# Patient Record
Sex: FEMALE | Race: BLACK OR AFRICAN AMERICAN | NOT HISPANIC OR LATINO | Employment: OTHER | URBAN - METROPOLITAN AREA
[De-identification: names, ages, dates, MRNs, and addresses within clinical notes are randomized per-mention and may not be internally consistent; named-entity substitution may affect disease eponyms.]

---

## 2023-01-17 ENCOUNTER — HOSPITAL ENCOUNTER (INPATIENT)
Dept: DATA CONVERSION | Facility: HOSPITAL | Age: 78
Discharge: NURSING, REHAB OR LONG-TERM CARE | End: 2023-01-23
Attending: ORTHOPAEDIC SURGERY | Admitting: ORTHOPAEDIC SURGERY
Payer: MEDICARE

## 2023-01-17 DIAGNOSIS — M50.00 CERVICAL DISC DISORDER WITH MYELOPATHY, UNSPECIFIED CERVICAL REGION: ICD-10-CM

## 2023-01-17 DIAGNOSIS — J44.9 CHRONIC OBSTRUCTIVE PULMONARY DISEASE, UNSPECIFIED (MULTI): ICD-10-CM

## 2023-01-17 DIAGNOSIS — Z86.718 PERSONAL HISTORY OF OTHER VENOUS THROMBOSIS AND EMBOLISM: ICD-10-CM

## 2023-01-17 DIAGNOSIS — Z20.822 CONTACT WITH AND (SUSPECTED) EXPOSURE TO COVID-19: ICD-10-CM

## 2023-01-17 DIAGNOSIS — Z87.891 PERSONAL HISTORY OF NICOTINE DEPENDENCE: ICD-10-CM

## 2023-01-17 DIAGNOSIS — D64.9 ANEMIA, UNSPECIFIED: ICD-10-CM

## 2023-01-17 DIAGNOSIS — F03.90 UNSPECIFIED DEMENTIA, UNSPECIFIED SEVERITY, WITHOUT BEHAVIORAL DISTURBANCE, PSYCHOTIC DISTURBANCE, MOOD DISTURBANCE, AND ANXIETY (MULTI): ICD-10-CM

## 2023-01-17 DIAGNOSIS — E11.9 TYPE 2 DIABETES MELLITUS WITHOUT COMPLICATIONS (MULTI): ICD-10-CM

## 2023-01-17 DIAGNOSIS — F32.A DEPRESSION, UNSPECIFIED: ICD-10-CM

## 2023-01-17 DIAGNOSIS — E66.9 OBESITY, UNSPECIFIED: ICD-10-CM

## 2023-01-17 DIAGNOSIS — Q76.412: ICD-10-CM

## 2023-01-17 DIAGNOSIS — M54.12 RADICULOPATHY, CERVICAL REGION: ICD-10-CM

## 2023-01-17 DIAGNOSIS — Z86.16 PERSONAL HISTORY OF COVID-19: ICD-10-CM

## 2023-01-17 DIAGNOSIS — M48.02 SPINAL STENOSIS, CERVICAL REGION: ICD-10-CM

## 2023-01-17 DIAGNOSIS — I10 ESSENTIAL (PRIMARY) HYPERTENSION: ICD-10-CM

## 2023-01-17 DIAGNOSIS — G99.2 MYELOPATHY IN DISEASES CLASSIFIED ELSEWHERE (MULTI): ICD-10-CM

## 2023-01-17 DIAGNOSIS — Z96.649 PRESENCE OF UNSPECIFIED ARTIFICIAL HIP JOINT: ICD-10-CM

## 2023-01-17 DIAGNOSIS — Z96.659 PRESENCE OF UNSPECIFIED ARTIFICIAL KNEE JOINT: ICD-10-CM

## 2023-01-17 LAB
PATIENT TEMPERATURE: 37 DEGREES C
PATIENT TEMPERATURE: 37 DEGREES C
POCT ANION GAP, ARTERIAL: 11 MMOL/L (ref 10–25)
POCT ANION GAP, ARTERIAL: 12 MMOL/L (ref 10–25)
POCT BASE EXCESS, ARTERIAL: -2.7 MMOL/L (ref -2–3)
POCT BASE EXCESS, ARTERIAL: -3.8 MMOL/L (ref -2–3)
POCT CHLORIDE, ARTERIAL: 107 MMOL/L (ref 98–107)
POCT CHLORIDE, ARTERIAL: 108 MMOL/L (ref 98–107)
POCT GLUCOSE, ARTERIAL: 149 MG/DL (ref 74–99)
POCT GLUCOSE, ARTERIAL: 164 MG/DL (ref 74–99)
POCT GLUCOSE: 139 MG/DL (ref 74–99)
POCT GLUCOSE: 175 MG/DL (ref 74–99)
POCT HCO3, ARTERIAL: 21.5 MMOL/L (ref 22–26)
POCT HCO3, ARTERIAL: 21.8 MMOL/L (ref 22–26)
POCT HEMATOCRIT, ARTERIAL: 32 % (ref 36–46)
POCT HEMATOCRIT, ARTERIAL: 32 % (ref 36–46)
POCT HEMOGLOBIN, ARTERIAL: 10.7 G/DL (ref 12–16)
POCT HEMOGLOBIN, ARTERIAL: 10.8 G/DL (ref 12–16)
POCT IONIZED CALCIUM, ARTERIAL: 1.28 MMOL/L (ref 1.1–1.33)
POCT IONIZED CALCIUM, ARTERIAL: 1.31 MMOL/L (ref 1.1–1.33)
POCT LACTATE, ARTERIAL: 0.7 MMOL/L (ref 0.4–2)
POCT LACTATE, ARTERIAL: 0.8 MMOL/L (ref 0.4–2)
POCT OXY HEMOGLOBIN, ARTERIAL: 94.5 % (ref 94–98)
POCT OXY HEMOGLOBIN, ARTERIAL: 95.8 % (ref 94–98)
POCT PCO2, ARTERIAL: 36 MMHG (ref 38–42)
POCT PCO2, ARTERIAL: 39 MMHG (ref 38–42)
POCT PH, ARTERIAL: 7.35 (ref 7.38–7.42)
POCT PH, ARTERIAL: 7.39 (ref 7.38–7.42)
POCT PO2, ARTERIAL: 76 MMHG (ref 85–95)
POCT PO2, ARTERIAL: 92 MMHG (ref 85–95)
POCT POTASSIUM, ARTERIAL: 4 MMOL/L (ref 3.5–5.3)
POCT POTASSIUM, ARTERIAL: 4.2 MMOL/L (ref 3.5–5.3)
POCT SO2, ARTERIAL: 97 % (ref 94–100)
POCT SO2, ARTERIAL: 99 % (ref 94–100)
POCT SODIUM, ARTERIAL: 136 MMOL/L (ref 136–145)
POCT SODIUM, ARTERIAL: 137 MMOL/L (ref 136–145)

## 2023-01-18 LAB
ANION GAP IN SER/PLAS: 16 MMOL/L (ref 10–20)
CALCIUM (MG/DL) IN SER/PLAS: 8.7 MG/DL (ref 8.6–10.6)
CARBON DIOXIDE, TOTAL (MMOL/L) IN SER/PLAS: 21 MMOL/L (ref 21–32)
CHLORIDE (MMOL/L) IN SER/PLAS: 105 MMOL/L (ref 98–107)
CREATININE (MG/DL) IN SER/PLAS: 0.73 MG/DL (ref 0.5–1.05)
ERYTHROCYTE DISTRIBUTION WIDTH (RATIO) BY AUTOMATED COUNT: 14.8 % (ref 11.5–14.5)
ERYTHROCYTE MEAN CORPUSCULAR HEMOGLOBIN CONCENTRATION (G/DL) BY AUTOMATED: 29.8 G/DL (ref 32–36)
ERYTHROCYTE MEAN CORPUSCULAR VOLUME (FL) BY AUTOMATED COUNT: 93 FL (ref 80–100)
ERYTHROCYTES (10*6/UL) IN BLOOD BY AUTOMATED COUNT: 3.81 X10E12/L (ref 4–5.2)
GFR FEMALE: 85 ML/MIN/1.73M2
GLUCOSE (MG/DL) IN SER/PLAS: 190 MG/DL (ref 74–99)
HEMATOCRIT (%) IN BLOOD BY AUTOMATED COUNT: 35.6 % (ref 36–46)
HEMOGLOBIN (G/DL) IN BLOOD: 10.6 G/DL (ref 12–16)
LEUKOCYTES (10*3/UL) IN BLOOD BY AUTOMATED COUNT: 12.8 X10E9/L (ref 4.4–11.3)
NRBC (PER 100 WBCS) BY AUTOMATED COUNT: 0 /100 WBC (ref 0–0)
PLATELETS (10*3/UL) IN BLOOD AUTOMATED COUNT: 314 X10E9/L (ref 150–450)
POTASSIUM (MMOL/L) IN SER/PLAS: 4.7 MMOL/L (ref 3.5–5.3)
SODIUM (MMOL/L) IN SER/PLAS: 137 MMOL/L (ref 136–145)
UREA NITROGEN (MG/DL) IN SER/PLAS: 15 MG/DL (ref 6–23)

## 2023-01-19 LAB
ABO GROUP (TYPE) IN BLOOD: NORMAL
ANION GAP IN SER/PLAS: 14 MMOL/L (ref 10–20)
ANION GAP IN SER/PLAS: 14 MMOL/L (ref 10–20)
ANTIBODY SCREEN: NORMAL
BASOPHILS (10*3/UL) IN BLOOD BY AUTOMATED COUNT: 0.03 X10E9/L (ref 0–0.1)
BASOPHILS/100 LEUKOCYTES IN BLOOD BY AUTOMATED COUNT: 0.3 % (ref 0–2)
CALCIUM (MG/DL) IN SER/PLAS: 8.8 MG/DL (ref 8.6–10.6)
CALCIUM (MG/DL) IN SER/PLAS: 8.9 MG/DL (ref 8.6–10.6)
CARBON DIOXIDE, TOTAL (MMOL/L) IN SER/PLAS: 23 MMOL/L (ref 21–32)
CARBON DIOXIDE, TOTAL (MMOL/L) IN SER/PLAS: 25 MMOL/L (ref 21–32)
CHLORIDE (MMOL/L) IN SER/PLAS: 104 MMOL/L (ref 98–107)
CHLORIDE (MMOL/L) IN SER/PLAS: 106 MMOL/L (ref 98–107)
CREATININE (MG/DL) IN SER/PLAS: 0.62 MG/DL (ref 0.5–1.05)
CREATININE (MG/DL) IN SER/PLAS: 0.69 MG/DL (ref 0.5–1.05)
EOSINOPHILS (10*3/UL) IN BLOOD BY AUTOMATED COUNT: 0.1 X10E9/L (ref 0–0.4)
EOSINOPHILS/100 LEUKOCYTES IN BLOOD BY AUTOMATED COUNT: 1 % (ref 0–6)
ERYTHROCYTE DISTRIBUTION WIDTH (RATIO) BY AUTOMATED COUNT: 14.6 % (ref 11.5–14.5)
ERYTHROCYTE DISTRIBUTION WIDTH (RATIO) BY AUTOMATED COUNT: 14.8 % (ref 11.5–14.5)
ERYTHROCYTE MEAN CORPUSCULAR HEMOGLOBIN CONCENTRATION (G/DL) BY AUTOMATED: 28.6 G/DL (ref 32–36)
ERYTHROCYTE MEAN CORPUSCULAR HEMOGLOBIN CONCENTRATION (G/DL) BY AUTOMATED: 31 G/DL (ref 32–36)
ERYTHROCYTE MEAN CORPUSCULAR VOLUME (FL) BY AUTOMATED COUNT: 90 FL (ref 80–100)
ERYTHROCYTE MEAN CORPUSCULAR VOLUME (FL) BY AUTOMATED COUNT: 97 FL (ref 80–100)
ERYTHROCYTES (10*6/UL) IN BLOOD BY AUTOMATED COUNT: 3.87 X10E12/L (ref 4–5.2)
ERYTHROCYTES (10*6/UL) IN BLOOD BY AUTOMATED COUNT: 3.87 X10E12/L (ref 4–5.2)
GFR FEMALE: 90 ML/MIN/1.73M2
GFR FEMALE: >90 ML/MIN/1.73M2
GLUCOSE (MG/DL) IN SER/PLAS: 108 MG/DL (ref 74–99)
GLUCOSE (MG/DL) IN SER/PLAS: 82 MG/DL (ref 74–99)
HEMATOCRIT (%) IN BLOOD BY AUTOMATED COUNT: 34.8 % (ref 36–46)
HEMATOCRIT (%) IN BLOOD BY AUTOMATED COUNT: 37.7 % (ref 36–46)
HEMOGLOBIN (G/DL) IN BLOOD: 10.8 G/DL (ref 12–16)
HEMOGLOBIN (G/DL) IN BLOOD: 10.8 G/DL (ref 12–16)
IMMATURE GRANULOCYTES/100 LEUKOCYTES IN BLOOD BY AUTOMATED COUNT: 0.4 % (ref 0–0.9)
LEUKOCYTES (10*3/UL) IN BLOOD BY AUTOMATED COUNT: 11.4 X10E9/L (ref 4.4–11.3)
LEUKOCYTES (10*3/UL) IN BLOOD BY AUTOMATED COUNT: 9.9 X10E9/L (ref 4.4–11.3)
LYMPHOCYTES (10*3/UL) IN BLOOD BY AUTOMATED COUNT: 1.68 X10E9/L (ref 0.8–3)
LYMPHOCYTES/100 LEUKOCYTES IN BLOOD BY AUTOMATED COUNT: 17 % (ref 13–44)
MONOCYTES (10*3/UL) IN BLOOD BY AUTOMATED COUNT: 1.35 X10E9/L (ref 0.05–0.8)
MONOCYTES/100 LEUKOCYTES IN BLOOD BY AUTOMATED COUNT: 13.6 % (ref 2–10)
NEUTROPHILS (10*3/UL) IN BLOOD BY AUTOMATED COUNT: 6.7 X10E9/L (ref 1.6–5.5)
NEUTROPHILS/100 LEUKOCYTES IN BLOOD BY AUTOMATED COUNT: 67.7 % (ref 40–80)
NRBC (PER 100 WBCS) BY AUTOMATED COUNT: 0 /100 WBC (ref 0–0)
NRBC (PER 100 WBCS) BY AUTOMATED COUNT: 0 /100 WBC (ref 0–0)
PLATELETS (10*3/UL) IN BLOOD AUTOMATED COUNT: 315 X10E9/L (ref 150–450)
PLATELETS (10*3/UL) IN BLOOD AUTOMATED COUNT: 346 X10E9/L (ref 150–450)
POTASSIUM (MMOL/L) IN SER/PLAS: 4 MMOL/L (ref 3.5–5.3)
POTASSIUM (MMOL/L) IN SER/PLAS: 4.3 MMOL/L (ref 3.5–5.3)
RH FACTOR: NORMAL
SODIUM (MMOL/L) IN SER/PLAS: 139 MMOL/L (ref 136–145)
SODIUM (MMOL/L) IN SER/PLAS: 139 MMOL/L (ref 136–145)
UREA NITROGEN (MG/DL) IN SER/PLAS: 14 MG/DL (ref 6–23)
UREA NITROGEN (MG/DL) IN SER/PLAS: 14 MG/DL (ref 6–23)

## 2023-01-20 LAB
ANION GAP IN SER/PLAS: 16 MMOL/L (ref 10–20)
CALCIUM (MG/DL) IN SER/PLAS: 9 MG/DL (ref 8.6–10.6)
CARBON DIOXIDE, TOTAL (MMOL/L) IN SER/PLAS: 22 MMOL/L (ref 21–32)
CHLORIDE (MMOL/L) IN SER/PLAS: 104 MMOL/L (ref 98–107)
CREATININE (MG/DL) IN SER/PLAS: 0.58 MG/DL (ref 0.5–1.05)
ERYTHROCYTE DISTRIBUTION WIDTH (RATIO) BY AUTOMATED COUNT: 14.8 % (ref 11.5–14.5)
ERYTHROCYTE MEAN CORPUSCULAR HEMOGLOBIN CONCENTRATION (G/DL) BY AUTOMATED: 29.2 G/DL (ref 32–36)
ERYTHROCYTE MEAN CORPUSCULAR VOLUME (FL) BY AUTOMATED COUNT: 95 FL (ref 80–100)
ERYTHROCYTES (10*6/UL) IN BLOOD BY AUTOMATED COUNT: 4.09 X10E12/L (ref 4–5.2)
GFR FEMALE: >90 ML/MIN/1.73M2
GLUCOSE (MG/DL) IN SER/PLAS: 50 MG/DL (ref 74–99)
HEMATOCRIT (%) IN BLOOD BY AUTOMATED COUNT: 38.7 % (ref 36–46)
HEMOGLOBIN (G/DL) IN BLOOD: 11.3 G/DL (ref 12–16)
LEUKOCYTES (10*3/UL) IN BLOOD BY AUTOMATED COUNT: 8.6 X10E9/L (ref 4.4–11.3)
NRBC (PER 100 WBCS) BY AUTOMATED COUNT: 0 /100 WBC (ref 0–0)
PATIENT TEMPERATURE: 37 DEGREES C
PLATELETS (10*3/UL) IN BLOOD AUTOMATED COUNT: 303 X10E9/L (ref 150–450)
POCT ANION GAP, ARTERIAL: 12 MMOL/L (ref 10–25)
POCT BASE EXCESS, ARTERIAL: -4.7 MMOL/L (ref -2–3)
POCT CHLORIDE, ARTERIAL: 103 MMOL/L (ref 98–107)
POCT GLUCOSE, ARTERIAL: 115 MG/DL (ref 74–99)
POCT GLUCOSE: 115 MG/DL (ref 74–99)
POCT HCO3, ARTERIAL: 21.6 MMOL/L (ref 22–26)
POCT HEMATOCRIT, ARTERIAL: 35 % (ref 36–46)
POCT HEMOGLOBIN, ARTERIAL: 11.6 G/DL (ref 12–16)
POCT IONIZED CALCIUM, ARTERIAL: 1.21 MMOL/L (ref 1.1–1.33)
POCT LACTATE, ARTERIAL: 1.3 MMOL/L (ref 0.4–2)
POCT OXY HEMOGLOBIN, ARTERIAL: 95.5 % (ref 94–98)
POCT PCO2, ARTERIAL: 44 MMHG (ref 38–42)
POCT PH, ARTERIAL: 7.3 (ref 7.38–7.42)
POCT PO2, ARTERIAL: 92 MMHG (ref 85–95)
POCT POTASSIUM, ARTERIAL: 4 MMOL/L (ref 3.5–5.3)
POCT SO2, ARTERIAL: 98 % (ref 94–100)
POCT SODIUM, ARTERIAL: 133 MMOL/L (ref 136–145)
POTASSIUM (MMOL/L) IN SER/PLAS: 3.8 MMOL/L (ref 3.5–5.3)
SODIUM (MMOL/L) IN SER/PLAS: 138 MMOL/L (ref 136–145)
UREA NITROGEN (MG/DL) IN SER/PLAS: 13 MG/DL (ref 6–23)

## 2023-01-21 LAB
ANION GAP IN SER/PLAS: 18 MMOL/L (ref 10–20)
CALCIUM (MG/DL) IN SER/PLAS: 8.6 MG/DL (ref 8.6–10.6)
CARBON DIOXIDE, TOTAL (MMOL/L) IN SER/PLAS: 23 MMOL/L (ref 21–32)
CHLORIDE (MMOL/L) IN SER/PLAS: 102 MMOL/L (ref 98–107)
CREATININE (MG/DL) IN SER/PLAS: 0.7 MG/DL (ref 0.5–1.05)
ERYTHROCYTE DISTRIBUTION WIDTH (RATIO) BY AUTOMATED COUNT: 14.6 % (ref 11.5–14.5)
ERYTHROCYTE MEAN CORPUSCULAR HEMOGLOBIN CONCENTRATION (G/DL) BY AUTOMATED: 30.2 G/DL (ref 32–36)
ERYTHROCYTE MEAN CORPUSCULAR VOLUME (FL) BY AUTOMATED COUNT: 94 FL (ref 80–100)
ERYTHROCYTES (10*6/UL) IN BLOOD BY AUTOMATED COUNT: 3.94 X10E12/L (ref 4–5.2)
GFR FEMALE: 90 ML/MIN/1.73M2
GLUCOSE (MG/DL) IN SER/PLAS: 104 MG/DL (ref 74–99)
HEMATOCRIT (%) IN BLOOD BY AUTOMATED COUNT: 37.1 % (ref 36–46)
HEMOGLOBIN (G/DL) IN BLOOD: 11.2 G/DL (ref 12–16)
LEUKOCYTES (10*3/UL) IN BLOOD BY AUTOMATED COUNT: 12.9 X10E9/L (ref 4.4–11.3)
NRBC (PER 100 WBCS) BY AUTOMATED COUNT: 0 /100 WBC (ref 0–0)
PLATELETS (10*3/UL) IN BLOOD AUTOMATED COUNT: 364 X10E9/L (ref 150–450)
POTASSIUM (MMOL/L) IN SER/PLAS: 4.5 MMOL/L (ref 3.5–5.3)
SODIUM (MMOL/L) IN SER/PLAS: 138 MMOL/L (ref 136–145)
UREA NITROGEN (MG/DL) IN SER/PLAS: 15 MG/DL (ref 6–23)

## 2023-01-22 LAB
ANION GAP IN SER/PLAS: 14 MMOL/L (ref 10–20)
CALCIUM (MG/DL) IN SER/PLAS: 8.5 MG/DL (ref 8.6–10.6)
CARBON DIOXIDE, TOTAL (MMOL/L) IN SER/PLAS: 26 MMOL/L (ref 21–32)
CHLORIDE (MMOL/L) IN SER/PLAS: 102 MMOL/L (ref 98–107)
CREATININE (MG/DL) IN SER/PLAS: 0.57 MG/DL (ref 0.5–1.05)
ERYTHROCYTE DISTRIBUTION WIDTH (RATIO) BY AUTOMATED COUNT: 14.5 % (ref 11.5–14.5)
ERYTHROCYTE MEAN CORPUSCULAR HEMOGLOBIN CONCENTRATION (G/DL) BY AUTOMATED: 30.7 G/DL (ref 32–36)
ERYTHROCYTE MEAN CORPUSCULAR VOLUME (FL) BY AUTOMATED COUNT: 90 FL (ref 80–100)
ERYTHROCYTES (10*6/UL) IN BLOOD BY AUTOMATED COUNT: 4 X10E12/L (ref 4–5.2)
GFR FEMALE: >90 ML/MIN/1.73M2
GLUCOSE (MG/DL) IN SER/PLAS: 87 MG/DL (ref 74–99)
HEMATOCRIT (%) IN BLOOD BY AUTOMATED COUNT: 36.1 % (ref 36–46)
HEMOGLOBIN (G/DL) IN BLOOD: 11.1 G/DL (ref 12–16)
LEUKOCYTES (10*3/UL) IN BLOOD BY AUTOMATED COUNT: 11.1 X10E9/L (ref 4.4–11.3)
NRBC (PER 100 WBCS) BY AUTOMATED COUNT: 0 /100 WBC (ref 0–0)
PLATELETS (10*3/UL) IN BLOOD AUTOMATED COUNT: 375 X10E9/L (ref 150–450)
POTASSIUM (MMOL/L) IN SER/PLAS: 4.1 MMOL/L (ref 3.5–5.3)
SODIUM (MMOL/L) IN SER/PLAS: 138 MMOL/L (ref 136–145)
UREA NITROGEN (MG/DL) IN SER/PLAS: 16 MG/DL (ref 6–23)

## 2023-01-23 LAB
ANION GAP IN SER/PLAS: 14 MMOL/L (ref 10–20)
CALCIUM (MG/DL) IN SER/PLAS: 8.3 MG/DL (ref 8.6–10.6)
CARBON DIOXIDE, TOTAL (MMOL/L) IN SER/PLAS: 26 MMOL/L (ref 21–32)
CHLORIDE (MMOL/L) IN SER/PLAS: 103 MMOL/L (ref 98–107)
CREATININE (MG/DL) IN SER/PLAS: 0.55 MG/DL (ref 0.5–1.05)
ERYTHROCYTE DISTRIBUTION WIDTH (RATIO) BY AUTOMATED COUNT: 14.7 % (ref 11.5–14.5)
ERYTHROCYTE MEAN CORPUSCULAR HEMOGLOBIN CONCENTRATION (G/DL) BY AUTOMATED: 31.1 G/DL (ref 32–36)
ERYTHROCYTE MEAN CORPUSCULAR VOLUME (FL) BY AUTOMATED COUNT: 91 FL (ref 80–100)
ERYTHROCYTES (10*6/UL) IN BLOOD BY AUTOMATED COUNT: 3.9 X10E12/L (ref 4–5.2)
GFR FEMALE: >90 ML/MIN/1.73M2
GLUCOSE (MG/DL) IN SER/PLAS: 102 MG/DL (ref 74–99)
HEMATOCRIT (%) IN BLOOD BY AUTOMATED COUNT: 35.4 % (ref 36–46)
HEMOGLOBIN (G/DL) IN BLOOD: 11 G/DL (ref 12–16)
LEUKOCYTES (10*3/UL) IN BLOOD BY AUTOMATED COUNT: 8.6 X10E9/L (ref 4.4–11.3)
NRBC (PER 100 WBCS) BY AUTOMATED COUNT: 0 /100 WBC (ref 0–0)
PLATELETS (10*3/UL) IN BLOOD AUTOMATED COUNT: 350 X10E9/L (ref 150–450)
POTASSIUM (MMOL/L) IN SER/PLAS: 4 MMOL/L (ref 3.5–5.3)
SARS-COV-2 RESULT: NOT DETECTED
SODIUM (MMOL/L) IN SER/PLAS: 139 MMOL/L (ref 136–145)
UREA NITROGEN (MG/DL) IN SER/PLAS: 13 MG/DL (ref 6–23)

## 2023-03-02 NOTE — PROGRESS NOTES
Service: Orthopaedics     Subjective Data:   TAMARA PALMER is a 77 year old Female who is Hospital Day # 6 and POD #2 for 1. C3-6 posterior fusion with lateral mass screws;2. ;3. ;4. ;5.     Complaining of neck and back pain. Eating javi crackers and ginger ale without difficulty. No headaches/chest pain/weakness/numbness.    Objective Data:     Objective Information:      T   P  R  BP   MAP  SpO2   Value  36.8  98  16  124/84      91%  Date/Time 1/22 7:53 1/22 7:53 1/22 7:53 1/22 7:53    1/22 7:53  Range  (36.5C - 36.9C )  (86 - 99 )  (16 - 18 )  (122 - 142 )/ (79 - 100 )    (91% - 100% )   As of 21-Jan-2023 10:45:00, patient is on 1 L/min of oxygen via room air.  Highest temp of 36.9 C was recorded at 1/21 6:01      Pain reported at 1/22 7:53: 7 = Severe    Physical Exam Narrative:  ·  Physical Exam:    · Physical Exam:  - Constitutional: No acute distress, although not cooperative with exam  - Eyes: EOM grossly intact  - Head/Neck: Trachea midline  - Respiratory/Thorax: Normal work of breathing  - Cardiovascular: RRR on peripheral palpation  - Gastrointestinal: Nondistended  - Psychological: Appropriate mood/behavior  - Skin: Warm and dry. Additional findings in musculoskeletal evaluation  - Musculoskeletal:  SILT BUE/BLE  C5: SILT   Deltoid 5/5 Left; 5/5 Right  C6: SILT   Poor understanding of what she needs to do, but moving wrists at least antigravity  C7: SILT   Triceps: 5/5 Left; 5/5 Right  C8: SILT   Finger flexion: 5/5 Left; 5/5 Right  T1: SILT    Interossei: Left exam limited by baseline contracture; 5/5 Right      L1: SILT       L2: SILT      Hip flexors 5/5 Left; 5/5 Right  L3: SILT      Knee extension 5/5 Left; 5/5 Right  L4: SILT      Tib Ant. (Dorsiflexion) 5/5 Left (limited by baseline contracture); 5/5 Right  L5: SILT      EHL5/5 Left; 5/5 Right  S1: SILT      Plantar flexion 5/5 Left; 5/5 Right    Patellar reflex: 2+   Bilaterally    Negative Johnson bilaterally   Negative Babinski  bilaterally      Recent Lab Results:    Results:    CBC: 1/22/2023 06:31              \     Hgb     /                              \     11.1 L    /  WBC  ----------------  Plt               11.1       ----------------    375              /     Hct     \                              /     36.1       \            RBC: 4.00     MCV: 90           BMP: 1/22/2023 06:31  NA+        Cl-     BUN  /                         138    102    16  /  --------------------------------  Glucose                ---------------------------  87    K+     HCO3-   Creat \                         4.1  26    0.57  \  Calcium : 8.5 L    Anion Gap : 14      Assessment and Plan:   Daily Risk Screen:  ·  Does patient have an indwelling urinary catheter? yes   ·  Plan for indwelling urinary catheter removal today? yes     Code Status:  ·  Code Status Full Code     Assessment:    77F PMHx dementia, cervical stenosis now s/p C5 corpectomy, C4-6 ACDF on 1/17, C4-6 PSF with lateral mass screws w/Dr. Robb.     PLAN:   Weightbearing Status: WBAT BLE  Precautions: Maintain soft collar at all times  Pain: PO/IV pain meds   Postoperative ABx for 24 hours completed  DVT PPx: SCDs, early ambulation, hold chemoprophylaxis   Dressing: soft dressing  Drain:DHemovac x 1 removed 1/22  [place]: none  FEN: MIVFs; HLIV with good PO intake  Diet: Regular diet   Pulm: IS every 2 hrs, maintain O2 sat >92%  Bowel Regimen: Senna, Colace  Decadron 4mg q6h x4 doses completed.   PT/OT consult    Dispo: Medically ready for SNF placement    Anthony Justice MD  Orthopaedic Surgery  Pager: [phone], Doc[facility] preferred    While admitted, this patient will be  followed by the Ortho Spine Team. Please reach out to the below residents with any questions (all available via doc halo).  1st call: Roberto Tolentino PGY-2 ([phone])  2nd call: Anthony Justice, PGY-4 ([phone])    Please call [phone] between 6PM and 6AM on week days and on weekends.          Attestation:   Note Completion:  I am a:   Resident/Fellow   Attending Attestation I saw and evaluated the patient.  I personally obtained the key and critical portions of the history and physical exam or was physically present for key and  critical portions performed by the resident/fellow. I reviewed the resident/fellow?s documentation and discussed the patient with the resident/fellow.  I agree with the resident/fellow?s medical decision making as documented in the note.     I personally evaluated the patient on 22-Jan-2023         Electronic Signatures:  Irvin Robb)  (Signed 23-Jan-2023 16:17)   Authored: Note Completion   Co-Signer: Service, Subjective Data, Objective Data, Assessment and Plan, Note Completion  [place] (Resident))  (Signed 22-Jan-2023 11:17)   Authored: Service, Subjective Data, Objective Data, Assessment  and Plan, Note Completion      Last Updated: 23-Jan-2023 16:17 by Irvin Robb)

## 2023-03-02 NOTE — H&P
History & Physical Reviewed:   I have reviewed the History and Physical dated:  20-Jan-2023   History and Physical reviewed and relevant findings noted. Patient examined to review pertinent physical  findings.: No significant changes   Home Medications Reviewed: no changes noted   Allergies Reviewed: no changes noted       ERAS (Enhanced Recovery After Surgery):  ·  ERAS Patient: no     Consent:   COVID-19 Consent:  ·  COVID-19 Risk Consent Surgeon has reviewed key risks related to the risk of josé COVID-19 and if they contract COVID-19 what the risks are.     Attestation:   Note Completion:  I am a:  Resident/Fellow   Attending Attestation I saw and evaluated the patient.  I personally obtained the key and critical portions of the history and physical exam or was physically present for key and  critical portions performed by the resident/fellow. I reviewed the resident/fellow?s documentation and discussed the patient with the resident/fellow.  I agree with the resident/fellow?s medical decision making as documented in the note.     I personally evaluated the patient on 20-Jan-2023         Electronic Signatures:  Irvin Robb)  (Signed 23-Jan-2023 16:18)   Authored: Note Completion   Co-Signer: History & Physical Reviewed, ERAS, Consent, Note Completion  Roberto Tolentino (Resident))  (Signed 20-Jan-2023 05:39)   Authored: History & Physical Reviewed, ERAS, Consent,  Note Completion      Last Updated: 23-Jan-2023 16:18 by Irvin Robb)

## 2023-03-02 NOTE — PROGRESS NOTES
Service: Orthopaedics     Subjective Data:   TAMARA PALMER is a 77 year old Female who is Hospital Day # 5 and POD #1 for 1. C3-6 posterior fusion with lateral mass screws;2. ;3. ;4. ;5.     Doing well postoperatively. No complaints. Fells well. No headaches/chest pain/weakness/numbness.    Objective Data:     Objective Information:      T   P  R  BP   MAP  SpO2   Value  36.6  90  16  141/92      100%  Date/Time 1/21 7:26 1/21 7:26 1/21 7:26 1/21 7:26    1/21 7:26  Range  (36.6C - 36.9C )  (75 - 93 )  (10 - 18 )  (127 - 150 )/ (85 - 99 )    (81% - 100% )   As of 20-Jan-2023 21:45:00, patient is on 2 L/min of oxygen via nasal cannula.  Highest temp of 36.9 C was recorded at 1/20 21:45      Pain reported at 1/21 6:01: 2 = Mild    Physical Exam Narrative:  ·  Physical Exam:    · Physical Exam:  - Constitutional: No acute distress, although not cooperative with exam  - Eyes: EOM grossly intact  - Head/Neck: Trachea midline  - Respiratory/Thorax: Normal work of breathing  - Cardiovascular: RRR on peripheral palpation  - Gastrointestinal: Nondistended  - Psychological: Appropriate mood/behavior  - Skin: Warm and dry. Additional findings in musculoskeletal evaluation  - Musculoskeletal:  SILT BUE/BLE  C5: SILT   Deltoid 5/5 Left; 5/5 Right  C6: SILT   Poor understanding of what she needs to do, but moving wrists at least antibragivy  C7: SILT   Triceps: 5/5 Left; 5/5 Right  C8: SILT   Finger flexion: 5/5 Left; 5/5 Right  T1: SILT    Interossei: Left exam limited by baseline contracture; 5/5 Right      L1: SILT       L2: SILT      Hip flexors 5/5 Left; 5/5 Right  L3: SILT      Knee extension 5/5 Left; 5/5 Right  L4: SILT      Tib Ant. (Dorsiflexion) 5/5 Left (limited by baseline contracture); 5/5 Right  L5: SILT      EHL5/5 Left; 5/5 Right  S1: SILT      Plantar flexion 5/5 Left; 5/5 Right    Patellar reflex: 2+   Bilaterally    Negative Johnson bilaterally   Negative Babinski bilaterally      Recent Lab  Results:    Results:      ---------- Recent Arterial Blood Gas Results----------     1/20/2023 09:23  pO2 92  pH 7.30  pCO2 44  SO2 98  Base Excess -4.7null    Assessment and Plan:   Daily Risk Screen:  ·  Does patient have an indwelling urinary catheter? yes   ·  Plan for indwelling urinary catheter removal today? yes     Code Status:  ·  Code Status Full Code     Assessment:    77F PMHx dementia, cervical stenosis now s/p C5 corpectomy, C4-6 ACDF on 1/17, C4-6 PSF with lateral mass screws w/Dr. Robb. Doing very well.     PLAN:   Weightbearing Status: WBAT BLE  Precautions: Maintain soft collar at all times  Pain: PO/IV pain meds   Postoperative ABx for 24 hours completed  DVT PPx: SCDs, early ambulation, hold chemoprophylaxis   Dressing: soft dressing  Drain:DHemovac x 1  [place]: will remove today   FEN: MIVFs; HLIV with good PO intake  Diet: Regular diet   Pulm: IS every 2 hrs, maintain O2 sat >92%  Bowel Regimen: Senna, Colace  Decadron 4mg q6h x4 doses completed.   PT/OT consult    Dispo: SNF pending drain removal, PT    Roberto Tolentino MD  Orthopaedic Surgery  PGY-2  Pager: [phone]  Available via [facility]    While admitted, this patient will be followed by the Ortho Spine Team. Please reach out to the below residents with any questions (all available via doc halo).  1st call: Roberto Tolentino PGY-2 ([phone])  2nd call: Anthony Justice, PGY-4 ([phone])    Please call [phone] between 6PM and 6AM on week days and on weekends.          Attestation:   Note Completion:  I am a:  Resident/Fellow   Attending Attestation I saw and evaluated the patient.  I personally obtained the key and critical portions of the history and physical exam or was physically present for key and  critical portions performed by the resident/fellow. I reviewed the resident/fellow?s documentation and discussed the patient with the resident/fellow.  I agree with the resident/fellow?s medical decision making as documented in the note.     I personally  evaluated the patient on 21-Jan-2023         Electronic Signatures:  Irvin Robb)  (Signed 23-Jan-2023 16:17)   Authored: Note Completion   Co-Signer: Service, Subjective Data, Objective Data, Assessment and Plan, Note Completion  Roberto Tolentino (Resident))  (Signed 21-Jan-2023 09:21)   Authored: Service, Subjective Data, Objective Data, Assessment  and Plan, Note Completion      Last Updated: 23-Jan-2023 16:17 by Irvin Robb)

## 2023-03-02 NOTE — PROGRESS NOTES
Service: Orthopaedics     Subjective Data:   TAMARA PALMER is a 77 year old Female who is Hospital Day # 7 and POD #3 for 1. C3-6 posterior fusion with lateral mass screws;2. ;3. ;4. ;5.     No overnight events. Patient feels well and is looking forward to DC to SNF today. No shortness of breath. No chest pain. No headaches.    Objective Data:     Objective Information:      T   P  R  BP   MAP  SpO2   Value  36.6  109  18  143/92      93%  Date/Time 1/23 11:06 1/23 11:06 1/23 11:06 1/23 11:06    1/23 11:06  Range  (36.3C - 36.9C )  (89 - 111 )  (16 - 19 )  (124 - 150 )/ (79 - 100 )    (90% - 97% )  Highest temp of 36.9 C was recorded at 1/22 4:30      Pain reported at 1/23 8:29: 9 = Severe    Physical Exam Narrative:  ·  Physical Exam:    · Physical Exam:  - Constitutional: No acute distress, although not cooperative with exam  - Eyes: EOM grossly intact  - Head/Neck: Trachea midline  - Respiratory/Thorax: Normal work of breathing  - Cardiovascular: RRR on peripheral palpation  - Gastrointestinal: Nondistended  - Psychological: Appropriate mood/behavior  - Skin: Warm and dry. Additional findings in musculoskeletal evaluation  - Musculoskeletal:  SILT BUE/BLE  C5: SILT   Deltoid 4/5 Left; 4/5 Right (pain limited)   C6: SILT   Still having difficulty understanding this movement, but moving wrists at least antigravity  C7: SILT   Triceps: 5/5 Left; 5/5 Right  C8: SILT   Finger flexion: 5/5 Left; 5/5 Right  T1: SILT    Interossei: Left exam limited by baseline contracture; 5/5 Right      L1: SILT       L2: SILT      Hip flexors 5/5 Left; 5/5 Right  L3: SILT      Knee extension 5/5 Left; 5/5 Right  L4: SILT      Tib Ant. (Dorsiflexion) 5/5 Left (limited by baseline contracture); 5/5 Right  L5: SILT      EHL5/5 Left; 5/5 Right  S1: SILT      Plantar flexion 5/5 Left; 5/5 Right    Patellar reflex: 2+   Bilaterally    Negative Johnson bilaterally   Negative Babinski bilaterally      Recent Lab  Results:    Results:    CBC: 1/23/2023 05:53              \     Hgb     /                              \     11.0 L    /  WBC  ----------------  Plt               8.6       ----------------    350              /     Hct     \                              /     35.4 L    \            RBC: 3.90 L    MCV: 91           BMP: 1/23/2023 05:53  NA+        Cl-     BUN  /                         139    103    13  /  --------------------------------  Glucose                ---------------------------  102 H    K+     HCO3-   Creat \                         4.0  26    0.55  \  Calcium : 8.3 L    Anion Gap : 14      Assessment and Plan:   Daily Risk Screen:  ·  Does patient have an indwelling urinary catheter? yes   ·  Plan for indwelling urinary catheter removal today? yes     Code Status:  ·  Code Status Full Code     Assessment:    77F PMHx dementia, cervical stenosis now s/p C5 corpectomy, C4-6 ACDF on 1/17, C4-6 PSF with lateral mass screws w/Dr. Robb. Doing well. Satting well on room air,  but is more tachycardic today. Could be due to patient's pain and recently started amlodipine. Having some calf tenderness LLE when pressing firmly, so will obtain DVT scan to rule out any vascular pathology in setting of recent surgery.    PLAN:   Weightbearing Status: WBAT BLE  Precautions: Maintain soft collar at all times  Pain: PO/IV pain meds   Postoperative ABx for 24 hours completed  DVT PPx: SCDs, early ambulation, hold chemoprophylaxis   Dressing: soft dressing  Drain: Hemovac x 1 removed 1/22  [place]: none  FEN: MIVFs; HLIV with good PO intake  Diet: Regular diet   Pulm: IS every 2 hrs, maintain O2 sat >92%  Bowel Regimen: Senna, Colace  Decadron 4mg q6h x4 doses completed.   PT/OT consult    Dispo: Medically ready for SNF placement pending result of DVT scan    Roberto Tolentino MD  Orthopaedic Surgery  PGY-2  Pager: [phone]  Available via [facility]    While admitted, this patient will be  followed by the Ortho Spine Team.  Please reach out to the below residents with any questions (all available via doc halo).  1st call: Roberto Tolentino PGY-2 ([phone])  2nd call: Anthony Justice, PGY-4 ([phone])    Please call [phone] between 6PM and 6AM on week days and on weekends.          Attestation:   Note Completion:  I am a:  Resident/Fellow   Attending Attestation I saw and evaluated the patient.  I personally obtained the key and critical portions of the history and physical exam or was physically present for key and  critical portions performed by the resident/fellow. I reviewed the resident/fellow?s documentation and discussed the patient with the resident/fellow.  I agree with the resident/fellow?s medical decision making as documented in the note.     I personally evaluated the patient on 23-Jan-2023         Electronic Signatures:  Irvin Robb)  (Signed 23-Jan-2023 16:17)   Authored: Note Completion   Co-Signer: Service, Subjective Data, Objective Data, Assessment and Plan, Note Completion  Roberto Tolentino (Resident))  (Signed 23-Jan-2023 14:07)   Authored: Service, Subjective Data, Objective Data, Assessment  and Plan, Note Completion      Last Updated: 23-Jan-2023 16:17 by Irvin Robb)

## 2023-03-02 NOTE — PROGRESS NOTES
Service: Orthopaedics     Subjective Data:   TAMARA PALMER is a 77 year old Female who is Hospital Day # 2 and POD #1 for 1. C4-6 ACDF;2. Partial corpectomy of C4 and C6;3. C5 corpectomy;4. ;5.     Patient reports pain is well controlled. Denies chest pain, shortness of breath, or nausea. Feeling comfortable overall. Some confusion (baseline dementia).    Objective Data:     Objective Information:      T   P  R  BP   MAP  SpO2   Value  36.6  88  18  116/75   94  99%  Date/Time 1/18 8:28 1/18 8:28 1/18 8:28 1/18 8:28  1/18 3:20 1/18 8:28  Range  (36.5C - 36.8C )  (88 - 95 )  (17 - 18 )  (110 - 134 )/ (75 - 85 )  (88 - 101 )  (97% - 99% )      Pain reported at 1/18 1:45: 5 = Moderate    Physical Exam Narrative:  ·  Physical Exam:    · Physical Exam:  - Constitutional: No acute distress, although not cooperative with exam  - Eyes: EOM grossly intact  - Head/Neck: Trachea midline  - Respiratory/Thorax: Normal work of breathing  - Cardiovascular: RRR on peripheral palpation  - Gastrointestinal: Nondistended  - Psychological: Appropriate mood/behavior  - Skin: Warm and dry. Additional findings in musculoskeletal evaluation  - Musculoskeletal:  SILT BUE/BLE  Grossly moving all motor groups antigravity or greater, does have restricted ROM of R ankle at baseline  Not willing to participate in exam further     Recent Lab Results:    Results:    CBC: 1/18/2023 06:08              \     Hgb     /                              \     10.6 L    /  WBC  ----------------  Plt               12.8 H    ----------------    314              /     Hct     \                              /     35.6 L    \            RBC: 3.81 L    MCV: 93           BMP: 1/18/2023 06:08  NA+        Cl-     BUN  /                         137    105    15  /  --------------------------------  Glucose                ---------------------------  190 H    K+     HCO3-   Creat \                         4.7  21    0.73  \  Calcium : 8.7     Anion  Gap : 16        ---------- Recent Arterial Blood Gas Results----------     1/17/2023 10:45  pO2 92  24 h range: ( 76 - 92 )  pH 7.35  24 h range: ( 7.35 - 7.39 )  pCO2 39  24 h range: ( 36 - 39 )  SO2 99  24 h range: ( 97 - 99 )  Base Excess -3.8  24 h range: ( -3.8 - -2.7 )null    Assessment and Plan:   Daily Risk Screen:  ·  Does patient have an indwelling urinary catheter? yes   ·  Plan for indwelling urinary catheter removal today? yes     Code Status:  ·  Code Status Full Code     Advance Care Planning:  Advance Care Planning: I evaluated the patient  and determined the patient's capacity to understand the risks, benefits and alternatives to treatment. I elicited the patient's goals for treatment and reviewed advance directives and medical orders for life sustaining treatment. The patient was given  an opportunity to review a blank advance directive as appropriate.     Assessment:    77F PMHx dementia, cervical stenosis now s/p C5 corpectomy, C4-6 ACDF on 1/17 w/Dr. Robb. Doing well. Plan for return to OR 1/20 for posterior fixation    PLAN:   Weightbearing Status: WBAT BLE  Precautions: Maintain soft collar at all times  Pain: PO/IV pain meds   Postoperative ABx for 24 hours  DVT PPx: SCDs, early ambulation, hold chemoprophylaxis in setting of recent spine surgery  Dressing: soft dressing  Drain: HV Drain x1, record output q8h, will generally remove when <50cc (lumbar) or <30cc (cervical) q shift x2-3 shifts, will maintain today   [place]: will remove POD1   FEN: MIVFs; HLIV with good PO intake  Diet: Clear liquid diet. Advance as tolerated if no nausea and + bowel sounds  Pulm: IS every 2 hrs, maintain O2 sat >92%  Bowel Regimen: Senna, Colace  Decadron 4mg q6h x4 doses to complete today  PT/OT consult    Dispo: pending hospital course after next surgery    Roberto Tolentino MD  Orthopaedic Surgery  PGY-2  Pager: [phone]  Available via [facility]    While admitted, this patient will be followed by the Ortho Spine Team.  Please reach out to the below residents with any questions (all available via doc halo).  1st call: Roberto Tolentino PGY-2 ([phone])  2nd call: Anthony Justice, PGY-4 ([phone])    Please call [phone] between 6PM and 6AM on week days and on weekends.          Attestation:   Note Completion:  I am a:  Resident/Fellow   Attending Attestation I saw and evaluated the patient.  I personally obtained the key and critical portions of the history and physical exam or was physically present for key and  critical portions performed by the resident/fellow. I reviewed the resident/fellow?s documentation and discussed the patient with the resident/fellow.  I agree with the resident/fellow?s medical decision making as documented in the note.     I personally evaluated the patient on 18-Jan-2023         Electronic Signatures:  Irvin Robb)  (Signed 23-Jan-2023 16:18)   Authored: Note Completion   Co-Signer: Service, Subjective Data, Objective Data, Assessment and Plan, Note Completion  Roberto Tolentino (Resident))  (Signed 18-Jan-2023 08:39)   Authored: Service, Subjective Data, Objective Data, Assessment  and Plan, Note Completion      Last Updated: 23-Jan-2023 16:18 by Irvin Robb)

## 2023-03-02 NOTE — PROGRESS NOTES
Service: Orthopaedics     Subjective Data:   TAMARA PALMER is a 77 year old Female who is Hospital Day # 3 and POD #2 for 1. C4-6 ACDF;2. Partial corpectomy of C4 and C6;3. C5 corpectomy;4. ;5.     Patient reports pain is well controlled. Denies chest pain, shortness of breath, or nausea. Feeling comfortable overall. Some confusion (baseline dementia). Overall doing well. Ready for surgery tomorrow.    Objective Data:     Objective Information:      T   P  R  BP   MAP  SpO2   Value  36.6  90  18  143/89   94  95%  Date/Time 1/19 21:26 1/19 21:26 1/19 21:26 1/19 21:26  1/18 3:20 1/19 21:26  Range  (36.5C - 37.5C )  (85 - 98 )  (17 - 19 )  (110 - 143 )/ (68 - 89 )  (94 - 101 )  (89% - 99% )  Highest temp of 37.5 C was recorded at 1/19 7:15      Pain reported at 1/19 8:44: 8 = Severe    Physical Exam Narrative:  ·  Physical Exam:    · Physical Exam:  - Constitutional: No acute distress, although not cooperative with exam  - Eyes: EOM grossly intact  - Head/Neck: Trachea midline  - Respiratory/Thorax: Normal work of breathing  - Cardiovascular: RRR on peripheral palpation  - Gastrointestinal: Nondistended  - Psychological: Appropriate mood/behavior  - Skin: Warm and dry. Additional findings in musculoskeletal evaluation  - Musculoskeletal:  SILT BUE/BLE  Grossly moving all motor groups antigravity or greater, does have restricted ROM of R ankle at baseline  Not willing to participate in exam further     Recent Lab Results:    Results:    CBC: 1/19/2023 21:14              \     Hgb     /                              \     10.8 L    /  WBC  ----------------  Plt               9.9       ----------------    315              /     Hct     \                              /     34.8 L    \            RBC: 3.87 L    MCV: 90     Neutrophil %: 67.7      BMP: 1/19/2023 21:14  NA+        Cl-     BUN  /                         139    104    14  /  --------------------------------  Glucose                 ---------------------------  82    K+     HCO3-   Creat \                         4.0  25    0.62  \  Calcium : 8.8     Anion Gap : 14      Assessment and Plan:   Daily Risk Screen:  ·  Does patient have an indwelling urinary catheter? yes   ·  Plan for indwelling urinary catheter removal today? yes     Code Status:  ·  Code Status Full Code     Assessment:    77F PMHx dementia, cervical stenosis now s/p C5 corpectomy, C4-6 ACDF on 1/17 w/Dr. Robb. Doing well. No new events. Plan for return to OR 1/20 for posterior fixation.    PLAN:   Weightbearing Status: WBAT BLE  Precautions: Maintain soft collar at all times  Pain: PO/IV pain meds   Postoperative ABx for 24 hours  DVT PPx: SCDs, early ambulation, hold chemoprophylaxis in setting of recent spine surgery  Dressing: soft dressing  Drain: HV Drain x1, record output q8h, will generally remove when <50cc (lumbar) or <30cc (cervical) q shift x2-3 shifts, will maintain today   [place]: will remove POD1   FEN: MIVFs; HLIV with good PO intake  Diet: Clear liquid diet. Advance as tolerated if no nausea and + bowel sounds  Pulm: IS every 2 hrs, maintain O2 sat >92%  Bowel Regimen: Senna, Colace  Decadron 4mg q6h x4 doses completed.   PT/OT consult    Dispo: pending hospital course after next surgery    Roberto Tolentino MD  Orthopaedic Surgery  PGY-2  Pager: [phone]  Available via Doc Halo    While admitted, this patient will be followed by the Ortho Spine Team. Please reach out to the below residents with any questions (all available via doc halo).  1st call: Roberto Tolentino PGY-2 ([phone])  2nd call: Anthony Justice, PGY-4 ([phone])    Please call [phone] between 6PM and 6AM on week days and on weekends.          Attestation:   Note Completion:  I am a:  Resident/Fellow   Attending Attestation I saw and evaluated the patient.  I personally obtained the key and critical portions of the history and physical exam or was physically present for key and  critical portions performed by the  resident/fellow. I reviewed the resident/fellow?s documentation and discussed the patient with the resident/fellow.  I agree with the resident/fellow?s medical decision making as documented in the note.     I personally evaluated the patient on 19-Jan-2023         Electronic Signatures:  Irvin Robb (MD)  (Signed 23-Jan-2023 16:18)   Authored: Note Completion   Co-Signer: Service, Subjective Data, Objective Data, Assessment and Plan, Note Completion  Roberto Tolentino (Resident))  (Signed 19-Jan-2023 23:35)   Authored: Service, Subjective Data, Objective Data, Assessment  and Plan, Note Completion      Last Updated: 23-Jan-2023 16:18 by Irvin Robb)

## 2023-03-02 NOTE — DISCHARGE SUMMARY
Send Summary:   Discharge Summary Providers:  Provider Role Provider Name   · Attending Irvin Robb   · Referring Irvin Robb   · Primary David Swift       Note Recipients: Irvin Robb MD Mark, Matthew, MD       Discharge:    Summary:   Admission Date: .17-Jan-2023 06:30:00   Discharge Date: 24-Jan-2023   Admission Reason: Cervical stenosis   Final Discharge Diagnoses: Cervical stenosis   Procedures: Date: 17-Jan-2023 14:55:00  Procedure Name: 1. C4-6 ACDF  2. Partial corpectomy of C4 and C6  3. C5 corpectomy  4.   5.    Date: 20-Jan-2023 10:58:00  Procedure Name: 1. C3-6 posterior fusion with lateral mass screws  2.   3.   4.   5.   Hospital Course:    77 year-old F who presented with cervical stenosis with radiculopathy. Patient is now s/p C5 corpectomy, C4-6 ACDF  1/17/23 and C4-C6 posterior perc fixation 1/20/23 by Dr. Robb. On the days of surgery, patient was identified in the pre-operative holding area and agreeable to proceed with surgery. Written consent was obtained.  Please see operative note for further  details of this procedure. The surgery went without complications. Patient received 24 hours of jaylen-operative antibiotics. Patient recovered in the PACU before transfer to a regular nursing floor. The patient was monitored as an inpatient postoperatively  for drain output, therapy, and pain control. The postoperative was pulled when output was appropriately low. The patient had good pain control, was voiding, and was neurovascularly stable at the time of discharge. Patient will follow-up with Dr. Robb in  3-4 weeks for post-operative visit.    Prescription for oxycodone 5mg or percocet 5mg-325mg q4-6 hr #42 provided due to exceedingly painful nature of surgical procedure. Patients undergoing these operations typically use 1-2 pills q4-6 hours for the first 1-2 weeks. The use will be monitored  postoperatively by Dr. Robb, and weaned appropriately during the recovery period.    Immunizations:     Immunizations:  01-Mar-2021   SARS-CoV-2 (COVID-19): Immunizations, 01-Mar-2021  29-Mar-2021   SARS-CoV-2 (COVID-19): Immunizations, 29-Mar-2021  14-Nov-2021   SARS-CoV-2 (COVID-19): Immunizations, 14-Nov-2021      Discharge Information:    and Continuing Care:   Lab Results - Pending:    None  Radiology Results - Pending: Xray Fluoro Spine at  17-Jan-2023 14:21:00   Discharge Instructions:    Activity:           activity with assistance   PROGRESSIVE MOBILIZATION W/WALKER OR CANE.          May shower..  WHEN NO DRAINAGE FROM INCISIONAL AREA          May not drive until follow-up visit.  or while taking narcotic medication          No pushing, pulling, or lifting objects greater than 10 pounds.            Weight-bearing Instructions: full weight bearing.            NO HEAVY HOUSE/YARD WORK    Nutrition/Diet:           resume normal diet          Diet Consistency/Texture:   You may need to start off eating softer foods and advance to more regular consistency foods as tolerated.    Wound Care:           Wound Site:   NECK (Anterior Cervical Spine)          Wound Type:   surgical incision          Change Dressing:   daily   UNTIL NO LONGER DRAINING, THEN YOU CAN LEAVE THE DRSG. OFF          Cleanse With:   soap and water,  ONCE THE INCISION IS NO LONGER DRAINING AND YOU ARE ABLE TO SHOWER.          Cover With:   4x4 gauze          Tape With:   paper tape          Instructions:   no lotions, creams, or tub soaks          Other Instructions:   Please do not remove steri strips, they will lift away from the skin on their own.          Wound Site:   NECK (Posterior Cervical Spine)          Wound Type:   surgical incision          Change Dressing:   daily          Cleanse With:   soap and water,  ONCE THE INCISION IS NO LONGER DRAINING AND YOU ARE ABLE TO SHOWER.          Cover With:   abdominal dressing          Tape With:   paper tape          Instructions:   no lotions, creams, or tub soaks          Other  Instructions:   There is a prineo dressing (Strip) over your incision, do not remove it with your dressing changes.  The dressing will slowly lift away from the skin over time.    Additional Orders:           Special Equipment:   cervical collar          Cervical Collar Instructions:   WEAR BRACE @ ALL TIMES, MAYBE OFF FOR HYGIENE/SHOWERING          Additional Instructions:   MAY USE HEAT OR ICE TO POSTERIOR NECK & SHOULDERS AS NEEDED FOR COMFORT     NO NSAIDS (ADVIL, IBUPROFEN, ALEVE... ETC.) FOR 3  MONTHS THEY HAVE A BAD EFFECT ON HEALING OF FUSION.     Rehab Services:           Occupational Therapy Orders:   Eval and Treat ([facility])   daily   ADL's          Physical Therapy Orders:   Eval and Treat ([facility])   daily   PROGRESSIVE MOBILIZATION W/WALKER OR CANE,NO BACK OR AGGRESSIVE LEG EXERCISES.DO NOT SET UP FOR OUTPT. P.T. AT D/C.    Care Recommendation:           I recommend that INPATIENT care is required at::   Skilled          Estimated Stay:   Convalescent stay < 30 days    Follow Up Appointments:    Follow-Up Appointment 01:           Physician/Dept/Service:   Dr. Irvin Robb/ Orthopaedic Surgery/ Spine          Reason for Referral:   Postoperative Follow Up Appointment          Scheduled Date/Time:   02-Mar-2023 10:00          [facility]   [facility] [address]          Phone Number:   Office: (April, Sec./[place]) - [phone]    Follow-Up Appointment 02:           Physician/Dept/Service:   SIMEON Marshall/ Orthopaedic Surgery/ Spine          Reason for Referral:   Postoperative Wound Check          Scheduled Date/Time:   09-Feb-2023 09:30          [facility]   [facility] [address] [facility] [address]          Phone Number:   [phone]    Discharge Medications: Home Medication   amLODIPine 2.5 mg oral tablet - 1 tab(s) oral once a day  DULoxetine 30 mg oral delayed release capsule - 1 cap(s) oral 2 times  a day  gabapentin 600 mg oral tablet - 1 tab(s) oral 3 times a day  losartan 50 mg oral tablet - 1 tab(s) oral once a day  topiramate 100 mg oral tablet - 1 tab(s) oral 2 times a day  acetaminophen 325 mg oral tablet - 2 tab(s) orally every 6 hours   Colace 100 mg oral capsule - 1 cap(s) orally 2 times a day while taking narcotic pain medication   MiraLax oral powder for reconstitution - 17 gram(s) orally 2 times a day for 14 days to prevent constipation   methocarbamol 500 mg oral tablet - 2 tab(s) orally every 8 hours to reduce muscle spasms     PRN Medication   oxyCODONE 5 mg oral tablet - Take 1 tab orally every 4 to 6 hours as needed for pain x 7 days     DNR Status:   ·  Code Status Code Status order at time of discharge: Full Code     Attestation:   Note Completion:  I am a:  Resident/Fellow   Attending Attestation I saw and evaluated the patient.  I personally obtained the key and critical portions of the history and physical exam or was physically present for key and  critical portions performed by the resident/fellow. I reviewed the resident/fellow?s documentation and discussed the patient with the resident/fellow.  I agree with the resident/fellow?s medical decision making as documented in the note.     I personally evaluated the patient on 23-Jan-2023         Electronic Signatures:  Irvin Robb)  (Signed 26-Jan-2023 15:50)   Authored: Note Completion   Co-Signer: Send Summary, Summary Content, Immunizations, Ongoing Care, DNR Status, Note Completion  Roberto Tolentino (Resident))  (Signed 24-Jan-2023 15:13)   Authored: Send Summary, Summary Content, Immunizations,  Ongoing Care, DNR Status, Note Completion      Last Updated: 26-Jan-2023 15:50 by Irvin Robb)

## 2023-03-02 NOTE — PROGRESS NOTES
Service: Orthopaedics     Subjective Data:   TAMARA PALMER is a 77 year old Female who is Hospital Day # 4 and POD #0 for 1. C3-6 posterior fusion with lateral mass screws;2. ;3. ;4. ;5.     Jah reports pain is well controlled, ready for OR today.    Objective Data:     Objective Information:      T   P  R  BP   MAP  SpO2   Value  36.7  85  14  150/99      99%  Date/Time 1/20 16:57 1/20 16:57 1/20 16:57 1/20 16:57    1/20 16:57  Range  (36.6C - 37.5C )  (84 - 98 )  (10 - 19 )  (110 - 150 )/ (80 - 99 )    (81% - 99% )   As of 20-Jan-2023 14:11:00, patient is on 2 L/min of oxygen via nasal cannula.  Highest temp of 37.5 C was recorded at 1/19 7:15      Pain reported at 1/20 16:34: 10 = Severe    Physical Exam Narrative:  ·  Physical Exam:    · Physical Exam:  - Constitutional: No acute distress, although not cooperative with exam  - Eyes: EOM grossly intact  - Head/Neck: Trachea midline  - Respiratory/Thorax: Normal work of breathing  - Cardiovascular: RRR on peripheral palpation  - Gastrointestinal: Nondistended  - Psychological: Appropriate mood/behavior  - Skin: Warm and dry. Additional findings in musculoskeletal evaluation  - Musculoskeletal:  SILT BUE/BLE  Grossly moving all motor groups antigravity or greater, does have restricted ROM of R ankle, R hand at baseline  Not willing to participate in exam further     Recent Lab Results:    Results:    CBC: 1/20/2023 05:54              \     Hgb     /                              \     11.3 L    /  WBC  ----------------  Plt               8.6       ----------------    303              /     Hct     \                              /     38.7       \            RBC: 4.09     MCV: 95     Neutrophil %: 67.7      BMP: 1/20/2023 05:54  NA+        Cl-     BUN  /                         138    104    13  /  --------------------------------  Glucose                ---------------------------  50 LL    K+     HCO3-   Creat \                         3.8  22     0.58  \  Calcium : 9.0     Anion Gap : 16        ---------- Recent Arterial Blood Gas Results----------     1/20/2023 09:23  pO2 92  pH 7.30  pCO2 44  SO2 98  Base Excess -4.7null    Assessment and Plan:   Daily Risk Screen:  ·  Does patient have an indwelling urinary catheter? yes   ·  Plan for indwelling urinary catheter removal today? yes     Code Status:  ·  Code Status Full Code     Assessment:    77F PMHx dementia, cervical stenosis now s/p C5 corpectomy, C4-6 ACDF on 1/17 w/Dr. Robb. Doing well. No new events. Return to OR today for posterior fixation.     PLAN:   Weightbearing Status: WBAT BLE  Precautions: Maintain soft collar at all times  Pain: PO/IV pain meds   Postoperative ABx for 24 hours completed  DVT PPx: SCDs, early ambulation, hold chemoprophylaxis in setting of recent spine surgery and in preparation for today's surgery   Dressing: soft dressing  Drain: Drain removed   [place]: will remove POD1   FEN: MIVFs; HLIV with good PO intake  Diet: NPO for OR today   Pulm: IS every 2 hrs, maintain O2 sat >92%  Bowel Regimen: Senna, Colace  Decadron 4mg q6h x4 doses completed.   PT/OT consult    Dispo: pending hospital course after next surgery    Roberto Tolentino MD  Orthopaedic Surgery  PGY-2  Pager: [phone]  Available via Doc Halo    While admitted, this patient will be followed by the Ortho Spine Team. Please reach out to the below residents with any questions (all available via doc halo).  1st call: Roberto Tolentino PGY-2 ([phone])  2nd call: Anthony Justice, PGY-4 ([phone])    Please call [phone] between 6PM and 6AM on week days and on weekends.          Attestation:   Note Completion:  I am a:  Resident/Fellow   Attending Attestation I saw and evaluated the patient.  I personally obtained the key and critical portions of the history and physical exam or was physically present for key and  critical portions performed by the resident/fellow. I reviewed the resident/fellow?s documentation and discussed the patient  with the resident/fellow.  I agree with the resident/fellow?s medical decision making as documented in the note.     I personally evaluated the patient on 20-Jan-2023         Electronic Signatures:  Irvin Robb (MD)  (Signed 23-Jan-2023 16:18)   Authored: Note Completion   Co-Signer: Service, Subjective Data, Objective Data, Assessment and Plan, Note Completion  Roberto Tolentino (Resident))  (Signed 20-Jan-2023 19:08)   Authored: Service, Subjective Data, Objective Data, Assessment  and Plan, Note Completion      Last Updated: 23-Jan-2023 16:18 by Irvin Robb)

## 2023-03-02 NOTE — H&P
History & Physical Reviewed:   I have reviewed the History and Physical dated:  10-Eze-2023   History and Physical reviewed and relevant findings noted. Patient examined to review pertinent physical  findings.: No significant changes   Home Medications Reviewed: no changes noted   Allergies Reviewed: no changes noted       ERAS (Enhanced Recovery After Surgery):  ·  ERAS Patient: no     Consent:   COVID-19 Consent:  ·  COVID-19 Risk Consent Surgeon has reviewed key risks related to the risk of josé COVID-19 and if they contract COVID-19 what the risks are.     Attestation:   Note Completion:  I am a:  Resident/Fellow   Attending Attestation I saw and evaluated the patient.  I personally obtained the key and critical portions of the history and physical exam or was physically present for key and  critical portions performed by the resident/fellow. I reviewed the resident/fellow?s documentation and discussed the patient with the resident/fellow.  I agree with the resident/fellow?s medical decision making as documented in the note.     I personally evaluated the patient on 17-Jan-2023         Electronic Signatures:  Irvin Robb)  (Signed 23-Jan-2023 16:19)   Authored: Note Completion   Co-Signer: History & Physical Reviewed, ERAS, Consent, Note Completion  Roberto Tolentino (Resident))  (Signed 17-Jan-2023 05:55)   Authored: History & Physical Reviewed, ERAS, Consent,  Note Completion      Last Updated: 23-Jan-2023 16:19 by Irvin Robb)

## 2023-03-10 ENCOUNTER — NURSING HOME VISIT (OUTPATIENT)
Dept: POST ACUTE CARE | Facility: EXTERNAL LOCATION | Age: 78
End: 2023-03-10
Payer: MEDICARE

## 2023-03-10 DIAGNOSIS — F32.A DEPRESSION, UNSPECIFIED DEPRESSION TYPE: ICD-10-CM

## 2023-03-10 DIAGNOSIS — F02.A0 MILD ALZHEIMER'S DEMENTIA OF OTHER ONSET, UNSPECIFIED WHETHER BEHAVIORAL, PSYCHOTIC, OR MOOD DISTURBANCE OR ANXIETY (MULTI): ICD-10-CM

## 2023-03-10 DIAGNOSIS — M50.00 CERVICAL DISC DISORDER WITH MYELOPATHY: Primary | ICD-10-CM

## 2023-03-10 DIAGNOSIS — I15.9 SECONDARY HYPERTENSION: ICD-10-CM

## 2023-03-10 DIAGNOSIS — G89.4 CHRONIC PAIN SYNDROME: ICD-10-CM

## 2023-03-10 DIAGNOSIS — M25.511 ACUTE PAIN OF RIGHT SHOULDER: ICD-10-CM

## 2023-03-10 DIAGNOSIS — E11.9 TYPE 2 DIABETES MELLITUS WITHOUT COMPLICATION, WITHOUT LONG-TERM CURRENT USE OF INSULIN (MULTI): ICD-10-CM

## 2023-03-10 DIAGNOSIS — R79.89 LOW VITAMIN D LEVEL: ICD-10-CM

## 2023-03-10 DIAGNOSIS — G30.8 MILD ALZHEIMER'S DEMENTIA OF OTHER ONSET, UNSPECIFIED WHETHER BEHAVIORAL, PSYCHOTIC, OR MOOD DISTURBANCE OR ANXIETY (MULTI): ICD-10-CM

## 2023-03-10 DIAGNOSIS — R26.81 GAIT INSTABILITY: ICD-10-CM

## 2023-03-10 DIAGNOSIS — J43.9 PULMONARY EMPHYSEMA, UNSPECIFIED EMPHYSEMA TYPE (MULTI): ICD-10-CM

## 2023-03-10 PROCEDURE — 99306 1ST NF CARE HIGH MDM 50: CPT | Performed by: INTERNAL MEDICINE

## 2023-03-10 NOTE — LETTER
Subjective  Chief complaint: Elida Benson is a 78 y.o. female who is a acute skilled care patient being seen and evaluated for multiple medical problems.  Patient presents for weakness    HPI:  77 year-old F who presented with cervical stenosis with radiculopathy. Patient is now s/p C5 corpectomy, C4-6 ACDF  1/17/23 and C4-C6 posterior perc fixation 1/20/23 by Dr. Robb. On the days of surgery, patient was identified in the pre-operative holding area and agreeable to proceed with surgery. Written consent was obtained.  Please see operative note for further  details of this procedure. The surgery went without complications. Patient received 24 hours of jaylen-operative antibiotics. Patient recovered in the PACU before transfer to a regular nursing floor. The patient was monitored as an inpatient postoperatively  for drain output, therapy, and pain control. The postoperative was pulled when output was appropriately low. The patient had good pain control, was voiding, and was neurovascularly stable at the time of discharge. Patient will follow-up with Dr. Robb in  3-4 weeks for post-operative visit.  Later on on March 3 patient was seen in the emergency room for shoulder pain concern about a stroke patient had a full work-up in the hospitals including CAT scan of the brain seen by neurology there was no concern about stroke with the MRI or the CT patient underwent physical therapy evaluation she was advised to go to skilled nursing facility for rehab for PT OT patient is discharged in stable condition as well as the shoulder she will follow-up with orthopedic as an outpatient.           Review of Systems  All systems reviewed and negative except for what was mentioned in the HPI    Past Medical History:   Diagnosis Date   • Encounter for other preprocedural examination 06/29/2018    Preoperative clearance   • Other malaise 04/21/2022    Physical deconditioning       Past Surgical History:   Procedure Laterality Date    • CARPAL TUNNEL RELEASE  08/27/2013    Neuroplasty Decompression Median Nerve At Carpal Tunnel   • KNEE ARTHROPLASTY  08/27/2013    Knee Arthroplasty   • TOTAL HIP ARTHROPLASTY  08/27/2013    Total Hip Replacement       Family history no history of back problems.    Does not smoke or drink.      Vital signs: 134/80-80-18    Objective  Physical Exam  Vitals reviewed.   Constitutional:       Appearance: Normal appearance.   HENT:      Head: Normocephalic and atraumatic.   Cardiovascular:      Rate and Rhythm: Normal rate and regular rhythm.   Pulmonary:      Effort: Pulmonary effort is normal.      Breath sounds: Normal breath sounds.   Abdominal:      General: Bowel sounds are normal.      Palpations: Abdomen is soft.   Musculoskeletal:      Cervical back: Neck supple.   Skin:     General: Skin is warm and dry.   Neurological:      General: No focal deficit present.      Mental Status: She is alert.   Psychiatric:         Mood and Affect: Mood normal.         Behavior: Behavior is cooperative.         Assessment/Plan  Patient basically was admitted to the hospitals for a shoulder pain and weakness stroke was ruled out by MRI patient referred to orthopedic surgery.  Was sent to skilled nursing facility for rehab  Problem List Items Addressed This Visit          Nervous    Cervical disc disorder with myelopathy - Primary     Follow-up with orthopedic         Chronic pain syndrome     Pain management         Dementia (CMS/HCC)     Continue home med            Respiratory    Chronic obstructive pulmonary disease (CMS/HCC)     Aerosol treatment            Circulatory    Hypertension     Continue home med            Musculoskeletal    Acute pain of right shoulder       Endocrine/Metabolic    Diabetes mellitus (CMS/HCC)     Monitor sugar            Other    Depression     Continue home med         Gait instability     Physical therapy         Low vitamin D level     Vitamin D          Medications, treatments, and labs  reviewed  Continue medications and treatments as listed in PCC

## 2023-03-11 NOTE — PROGRESS NOTES
Subjective   Chief complaint: Elida Benson is a 78 y.o. female who is a acute skilled care patient being seen and evaluated for multiple medical problems.  Patient presents for weakness    HPI:  77 year-old F who presented with cervical stenosis with radiculopathy. Patient is now s/p C5 corpectomy, C4-6 ACDF  1/17/23 and C4-C6 posterior perc fixation 1/20/23 by Dr. Robb. On the days of surgery, patient was identified in the pre-operative holding area and agreeable to proceed with surgery. Written consent was obtained.  Please see operative note for further  details of this procedure. The surgery went without complications. Patient received 24 hours of jaylen-operative antibiotics. Patient recovered in the PACU before transfer to a regular nursing floor. The patient was monitored as an inpatient postoperatively  for drain output, therapy, and pain control. The postoperative was pulled when output was appropriately low. The patient had good pain control, was voiding, and was neurovascularly stable at the time of discharge. Patient will follow-up with Dr. Robb in  3-4 weeks for post-operative visit.  Later on on March 3 patient was seen in the emergency room for shoulder pain concern about a stroke patient had a full work-up in the hospitals including CAT scan of the brain seen by neurology there was no concern about stroke with the MRI or the CT patient underwent physical therapy evaluation she was advised to go to skilled nursing facility for rehab for PT OT patient is discharged in stable condition as well as the shoulder she will follow-up with orthopedic as an outpatient.           Review of Systems  All systems reviewed and negative except for what was mentioned in the HPI    Past Medical History:   Diagnosis Date    Encounter for other preprocedural examination 06/29/2018    Preoperative clearance    Other malaise 04/21/2022    Physical deconditioning       Past Surgical History:   Procedure Laterality Date     CARPAL TUNNEL RELEASE  08/27/2013    Neuroplasty Decompression Median Nerve At Carpal Tunnel    KNEE ARTHROPLASTY  08/27/2013    Knee Arthroplasty    TOTAL HIP ARTHROPLASTY  08/27/2013    Total Hip Replacement       Family history no history of back problems.    Does not smoke or drink.      Vital signs: 134/80-80-18    Objective   Physical Exam  Vitals reviewed.   Constitutional:       Appearance: Normal appearance.   HENT:      Head: Normocephalic and atraumatic.   Cardiovascular:      Rate and Rhythm: Normal rate and regular rhythm.   Pulmonary:      Effort: Pulmonary effort is normal.      Breath sounds: Normal breath sounds.   Abdominal:      General: Bowel sounds are normal.      Palpations: Abdomen is soft.   Musculoskeletal:      Cervical back: Neck supple.   Skin:     General: Skin is warm and dry.   Neurological:      General: No focal deficit present.      Mental Status: She is alert.   Psychiatric:         Mood and Affect: Mood normal.         Behavior: Behavior is cooperative.         Assessment/Plan   Patient basically was admitted to the hospitals for a shoulder pain and weakness stroke was ruled out by MRI patient referred to orthopedic surgery.  Was sent to skilled nursing facility for rehab  Problem List Items Addressed This Visit          Nervous    Cervical disc disorder with myelopathy - Primary     Follow-up with orthopedic         Chronic pain syndrome     Pain management         Dementia (CMS/HCC)     Continue home med            Respiratory    Chronic obstructive pulmonary disease (CMS/HCC)     Aerosol treatment            Circulatory    Hypertension     Continue home med            Musculoskeletal    Acute pain of right shoulder       Endocrine/Metabolic    Diabetes mellitus (CMS/HCC)     Monitor sugar            Other    Depression     Continue home med         Gait instability     Physical therapy         Low vitamin D level     Vitamin D          Medications, treatments, and labs  reviewed  Continue medications and treatments as listed in PCC

## 2023-03-12 PROBLEM — M25.511 ACUTE PAIN OF RIGHT SHOULDER: Status: ACTIVE | Noted: 2023-03-12

## 2023-03-12 PROBLEM — J44.9 CHRONIC OBSTRUCTIVE PULMONARY DISEASE (MULTI): Status: ACTIVE | Noted: 2023-03-12

## 2023-03-12 PROBLEM — E11.9 DIABETES MELLITUS (MULTI): Status: ACTIVE | Noted: 2023-03-12

## 2023-03-12 PROBLEM — I10 HYPERTENSION: Status: ACTIVE | Noted: 2023-03-12

## 2023-03-12 PROBLEM — F03.90 DEMENTIA (MULTI): Status: ACTIVE | Noted: 2023-03-12

## 2023-03-12 PROBLEM — G89.4 CHRONIC PAIN SYNDROME: Status: ACTIVE | Noted: 2023-03-12

## 2023-03-12 PROBLEM — F32.A DEPRESSION: Status: ACTIVE | Noted: 2023-03-12

## 2023-03-12 PROBLEM — R79.89 LOW VITAMIN D LEVEL: Status: ACTIVE | Noted: 2023-03-12

## 2023-03-12 PROBLEM — R26.81 GAIT INSTABILITY: Status: ACTIVE | Noted: 2023-03-12

## 2023-03-12 PROBLEM — M50.00 CERVICAL DISC DISORDER WITH MYELOPATHY: Status: ACTIVE | Noted: 2023-03-12

## 2023-03-13 ENCOUNTER — NURSING HOME VISIT (OUTPATIENT)
Dept: POST ACUTE CARE | Facility: EXTERNAL LOCATION | Age: 78
End: 2023-03-13
Payer: MEDICARE

## 2023-03-13 DIAGNOSIS — R53.1 WEAKNESS: Primary | ICD-10-CM

## 2023-03-13 DIAGNOSIS — J43.9 PULMONARY EMPHYSEMA, UNSPECIFIED EMPHYSEMA TYPE (MULTI): ICD-10-CM

## 2023-03-13 DIAGNOSIS — G47.00 INSOMNIA, UNSPECIFIED TYPE: ICD-10-CM

## 2023-03-13 DIAGNOSIS — R26.81 GAIT INSTABILITY: ICD-10-CM

## 2023-03-13 DIAGNOSIS — G30.8 MILD ALZHEIMER'S DEMENTIA OF OTHER ONSET, UNSPECIFIED WHETHER BEHAVIORAL, PSYCHOTIC, OR MOOD DISTURBANCE OR ANXIETY (MULTI): ICD-10-CM

## 2023-03-13 DIAGNOSIS — M25.511 ACUTE PAIN OF RIGHT SHOULDER: ICD-10-CM

## 2023-03-13 DIAGNOSIS — F02.A0 MILD ALZHEIMER'S DEMENTIA OF OTHER ONSET, UNSPECIFIED WHETHER BEHAVIORAL, PSYCHOTIC, OR MOOD DISTURBANCE OR ANXIETY (MULTI): ICD-10-CM

## 2023-03-13 DIAGNOSIS — I15.9 SECONDARY HYPERTENSION: ICD-10-CM

## 2023-03-13 PROCEDURE — 99309 SBSQ NF CARE MODERATE MDM 30: CPT | Performed by: NURSE PRACTITIONER

## 2023-03-13 NOTE — LETTER
Patient: Elida Benson  : 1945    Encounter Date: 2023    Subjective  Chief complaint: Elida Benson is a 78 y.o. female who is a acute skilled care patient being seen and evaluated for weakness and insomnia    HPI:  3/13/2023 -patient at skilled nursing facility for therapy due to weakness after recent hospitalization.  Patient requires assist with ADLs and transfers.  She has complaint of difficulty sleeping at night.  She has not tried anything for it.  Denies constitutional symptoms.      Objective  Vital signs: 121/70  Physical Exam  Constitutional:       General: She is not in acute distress.  Eyes:      Extraocular Movements: Extraocular movements intact.   Cardiovascular:      Rate and Rhythm: Regular rhythm.   Pulmonary:      Effort: Pulmonary effort is normal.      Breath sounds: Normal breath sounds.   Abdominal:      General: Bowel sounds are normal.      Palpations: Abdomen is soft.   Musculoskeletal:      Cervical back: Neck supple.      Right lower leg: No edema.      Left lower leg: No edema.      Comments: Generalized weakness   Neurological:      Mental Status: She is alert.   Psychiatric:         Mood and Affect: Mood normal.         Behavior: Behavior is cooperative.         Assessment/Plan  Problem List Items Addressed This Visit       Acute pain of right shoulder     Pain controlled with current regime  Therapy to eval and treat         Chronic obstructive pulmonary disease (CMS/HCC)     Stable  On room air         Dementia (CMS/HCC)     Speech therapy for cognition         Gait instability     Therapy         Hypertension     Controlled  Continue to monitor BP         Insomnia     Start melatonin         Weakness - Primary     Continue therapy          Medications, treatments, and labs reviewed  Continue medications and treatments as listed in PCC        Electronically Signed By: MI Triplett   3/15/23  7:49 PM

## 2023-03-14 ENCOUNTER — NURSING HOME VISIT (OUTPATIENT)
Dept: POST ACUTE CARE | Facility: EXTERNAL LOCATION | Age: 78
End: 2023-03-14
Payer: MEDICARE

## 2023-03-14 DIAGNOSIS — I15.9 SECONDARY HYPERTENSION: ICD-10-CM

## 2023-03-14 DIAGNOSIS — F02.A0 MILD ALZHEIMER'S DEMENTIA OF OTHER ONSET, UNSPECIFIED WHETHER BEHAVIORAL, PSYCHOTIC, OR MOOD DISTURBANCE OR ANXIETY (MULTI): ICD-10-CM

## 2023-03-14 DIAGNOSIS — G30.8 MILD ALZHEIMER'S DEMENTIA OF OTHER ONSET, UNSPECIFIED WHETHER BEHAVIORAL, PSYCHOTIC, OR MOOD DISTURBANCE OR ANXIETY (MULTI): ICD-10-CM

## 2023-03-14 DIAGNOSIS — R53.1 WEAKNESS: Primary | ICD-10-CM

## 2023-03-14 DIAGNOSIS — J43.9 PULMONARY EMPHYSEMA, UNSPECIFIED EMPHYSEMA TYPE (MULTI): ICD-10-CM

## 2023-03-14 PROCEDURE — 99309 SBSQ NF CARE MODERATE MDM 30: CPT | Performed by: NURSE PRACTITIONER

## 2023-03-14 NOTE — LETTER
Patient: Elida Benson  : 1945    Date: 3/14/23    Subjective  Chief complaint: Elida Benson is a 78 y.o. female who is a acute skilled care patient being seen and evaluated for weakness    HPI:  3/14/23 -patient at skilled nursing facility for therapy due to weakness after recent hospitalization.  Patient is total dependent for mobility and requires wheelchair.  She requires moderate to minimal assist for transfers and moderate assist for bed mobility.  She is working in speech therapy for cognition.  No new concerns today.  Denies constitutional symptoms.      Objective  Vital signs: 109/71, 97.8, 82, 16, 96%  Physical Exam  Constitutional:       General: She is not in acute distress.  Eyes:      Extraocular Movements: Extraocular movements intact.   Cardiovascular:      Rate and Rhythm: Regular rhythm.   Pulmonary:      Effort: Pulmonary effort is normal.      Breath sounds: Normal breath sounds.   Abdominal:      General: Bowel sounds are normal.      Palpations: Abdomen is soft.   Musculoskeletal:      Cervical back: Neck supple.      Right lower leg: No edema.      Left lower leg: No edema.   Neurological:      Mental Status: She is alert.   Psychiatric:         Mood and Affect: Mood normal.         Behavior: Behavior is cooperative.         Assessment/Plan  Problem List Items Addressed This Visit       Chronic obstructive pulmonary disease (CMS/HCC)     Stable  On room air         Dementia (CMS/[facility])     Speech therapy for cognition         Hypertension     Controlled  Continue to monitor BP         Weakness - Primary     Continue therapy          Medications, treatments, and labs reviewed  Continue medications and treatments as listed in [facility]        PARAG Triplett-CNP

## 2023-03-14 NOTE — PROGRESS NOTES
Subjective   Chief complaint: Elida Benson is a 78 y.o. female who is a acute skilled care patient being seen and evaluated for weakness    HPI:  3/14/23 -patient at skilled nursing facility for therapy due to weakness after recent hospitalization.  Patient is total dependent for mobility and requires wheelchair.  She requires moderate to minimal assist for transfers and moderate assist for bed mobility.  She is working in speech therapy for cognition.  No new concerns today.  Denies constitutional symptoms.      Objective   Vital signs: 109/71, 97.8, 82, 16, 96%  Physical Exam  Constitutional:       General: She is not in acute distress.  Eyes:      Extraocular Movements: Extraocular movements intact.   Cardiovascular:      Rate and Rhythm: Regular rhythm.   Pulmonary:      Effort: Pulmonary effort is normal.      Breath sounds: Normal breath sounds.   Abdominal:      General: Bowel sounds are normal.      Palpations: Abdomen is soft.   Musculoskeletal:      Cervical back: Neck supple.      Right lower leg: No edema.      Left lower leg: No edema.   Neurological:      Mental Status: She is alert.   Psychiatric:         Mood and Affect: Mood normal.         Behavior: Behavior is cooperative.         Assessment/Plan   Problem List Items Addressed This Visit       Chronic obstructive pulmonary disease (CMS/[facility])     Stable  On room air         Dementia (CMS/[facility])     Speech therapy for cognition         Hypertension     Controlled  Continue to monitor BP         Weakness - Primary     Continue therapy          Medications, treatments, and labs reviewed  Continue medications and treatments as listed in PCC

## 2023-03-15 ENCOUNTER — NURSING HOME VISIT (OUTPATIENT)
Dept: POST ACUTE CARE | Facility: EXTERNAL LOCATION | Age: 78
End: 2023-03-15
Payer: MEDICARE

## 2023-03-15 DIAGNOSIS — E11.9 TYPE 2 DIABETES MELLITUS WITHOUT COMPLICATION, WITHOUT LONG-TERM CURRENT USE OF INSULIN (MULTI): ICD-10-CM

## 2023-03-15 DIAGNOSIS — I15.9 SECONDARY HYPERTENSION: ICD-10-CM

## 2023-03-15 DIAGNOSIS — R53.1 WEAKNESS: ICD-10-CM

## 2023-03-15 PROBLEM — G47.00 INSOMNIA: Status: ACTIVE | Noted: 2023-03-15

## 2023-03-15 PROCEDURE — 99309 SBSQ NF CARE MODERATE MDM 30: CPT | Performed by: INTERNAL MEDICINE

## 2023-03-15 NOTE — LETTER
Patient: Elida Benson  : 1945    Encounter Date: 03/15/2023    Subjective  Chief complaint: Elida Benson is a 78 y.o. female who is a acute skilled care patient being seen and evaluated for multiple medical problems.  Patient presents for .  General medical care and follow-up    HPI:  Patient presents for general medical care and f/u.  Patient seen and examined at bedside.  No issues per nursing.  Patient has no acute complaints.  Patient has dementia and requires assist with ADLs.  Denies chest pain and headache.  Patient denies vision changes, excessive thirst, sweating, urinary frequency.  Patient is working in therapy and requires moderate assistance for transfers ADLs and mobility.  Pain controlled.  No acute distress.          Review of Systems  All systems reviewed and negative except for what was mentioned in the HPI    Vital signs: 115/70    Objective  Physical Exam  Constitutional:       General: She is not in acute distress.  Eyes:      Extraocular Movements: Extraocular movements intact.   Cardiovascular:      Rate and Rhythm: Regular rhythm.   Pulmonary:      Effort: Pulmonary effort is normal.      Breath sounds: Normal breath sounds.   Abdominal:      General: Bowel sounds are normal.      Palpations: Abdomen is soft.   Musculoskeletal:      Cervical back: Neck supple.      Right lower leg: No edema.      Left lower leg: No edema.   Neurological:      Mental Status: She is alert.   Psychiatric:         Mood and Affect: Mood normal.         Behavior: Behavior is cooperative.         Assessment/Plan  Problem List Items Addressed This Visit          Circulatory    Hypertension     Controlled   monitor BP            Endocrine/Metabolic    Diabetes mellitus (CMS/[facility])     Monitor blood sugars            Other    Weakness     Continue therapy          Medications, treatments, and labs reviewed  Continue medications and treatments as listed in [facility]    Scribe Attestation  By signing my name  below, I, Hector Arnoldibkelsie   attest that this documentation has been prepared under the direction and in the presence of Sean Cloud MD.    Provider Attestation - Scribe documentation  All medical record entries made by the Scribe were at my direction and personally dictated by me. I have reviewed the chart and agree that the record accurately reflects my personal performance of the history, physical exam, discussion and plan.      Electronically Signed By: Sean Cloud MD   3/20/23  4:50 PM

## 2023-03-15 NOTE — PROGRESS NOTES
Subjective   Chief complaint: Elida Benson is a 78 y.o. female who is a acute skilled care patient being seen and evaluated for weakness and insomnia    HPI:  3/13/2023 -patient at skilled nursing facility for therapy due to weakness after recent hospitalization.  Patient requires assist with ADLs and transfers.  She has complaint of difficulty sleeping at night.  She has not tried anything for it.  Denies constitutional symptoms.      Objective   Vital signs: 121/70  Physical Exam  Constitutional:       General: She is not in acute distress.  Eyes:      Extraocular Movements: Extraocular movements intact.   Cardiovascular:      Rate and Rhythm: Regular rhythm.   Pulmonary:      Effort: Pulmonary effort is normal.      Breath sounds: Normal breath sounds.   Abdominal:      General: Bowel sounds are normal.      Palpations: Abdomen is soft.   Musculoskeletal:      Cervical back: Neck supple.      Right lower leg: No edema.      Left lower leg: No edema.      Comments: Generalized weakness   Neurological:      Mental Status: She is alert.   Psychiatric:         Mood and Affect: Mood normal.         Behavior: Behavior is cooperative.         Assessment/Plan   Problem List Items Addressed This Visit       Acute pain of right shoulder     Pain controlled with current regime  Therapy to eval and treat         Chronic obstructive pulmonary disease (CMS/HCC)     Stable  On room air         Dementia (CMS/HCC)     Speech therapy for cognition         Gait instability     Therapy         Hypertension     Controlled  Continue to monitor BP         Insomnia     Start melatonin         Weakness - Primary     Continue therapy          Medications, treatments, and labs reviewed  Continue medications and treatments as listed in P[facility]

## 2023-03-16 ENCOUNTER — NURSING HOME VISIT (OUTPATIENT)
Dept: POST ACUTE CARE | Facility: EXTERNAL LOCATION | Age: 78
End: 2023-03-16
Payer: MEDICARE

## 2023-03-16 DIAGNOSIS — R53.1 WEAKNESS: Primary | ICD-10-CM

## 2023-03-16 DIAGNOSIS — I15.9 SECONDARY HYPERTENSION: ICD-10-CM

## 2023-03-16 DIAGNOSIS — G30.8 MILD ALZHEIMER'S DEMENTIA OF OTHER ONSET, UNSPECIFIED WHETHER BEHAVIORAL, PSYCHOTIC, OR MOOD DISTURBANCE OR ANXIETY (MULTI): ICD-10-CM

## 2023-03-16 DIAGNOSIS — F02.A0 MILD ALZHEIMER'S DEMENTIA OF OTHER ONSET, UNSPECIFIED WHETHER BEHAVIORAL, PSYCHOTIC, OR MOOD DISTURBANCE OR ANXIETY (MULTI): ICD-10-CM

## 2023-03-16 DIAGNOSIS — J43.9 PULMONARY EMPHYSEMA, UNSPECIFIED EMPHYSEMA TYPE (MULTI): ICD-10-CM

## 2023-03-16 PROCEDURE — 99309 SBSQ NF CARE MODERATE MDM 30: CPT | Performed by: NURSE PRACTITIONER

## 2023-03-16 NOTE — LETTER
Patient: Elida Benson  : 1945    Encounter Date: 2023    Progress Note  Subjective  Chief complaint: Elida Benson is a 78 y.o. female who is a acute skilled care patient being seen and evaluated for weakness    HPI:  3/14/23 -patient at skilled nursing facility for therapy due to weakness after recent hospitalization.  Patient is total dependent for mobility and requires wheelchair.  She requires moderate to minimal assist for transfers and moderate assist for bed mobility.  She is working in speech therapy for cognition.  No new concerns today.  Denies constitutional symptoms.    3/16/2023 therapy continues to work with the patient.  She is totally dependent for mobility and requires assist with ADLs.  She has no new concerns today.  Denies constitutional symptoms.      Objective  Vital signs: 104/57, 18, 97.5, 90, 94%  Physical Exam  Constitutional:       General: She is not in acute distress.  Eyes:      Extraocular Movements: Extraocular movements intact.   Cardiovascular:      Rate and Rhythm: Regular rhythm.   Pulmonary:      Effort: Pulmonary effort is normal.      Breath sounds: Normal breath sounds.   Abdominal:      General: Bowel sounds are normal.      Palpations: Abdomen is soft.   Musculoskeletal:      Cervical back: Neck supple.      Right lower leg: No edema.      Left lower leg: No edema.      Comments: Generalized weakness   Neurological:      Mental Status: She is alert.   Psychiatric:         Mood and Affect: Mood normal.         Behavior: Behavior is cooperative.         Assessment/Plan  Problem List Items Addressed This Visit       Chronic obstructive pulmonary disease (CMS/HCC)     Stable  On room air         Dementia (CMS/[facility])     Speech therapy for cognition         Hypertension     Controlled  Continue to monitor BP         Weakness - Primary     Continue therapy          Medications, treatments, and labs reviewed  Continue medications and treatments as listed in  PCC        Electronically Signed By: MI Triplett   3/18/23  7:43 PM

## 2023-03-17 ENCOUNTER — NURSING HOME VISIT (OUTPATIENT)
Dept: POST ACUTE CARE | Facility: EXTERNAL LOCATION | Age: 78
End: 2023-03-17
Payer: MEDICARE

## 2023-03-17 DIAGNOSIS — R53.1 WEAKNESS: ICD-10-CM

## 2023-03-17 PROCEDURE — 99308 SBSQ NF CARE LOW MDM 20: CPT | Performed by: INTERNAL MEDICINE

## 2023-03-17 NOTE — LETTER
Patient: Elida Benson  : 1945    Encounter Date: 2023    Subjective  Chief complaint: Elida Benson is a 78 y.o. female who is a acute skilled care patient being seen and evaluated for weakness    HPI:  Patient working in therapy and requires assistance for all transfers and ADL's. She is getting stronger and has no new concerns at this time.         Review of Systems  All systems reviewed and negative except for what was mentioned in the HPI    Vital signs: 120/80    Objective  Physical Exam  Constitutional:       General: She is not in acute distress.  Eyes:      Extraocular Movements: Extraocular movements intact.   Cardiovascular:      Rate and Rhythm: Regular rhythm.   Pulmonary:      Effort: Pulmonary effort is normal.      Breath sounds: Normal breath sounds.   Abdominal:      General: Bowel sounds are normal.      Palpations: Abdomen is soft.   Musculoskeletal:      Cervical back: Neck supple.      Right lower leg: No edema.      Left lower leg: No edema.   Neurological:      Mental Status: She is alert.   Psychiatric:         Mood and Affect: Mood normal.         Behavior: Behavior is cooperative.         Assessment/Plan  Problem List Items Addressed This Visit          Other    Weakness     Continue therapy          Medications, treatments, and labs reviewed  Continue medications and treatments as listed in [facility]    Scribe Attestation  By signing my name below, IHelen Scribkelsie   attest that this documentation has been prepared under the direction and in the presence of Sean Cloud MD.    Provider Attestation - Scribe documentation  All medical record entries made by the Scribe were at my direction and personally dictated by me. I have reviewed the chart and agree that the record accurately reflects my personal performance of the history, physical exam, discussion and plan.      Electronically Signed By: Sean Cloud MD   3/21/23  3:07 PM

## 2023-03-17 NOTE — PROGRESS NOTES
Progress Note  Subjective   Chief complaint: Elida Benson is a 78 y.o. female who is a acute skilled care patient being seen and evaluated for weakness    HPI:  3/14/23 -patient at skilled nursing facility for therapy due to weakness after recent hospitalization.  Patient is total dependent for mobility and requires wheelchair.  She requires moderate to minimal assist for transfers and moderate assist for bed mobility.  She is working in speech therapy for cognition.  No new concerns today.  Denies constitutional symptoms.    3/16/2023 therapy continues to work with the patient.  She is totally dependent for mobility and requires assist with ADLs.  She has no new concerns today.  Denies constitutional symptoms.      Objective   Vital signs: 104/57, 18, 97.5, 90, 94%  Physical Exam  Constitutional:       General: She is not in acute distress.  Eyes:      Extraocular Movements: Extraocular movements intact.   Cardiovascular:      Rate and Rhythm: Regular rhythm.   Pulmonary:      Effort: Pulmonary effort is normal.      Breath sounds: Normal breath sounds.   Abdominal:      General: Bowel sounds are normal.      Palpations: Abdomen is soft.   Musculoskeletal:      Cervical back: Neck supple.      Right lower leg: No edema.      Left lower leg: No edema.      Comments: Generalized weakness   Neurological:      Mental Status: She is alert.   Psychiatric:         Mood and Affect: Mood normal.         Behavior: Behavior is cooperative.         Assessment/Plan   Problem List Items Addressed This Visit       Chronic obstructive pulmonary disease (CMS/[facility])     Stable  On room air         Dementia (CMS/[facility])     Speech therapy for cognition         Hypertension     Controlled  Continue to monitor BP         Weakness - Primary     Continue therapy          Medications, treatments, and labs reviewed  Continue medications and treatments as listed in P[facility]

## 2023-03-20 ENCOUNTER — NURSING HOME VISIT (OUTPATIENT)
Dept: POST ACUTE CARE | Facility: EXTERNAL LOCATION | Age: 78
End: 2023-03-20
Payer: MEDICARE

## 2023-03-20 DIAGNOSIS — F02.A0 MILD ALZHEIMER'S DEMENTIA OF OTHER ONSET, UNSPECIFIED WHETHER BEHAVIORAL, PSYCHOTIC, OR MOOD DISTURBANCE OR ANXIETY (MULTI): ICD-10-CM

## 2023-03-20 DIAGNOSIS — J43.9 PULMONARY EMPHYSEMA, UNSPECIFIED EMPHYSEMA TYPE (MULTI): ICD-10-CM

## 2023-03-20 DIAGNOSIS — G30.8 MILD ALZHEIMER'S DEMENTIA OF OTHER ONSET, UNSPECIFIED WHETHER BEHAVIORAL, PSYCHOTIC, OR MOOD DISTURBANCE OR ANXIETY (MULTI): ICD-10-CM

## 2023-03-20 DIAGNOSIS — I15.9 SECONDARY HYPERTENSION: ICD-10-CM

## 2023-03-20 DIAGNOSIS — R53.1 WEAKNESS: Primary | ICD-10-CM

## 2023-03-20 DIAGNOSIS — G47.00 INSOMNIA, UNSPECIFIED TYPE: ICD-10-CM

## 2023-03-20 PROCEDURE — 99309 SBSQ NF CARE MODERATE MDM 30: CPT | Performed by: NURSE PRACTITIONER

## 2023-03-20 NOTE — PROGRESS NOTES
Subjective   Chief complaint: Elida Benson is a 78 y.o. female who is a acute skilled care patient being seen and evaluated for multiple medical problems.  Patient presents for .  General medical care and follow-up    HPI:  Patient presents for general medical care and f/u.  Patient seen and examined at bedside.  No issues per nursing.  Patient has no acute complaints.  Patient has dementia and requires assist with ADLs.  Denies chest pain and headache.  Patient denies vision changes, excessive thirst, sweating, urinary frequency.  Patient is working in therapy and requires moderate assistance for transfers ADLs and mobility.  Pain controlled.  No acute distress.          Review of Systems  All systems reviewed and negative except for what was mentioned in the HPI    Vital signs: 115/70    Objective   Physical Exam  Constitutional:       General: She is not in acute distress.  Eyes:      Extraocular Movements: Extraocular movements intact.   Cardiovascular:      Rate and Rhythm: Regular rhythm.   Pulmonary:      Effort: Pulmonary effort is normal.      Breath sounds: Normal breath sounds.   Abdominal:      General: Bowel sounds are normal.      Palpations: Abdomen is soft.   Musculoskeletal:      Cervical back: Neck supple.      Right lower leg: No edema.      Left lower leg: No edema.   Neurological:      Mental Status: She is alert.   Psychiatric:         Mood and Affect: Mood normal.         Behavior: Behavior is cooperative.         Assessment/Plan   Problem List Items Addressed This Visit          Circulatory    Hypertension     Controlled   monitor BP            Endocrine/Metabolic    Diabetes mellitus (CMS/[facility])     Monitor blood sugars            Other    Weakness     Continue therapy          Medications, treatments, and labs reviewed  Continue medications and treatments as listed in P[facility]    Scribe Attestation  By signing my name below, IHelen, Scribe   attest that this documentation has been  prepared under the direction and in the presence of Sean Cloud MD.    Provider Attestation - Scribe documentation  All medical record entries made by the Scribe were at my direction and personally dictated by me. I have reviewed the chart and agree that the record accurately reflects my personal performance of the history, physical exam, discussion and plan.

## 2023-03-20 NOTE — LETTER
Patient: Elida Benson  : 1945    Encounter Date: 2023    PROGRESS NOTE    Subjective  Chief complaint: Elida Benson is a 78 y.o. female who is a acute skilled care patient being seen and evaluated for weakness.    HPI:  3/14/23 -patient at skilled nursing facility for therapy due to weakness after recent hospitalization.  Patient is total dependent for mobility and requires wheelchair.  She requires moderate to minimal assist for transfers and moderate assist for bed mobility.  She is working in speech therapy for cognition.  No new concerns today.  Denies constitutional symptoms.    3/16/2023 therapy continues to work with the patient.  She is totally dependent for mobility and requires assist with ADLs.  She has no new concerns today.  Denies constitutional symptoms.    3/20/23 patient to skilled nursing facility for therapy.  She requires assist with all ADLs and transfers.  States that she is feeling a lot better today.  She is sleeping well at night.  Denies constitutional symptoms.      Objective  Vital signs: 115/70  Physical Exam  Constitutional:       General: She is not in acute distress.  Eyes:      Extraocular Movements: Extraocular movements intact.   Cardiovascular:      Rate and Rhythm: Regular rhythm.   Pulmonary:      Effort: Pulmonary effort is normal.      Breath sounds: Normal breath sounds.   Abdominal:      General: Bowel sounds are normal.      Palpations: Abdomen is soft.   Musculoskeletal:      Cervical back: Neck supple.      Right lower leg: No edema.      Left lower leg: No edema.      Comments: Generalized weakness   Neurological:      Mental Status: She is alert.   Psychiatric:         Mood and Affect: Mood normal.         Behavior: Behavior is cooperative.         Assessment/Plan  Problem List Items Addressed This Visit       Chronic obstructive pulmonary disease (CMS/[facility])     Stable  On room air         Dementia (CMS/[facility])     Speech therapy for cognition          Hypertension     Controlled  Continue to monitor BP         Insomnia     Sleeping well         Weakness - Primary     Continue therapy          Medications, treatments, and labs reviewed  Continue medications and treatments as listed in [facility]    Scribe Attestation  ISharon Scribe   attest that this documentation has been prepared under the direction and in the presence of MI Triplett    Provider Attestation - Scribe documentation  All medical record entries made by the Scribe were at my direction and personally dictated by me. I have reviewed the chart and agree that the record accurately reflects my personal performance of the history, physical exam, discussion and plan.   MI Triplett        Electronically Signed By: MI Triplett   3/20/23  9:19 PM

## 2023-03-20 NOTE — PROGRESS NOTES
PROGRESS NOTE    Subjective   Chief complaint: Elida Benson is a 78 y.o. female who is a acute skilled care patient being seen and evaluated for weakness.    HPI:  3/14/23 -patient at skilled nursing facility for therapy due to weakness after recent hospitalization.  Patient is total dependent for mobility and requires wheelchair.  She requires moderate to minimal assist for transfers and moderate assist for bed mobility.  She is working in speech therapy for cognition.  No new concerns today.  Denies constitutional symptoms.    3/16/2023 therapy continues to work with the patient.  She is totally dependent for mobility and requires assist with ADLs.  She has no new concerns today.  Denies constitutional symptoms.    3/20/23 patient to skilled nursing facility for therapy.  She requires assist with all ADLs and transfers.  States that she is feeling a lot better today.  She is sleeping well at night.  Denies constitutional symptoms.      Objective   Vital signs: 115/70  Physical Exam  Constitutional:       General: She is not in acute distress.  Eyes:      Extraocular Movements: Extraocular movements intact.   Cardiovascular:      Rate and Rhythm: Regular rhythm.   Pulmonary:      Effort: Pulmonary effort is normal.      Breath sounds: Normal breath sounds.   Abdominal:      General: Bowel sounds are normal.      Palpations: Abdomen is soft.   Musculoskeletal:      Cervical back: Neck supple.      Right lower leg: No edema.      Left lower leg: No edema.      Comments: Generalized weakness   Neurological:      Mental Status: She is alert.   Psychiatric:         Mood and Affect: Mood normal.         Behavior: Behavior is cooperative.         Assessment/Plan   Problem List Items Addressed This Visit       Chronic obstructive pulmonary disease (CMS/[facility])     Stable  On room air         Dementia (CMS/[facility])     Speech therapy for cognition         Hypertension     Controlled  Continue to monitor BP         Insomnia      Sleeping well         Weakness - Primary     Continue therapy          Medications, treatments, and labs reviewed  Continue medications and treatments as listed in [facility]    Scribe Attestation  ISharon Scribe   attest that this documentation has been prepared under the direction and in the presence of MI Triplett    Provider Attestation - Scribe documentation  All medical record entries made by the Scribe were at my direction and personally dictated by me. I have reviewed the chart and agree that the record accurately reflects my personal performance of the history, physical exam, discussion and plan.   MI Triplett

## 2023-03-21 ENCOUNTER — NURSING HOME VISIT (OUTPATIENT)
Dept: POST ACUTE CARE | Facility: EXTERNAL LOCATION | Age: 78
End: 2023-03-21
Payer: MEDICARE

## 2023-03-21 DIAGNOSIS — J43.9 PULMONARY EMPHYSEMA, UNSPECIFIED EMPHYSEMA TYPE (MULTI): ICD-10-CM

## 2023-03-21 DIAGNOSIS — F02.A0 MILD ALZHEIMER'S DEMENTIA OF OTHER ONSET, UNSPECIFIED WHETHER BEHAVIORAL, PSYCHOTIC, OR MOOD DISTURBANCE OR ANXIETY (MULTI): ICD-10-CM

## 2023-03-21 DIAGNOSIS — G30.8 MILD ALZHEIMER'S DEMENTIA OF OTHER ONSET, UNSPECIFIED WHETHER BEHAVIORAL, PSYCHOTIC, OR MOOD DISTURBANCE OR ANXIETY (MULTI): ICD-10-CM

## 2023-03-21 DIAGNOSIS — I15.9 SECONDARY HYPERTENSION: ICD-10-CM

## 2023-03-21 DIAGNOSIS — R53.1 WEAKNESS: Primary | ICD-10-CM

## 2023-03-21 PROCEDURE — 99309 SBSQ NF CARE MODERATE MDM 30: CPT | Performed by: NURSE PRACTITIONER

## 2023-03-21 NOTE — LETTER
Patient: Elida Benson  : 1945    Encounter Date: 2023    Progress Note  Subjective  Chief complaint: Elida Benson is a 78 y.o. female who is a acute skilled care patient being seen and evaluated for weakness    HPI:  3/14/23 -patient at skilled nursing facility for therapy due to weakness after recent hospitalization.  Patient is total dependent for mobility and requires wheelchair.  She requires moderate to minimal assist for transfers and moderate assist for bed mobility.  She is working in speech therapy for cognition.  No new concerns today.  Denies constitutional symptoms.    3/16/2023 therapy continues to work with the patient.  She is totally dependent for mobility and requires assist with ADLs.  She has no new concerns today.  Denies constitutional symptoms.    3/20/23 patient to skilled nursing facility for therapy.  She requires assist with all ADLs and transfers.  States that she is feeling a lot better today.  She is sleeping well at night.  Denies constitutional symptoms.    3/21/2023  therapy continues to work with the patient.  She is making progress and is able to walk 22 feet with front wheel walker with minimal assist.  Patient requires minimal to moderate assist for transfers and moderate assist for bed mobility.  She is working and speech therapy for cognition.  No new concerns per nursing staff.  Patient denies Additional symptoms.      Objective  Vital signs: 136/76, 97.3, 88, 18, 96%  Physical Exam  Constitutional:       General: She is not in acute distress.  Eyes:      Extraocular Movements: Extraocular movements intact.   Cardiovascular:      Rate and Rhythm: Regular rhythm.   Pulmonary:      Effort: Pulmonary effort is normal.      Breath sounds: Normal breath sounds.   Abdominal:      General: Bowel sounds are normal.      Palpations: Abdomen is soft.   Musculoskeletal:      Cervical back: Neck supple.      Right lower leg: No edema.      Left lower leg: No edema.       Comments: Generalized weakness   Neurological:      Mental Status: She is alert.   Psychiatric:         Mood and Affect: Mood normal.         Behavior: Behavior is cooperative.         Assessment/Plan  Problem List Items Addressed This Visit       Chronic obstructive pulmonary disease (CMS/[facility])     Stable  On room air         Dementia (CMS/[facility])     Speech therapy for cognition         Hypertension     Controlled  Continue to monitor BP         Weakness - Primary     Improving   Continue therapy          Medications, treatments, and labs reviewed  Continue medications and treatments as listed in [facility]    MI Triplett      Electronically Signed By: MI Triplett   3/21/23  2:47 PM

## 2023-03-21 NOTE — PROGRESS NOTES
Progress Note  Subjective   Chief complaint: Elida Benson is a 78 y.o. female who is a acute skilled care patient being seen and evaluated for weakness    HPI:  3/14/23 -patient at skilled nursing facility for therapy due to weakness after recent hospitalization.  Patient is total dependent for mobility and requires wheelchair.  She requires moderate to minimal assist for transfers and moderate assist for bed mobility.  She is working in speech therapy for cognition.  No new concerns today.  Denies constitutional symptoms.    3/16/2023 therapy continues to work with the patient.  She is totally dependent for mobility and requires assist with ADLs.  She has no new concerns today.  Denies constitutional symptoms.    3/20/23 patient to skilled nursing facility for therapy.  She requires assist with all ADLs and transfers.  States that she is feeling a lot better today.  She is sleeping well at night.  Denies constitutional symptoms.    3/21/2023  therapy continues to work with the patient.  She is making progress and is able to walk 22 feet with front wheel walker with minimal assist.  Patient requires minimal to moderate assist for transfers and moderate assist for bed mobility.  She is working and speech therapy for cognition.  No new concerns per nursing staff.  Patient denies Additional symptoms.      Objective   Vital signs: 136/76, 97.3, 88, 18, 96%  Physical Exam  Constitutional:       General: She is not in acute distress.  Eyes:      Extraocular Movements: Extraocular movements intact.   Cardiovascular:      Rate and Rhythm: Regular rhythm.   Pulmonary:      Effort: Pulmonary effort is normal.      Breath sounds: Normal breath sounds.   Abdominal:      General: Bowel sounds are normal.      Palpations: Abdomen is soft.   Musculoskeletal:      Cervical back: Neck supple.      Right lower leg: No edema.      Left lower leg: No edema.      Comments: Generalized weakness   Neurological:      Mental Status:  She is alert.   Psychiatric:         Mood and Affect: Mood normal.         Behavior: Behavior is cooperative.         Assessment/Plan   Problem List Items Addressed This Visit       Chronic obstructive pulmonary disease (CMS/[facility])     Stable  On room air         Dementia (CMS/[facility])     Speech therapy for cognition         Hypertension     Controlled  Continue to monitor BP         Weakness - Primary     Improving   Continue therapy          Medications, treatments, and labs reviewed  Continue medications and treatments as listed in [facility]    Radha Lay, APRN-CNP

## 2023-03-21 NOTE — PROGRESS NOTES
Subjective   Chief complaint: Elida Benson is a 78 y.o. female who is a acute skilled care patient being seen and evaluated for weakness    HPI:  Patient working in therapy and requires assistance for all transfers and ADL's. She is getting stronger and has no new concerns at this time.         Review of Systems  All systems reviewed and negative except for what was mentioned in the HPI    Vital signs: 120/80    Objective   Physical Exam  Constitutional:       General: She is not in acute distress.  Eyes:      Extraocular Movements: Extraocular movements intact.   Cardiovascular:      Rate and Rhythm: Regular rhythm.   Pulmonary:      Effort: Pulmonary effort is normal.      Breath sounds: Normal breath sounds.   Abdominal:      General: Bowel sounds are normal.      Palpations: Abdomen is soft.   Musculoskeletal:      Cervical back: Neck supple.      Right lower leg: No edema.      Left lower leg: No edema.   Neurological:      Mental Status: She is alert.   Psychiatric:         Mood and Affect: Mood normal.         Behavior: Behavior is cooperative.         Assessment/Plan   Problem List Items Addressed This Visit          Other    Weakness     Continue therapy          Medications, treatments, and labs reviewed  Continue medications and treatments as listed in [facility]    Scribe Attestation  By signing my name below, IHelen Scribe   attest that this documentation has been prepared under the direction and in the presence of Sean Cloud MD.    Provider Attestation - Scribe documentation  All medical record entries made by the Scribe were at my direction and personally dictated by me. I have reviewed the chart and agree that the record accurately reflects my personal performance of the history, physical exam, discussion and plan.

## 2023-03-22 ENCOUNTER — NURSING HOME VISIT (OUTPATIENT)
Dept: POST ACUTE CARE | Facility: EXTERNAL LOCATION | Age: 78
End: 2023-03-22
Payer: MEDICARE

## 2023-03-22 DIAGNOSIS — R53.1 WEAKNESS: ICD-10-CM

## 2023-03-22 DIAGNOSIS — Z01.89 ROUTINE LAB DRAW: ICD-10-CM

## 2023-03-22 PROCEDURE — 99308 SBSQ NF CARE LOW MDM 20: CPT | Performed by: INTERNAL MEDICINE

## 2023-03-22 NOTE — LETTER
Patient: Elida Benson  : 1945    Encounter Date: 2023    Subjective  Chief complaint: Elida Benson is a 78 y.o. female who is a acute skilled care patient being seen and evaluated for  weakness    HPI:  3/21/2023  therapy continues to work with the patient.  She is making progress and is able to walk 22 feet with front wheel walker with minimal assist.  Patient requires minimal to moderate assist for transfers and moderate assist for bed mobility.  She is working and speech therapy for cognition.  No new concerns per nursing staff.  Patient denies Additional symptoms.    3/22/23 Patient progressing well in therapy. She is ambulating up to 22' with FWW and minimal assistance. She requires min to mod assistance for  transfers and moderate assistance for bed mobility. No new isseus at this time. Recent labs WNL. Denies constitutional symptoms. BUN 17, Creat 0.17, Na 140, K4.2, H&H 10.5 & 35.2.         Review of Systems  All systems reviewed and negative except for what was mentioned in the HPI    Vital signs: 136/76, 88, 18, 96%    Objective  Physical Exam  Constitutional:       General: She is not in acute distress.  Eyes:      Extraocular Movements: Extraocular movements intact.   Cardiovascular:      Rate and Rhythm: Regular rhythm.   Pulmonary:      Effort: Pulmonary effort is normal.      Breath sounds: Normal breath sounds.   Abdominal:      General: Bowel sounds are normal.      Palpations: Abdomen is soft.   Musculoskeletal:      Cervical back: Neck supple.      Right lower leg: No edema.      Left lower leg: No edema.   Neurological:      Mental Status: She is alert.   Psychiatric:         Mood and Affect: Mood normal.         Behavior: Behavior is cooperative.         Assessment/Plan  Problem List Items Addressed This Visit          Other    Weakness     Improving   Continue therapy         Routine lab draw     WNL - NNO          Medications, treatments, and labs reviewed  Continue  medications and treatments as listed in [facility]    Scribe Attestation  By signing my name below, I, Shalini Arnold   attest that this documentation has been prepared under the direction and in the presence of Sean Cloud MD.    Provider Attestation - Scribe documentation  All medical record entries made by the Scribe were at my direction and personally dictated by me. I have reviewed the chart and agree that the record accurately reflects my personal performance of the history, physical exam, discussion and plan.      Electronically Signed By: Sean Cloud MD   3/22/23  5:33 PM

## 2023-03-22 NOTE — PROGRESS NOTES
Subjective   Chief complaint: Elida Benson is a 78 y.o. female who is a acute skilled care patient being seen and evaluated for  weakness    HPI:  3/21/2023  therapy continues to work with the patient.  She is making progress and is able to walk 22 feet with front wheel walker with minimal assist.  Patient requires minimal to moderate assist for transfers and moderate assist for bed mobility.  She is working and speech therapy for cognition.  No new concerns per nursing staff.  Patient denies Additional symptoms.    3/22/23 Patient progressing well in therapy. She is ambulating up to 22' with FWW and minimal assistance. She requires min to mod assistance for  transfers and moderate assistance for bed mobility. No new isseus at this time. Recent labs WNL. Denies constitutional symptoms. BUN 17, Creat 0.17, Na 140, K4.2, H&H 10.5 & 35.2.         Review of Systems  All systems reviewed and negative except for what was mentioned in the HPI    Vital signs: 136/76, 88, 18, 96%    Objective   Physical Exam  Constitutional:       General: She is not in acute distress.  Eyes:      Extraocular Movements: Extraocular movements intact.   Cardiovascular:      Rate and Rhythm: Regular rhythm.   Pulmonary:      Effort: Pulmonary effort is normal.      Breath sounds: Normal breath sounds.   Abdominal:      General: Bowel sounds are normal.      Palpations: Abdomen is soft.   Musculoskeletal:      Cervical back: Neck supple.      Right lower leg: No edema.      Left lower leg: No edema.   Neurological:      Mental Status: She is alert.   Psychiatric:         Mood and Affect: Mood normal.         Behavior: Behavior is cooperative.         Assessment/Plan   Problem List Items Addressed This Visit          Other    Weakness     Improving   Continue therapy         Routine lab draw     WNL - NNO          Medications, treatments, and labs reviewed  Continue medications and treatments as listed in [facility]    Scribe Attestation  By  signing my name below, I, Shalini Arnold   attest that this documentation has been prepared under the direction and in the presence of Sean Cloud MD.    Provider Attestation - Scribe documentation  All medical record entries made by the Scribe were at my direction and personally dictated by me. I have reviewed the chart and agree that the record accurately reflects my personal performance of the history, physical exam, discussion and plan.

## 2023-03-23 ENCOUNTER — NURSING HOME VISIT (OUTPATIENT)
Dept: POST ACUTE CARE | Facility: EXTERNAL LOCATION | Age: 78
End: 2023-03-23
Payer: MEDICARE

## 2023-03-23 DIAGNOSIS — J43.9 PULMONARY EMPHYSEMA, UNSPECIFIED EMPHYSEMA TYPE (MULTI): ICD-10-CM

## 2023-03-23 DIAGNOSIS — F02.A0 MILD ALZHEIMER'S DEMENTIA OF OTHER ONSET, UNSPECIFIED WHETHER BEHAVIORAL, PSYCHOTIC, OR MOOD DISTURBANCE OR ANXIETY (MULTI): ICD-10-CM

## 2023-03-23 DIAGNOSIS — R53.1 WEAKNESS: Primary | ICD-10-CM

## 2023-03-23 DIAGNOSIS — I15.9 SECONDARY HYPERTENSION: ICD-10-CM

## 2023-03-23 DIAGNOSIS — G30.8 MILD ALZHEIMER'S DEMENTIA OF OTHER ONSET, UNSPECIFIED WHETHER BEHAVIORAL, PSYCHOTIC, OR MOOD DISTURBANCE OR ANXIETY (MULTI): ICD-10-CM

## 2023-03-23 PROCEDURE — 99309 SBSQ NF CARE MODERATE MDM 30: CPT | Performed by: NURSE PRACTITIONER

## 2023-03-23 NOTE — LETTER
Patient: Elida Benson  : 1945    Encounter Date: 2023    Subjective  Chief complaint: Elida Benson is a 78 y.o. female who is a acute skilled care patient being seen and evaluated for  weakness    HPI:  3/14/23 -patient at skilled nursing facility for therapy due to weakness after recent hospitalization.  Patient is total dependent for mobility and requires wheelchair.  She requires moderate to minimal assist for transfers and moderate assist for bed mobility.  She is working in speech therapy for cognition.  No new concerns today.  Denies constitutional symptoms.    3/16/2023 therapy continues to work with the patient.  She is totally dependent for mobility and requires assist with ADLs.  She has no new concerns today.  Denies constitutional symptoms.    3/20/23 patient to skilled nursing facility for therapy.  She requires assist with all ADLs and transfers.  States that she is feeling a lot better today.  She is sleeping well at night.  Denies constitutional symptoms.    3/21/2023  therapy continues to work with the patient.  She is making progress and is able to walk 22 feet with front wheel walker with minimal assist.  Patient requires minimal to moderate assist for transfers and moderate assist for bed mobility.  She is working and speech therapy for cognition.  No new concerns per nursing staff.  Patient denies Additional symptoms.    3/22/23 Patient progressing well in therapy. She is ambulating up to 22' with FWW and minimal assistance. She requires min to mod assistance for  transfers and moderate assistance for bed mobility. No new isseus at this time. Recent labs WNL. Denies constitutional symptoms. BUN 17, Creat 0.17, Na 140, K4.2, H&H 10.5 & 35.2.     3/23/2023 patient has no new concerns today.  She continues to work with therapy on strengthening and is motivated to walk.  Patient is able to walk short distances with front wheeled walker with assist.  Denies nausea vomiting fever  chills.      Vital signs: 129/76, 97.2, 81, 97%, 20  Objective  Physical Exam  Constitutional:       General: She is not in acute distress.  Eyes:      Extraocular Movements: Extraocular movements intact.   Cardiovascular:      Rate and Rhythm: Regular rhythm.   Pulmonary:      Effort: Pulmonary effort is normal.      Breath sounds: Normal breath sounds.   Abdominal:      General: Bowel sounds are normal.      Palpations: Abdomen is soft.   Musculoskeletal:      Cervical back: Neck supple.      Right lower leg: No edema.      Left lower leg: No edema.      Comments: Generalized weakness   Neurological:      Mental Status: She is alert.   Psychiatric:         Mood and Affect: Mood normal.         Behavior: Behavior is cooperative.         Assessment/Plan  Problem List Items Addressed This Visit       Chronic obstructive pulmonary disease (CMS/[facility])     Stable, no shortness of breath or wheezing  On room air         Dementia (CMS/[facility])     Speech therapy for cognition         Hypertension     Controlled  Continue antihypertensives  Continue to monitor BP         Weakness - Primary     Improving   Continue with therapy        Medications, treatments, and labs reviewed  Continue medications and treatments as listed in [facility]        Electronically Signed By: MI Triplett   3/24/23  1:29 PM

## 2023-03-24 ENCOUNTER — NURSING HOME VISIT (OUTPATIENT)
Dept: POST ACUTE CARE | Facility: EXTERNAL LOCATION | Age: 78
End: 2023-03-24
Payer: MEDICARE

## 2023-03-24 DIAGNOSIS — R53.1 WEAKNESS: ICD-10-CM

## 2023-03-24 DIAGNOSIS — I15.9 SECONDARY HYPERTENSION: ICD-10-CM

## 2023-03-24 PROBLEM — Z01.89 ROUTINE LAB DRAW: Status: RESOLVED | Noted: 2023-03-22 | Resolved: 2023-03-24

## 2023-03-24 PROCEDURE — 99308 SBSQ NF CARE LOW MDM 20: CPT | Performed by: INTERNAL MEDICINE

## 2023-03-24 NOTE — PROGRESS NOTES
Subjective   Chief complaint: Elida Benson is a 78 y.o. female who is a acute skilled care patient being seen and evaluated for  weakness    HPI:  3/14/23 -patient at skilled nursing facility for therapy due to weakness after recent hospitalization.  Patient is total dependent for mobility and requires wheelchair.  She requires moderate to minimal assist for transfers and moderate assist for bed mobility.  She is working in speech therapy for cognition.  No new concerns today.  Denies constitutional symptoms.    3/16/2023 therapy continues to work with the patient.  She is totally dependent for mobility and requires assist with ADLs.  She has no new concerns today.  Denies constitutional symptoms.    3/20/23 patient to skilled nursing facility for therapy.  She requires assist with all ADLs and transfers.  States that she is feeling a lot better today.  She is sleeping well at night.  Denies constitutional symptoms.    3/21/2023  therapy continues to work with the patient.  She is making progress and is able to walk 22 feet with front wheel walker with minimal assist.  Patient requires minimal to moderate assist for transfers and moderate assist for bed mobility.  She is working and speech therapy for cognition.  No new concerns per nursing staff.  Patient denies Additional symptoms.    3/22/23 Patient progressing well in therapy. She is ambulating up to 22' with FWW and minimal assistance. She requires min to mod assistance for  transfers and moderate assistance for bed mobility. No new isseus at this time. Recent labs WNL. Denies constitutional symptoms. BUN 17, Creat 0.17, Na 140, K4.2, H&H 10.5 & 35.2.     3/23/2023 patient has no new concerns today.  She continues to work with therapy on strengthening and is motivated to walk.  Patient is able to walk short distances with front wheeled walker with assist.  Denies nausea vomiting fever chills.      Vital signs: 129/76, 97.2, 81, 97%, 20  Objective    Physical Exam  Constitutional:       General: She is not in acute distress.  Eyes:      Extraocular Movements: Extraocular movements intact.   Cardiovascular:      Rate and Rhythm: Regular rhythm.   Pulmonary:      Effort: Pulmonary effort is normal.      Breath sounds: Normal breath sounds.   Abdominal:      General: Bowel sounds are normal.      Palpations: Abdomen is soft.   Musculoskeletal:      Cervical back: Neck supple.      Right lower leg: No edema.      Left lower leg: No edema.      Comments: Generalized weakness   Neurological:      Mental Status: She is alert.   Psychiatric:         Mood and Affect: Mood normal.         Behavior: Behavior is cooperative.         Assessment/Plan   Problem List Items Addressed This Visit       Chronic obstructive pulmonary disease (CMS/[facility])     Stable, no shortness of breath or wheezing  On room air         Dementia (CMS/[facility])     Speech therapy for cognition         Hypertension     Controlled  Continue antihypertensives  Continue to monitor BP         Weakness - Primary     Improving   Continue with therapy        Medications, treatments, and labs reviewed  Continue medications and treatments as listed in [facility]

## 2023-03-24 NOTE — LETTER
Patient: Elida Benson  : 1945    Encounter Date: 2023    Subjective  Chief complaint: Elida Benson is a 78 y.o. female who is a acute skilled care patient being seen and evaluated for  weakness    HPI:  3/14/23 -patient at skilled nursing facility for therapy due to weakness after recent hospitalization.  Patient is total dependent for mobility and requires wheelchair.  She requires moderate to minimal assist for transfers and moderate assist for bed mobility.  She is working in speech therapy for cognition.  No new concerns today.  Denies constitutional symptoms.    3/16/2023 therapy continues to work with the patient.  She is totally dependent for mobility and requires assist with ADLs.  She has no new concerns today.  Denies constitutional symptoms.    3/20/23 patient to skilled nursing facility for therapy.  She requires assist with all ADLs and transfers.  States that she is feeling a lot better today.  She is sleeping well at night.  Denies constitutional symptoms.    3/21/2023  therapy continues to work with the patient.  She is making progress and is able to walk 22 feet with front wheel walker with minimal assist.  Patient requires minimal to moderate assist for transfers and moderate assist for bed mobility.  She is working and speech therapy for cognition.  No new concerns per nursing staff.  Patient denies Additional symptoms.    3/22/23 Patient progressing well in therapy. She is ambulating up to 22' with FWW and minimal assistance. She requires min to mod assistance for  transfers and moderate assistance for bed mobility. No new isseus at this time. Recent labs WNL. Denies constitutional symptoms. BUN 17, Creat 0.17, Na 140, K4.2, H&H 10.5 & 35.2.     3/23/2023 patient has no new concerns today.  She continues to work with therapy on strengthening and is motivated to walk.  Patient is able to walk short distances with front wheeled walker with assist.  Denies nausea vomiting fever  chills.    3/24/23 Patient ambulating over 20 feet with a walker minimal assistance.  She also requires minimal to moderate assistance for transfers and moderate assistance for bed mobility.  She is doing well and has no new issues today.  No acute distress.      Vital signs: 1232/78, 981, 97%, 20  Objective  Physical Exam  Constitutional:       General: She is not in acute distress.  Eyes:      Extraocular Movements: Extraocular movements intact.   Cardiovascular:      Rate and Rhythm: Regular rhythm.   Pulmonary:      Effort: Pulmonary effort is normal.      Breath sounds: Normal breath sounds.   Abdominal:      General: Bowel sounds are normal.      Palpations: Abdomen is soft.   Musculoskeletal:      Cervical back: Neck supple.      Right lower leg: No edema.      Left lower leg: No edema.      Comments: Generalized weakness   Neurological:      Mental Status: She is alert.   Psychiatric:         Mood and Affect: Mood normal.         Behavior: Behavior is cooperative.         Assessment/Plan  Problem List Items Addressed This Visit          Circulatory    Hypertension     Controlled  Continue antihypertensives  Continue to monitor BP            Other    Weakness     Continue therapy        Medications, treatments, and labs reviewed  Continue medications and treatments as listed in [facility]        Electronically Signed By: Sean Cloud MD   3/25/23 11:39 AM

## 2023-03-25 NOTE — PROGRESS NOTES
Subjective   Chief complaint: Elida Benson is a 78 y.o. female who is a acute skilled care patient being seen and evaluated for  weakness    HPI:  3/14/23 -patient at skilled nursing facility for therapy due to weakness after recent hospitalization.  Patient is total dependent for mobility and requires wheelchair.  She requires moderate to minimal assist for transfers and moderate assist for bed mobility.  She is working in speech therapy for cognition.  No new concerns today.  Denies constitutional symptoms.    3/16/2023 therapy continues to work with the patient.  She is totally dependent for mobility and requires assist with ADLs.  She has no new concerns today.  Denies constitutional symptoms.    3/20/23 patient to skilled nursing facility for therapy.  She requires assist with all ADLs and transfers.  States that she is feeling a lot better today.  She is sleeping well at night.  Denies constitutional symptoms.    3/21/2023  therapy continues to work with the patient.  She is making progress and is able to walk 22 feet with front wheel walker with minimal assist.  Patient requires minimal to moderate assist for transfers and moderate assist for bed mobility.  She is working and speech therapy for cognition.  No new concerns per nursing staff.  Patient denies Additional symptoms.    3/22/23 Patient progressing well in therapy. She is ambulating up to 22' with FWW and minimal assistance. She requires min to mod assistance for  transfers and moderate assistance for bed mobility. No new isseus at this time. Recent labs WNL. Denies constitutional symptoms. BUN 17, Creat 0.17, Na 140, K4.2, H&H 10.5 & 35.2.     3/23/2023 patient has no new concerns today.  She continues to work with therapy on strengthening and is motivated to walk.  Patient is able to walk short distances with front wheeled walker with assist.  Denies nausea vomiting fever chills.    3/24/23 Patient ambulating over 20 feet with a walker minimal  assistance.  She also requires minimal to moderate assistance for transfers and moderate assistance for bed mobility.  She is doing well and has no new issues today.  No acute distress.      Vital signs: 1232/78, 981, 97%, 20  Objective   Physical Exam  Constitutional:       General: She is not in acute distress.  Eyes:      Extraocular Movements: Extraocular movements intact.   Cardiovascular:      Rate and Rhythm: Regular rhythm.   Pulmonary:      Effort: Pulmonary effort is normal.      Breath sounds: Normal breath sounds.   Abdominal:      General: Bowel sounds are normal.      Palpations: Abdomen is soft.   Musculoskeletal:      Cervical back: Neck supple.      Right lower leg: No edema.      Left lower leg: No edema.      Comments: Generalized weakness   Neurological:      Mental Status: She is alert.   Psychiatric:         Mood and Affect: Mood normal.         Behavior: Behavior is cooperative.         Assessment/Plan   Problem List Items Addressed This Visit          Circulatory    Hypertension     Controlled  Continue antihypertensives  Continue to monitor BP            Other    Weakness     Continue therapy        Medications, treatments, and labs reviewed  Continue medications and treatments as listed in [facility]

## 2023-03-26 NOTE — PROGRESS NOTES
PROGRESS NOTE  Subjective   Chief complaint: Elida Benson is a 78 y.o. female who is an acute skilled patient being seen and evaluated for weakness    HPI:  3/14/23 -patient at skilled nursing facility for therapy due to weakness after recent hospitalization.  Patient is total dependent for mobility and requires wheelchair.  She requires moderate to minimal assist for transfers and moderate assist for bed mobility.  She is working in speech therapy for cognition.  No new concerns today.  Denies constitutional symptoms.    3/16/2023 therapy continues to work with the patient.  She is totally dependent for mobility and requires assist with ADLs.  She has no new concerns today.  Denies constitutional symptoms.    3/20/23 patient to skilled nursing facility for therapy.  She requires assist with all ADLs and transfers.  States that she is feeling a lot better today.  She is sleeping well at night.  Denies constitutional symptoms.    3/21/2023  therapy continues to work with the patient.  She is making progress and is able to walk 22 feet with front wheel walker with minimal assist.  Patient requires minimal to moderate assist for transfers and moderate assist for bed mobility.  She is working and speech therapy for cognition.  No new concerns per nursing staff.  Patient denies Additional symptoms.    3/22/23 Patient progressing well in therapy. She is ambulating up to 22' with FWW and minimal assistance. She requires min to mod assistance for  transfers and moderate assistance for bed mobility. No new isseus at this time. Recent labs WNL. Denies constitutional symptoms. BUN 17, Creat 0.17, Na 140, K4.2, H&H 10.5 & 35.2.     3/23/2023 patient has no new concerns today.  She continues to work with therapy on strengthening and is motivated to walk.  Patient is able to walk short distances with front wheeled walker with assist.  Denies nausea vomiting fever chills.    3/24/23 Patient ambulating over 20 feet with a  walker minimal assistance.  She also requires minimal to moderate assistance for transfers and moderate assistance for bed mobility.  She is doing well and has no new issues today.  No acute distress.    3/27/2023 patient has been working in therapy and is able to walk short distances.  She is eager to go home.  She has no new concerns today.  Denies constitutional symptoms.        Objective   Vital signs: 130/66    Physical Exam  Constitutional:       General: She is not in acute distress.  Eyes:      Extraocular Movements: Extraocular movements intact.   Cardiovascular:      Rate and Rhythm: Regular rhythm.   Pulmonary:      Effort: Pulmonary effort is normal.      Breath sounds: Normal breath sounds.   Abdominal:      General: Bowel sounds are normal.      Palpations: Abdomen is soft.   Musculoskeletal:      Cervical back: Neck supple.      Right lower leg: No edema.      Left lower leg: No edema.      Comments: Generalized weakness  AFO right lower extremity   Neurological:      Mental Status: She is alert.   Psychiatric:         Mood and Affect: Mood normal.         Behavior: Behavior is cooperative.         Assessment/Plan   Problem List Items Addressed This Visit       Chronic obstructive pulmonary disease (CMS/[facility])     Stable, no shortness of breath or wheezing  On room air         Dementia (CMS/[facility])     Speech therapy for cognition         Hypertension     Continue antihypertensives  Continue to monitor BP         Weakness - Primary     Improving   Continue with therapy          Medications, treatments, and labs reviewed  Continue medications and treatments as listed in PCC    Radha Lay, APRN-CNP

## 2023-03-27 ENCOUNTER — NURSING HOME VISIT (OUTPATIENT)
Dept: POST ACUTE CARE | Facility: EXTERNAL LOCATION | Age: 78
End: 2023-03-27
Payer: MEDICARE

## 2023-03-27 DIAGNOSIS — I15.9 SECONDARY HYPERTENSION: ICD-10-CM

## 2023-03-27 DIAGNOSIS — J43.9 PULMONARY EMPHYSEMA, UNSPECIFIED EMPHYSEMA TYPE (MULTI): ICD-10-CM

## 2023-03-27 DIAGNOSIS — G30.8 MILD ALZHEIMER'S DEMENTIA OF OTHER ONSET, UNSPECIFIED WHETHER BEHAVIORAL, PSYCHOTIC, OR MOOD DISTURBANCE OR ANXIETY (MULTI): ICD-10-CM

## 2023-03-27 DIAGNOSIS — F02.A0 MILD ALZHEIMER'S DEMENTIA OF OTHER ONSET, UNSPECIFIED WHETHER BEHAVIORAL, PSYCHOTIC, OR MOOD DISTURBANCE OR ANXIETY (MULTI): ICD-10-CM

## 2023-03-27 DIAGNOSIS — R53.1 WEAKNESS: Primary | ICD-10-CM

## 2023-03-27 PROCEDURE — 99308 SBSQ NF CARE LOW MDM 20: CPT | Performed by: NURSE PRACTITIONER

## 2023-03-27 NOTE — LETTER
Patient: Elida Benson  : 1945    Encounter Date: 2023    PROGRESS NOTE  Subjective  Chief complaint: Elida Benson is a 78 y.o. female who is an acute skilled patient being seen and evaluated for weakness    HPI:  3/14/23 -patient at skilled nursing facility for therapy due to weakness after recent hospitalization.  Patient is total dependent for mobility and requires wheelchair.  She requires moderate to minimal assist for transfers and moderate assist for bed mobility.  She is working in speech therapy for cognition.  No new concerns today.  Denies constitutional symptoms.    3/16/2023 therapy continues to work with the patient.  She is totally dependent for mobility and requires assist with ADLs.  She has no new concerns today.  Denies constitutional symptoms.    3/20/23 patient to skilled nursing facility for therapy.  She requires assist with all ADLs and transfers.  States that she is feeling a lot better today.  She is sleeping well at night.  Denies constitutional symptoms.    3/21/2023  therapy continues to work with the patient.  She is making progress and is able to walk 22 feet with front wheel walker with minimal assist.  Patient requires minimal to moderate assist for transfers and moderate assist for bed mobility.  She is working and speech therapy for cognition.  No new concerns per nursing staff.  Patient denies Additional symptoms.    3/22/23 Patient progressing well in therapy. She is ambulating up to 22' with FWW and minimal assistance. She requires min to mod assistance for  transfers and moderate assistance for bed mobility. No new isseus at this time. Recent labs WNL. Denies constitutional symptoms. BUN 17, Creat 0.17, Na 140, K4.2, H&H 10.5 & 35.2.     3/23/2023 patient has no new concerns today.  She continues to work with therapy on strengthening and is motivated to walk.  Patient is able to walk short distances with front wheeled walker with assist.  Denies nausea  vomiting fever chills.    3/24/23 Patient ambulating over 20 feet with a walker minimal assistance.  She also requires minimal to moderate assistance for transfers and moderate assistance for bed mobility.  She is doing well and has no new issues today.  No acute distress.    3/27/2023 patient has been working in therapy and is able to walk short distances.  She is eager to go home.  She has no new concerns today.  Denies constitutional symptoms.        Objective  Vital signs: 130/66    Physical Exam  Constitutional:       General: She is not in acute distress.  Eyes:      Extraocular Movements: Extraocular movements intact.   Cardiovascular:      Rate and Rhythm: Regular rhythm.   Pulmonary:      Effort: Pulmonary effort is normal.      Breath sounds: Normal breath sounds.   Abdominal:      General: Bowel sounds are normal.      Palpations: Abdomen is soft.   Musculoskeletal:      Cervical back: Neck supple.      Right lower leg: No edema.      Left lower leg: No edema.      Comments: Generalized weakness  AFO right lower extremity   Neurological:      Mental Status: She is alert.   Psychiatric:         Mood and Affect: Mood normal.         Behavior: Behavior is cooperative.         Assessment/Plan  Problem List Items Addressed This Visit       Chronic obstructive pulmonary disease (CMS/[facility])     Stable, no shortness of breath or wheezing  On room air         Dementia (CMS/[facility])     Speech therapy for cognition         Hypertension     Continue antihypertensives  Continue to monitor BP         Weakness - Primary     Improving   Continue with therapy          Medications, treatments, and labs reviewed  Continue medications and treatments as listed in [facility]    MI Triplett      Electronically Signed By: MI Triplett   3/27/23  4:07 PM

## 2023-03-28 ENCOUNTER — NURSING HOME VISIT (OUTPATIENT)
Dept: POST ACUTE CARE | Facility: EXTERNAL LOCATION | Age: 78
End: 2023-03-28
Payer: MEDICARE

## 2023-03-28 DIAGNOSIS — J43.9 PULMONARY EMPHYSEMA, UNSPECIFIED EMPHYSEMA TYPE (MULTI): ICD-10-CM

## 2023-03-28 DIAGNOSIS — F02.A0 MILD ALZHEIMER'S DEMENTIA OF OTHER ONSET, UNSPECIFIED WHETHER BEHAVIORAL, PSYCHOTIC, OR MOOD DISTURBANCE OR ANXIETY (MULTI): ICD-10-CM

## 2023-03-28 DIAGNOSIS — R53.1 WEAKNESS: Primary | ICD-10-CM

## 2023-03-28 DIAGNOSIS — G30.8 MILD ALZHEIMER'S DEMENTIA OF OTHER ONSET, UNSPECIFIED WHETHER BEHAVIORAL, PSYCHOTIC, OR MOOD DISTURBANCE OR ANXIETY (MULTI): ICD-10-CM

## 2023-03-28 DIAGNOSIS — I15.9 SECONDARY HYPERTENSION: ICD-10-CM

## 2023-03-28 PROCEDURE — 99308 SBSQ NF CARE LOW MDM 20: CPT | Performed by: NURSE PRACTITIONER

## 2023-03-28 NOTE — PROGRESS NOTES
PROGRESS NOTE  Subjective   Chief complaint: Elida Benson is a 78 y.o. female who is an acute skilled patient being seen and evaluated for weakness    HPI:  3/14/23 -patient at skilled nursing facility for therapy due to weakness after recent hospitalization.  Patient is total dependent for mobility and requires wheelchair.  She requires moderate to minimal assist for transfers and moderate assist for bed mobility.  She is working in speech therapy for cognition.  No new concerns today.  Denies constitutional symptoms.    3/16/2023 therapy continues to work with the patient.  She is totally dependent for mobility and requires assist with ADLs.  She has no new concerns today.  Denies constitutional symptoms.    3/20/23 patient to skilled nursing facility for therapy.  She requires assist with all ADLs and transfers.  States that she is feeling a lot better today.  She is sleeping well at night.  Denies constitutional symptoms.    3/21/2023  therapy continues to work with the patient.  She is making progress and is able to walk 22 feet with front wheel walker with minimal assist.  Patient requires minimal to moderate assist for transfers and moderate assist for bed mobility.  She is working and speech therapy for cognition.  No new concerns per nursing staff.  Patient denies Additional symptoms.    3/22/23 Patient progressing well in therapy. She is ambulating up to 22' with FWW and minimal assistance. She requires min to mod assistance for  transfers and moderate assistance for bed mobility. No new isseus at this time. Recent labs WNL. Denies constitutional symptoms. BUN 17, Creat 0.17, Na 140, K4.2, H&H 10.5 & 35.2.     3/23/2023 patient has no new concerns today.  She continues to work with therapy on strengthening and is motivated to walk.  Patient is able to walk short distances with front wheeled walker with assist.  Denies nausea vomiting fever chills.    3/24/23 Patient ambulating over 20 feet with a  walker minimal assistance.  She also requires minimal to moderate assistance for transfers and moderate assistance for bed mobility.  She is doing well and has no new issues today.  No acute distress.    3/27/2023 patient has been working in therapy and is able to walk short distances.  She is eager to go home.  She has no new concerns today.  Denies constitutional symptoms.    3/28/2023 patient is working in therapy with a rollator walker and is able to walk 10 steps with minimal assist.  She requires minimal assist for transfers and is total dependent for hygiene.  Patient is working in speech therapy for cognition.  Her cognition is limited her ability to discharge home alone according to the therapist.  No new concerns per nursing staff.      Objective   Vital signs: 130/66, 18, 97.5, 72, 95%    Physical Exam  Constitutional:       General: She is not in acute distress.  Eyes:      Extraocular Movements: Extraocular movements intact.   Cardiovascular:      Rate and Rhythm: Regular rhythm.   Pulmonary:      Effort: Pulmonary effort is normal.      Breath sounds: Normal breath sounds.   Abdominal:      General: Bowel sounds are normal.      Palpations: Abdomen is soft.   Musculoskeletal:      Cervical back: Neck supple.      Right lower leg: No edema.      Left lower leg: No edema.      Comments: Generalized weakness  AFO right lower extremity   Neurological:      Mental Status: She is alert.   Psychiatric:         Mood and Affect: Mood normal.         Behavior: Behavior is cooperative.         Assessment/Plan   Problem List Items Addressed This Visit       Chronic obstructive pulmonary disease (CMS/[facility])     Stable, no shortness of breath or wheezing  On room air         Dementia (CMS/H[facility])     Speech therapy for cognition  Cognition limiting ability to discharge home alone according to therapy  We will discuss possible long-term care         Hypertension     Blood pressure at goal  Continue  antihypertensives  Continue to monitor BP         Weakness - Primary     Continue with therapy          Medications, treatments, and labs reviewed  Continue medications and treatments as listed in PCC    Radha Lay, APRN-CNP

## 2023-03-28 NOTE — LETTER
Patient: Elida Benson  : 1945    Encounter Date: 2023    PROGRESS NOTE  Subjective  Chief complaint: Elida Benson is a 78 y.o. female who is an acute skilled patient being seen and evaluated for weakness    HPI:  3/14/23 -patient at skilled nursing facility for therapy due to weakness after recent hospitalization.  Patient is total dependent for mobility and requires wheelchair.  She requires moderate to minimal assist for transfers and moderate assist for bed mobility.  She is working in speech therapy for cognition.  No new concerns today.  Denies constitutional symptoms.    3/16/2023 therapy continues to work with the patient.  She is totally dependent for mobility and requires assist with ADLs.  She has no new concerns today.  Denies constitutional symptoms.    3/20/23 patient to skilled nursing facility for therapy.  She requires assist with all ADLs and transfers.  States that she is feeling a lot better today.  She is sleeping well at night.  Denies constitutional symptoms.    3/21/2023  therapy continues to work with the patient.  She is making progress and is able to walk 22 feet with front wheel walker with minimal assist.  Patient requires minimal to moderate assist for transfers and moderate assist for bed mobility.  She is working and speech therapy for cognition.  No new concerns per nursing staff.  Patient denies Additional symptoms.    3/22/23 Patient progressing well in therapy. She is ambulating up to 22' with FWW and minimal assistance. She requires min to mod assistance for  transfers and moderate assistance for bed mobility. No new isseus at this time. Recent labs WNL. Denies constitutional symptoms. BUN 17, Creat 0.17, Na 140, K4.2, H&H 10.5 & 35.2.     3/23/2023 patient has no new concerns today.  She continues to work with therapy on strengthening and is motivated to walk.  Patient is able to walk short distances with front wheeled walker with assist.  Denies nausea  vomiting fever chills.    3/24/23 Patient ambulating over 20 feet with a walker minimal assistance.  She also requires minimal to moderate assistance for transfers and moderate assistance for bed mobility.  She is doing well and has no new issues today.  No acute distress.    3/27/2023 patient has been working in therapy and is able to walk short distances.  She is eager to go home.  She has no new concerns today.  Denies constitutional symptoms.    3/28/2023 patient is working in therapy with a rollator walker and is able to walk 10 steps with minimal assist.  She requires minimal assist for transfers and is total dependent for hygiene.  Patient is working in speech therapy for cognition.  Her cognition is limited her ability to discharge home alone according to the therapist.  No new concerns per nursing staff.      Objective  Vital signs: 130/66, 18, 97.5, 72, 95%    Physical Exam  Constitutional:       General: She is not in acute distress.  Eyes:      Extraocular Movements: Extraocular movements intact.   Cardiovascular:      Rate and Rhythm: Regular rhythm.   Pulmonary:      Effort: Pulmonary effort is normal.      Breath sounds: Normal breath sounds.   Abdominal:      General: Bowel sounds are normal.      Palpations: Abdomen is soft.   Musculoskeletal:      Cervical back: Neck supple.      Right lower leg: No edema.      Left lower leg: No edema.      Comments: Generalized weakness  AFO right lower extremity   Neurological:      Mental Status: She is alert.   Psychiatric:         Mood and Affect: Mood normal.         Behavior: Behavior is cooperative.         Assessment/Plan  Problem List Items Addressed This Visit       Chronic obstructive pulmonary disease (CMS/[facility])     Stable, no shortness of breath or wheezing  On room air         Dementia (CMS/[facility])     Speech therapy for cognition  Cognition limiting ability to discharge home alone according to therapy  We will discuss possible long-term care          Hypertension     Blood pressure at goal  Continue antihypertensives  Continue to monitor BP         Weakness - Primary     Continue with therapy          Medications, treatments, and labs reviewed  Continue medications and treatments as listed in PCC    MI Triplett      Electronically Signed By: MI Triplett   3/28/23  2:14 PM

## 2023-03-28 NOTE — ASSESSMENT & PLAN NOTE
Speech therapy for cognition  Cognition limiting ability to discharge home alone according to therapy  We will discuss possible long-term care

## 2023-03-29 ENCOUNTER — NURSING HOME VISIT (OUTPATIENT)
Dept: POST ACUTE CARE | Facility: EXTERNAL LOCATION | Age: 78
End: 2023-03-29
Payer: MEDICARE

## 2023-03-29 DIAGNOSIS — R53.1 WEAKNESS: ICD-10-CM

## 2023-03-29 DIAGNOSIS — R26.81 GAIT INSTABILITY: ICD-10-CM

## 2023-03-29 DIAGNOSIS — E11.9 TYPE 2 DIABETES MELLITUS WITHOUT COMPLICATION, WITHOUT LONG-TERM CURRENT USE OF INSULIN (MULTI): ICD-10-CM

## 2023-03-29 PROCEDURE — 99308 SBSQ NF CARE LOW MDM 20: CPT | Performed by: INTERNAL MEDICINE

## 2023-03-29 NOTE — LETTER
Patient: Elida Benson  : 1945    Encounter Date: 2023    Subjective  Chief complaint: Elida Benson is a 78 y.o. female who is a acute skilled care patient being seen and evaluated for weakness    HPI:  3/14/23 -patient at skilled nursing facility for therapy due to weakness after recent hospitalization.  Patient is total dependent for mobility and requires wheelchair.  She requires moderate to minimal assist for transfers and moderate assist for bed mobility.  She is working in speech therapy for cognition.  No new concerns today.  Denies constitutional symptoms.    3/16/2023 therapy continues to work with the patient.  She is totally dependent for mobility and requires assist with ADLs.  She has no new concerns today.  Denies constitutional symptoms.    3/20/23 patient to skilled nursing facility for therapy.  She requires assist with all ADLs and transfers.  States that she is feeling a lot better today.  She is sleeping well at night.  Denies constitutional symptoms.    3/21/2023  therapy continues to work with the patient.  She is making progress and is able to walk 22 feet with front wheel walker with minimal assist.  Patient requires minimal to moderate assist for transfers and moderate assist for bed mobility.  She is working and speech therapy for cognition.  No new concerns per nursing staff.  Patient denies Additional symptoms.    3/22/23 Patient progressing well in therapy. She is ambulating up to 22' with FWW and minimal assistance. She requires min to mod assistance for  transfers and moderate assistance for bed mobility. No new isseus at this time. Recent labs WNL. Denies constitutional symptoms. BUN 17, Creat 0.17, Na 140, K4.2, H&H 10.5 & 35.2.     3/23/2023 patient has no new concerns today.  She continues to work with therapy on strengthening and is motivated to walk.  Patient is able to walk short distances with front wheeled walker with assist.  Denies nausea vomiting fever  chills.    3/24/23 Patient ambulating over 20 feet with a walker minimal assistance.  She also requires minimal to moderate assistance for transfers and moderate assistance for bed mobility.  She is doing well and has no new issues today.  No acute distress.    3/27/2023 patient has been working in therapy and is able to walk short distances.  She is eager to go home.  She has no new concerns today.  Denies constitutional symptoms.    3/28/2023 patient is working in therapy with a rollator walker and is able to walk 10 steps with minimal assist.  She requires minimal assist for transfers and is total dependent for hygiene.  Patient is working in speech therapy for cognition.  Her cognition is limited her ability to discharge home alone according to the therapist.  No new concerns per nursing staff.    3/29/23 Patient working in therapy and uses rollator walker for ambulation up to 10 steps with minimal assistance. Bruce pain. No new issues at this time. No acute distress.         Review of Systems  All systems reviewed and negative except for what was mentioned in the HPI    Vital signs: 114/71, 98, 18, 96%,     Objective  Physical Exam  Constitutional:       General: She is not in acute distress.  Eyes:      Extraocular Movements: Extraocular movements intact.   Cardiovascular:      Rate and Rhythm: Regular rhythm.   Pulmonary:      Effort: Pulmonary effort is normal.      Breath sounds: Normal breath sounds.   Abdominal:      General: Bowel sounds are normal.      Palpations: Abdomen is soft.   Musculoskeletal:      Cervical back: Neck supple.      Right lower leg: No edema.      Left lower leg: No edema.   Neurological:      Mental Status: She is alert.   Psychiatric:         Mood and Affect: Mood normal.         Behavior: Behavior is cooperative.         Assessment/Plan  Problem List Items Addressed This Visit          Endocrine/Metabolic    Diabetes mellitus (CMS/HCC)     Monitor blood sugars   SSI             Other    Gait instability     Therapy         Weakness     Therapy          Medications, treatments, and labs reviewed  Continue medications and treatments as listed in PCC    Scribe Attestation  By signing my name below, I, Hector Arnoldibkelsie   attest that this documentation has been prepared under the direction and in the presence of Sean Cloud MD.    Provider Attestation - Scribe documentation  All medical record entries made by the Scribe were at my direction and personally dictated by me. I have reviewed the chart and agree that the record accurately reflects my personal performance of the history, physical exam, discussion and plan.      Electronically Signed By: Sean Cloud MD   3/30/23 11:56 AM

## 2023-03-30 ENCOUNTER — NURSING HOME VISIT (OUTPATIENT)
Dept: POST ACUTE CARE | Facility: EXTERNAL LOCATION | Age: 78
End: 2023-03-30
Payer: MEDICARE

## 2023-03-30 DIAGNOSIS — J43.9 PULMONARY EMPHYSEMA, UNSPECIFIED EMPHYSEMA TYPE (MULTI): ICD-10-CM

## 2023-03-30 DIAGNOSIS — R53.1 WEAKNESS: Primary | ICD-10-CM

## 2023-03-30 DIAGNOSIS — F02.A0 MILD ALZHEIMER'S DEMENTIA OF OTHER ONSET, UNSPECIFIED WHETHER BEHAVIORAL, PSYCHOTIC, OR MOOD DISTURBANCE OR ANXIETY (MULTI): ICD-10-CM

## 2023-03-30 DIAGNOSIS — I15.9 SECONDARY HYPERTENSION: ICD-10-CM

## 2023-03-30 DIAGNOSIS — G30.8 MILD ALZHEIMER'S DEMENTIA OF OTHER ONSET, UNSPECIFIED WHETHER BEHAVIORAL, PSYCHOTIC, OR MOOD DISTURBANCE OR ANXIETY (MULTI): ICD-10-CM

## 2023-03-30 PROCEDURE — 99308 SBSQ NF CARE LOW MDM 20: CPT | Performed by: NURSE PRACTITIONER

## 2023-03-30 NOTE — LETTER
Patient: Elida Benson  : 1945    Encounter Date: 2023    PROGRESS NOTE  Subjective  Chief complaint: Elida Benson is a 78 y.o. female who is an acute skilled patient being seen and evaluated for weakness    HPI:  3/14/23 -patient at skilled nursing facility for therapy due to weakness after recent hospitalization.  Patient is total dependent for mobility and requires wheelchair.  She requires moderate to minimal assist for transfers and moderate assist for bed mobility.  She is working in speech therapy for cognition.  No new concerns today.  Denies constitutional symptoms.    3/16/2023 therapy continues to work with the patient.  She is totally dependent for mobility and requires assist with ADLs.  She has no new concerns today.  Denies constitutional symptoms.    3/20/23 patient to skilled nursing facility for therapy.  She requires assist with all ADLs and transfers.  States that she is feeling a lot better today.  She is sleeping well at night.  Denies constitutional symptoms.    3/21/2023  therapy continues to work with the patient.  She is making progress and is able to walk 22 feet with front wheel walker with minimal assist.  Patient requires minimal to moderate assist for transfers and moderate assist for bed mobility.  She is working and speech therapy for cognition.  No new concerns per nursing staff.  Patient denies Additional symptoms.    3/22/23 Patient progressing well in therapy. She is ambulating up to 22' with FWW and minimal assistance. She requires min to mod assistance for  transfers and moderate assistance for bed mobility. No new isseus at this time. Recent labs WNL. Denies constitutional symptoms. BUN 17, Creat 0.17, Na 140, K4.2, H&H 10.5 & 35.2.     3/23/2023 patient has no new concerns today.  She continues to work with therapy on strengthening and is motivated to walk.  Patient is able to walk short distances with front wheeled walker with assist.  Denies nausea  vomiting fever chills.    3/24/23 Patient ambulating over 20 feet with a walker minimal assistance.  She also requires minimal to moderate assistance for transfers and moderate assistance for bed mobility.  She is doing well and has no new issues today.  No acute distress.    3/27/2023 patient has been working in therapy and is able to walk short distances.  She is eager to go home.  She has no new concerns today.  Denies constitutional symptoms.    3/28/2023 patient is working in therapy with a rollator walker and is able to walk 10 steps with minimal assist.  She requires minimal assist for transfers and is total dependent for hygiene.  Patient is working in speech therapy for cognition.  Her cognition is limited her ability to discharge home alone according to the therapist.  No new concerns per nursing staff.    3/29/23 Patient working in therapy and uses rollator walker for ambulation up to 10 steps with minimal assistance. Bruce pain. No new issues at this time. No acute distress.     3/30/2023 therapy continues to work with the patient.  She is walking short distances with walker with assist.  She has no new concerns today.  Denies constitutional symptoms.  Uneventful night.      Objective  Vital signs: 114/71    Physical Exam  Constitutional:       General: She is not in acute distress.  Eyes:      Extraocular Movements: Extraocular movements intact.   Cardiovascular:      Rate and Rhythm: Regular rhythm.   Pulmonary:      Effort: Pulmonary effort is normal.      Breath sounds: Normal breath sounds.   Abdominal:      General: Bowel sounds are normal.      Palpations: Abdomen is soft.   Musculoskeletal:      Cervical back: Neck supple.      Right lower leg: No edema.      Left lower leg: No edema.      Comments: Generalized weakness  AFO right lower extremity   Neurological:      Mental Status: She is alert.   Psychiatric:         Mood and Affect: Mood normal.         Behavior: Behavior is cooperative.          Assessment/Plan  Problem List Items Addressed This Visit       Chronic obstructive pulmonary disease (CMS/[facility])     Stable, no shortness of breath or wheezing  On room air         Dementia (CMS/[facility])     Speech therapy for cognition         Hypertension     Blood pressure at goal  Continue antihypertensives  Continue to monitor BP         Weakness - Primary     Continue with therapy          Medications, treatments, and labs reviewed  Continue medications and treatments as listed in PCC    MI Triplett      Electronically Signed By: MI Triplett   3/30/23  8:28 PM

## 2023-03-30 NOTE — PROGRESS NOTES
Subjective   Chief complaint: Elida Benson is a 78 y.o. female who is a acute skilled care patient being seen and evaluated for weakness    HPI:  3/14/23 -patient at skilled nursing facility for therapy due to weakness after recent hospitalization.  Patient is total dependent for mobility and requires wheelchair.  She requires moderate to minimal assist for transfers and moderate assist for bed mobility.  She is working in speech therapy for cognition.  No new concerns today.  Denies constitutional symptoms.    3/16/2023 therapy continues to work with the patient.  She is totally dependent for mobility and requires assist with ADLs.  She has no new concerns today.  Denies constitutional symptoms.    3/20/23 patient to skilled nursing facility for therapy.  She requires assist with all ADLs and transfers.  States that she is feeling a lot better today.  She is sleeping well at night.  Denies constitutional symptoms.    3/21/2023  therapy continues to work with the patient.  She is making progress and is able to walk 22 feet with front wheel walker with minimal assist.  Patient requires minimal to moderate assist for transfers and moderate assist for bed mobility.  She is working and speech therapy for cognition.  No new concerns per nursing staff.  Patient denies Additional symptoms.    3/22/23 Patient progressing well in therapy. She is ambulating up to 22' with FWW and minimal assistance. She requires min to mod assistance for  transfers and moderate assistance for bed mobility. No new isseus at this time. Recent labs WNL. Denies constitutional symptoms. BUN 17, Creat 0.17, Na 140, K4.2, H&H 10.5 & 35.2.     3/23/2023 patient has no new concerns today.  She continues to work with therapy on strengthening and is motivated to walk.  Patient is able to walk short distances with front wheeled walker with assist.  Denies nausea vomiting fever chills.    3/24/23 Patient ambulating over 20 feet with a walker minimal  assistance.  She also requires minimal to moderate assistance for transfers and moderate assistance for bed mobility.  She is doing well and has no new issues today.  No acute distress.    3/27/2023 patient has been working in therapy and is able to walk short distances.  She is eager to go home.  She has no new concerns today.  Denies constitutional symptoms.    3/28/2023 patient is working in therapy with a rollator walker and is able to walk 10 steps with minimal assist.  She requires minimal assist for transfers and is total dependent for hygiene.  Patient is working in speech therapy for cognition.  Her cognition is limited her ability to discharge home alone according to the therapist.  No new concerns per nursing staff.    3/29/23 Patient working in therapy and uses rollator walker for ambulation up to 10 steps with minimal assistance. Bruce pain. No new issues at this time. No acute distress.         Review of Systems  All systems reviewed and negative except for what was mentioned in the HPI    Vital signs: 114/71, 98, 18, 96%,     Objective   Physical Exam  Constitutional:       General: She is not in acute distress.  Eyes:      Extraocular Movements: Extraocular movements intact.   Cardiovascular:      Rate and Rhythm: Regular rhythm.   Pulmonary:      Effort: Pulmonary effort is normal.      Breath sounds: Normal breath sounds.   Abdominal:      General: Bowel sounds are normal.      Palpations: Abdomen is soft.   Musculoskeletal:      Cervical back: Neck supple.      Right lower leg: No edema.      Left lower leg: No edema.   Neurological:      Mental Status: She is alert.   Psychiatric:         Mood and Affect: Mood normal.         Behavior: Behavior is cooperative.         Assessment/Plan   Problem List Items Addressed This Visit          Endocrine/Metabolic    Diabetes mellitus (CMS/[facility])     Monitor blood sugars   SSI            Other    Gait instability     Therapy         Weakness      Therapy          Medications, treatments, and labs reviewed  Continue medications and treatments as listed in PCC    Scribe Attestation  By signing my name below, I, Shalini Arnold   attest that this documentation has been prepared under the direction and in the presence of Sean Cloud MD.    Provider Attestation - Scribe documentation  All medical record entries made by the Scribe were at my direction and personally dictated by me. I have reviewed the chart and agree that the record accurately reflects my personal performance of the history, physical exam, discussion and plan.

## 2023-03-31 ENCOUNTER — NURSING HOME VISIT (OUTPATIENT)
Dept: POST ACUTE CARE | Facility: EXTERNAL LOCATION | Age: 78
End: 2023-03-31
Payer: MEDICARE

## 2023-03-31 DIAGNOSIS — R53.1 WEAKNESS: ICD-10-CM

## 2023-03-31 DIAGNOSIS — E11.9 TYPE 2 DIABETES MELLITUS WITHOUT COMPLICATION, WITHOUT LONG-TERM CURRENT USE OF INSULIN (MULTI): ICD-10-CM

## 2023-03-31 DIAGNOSIS — G30.8 MILD ALZHEIMER'S DEMENTIA OF OTHER ONSET, UNSPECIFIED WHETHER BEHAVIORAL, PSYCHOTIC, OR MOOD DISTURBANCE OR ANXIETY (MULTI): ICD-10-CM

## 2023-03-31 DIAGNOSIS — I10 PRIMARY HYPERTENSION: ICD-10-CM

## 2023-03-31 DIAGNOSIS — F02.A0 MILD ALZHEIMER'S DEMENTIA OF OTHER ONSET, UNSPECIFIED WHETHER BEHAVIORAL, PSYCHOTIC, OR MOOD DISTURBANCE OR ANXIETY (MULTI): ICD-10-CM

## 2023-03-31 PROCEDURE — 99308 SBSQ NF CARE LOW MDM 20: CPT | Performed by: INTERNAL MEDICINE

## 2023-03-31 NOTE — PROGRESS NOTES
PROGRESS NOTE  Subjective   Chief complaint: Elida Benson is a 78 y.o. female who is an acute skilled patient being seen and evaluated for weakness    HPI:  3/14/23 -patient at skilled nursing facility for therapy due to weakness after recent hospitalization.  Patient is total dependent for mobility and requires wheelchair.  She requires moderate to minimal assist for transfers and moderate assist for bed mobility.  She is working in speech therapy for cognition.  No new concerns today.  Denies constitutional symptoms.    3/16/2023 therapy continues to work with the patient.  She is totally dependent for mobility and requires assist with ADLs.  She has no new concerns today.  Denies constitutional symptoms.    3/20/23 patient to skilled nursing facility for therapy.  She requires assist with all ADLs and transfers.  States that she is feeling a lot better today.  She is sleeping well at night.  Denies constitutional symptoms.    3/21/2023  therapy continues to work with the patient.  She is making progress and is able to walk 22 feet with front wheel walker with minimal assist.  Patient requires minimal to moderate assist for transfers and moderate assist for bed mobility.  She is working and speech therapy for cognition.  No new concerns per nursing staff.  Patient denies Additional symptoms.    3/22/23 Patient progressing well in therapy. She is ambulating up to 22' with FWW and minimal assistance. She requires min to mod assistance for  transfers and moderate assistance for bed mobility. No new isseus at this time. Recent labs WNL. Denies constitutional symptoms. BUN 17, Creat 0.17, Na 140, K4.2, H&H 10.5 & 35.2.     3/23/2023 patient has no new concerns today.  She continues to work with therapy on strengthening and is motivated to walk.  Patient is able to walk short distances with front wheeled walker with assist.  Denies nausea vomiting fever chills.    3/24/23 Patient ambulating over 20 feet with a  walker minimal assistance.  She also requires minimal to moderate assistance for transfers and moderate assistance for bed mobility.  She is doing well and has no new issues today.  No acute distress.    3/27/2023 patient has been working in therapy and is able to walk short distances.  She is eager to go home.  She has no new concerns today.  Denies constitutional symptoms.    3/28/2023 patient is working in therapy with a rollator walker and is able to walk 10 steps with minimal assist.  She requires minimal assist for transfers and is total dependent for hygiene.  Patient is working in speech therapy for cognition.  Her cognition is limited her ability to discharge home alone according to the therapist.  No new concerns per nursing staff.    3/29/23 Patient working in therapy and uses rollator walker for ambulation up to 10 steps with minimal assistance. Bruce pain. No new issues at this time. No acute distress.     3/30/2023 therapy continues to work with the patient.  She is walking short distances with walker with assist.  She has no new concerns today.  Denies constitutional symptoms.  Uneventful night.      Objective   Vital signs: 114/71    Physical Exam  Constitutional:       General: She is not in acute distress.  Eyes:      Extraocular Movements: Extraocular movements intact.   Cardiovascular:      Rate and Rhythm: Regular rhythm.   Pulmonary:      Effort: Pulmonary effort is normal.      Breath sounds: Normal breath sounds.   Abdominal:      General: Bowel sounds are normal.      Palpations: Abdomen is soft.   Musculoskeletal:      Cervical back: Neck supple.      Right lower leg: No edema.      Left lower leg: No edema.      Comments: Generalized weakness  AFO right lower extremity   Neurological:      Mental Status: She is alert.   Psychiatric:         Mood and Affect: Mood normal.         Behavior: Behavior is cooperative.         Assessment/Plan   Problem List Items Addressed This Visit        Chronic obstructive pulmonary disease (CMS/[facility])     Stable, no shortness of breath or wheezing  On room air         Dementia (CMS/[facility])     Speech therapy for cognition         Hypertension     Blood pressure at goal  Continue antihypertensives  Continue to monitor BP         Weakness - Primary     Continue with therapy          Medications, treatments, and labs reviewed  Continue medications and treatments as listed in PCC    Radha Lay, APRN-CNP

## 2023-03-31 NOTE — LETTER
Patient: Elida Benson  : 1945    Encounter Date: 2023    Subjective  Chief complaint: Elida Benson is a 78 y.o. female who is a acute skilled care patient being seen and evaluated for weakness    HPI:  3/14/23 -patient at skilled nursing facility for therapy due to weakness after recent hospitalization.  Patient is total dependent for mobility and requires wheelchair.  She requires moderate to minimal assist for transfers and moderate assist for bed mobility.  She is working in speech therapy for cognition.  No new concerns today.  Denies constitutional symptoms.    3/16/2023 therapy continues to work with the patient.  She is totally dependent for mobility and requires assist with ADLs.  She has no new concerns today.  Denies constitutional symptoms.    3/20/23 patient to skilled nursing facility for therapy.  She requires assist with all ADLs and transfers.  States that she is feeling a lot better today.  She is sleeping well at night.  Denies constitutional symptoms.    3/21/2023  therapy continues to work with the patient.  She is making progress and is able to walk 22 feet with front wheel walker with minimal assist.  Patient requires minimal to moderate assist for transfers and moderate assist for bed mobility.  She is working and speech therapy for cognition.  No new concerns per nursing staff.  Patient denies Additional symptoms.    3/22/23 Patient progressing well in therapy. She is ambulating up to 22' with FWW and minimal assistance. She requires min to mod assistance for  transfers and moderate assistance for bed mobility. No new isseus at this time. Recent labs WNL. Denies constitutional symptoms. BUN 17, Creat 0.17, Na 140, K4.2, H&H 10.5 & 35.2.     3/23/2023 patient has no new concerns today.  She continues to work with therapy on strengthening and is motivated to walk.  Patient is able to walk short distances with front wheeled walker with assist.  Denies nausea vomiting fever  chills.    3/24/23 Patient ambulating over 20 feet with a walker minimal assistance.  She also requires minimal to moderate assistance for transfers and moderate assistance for bed mobility.  She is doing well and has no new issues today.  No acute distress.    3/27/2023 patient has been working in therapy and is able to walk short distances.  She is eager to go home.  She has no new concerns today.  Denies constitutional symptoms.    3/28/2023 patient is working in therapy with a rollator walker and is able to walk 10 steps with minimal assist.  She requires minimal assist for transfers and is total dependent for hygiene.  Patient is working in speech therapy for cognition.  Her cognition is limited her ability to discharge home alone according to the therapist.  No new concerns per nursing staff.    3/29/23 Patient working in therapy and uses rollator walker for ambulation up to 10 steps with minimal assistance. Bruce pain. No new issues at this time. No acute distress.     3/30/2023 therapy continues to work with the patient.  She is walking short distances with walker with assist.  She has no new concerns today.  Denies constitutional symptoms.  Uneventful night.    3/31/23 Patient with dementia walking increased distances with walker. She has no new issues and continues participating in therapy. No new issues at this time. Denies SOB.         Review of Systems  All systems reviewed and negative except for what was mentioned in the HPI    Vital signs: 107/71, 88, 18, 97%,     Objective  Physical Exam    Assessment/Plan  Problem List Items Addressed This Visit          Nervous    Dementia (CMS/[facility])     Speech therapy for cognition             Circulatory    Hypertension     Blood pressure at goal  Continue antihypertensives  Continue to monitor BP            Endocrine/Metabolic    Diabetes mellitus (CMS/[facility])     Monitor blood sugars   SSI            Other    Weakness     Continue therapy           Medications, treatments, and labs reviewed  Continue medications and treatments as listed in PCC    Scribe Attestation  By signing my name below, I, Hector Arnoldibe   attest that this documentation has been prepared under the direction and in the presence of Sean Cloud MD.    Provider Attestation - Scribe documentation  All medical record entries made by the Scribe were at my direction and personally dictated by me. I have reviewed the chart and agree that the record accurately reflects my personal performance of the history, physical exam, discussion and plan.      Electronically Signed By: Sean Cloud MD   4/3/23  5:39 PM

## 2023-04-03 ENCOUNTER — NURSING HOME VISIT (OUTPATIENT)
Dept: POST ACUTE CARE | Facility: EXTERNAL LOCATION | Age: 78
End: 2023-04-03
Payer: MEDICARE

## 2023-04-03 DIAGNOSIS — G30.8 MILD ALZHEIMER'S DEMENTIA OF OTHER ONSET, UNSPECIFIED WHETHER BEHAVIORAL, PSYCHOTIC, OR MOOD DISTURBANCE OR ANXIETY (MULTI): ICD-10-CM

## 2023-04-03 DIAGNOSIS — J43.9 PULMONARY EMPHYSEMA, UNSPECIFIED EMPHYSEMA TYPE (MULTI): ICD-10-CM

## 2023-04-03 DIAGNOSIS — R53.1 WEAKNESS: Primary | ICD-10-CM

## 2023-04-03 DIAGNOSIS — R11.2 NAUSEA AND VOMITING, UNSPECIFIED VOMITING TYPE: ICD-10-CM

## 2023-04-03 DIAGNOSIS — I10 PRIMARY HYPERTENSION: ICD-10-CM

## 2023-04-03 DIAGNOSIS — R19.7 DIARRHEA, UNSPECIFIED TYPE: ICD-10-CM

## 2023-04-03 DIAGNOSIS — F02.A0 MILD ALZHEIMER'S DEMENTIA OF OTHER ONSET, UNSPECIFIED WHETHER BEHAVIORAL, PSYCHOTIC, OR MOOD DISTURBANCE OR ANXIETY (MULTI): ICD-10-CM

## 2023-04-03 PROCEDURE — 99309 SBSQ NF CARE MODERATE MDM 30: CPT | Performed by: NURSE PRACTITIONER

## 2023-04-03 NOTE — LETTER
Patient: Elida Benson  : 1945    Encounter Date: 2023    PROGRESS NOTE    Subjective  Chief complaint: Elida Benson is a 78 y.o. female who is an acute skilled patient being seen and evaluated for weakness and nausea vomiting diarrhea    HPI:  3/14/23 -patient at skilled nursing facility for therapy due to weakness after recent hospitalization.  Patient is total dependent for mobility and requires wheelchair.  She requires moderate to minimal assist for transfers and moderate assist for bed mobility.  She is working in speech therapy for cognition.  No new concerns today.  Denies constitutional symptoms.    3/16/2023 therapy continues to work with the patient.  She is totally dependent for mobility and requires assist with ADLs.  She has no new concerns today.  Denies constitutional symptoms.    3/20/23 patient to skilled nursing facility for therapy.  She requires assist with all ADLs and transfers.  States that she is feeling a lot better today.  She is sleeping well at night.  Denies constitutional symptoms.    3/21/2023  therapy continues to work with the patient.  She is making progress and is able to walk 22 feet with front wheel walker with minimal assist.  Patient requires minimal to moderate assist for transfers and moderate assist for bed mobility.  She is working and speech therapy for cognition.  No new concerns per nursing staff.  Patient denies Additional symptoms.    3/22/23 Patient progressing well in therapy. She is ambulating up to 22' with FWW and minimal assistance. She requires min to mod assistance for  transfers and moderate assistance for bed mobility. No new isseus at this time. Recent labs WNL. Denies constitutional symptoms. BUN 17, Creat 0.17, Na 140, K4.2, H&H 10.5 & 35.2.     3/23/2023 patient has no new concerns today.  She continues to work with therapy on strengthening and is motivated to walk.  Patient is able to walk short distances with front wheeled walker  with assist.  Denies nausea vomiting fever chills.    3/24/23 Patient ambulating over 20 feet with a walker minimal assistance.  She also requires minimal to moderate assistance for transfers and moderate assistance for bed mobility.  She is doing well and has no new issues today.  No acute distress.    3/27/2023 patient has been working in therapy and is able to walk short distances.  She is eager to go home.  She has no new concerns today.  Denies constitutional symptoms.    3/28/2023 patient is working in therapy with a rollator walker and is able to walk 10 steps with minimal assist.  She requires minimal assist for transfers and is total dependent for hygiene.  Patient is working in speech therapy for cognition.  Her cognition is limited her ability to discharge home alone according to the therapist.  No new concerns per nursing staff.    3/29/23 Patient working in therapy and uses rollator walker for ambulation up to 10 steps with minimal assistance. Bruce pain. No new issues at this time. No acute distress.     3/30/2023 therapy continues to work with the patient.  She is walking short distances with walker with assist.  She has no new concerns today.  Denies constitutional symptoms.  Uneventful night.    3/31/23 Patient with dementia walking increased distances with walker. She has no new issues and continues participating in therapy. No new issues at this time. Denies SOB.     4/3/2023 patient has been at skilled nursing facility for therapy and has made progress.  She is able to walk 12 feet with contact-guard assist with front wheel walker.  She requires minimal assist for transfers and is working with speech therapy for cognition.  Patient had complaint of nausea vomiting and diarrhea which began a few days ago.  KUB was obtained and is negative.  She states that she has not had any symptoms today.  Labs were also ordered and pending.  She denies fever and chills.      Objective  Vital signs: 107/71, BS  371    Physical Exam  Constitutional:       General: She is not in acute distress.  Eyes:      Extraocular Movements: Extraocular movements intact.   Cardiovascular:      Rate and Rhythm: Regular rhythm.   Pulmonary:      Effort: Pulmonary effort is normal.      Breath sounds: Normal breath sounds.   Abdominal:      General: Bowel sounds are normal.      Palpations: Abdomen is soft.   Musculoskeletal:      Cervical back: Neck supple.      Right lower leg: No edema.      Left lower leg: No edema.      Comments: Generalized weakness  AFO right lower extremity   Neurological:      Mental Status: She is alert.   Psychiatric:         Mood and Affect: Mood normal.         Behavior: Behavior is cooperative.         Assessment/Plan  Problem List Items Addressed This Visit       Chronic obstructive pulmonary disease (CMS/[facility])     Stable, no shortness of breath or wheezing  On room air         Dementia (CMS/[facility])     Speech therapy for cognition         Diarrhea     Monitor         Hypertension     Blood pressure at goal  Continue antihypertensives  Continue to monitor BP         Nausea and vomiting     Clear liquid diet advance as tolerated  KUB negative  Labs pending         Weakness - Primary     Improving  Continue with therapy          Medications, treatments, and labs reviewed  Continue medications and treatments as listed in [facility]    MI Triplett      Electronically Signed By: MI Triplett   4/5/23  4:26 PM

## 2023-04-03 NOTE — PROGRESS NOTES
Subjective   Chief complaint: Elida Benson is a 78 y.o. female who is a acute skilled care patient being seen and evaluated for weakness    HPI:  3/14/23 -patient at skilled nursing facility for therapy due to weakness after recent hospitalization.  Patient is total dependent for mobility and requires wheelchair.  She requires moderate to minimal assist for transfers and moderate assist for bed mobility.  She is working in speech therapy for cognition.  No new concerns today.  Denies constitutional symptoms.    3/16/2023 therapy continues to work with the patient.  She is totally dependent for mobility and requires assist with ADLs.  She has no new concerns today.  Denies constitutional symptoms.    3/20/23 patient to skilled nursing facility for therapy.  She requires assist with all ADLs and transfers.  States that she is feeling a lot better today.  She is sleeping well at night.  Denies constitutional symptoms.    3/21/2023  therapy continues to work with the patient.  She is making progress and is able to walk 22 feet with front wheel walker with minimal assist.  Patient requires minimal to moderate assist for transfers and moderate assist for bed mobility.  She is working and speech therapy for cognition.  No new concerns per nursing staff.  Patient denies Additional symptoms.    3/22/23 Patient progressing well in therapy. She is ambulating up to 22' with FWW and minimal assistance. She requires min to mod assistance for  transfers and moderate assistance for bed mobility. No new isseus at this time. Recent labs WNL. Denies constitutional symptoms. BUN 17, Creat 0.17, Na 140, K4.2, H&H 10.5 & 35.2.     3/23/2023 patient has no new concerns today.  She continues to work with therapy on strengthening and is motivated to walk.  Patient is able to walk short distances with front wheeled walker with assist.  Denies nausea vomiting fever chills.    3/24/23 Patient ambulating over 20 feet with a walker minimal  assistance.  She also requires minimal to moderate assistance for transfers and moderate assistance for bed mobility.  She is doing well and has no new issues today.  No acute distress.    3/27/2023 patient has been working in therapy and is able to walk short distances.  She is eager to go home.  She has no new concerns today.  Denies constitutional symptoms.    3/28/2023 patient is working in therapy with a rollator walker and is able to walk 10 steps with minimal assist.  She requires minimal assist for transfers and is total dependent for hygiene.  Patient is working in speech therapy for cognition.  Her cognition is limited her ability to discharge home alone according to the therapist.  No new concerns per nursing staff.    3/29/23 Patient working in therapy and uses rollator walker for ambulation up to 10 steps with minimal assistance. Bruce pain. No new issues at this time. No acute distress.     3/30/2023 therapy continues to work with the patient.  She is walking short distances with walker with assist.  She has no new concerns today.  Denies constitutional symptoms.  Uneventful night.    3/31/23 Patient with dementia walking increased distances with walker. She has no new issues and continues participating in therapy. No new issues at this time. Denies SOB.         Review of Systems  All systems reviewed and negative except for what was mentioned in the HPI    Vital signs: 107/71, 88, 18, 97%,     Objective   Physical Exam    Assessment/Plan   Problem List Items Addressed This Visit          Nervous    Dementia (CMS/[facility])     Speech therapy for cognition             Circulatory    Hypertension     Blood pressure at goal  Continue antihypertensives  Continue to monitor BP            Endocrine/Metabolic    Diabetes mellitus (CMS/[facility])     Monitor blood sugars   SSI            Other    Weakness     Continue therapy          Medications, treatments, and labs reviewed  Continue medications and treatments  as listed in [facility]    Scribe Attestation  By signing my name below, I, Hector Arnoldibkelsie   attest that this documentation has been prepared under the direction and in the presence of Sean Cloud MD.    Provider Attestation - Scribe documentation  All medical record entries made by the Scribe were at my direction and personally dictated by me. I have reviewed the chart and agree that the record accurately reflects my personal performance of the history, physical exam, discussion and plan.

## 2023-04-04 ENCOUNTER — NURSING HOME VISIT (OUTPATIENT)
Dept: POST ACUTE CARE | Facility: EXTERNAL LOCATION | Age: 78
End: 2023-04-04
Payer: MEDICARE

## 2023-04-04 DIAGNOSIS — G30.8 MILD ALZHEIMER'S DEMENTIA OF OTHER ONSET, UNSPECIFIED WHETHER BEHAVIORAL, PSYCHOTIC, OR MOOD DISTURBANCE OR ANXIETY (MULTI): ICD-10-CM

## 2023-04-04 DIAGNOSIS — J43.9 PULMONARY EMPHYSEMA, UNSPECIFIED EMPHYSEMA TYPE (MULTI): ICD-10-CM

## 2023-04-04 DIAGNOSIS — I10 PRIMARY HYPERTENSION: ICD-10-CM

## 2023-04-04 DIAGNOSIS — R53.1 WEAKNESS: Primary | ICD-10-CM

## 2023-04-04 DIAGNOSIS — F02.A0 MILD ALZHEIMER'S DEMENTIA OF OTHER ONSET, UNSPECIFIED WHETHER BEHAVIORAL, PSYCHOTIC, OR MOOD DISTURBANCE OR ANXIETY (MULTI): ICD-10-CM

## 2023-04-04 PROCEDURE — 99308 SBSQ NF CARE LOW MDM 20: CPT | Performed by: NURSE PRACTITIONER

## 2023-04-04 NOTE — PROGRESS NOTES
PROGRESS NOTE    Subjective   Chief complaint: Elida Benson is a 78 y.o. female who is an acute skilled patient being seen and evaluated for weakness and nausea vomiting diarrhea    HPI:  3/14/23 -patient at skilled nursing facility for therapy due to weakness after recent hospitalization.  Patient is total dependent for mobility and requires wheelchair.  She requires moderate to minimal assist for transfers and moderate assist for bed mobility.  She is working in speech therapy for cognition.  No new concerns today.  Denies constitutional symptoms.    3/16/2023 therapy continues to work with the patient.  She is totally dependent for mobility and requires assist with ADLs.  She has no new concerns today.  Denies constitutional symptoms.    3/20/23 patient to skilled nursing facility for therapy.  She requires assist with all ADLs and transfers.  States that she is feeling a lot better today.  She is sleeping well at night.  Denies constitutional symptoms.    3/21/2023  therapy continues to work with the patient.  She is making progress and is able to walk 22 feet with front wheel walker with minimal assist.  Patient requires minimal to moderate assist for transfers and moderate assist for bed mobility.  She is working and speech therapy for cognition.  No new concerns per nursing staff.  Patient denies Additional symptoms.    3/22/23 Patient progressing well in therapy. She is ambulating up to 22' with FWW and minimal assistance. She requires min to mod assistance for  transfers and moderate assistance for bed mobility. No new isseus at this time. Recent labs WNL. Denies constitutional symptoms. BUN 17, Creat 0.17, Na 140, K4.2, H&H 10.5 & 35.2.     3/23/2023 patient has no new concerns today.  She continues to work with therapy on strengthening and is motivated to walk.  Patient is able to walk short distances with front wheeled walker with assist.  Denies nausea vomiting fever chills.    3/24/23 Patient  ambulating over 20 feet with a walker minimal assistance.  She also requires minimal to moderate assistance for transfers and moderate assistance for bed mobility.  She is doing well and has no new issues today.  No acute distress.    3/27/2023 patient has been working in therapy and is able to walk short distances.  She is eager to go home.  She has no new concerns today.  Denies constitutional symptoms.    3/28/2023 patient is working in therapy with a rollator walker and is able to walk 10 steps with minimal assist.  She requires minimal assist for transfers and is total dependent for hygiene.  Patient is working in speech therapy for cognition.  Her cognition is limited her ability to discharge home alone according to the therapist.  No new concerns per nursing staff.    3/29/23 Patient working in therapy and uses rollator walker for ambulation up to 10 steps with minimal assistance. Bruce pain. No new issues at this time. No acute distress.     3/30/2023 therapy continues to work with the patient.  She is walking short distances with walker with assist.  She has no new concerns today.  Denies constitutional symptoms.  Uneventful night.    3/31/23 Patient with dementia walking increased distances with walker. She has no new issues and continues participating in therapy. No new issues at this time. Denies SOB.     4/3/2023 patient has been at skilled nursing facility for therapy and has made progress.  She is able to walk 12 feet with contact-guard assist with front wheel walker.  She requires minimal assist for transfers and is working with speech therapy for cognition.  Patient had complaint of nausea vomiting and diarrhea which began a few days ago.  KUB was obtained and is negative.  She states that she has not had any symptoms today.  Labs were also ordered and pending.  She denies fever and chills.      Objective   Vital signs: 107/71,     Physical Exam  Constitutional:       General: She is not in  acute distress.  Eyes:      Extraocular Movements: Extraocular movements intact.   Cardiovascular:      Rate and Rhythm: Regular rhythm.   Pulmonary:      Effort: Pulmonary effort is normal.      Breath sounds: Normal breath sounds.   Abdominal:      General: Bowel sounds are normal.      Palpations: Abdomen is soft.   Musculoskeletal:      Cervical back: Neck supple.      Right lower leg: No edema.      Left lower leg: No edema.      Comments: Generalized weakness  AFO right lower extremity   Neurological:      Mental Status: She is alert.   Psychiatric:         Mood and Affect: Mood normal.         Behavior: Behavior is cooperative.         Assessment/Plan   Problem List Items Addressed This Visit       Chronic obstructive pulmonary disease (CMS/[facility])     Stable, no shortness of breath or wheezing  On room air         Dementia (CMS/[facility])     Speech therapy for cognition         Diarrhea     Monitor         Hypertension     Blood pressure at goal  Continue antihypertensives  Continue to monitor BP         Nausea and vomiting     Clear liquid diet advance as tolerated  KUB negative  Labs pending         Weakness - Primary     Improving  Continue with therapy          Medications, treatments, and labs reviewed  Continue medications and treatments as listed in [facility]    Radha Lay, APRN-CNP

## 2023-04-04 NOTE — LETTER
Patient: Elida Benson  : 1945    Encounter Date: 2023    PROGRESS NOTE    Subjective  Chief complaint: Elida Benson is a 78 y.o. female who is an acute skilled patient being seen and evaluated for weakness    HPI:  3/14/23 -patient at skilled nursing facility for therapy due to weakness after recent hospitalization.  Patient is total dependent for mobility and requires wheelchair.  She requires moderate to minimal assist for transfers and moderate assist for bed mobility.  She is working in speech therapy for cognition.  No new concerns today.  Denies constitutional symptoms.    3/16/2023 therapy continues to work with the patient.  She is totally dependent for mobility and requires assist with ADLs.  She has no new concerns today.  Denies constitutional symptoms.    3/20/23 patient to skilled nursing facility for therapy.  She requires assist with all ADLs and transfers.  States that she is feeling a lot better today.  She is sleeping well at night.  Denies constitutional symptoms.    3/21/2023  therapy continues to work with the patient.  She is making progress and is able to walk 22 feet with front wheel walker with minimal assist.  Patient requires minimal to moderate assist for transfers and moderate assist for bed mobility.  She is working and speech therapy for cognition.  No new concerns per nursing staff.  Patient denies Additional symptoms.    3/22/23 Patient progressing well in therapy. She is ambulating up to 22' with FWW and minimal assistance. She requires min to mod assistance for  transfers and moderate assistance for bed mobility. No new isseus at this time. Recent labs WNL. Denies constitutional symptoms. BUN 17, Creat 0.17, Na 140, K4.2, H&H 10.5 & 35.2.     3/23/2023 patient has no new concerns today.  She continues to work with therapy on strengthening and is motivated to walk.  Patient is able to walk short distances with front wheeled walker with assist.  Denies nausea  vomiting fever chills.    3/24/23 Patient ambulating over 20 feet with a walker minimal assistance.  She also requires minimal to moderate assistance for transfers and moderate assistance for bed mobility.  She is doing well and has no new issues today.  No acute distress.    3/27/2023 patient has been working in therapy and is able to walk short distances.  She is eager to go home.  She has no new concerns today.  Denies constitutional symptoms.    3/28/2023 patient is working in therapy with a rollator walker and is able to walk 10 steps with minimal assist.  She requires minimal assist for transfers and is total dependent for hygiene.  Patient is working in speech therapy for cognition.  Her cognition is limited her ability to discharge home alone according to the therapist.  No new concerns per nursing staff.    3/29/23 Patient working in therapy and uses rollator walker for ambulation up to 10 steps with minimal assistance. Bruce pain. No new issues at this time. No acute distress.     3/30/2023 therapy continues to work with the patient.  She is walking short distances with walker with assist.  She has no new concerns today.  Denies constitutional symptoms.  Uneventful night.    3/31/23 Patient with dementia walking increased distances with walker. She has no new issues and continues participating in therapy. No new issues at this time. Denies SOB.     4/3/2023 patient has been at skilled nursing facility for therapy and has made progress.  She is able to walk 12 feet with contact-guard assist with front wheel walker.  She requires minimal assist for transfers and is working with speech therapy for cognition.  Patient had complaint of nausea vomiting and diarrhea which began a few days ago.  KUB was obtained and is negative.  She states that she has not had any symptoms today.  Labs were also ordered and pending.  She denies fever and chills.    4/4/23 patient had uneventful night.  No new concerns per nursing  staff.  She continues working towards goals in therapy and is able to walk short distances with assist.  Patient denies nausea vomiting fever chills.      Objective  Vital signs: 104/65    Physical Exam  Constitutional:       General: She is not in acute distress.  Eyes:      Extraocular Movements: Extraocular movements intact.   Cardiovascular:      Rate and Rhythm: Regular rhythm.   Pulmonary:      Effort: Pulmonary effort is normal.      Breath sounds: Normal breath sounds.   Abdominal:      General: Bowel sounds are normal.      Palpations: Abdomen is soft.   Musculoskeletal:      Cervical back: Neck supple.      Right lower leg: No edema.      Left lower leg: No edema.      Comments: Generalized weakness  AFO right lower extremity   Neurological:      Mental Status: She is alert.   Psychiatric:         Mood and Affect: Mood normal.         Behavior: Behavior is cooperative.         Assessment/Plan  Problem List Items Addressed This Visit       Chronic obstructive pulmonary disease (CMS/[facility])     Stable, no shortness of breath or wheezing  On room air         Dementia (CMS/[facility])     Speech therapy for cognition         Hypertension     Blood pressure at goal  Continue antihypertensives  Continue to monitor BP         Weakness - Primary     Continue therapy          Medications, treatments, and labs reviewed  Continue medications and treatments as listed in [facility]    MI Triplett      Electronically Signed By: MI Triplett   4/14/23  5:12 PM

## 2023-04-05 ENCOUNTER — NURSING HOME VISIT (OUTPATIENT)
Dept: POST ACUTE CARE | Facility: EXTERNAL LOCATION | Age: 78
End: 2023-04-05
Payer: MEDICARE

## 2023-04-05 DIAGNOSIS — G30.8 MILD ALZHEIMER'S DEMENTIA OF OTHER ONSET, UNSPECIFIED WHETHER BEHAVIORAL, PSYCHOTIC, OR MOOD DISTURBANCE OR ANXIETY (MULTI): ICD-10-CM

## 2023-04-05 DIAGNOSIS — R53.1 WEAKNESS: ICD-10-CM

## 2023-04-05 DIAGNOSIS — F02.A0 MILD ALZHEIMER'S DEMENTIA OF OTHER ONSET, UNSPECIFIED WHETHER BEHAVIORAL, PSYCHOTIC, OR MOOD DISTURBANCE OR ANXIETY (MULTI): ICD-10-CM

## 2023-04-05 PROBLEM — M25.511 ACUTE PAIN OF RIGHT SHOULDER: Status: RESOLVED | Noted: 2023-03-12 | Resolved: 2023-04-05

## 2023-04-05 PROBLEM — R19.7 DIARRHEA: Status: ACTIVE | Noted: 2023-04-05

## 2023-04-05 PROBLEM — R11.2 NAUSEA AND VOMITING: Status: ACTIVE | Noted: 2023-04-05

## 2023-04-05 PROCEDURE — 99308 SBSQ NF CARE LOW MDM 20: CPT | Performed by: INTERNAL MEDICINE

## 2023-04-05 NOTE — PROGRESS NOTES
PROGRESS NOTE    Subjective   Chief complaint: Elida Benson is a 78 y.o. female who is an acute skilled patient being seen and evaluated for weakness    HPI:  3/14/23 -patient at skilled nursing facility for therapy due to weakness after recent hospitalization.  Patient is total dependent for mobility and requires wheelchair.  She requires moderate to minimal assist for transfers and moderate assist for bed mobility.  She is working in speech therapy for cognition.  No new concerns today.  Denies constitutional symptoms.    3/16/2023 therapy continues to work with the patient.  She is totally dependent for mobility and requires assist with ADLs.  She has no new concerns today.  Denies constitutional symptoms.    3/20/23 patient to skilled nursing facility for therapy.  She requires assist with all ADLs and transfers.  States that she is feeling a lot better today.  She is sleeping well at night.  Denies constitutional symptoms.    3/21/2023  therapy continues to work with the patient.  She is making progress and is able to walk 22 feet with front wheel walker with minimal assist.  Patient requires minimal to moderate assist for transfers and moderate assist for bed mobility.  She is working and speech therapy for cognition.  No new concerns per nursing staff.  Patient denies Additional symptoms.    3/22/23 Patient progressing well in therapy. She is ambulating up to 22' with FWW and minimal assistance. She requires min to mod assistance for  transfers and moderate assistance for bed mobility. No new isseus at this time. Recent labs WNL. Denies constitutional symptoms. BUN 17, Creat 0.17, Na 140, K4.2, H&H 10.5 & 35.2.     3/23/2023 patient has no new concerns today.  She continues to work with therapy on strengthening and is motivated to walk.  Patient is able to walk short distances with front wheeled walker with assist.  Denies nausea vomiting fever chills.    3/24/23 Patient ambulating over 20 feet with a  walker minimal assistance.  She also requires minimal to moderate assistance for transfers and moderate assistance for bed mobility.  She is doing well and has no new issues today.  No acute distress.    3/27/2023 patient has been working in therapy and is able to walk short distances.  She is eager to go home.  She has no new concerns today.  Denies constitutional symptoms.    3/28/2023 patient is working in therapy with a rollator walker and is able to walk 10 steps with minimal assist.  She requires minimal assist for transfers and is total dependent for hygiene.  Patient is working in speech therapy for cognition.  Her cognition is limited her ability to discharge home alone according to the therapist.  No new concerns per nursing staff.    3/29/23 Patient working in therapy and uses rollator walker for ambulation up to 10 steps with minimal assistance. Bruce pain. No new issues at this time. No acute distress.     3/30/2023 therapy continues to work with the patient.  She is walking short distances with walker with assist.  She has no new concerns today.  Denies constitutional symptoms.  Uneventful night.    3/31/23 Patient with dementia walking increased distances with walker. She has no new issues and continues participating in therapy. No new issues at this time. Denies SOB.     4/3/2023 patient has been at skilled nursing facility for therapy and has made progress.  She is able to walk 12 feet with contact-guard assist with front wheel walker.  She requires minimal assist for transfers and is working with speech therapy for cognition.  Patient had complaint of nausea vomiting and diarrhea which began a few days ago.  KUB was obtained and is negative.  She states that she has not had any symptoms today.  Labs were also ordered and pending.  She denies fever and chills.    4/4/23 patient had uneventful night.  No new concerns per nursing staff.  She continues working towards goals in therapy and is able to  walk short distances with assist.  Patient denies nausea vomiting fever chills.      Objective   Vital signs: 104/65    Physical Exam  Constitutional:       General: She is not in acute distress.  Eyes:      Extraocular Movements: Extraocular movements intact.   Cardiovascular:      Rate and Rhythm: Regular rhythm.   Pulmonary:      Effort: Pulmonary effort is normal.      Breath sounds: Normal breath sounds.   Abdominal:      General: Bowel sounds are normal.      Palpations: Abdomen is soft.   Musculoskeletal:      Cervical back: Neck supple.      Right lower leg: No edema.      Left lower leg: No edema.      Comments: Generalized weakness  AFO right lower extremity   Neurological:      Mental Status: She is alert.   Psychiatric:         Mood and Affect: Mood normal.         Behavior: Behavior is cooperative.         Assessment/Plan   Problem List Items Addressed This Visit       Chronic obstructive pulmonary disease (CMS/[facility])     Stable, no shortness of breath or wheezing  On room air         Dementia (CMS/[facility])     Speech therapy for cognition         Hypertension     Blood pressure at goal  Continue antihypertensives  Continue to monitor BP         Weakness - Primary     Continue therapy          Medications, treatments, and labs reviewed  Continue medications and treatments as listed in [facility]    Radha Lay, APRN-CNP

## 2023-04-05 NOTE — LETTER
Patient: Elida Benson  : 1945    Encounter Date: 2023    PROGRESS NOTE    Subjective  Chief complaint: Elida Benson is a 78 y.o. female who is an acute skilled patient being seen and evaluated for weakness    HPI:  3/14/23 -patient at skilled nursing facility for therapy due to weakness after recent hospitalization.  Patient is total dependent for mobility and requires wheelchair.  She requires moderate to minimal assist for transfers and moderate assist for bed mobility.  She is working in speech therapy for cognition.  No new concerns today.  Denies constitutional symptoms.    3/16/2023 therapy continues to work with the patient.  She is totally dependent for mobility and requires assist with ADLs.  She has no new concerns today.  Denies constitutional symptoms.    3/20/23 patient to skilled nursing facility for therapy.  She requires assist with all ADLs and transfers.  States that she is feeling a lot better today.  She is sleeping well at night.  Denies constitutional symptoms.    3/21/2023  therapy continues to work with the patient.  She is making progress and is able to walk 22 feet with front wheel walker with minimal assist.  Patient requires minimal to moderate assist for transfers and moderate assist for bed mobility.  She is working and speech therapy for cognition.  No new concerns per nursing staff.  Patient denies Additional symptoms.    3/22/23 Patient progressing well in therapy. She is ambulating up to 22' with FWW and minimal assistance. She requires min to mod assistance for  transfers and moderate assistance for bed mobility. No new isseus at this time. Recent labs WNL. Denies constitutional symptoms. BUN 17, Creat 0.17, Na 140, K4.2, H&H 10.5 & 35.2.     3/23/2023 patient has no new concerns today.  She continues to work with therapy on strengthening and is motivated to walk.  Patient is able to walk short distances with front wheeled walker with assist.  Denies nausea  vomiting fever chills.    3/24/23 Patient ambulating over 20 feet with a walker minimal assistance.  She also requires minimal to moderate assistance for transfers and moderate assistance for bed mobility.  She is doing well and has no new issues today.  No acute distress.    3/27/2023 patient has been working in therapy and is able to walk short distances.  She is eager to go home.  She has no new concerns today.  Denies constitutional symptoms.    3/28/2023 patient is working in therapy with a rollator walker and is able to walk 10 steps with minimal assist.  She requires minimal assist for transfers and is total dependent for hygiene.  Patient is working in speech therapy for cognition.  Her cognition is limited her ability to discharge home alone according to the therapist.  No new concerns per nursing staff.    3/29/23 Patient working in therapy and uses rollator walker for ambulation up to 10 steps with minimal assistance. Bruce pain. No new issues at this time. No acute distress.     3/30/2023 therapy continues to work with the patient.  She is walking short distances with walker with assist.  She has no new concerns today.  Denies constitutional symptoms.  Uneventful night.    3/31/23 Patient with dementia walking increased distances with walker. She has no new issues and continues participating in therapy. No new issues at this time. Denies SOB.     4/3/2023 patient has been at skilled nursing facility for therapy and has made progress.  She is able to walk 12 feet with contact-guard assist with front wheel walker.  She requires minimal assist for transfers and is working with speech therapy for cognition.  Patient had complaint of nausea vomiting and diarrhea which began a few days ago.  KUB was obtained and is negative.  She states that she has not had any symptoms today.  Labs were also ordered and pending.  She denies fever and chills.    4/5/23  patient with dementia continues to work in therapy due to  weakness.  She is also working in therapy for cognition.  Patient ambulating over 10 feet with a walker and contact-guard assist.  No new issues or concerns.  No acute distress      Objective  Vital signs:  116/64, 80, 18, 95%    Physical Exam  Constitutional:       General: She is not in acute distress.  Eyes:      Extraocular Movements: Extraocular movements intact.   Cardiovascular:      Rate and Rhythm: Regular rhythm.   Pulmonary:      Effort: Pulmonary effort is normal.      Breath sounds: Normal breath sounds.   Abdominal:      General: Bowel sounds are normal.      Palpations: Abdomen is soft.   Musculoskeletal:      Cervical back: Neck supple.      Right lower leg: No edema.      Left lower leg: No edema.   Neurological:      Mental Status: She is alert.   Psychiatric:         Mood and Affect: Mood normal.         Behavior: Behavior is cooperative.         Assessment/Plan  Problem List Items Addressed This Visit          Nervous    Dementia (CMS/[facility])     Speech therapy for cognition             Other    Weakness     Continue therapy          Medications, treatments, and labs reviewed  Continue medications and treatments as listed in [facility]    Scribe Attestation  By signing my name below, IHelen, Scribe   attest that this documentation has been prepared under the direction and in the presence of Sean Cloud MD.    Provider Attestation - Scribe documentation  All medical record entries made by the Scribe were at my direction and personally dictated by me. I have reviewed the chart and agree that the record accurately reflects my personal performance of the history, physical exam, discussion and plan.      Electronically Signed By: Sean Cloud MD   4/6/23  5:11 PM

## 2023-04-06 ENCOUNTER — NURSING HOME VISIT (OUTPATIENT)
Dept: POST ACUTE CARE | Facility: EXTERNAL LOCATION | Age: 78
End: 2023-04-06
Payer: MEDICARE

## 2023-04-06 DIAGNOSIS — G30.8 MILD ALZHEIMER'S DEMENTIA OF OTHER ONSET, UNSPECIFIED WHETHER BEHAVIORAL, PSYCHOTIC, OR MOOD DISTURBANCE OR ANXIETY (MULTI): ICD-10-CM

## 2023-04-06 DIAGNOSIS — F02.A0 MILD ALZHEIMER'S DEMENTIA OF OTHER ONSET, UNSPECIFIED WHETHER BEHAVIORAL, PSYCHOTIC, OR MOOD DISTURBANCE OR ANXIETY (MULTI): ICD-10-CM

## 2023-04-06 DIAGNOSIS — I10 PRIMARY HYPERTENSION: ICD-10-CM

## 2023-04-06 DIAGNOSIS — R53.1 WEAKNESS: Primary | ICD-10-CM

## 2023-04-06 DIAGNOSIS — J43.9 PULMONARY EMPHYSEMA, UNSPECIFIED EMPHYSEMA TYPE (MULTI): ICD-10-CM

## 2023-04-06 PROCEDURE — 99308 SBSQ NF CARE LOW MDM 20: CPT | Performed by: NURSE PRACTITIONER

## 2023-04-06 NOTE — PROGRESS NOTES
PROGRESS NOTE    Subjective   Chief complaint: Elida Benson is a 78 y.o. female who is an acute skilled patient being seen and evaluated for weakness    HPI:  3/14/23 -patient at skilled nursing facility for therapy due to weakness after recent hospitalization.  Patient is total dependent for mobility and requires wheelchair.  She requires moderate to minimal assist for transfers and moderate assist for bed mobility.  She is working in speech therapy for cognition.  No new concerns today.  Denies constitutional symptoms.    3/16/2023 therapy continues to work with the patient.  She is totally dependent for mobility and requires assist with ADLs.  She has no new concerns today.  Denies constitutional symptoms.    3/20/23 patient to skilled nursing facility for therapy.  She requires assist with all ADLs and transfers.  States that she is feeling a lot better today.  She is sleeping well at night.  Denies constitutional symptoms.    3/21/2023  therapy continues to work with the patient.  She is making progress and is able to walk 22 feet with front wheel walker with minimal assist.  Patient requires minimal to moderate assist for transfers and moderate assist for bed mobility.  She is working and speech therapy for cognition.  No new concerns per nursing staff.  Patient denies Additional symptoms.    3/22/23 Patient progressing well in therapy. She is ambulating up to 22' with FWW and minimal assistance. She requires min to mod assistance for  transfers and moderate assistance for bed mobility. No new isseus at this time. Recent labs WNL. Denies constitutional symptoms. BUN 17, Creat 0.17, Na 140, K4.2, H&H 10.5 & 35.2.     3/23/2023 patient has no new concerns today.  She continues to work with therapy on strengthening and is motivated to walk.  Patient is able to walk short distances with front wheeled walker with assist.  Denies nausea vomiting fever chills.    3/24/23 Patient ambulating over 20 feet with a  walker minimal assistance.  She also requires minimal to moderate assistance for transfers and moderate assistance for bed mobility.  She is doing well and has no new issues today.  No acute distress.    3/27/2023 patient has been working in therapy and is able to walk short distances.  She is eager to go home.  She has no new concerns today.  Denies constitutional symptoms.    3/28/2023 patient is working in therapy with a rollator walker and is able to walk 10 steps with minimal assist.  She requires minimal assist for transfers and is total dependent for hygiene.  Patient is working in speech therapy for cognition.  Her cognition is limited her ability to discharge home alone according to the therapist.  No new concerns per nursing staff.    3/29/23 Patient working in therapy and uses rollator walker for ambulation up to 10 steps with minimal assistance. Bruce pain. No new issues at this time. No acute distress.     3/30/2023 therapy continues to work with the patient.  She is walking short distances with walker with assist.  She has no new concerns today.  Denies constitutional symptoms.  Uneventful night.    3/31/23 Patient with dementia walking increased distances with walker. She has no new issues and continues participating in therapy. No new issues at this time. Denies SOB.     4/3/2023 patient has been at skilled nursing facility for therapy and has made progress.  She is able to walk 12 feet with contact-guard assist with front wheel walker.  She requires minimal assist for transfers and is working with speech therapy for cognition.  Patient had complaint of nausea vomiting and diarrhea which began a few days ago.  KUB was obtained and is negative.  She states that she has not had any symptoms today.  Labs were also ordered and pending.  She denies fever and chills.    4/5/23  patient with dementia continues to work in therapy due to weakness.  She is also working in therapy for cognition.  Patient  ambulating over 10 feet with a walker and contact-guard assist.  No new issues or concerns.  No acute distress      Objective   Vital signs:  116/64, 80, 18, 95%    Physical Exam  Constitutional:       General: She is not in acute distress.  Eyes:      Extraocular Movements: Extraocular movements intact.   Cardiovascular:      Rate and Rhythm: Regular rhythm.   Pulmonary:      Effort: Pulmonary effort is normal.      Breath sounds: Normal breath sounds.   Abdominal:      General: Bowel sounds are normal.      Palpations: Abdomen is soft.   Musculoskeletal:      Cervical back: Neck supple.      Right lower leg: No edema.      Left lower leg: No edema.   Neurological:      Mental Status: She is alert.   Psychiatric:         Mood and Affect: Mood normal.         Behavior: Behavior is cooperative.         Assessment/Plan   Problem List Items Addressed This Visit          Nervous    Dementia (CMS/[facility])     Speech therapy for cognition             Other    Weakness     Continue therapy          Medications, treatments, and labs reviewed  Continue medications and treatments as listed in [facility]    Scribe Attestation  By signing my name below, Helen SHABAZZ Scribe   attest that this documentation has been prepared under the direction and in the presence of Sean Cloud MD.    Provider Attestation - Scribe documentation  All medical record entries made by the Scribe were at my direction and personally dictated by me. I have reviewed the chart and agree that the record accurately reflects my personal performance of the history, physical exam, discussion and plan.

## 2023-04-06 NOTE — LETTER
Patient: Elida Benson  : 1945    Encounter Date: 2023    PROGRESS NOTE    Subjective  Chief complaint: Elida Benson is a 78 y.o. female who is a acute skilled care patient being seen and evaluated for weakness.    HPI:  3/14/23 -patient at skilled nursing facility for therapy due to weakness after recent hospitalization.  Patient is total dependent for mobility and requires wheelchair.  She requires moderate to minimal assist for transfers and moderate assist for bed mobility.  She is working in speech therapy for cognition.  No new concerns today.  Denies constitutional symptoms.    3/16/2023 therapy continues to work with the patient.  She is totally dependent for mobility and requires assist with ADLs.  She has no new concerns today.  Denies constitutional symptoms.    3/20/23 patient to skilled nursing facility for therapy.  She requires assist with all ADLs and transfers.  States that she is feeling a lot better today.  She is sleeping well at night.  Denies constitutional symptoms.    3/21/2023  therapy continues to work with the patient.  She is making progress and is able to walk 22 feet with front wheel walker with minimal assist.  Patient requires minimal to moderate assist for transfers and moderate assist for bed mobility.  She is working and speech therapy for cognition.  No new concerns per nursing staff.  Patient denies Additional symptoms.    3/22/23 Patient progressing well in therapy. She is ambulating up to 22' with FWW and minimal assistance. She requires min to mod assistance for  transfers and moderate assistance for bed mobility. No new isseus at this time. Recent labs WNL. Denies constitutional symptoms. BUN 17, Creat 0.17, Na 140, K4.2, H&H 10.5 & 35.2.     3/23/2023 patient has no new concerns today.  She continues to work with therapy on strengthening and is motivated to walk.  Patient is able to walk short distances with front wheeled walker with assist.  Denies  nausea vomiting fever chills.    3/24/23 Patient ambulating over 20 feet with a walker minimal assistance.  She also requires minimal to moderate assistance for transfers and moderate assistance for bed mobility.  She is doing well and has no new issues today.  No acute distress.    3/27/2023 patient has been working in therapy and is able to walk short distances.  She is eager to go home.  She has no new concerns today.  Denies constitutional symptoms.    3/28/2023 patient is working in therapy with a rollator walker and is able to walk 10 steps with minimal assist.  She requires minimal assist for transfers and is total dependent for hygiene.  Patient is working in speech therapy for cognition.  Her cognition is limited her ability to discharge home alone according to the therapist.  No new concerns per nursing staff.    3/29/23 Patient working in therapy and uses rollator walker for ambulation up to 10 steps with minimal assistance. Bruce pain. No new issues at this time. No acute distress.     3/30/2023 therapy continues to work with the patient.  She is walking short distances with walker with assist.  She has no new concerns today.  Denies constitutional symptoms.  Uneventful night.    3/31/23 Patient with dementia walking increased distances with walker. She has no new issues and continues participating in therapy. No new issues at this time. Denies SOB.     4/3/2023 patient has been at skilled nursing facility for therapy and has made progress.  She is able to walk 12 feet with contact-guard assist with front wheel walker.  She requires minimal assist for transfers and is working with speech therapy for cognition.  Patient had complaint of nausea vomiting and diarrhea which began a few days ago.  KUB was obtained and is negative.  She states that she has not had any symptoms today.  Labs were also ordered and pending.  She denies fever and chills.    4/5/23  patient with dementia continues to work in therapy  due to weakness.  She is also working in therapy for cognition.  Patient ambulating over 10 feet with a walker and contact-guard assist.  No new issues or concerns.  No acute distress.    4/6/23  therapy has been working with the patient to improve strength and endurance with ADLs, transfers, and mobility.  Patient continues to work toward goals.  Patient is stable and has no new complaints.  Nursing staff voices no new concerns today.      Objective  Vital signs: 18, 115/64, 97.2, 80, 95%, BS 84  Physical Exam  Constitutional:       General: She is not in acute distress.  Eyes:      Extraocular Movements: Extraocular movements intact.   Cardiovascular:      Rate and Rhythm: Regular rhythm.   Pulmonary:      Effort: Pulmonary effort is normal.      Breath sounds: Normal breath sounds.   Abdominal:      General: Bowel sounds are normal.      Palpations: Abdomen is soft.   Musculoskeletal:      Cervical back: Neck supple.      Right lower leg: No edema.      Left lower leg: No edema.      Comments: Generalized weakness  AFO right lower extremity   Neurological:      Mental Status: She is alert.   Psychiatric:         Mood and Affect: Mood normal.         Behavior: Behavior is cooperative.         Assessment/Plan  Problem List Items Addressed This Visit       Chronic obstructive pulmonary disease (CMS/[facility])     Stable, no shortness of breath or wheezing  On room air         Dementia (CMS/[facility])     Speech therapy for cognition         Hypertension     Blood pressure at goal  Continue antihypertensives  Continue to monitor BP         Weakness - Primary     Continue therapy          Medications, treatments, and labs reviewed  Continue medications and treatments as listed in [facility]    Scribe Attestation  I, Hector Murrayibkelsie   attest that this documentation has been prepared under the direction and in the presence of PARAG Triplett-CNP    Provider Attestation - Scribe documentation  All medical record entries made by  the Scribe were at my direction and personally dictated by me. I have reviewed the chart and agree that the record accurately reflects my personal performance of the history, physical exam, discussion and plan.   MI Triplett        Electronically Signed By: MI Triplett   4/10/23  7:13 PM

## 2023-04-06 NOTE — PROGRESS NOTES
PROGRESS NOTE    Subjective   Chief complaint: Elida Benson is a 78 y.o. female who is a acute skilled care patient being seen and evaluated for weakness.    HPI:  3/14/23 -patient at skilled nursing facility for therapy due to weakness after recent hospitalization.  Patient is total dependent for mobility and requires wheelchair.  She requires moderate to minimal assist for transfers and moderate assist for bed mobility.  She is working in speech therapy for cognition.  No new concerns today.  Denies constitutional symptoms.    3/16/2023 therapy continues to work with the patient.  She is totally dependent for mobility and requires assist with ADLs.  She has no new concerns today.  Denies constitutional symptoms.    3/20/23 patient to skilled nursing facility for therapy.  She requires assist with all ADLs and transfers.  States that she is feeling a lot better today.  She is sleeping well at night.  Denies constitutional symptoms.    3/21/2023  therapy continues to work with the patient.  She is making progress and is able to walk 22 feet with front wheel walker with minimal assist.  Patient requires minimal to moderate assist for transfers and moderate assist for bed mobility.  She is working and speech therapy for cognition.  No new concerns per nursing staff.  Patient denies Additional symptoms.    3/22/23 Patient progressing well in therapy. She is ambulating up to 22' with FWW and minimal assistance. She requires min to mod assistance for  transfers and moderate assistance for bed mobility. No new isseus at this time. Recent labs WNL. Denies constitutional symptoms. BUN 17, Creat 0.17, Na 140, K4.2, H&H 10.5 & 35.2.     3/23/2023 patient has no new concerns today.  She continues to work with therapy on strengthening and is motivated to walk.  Patient is able to walk short distances with front wheeled walker with assist.  Denies nausea vomiting fever chills.    3/24/23 Patient ambulating over 20 feet with  a walker minimal assistance.  She also requires minimal to moderate assistance for transfers and moderate assistance for bed mobility.  She is doing well and has no new issues today.  No acute distress.    3/27/2023 patient has been working in therapy and is able to walk short distances.  She is eager to go home.  She has no new concerns today.  Denies constitutional symptoms.    3/28/2023 patient is working in therapy with a rollator walker and is able to walk 10 steps with minimal assist.  She requires minimal assist for transfers and is total dependent for hygiene.  Patient is working in speech therapy for cognition.  Her cognition is limited her ability to discharge home alone according to the therapist.  No new concerns per nursing staff.    3/29/23 Patient working in therapy and uses rollator walker for ambulation up to 10 steps with minimal assistance. Bruce pain. No new issues at this time. No acute distress.     3/30/2023 therapy continues to work with the patient.  She is walking short distances with walker with assist.  She has no new concerns today.  Denies constitutional symptoms.  Uneventful night.    3/31/23 Patient with dementia walking increased distances with walker. She has no new issues and continues participating in therapy. No new issues at this time. Denies SOB.     4/3/2023 patient has been at skilled nursing facility for therapy and has made progress.  She is able to walk 12 feet with contact-guard assist with front wheel walker.  She requires minimal assist for transfers and is working with speech therapy for cognition.  Patient had complaint of nausea vomiting and diarrhea which began a few days ago.  KUB was obtained and is negative.  She states that she has not had any symptoms today.  Labs were also ordered and pending.  She denies fever and chills.    4/5/23  patient with dementia continues to work in therapy due to weakness.  She is also working in therapy for cognition.  Patient  ambulating over 10 feet with a walker and contact-guard assist.  No new issues or concerns.  No acute distress.    4/6/23  therapy has been working with the patient to improve strength and endurance with ADLs, transfers, and mobility.  Patient continues to work toward goals.  Patient is stable and has no new complaints.  Nursing staff voices no new concerns today.      Objective   Vital signs: 18, 115/64, 97.2, 80, 95%, BS 84  Physical Exam  Constitutional:       General: She is not in acute distress.  Eyes:      Extraocular Movements: Extraocular movements intact.   Cardiovascular:      Rate and Rhythm: Regular rhythm.   Pulmonary:      Effort: Pulmonary effort is normal.      Breath sounds: Normal breath sounds.   Abdominal:      General: Bowel sounds are normal.      Palpations: Abdomen is soft.   Musculoskeletal:      Cervical back: Neck supple.      Right lower leg: No edema.      Left lower leg: No edema.      Comments: Generalized weakness  AFO right lower extremity   Neurological:      Mental Status: She is alert.   Psychiatric:         Mood and Affect: Mood normal.         Behavior: Behavior is cooperative.         Assessment/Plan   Problem List Items Addressed This Visit       Chronic obstructive pulmonary disease (CMS/[facility])     Stable, no shortness of breath or wheezing  On room air         Dementia (CMS/[facility])     Speech therapy for cognition         Hypertension     Blood pressure at goal  Continue antihypertensives  Continue to monitor BP         Weakness - Primary     Continue therapy          Medications, treatments, and labs reviewed  Continue medications and treatments as listed in [facility]    Scribe Attestation  Sharon SHABAZZ Scribe   attest that this documentation has been prepared under the direction and in the presence of PARAG Triplett-CNP    Provider Attestation - Scribe documentation  All medical record entries made by the Scribe were at my direction and personally dictated by me. I have  reviewed the chart and agree that the record accurately reflects my personal performance of the history, physical exam, discussion and plan.   Radha Lay, APRN-CNP

## 2023-04-07 ENCOUNTER — NURSING HOME VISIT (OUTPATIENT)
Dept: POST ACUTE CARE | Facility: EXTERNAL LOCATION | Age: 78
End: 2023-04-07
Payer: MEDICARE

## 2023-04-07 DIAGNOSIS — M50.00 CERVICAL DISC DISORDER WITH MYELOPATHY: ICD-10-CM

## 2023-04-07 DIAGNOSIS — R53.1 WEAKNESS: ICD-10-CM

## 2023-04-07 PROCEDURE — 99309 SBSQ NF CARE MODERATE MDM 30: CPT | Performed by: INTERNAL MEDICINE

## 2023-04-07 NOTE — LETTER
Patient: Elida Benson  : 1945    Encounter Date: 2023    PROGRESS NOTE    Subjective  Chief complaint: Elida Benson is a 78 y.o. female who is an acute skilled patient being seen and evaluated for weakness    HPI:  3/14/23 -patient at skilled nursing facility for therapy due to weakness after recent hospitalization.  Patient is total dependent for mobility and requires wheelchair.  She requires moderate to minimal assist for transfers and moderate assist for bed mobility.  She is working in speech therapy for cognition.  No new concerns today.  Denies constitutional symptoms.    3/16/2023 therapy continues to work with the patient.  She is totally dependent for mobility and requires assist with ADLs.  She has no new concerns today.  Denies constitutional symptoms.    3/20/23 patient to skilled nursing facility for therapy.  She requires assist with all ADLs and transfers.  States that she is feeling a lot better today.  She is sleeping well at night.  Denies constitutional symptoms.    3/21/2023  therapy continues to work with the patient.  She is making progress and is able to walk 22 feet with front wheel walker with minimal assist.  Patient requires minimal to moderate assist for transfers and moderate assist for bed mobility.  She is working and speech therapy for cognition.  No new concerns per nursing staff.  Patient denies Additional symptoms.    3/22/23 Patient progressing well in therapy. She is ambulating up to 22' with FWW and minimal assistance. She requires min to mod assistance for  transfers and moderate assistance for bed mobility. No new isseus at this time. Recent labs WNL. Denies constitutional symptoms. BUN 17, Creat 0.17, Na 140, K4.2, H&H 10.5 & 35.2.     3/23/2023 patient has no new concerns today.  She continues to work with therapy on strengthening and is motivated to walk.  Patient is able to walk short distances with front wheeled walker with assist.  Denies nausea  vomiting fever chills.    3/24/23 Patient ambulating over 20 feet with a walker minimal assistance.  She also requires minimal to moderate assistance for transfers and moderate assistance for bed mobility.  She is doing well and has no new issues today.  No acute distress.    3/27/2023 patient has been working in therapy and is able to walk short distances.  She is eager to go home.  She has no new concerns today.  Denies constitutional symptoms.    3/28/2023 patient is working in therapy with a rollator walker and is able to walk 10 steps with minimal assist.  She requires minimal assist for transfers and is total dependent for hygiene.  Patient is working in speech therapy for cognition.  Her cognition is limited her ability to discharge home alone according to the therapist.  No new concerns per nursing staff.    3/29/23 Patient working in therapy and uses rollator walker for ambulation up to 10 steps with minimal assistance. Bruce pain. No new issues at this time. No acute distress.     3/30/2023 therapy continues to work with the patient.  She is walking short distances with walker with assist.  She has no new concerns today.  Denies constitutional symptoms.  Uneventful night.    3/31/23 Patient with dementia walking increased distances with walker. She has no new issues and continues participating in therapy. No new issues at this time. Denies SOB.     4/3/2023 patient has been at skilled nursing facility for therapy and has made progress.  She is able to walk 12 feet with contact-guard assist with front wheel walker.  She requires minimal assist for transfers and is working with speech therapy for cognition.  Patient had complaint of nausea vomiting and diarrhea which began a few days ago.  KUB was obtained and is negative.  She states that she has not had any symptoms today.  Labs were also ordered and pending.  She denies fever and chills.    4/5/23  patient with dementia continues to work in therapy due to  weakness.  She is also working in therapy for cognition.  Patient ambulating over 10 feet with a walker and contact-guard assist.  No new issues or concerns.  No acute distress.    4/6/23  therapy has been working with the patient to improve strength and endurance with ADLs, transfers, and mobility.  Patient continues to work toward goals.  Patient is stable and has no new complaints.  Nursing staff voices no new concerns today.    4/7/23 Patient working in therapy and requires moderate assistance for transfers and minimal assistance for ADL's. Pain controlled. No acute distress.       Objective  Vital signs: 106/72, 91, 18, 97%    Physical Exam  Constitutional:       General: She is not in acute distress.  Eyes:      Extraocular Movements: Extraocular movements intact.   Cardiovascular:      Rate and Rhythm: Normal rate and regular rhythm.   Pulmonary:      Effort: Pulmonary effort is normal.      Breath sounds: Normal breath sounds.   Abdominal:      General: Bowel sounds are normal.      Palpations: Abdomen is soft.   Musculoskeletal:         General: Normal range of motion.      Cervical back: Normal range of motion and neck supple.      Right lower leg: No edema.      Left lower leg: No edema.   Neurological:      Mental Status: She is alert.   Psychiatric:         Mood and Affect: Mood normal.         Behavior: Behavior is cooperative.         Assessment/Plan  Problem List Items Addressed This Visit          Nervous    Cervical disc disorder with myelopathy     Pain control  +weakness            Other    Weakness     Continue therapy          Medications, treatments, and labs reviewed  Continue medications and treatments as listed in [facility]    Scribe Attestation  By signing my name below, Helen SHABAZZ, Hectoribkelsie   attest that this documentation has been prepared under the direction and in the presence of Sean Cloud MD.    Provider Attestation - Scribe documentation  All medical record entries made by the  Scribe were at my direction and personally dictated by me. I have reviewed the chart and agree that the record accurately reflects my personal performance of the history, physical exam, discussion and plan.      Electronically Signed By: Sean Cloud MD   4/10/23  7:27 PM

## 2023-04-10 ENCOUNTER — NURSING HOME VISIT (OUTPATIENT)
Dept: POST ACUTE CARE | Facility: EXTERNAL LOCATION | Age: 78
End: 2023-04-10
Payer: MEDICARE

## 2023-04-10 DIAGNOSIS — R53.1 WEAKNESS: Primary | ICD-10-CM

## 2023-04-10 DIAGNOSIS — N89.8 VAGINAL DISCHARGE: ICD-10-CM

## 2023-04-10 DIAGNOSIS — F02.A0 MILD ALZHEIMER'S DEMENTIA OF OTHER ONSET, UNSPECIFIED WHETHER BEHAVIORAL, PSYCHOTIC, OR MOOD DISTURBANCE OR ANXIETY (MULTI): ICD-10-CM

## 2023-04-10 DIAGNOSIS — G30.8 MILD ALZHEIMER'S DEMENTIA OF OTHER ONSET, UNSPECIFIED WHETHER BEHAVIORAL, PSYCHOTIC, OR MOOD DISTURBANCE OR ANXIETY (MULTI): ICD-10-CM

## 2023-04-10 DIAGNOSIS — I10 HYPERTENSION, ESSENTIAL: ICD-10-CM

## 2023-04-10 DIAGNOSIS — J43.9 PULMONARY EMPHYSEMA, UNSPECIFIED EMPHYSEMA TYPE (MULTI): ICD-10-CM

## 2023-04-10 PROCEDURE — 99308 SBSQ NF CARE LOW MDM 20: CPT | Performed by: NURSE PRACTITIONER

## 2023-04-10 NOTE — LETTER
Patient: Elida Benson  : 1945    Encounter Date: 04/10/2023    PROGRESS NOTE    Subjective  Chief complaint: Elida Benson is a 78 y.o. female who is a acute skilled care patient being seen and evaluated for weakness.    HPI:  3/14/23 -patient at skilled nursing facility for therapy due to weakness after recent hospitalization.  Patient is total dependent for mobility and requires wheelchair.  She requires moderate to minimal assist for transfers and moderate assist for bed mobility.  She is working in speech therapy for cognition.  No new concerns today.  Denies constitutional symptoms.    3/16/2023 therapy continues to work with the patient.  She is totally dependent for mobility and requires assist with ADLs.  She has no new concerns today.  Denies constitutional symptoms.    3/20/23 patient to skilled nursing facility for therapy.  She requires assist with all ADLs and transfers.  States that she is feeling a lot better today.  She is sleeping well at night.  Denies constitutional symptoms.    3/21/2023  therapy continues to work with the patient.  She is making progress and is able to walk 22 feet with front wheel walker with minimal assist.  Patient requires minimal to moderate assist for transfers and moderate assist for bed mobility.  She is working and speech therapy for cognition.  No new concerns per nursing staff.  Patient denies Additional symptoms.    3/22/23 Patient progressing well in therapy. She is ambulating up to 22' with FWW and minimal assistance. She requires min to mod assistance for  transfers and moderate assistance for bed mobility. No new isseus at this time. Recent labs WNL. Denies constitutional symptoms. BUN 17, Creat 0.17, Na 140, K4.2, H&H 10.5 & 35.2.     3/23/2023 patient has no new concerns today.  She continues to work with therapy on strengthening and is motivated to walk.  Patient is able to walk short distances with front wheeled walker with assist.  Denies  nausea vomiting fever chills.    3/24/23 Patient ambulating over 20 feet with a walker minimal assistance.  She also requires minimal to moderate assistance for transfers and moderate assistance for bed mobility.  She is doing well and has no new issues today.  No acute distress.    3/27/2023 patient has been working in therapy and is able to walk short distances.  She is eager to go home.  She has no new concerns today.  Denies constitutional symptoms.    3/28/2023 patient is working in therapy with a rollator walker and is able to walk 10 steps with minimal assist.  She requires minimal assist for transfers and is total dependent for hygiene.  Patient is working in speech therapy for cognition.  Her cognition is limited her ability to discharge home alone according to the therapist.  No new concerns per nursing staff.    3/29/23 Patient working in therapy and uses rollator walker for ambulation up to 10 steps with minimal assistance. Bruce pain. No new issues at this time. No acute distress.     3/30/2023 therapy continues to work with the patient.  She is walking short distances with walker with assist.  She has no new concerns today.  Denies constitutional symptoms.  Uneventful night.    3/31/23 Patient with dementia walking increased distances with walker. She has no new issues and continues participating in therapy. No new issues at this time. Denies SOB.     4/3/2023 patient has been at skilled nursing facility for therapy and has made progress.  She is able to walk 12 feet with contact-guard assist with front wheel walker.  She requires minimal assist for transfers and is working with speech therapy for cognition.  Patient had complaint of nausea vomiting and diarrhea which began a few days ago.  KUB was obtained and is negative.  She states that she has not had any symptoms today.  Labs were also ordered and pending.  She denies fever and chills.    4/5/23  patient with dementia continues to work in therapy  due to weakness.  She is also working in therapy for cognition.  Patient ambulating over 10 feet with a walker and contact-guard assist.  No new issues or concerns.  No acute distress.    4/6/23  therapy has been working with the patient to improve strength and endurance with ADLs, transfers, and mobility.  Patient continues to work toward goals.  Patient is stable and has no new complaints.  Nursing staff voices no new concerns today.    4/7/23 Patient working in therapy and requires moderate assistance for transfers and minimal assistance for ADL's. Pain controlled. No acute distress.     4/10/23  nurse called on 4/9 to report patient with vaginal discharge.  Gave order for culture.  Patient denies itching and burning.  Continues to work towards goals in therapy.  Denies n/v/f/c and pain.      Objective  Vital signs: 106/72, 97.6, 91, 18, 97%  Physical Exam  Constitutional:       General: She is not in acute distress.  Eyes:      Extraocular Movements: Extraocular movements intact.   Cardiovascular:      Rate and Rhythm: Regular rhythm.   Pulmonary:      Effort: Pulmonary effort is normal.      Breath sounds: Normal breath sounds.   Abdominal:      General: Bowel sounds are normal.      Palpations: Abdomen is soft.   Musculoskeletal:      Cervical back: Neck supple.      Right lower leg: No edema.      Left lower leg: No edema.      Comments: Generalized weakness  AFO right lower extremity   Neurological:      Mental Status: She is alert.   Psychiatric:         Mood and Affect: Mood normal.         Behavior: Behavior is cooperative.         Assessment/Plan  Problem List Items Addressed This Visit       Chronic obstructive pulmonary disease (CMS/[facility])     Stable, no shortness of breath or wheezing  On room air         Dementia (CMS/[facility])     Speech therapy for cognition         Hypertension, essential     Blood pressure at goal  Continue antihypertensives  Continue to monitor BP         Vaginal discharge     Culture  pending         Weakness - Primary     Continue therapy          Medications, treatments, and labs reviewed  Continue medications and treatments as listed in [facility]    Scribe Attestation  I, Shalini Murray   attest that this documentation has been prepared under the direction and in the presence of MI Triplett    Provider Attestation - Scribe documentation  All medical record entries made by the Scribe were at my direction and personally dictated by me. I have reviewed the chart and agree that the record accurately reflects my personal performance of the history, physical exam, discussion and plan.   MI Triplett        Electronically Signed By: MI Triplett   4/11/23  5:33 PM

## 2023-04-10 NOTE — PROGRESS NOTES
PROGRESS NOTE    Subjective   Chief complaint: Elida Benson is a 78 y.o. female who is an acute skilled patient being seen and evaluated for weakness    HPI:  3/14/23 -patient at skilled nursing facility for therapy due to weakness after recent hospitalization.  Patient is total dependent for mobility and requires wheelchair.  She requires moderate to minimal assist for transfers and moderate assist for bed mobility.  She is working in speech therapy for cognition.  No new concerns today.  Denies constitutional symptoms.    3/16/2023 therapy continues to work with the patient.  She is totally dependent for mobility and requires assist with ADLs.  She has no new concerns today.  Denies constitutional symptoms.    3/20/23 patient to skilled nursing facility for therapy.  She requires assist with all ADLs and transfers.  States that she is feeling a lot better today.  She is sleeping well at night.  Denies constitutional symptoms.    3/21/2023  therapy continues to work with the patient.  She is making progress and is able to walk 22 feet with front wheel walker with minimal assist.  Patient requires minimal to moderate assist for transfers and moderate assist for bed mobility.  She is working and speech therapy for cognition.  No new concerns per nursing staff.  Patient denies Additional symptoms.    3/22/23 Patient progressing well in therapy. She is ambulating up to 22' with FWW and minimal assistance. She requires min to mod assistance for  transfers and moderate assistance for bed mobility. No new isseus at this time. Recent labs WNL. Denies constitutional symptoms. BUN 17, Creat 0.17, Na 140, K4.2, H&H 10.5 & 35.2.     3/23/2023 patient has no new concerns today.  She continues to work with therapy on strengthening and is motivated to walk.  Patient is able to walk short distances with front wheeled walker with assist.  Denies nausea vomiting fever chills.    3/24/23 Patient ambulating over 20 feet with a  walker minimal assistance.  She also requires minimal to moderate assistance for transfers and moderate assistance for bed mobility.  She is doing well and has no new issues today.  No acute distress.    3/27/2023 patient has been working in therapy and is able to walk short distances.  She is eager to go home.  She has no new concerns today.  Denies constitutional symptoms.    3/28/2023 patient is working in therapy with a rollator walker and is able to walk 10 steps with minimal assist.  She requires minimal assist for transfers and is total dependent for hygiene.  Patient is working in speech therapy for cognition.  Her cognition is limited her ability to discharge home alone according to the therapist.  No new concerns per nursing staff.    3/29/23 Patient working in therapy and uses rollator walker for ambulation up to 10 steps with minimal assistance. Bruce pain. No new issues at this time. No acute distress.     3/30/2023 therapy continues to work with the patient.  She is walking short distances with walker with assist.  She has no new concerns today.  Denies constitutional symptoms.  Uneventful night.    3/31/23 Patient with dementia walking increased distances with walker. She has no new issues and continues participating in therapy. No new issues at this time. Denies SOB.     4/3/2023 patient has been at skilled nursing facility for therapy and has made progress.  She is able to walk 12 feet with contact-guard assist with front wheel walker.  She requires minimal assist for transfers and is working with speech therapy for cognition.  Patient had complaint of nausea vomiting and diarrhea which began a few days ago.  KUB was obtained and is negative.  She states that she has not had any symptoms today.  Labs were also ordered and pending.  She denies fever and chills.    4/5/23  patient with dementia continues to work in therapy due to weakness.  She is also working in therapy for cognition.  Patient  ambulating over 10 feet with a walker and contact-guard assist.  No new issues or concerns.  No acute distress.    4/6/23  therapy has been working with the patient to improve strength and endurance with ADLs, transfers, and mobility.  Patient continues to work toward goals.  Patient is stable and has no new complaints.  Nursing staff voices no new concerns today.    4/7/23 Patient working in therapy and requires moderate assistance for transfers and minimal assistance for ADL's. Pain controlled. No acute distress.       Objective   Vital signs: 106/72, 91, 18, 97%    Physical Exam  Constitutional:       General: She is not in acute distress.  Eyes:      Extraocular Movements: Extraocular movements intact.   Cardiovascular:      Rate and Rhythm: Normal rate and regular rhythm.   Pulmonary:      Effort: Pulmonary effort is normal.      Breath sounds: Normal breath sounds.   Abdominal:      General: Bowel sounds are normal.      Palpations: Abdomen is soft.   Musculoskeletal:         General: Normal range of motion.      Cervical back: Normal range of motion and neck supple.      Right lower leg: No edema.      Left lower leg: No edema.   Neurological:      Mental Status: She is alert.   Psychiatric:         Mood and Affect: Mood normal.         Behavior: Behavior is cooperative.         Assessment/Plan   Problem List Items Addressed This Visit          Nervous    Cervical disc disorder with myelopathy     Pain control  +weakness            Other    Weakness     Continue therapy          Medications, treatments, and labs reviewed  Continue medications and treatments as listed in [facility]    Scribe Attestation  By signing my name below, IHelen Scribe   attest that this documentation has been prepared under the direction and in the presence of Sean Cloud MD.    Provider Attestation - Scribe documentation  All medical record entries made by the Scribe were at my direction and personally dictated by me. I have  reviewed the chart and agree that the record accurately reflects my personal performance of the history, physical exam, discussion and plan.

## 2023-04-11 ENCOUNTER — NURSING HOME VISIT (OUTPATIENT)
Dept: POST ACUTE CARE | Facility: EXTERNAL LOCATION | Age: 78
End: 2023-04-11
Payer: MEDICARE

## 2023-04-11 DIAGNOSIS — I10 HYPERTENSION, ESSENTIAL: ICD-10-CM

## 2023-04-11 DIAGNOSIS — F02.A0 MILD ALZHEIMER'S DEMENTIA OF OTHER ONSET, UNSPECIFIED WHETHER BEHAVIORAL, PSYCHOTIC, OR MOOD DISTURBANCE OR ANXIETY (MULTI): ICD-10-CM

## 2023-04-11 DIAGNOSIS — G30.8 MILD ALZHEIMER'S DEMENTIA OF OTHER ONSET, UNSPECIFIED WHETHER BEHAVIORAL, PSYCHOTIC, OR MOOD DISTURBANCE OR ANXIETY (MULTI): ICD-10-CM

## 2023-04-11 DIAGNOSIS — R53.1 WEAKNESS: Primary | ICD-10-CM

## 2023-04-11 PROBLEM — N89.8 VAGINAL DISCHARGE: Status: ACTIVE | Noted: 2023-04-11

## 2023-04-11 PROCEDURE — 99308 SBSQ NF CARE LOW MDM 20: CPT | Performed by: NURSE PRACTITIONER

## 2023-04-11 NOTE — PROGRESS NOTES
PROGRESS NOTE    Subjective   Chief complaint: Elida Benson is a 78 y.o. female who is a acute skilled care patient being seen and evaluated for weakness.    HPI:  3/14/23 -patient at skilled nursing facility for therapy due to weakness after recent hospitalization.  Patient is total dependent for mobility and requires wheelchair.  She requires moderate to minimal assist for transfers and moderate assist for bed mobility.  She is working in speech therapy for cognition.  No new concerns today.  Denies constitutional symptoms.    3/16/2023 therapy continues to work with the patient.  She is totally dependent for mobility and requires assist with ADLs.  She has no new concerns today.  Denies constitutional symptoms.    3/20/23 patient to skilled nursing facility for therapy.  She requires assist with all ADLs and transfers.  States that she is feeling a lot better today.  She is sleeping well at night.  Denies constitutional symptoms.    3/21/2023  therapy continues to work with the patient.  She is making progress and is able to walk 22 feet with front wheel walker with minimal assist.  Patient requires minimal to moderate assist for transfers and moderate assist for bed mobility.  She is working and speech therapy for cognition.  No new concerns per nursing staff.  Patient denies Additional symptoms.    3/22/23 Patient progressing well in therapy. She is ambulating up to 22' with FWW and minimal assistance. She requires min to mod assistance for  transfers and moderate assistance for bed mobility. No new isseus at this time. Recent labs WNL. Denies constitutional symptoms. BUN 17, Creat 0.17, Na 140, K4.2, H&H 10.5 & 35.2.     3/23/2023 patient has no new concerns today.  She continues to work with therapy on strengthening and is motivated to walk.  Patient is able to walk short distances with front wheeled walker with assist.  Denies nausea vomiting fever chills.    3/24/23 Patient ambulating over 20 feet with  a walker minimal assistance.  She also requires minimal to moderate assistance for transfers and moderate assistance for bed mobility.  She is doing well and has no new issues today.  No acute distress.    3/27/2023 patient has been working in therapy and is able to walk short distances.  She is eager to go home.  She has no new concerns today.  Denies constitutional symptoms.    3/28/2023 patient is working in therapy with a rollator walker and is able to walk 10 steps with minimal assist.  She requires minimal assist for transfers and is total dependent for hygiene.  Patient is working in speech therapy for cognition.  Her cognition is limited her ability to discharge home alone according to the therapist.  No new concerns per nursing staff.    3/29/23 Patient working in therapy and uses rollator walker for ambulation up to 10 steps with minimal assistance. Bruce pain. No new issues at this time. No acute distress.     3/30/2023 therapy continues to work with the patient.  She is walking short distances with walker with assist.  She has no new concerns today.  Denies constitutional symptoms.  Uneventful night.    3/31/23 Patient with dementia walking increased distances with walker. She has no new issues and continues participating in therapy. No new issues at this time. Denies SOB.     4/3/2023 patient has been at skilled nursing facility for therapy and has made progress.  She is able to walk 12 feet with contact-guard assist with front wheel walker.  She requires minimal assist for transfers and is working with speech therapy for cognition.  Patient had complaint of nausea vomiting and diarrhea which began a few days ago.  KUB was obtained and is negative.  She states that she has not had any symptoms today.  Labs were also ordered and pending.  She denies fever and chills.    4/5/23  patient with dementia continues to work in therapy due to weakness.  She is also working in therapy for cognition.  Patient  ambulating over 10 feet with a walker and contact-guard assist.  No new issues or concerns.  No acute distress.    4/6/23  therapy has been working with the patient to improve strength and endurance with ADLs, transfers, and mobility.  Patient continues to work toward goals.  Patient is stable and has no new complaints.  Nursing staff voices no new concerns today.    4/7/23 Patient working in therapy and requires moderate assistance for transfers and minimal assistance for ADL's. Pain controlled. No acute distress.     4/10/23  nurse called on 4/9 to report patient with vaginal discharge.  Gave order for culture.  Patient denies itching and burning.  Continues to work towards goals in therapy.  Denies n/v/f/c and pain.      Objective   Vital signs: 106/72, 97.6, 91, 18, 97%  Physical Exam  Constitutional:       General: She is not in acute distress.  Eyes:      Extraocular Movements: Extraocular movements intact.   Cardiovascular:      Rate and Rhythm: Regular rhythm.   Pulmonary:      Effort: Pulmonary effort is normal.      Breath sounds: Normal breath sounds.   Abdominal:      General: Bowel sounds are normal.      Palpations: Abdomen is soft.   Musculoskeletal:      Cervical back: Neck supple.      Right lower leg: No edema.      Left lower leg: No edema.      Comments: Generalized weakness  AFO right lower extremity   Neurological:      Mental Status: She is alert.   Psychiatric:         Mood and Affect: Mood normal.         Behavior: Behavior is cooperative.         Assessment/Plan   Problem List Items Addressed This Visit       Chronic obstructive pulmonary disease (CMS/[facility])     Stable, no shortness of breath or wheezing  On room air         Dementia (CMS/[facility])     Speech therapy for cognition         Hypertension, essential     Blood pressure at goal  Continue antihypertensives  Continue to monitor BP         Vaginal discharge     Culture pending         Weakness - Primary     Continue therapy           Medications, treatments, and labs reviewed  Continue medications and treatments as listed in [facility]    Scribe Attestation  I, Shalini Murray   attest that this documentation has been prepared under the direction and in the presence of MI Triplett    Provider Attestation - Scribe documentation  All medical record entries made by the Scribe were at my direction and personally dictated by me. I have reviewed the chart and agree that the record accurately reflects my personal performance of the history, physical exam, discussion and plan.   MI Triplett

## 2023-04-11 NOTE — LETTER
Patient: Elida Benson  : 1945    Encounter Date: 2023    PROGRESS NOTE    Subjective  Chief complaint: Elida Benson is a 78 y.o. female who is an acute skilled patient being seen and evaluated for weakness    HPI:  3/14/23 -patient at skilled nursing facility for therapy due to weakness after recent hospitalization.  Patient is total dependent for mobility and requires wheelchair.  She requires moderate to minimal assist for transfers and moderate assist for bed mobility.  She is working in speech therapy for cognition.  No new concerns today.  Denies constitutional symptoms.    3/16/2023 therapy continues to work with the patient.  She is totally dependent for mobility and requires assist with ADLs.  She has no new concerns today.  Denies constitutional symptoms.    3/20/23 patient to skilled nursing facility for therapy.  She requires assist with all ADLs and transfers.  States that she is feeling a lot better today.  She is sleeping well at night.  Denies constitutional symptoms.    3/21/2023  therapy continues to work with the patient.  She is making progress and is able to walk 22 feet with front wheel walker with minimal assist.  Patient requires minimal to moderate assist for transfers and moderate assist for bed mobility.  She is working and speech therapy for cognition.  No new concerns per nursing staff.  Patient denies Additional symptoms.    3/22/23 Patient progressing well in therapy. She is ambulating up to 22' with FWW and minimal assistance. She requires min to mod assistance for  transfers and moderate assistance for bed mobility. No new isseus at this time. Recent labs WNL. Denies constitutional symptoms. BUN 17, Creat 0.17, Na 140, K4.2, H&H 10.5 & 35.2.     3/23/2023 patient has no new concerns today.  She continues to work with therapy on strengthening and is motivated to walk.  Patient is able to walk short distances with front wheeled walker with assist.  Denies nausea  vomiting fever chills.    3/24/23 Patient ambulating over 20 feet with a walker minimal assistance.  She also requires minimal to moderate assistance for transfers and moderate assistance for bed mobility.  She is doing well and has no new issues today.  No acute distress.    3/27/2023 patient has been working in therapy and is able to walk short distances.  She is eager to go home.  She has no new concerns today.  Denies constitutional symptoms.    3/28/2023 patient is working in therapy with a rollator walker and is able to walk 10 steps with minimal assist.  She requires minimal assist for transfers and is total dependent for hygiene.  Patient is working in speech therapy for cognition.  Her cognition is limited her ability to discharge home alone according to the therapist.  No new concerns per nursing staff.    3/29/23 Patient working in therapy and uses rollator walker for ambulation up to 10 steps with minimal assistance. Bruce pain. No new issues at this time. No acute distress.     3/30/2023 therapy continues to work with the patient.  She is walking short distances with walker with assist.  She has no new concerns today.  Denies constitutional symptoms.  Uneventful night.    3/31/23 Patient with dementia walking increased distances with walker. She has no new issues and continues participating in therapy. No new issues at this time. Denies SOB.     4/3/2023 patient has been at skilled nursing facility for therapy and has made progress.  She is able to walk 12 feet with contact-guard assist with front wheel walker.  She requires minimal assist for transfers and is working with speech therapy for cognition.  Patient had complaint of nausea vomiting and diarrhea which began a few days ago.  KUB was obtained and is negative.  She states that she has not had any symptoms today.  Labs were also ordered and pending.  She denies fever and chills.    4/5/23  patient with dementia continues to work in therapy due to  weakness.  She is also working in therapy for cognition.  Patient ambulating over 10 feet with a walker and contact-guard assist.  No new issues or concerns.  No acute distress.    4/6/23  therapy has been working with the patient to improve strength and endurance with ADLs, transfers, and mobility.  Patient continues to work toward goals.  Patient is stable and has no new complaints.  Nursing staff voices no new concerns today.    4/7/23 Patient working in therapy and requires moderate assistance for transfers and minimal assistance for ADL's. Pain controlled. No acute distress.     4/10/23  nurse called on 4/9 to report patient with vaginal discharge.  Gave order for culture.  Patient denies itching and burning.  Continues to work towards goals in therapy.  Denies n/v/f/c and pain.    4/11/2023 patient continues to work toward goals in therapy.  She is making progress and able to walk 45 feet with front wheeled walker with contact-guard assist.  Patient requires contact-guard assist for transfers and standby assist for bed mobility.  She has no new concerns today.  Denies constitutional symptoms.      Objective  Vital signs: 110/69    Physical Exam  Constitutional:       General: She is not in acute distress.  Eyes:      Extraocular Movements: Extraocular movements intact.   Cardiovascular:      Rate and Rhythm: Regular rhythm.   Pulmonary:      Effort: Pulmonary effort is normal.      Breath sounds: Normal breath sounds.   Abdominal:      General: Bowel sounds are normal.      Palpations: Abdomen is soft.   Musculoskeletal:      Cervical back: Neck supple.      Right lower leg: No edema.      Left lower leg: No edema.      Comments: Generalized weakness  AFO right lower extremity   Neurological:      Mental Status: She is alert.   Psychiatric:         Mood and Affect: Mood normal.         Behavior: Behavior is cooperative.         Assessment/Plan  Problem List Items Addressed This Visit       Dementia (CMS/[facility])      Speech therapy for cognition         Hypertension, essential     Blood pressure controlled  Continue antihypertensives  Continue to monitor BP         Weakness - Primary     Continue working in therapy          Medications, treatments, and labs reviewed  Continue medications and treatments as listed in [facility]    MI Triplett      Electronically Signed By: MI Triplett   4/16/23 11:51 AM

## 2023-04-12 ENCOUNTER — NURSING HOME VISIT (OUTPATIENT)
Dept: POST ACUTE CARE | Facility: EXTERNAL LOCATION | Age: 78
End: 2023-04-12
Payer: MEDICARE

## 2023-04-12 DIAGNOSIS — J43.0 UNILATERAL EMPHYSEMA (MULTI): ICD-10-CM

## 2023-04-12 DIAGNOSIS — M50.00 CERVICAL DISC DISORDER WITH MYELOPATHY: ICD-10-CM

## 2023-04-12 PROCEDURE — 99308 SBSQ NF CARE LOW MDM 20: CPT | Performed by: INTERNAL MEDICINE

## 2023-04-12 NOTE — LETTER
Patient: Elida Benson  : 1945    Encounter Date: 2023    PROGRESS NOTE    Subjective  Chief complaint: Elida Benson is a 78 y.o. female who is an acute skilled patient being seen and evaluated for weakness    HPI:  3/14/23 -patient at skilled nursing facility for therapy due to weakness after recent hospitalization.  Patient is total dependent for mobility and requires wheelchair.  She requires moderate to minimal assist for transfers and moderate assist for bed mobility.  She is working in speech therapy for cognition.  No new concerns today.  Denies constitutional symptoms.    3/16/2023 therapy continues to work with the patient.  She is totally dependent for mobility and requires assist with ADLs.  She has no new concerns today.  Denies constitutional symptoms.    3/20/23 patient to skilled nursing facility for therapy.  She requires assist with all ADLs and transfers.  States that she is feeling a lot better today.  She is sleeping well at night.  Denies constitutional symptoms.    3/21/2023  therapy continues to work with the patient.  She is making progress and is able to walk 22 feet with front wheel walker with minimal assist.  Patient requires minimal to moderate assist for transfers and moderate assist for bed mobility.  She is working and speech therapy for cognition.  No new concerns per nursing staff.  Patient denies Additional symptoms.    3/22/23 Patient progressing well in therapy. She is ambulating up to 22' with FWW and minimal assistance. She requires min to mod assistance for  transfers and moderate assistance for bed mobility. No new isseus at this time. Recent labs WNL. Denies constitutional symptoms. BUN 17, Creat 0.17, Na 140, K4.2, H&H 10.5 & 35.2.     3/23/2023 patient has no new concerns today.  She continues to work with therapy on strengthening and is motivated to walk.  Patient is able to walk short distances with front wheeled walker with assist.  Denies nausea  vomiting fever chills.    3/24/23 Patient ambulating over 20 feet with a walker minimal assistance.  She also requires minimal to moderate assistance for transfers and moderate assistance for bed mobility.  She is doing well and has no new issues today.  No acute distress.    3/27/2023 patient has been working in therapy and is able to walk short distances.  She is eager to go home.  She has no new concerns today.  Denies constitutional symptoms.    3/28/2023 patient is working in therapy with a rollator walker and is able to walk 10 steps with minimal assist.  She requires minimal assist for transfers and is total dependent for hygiene.  Patient is working in speech therapy for cognition.  Her cognition is limited her ability to discharge home alone according to the therapist.  No new concerns per nursing staff.    3/29/23 Patient working in therapy and uses rollator walker for ambulation up to 10 steps with minimal assistance. Bruce pain. No new issues at this time. No acute distress.     3/30/2023 therapy continues to work with the patient.  She is walking short distances with walker with assist.  She has no new concerns today.  Denies constitutional symptoms.  Uneventful night.    3/31/23 Patient with dementia walking increased distances with walker. She has no new issues and continues participating in therapy. No new issues at this time. Denies SOB.     4/3/2023 patient has been at skilled nursing facility for therapy and has made progress.  She is able to walk 12 feet with contact-guard assist with front wheel walker.  She requires minimal assist for transfers and is working with speech therapy for cognition.  Patient had complaint of nausea vomiting and diarrhea which began a few days ago.  KUB was obtained and is negative.  She states that she has not had any symptoms today.  Labs were also ordered and pending.  She denies fever and chills.    4/5/23  patient with dementia continues to work in therapy due to  weakness.  She is also working in therapy for cognition.  Patient ambulating over 10 feet with a walker and contact-guard assist.  No new issues or concerns.  No acute distress.    4/6/23  therapy has been working with the patient to improve strength and endurance with ADLs, transfers, and mobility.  Patient continues to work toward goals.  Patient is stable and has no new complaints.  Nursing staff voices no new concerns today.    4/7/23 Patient working in therapy and requires moderate assistance for transfers and minimal assistance for ADL's. Pain controlled. No acute distress.     4/10/23  nurse called on 4/9 to report patient with vaginal discharge.  Gave order for culture.  Patient denies itching and burning.  Continues to work towards goals in therapy.  Denies n/v/f/c and pain.    4/11/2023 patient continues to work toward goals in therapy.  She is making progress and able to walk 45 feet with front wheeled walker with contact-guard assist.  Patient requires contact-guard assist for transfers and standby assist for bed mobility.  She has no new concerns today.  Denies constitutional symptoms.    4/12/23 Patient progressing in therapy and is walking over 40' with walker and CGA. No new issues or concerns today. No acute distress. Denies SOB or pain at this time.       Objective  Vital signs: 123/76, 98%    Physical Exam  Constitutional:       General: She is not in acute distress.  Eyes:      Extraocular Movements: Extraocular movements intact.   Pulmonary:      Effort: Pulmonary effort is normal.      Breath sounds: Normal breath sounds.   Abdominal:      General: Bowel sounds are normal.      Palpations: Abdomen is soft.   Musculoskeletal:      Cervical back: Neck supple.      Right lower leg: No edema.      Left lower leg: No edema.   Neurological:      Mental Status: She is alert.   Psychiatric:         Mood and Affect: Mood normal.         Behavior: Behavior is cooperative.         Assessment/Plan  Problem  List Items Addressed This Visit          Nervous    Cervical disc disorder with myelopathy     Pain control  +weakness            Respiratory    Chronic obstructive pulmonary disease (CMS/HCC)     Stable, no shortness of breath or wheezing  On room air          Medications, treatments, and labs reviewed  Continue medications and treatments as listed in [facility]    Scribe Attestation  By signing my name below, I, Hector Arnoldibkelsie   attest that this documentation has been prepared under the direction and in the presence of Sean Cloud MD.    Provider Attestation - Scribe documentation  All medical record entries made by the Scribe were at my direction and personally dictated by me. I have reviewed the chart and agree that the record accurately reflects my personal performance of the history, physical exam, discussion and plan.      Electronically Signed By: Sean Cloud MD   4/16/23  1:10 PM

## 2023-04-12 NOTE — PROGRESS NOTES
PROGRESS NOTE    Subjective   Chief complaint: Elida Benson is a 78 y.o. female who is an acute skilled patient being seen and evaluated for weakness    HPI:  3/14/23 -patient at skilled nursing facility for therapy due to weakness after recent hospitalization.  Patient is total dependent for mobility and requires wheelchair.  She requires moderate to minimal assist for transfers and moderate assist for bed mobility.  She is working in speech therapy for cognition.  No new concerns today.  Denies constitutional symptoms.    3/16/2023 therapy continues to work with the patient.  She is totally dependent for mobility and requires assist with ADLs.  She has no new concerns today.  Denies constitutional symptoms.    3/20/23 patient to skilled nursing facility for therapy.  She requires assist with all ADLs and transfers.  States that she is feeling a lot better today.  She is sleeping well at night.  Denies constitutional symptoms.    3/21/2023  therapy continues to work with the patient.  She is making progress and is able to walk 22 feet with front wheel walker with minimal assist.  Patient requires minimal to moderate assist for transfers and moderate assist for bed mobility.  She is working and speech therapy for cognition.  No new concerns per nursing staff.  Patient denies Additional symptoms.    3/22/23 Patient progressing well in therapy. She is ambulating up to 22' with FWW and minimal assistance. She requires min to mod assistance for  transfers and moderate assistance for bed mobility. No new isseus at this time. Recent labs WNL. Denies constitutional symptoms. BUN 17, Creat 0.17, Na 140, K4.2, H&H 10.5 & 35.2.     3/23/2023 patient has no new concerns today.  She continues to work with therapy on strengthening and is motivated to walk.  Patient is able to walk short distances with front wheeled walker with assist.  Denies nausea vomiting fever chills.    3/24/23 Patient ambulating over 20 feet with a  walker minimal assistance.  She also requires minimal to moderate assistance for transfers and moderate assistance for bed mobility.  She is doing well and has no new issues today.  No acute distress.    3/27/2023 patient has been working in therapy and is able to walk short distances.  She is eager to go home.  She has no new concerns today.  Denies constitutional symptoms.    3/28/2023 patient is working in therapy with a rollator walker and is able to walk 10 steps with minimal assist.  She requires minimal assist for transfers and is total dependent for hygiene.  Patient is working in speech therapy for cognition.  Her cognition is limited her ability to discharge home alone according to the therapist.  No new concerns per nursing staff.    3/29/23 Patient working in therapy and uses rollator walker for ambulation up to 10 steps with minimal assistance. Bruce pain. No new issues at this time. No acute distress.     3/30/2023 therapy continues to work with the patient.  She is walking short distances with walker with assist.  She has no new concerns today.  Denies constitutional symptoms.  Uneventful night.    3/31/23 Patient with dementia walking increased distances with walker. She has no new issues and continues participating in therapy. No new issues at this time. Denies SOB.     4/3/2023 patient has been at skilled nursing facility for therapy and has made progress.  She is able to walk 12 feet with contact-guard assist with front wheel walker.  She requires minimal assist for transfers and is working with speech therapy for cognition.  Patient had complaint of nausea vomiting and diarrhea which began a few days ago.  KUB was obtained and is negative.  She states that she has not had any symptoms today.  Labs were also ordered and pending.  She denies fever and chills.    4/5/23  patient with dementia continues to work in therapy due to weakness.  She is also working in therapy for cognition.  Patient  ambulating over 10 feet with a walker and contact-guard assist.  No new issues or concerns.  No acute distress.    4/6/23  therapy has been working with the patient to improve strength and endurance with ADLs, transfers, and mobility.  Patient continues to work toward goals.  Patient is stable and has no new complaints.  Nursing staff voices no new concerns today.    4/7/23 Patient working in therapy and requires moderate assistance for transfers and minimal assistance for ADL's. Pain controlled. No acute distress.     4/10/23  nurse called on 4/9 to report patient with vaginal discharge.  Gave order for culture.  Patient denies itching and burning.  Continues to work towards goals in therapy.  Denies n/v/f/c and pain.    4/11/2023 patient continues to work toward goals in therapy.  She is making progress and able to walk 45 feet with front wheeled walker with contact-guard assist.  Patient requires contact-guard assist for transfers and standby assist for bed mobility.  She has no new concerns today.  Denies constitutional symptoms.      Objective   Vital signs: 110/69    Physical Exam  Constitutional:       General: She is not in acute distress.  Eyes:      Extraocular Movements: Extraocular movements intact.   Cardiovascular:      Rate and Rhythm: Regular rhythm.   Pulmonary:      Effort: Pulmonary effort is normal.      Breath sounds: Normal breath sounds.   Abdominal:      General: Bowel sounds are normal.      Palpations: Abdomen is soft.   Musculoskeletal:      Cervical back: Neck supple.      Right lower leg: No edema.      Left lower leg: No edema.      Comments: Generalized weakness  AFO right lower extremity   Neurological:      Mental Status: She is alert.   Psychiatric:         Mood and Affect: Mood normal.         Behavior: Behavior is cooperative.         Assessment/Plan   Problem List Items Addressed This Visit       Dementia (CMS/[facility])     Speech therapy for cognition         Hypertension, essential      Blood pressure controlled  Continue antihypertensives  Continue to monitor BP         Weakness - Primary     Continue working in therapy          Medications, treatments, and labs reviewed  Continue medications and treatments as listed in PCC    Radha Lay, APRN-CNP

## 2023-04-13 ENCOUNTER — NURSING HOME VISIT (OUTPATIENT)
Dept: POST ACUTE CARE | Facility: EXTERNAL LOCATION | Age: 78
End: 2023-04-13
Payer: MEDICARE

## 2023-04-13 DIAGNOSIS — G30.8 MILD ALZHEIMER'S DEMENTIA OF OTHER ONSET, UNSPECIFIED WHETHER BEHAVIORAL, PSYCHOTIC, OR MOOD DISTURBANCE OR ANXIETY (MULTI): ICD-10-CM

## 2023-04-13 DIAGNOSIS — F02.A0 MILD ALZHEIMER'S DEMENTIA OF OTHER ONSET, UNSPECIFIED WHETHER BEHAVIORAL, PSYCHOTIC, OR MOOD DISTURBANCE OR ANXIETY (MULTI): ICD-10-CM

## 2023-04-13 DIAGNOSIS — R53.1 WEAKNESS: Primary | ICD-10-CM

## 2023-04-13 DIAGNOSIS — I10 HYPERTENSION, ESSENTIAL: ICD-10-CM

## 2023-04-13 DIAGNOSIS — J43.0 UNILATERAL EMPHYSEMA (MULTI): ICD-10-CM

## 2023-04-13 PROCEDURE — 99309 SBSQ NF CARE MODERATE MDM 30: CPT | Performed by: NURSE PRACTITIONER

## 2023-04-13 NOTE — LETTER
Patient: Elida Benson  : 1945    Encounter Date: 2023    PROGRESS NOTE    Subjective  Chief complaint: Elida Benson is a 78 y.o. female who is an acute skilled patient being seen and evaluated for weakness    HPI:  3/14/23 -patient at skilled nursing facility for therapy due to weakness after recent hospitalization.  Patient is total dependent for mobility and requires wheelchair.  She requires moderate to minimal assist for transfers and moderate assist for bed mobility.  She is working in speech therapy for cognition.  No new concerns today.  Denies constitutional symptoms.    3/16/2023 therapy continues to work with the patient.  She is totally dependent for mobility and requires assist with ADLs.  She has no new concerns today.  Denies constitutional symptoms.    3/20/23 patient to skilled nursing facility for therapy.  She requires assist with all ADLs and transfers.  States that she is feeling a lot better today.  She is sleeping well at night.  Denies constitutional symptoms.    3/21/2023  therapy continues to work with the patient.  She is making progress and is able to walk 22 feet with front wheel walker with minimal assist.  Patient requires minimal to moderate assist for transfers and moderate assist for bed mobility.  She is working and speech therapy for cognition.  No new concerns per nursing staff.  Patient denies Additional symptoms.    3/22/23 Patient progressing well in therapy. She is ambulating up to 22' with FWW and minimal assistance. She requires min to mod assistance for  transfers and moderate assistance for bed mobility. No new isseus at this time. Recent labs WNL. Denies constitutional symptoms. BUN 17, Creat 0.17, Na 140, K4.2, H&H 10.5 & 35.2.     3/23/2023 patient has no new concerns today.  She continues to work with therapy on strengthening and is motivated to walk.  Patient is able to walk short distances with front wheeled walker with assist.  Denies nausea  vomiting fever chills.    3/24/23 Patient ambulating over 20 feet with a walker minimal assistance.  She also requires minimal to moderate assistance for transfers and moderate assistance for bed mobility.  She is doing well and has no new issues today.  No acute distress.    3/27/2023 patient has been working in therapy and is able to walk short distances.  She is eager to go home.  She has no new concerns today.  Denies constitutional symptoms.    3/28/2023 patient is working in therapy with a rollator walker and is able to walk 10 steps with minimal assist.  She requires minimal assist for transfers and is total dependent for hygiene.  Patient is working in speech therapy for cognition.  Her cognition is limited her ability to discharge home alone according to the therapist.  No new concerns per nursing staff.    3/29/23 Patient working in therapy and uses rollator walker for ambulation up to 10 steps with minimal assistance. Bruce pain. No new issues at this time. No acute distress.     3/30/2023 therapy continues to work with the patient.  She is walking short distances with walker with assist.  She has no new concerns today.  Denies constitutional symptoms.  Uneventful night.    3/31/23 Patient with dementia walking increased distances with walker. She has no new issues and continues participating in therapy. No new issues at this time. Denies SOB.     4/3/2023 patient has been at skilled nursing facility for therapy and has made progress.  She is able to walk 12 feet with contact-guard assist with front wheel walker.  She requires minimal assist for transfers and is working with speech therapy for cognition.  Patient had complaint of nausea vomiting and diarrhea which began a few days ago.  KUB was obtained and is negative.  She states that she has not had any symptoms today.  Labs were also ordered and pending.  She denies fever and chills.    4/5/23  patient with dementia continues to work in therapy due to  weakness.  She is also working in therapy for cognition.  Patient ambulating over 10 feet with a walker and contact-guard assist.  No new issues or concerns.  No acute distress.    4/6/23  therapy has been working with the patient to improve strength and endurance with ADLs, transfers, and mobility.  Patient continues to work toward goals.  Patient is stable and has no new complaints.  Nursing staff voices no new concerns today.    4/7/23 Patient working in therapy and requires moderate assistance for transfers and minimal assistance for ADL's. Pain controlled. No acute distress.     4/10/23  nurse called on 4/9 to report patient with vaginal discharge.  Gave order for culture.  Patient denies itching and burning.  Continues to work towards goals in therapy.  Denies n/v/f/c and pain.    4/11/2023 patient continues to work toward goals in therapy.  She is making progress and able to walk 45 feet with front wheeled walker with contact-guard assist.  Patient requires contact-guard assist for transfers and standby assist for bed mobility.  She has no new concerns today.  Denies constitutional symptoms.    4/13/2023 patient has no new complaints or concerns today.  Continues working towards goals in therapy.  Denies constitutional symptoms.          Objective  Vital signs: 107/65, blood sugar 121    Physical Exam  Constitutional:       General: She is not in acute distress.  Eyes:      Extraocular Movements: Extraocular movements intact.   Cardiovascular:      Rate and Rhythm: Regular rhythm.   Pulmonary:      Effort: Pulmonary effort is normal.      Breath sounds: Normal breath sounds.   Abdominal:      General: Bowel sounds are normal.      Palpations: Abdomen is soft.   Musculoskeletal:      Cervical back: Neck supple.      Right lower leg: No edema.      Left lower leg: No edema.      Comments: Generalized weakness  AFO right lower extremity   Neurological:      Mental Status: She is alert.   Psychiatric:          Mood and Affect: Mood normal.         Behavior: Behavior is cooperative.         Assessment/Plan  Problem List Items Addressed This Visit       Chronic obstructive pulmonary disease (CMS/[facility])     Stable, no shortness of breath or wheezing  On room air         Dementia (CMS/[facility])     Speech therapy for cognition         Hypertension, essential     Blood pressure controlled  Continue antihypertensives  Continue to monitor BP         Weakness - Primary     Continue working in therapy          Medications, treatments, and labs reviewed  Continue medications and treatments as listed in [facility]    MI Triplett      Electronically Signed By: MI Triplett   4/18/23  2:07 PM

## 2023-04-13 NOTE — PROGRESS NOTES
PROGRESS NOTE    Subjective   Chief complaint: Elida Benson is a 78 y.o. female who is an acute skilled patient being seen and evaluated for weakness    HPI:  3/14/23 -patient at skilled nursing facility for therapy due to weakness after recent hospitalization.  Patient is total dependent for mobility and requires wheelchair.  She requires moderate to minimal assist for transfers and moderate assist for bed mobility.  She is working in speech therapy for cognition.  No new concerns today.  Denies constitutional symptoms.    3/16/2023 therapy continues to work with the patient.  She is totally dependent for mobility and requires assist with ADLs.  She has no new concerns today.  Denies constitutional symptoms.    3/20/23 patient to skilled nursing facility for therapy.  She requires assist with all ADLs and transfers.  States that she is feeling a lot better today.  She is sleeping well at night.  Denies constitutional symptoms.    3/21/2023  therapy continues to work with the patient.  She is making progress and is able to walk 22 feet with front wheel walker with minimal assist.  Patient requires minimal to moderate assist for transfers and moderate assist for bed mobility.  She is working and speech therapy for cognition.  No new concerns per nursing staff.  Patient denies Additional symptoms.    3/22/23 Patient progressing well in therapy. She is ambulating up to 22' with FWW and minimal assistance. She requires min to mod assistance for  transfers and moderate assistance for bed mobility. No new isseus at this time. Recent labs WNL. Denies constitutional symptoms. BUN 17, Creat 0.17, Na 140, K4.2, H&H 10.5 & 35.2.     3/23/2023 patient has no new concerns today.  She continues to work with therapy on strengthening and is motivated to walk.  Patient is able to walk short distances with front wheeled walker with assist.  Denies nausea vomiting fever chills.    3/24/23 Patient ambulating over 20 feet with a  walker minimal assistance.  She also requires minimal to moderate assistance for transfers and moderate assistance for bed mobility.  She is doing well and has no new issues today.  No acute distress.    3/27/2023 patient has been working in therapy and is able to walk short distances.  She is eager to go home.  She has no new concerns today.  Denies constitutional symptoms.    3/28/2023 patient is working in therapy with a rollator walker and is able to walk 10 steps with minimal assist.  She requires minimal assist for transfers and is total dependent for hygiene.  Patient is working in speech therapy for cognition.  Her cognition is limited her ability to discharge home alone according to the therapist.  No new concerns per nursing staff.    3/29/23 Patient working in therapy and uses rollator walker for ambulation up to 10 steps with minimal assistance. Bruce pain. No new issues at this time. No acute distress.     3/30/2023 therapy continues to work with the patient.  She is walking short distances with walker with assist.  She has no new concerns today.  Denies constitutional symptoms.  Uneventful night.    3/31/23 Patient with dementia walking increased distances with walker. She has no new issues and continues participating in therapy. No new issues at this time. Denies SOB.     4/3/2023 patient has been at skilled nursing facility for therapy and has made progress.  She is able to walk 12 feet with contact-guard assist with front wheel walker.  She requires minimal assist for transfers and is working with speech therapy for cognition.  Patient had complaint of nausea vomiting and diarrhea which began a few days ago.  KUB was obtained and is negative.  She states that she has not had any symptoms today.  Labs were also ordered and pending.  She denies fever and chills.    4/5/23  patient with dementia continues to work in therapy due to weakness.  She is also working in therapy for cognition.  Patient  ambulating over 10 feet with a walker and contact-guard assist.  No new issues or concerns.  No acute distress.    4/6/23  therapy has been working with the patient to improve strength and endurance with ADLs, transfers, and mobility.  Patient continues to work toward goals.  Patient is stable and has no new complaints.  Nursing staff voices no new concerns today.    4/7/23 Patient working in therapy and requires moderate assistance for transfers and minimal assistance for ADL's. Pain controlled. No acute distress.     4/10/23  nurse called on 4/9 to report patient with vaginal discharge.  Gave order for culture.  Patient denies itching and burning.  Continues to work towards goals in therapy.  Denies n/v/f/c and pain.    4/11/2023 patient continues to work toward goals in therapy.  She is making progress and able to walk 45 feet with front wheeled walker with contact-guard assist.  Patient requires contact-guard assist for transfers and standby assist for bed mobility.  She has no new concerns today.  Denies constitutional symptoms.    4/13/2023 patient has no new complaints or concerns today.  Continues working towards goals in therapy.  Denies constitutional symptoms.          Objective   Vital signs: 107/65, blood sugar 121    Physical Exam  Constitutional:       General: She is not in acute distress.  Eyes:      Extraocular Movements: Extraocular movements intact.   Cardiovascular:      Rate and Rhythm: Regular rhythm.   Pulmonary:      Effort: Pulmonary effort is normal.      Breath sounds: Normal breath sounds.   Abdominal:      General: Bowel sounds are normal.      Palpations: Abdomen is soft.   Musculoskeletal:      Cervical back: Neck supple.      Right lower leg: No edema.      Left lower leg: No edema.      Comments: Generalized weakness  AFO right lower extremity   Neurological:      Mental Status: She is alert.   Psychiatric:         Mood and Affect: Mood normal.         Behavior: Behavior is  cooperative.         Assessment/Plan   Problem List Items Addressed This Visit       Chronic obstructive pulmonary disease (CMS/[facility])     Stable, no shortness of breath or wheezing  On room air         Dementia (CMS/[facility])     Speech therapy for cognition         Hypertension, essential     Blood pressure controlled  Continue antihypertensives  Continue to monitor BP         Weakness - Primary     Continue working in therapy          Medications, treatments, and labs reviewed  Continue medications and treatments as listed in [facility]    Radha Lay, APRN-CNP

## 2023-04-14 ENCOUNTER — NURSING HOME VISIT (OUTPATIENT)
Dept: POST ACUTE CARE | Facility: EXTERNAL LOCATION | Age: 78
End: 2023-04-14
Payer: MEDICARE

## 2023-04-14 DIAGNOSIS — R53.1 WEAKNESS: ICD-10-CM

## 2023-04-14 DIAGNOSIS — J43.0 UNILATERAL EMPHYSEMA (MULTI): ICD-10-CM

## 2023-04-14 PROCEDURE — 99308 SBSQ NF CARE LOW MDM 20: CPT | Performed by: INTERNAL MEDICINE

## 2023-04-14 NOTE — LETTER
Patient: Elida Benson  : 1945    Encounter Date: 2023    PROGRESS NOTE    Subjective  Chief complaint: Elida Benson is a 78 y.o. female who is an acute skilled patient being seen and evaluated for weakness    HPI:  3/14/23 -patient at skilled nursing facility for therapy due to weakness after recent hospitalization.  Patient is total dependent for mobility and requires wheelchair.  She requires moderate to minimal assist for transfers and moderate assist for bed mobility.  She is working in speech therapy for cognition.  No new concerns today.  Denies constitutional symptoms.    3/16/2023 therapy continues to work with the patient.  She is totally dependent for mobility and requires assist with ADLs.  She has no new concerns today.  Denies constitutional symptoms.    3/20/23 patient to skilled nursing facility for therapy.  She requires assist with all ADLs and transfers.  States that she is feeling a lot better today.  She is sleeping well at night.  Denies constitutional symptoms.    3/21/2023  therapy continues to work with the patient.  She is making progress and is able to walk 22 feet with front wheel walker with minimal assist.  Patient requires minimal to moderate assist for transfers and moderate assist for bed mobility.  She is working and speech therapy for cognition.  No new concerns per nursing staff.  Patient denies Additional symptoms.    3/22/23 Patient progressing well in therapy. She is ambulating up to 22' with FWW and minimal assistance. She requires min to mod assistance for  transfers and moderate assistance for bed mobility. No new isseus at this time. Recent labs WNL. Denies constitutional symptoms. BUN 17, Creat 0.17, Na 140, K4.2, H&H 10.5 & 35.2.     3/23/2023 patient has no new concerns today.  She continues to work with therapy on strengthening and is motivated to walk.  Patient is able to walk short distances with front wheeled walker with assist.  Denies nausea  vomiting fever chills.    3/24/23 Patient ambulating over 20 feet with a walker minimal assistance.  She also requires minimal to moderate assistance for transfers and moderate assistance for bed mobility.  She is doing well and has no new issues today.  No acute distress.    3/27/2023 patient has been working in therapy and is able to walk short distances.  She is eager to go home.  She has no new concerns today.  Denies constitutional symptoms.    3/28/2023 patient is working in therapy with a rollator walker and is able to walk 10 steps with minimal assist.  She requires minimal assist for transfers and is total dependent for hygiene.  Patient is working in speech therapy for cognition.  Her cognition is limited her ability to discharge home alone according to the therapist.  No new concerns per nursing staff.    3/29/23 Patient working in therapy and uses rollator walker for ambulation up to 10 steps with minimal assistance. Bruce pain. No new issues at this time. No acute distress.     3/30/2023 therapy continues to work with the patient.  She is walking short distances with walker with assist.  She has no new concerns today.  Denies constitutional symptoms.  Uneventful night.    3/31/23 Patient with dementia walking increased distances with walker. She has no new issues and continues participating in therapy. No new issues at this time. Denies SOB.     4/3/2023 patient has been at skilled nursing facility for therapy and has made progress.  She is able to walk 12 feet with contact-guard assist with front wheel walker.  She requires minimal assist for transfers and is working with speech therapy for cognition.  Patient had complaint of nausea vomiting and diarrhea which began a few days ago.  KUB was obtained and is negative.  She states that she has not had any symptoms today.  Labs were also ordered and pending.  She denies fever and chills.    4/5/23  patient with dementia continues to work in therapy due to  weakness.  She is also working in therapy for cognition.  Patient ambulating over 10 feet with a walker and contact-guard assist.  No new issues or concerns.  No acute distress.    4/6/23  therapy has been working with the patient to improve strength and endurance with ADLs, transfers, and mobility.  Patient continues to work toward goals.  Patient is stable and has no new complaints.  Nursing staff voices no new concerns today.    4/7/23 Patient working in therapy and requires moderate assistance for transfers and minimal assistance for ADL's. Pain controlled. No acute distress.     4/10/23  nurse called on 4/9 to report patient with vaginal discharge.  Gave order for culture.  Patient denies itching and burning.  Continues to work towards goals in therapy.  Denies n/v/f/c and pain.    4/11/2023 patient continues to work toward goals in therapy.  She is making progress and able to walk 45 feet with front wheeled walker with contact-guard assist.  Patient requires contact-guard assist for transfers and standby assist for bed mobility.  She has no new concerns today.  Denies constitutional symptoms.    4/13/2023 patient has no new complaints or concerns today.  Continues working towards goals in therapy.  Denies constitutional symptoms.    4/14/23 Patient working in therapy due to weakness and debility. Patient ambulates over 40 feet with walker and contact guard assistance. No new issues or concerns at this time. Deenis SOB.       Objective  Vital signs: 112/68, 96%    Physical Exam    Assessment/Plan  Problem List Items Addressed This Visit          Respiratory    Chronic obstructive pulmonary disease (CMS/HCC)     Stable, no shortness of breath or wheezing  On room air            Other    Weakness     Continue working in therapy          Medications, treatments, and labs reviewed  Continue medications and treatments as listed in [facility]    Sean Cloud MD    1. Unilateral emphysema (CMS/HCC)        2. Weakness              Scribe Attestation  By signing my name below, I, Shalini Arnold   attest that this documentation has been prepared under the direction and in the presence of Sean Cloud MD.    Provider Attestation - Scribe documentation  All medical record entries made by the Scribe were at my direction and personally dictated by me. I have reviewed the chart and agree that the record accurately reflects my personal performance of the history, physical exam, discussion and plan.      Electronically Signed By: Sean Cloud MD   4/19/23  5:42 PM

## 2023-04-16 NOTE — PROGRESS NOTES
PROGRESS NOTE    Subjective   Chief complaint: Elida Benson is a 78 y.o. female who is an acute skilled patient being seen and evaluated for weakness    HPI:  3/14/23 -patient at skilled nursing facility for therapy due to weakness after recent hospitalization.  Patient is total dependent for mobility and requires wheelchair.  She requires moderate to minimal assist for transfers and moderate assist for bed mobility.  She is working in speech therapy for cognition.  No new concerns today.  Denies constitutional symptoms.    3/16/2023 therapy continues to work with the patient.  She is totally dependent for mobility and requires assist with ADLs.  She has no new concerns today.  Denies constitutional symptoms.    3/20/23 patient to skilled nursing facility for therapy.  She requires assist with all ADLs and transfers.  States that she is feeling a lot better today.  She is sleeping well at night.  Denies constitutional symptoms.    3/21/2023  therapy continues to work with the patient.  She is making progress and is able to walk 22 feet with front wheel walker with minimal assist.  Patient requires minimal to moderate assist for transfers and moderate assist for bed mobility.  She is working and speech therapy for cognition.  No new concerns per nursing staff.  Patient denies Additional symptoms.    3/22/23 Patient progressing well in therapy. She is ambulating up to 22' with FWW and minimal assistance. She requires min to mod assistance for  transfers and moderate assistance for bed mobility. No new isseus at this time. Recent labs WNL. Denies constitutional symptoms. BUN 17, Creat 0.17, Na 140, K4.2, H&H 10.5 & 35.2.     3/23/2023 patient has no new concerns today.  She continues to work with therapy on strengthening and is motivated to walk.  Patient is able to walk short distances with front wheeled walker with assist.  Denies nausea vomiting fever chills.    3/24/23 Patient ambulating over 20 feet with a  walker minimal assistance.  She also requires minimal to moderate assistance for transfers and moderate assistance for bed mobility.  She is doing well and has no new issues today.  No acute distress.    3/27/2023 patient has been working in therapy and is able to walk short distances.  She is eager to go home.  She has no new concerns today.  Denies constitutional symptoms.    3/28/2023 patient is working in therapy with a rollator walker and is able to walk 10 steps with minimal assist.  She requires minimal assist for transfers and is total dependent for hygiene.  Patient is working in speech therapy for cognition.  Her cognition is limited her ability to discharge home alone according to the therapist.  No new concerns per nursing staff.    3/29/23 Patient working in therapy and uses rollator walker for ambulation up to 10 steps with minimal assistance. Bruce pain. No new issues at this time. No acute distress.     3/30/2023 therapy continues to work with the patient.  She is walking short distances with walker with assist.  She has no new concerns today.  Denies constitutional symptoms.  Uneventful night.    3/31/23 Patient with dementia walking increased distances with walker. She has no new issues and continues participating in therapy. No new issues at this time. Denies SOB.     4/3/2023 patient has been at skilled nursing facility for therapy and has made progress.  She is able to walk 12 feet with contact-guard assist with front wheel walker.  She requires minimal assist for transfers and is working with speech therapy for cognition.  Patient had complaint of nausea vomiting and diarrhea which began a few days ago.  KUB was obtained and is negative.  She states that she has not had any symptoms today.  Labs were also ordered and pending.  She denies fever and chills.    4/5/23  patient with dementia continues to work in therapy due to weakness.  She is also working in therapy for cognition.  Patient  ambulating over 10 feet with a walker and contact-guard assist.  No new issues or concerns.  No acute distress.    4/6/23  therapy has been working with the patient to improve strength and endurance with ADLs, transfers, and mobility.  Patient continues to work toward goals.  Patient is stable and has no new complaints.  Nursing staff voices no new concerns today.    4/7/23 Patient working in therapy and requires moderate assistance for transfers and minimal assistance for ADL's. Pain controlled. No acute distress.     4/10/23  nurse called on 4/9 to report patient with vaginal discharge.  Gave order for culture.  Patient denies itching and burning.  Continues to work towards goals in therapy.  Denies n/v/f/c and pain.    4/11/2023 patient continues to work toward goals in therapy.  She is making progress and able to walk 45 feet with front wheeled walker with contact-guard assist.  Patient requires contact-guard assist for transfers and standby assist for bed mobility.  She has no new concerns today.  Denies constitutional symptoms.    4/12/23 Patient progressing in therapy and is walking over 40' with walker and CGA. No new issues or concerns today. No acute distress. Denies SOB or pain at this time.       Objective   Vital signs: 123/76, 98%    Physical Exam  Constitutional:       General: She is not in acute distress.  Eyes:      Extraocular Movements: Extraocular movements intact.   Pulmonary:      Effort: Pulmonary effort is normal.      Breath sounds: Normal breath sounds.   Abdominal:      General: Bowel sounds are normal.      Palpations: Abdomen is soft.   Musculoskeletal:      Cervical back: Neck supple.      Right lower leg: No edema.      Left lower leg: No edema.   Neurological:      Mental Status: She is alert.   Psychiatric:         Mood and Affect: Mood normal.         Behavior: Behavior is cooperative.         Assessment/Plan   Problem List Items Addressed This Visit          Nervous    Cervical  disc disorder with myelopathy     Pain control  +weakness            Respiratory    Chronic obstructive pulmonary disease (CMS/HCC)     Stable, no shortness of breath or wheezing  On room air          Medications, treatments, and labs reviewed  Continue medications and treatments as listed in [facility]    Scribe Attestation  By signing my name below, I, Shalini Arnold   attest that this documentation has been prepared under the direction and in the presence of Sean Cloud MD.    Provider Attestation - Scribe documentation  All medical record entries made by the Scribe were at my direction and personally dictated by me. I have reviewed the chart and agree that the record accurately reflects my personal performance of the history, physical exam, discussion and plan.

## 2023-04-18 PROBLEM — R19.7 DIARRHEA: Status: RESOLVED | Noted: 2023-04-05 | Resolved: 2023-04-18

## 2023-04-18 PROBLEM — R11.2 NAUSEA AND VOMITING: Status: RESOLVED | Noted: 2023-04-05 | Resolved: 2023-04-18

## 2023-04-18 NOTE — PROGRESS NOTES
PROGRESS NOTE    Subjective   Chief complaint: Elida Benson is a 78 y.o. female who is an acute skilled patient being seen and evaluated for weakness    HPI:  3/14/23 -patient at skilled nursing facility for therapy due to weakness after recent hospitalization.  Patient is total dependent for mobility and requires wheelchair.  She requires moderate to minimal assist for transfers and moderate assist for bed mobility.  She is working in speech therapy for cognition.  No new concerns today.  Denies constitutional symptoms.    3/16/2023 therapy continues to work with the patient.  She is totally dependent for mobility and requires assist with ADLs.  She has no new concerns today.  Denies constitutional symptoms.    3/20/23 patient to skilled nursing facility for therapy.  She requires assist with all ADLs and transfers.  States that she is feeling a lot better today.  She is sleeping well at night.  Denies constitutional symptoms.    3/21/2023  therapy continues to work with the patient.  She is making progress and is able to walk 22 feet with front wheel walker with minimal assist.  Patient requires minimal to moderate assist for transfers and moderate assist for bed mobility.  She is working and speech therapy for cognition.  No new concerns per nursing staff.  Patient denies Additional symptoms.    3/22/23 Patient progressing well in therapy. She is ambulating up to 22' with FWW and minimal assistance. She requires min to mod assistance for  transfers and moderate assistance for bed mobility. No new isseus at this time. Recent labs WNL. Denies constitutional symptoms. BUN 17, Creat 0.17, Na 140, K4.2, H&H 10.5 & 35.2.     3/23/2023 patient has no new concerns today.  She continues to work with therapy on strengthening and is motivated to walk.  Patient is able to walk short distances with front wheeled walker with assist.  Denies nausea vomiting fever chills.    3/24/23 Patient ambulating over 20 feet with a  walker minimal assistance.  She also requires minimal to moderate assistance for transfers and moderate assistance for bed mobility.  She is doing well and has no new issues today.  No acute distress.    3/27/2023 patient has been working in therapy and is able to walk short distances.  She is eager to go home.  She has no new concerns today.  Denies constitutional symptoms.    3/28/2023 patient is working in therapy with a rollator walker and is able to walk 10 steps with minimal assist.  She requires minimal assist for transfers and is total dependent for hygiene.  Patient is working in speech therapy for cognition.  Her cognition is limited her ability to discharge home alone according to the therapist.  No new concerns per nursing staff.    3/29/23 Patient working in therapy and uses rollator walker for ambulation up to 10 steps with minimal assistance. Bruce pain. No new issues at this time. No acute distress.     3/30/2023 therapy continues to work with the patient.  She is walking short distances with walker with assist.  She has no new concerns today.  Denies constitutional symptoms.  Uneventful night.    3/31/23 Patient with dementia walking increased distances with walker. She has no new issues and continues participating in therapy. No new issues at this time. Denies SOB.     4/3/2023 patient has been at skilled nursing facility for therapy and has made progress.  She is able to walk 12 feet with contact-guard assist with front wheel walker.  She requires minimal assist for transfers and is working with speech therapy for cognition.  Patient had complaint of nausea vomiting and diarrhea which began a few days ago.  KUB was obtained and is negative.  She states that she has not had any symptoms today.  Labs were also ordered and pending.  She denies fever and chills.    4/5/23  patient with dementia continues to work in therapy due to weakness.  She is also working in therapy for cognition.  Patient  ambulating over 10 feet with a walker and contact-guard assist.  No new issues or concerns.  No acute distress.    4/6/23  therapy has been working with the patient to improve strength and endurance with ADLs, transfers, and mobility.  Patient continues to work toward goals.  Patient is stable and has no new complaints.  Nursing staff voices no new concerns today.    4/7/23 Patient working in therapy and requires moderate assistance for transfers and minimal assistance for ADL's. Pain controlled. No acute distress.     4/10/23  nurse called on 4/9 to report patient with vaginal discharge.  Gave order for culture.  Patient denies itching and burning.  Continues to work towards goals in therapy.  Denies n/v/f/c and pain.    4/11/2023 patient continues to work toward goals in therapy.  She is making progress and able to walk 45 feet with front wheeled walker with contact-guard assist.  Patient requires contact-guard assist for transfers and standby assist for bed mobility.  She has no new concerns today.  Denies constitutional symptoms.    4/13/2023 patient has no new complaints or concerns today.  Continues working towards goals in therapy.  Denies constitutional symptoms.    4/14/23 Patient working in therapy due to weakness and debility. Patient ambulates over 40 feet with walker and contact guard assistance. No new issues or concerns at this time. Deenis SOB.       Objective   Vital signs: 112/68, 96%    Physical Exam    Assessment/Plan   Problem List Items Addressed This Visit          Respiratory    Chronic obstructive pulmonary disease (CMS/HCC)     Stable, no shortness of breath or wheezing  On room air            Other    Weakness     Continue working in therapy          Medications, treatments, and labs reviewed  Continue medications and treatments as listed in [facility]    Sean Cloud MD    1. Unilateral emphysema (CMS/HCC)        2. Weakness             Scribe Attestation  By signing my name below, I, Helen  Shalini Cornejo   attest that this documentation has been prepared under the direction and in the presence of Sean Cloud MD.    Provider Attestation - Scribe documentation  All medical record entries made by the Scribe were at my direction and personally dictated by me. I have reviewed the chart and agree that the record accurately reflects my personal performance of the history, physical exam, discussion and plan.

## 2023-05-03 ENCOUNTER — TELEPHONE (OUTPATIENT)
Dept: PRIMARY CARE | Facility: CLINIC | Age: 78
End: 2023-05-03

## 2023-05-03 NOTE — TELEPHONE ENCOUNTER
Pts daughter called stating she has had blood in her urine for the past week. I suggested for her to go to ER and make a fuv with us afterwards.     Kaelyn Turk (daughter) can be reached at [phone]

## 2023-05-18 ENCOUNTER — PATIENT OUTREACH (OUTPATIENT)
Dept: CARE COORDINATION | Facility: CLINIC | Age: 78
End: 2023-05-18
Payer: MEDICARE

## 2023-05-19 LAB
APPEARANCE, URINE: ABNORMAL
BACTERIA, URINE: ABNORMAL /HPF
BILIRUBIN, URINE: ABNORMAL
BLOOD, URINE: ABNORMAL
COLOR, URINE: YELLOW
GLUCOSE, URINE: NEGATIVE MG/DL
HYALINE CASTS, URINE: ABNORMAL /LPF
KETONES, URINE: NEGATIVE MG/DL
LEUKOCYTE ESTERASE, URINE: ABNORMAL
MUCUS, URINE: ABNORMAL /LPF
NITRITE, URINE: NEGATIVE
PH, URINE: 6 (ref 5–8)
PROTEIN, URINE: ABNORMAL MG/DL
RBC, URINE: >182 /HPF (ref 0–5)
SPECIFIC GRAVITY, URINE: 1.02 (ref 1–1.03)
SQUAMOUS EPITHELIAL CELLS, URINE: 4 /HPF
UROBILINOGEN, URINE: <2 MG/DL (ref 0–1.9)
WBC, URINE: 122 /HPF (ref 0–5)

## 2023-05-21 LAB — URINE CULTURE: NORMAL

## 2023-05-22 ENCOUNTER — TELEPHONE (OUTPATIENT)
Dept: PRIMARY CARE | Facility: CLINIC | Age: 78
End: 2023-05-22
Payer: MEDICARE

## 2023-05-22 DIAGNOSIS — G89.4 CHRONIC PAIN SYNDROME: Primary | ICD-10-CM

## 2023-05-23 RX ORDER — METHOCARBAMOL 750 MG/1
TABLET, FILM COATED ORAL
COMMUNITY
Start: 2023-04-15 | End: 2023-05-26 | Stop reason: SINTOL

## 2023-05-24 RX ORDER — DULOXETIN HYDROCHLORIDE 30 MG/1
30 CAPSULE, DELAYED RELEASE ORAL 2 TIMES DAILY
Qty: 60 CAPSULE | Refills: 0 | Status: SHIPPED | OUTPATIENT
Start: 2023-05-24 | End: 2023-06-26

## 2023-05-26 ENCOUNTER — OFFICE VISIT (OUTPATIENT)
Dept: PRIMARY CARE | Facility: CLINIC | Age: 78
End: 2023-05-26
Payer: MEDICARE

## 2023-05-26 VITALS
HEIGHT: 59 IN | BODY MASS INDEX: 26.21 KG/M2 | HEART RATE: 98 BPM | SYSTOLIC BLOOD PRESSURE: 117 MMHG | DIASTOLIC BLOOD PRESSURE: 84 MMHG | WEIGHT: 130 LBS

## 2023-05-26 DIAGNOSIS — J43.0 UNILATERAL EMPHYSEMA (MULTI): ICD-10-CM

## 2023-05-26 DIAGNOSIS — N82.3 RVF (RECTOVAGINAL FISTULA): ICD-10-CM

## 2023-05-26 DIAGNOSIS — I10 HYPERTENSION, ESSENTIAL: ICD-10-CM

## 2023-05-26 DIAGNOSIS — M54.2 CERVICAL PAIN: ICD-10-CM

## 2023-05-26 DIAGNOSIS — K57.92 DIVERTICULITIS: ICD-10-CM

## 2023-05-26 DIAGNOSIS — M51.16 LUMBAR DISC HERNIATION WITH RADICULOPATHY: Primary | ICD-10-CM

## 2023-05-26 DIAGNOSIS — F51.01 PRIMARY INSOMNIA: ICD-10-CM

## 2023-05-26 PROBLEM — M43.10 ACQUIRED SPONDYLOLISTHESIS: Status: ACTIVE | Noted: 2023-05-26

## 2023-05-26 PROBLEM — R32 INCONTINENCE OF URINE: Status: ACTIVE | Noted: 2023-05-26

## 2023-05-26 PROBLEM — R53.81 PHYSICAL DECONDITIONING: Status: ACTIVE | Noted: 2023-05-26

## 2023-05-26 PROBLEM — N39.41 URGE INCONTINENCE OF URINE: Status: ACTIVE | Noted: 2023-05-26

## 2023-05-26 PROBLEM — G99.2 MYELOPATHY CONCURRENT WITH AND DUE TO SPINAL STENOSIS OF CERVICAL REGION (MULTI): Status: ACTIVE | Noted: 2023-05-26

## 2023-05-26 PROBLEM — E55.9 VITAMIN D DEFICIENCY: Status: ACTIVE | Noted: 2023-05-26

## 2023-05-26 PROBLEM — Q31.8 VOCAL CORD ANOMALY: Status: ACTIVE | Noted: 2023-05-26

## 2023-05-26 PROBLEM — H52.209 ASTIGMATISM: Status: ACTIVE | Noted: 2023-05-26

## 2023-05-26 PROBLEM — R27.0 ATAXIA: Status: ACTIVE | Noted: 2023-05-26

## 2023-05-26 PROBLEM — H25.813 COMBINED FORM OF AGE-RELATED CATARACT, BOTH EYES: Status: ACTIVE | Noted: 2023-05-26

## 2023-05-26 PROBLEM — M48.062 LUMBAR STENOSIS WITH NEUROGENIC CLAUDICATION: Status: ACTIVE | Noted: 2023-05-26

## 2023-05-26 PROBLEM — K92.1 HEMATOCHEZIA: Status: ACTIVE | Noted: 2023-05-26

## 2023-05-26 PROBLEM — H52.10 MYOPIA WITH PRESBYOPIA: Status: ACTIVE | Noted: 2023-05-26

## 2023-05-26 PROBLEM — T84.7XXA: Status: ACTIVE | Noted: 2023-05-26

## 2023-05-26 PROBLEM — L03.116 CELLULITIS OF LEFT LOWER EXTREMITY: Status: ACTIVE | Noted: 2023-05-26

## 2023-05-26 PROBLEM — Z96.9 PRESENCE OF RETAINED HARDWARE: Status: ACTIVE | Noted: 2023-05-26

## 2023-05-26 PROBLEM — M41.9 LUMBAR SCOLIOSIS: Status: ACTIVE | Noted: 2023-05-26

## 2023-05-26 PROBLEM — M15.9 OSTEOARTHRITIS, MULTIPLE SITES: Status: ACTIVE | Noted: 2023-05-26

## 2023-05-26 PROBLEM — M48.02 MYELOPATHY CONCURRENT WITH AND DUE TO SPINAL STENOSIS OF CERVICAL REGION (MULTI): Status: ACTIVE | Noted: 2023-05-26

## 2023-05-26 PROBLEM — M24.549 CONTRACTURE OF JOINT OF FINGER: Status: ACTIVE | Noted: 2023-05-26

## 2023-05-26 PROBLEM — M54.16 LUMBAR RADICULOPATHY: Status: ACTIVE | Noted: 2023-05-26

## 2023-05-26 PROBLEM — F45.42 PAIN DISORDER ASSOCIATED WITH PSYCHOLOGICAL AND PHYSICAL FACTORS: Status: ACTIVE | Noted: 2023-05-26

## 2023-05-26 PROBLEM — M19.012 ARTHRITIS OF LEFT SHOULDER REGION: Status: ACTIVE | Noted: 2023-05-26

## 2023-05-26 PROBLEM — A49.8 PROTEUS INFECTION: Status: ACTIVE | Noted: 2023-05-26

## 2023-05-26 PROBLEM — M43.16 SPONDYLOLISTHESIS, LUMBAR REGION: Status: ACTIVE | Noted: 2023-05-26

## 2023-05-26 PROBLEM — G89.18 POST-OP PAIN: Status: ACTIVE | Noted: 2023-05-26

## 2023-05-26 PROBLEM — H52.4 MYOPIA WITH PRESBYOPIA: Status: ACTIVE | Noted: 2023-05-26

## 2023-05-26 PROBLEM — R26.81 UNSTEADINESS ON FEET: Status: ACTIVE | Noted: 2023-05-26

## 2023-05-26 PROBLEM — I87.2 VENOUS INSUFFICIENCY: Status: ACTIVE | Noted: 2023-05-26

## 2023-05-26 PROBLEM — N89.8 VAGINAL DISCHARGE: Status: RESOLVED | Noted: 2023-04-11 | Resolved: 2023-05-26

## 2023-05-26 PROBLEM — M96.1 POSTLAMINECTOMY SYNDROME, LUMBAR: Status: ACTIVE | Noted: 2023-05-26

## 2023-05-26 PROBLEM — H52.00 HYPEROPIA: Status: ACTIVE | Noted: 2023-05-26

## 2023-05-26 LAB
CHOLESTEROL (MG/DL) IN SER/PLAS: 182 MG/DL (ref 0–199)
CHOLESTEROL IN HDL (MG/DL) IN SER/PLAS: 77.2 MG/DL
CHOLESTEROL/HDL RATIO: 2.4
LDL: 84 MG/DL (ref 0–99)
TRIGLYCERIDE (MG/DL) IN SER/PLAS: 102 MG/DL (ref 0–149)
VLDL: 20 MG/DL (ref 0–40)

## 2023-05-26 PROCEDURE — 3074F SYST BP LT 130 MM HG: CPT | Performed by: FAMILY MEDICINE

## 2023-05-26 PROCEDURE — 1126F AMNT PAIN NOTED NONE PRSNT: CPT | Performed by: FAMILY MEDICINE

## 2023-05-26 PROCEDURE — 1160F RVW MEDS BY RX/DR IN RCRD: CPT | Performed by: FAMILY MEDICINE

## 2023-05-26 PROCEDURE — 99214 OFFICE O/P EST MOD 30 MIN: CPT | Performed by: FAMILY MEDICINE

## 2023-05-26 PROCEDURE — 1036F TOBACCO NON-USER: CPT | Performed by: FAMILY MEDICINE

## 2023-05-26 PROCEDURE — 1159F MED LIST DOCD IN RCRD: CPT | Performed by: FAMILY MEDICINE

## 2023-05-26 PROCEDURE — 3079F DIAST BP 80-89 MM HG: CPT | Performed by: FAMILY MEDICINE

## 2023-05-26 PROCEDURE — 80061 LIPID PANEL: CPT

## 2023-05-26 RX ORDER — TRAMADOL HYDROCHLORIDE 50 MG/1
TABLET ORAL
COMMUNITY
End: 2023-06-01 | Stop reason: SDUPTHER

## 2023-05-26 RX ORDER — TOPIRAMATE 100 MG/1
TABLET, FILM COATED ORAL
COMMUNITY
End: 2024-01-19 | Stop reason: ALTCHOICE

## 2023-05-26 RX ORDER — LOSARTAN POTASSIUM 50 MG/1
TABLET ORAL
COMMUNITY
End: 2023-10-12 | Stop reason: SDUPTHER

## 2023-05-26 RX ORDER — AMLODIPINE BESYLATE 2.5 MG/1
TABLET ORAL
COMMUNITY
End: 2023-05-26

## 2023-05-26 RX ORDER — FLUTICASONE FUROATE, UMECLIDINIUM BROMIDE AND VILANTEROL TRIFENATATE 100; 62.5; 25 UG/1; UG/1; UG/1
POWDER RESPIRATORY (INHALATION)
COMMUNITY
End: 2023-05-26 | Stop reason: ALTCHOICE

## 2023-05-26 RX ORDER — DONEPEZIL HYDROCHLORIDE 5 MG/1
TABLET, FILM COATED ORAL
COMMUNITY
End: 2023-11-27 | Stop reason: SDUPTHER

## 2023-05-26 RX ORDER — GABAPENTIN 600 MG/1
TABLET ORAL
COMMUNITY
End: 2024-01-19

## 2023-05-26 RX ORDER — ASPIRIN 325 MG
TABLET, DELAYED RELEASE (ENTERIC COATED) ORAL
COMMUNITY

## 2023-05-26 RX ORDER — BIOTIN 1 MG
TABLET ORAL
COMMUNITY

## 2023-05-26 RX ORDER — METHOCARBAMOL 750 MG/1
TABLET, FILM COATED ORAL
Qty: 90 TABLET | Refills: 1 | Status: CANCELLED | OUTPATIENT
Start: 2023-05-26

## 2023-05-26 ASSESSMENT — ENCOUNTER SYMPTOMS
ABDOMINAL PAIN: 1
CARDIOVASCULAR NEGATIVE: 1
CONSTITUTIONAL NEGATIVE: 1
RESPIRATORY NEGATIVE: 1
MUSCULOSKELETAL NEGATIVE: 1
WEAKNESS: 1

## 2023-05-26 NOTE — PROGRESS NOTES
Subjective   Patient ID: Elida Benson is a 78 y.o. female who presents for Medicare Annual Wellness Visit Subsequent.    HPI fu diverticullitis and likely rectovaginal fistula , on long term antibiotics and scheduled for fu w dr alicia and colonosocpy, slowly improving  Chornic pain, gerd, now episodes of low bp , spinal stenosis lumbar and cervicle spine w dificulty w ambulationOARRS:  No data recorded  I have personally reviewed the OARRS report for Elida Benson. I have considered the risks of abuse, dependence, addiction and diversion    Is the patient prescribed a combination of a benzodiazepine and opioid?  No    Last Urine Drug Screen / ordered today: No  Recent Results (from the past 76346 hour(s))   Drug Screen, Urine With Reflex to Confirmation    Collection Time: 10/27/22  2:52 PM   Result Value Ref Range    DRUG SCREEN COMMENT URINE SEE BELOW     Amphetamine Screen, Urine PRESUMPTIVE NEGATIVE NEGATIVE    Barbiturate Screen, Urine PRESUMPTIVE NEGATIVE NEGATIVE    BENZODIAZEPINE (PRESENCE) IN URINE BY SCREEN METHOD PRESUMPTIVE NEGATIVE NEGATIVE    Cannabinoid Screen, Urine PRESUMPTIVE NEGATIVE NEGATIVE    Cocaine Screen, Urine PRESUMPTIVE NEGATIVE NEGATIVE    Fentanyl, Ur PRESUMPTIVE NEGATIVE NEGATIVE    Methadone Screen, Urine PRESUMPTIVE NEGATIVE NEGATIVE    Opiate Screen, Urine PRESUMPTIVE NEGATIVE NEGATIVE    Oxycodone Screen, Ur PRESUMPTIVE NEGATIVE NEGATIVE    PCP Screen, Urine PRESUMPTIVE NEGATIVE NEGATIVE     Results are as expected.     Controlled Substance Agreement:  Date of the Last Agreement: 10/22  Reviewed Controlled Substance Agreement including but not limited to the benefits, risks, and alternatives to treatment with a Controlled Substance medication(s).    Opioids:  What is the patient's goal of therapy? Pain limitation  Is this being achieved with current treatment? yes    I have calculated the patient's Morphine Dose Equivalent (MED):   I have considered referral to Pain  "Management and/or a specialist, and do not feel it is necessary at this time.    I feel that it is clinically indicated to continue this current medication regimen after consideration of alternative therapies, and other non-opioid treatment.    Opioid Risk Screening:  No data recorded    Pt is having pain cont resonalbly on tramadol and no side effect , falls, cognitive changes, nasuea or vommitting or abuse issues or concerns and daughters are with pt today and agree w above  No data recorded    Review of Systems   Constitutional: Negative.    HENT: Negative.     Respiratory: Negative.     Cardiovascular: Negative.    Gastrointestinal:  Positive for abdominal pain.   Genitourinary:  Positive for pelvic pain.   Musculoskeletal: Negative.    Neurological:  Positive for weakness.        Gait instability       Objective   /84   Pulse 98   Ht 1.499 m (4' 11\")   Wt 59 kg (130 lb)   BMI 26.26 kg/m²     Physical Exam  Vitals reviewed.   Constitutional:       Appearance: Normal appearance. She is normal weight.   Eyes:      Extraocular Movements: Extraocular movements intact.      Conjunctiva/sclera: Conjunctivae normal.      Pupils: Pupils are equal, round, and reactive to light.   Cardiovascular:      Rate and Rhythm: Normal rate and regular rhythm.      Pulses: Normal pulses.      Heart sounds: Normal heart sounds.   Pulmonary:      Effort: Pulmonary effort is normal.      Breath sounds: Normal breath sounds.   Abdominal:      General: Bowel sounds are normal.      Palpations: Abdomen is soft.      Comments: Diffuse abd tend no point tend or rebound   Musculoskeletal:         General: Normal range of motion.   Skin:     General: Skin is warm and dry.   Neurological:      General: No focal deficit present.      Mental Status: She is alert and oriented to person, place, and time. Mental status is at baseline.      Motor: Weakness present.      Gait: Gait abnormal.         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

## 2023-05-26 NOTE — PROGRESS NOTES
Subjective   Reason for Visit: Elida Benson is an 78 y.o. female here for a Medicare Wellness visit.      Pt comes in for wellness exam. Pt having stomach issues and memory issues.          HPI    Patient Care Team:  David Swift MD as PCP - General (Family Medicine)  Ervin Alvarez MD as PCP - [employer] PCP  Salud Rondon RN as Care Manager (Case Management)     Review of Systems    Objective   Vitals:  There were no vitals taken for this visit.      Physical Exam    Assessment/Plan   Problem List Items Addressed This Visit    None

## 2023-06-01 DIAGNOSIS — G89.4 CHRONIC PAIN SYNDROME: Primary | ICD-10-CM

## 2023-06-01 RX ORDER — TRAMADOL HYDROCHLORIDE 50 MG/1
50 TABLET ORAL EVERY 6 HOURS PRN
Qty: 60 TABLET | Refills: 0 | Status: SHIPPED | OUTPATIENT
Start: 2023-06-01 | End: 2023-07-01

## 2023-06-02 ENCOUNTER — TELEPHONE (OUTPATIENT)
Dept: PRIMARY CARE | Facility: CLINIC | Age: 78
End: 2023-06-02
Payer: MEDICARE

## 2023-06-05 LAB
APPEARANCE, URINE: ABNORMAL
BACTERIA, URINE: ABNORMAL /HPF
BILIRUBIN, URINE: NEGATIVE
BLOOD, URINE: ABNORMAL
CALCIUM OXALATE CRYSTALS, URINE: ABNORMAL /HPF
COLOR, URINE: YELLOW
GLUCOSE, URINE: NEGATIVE MG/DL
KETONES, URINE: NEGATIVE MG/DL
LEUKOCYTE ESTERASE, URINE: ABNORMAL
MUCUS, URINE: ABNORMAL /LPF
NITRITE, URINE: NEGATIVE
PH, URINE: 5 (ref 5–8)
PROTEIN, URINE: ABNORMAL MG/DL
RBC, URINE: 101 /HPF (ref 0–5)
SPECIFIC GRAVITY, URINE: 1.03 (ref 1–1.03)
SQUAMOUS EPITHELIAL CELLS, URINE: 3 /HPF
UROBILINOGEN, URINE: <2 MG/DL (ref 0–1.9)
WBC CLUMPS, URINE: ABNORMAL /HPF
WBC, URINE: >182 /HPF (ref 0–5)

## 2023-06-06 LAB — URINE CULTURE: ABNORMAL

## 2023-06-16 ENCOUNTER — APPOINTMENT (OUTPATIENT)
Dept: LAB | Facility: LAB | Age: 78
End: 2023-06-16
Payer: MEDICARE

## 2023-06-16 LAB
ANION GAP IN SER/PLAS: 15 MMOL/L (ref 10–20)
CALCIUM (MG/DL) IN SER/PLAS: 9.4 MG/DL (ref 8.6–10.6)
CARBON DIOXIDE, TOTAL (MMOL/L) IN SER/PLAS: 26 MMOL/L (ref 21–32)
CHLORIDE (MMOL/L) IN SER/PLAS: 109 MMOL/L (ref 98–107)
CREATININE (MG/DL) IN SER/PLAS: 0.61 MG/DL (ref 0.5–1.05)
ERYTHROCYTE DISTRIBUTION WIDTH (RATIO) BY AUTOMATED COUNT: 17.1 % (ref 11.5–14.5)
ERYTHROCYTE MEAN CORPUSCULAR HEMOGLOBIN CONCENTRATION (G/DL) BY AUTOMATED: 28.5 G/DL (ref 32–36)
ERYTHROCYTE MEAN CORPUSCULAR VOLUME (FL) BY AUTOMATED COUNT: 94 FL (ref 80–100)
ERYTHROCYTES (10*6/UL) IN BLOOD BY AUTOMATED COUNT: 4.12 X10E12/L (ref 4–5.2)
GFR FEMALE: >90 ML/MIN/1.73M2
GLUCOSE (MG/DL) IN SER/PLAS: 83 MG/DL (ref 74–99)
HEMATOCRIT (%) IN BLOOD BY AUTOMATED COUNT: 38.6 % (ref 36–46)
HEMOGLOBIN (G/DL) IN BLOOD: 11 G/DL (ref 12–16)
LEUKOCYTES (10*3/UL) IN BLOOD BY AUTOMATED COUNT: 7.1 X10E9/L (ref 4.4–11.3)
NRBC (PER 100 WBCS) BY AUTOMATED COUNT: 0 /100 WBC (ref 0–0)
PLATELETS (10*3/UL) IN BLOOD AUTOMATED COUNT: 362 X10E9/L (ref 150–450)
POTASSIUM (MMOL/L) IN SER/PLAS: 4.3 MMOL/L (ref 3.5–5.3)
SODIUM (MMOL/L) IN SER/PLAS: 146 MMOL/L (ref 136–145)
UREA NITROGEN (MG/DL) IN SER/PLAS: 14 MG/DL (ref 6–23)

## 2023-07-05 LAB
ALANINE AMINOTRANSFERASE (SGPT) (U/L) IN SER/PLAS: 8 U/L (ref 7–45)
ALBUMIN (G/DL) IN SER/PLAS: 3.2 G/DL (ref 3.4–5)
ALKALINE PHOSPHATASE (U/L) IN SER/PLAS: 78 U/L (ref 33–136)
ANION GAP IN SER/PLAS: 10 MMOL/L (ref 10–20)
ASPARTATE AMINOTRANSFERASE (SGOT) (U/L) IN SER/PLAS: 10 U/L (ref 9–39)
BILIRUBIN TOTAL (MG/DL) IN SER/PLAS: 0.3 MG/DL (ref 0–1.2)
CALCIUM (MG/DL) IN SER/PLAS: 8.7 MG/DL (ref 8.6–10.3)
CARBON DIOXIDE, TOTAL (MMOL/L) IN SER/PLAS: 25 MMOL/L (ref 21–32)
CHLORIDE (MMOL/L) IN SER/PLAS: 105 MMOL/L (ref 98–107)
CREATININE (MG/DL) IN SER/PLAS: 0.64 MG/DL (ref 0.5–1.05)
ERYTHROCYTE DISTRIBUTION WIDTH (RATIO) BY AUTOMATED COUNT: 16.4 % (ref 11.5–14.5)
ERYTHROCYTE MEAN CORPUSCULAR HEMOGLOBIN CONCENTRATION (G/DL) BY AUTOMATED: 29.2 G/DL (ref 32–36)
ERYTHROCYTE MEAN CORPUSCULAR VOLUME (FL) BY AUTOMATED COUNT: 92 FL (ref 80–100)
ERYTHROCYTES (10*6/UL) IN BLOOD BY AUTOMATED COUNT: 4.26 X10E12/L (ref 4–5.2)
GFR FEMALE: >90 ML/MIN/1.73M2
GLUCOSE (MG/DL) IN SER/PLAS: 118 MG/DL (ref 74–99)
HEMATOCRIT (%) IN BLOOD BY AUTOMATED COUNT: 39.1 % (ref 36–46)
HEMOGLOBIN (G/DL) IN BLOOD: 11.4 G/DL (ref 12–16)
LEUKOCYTES (10*3/UL) IN BLOOD BY AUTOMATED COUNT: 8.1 X10E9/L (ref 4.4–11.3)
PLATELETS (10*3/UL) IN BLOOD AUTOMATED COUNT: 417 X10E9/L (ref 150–450)
POTASSIUM (MMOL/L) IN SER/PLAS: 3.8 MMOL/L (ref 3.5–5.3)
PROTEIN TOTAL: 6.8 G/DL (ref 6.4–8.2)
SODIUM (MMOL/L) IN SER/PLAS: 136 MMOL/L (ref 136–145)
UREA NITROGEN (MG/DL) IN SER/PLAS: 16 MG/DL (ref 6–23)

## 2023-07-06 LAB — URINE CULTURE: NORMAL

## 2023-07-20 DIAGNOSIS — G89.4 CHRONIC PAIN SYNDROME: Primary | ICD-10-CM

## 2023-07-20 RX ORDER — TRAMADOL HYDROCHLORIDE 50 MG/1
TABLET ORAL
COMMUNITY
End: 2023-07-20 | Stop reason: SDUPTHER

## 2023-07-20 RX ORDER — TRAMADOL HYDROCHLORIDE 50 MG/1
50 TABLET ORAL EVERY 12 HOURS PRN
Qty: 60 TABLET | Refills: 0 | Status: SHIPPED | OUTPATIENT
Start: 2023-07-20 | End: 2023-08-19

## 2023-07-30 LAB — STAPH/MRSA SCREEN, CULTURE: NORMAL

## 2023-08-28 ENCOUNTER — NURSING HOME VISIT (OUTPATIENT)
Dept: POST ACUTE CARE | Facility: EXTERNAL LOCATION | Age: 78
End: 2023-08-28
Payer: MEDICARE

## 2023-08-28 DIAGNOSIS — E11.9 TYPE 2 DIABETES MELLITUS WITHOUT COMPLICATION, WITH LONG-TERM CURRENT USE OF INSULIN (MULTI): ICD-10-CM

## 2023-08-28 DIAGNOSIS — E11.9 TYPE 2 DIABETES MELLITUS WITHOUT COMPLICATION, WITHOUT LONG-TERM CURRENT USE OF INSULIN (MULTI): ICD-10-CM

## 2023-08-28 DIAGNOSIS — I10 HYPERTENSION, ESSENTIAL: ICD-10-CM

## 2023-08-28 DIAGNOSIS — M51.16 LUMBAR DISC HERNIATION WITH RADICULOPATHY: ICD-10-CM

## 2023-08-28 DIAGNOSIS — R26.81 GAIT INSTABILITY: ICD-10-CM

## 2023-08-28 DIAGNOSIS — Z79.4 TYPE 2 DIABETES MELLITUS WITHOUT COMPLICATION, WITH LONG-TERM CURRENT USE OF INSULIN (MULTI): ICD-10-CM

## 2023-08-28 DIAGNOSIS — F51.01 PRIMARY INSOMNIA: ICD-10-CM

## 2023-08-28 DIAGNOSIS — E55.9 VITAMIN D DEFICIENCY: ICD-10-CM

## 2023-08-28 DIAGNOSIS — M15.9 PRIMARY OSTEOARTHRITIS INVOLVING MULTIPLE JOINTS: ICD-10-CM

## 2023-08-28 DIAGNOSIS — G89.4 CHRONIC PAIN SYNDROME: ICD-10-CM

## 2023-08-28 DIAGNOSIS — M54.16 LUMBAR RADICULOPATHY: ICD-10-CM

## 2023-08-28 DIAGNOSIS — J43.0 UNILATERAL EMPHYSEMA (MULTI): Primary | ICD-10-CM

## 2023-08-28 PROCEDURE — 99305 1ST NF CARE MODERATE MDM 35: CPT | Performed by: INTERNAL MEDICINE

## 2023-08-28 NOTE — LETTER
Patient: Elida Benson  : 1945    Encounter Date: 2023    HISTORY & PHYSICAL    Subjective  Chief complaint: Elida Benson is a 78 y.o. female who is a acute skilled care patient being seen and evaluated for multiple medical problems.  Patient presents for weakness.    HPI:  Patient was recently admitted to the skilled nursing facility after being in the hospital. Patient was admitted to the hospital for being status post laparoscopic anterior resection with colorectal anastomosis and fistula takedown diverting loop ileostomy with flex sig and omental flap. Patient had postoperative complication by hypotension and was transferred to ICU. Pt was given eliquis. Pt was evaluated by PT/OT and was discharged to SNF.        Past Medical History:   Diagnosis Date   • Dementia (CMS/HCC)    • Depression    • Diverticulitis    • Encounter for other preprocedural examination 2018    Preoperative clearance   • Hypertension, essential    • Other malaise 2022    Physical deconditioning   • Rectovaginal fistula        Past Surgical History:   Procedure Laterality Date   • CARPAL TUNNEL RELEASE  2013    Neuroplasty Decompression Median Nerve At Carpal Tunnel   • KNEE ARTHROPLASTY  2013    Knee Arthroplasty   • TOTAL HIP ARTHROPLASTY  2013    Total Hip Replacement       No family history on file.    Social History     Socioeconomic History   • Marital status:      Spouse name: Not on file   • Number of children: Not on file   • Years of education: Not on file   • Highest education level: Not on file   Occupational History   • Not on file   Tobacco Use   • Smoking status: Never   • Smokeless tobacco: Never   Substance and Sexual Activity   • Alcohol use: Not on file   • Drug use: Not on file   • Sexual activity: Not on file   Other Topics Concern   • Not on file   Social History Narrative   • Not on file     Social Determinants of Health     Financial Resource Strain: Not on  file   Food Insecurity: Not on file   Transportation Needs: Not on file   Physical Activity: Not on file   Stress: Not on file   Social Connections: Not on file   Intimate Partner Violence: Not on file   Housing Stability: Not on file       Vital signs: 99/65, 97.4, 70, 18, 97%    Objective  Physical Exam  Vitals reviewed.   Constitutional:       Appearance: Normal appearance.   HENT:      Head: Normocephalic and atraumatic.   Cardiovascular:      Rate and Rhythm: Normal rate and regular rhythm.   Pulmonary:      Effort: Pulmonary effort is normal.      Breath sounds: Normal breath sounds.   Abdominal:      General: Bowel sounds are normal.      Palpations: Abdomen is soft.   Musculoskeletal:      Cervical back: Neck supple.   Skin:     General: Skin is warm and dry.   Neurological:      General: No focal deficit present.      Mental Status: She is alert.   Psychiatric:         Mood and Affect: Mood normal.         Behavior: Behavior is cooperative.         Assessment/Plan  Problem List Items Addressed This Visit       Chronic obstructive pulmonary disease (CMS/HCC) - Primary    Chronic pain syndrome    Diabetes mellitus (CMS/HCC)    Gait instability    Hypertension, essential    Insomnia    Lumbar disc herniation with radiculopathy    Lumbar radiculopathy    Osteoarthritis, multiple sites    Vitamin D deficiency    Type 2 diabetes mellitus without complication (CMS/HCC)     Medications, treatments, and labs reviewed  Continue medications and treatments as listed in [facility]    Scribe Attestation  I, Shalini Salas   attest that this documentation has been prepared under the direction and in the presence of Sean Cloud MD.    Provider Attestation - Scribe documentation  All medical record entries made by the Scribe were at my direction and personally dictated by me. I have reviewed the chart and agree that the record accurately reflects my personal performance of the history, physical exam, discussion and  plan.    Sean Cloud MD          Electronically Signed By: Sean Cloud MD   8/28/23  5:41 PM

## 2023-08-28 NOTE — PROGRESS NOTES
HISTORY & PHYSICAL    Subjective   Chief complaint: Elida Benson is a 78 y.o. female who is a acute skilled care patient being seen and evaluated for multiple medical problems.  Patient presents for weakness.    HPI:  Patient was recently admitted to the skilled nursing facility after being in the hospital. Patient was admitted to the hospital for being status post laparoscopic anterior resection with colorectal anastomosis and fistula takedown diverting loop ileostomy with flex sig and omental flap. Patient had postoperative complication by hypotension and was transferred to ICU. Pt was given eliquis. Pt was evaluated by PT/OT and was discharged to SNF.        Past Medical History:   Diagnosis Date    Dementia (CMS/HCC)     Depression     Diverticulitis     Encounter for other preprocedural examination 06/29/2018    Preoperative clearance    Hypertension, essential     Other malaise 04/21/2022    Physical deconditioning    Rectovaginal fistula        Past Surgical History:   Procedure Laterality Date    CARPAL TUNNEL RELEASE  08/27/2013    Neuroplasty Decompression Median Nerve At Carpal Tunnel    KNEE ARTHROPLASTY  08/27/2013    Knee Arthroplasty    TOTAL HIP ARTHROPLASTY  08/27/2013    Total Hip Replacement       No family history on file.    Social History     Socioeconomic History    Marital status:      Spouse name: Not on file    Number of children: Not on file    Years of education: Not on file    Highest education level: Not on file   Occupational History    Not on file   Tobacco Use    Smoking status: Never    Smokeless tobacco: Never   Substance and Sexual Activity    Alcohol use: Not on file    Drug use: Not on file    Sexual activity: Not on file   Other Topics Concern    Not on file   Social History Narrative    Not on file     Social Determinants of Health     Financial Resource Strain: Not on file   Food Insecurity: Not on file   Transportation Needs: Not on file   Physical Activity: Not  on file   Stress: Not on file   Social Connections: Not on file   Intimate Partner Violence: Not on file   Housing Stability: Not on file       Vital signs: 99/65, 97.4, 70, 18, 97%    Objective   Physical Exam  Vitals reviewed.   Constitutional:       Appearance: Normal appearance.   HENT:      Head: Normocephalic and atraumatic.   Cardiovascular:      Rate and Rhythm: Normal rate and regular rhythm.   Pulmonary:      Effort: Pulmonary effort is normal.      Breath sounds: Normal breath sounds.   Abdominal:      General: Bowel sounds are normal.      Palpations: Abdomen is soft.   Musculoskeletal:      Cervical back: Neck supple.   Skin:     General: Skin is warm and dry.   Neurological:      General: No focal deficit present.      Mental Status: She is alert.   Psychiatric:         Mood and Affect: Mood normal.         Behavior: Behavior is cooperative.         Assessment/Plan   Problem List Items Addressed This Visit       Chronic obstructive pulmonary disease (CMS/HCC) - Primary    Chronic pain syndrome    Diabetes mellitus (CMS/HCC)    Gait instability    Hypertension, essential    Insomnia    Lumbar disc herniation with radiculopathy    Lumbar radiculopathy    Osteoarthritis, multiple sites    Vitamin D deficiency    Type 2 diabetes mellitus without complication (CMS/HCC)     Medications, treatments, and labs reviewed  Continue medications and treatments as listed in [facility]    Scribe Attestation  I, Shalini Salas   attest that this documentation has been prepared under the direction and in the presence of Sean Cloud MD.    Provider Attestation - Scribe documentation  All medical record entries made by the Scribe were at my direction and personally dictated by me. I have reviewed the chart and agree that the record accurately reflects my personal performance of the history, physical exam, discussion and plan.    Sean Cloud MD

## 2023-08-29 ENCOUNTER — NURSING HOME VISIT (OUTPATIENT)
Dept: POST ACUTE CARE | Facility: EXTERNAL LOCATION | Age: 78
End: 2023-08-29
Payer: MEDICARE

## 2023-08-29 DIAGNOSIS — R53.1 WEAKNESS: Primary | ICD-10-CM

## 2023-08-29 DIAGNOSIS — I10 HYPERTENSION, ESSENTIAL: ICD-10-CM

## 2023-08-29 PROCEDURE — 99308 SBSQ NF CARE LOW MDM 20: CPT | Performed by: INTERNAL MEDICINE

## 2023-08-29 NOTE — LETTER
Patient: Elida Benson  : 1945    Encounter Date: 2023    PROGRESS NOTE    Subjective  Chief complaint: Elida Benson is a 78 y.o. female who is an acute skilled patient being seen and evaluated for weakness    HPI:  Patient working in therapy due to weakness and debility. Requires assistance for transfers and ADL's. Denies chest pain and headache. No acute distress.       Objective  Vital signs: 126/72, 98%    Physical Exam  Constitutional:       General: She is not in acute distress.  Eyes:      Extraocular Movements: Extraocular movements intact.   Cardiovascular:      Rate and Rhythm: Normal rate and regular rhythm.   Pulmonary:      Effort: Pulmonary effort is normal.      Breath sounds: Normal breath sounds.   Abdominal:      General: Bowel sounds are normal.      Palpations: Abdomen is soft.   Musculoskeletal:      Cervical back: Neck supple.      Right lower leg: No edema.      Left lower leg: No edema.   Neurological:      Mental Status: She is alert.   Psychiatric:         Mood and Affect: Mood normal.         Behavior: Behavior is cooperative.         Assessment/Plan  Problem List Items Addressed This Visit       Hypertension, essential     Blood pressure controlled  Continue antihypertensives  Continue to monitor BP         Weakness - Primary     Continue working in therapy          Medications, treatments, and labs reviewed  Continue medications and treatments as listed in [facility]    Scribe Attestation  By signing my name below, I, Shalini Arnold   attest that this documentation has been prepared under the direction and in the presence of Sean Cloud MD.    Provider Attestation - Scribe documentation  All medical record entries made by the Scribe were at my direction and personally dictated by me. I have reviewed the chart and agree that the record accurately reflects my personal performance of the history, physical exam, discussion and plan.  1. Weakness        2.  Hypertension, essential              Electronically Signed By: Sean Cloud MD   8/30/23  7:18 PM

## 2023-08-30 ENCOUNTER — NURSING HOME VISIT (OUTPATIENT)
Dept: POST ACUTE CARE | Facility: EXTERNAL LOCATION | Age: 78
End: 2023-08-30
Payer: MEDICARE

## 2023-08-30 DIAGNOSIS — J44.9 COPD WITHOUT EXACERBATION (MULTI): ICD-10-CM

## 2023-08-30 DIAGNOSIS — U07.1 COVID: ICD-10-CM

## 2023-08-30 DIAGNOSIS — I10 HYPERTENSION, ESSENTIAL: ICD-10-CM

## 2023-08-30 DIAGNOSIS — N82.4 COLOVAGINAL FISTULA: ICD-10-CM

## 2023-08-30 DIAGNOSIS — R53.1 WEAKNESS: Primary | ICD-10-CM

## 2023-08-30 DIAGNOSIS — D72.829 LEUKOCYTOSIS, UNSPECIFIED TYPE: ICD-10-CM

## 2023-08-30 PROCEDURE — 99309 SBSQ NF CARE MODERATE MDM 30: CPT | Performed by: NURSE PRACTITIONER

## 2023-08-30 NOTE — LETTER
Patient: Elida Benson  : 1945    Encounter Date: 2023    PROGRESS NOTE    Subjective  Chief complaint: Elida Benson is a 78 y.o. female who is a acute skilled care patient being seen and evaluated for  F/u of COVID and weakness.    HPI:  HPI   Nurse called on  to report labs showed WBC 13.9 down from 20.6.  In addition, patient being seen for follow-up of COVID.  Remains on isolation precaution.  Denies n/v/f/c and sob.  Patient is skilled for PT and OT.  Working on bed mobility and transfers.  Patient completed STS at [facility] with Mod A and voice cues.  No further concerns reported.     Objective  Vital signs:  18, 123/78, 97.5, 69, 95%  Physical Exam  Constitutional:       General: She is not in acute distress.  Eyes:      Extraocular Movements: Extraocular movements intact.   Cardiovascular:      Rate and Rhythm: Normal rate and regular rhythm.   Pulmonary:      Effort: Pulmonary effort is normal.      Breath sounds: Normal breath sounds.      Comments: O2  Abdominal:      General: Bowel sounds are normal.      Palpations: Abdomen is soft.      Comments: Ileostomy   Musculoskeletal:      Cervical back: Neck supple.      Right lower leg: No edema.      Left lower leg: No edema.      Comments: Generalized weakness   Neurological:      Mental Status: She is alert.   Psychiatric:         Mood and Affect: Mood normal.         Behavior: Behavior is cooperative.         Assessment/Plan  Problem List Items Addressed This Visit       Colovaginal fistula     Status post surgery  Follow-up with surgeon  Therapy  Pain meds         COPD without exacerbation (CMS/HCC)     Stable, no shortness of breath or wheezing         COVID     Isolation  Supportive treatment         Hypertension, essential     Blood pressure controlled  Continue antihypertensives  Continue to monitor BP         Leukocytosis     Improving  Monitor lab         Weakness - Primary     Continue working in therapy          Medications,  treatments, and labs reviewed  Continue medications and treatments as listed in EMR    Scribe Attestation  HARIKA, Shalini Murray   attest that this documentation has been prepared under the direction and in the presence of MI Triplett    Provider Attestation - Scribe documentation  All medical record entries made by the Scribe were at my direction and personally dictated by me. I have reviewed the chart and agree that the record accurately reflects my personal performance of the history, physical exam, discussion and plan.   MI Triplett        Electronically Signed By: MI Triplett   9/12/23  4:36 PM

## 2023-08-30 NOTE — PROGRESS NOTES
PROGRESS NOTE    Subjective   Chief complaint: Elida Benson is a 78 y.o. female who is an acute skilled patient being seen and evaluated for weakness    HPI:  Patient working in therapy due to weakness and debility. Requires assistance for transfers and ADL's. Denies chest pain and headache. No acute distress.       Objective   Vital signs: 126/72, 98%    Physical Exam  Constitutional:       General: She is not in acute distress.  Eyes:      Extraocular Movements: Extraocular movements intact.   Cardiovascular:      Rate and Rhythm: Normal rate and regular rhythm.   Pulmonary:      Effort: Pulmonary effort is normal.      Breath sounds: Normal breath sounds.   Abdominal:      General: Bowel sounds are normal.      Palpations: Abdomen is soft.   Musculoskeletal:      Cervical back: Neck supple.      Right lower leg: No edema.      Left lower leg: No edema.   Neurological:      Mental Status: She is alert.   Psychiatric:         Mood and Affect: Mood normal.         Behavior: Behavior is cooperative.         Assessment/Plan   Problem List Items Addressed This Visit       Hypertension, essential     Blood pressure controlled  Continue antihypertensives  Continue to monitor BP         Weakness - Primary     Continue working in therapy          Medications, treatments, and labs reviewed  Continue medications and treatments as listed in [facility]    Scribe Attestation  By signing my name below, I, Shalini Arnold   attest that this documentation has been prepared under the direction and in the presence of Sean Cloud MD.    Provider Attestation - Scribe documentation  All medical record entries made by the Scribe were at my direction and personally dictated by me. I have reviewed the chart and agree that the record accurately reflects my personal performance of the history, physical exam, discussion and plan.  1. Weakness        2. Hypertension, essential

## 2023-08-31 ENCOUNTER — NURSING HOME VISIT (OUTPATIENT)
Dept: POST ACUTE CARE | Facility: EXTERNAL LOCATION | Age: 78
End: 2023-08-31
Payer: MEDICARE

## 2023-08-31 DIAGNOSIS — J43.0 UNILATERAL EMPHYSEMA (MULTI): ICD-10-CM

## 2023-08-31 DIAGNOSIS — D72.829 LEUKOCYTOSIS, UNSPECIFIED TYPE: ICD-10-CM

## 2023-08-31 DIAGNOSIS — R53.1 WEAKNESS: Primary | ICD-10-CM

## 2023-08-31 DIAGNOSIS — U07.1 COVID: ICD-10-CM

## 2023-08-31 PROCEDURE — 99308 SBSQ NF CARE LOW MDM 20: CPT | Performed by: INTERNAL MEDICINE

## 2023-08-31 NOTE — PROGRESS NOTES
PROGRESS NOTE    Subjective   Chief complaint: Elida Benson is a 78 y.o. female who is an acute skilled patient being seen and evaluated for weakness    HPI:  8/29/23 Patient working in therapy due to weakness and debility. Requires assistance for transfers and ADL's. Denies chest pain and headache. No acute distress.     8/31/23 Patient is working in therapy due to weakness. requires assistance for transfers and ADL's. Denies SOB or orthopnea. Patient recent labs reveal WBC improving. 13.9 previously 20.6. Denies fever or chills. No acute distress.       Objective   Vital signs: 124/77, 98%    Physical Exam  Constitutional:       General: She is not in acute distress.  Eyes:      Extraocular Movements: Extraocular movements intact.   Cardiovascular:      Rate and Rhythm: Normal rate and regular rhythm.   Pulmonary:      Effort: Pulmonary effort is normal.      Breath sounds: Normal breath sounds.   Abdominal:      General: Bowel sounds are normal.      Palpations: Abdomen is soft.   Musculoskeletal:      Cervical back: Neck supple.      Right lower leg: No edema.      Left lower leg: No edema.   Neurological:      Mental Status: She is alert.   Psychiatric:         Mood and Affect: Mood normal.         Behavior: Behavior is cooperative.         Assessment/Plan   Problem List Items Addressed This Visit       Chronic obstructive pulmonary disease (CMS/HCC)     Stable, no shortness of breath or wheezing  On room air         Weakness - Primary     Continue working in therapy         COVID     Isolation precautions  Supportive treatment as needed  Monitor labs           Leukocytosis     WBC improved  Continue to monitor        Medications, treatments, and labs reviewed  Continue medications and treatments as listed in [facility]    Scribe Attestation  By signing my name below, IHelen, Scribe   attest that this documentation has been prepared under the direction and in the presence of Sean Cloud  MD.    Provider Attestation - Scribe documentation  All medical record entries made by the Scribe were at my direction and personally dictated by me. I have reviewed the chart and agree that the record accurately reflects my personal performance of the history, physical exam, discussion and plan.  1. Weakness        2. Unilateral emphysema (CMS/HCC)        3. COVID        4. Leukocytosis, unspecified type

## 2023-08-31 NOTE — LETTER
Patient: Elida Benson  : 1945    Encounter Date: 2023    PROGRESS NOTE    Subjective  Chief complaint: Elida Benson is a 78 y.o. female who is an acute skilled patient being seen and evaluated for weakness    HPI:  23 Patient working in therapy due to weakness and debility. Requires assistance for transfers and ADL's. Denies chest pain and headache. No acute distress.     23 Patient is working in therapy due to weakness. requires assistance for transfers and ADL's. Denies SOB or orthopnea. Patient recent labs reveal WBC improving. 13.9 previously 20.6. Denies fever or chills. No acute distress.       Objective  Vital signs: 124/77, 98%    Physical Exam  Constitutional:       General: She is not in acute distress.  Eyes:      Extraocular Movements: Extraocular movements intact.   Cardiovascular:      Rate and Rhythm: Normal rate and regular rhythm.   Pulmonary:      Effort: Pulmonary effort is normal.      Breath sounds: Normal breath sounds.   Abdominal:      General: Bowel sounds are normal.      Palpations: Abdomen is soft.   Musculoskeletal:      Cervical back: Neck supple.      Right lower leg: No edema.      Left lower leg: No edema.   Neurological:      Mental Status: She is alert.   Psychiatric:         Mood and Affect: Mood normal.         Behavior: Behavior is cooperative.         Assessment/Plan  Problem List Items Addressed This Visit       Chronic obstructive pulmonary disease (CMS/HCC)     Stable, no shortness of breath or wheezing  On room air         Weakness - Primary     Continue working in therapy         COVID     Isolation precautions  Supportive treatment as needed  Monitor labs           Leukocytosis     WBC improved  Continue to monitor        Medications, treatments, and labs reviewed  Continue medications and treatments as listed in [facility]    Scribe Attestation  By signing my name below, I, Helen Cornejo, Scribe   attest that this documentation has been prepared  under the direction and in the presence of Sean Cloud MD.    Provider Attestation - Scribe documentation  All medical record entries made by the Scribe were at my direction and personally dictated by me. I have reviewed the chart and agree that the record accurately reflects my personal performance of the history, physical exam, discussion and plan.  1. Weakness        2. Unilateral emphysema (CMS/HCC)        3. COVID        4. Leukocytosis, unspecified type              Electronically Signed By: Sean Cloud MD   9/1/23 10:23 AM

## 2023-09-01 ENCOUNTER — NURSING HOME VISIT (OUTPATIENT)
Dept: POST ACUTE CARE | Facility: EXTERNAL LOCATION | Age: 78
End: 2023-09-01
Payer: MEDICARE

## 2023-09-01 DIAGNOSIS — U07.1 COVID: ICD-10-CM

## 2023-09-01 DIAGNOSIS — J44.9 COPD WITHOUT EXACERBATION (MULTI): ICD-10-CM

## 2023-09-01 DIAGNOSIS — I10 HYPERTENSION, ESSENTIAL: ICD-10-CM

## 2023-09-01 DIAGNOSIS — R53.1 WEAKNESS: Primary | ICD-10-CM

## 2023-09-01 PROCEDURE — 99309 SBSQ NF CARE MODERATE MDM 30: CPT | Performed by: NURSE PRACTITIONER

## 2023-09-01 NOTE — LETTER
Patient: Elida Benson  : 1945    Encounter Date: 2023    PROGRESS NOTE    Subjective  Chief complaint: Elida Benson is a 78 y.o. female who is a acute skilled care patient being seen and evaluated for follow up of Covid and weakness.    HPI:  HPI   Patient being seen for follow-up of COVID.  Remains in isolation precaution.  Has fatigue and cough.  Denies n/v/f/c.  Therapy working with patient in room on supine BLE strength exercises due to increased fatigue and weakness.    Objective  Vital signs: 130/78, 98.6, 70, 18, 96%  Physical Exam  Constitutional:       General: She is not in acute distress.  Eyes:      Extraocular Movements: Extraocular movements intact.   Cardiovascular:      Rate and Rhythm: Normal rate and regular rhythm.   Pulmonary:      Effort: Pulmonary effort is normal.      Breath sounds: Normal breath sounds.      Comments: O2  Abdominal:      General: Bowel sounds are normal.      Palpations: Abdomen is soft.      Comments: Ileostomy   Musculoskeletal:      Cervical back: Neck supple.      Right lower leg: No edema.      Left lower leg: No edema.      Comments: Generalized weakness   Neurological:      Mental Status: She is alert.   Psychiatric:         Mood and Affect: Mood normal.         Behavior: Behavior is cooperative.         Assessment/Plan  Problem List Items Addressed This Visit       COPD without exacerbation (CMS/HCC)     Stable, no shortness of breath or wheezing         COVID     Isolation  Supportive treatment  Start Robitussin         Hypertension, essential     Blood pressure controlled  Continue antihypertensives  Continue to monitor BP         Weakness - Primary     Continue working in therapy          Medications, treatments, and labs reviewed  Continue medications and treatments as listed in EMR    Scribe Attestation  Sharon SHABAZZ Scribe   attest that this documentation has been prepared under the direction and in the presence of Radha Lay,  PARAG-CNP    Provider Attestation - Scribe documentation  All medical record entries made by the Scribe were at my direction and personally dictated by me. I have reviewed the chart and agree that the record accurately reflects my personal performance of the history, physical exam, discussion and plan.   MI Triplett        Electronically Signed By: MI Triplett   9/12/23  5:16 PM

## 2023-09-07 ENCOUNTER — NURSING HOME VISIT (OUTPATIENT)
Dept: POST ACUTE CARE | Facility: EXTERNAL LOCATION | Age: 78
End: 2023-09-07
Payer: MEDICARE

## 2023-09-07 DIAGNOSIS — J43.0 UNILATERAL EMPHYSEMA (MULTI): ICD-10-CM

## 2023-09-07 DIAGNOSIS — R53.1 WEAKNESS: ICD-10-CM

## 2023-09-07 DIAGNOSIS — K57.92 DIVERTICULITIS: Primary | ICD-10-CM

## 2023-09-07 DIAGNOSIS — F32.A DEPRESSION, UNSPECIFIED DEPRESSION TYPE: ICD-10-CM

## 2023-09-07 DIAGNOSIS — G30.8 MILD ALZHEIMER'S DEMENTIA OF OTHER ONSET, UNSPECIFIED WHETHER BEHAVIORAL, PSYCHOTIC, OR MOOD DISTURBANCE OR ANXIETY (MULTI): ICD-10-CM

## 2023-09-07 DIAGNOSIS — F02.A0 MILD ALZHEIMER'S DEMENTIA OF OTHER ONSET, UNSPECIFIED WHETHER BEHAVIORAL, PSYCHOTIC, OR MOOD DISTURBANCE OR ANXIETY (MULTI): ICD-10-CM

## 2023-09-07 DIAGNOSIS — E11.9 TYPE 2 DIABETES MELLITUS WITHOUT COMPLICATION, WITHOUT LONG-TERM CURRENT USE OF INSULIN (MULTI): ICD-10-CM

## 2023-09-07 PROCEDURE — 99305 1ST NF CARE MODERATE MDM 35: CPT | Performed by: INTERNAL MEDICINE

## 2023-09-07 NOTE — PROGRESS NOTES
HISTORY & PHYSICAL    Subjective   Chief complaint: Elida Benson is a 78 y.o. female who is a acute skilled care patient being seen and evaluated for multiple medical problems.  Patient presents for weakness.    HPI:  Patient is a 78 year old female with a past medical history of pleural effusion, osteoarthritis, spinal stenosis and dementia. Patient was admitted for medical management of diverticulitis.  She was given a course of antibiotic improved and he was evaluated by PT and OT.  Patient is skilled and working towards goals in therapy.       Past Medical History:   Diagnosis Date    Dementia (CMS/HCC)     Depression     Diverticulitis     Encounter for other preprocedural examination 06/29/2018    Preoperative clearance    Hypertension, essential     Other malaise 04/21/2022    Physical deconditioning    Rectovaginal fistula        Past Surgical History:   Procedure Laterality Date    CARPAL TUNNEL RELEASE  08/27/2013    Neuroplasty Decompression Median Nerve At Carpal Tunnel    KNEE ARTHROPLASTY  08/27/2013    Knee Arthroplasty    TOTAL HIP ARTHROPLASTY  08/27/2013    Total Hip Replacement       No family history on file.    Social History     Socioeconomic History    Marital status:      Spouse name: Not on file    Number of children: Not on file    Years of education: Not on file    Highest education level: Not on file   Occupational History    Not on file   Tobacco Use    Smoking status: Never    Smokeless tobacco: Never   Substance and Sexual Activity    Alcohol use: Not on file    Drug use: Not on file    Sexual activity: Not on file   Other Topics Concern    Not on file   Social History Narrative    Not on file     Social Determinants of Health     Financial Resource Strain: Not on file   Food Insecurity: Not on file   Transportation Needs: Not on file   Physical Activity: Not on file   Stress: Not on file   Social Connections: Not on file   Intimate Partner Violence: Not on file   Housing  Stability: Not on file       Vital signs: 108/78, 98, 68, 18, 108.0, 97%    Objective   Physical Exam  Vitals reviewed.   Constitutional:       Appearance: Normal appearance.   HENT:      Head: Normocephalic and atraumatic.   Cardiovascular:      Rate and Rhythm: Normal rate and regular rhythm.   Pulmonary:      Effort: Pulmonary effort is normal.      Breath sounds: Normal breath sounds.   Abdominal:      General: Bowel sounds are normal.      Palpations: Abdomen is soft.   Musculoskeletal:      Cervical back: Neck supple.   Skin:     General: Skin is warm and dry.   Neurological:      General: No focal deficit present.      Mental Status: She is alert.   Psychiatric:         Mood and Affect: Mood normal.         Behavior: Behavior is cooperative.         Assessment/Plan   Problem List Items Addressed This Visit       Chronic obstructive pulmonary disease (CMS/HCC)    Dementia (CMS/HCC)    Depression    Diabetes mellitus (CMS/HCC)    Weakness    Diverticulitis - Primary     Medications, treatments, and labs reviewed  Continue medications and treatments as listed in [facility]    Scribe Attestation  I, Hector Salasibkelsie   attest that this documentation has been prepared under the direction and in the presence of Sean Cloud MD.    Provider Attestation - Scribe documentation  All medical record entries made by the Scribe were at my direction and personally dictated by me. I have reviewed the chart and agree that the record accurately reflects my personal performance of the history, physical exam, discussion and plan.    Sean Cloud MD

## 2023-09-07 NOTE — PROGRESS NOTES
PROGRESS NOTE    Subjective   Chief complaint: Elida Benson is a 78 y.o. female who is a acute skilled care patient being seen and evaluated for  F/u of COVID and weakness.    HPI:  HPI   Nurse called on 8/28 to report labs showed WBC 13.9 down from 20.6.  In addition, patient being seen for follow-up of COVID.  Remains on isolation precaution.  Denies n/v/f/c and sob.  Patient is skilled for PT and OT.  Working on bed mobility and transfers.  Patient completed STS at [facility] with Mod A and voice cues.  No further concerns reported.     Objective   Vital signs:  18, 123/78, 97.5, 69, 95%  Physical Exam  Constitutional:       General: She is not in acute distress.  Eyes:      Extraocular Movements: Extraocular movements intact.   Cardiovascular:      Rate and Rhythm: Normal rate and regular rhythm.   Pulmonary:      Effort: Pulmonary effort is normal.      Breath sounds: Normal breath sounds.      Comments: O2  Abdominal:      General: Bowel sounds are normal.      Palpations: Abdomen is soft.      Comments: Ileostomy   Musculoskeletal:      Cervical back: Neck supple.      Right lower leg: No edema.      Left lower leg: No edema.      Comments: Generalized weakness   Neurological:      Mental Status: She is alert.   Psychiatric:         Mood and Affect: Mood normal.         Behavior: Behavior is cooperative.         Assessment/Plan   Problem List Items Addressed This Visit       Colovaginal fistula     Status post surgery  Follow-up with surgeon  Therapy  Pain meds         COPD without exacerbation (CMS/HCC)     Stable, no shortness of breath or wheezing         COVID     Isolation  Supportive treatment         Hypertension, essential     Blood pressure controlled  Continue antihypertensives  Continue to monitor BP         Leukocytosis     Improving  Monitor lab         Weakness - Primary     Continue working in therapy          Medications, treatments, and labs reviewed  Continue medications and treatments as  listed in EMR    Scribe Attestation  I, Shalini Murray   attest that this documentation has been prepared under the direction and in the presence of MI Triplett    Provider Attestation - Scribe documentation  All medical record entries made by the Scribe were at my direction and personally dictated by me. I have reviewed the chart and agree that the record accurately reflects my personal performance of the history, physical exam, discussion and plan.   MI Triplett

## 2023-09-07 NOTE — LETTER
Patient: Elida Benson  : 1945    Encounter Date: 2023    HISTORY & PHYSICAL    Subjective  Chief complaint: Elida Benson is a 78 y.o. female who is a acute skilled care patient being seen and evaluated for multiple medical problems.  Patient presents for weakness.    HPI:  Patient is a 78 year old female with a past medical history of pleural effusion, osteoarthritis, spinal stenosis and dementia. Patient was admitted for medical management of diverticulitis.  She was given a course of antibiotic improved and he was evaluated by PT and OT.  Patient is skilled and working towards goals in therapy.       Past Medical History:   Diagnosis Date   • Dementia (CMS/HCC)    • Depression    • Diverticulitis    • Encounter for other preprocedural examination 2018    Preoperative clearance   • Hypertension, essential    • Other malaise 2022    Physical deconditioning   • Rectovaginal fistula        Past Surgical History:   Procedure Laterality Date   • CARPAL TUNNEL RELEASE  2013    Neuroplasty Decompression Median Nerve At Carpal Tunnel   • KNEE ARTHROPLASTY  2013    Knee Arthroplasty   • TOTAL HIP ARTHROPLASTY  2013    Total Hip Replacement       No family history on file.    Social History     Socioeconomic History   • Marital status:      Spouse name: Not on file   • Number of children: Not on file   • Years of education: Not on file   • Highest education level: Not on file   Occupational History   • Not on file   Tobacco Use   • Smoking status: Never   • Smokeless tobacco: Never   Substance and Sexual Activity   • Alcohol use: Not on file   • Drug use: Not on file   • Sexual activity: Not on file   Other Topics Concern   • Not on file   Social History Narrative   • Not on file     Social Determinants of Health     Financial Resource Strain: Not on file   Food Insecurity: Not on file   Transportation Needs: Not on file   Physical Activity: Not on file   Stress: Not  on file   Social Connections: Not on file   Intimate Partner Violence: Not on file   Housing Stability: Not on file       Vital signs: 108/78, 98, 68, 18, 108.0, 97%    Objective  Physical Exam  Vitals reviewed.   Constitutional:       Appearance: Normal appearance.   HENT:      Head: Normocephalic and atraumatic.   Cardiovascular:      Rate and Rhythm: Normal rate and regular rhythm.   Pulmonary:      Effort: Pulmonary effort is normal.      Breath sounds: Normal breath sounds.   Abdominal:      General: Bowel sounds are normal.      Palpations: Abdomen is soft.   Musculoskeletal:      Cervical back: Neck supple.   Skin:     General: Skin is warm and dry.   Neurological:      General: No focal deficit present.      Mental Status: She is alert.   Psychiatric:         Mood and Affect: Mood normal.         Behavior: Behavior is cooperative.         Assessment/Plan  Problem List Items Addressed This Visit       Chronic obstructive pulmonary disease (CMS/HCC)    Dementia (CMS/HCC)    Depression    Diabetes mellitus (CMS/HCC)    Weakness    Diverticulitis - Primary     Medications, treatments, and labs reviewed  Continue medications and treatments as listed in [facility]    Scribe Attestation  ILedy Scribkelsie   attest that this documentation has been prepared under the direction and in the presence of Sean Cloud MD.    Provider Attestation - Scribe documentation  All medical record entries made by the Scribe were at my direction and personally dictated by me. I have reviewed the chart and agree that the record accurately reflects my personal performance of the history, physical exam, discussion and plan.    Sean Cloud MD          Electronically Signed By: Sean Cloud MD   9/7/23  4:42 PM

## 2023-09-08 ENCOUNTER — DOCUMENTATION (OUTPATIENT)
Dept: CARE COORDINATION | Facility: CLINIC | Age: 78
End: 2023-09-08
Payer: MEDICARE

## 2023-09-08 ENCOUNTER — NURSING HOME VISIT (OUTPATIENT)
Dept: POST ACUTE CARE | Facility: EXTERNAL LOCATION | Age: 78
End: 2023-09-08
Payer: MEDICARE

## 2023-09-08 DIAGNOSIS — J44.9 COPD WITHOUT EXACERBATION (MULTI): ICD-10-CM

## 2023-09-08 DIAGNOSIS — G30.9 MILD ALZHEIMER'S DEMENTIA, UNSPECIFIED TIMING OF DEMENTIA ONSET, UNSPECIFIED WHETHER BEHAVIORAL, PSYCHOTIC, OR MOOD DISTURBANCE OR ANXIETY (MULTI): ICD-10-CM

## 2023-09-08 DIAGNOSIS — I10 HYPERTENSION, ESSENTIAL: ICD-10-CM

## 2023-09-08 DIAGNOSIS — Z79.4 TYPE 2 DIABETES MELLITUS WITHOUT COMPLICATION, WITH LONG-TERM CURRENT USE OF INSULIN (MULTI): ICD-10-CM

## 2023-09-08 DIAGNOSIS — R53.1 WEAKNESS: Primary | ICD-10-CM

## 2023-09-08 DIAGNOSIS — E11.9 TYPE 2 DIABETES MELLITUS WITHOUT COMPLICATION, WITH LONG-TERM CURRENT USE OF INSULIN (MULTI): ICD-10-CM

## 2023-09-08 DIAGNOSIS — F02.A0 MILD ALZHEIMER'S DEMENTIA, UNSPECIFIED TIMING OF DEMENTIA ONSET, UNSPECIFIED WHETHER BEHAVIORAL, PSYCHOTIC, OR MOOD DISTURBANCE OR ANXIETY (MULTI): ICD-10-CM

## 2023-09-08 PROCEDURE — 99309 SBSQ NF CARE MODERATE MDM 30: CPT | Performed by: NURSE PRACTITIONER

## 2023-09-08 NOTE — LETTER
Patient: Elida Benson  : 1945    Encounter Date: 2023    PROGRESS NOTE    Subjective  Chief complaint: Elida Benson is a 78 y.o. female who is a acute skilled care patient being seen and evaluated for weakness.    HPI:  HPI   Patient with PMH of HTN, COPD, diabetes, history of pleural effusion, spinal stenosis, OA, dementia, insomnia and depression admitted to SNF for therapy d/t weakness after recent hospitalization for diverticulitis.   Patient requires assist with ADLs and transfers.  Therapy to eval and treat.  No new complaints.      Objective  Vital signs:  18, 100/74, 97.3, 65, 97%,   Physical Exam  Constitutional:       General: She is not in acute distress.  Eyes:      Extraocular Movements: Extraocular movements intact.   Cardiovascular:      Rate and Rhythm: Normal rate and regular rhythm.   Pulmonary:      Effort: Pulmonary effort is normal.      Breath sounds: Normal breath sounds.   Abdominal:      General: Bowel sounds are normal.      Palpations: Abdomen is soft.      Comments: Ileostomy   Musculoskeletal:      Cervical back: Neck supple.      Right lower leg: No edema.      Left lower leg: No edema.      Comments: Generalized weakness   Neurological:      Mental Status: She is alert.   Psychiatric:         Mood and Affect: Mood normal.         Behavior: Behavior is cooperative.         Assessment/Plan  Problem List Items Addressed This Visit       COPD without exacerbation (CMS/HCC)     Stable, no shortness of breath or wheezing         Dementia (CMS/HCC)    Hypertension, essential     Blood pressure controlled  Continue antihypertensives  Continue to monitor BP         Type 2 diabetes mellitus without complication, with long-term current use of insulin (CMS/HCC)     FBG at goal  Monitor glucoscan         Weakness - Primary     Continue working in therapy  Requires wheelchair for mobility and assist with ADLs and transfers          Medications, treatments, and labs  reviewed  Continue medications and treatments as listed in EMR    Scribe Attestation  Sharon SHABAZZ Scribe   attest that this documentation has been prepared under the direction and in the presence of MI Triplett    Provider Attestation - Scribe documentation  All medical record entries made by the Scribe were at my direction and personally dictated by me. I have reviewed the chart and agree that the record accurately reflects my personal performance of the history, physical exam, discussion and plan.   MI Triplett        Electronically Signed By: MI Triplett   9/27/23  5:00 PM

## 2023-09-08 NOTE — PROGRESS NOTES
PROGRESS NOTE    Subjective   Chief complaint: Elida Benson is a 78 y.o. female who is a acute skilled care patient being seen and evaluated for follow up of Covid and weakness.    HPI:  HPI   Patient being seen for follow-up of COVID.  Remains in isolation precaution.  Has fatigue and cough.  Denies n/v/f/c.  Therapy working with patient in room on supine BLE strength exercises due to increased fatigue and weakness.    Objective   Vital signs: 130/78, 98.6, 70, 18, 96%  Physical Exam  Constitutional:       General: She is not in acute distress.  Eyes:      Extraocular Movements: Extraocular movements intact.   Cardiovascular:      Rate and Rhythm: Normal rate and regular rhythm.   Pulmonary:      Effort: Pulmonary effort is normal.      Breath sounds: Normal breath sounds.      Comments: O2  Abdominal:      General: Bowel sounds are normal.      Palpations: Abdomen is soft.      Comments: Ileostomy   Musculoskeletal:      Cervical back: Neck supple.      Right lower leg: No edema.      Left lower leg: No edema.      Comments: Generalized weakness   Neurological:      Mental Status: She is alert.   Psychiatric:         Mood and Affect: Mood normal.         Behavior: Behavior is cooperative.         Assessment/Plan   Problem List Items Addressed This Visit       COPD without exacerbation (CMS/HCC)     Stable, no shortness of breath or wheezing         COVID     Isolation  Supportive treatment  Start Robitussin         Hypertension, essential     Blood pressure controlled  Continue antihypertensives  Continue to monitor BP         Weakness - Primary     Continue working in therapy          Medications, treatments, and labs reviewed  Continue medications and treatments as listed in EMR    Scribe Attestation  I, Hector Murrayibkelsie   attest that this documentation has been prepared under the direction and in the presence of PARAG Triplett-CNP    Provider Attestation - Scribe documentation  All medical  record entries made by the Scribe were at my direction and personally dictated by me. I have reviewed the chart and agree that the record accurately reflects my personal performance of the history, physical exam, discussion and plan.   Radha Lay, APRN-CNP

## 2023-09-08 NOTE — PROGRESS NOTES
09/08/2023   Discharged from the hospital for shortness of breathe, PNA.  Discharge disposition to a skilled nursing facility, no outreach  yara

## 2023-09-11 ENCOUNTER — NURSING HOME VISIT (OUTPATIENT)
Dept: POST ACUTE CARE | Facility: EXTERNAL LOCATION | Age: 78
End: 2023-09-11
Payer: MEDICARE

## 2023-09-11 DIAGNOSIS — R53.1 WEAKNESS: Primary | ICD-10-CM

## 2023-09-11 DIAGNOSIS — F32.A DEPRESSION, UNSPECIFIED DEPRESSION TYPE: ICD-10-CM

## 2023-09-11 DIAGNOSIS — J43.0 UNILATERAL EMPHYSEMA (MULTI): ICD-10-CM

## 2023-09-11 DIAGNOSIS — I10 HYPERTENSION, ESSENTIAL: ICD-10-CM

## 2023-09-11 PROCEDURE — 99309 SBSQ NF CARE MODERATE MDM 30: CPT | Performed by: INTERNAL MEDICINE

## 2023-09-11 NOTE — LETTER
Patient: Elida Benson  : 1945    Encounter Date: 2023    PROGRESS NOTE    Subjective  Chief complaint: Elida Benson is a 78 y.o. female who is a long term care patient being seen and evaluated for monthly general medical care and follow-up.   Weakness    HPI:   patient presents for general medical care and f/u.  Patient seen and examined at bedside.  No issues per nursing.  Patient has no acute complaints.  Patient working in therapy due to weakness. Requires assistance for transfer, ADL's and mobility. Denies feeling down or thoughts of harming self or others. Denies cough or wheezing. Denies chest pain or headache. No acute distress.       Objective  Vital signs:  124/72, 98%    Physical Exam  Constitutional:       General: She is not in acute distress.  Eyes:      Extraocular Movements: Extraocular movements intact.   Cardiovascular:      Rate and Rhythm: Normal rate and regular rhythm.   Pulmonary:      Effort: Pulmonary effort is normal.      Breath sounds: Normal breath sounds.   Abdominal:      General: Bowel sounds are normal.      Palpations: Abdomen is soft.   Musculoskeletal:      Cervical back: Neck supple.      Right lower leg: No edema.      Left lower leg: No edema.   Neurological:      Mental Status: She is alert.   Psychiatric:         Mood and Affect: Mood normal.         Behavior: Behavior is cooperative.         Assessment/Plan  Problem List Items Addressed This Visit       Chronic obstructive pulmonary disease (CMS/HCC)     Stable, no shortness of breath or wheezing  On room air         Depression     Mood stable  Continue home med         Hypertension, essential     Blood pressure controlled  Continue antihypertensives  Continue to monitor BP         Weakness - Primary     Continue working in therapy          Medications, treatments, and labs reviewed  Continue medications and treatments as listed in [facility]    Scribe Attestation  By signing my name below, I, Helen Cornejo,  Scribe   attest that this documentation has been prepared under the direction and in the presence of Sean Cloud MD.    Provider Attestation - Scribe documentation  All medical record entries made by the Scribe were at my direction and personally dictated by me. I have reviewed the chart and agree that the record accurately reflects my personal performance of the history, physical exam, discussion and plan.    1. Weakness        2. Hypertension, essential        3. Depression, unspecified depression type        4. Unilateral emphysema (CMS/[facility])               Electronically Signed By: Sean Cloud MD   9/12/23 12:23 PM

## 2023-09-12 ENCOUNTER — NURSING HOME VISIT (OUTPATIENT)
Dept: POST ACUTE CARE | Facility: EXTERNAL LOCATION | Age: 78
End: 2023-09-12
Payer: MEDICARE

## 2023-09-12 DIAGNOSIS — R53.1 WEAKNESS: ICD-10-CM

## 2023-09-12 DIAGNOSIS — Z79.4 TYPE 2 DIABETES MELLITUS WITHOUT COMPLICATION, WITH LONG-TERM CURRENT USE OF INSULIN (MULTI): ICD-10-CM

## 2023-09-12 DIAGNOSIS — E11.9 TYPE 2 DIABETES MELLITUS WITHOUT COMPLICATION, WITH LONG-TERM CURRENT USE OF INSULIN (MULTI): ICD-10-CM

## 2023-09-12 DIAGNOSIS — F32.A DEPRESSION, UNSPECIFIED DEPRESSION TYPE: ICD-10-CM

## 2023-09-12 PROBLEM — N82.4 COLOVAGINAL FISTULA: Status: ACTIVE | Noted: 2023-09-12

## 2023-09-12 PROBLEM — R26.81 GAIT INSTABILITY: Status: RESOLVED | Noted: 2023-03-12 | Resolved: 2023-09-12

## 2023-09-12 PROBLEM — M43.16 SPONDYLOLISTHESIS, LUMBAR REGION: Status: RESOLVED | Noted: 2023-05-26 | Resolved: 2023-09-12

## 2023-09-12 PROBLEM — N39.41 URGE INCONTINENCE OF URINE: Status: RESOLVED | Noted: 2023-05-26 | Resolved: 2023-09-12

## 2023-09-12 PROBLEM — E55.9 VITAMIN D DEFICIENCY: Status: RESOLVED | Noted: 2023-05-26 | Resolved: 2023-09-12

## 2023-09-12 PROBLEM — T84.7XXA: Status: RESOLVED | Noted: 2023-05-26 | Resolved: 2023-09-12

## 2023-09-12 PROBLEM — G89.18 POST-OP PAIN: Status: RESOLVED | Noted: 2023-05-26 | Resolved: 2023-09-12

## 2023-09-12 PROBLEM — R53.81 PHYSICAL DECONDITIONING: Status: RESOLVED | Noted: 2023-05-26 | Resolved: 2023-09-12

## 2023-09-12 PROBLEM — A49.8 PROTEUS INFECTION: Status: RESOLVED | Noted: 2023-05-26 | Resolved: 2023-09-12

## 2023-09-12 PROBLEM — R26.81 UNSTEADINESS ON FEET: Status: RESOLVED | Noted: 2023-05-26 | Resolved: 2023-09-12

## 2023-09-12 PROBLEM — L03.116 CELLULITIS OF LEFT LOWER EXTREMITY: Status: RESOLVED | Noted: 2023-05-26 | Resolved: 2023-09-12

## 2023-09-12 PROBLEM — M54.2 CERVICAL PAIN: Status: RESOLVED | Noted: 2023-05-26 | Resolved: 2023-09-12

## 2023-09-12 PROBLEM — K92.1 HEMATOCHEZIA: Status: RESOLVED | Noted: 2023-05-26 | Resolved: 2023-09-12

## 2023-09-12 PROCEDURE — 99308 SBSQ NF CARE LOW MDM 20: CPT | Performed by: INTERNAL MEDICINE

## 2023-09-12 NOTE — PROGRESS NOTES
PROGRESS NOTE    Subjective   Chief complaint: Elida Benson is a 78 y.o. female who is an acute skilled patient being seen and evaluated for weakness    HPI:  Therapy has been working with the patient to improve strength and endurance with ADLs, transfers, and mobility.  Patient continues to work toward goals. Patient at parallel bars with min assist. Static balance activities completed with good carryover.  Actively participates with skilled interventions with encouragement.  Patient is stable and has no new complaints.  Nursing staff voices no new concerns today.      Objective   Vital signs: 105/67,66,96% O2 via NC,     Physical Exam  Constitutional:       General: She is not in acute distress.  Eyes:      Extraocular Movements: Extraocular movements intact.   Cardiovascular:      Rate and Rhythm: Normal rate and regular rhythm.   Pulmonary:      Effort: Pulmonary effort is normal.      Breath sounds: Normal breath sounds.   Abdominal:      General: Bowel sounds are normal.      Palpations: Abdomen is soft.   Musculoskeletal:      Cervical back: Neck supple.      Right lower leg: No edema.      Left lower leg: No edema.   Neurological:      Mental Status: She is alert.   Psychiatric:         Mood and Affect: Mood normal.         Behavior: Behavior is cooperative.         Assessment/Plan   Problem List Items Addressed This Visit       Depression     Mood stable  Continue meds         Weakness     Continue working in therapy         Type 2 diabetes mellitus without complication (CMS/HCC)     Cont glucoscan with SS            Medications, treatments, and labs reviewed  Continue medications and treatments as listed in EMR    Scribe Attestation  I, Shalini Quiorz   attest that this documentation has been prepared under the direction and in the presence of Sean Cloud MD.     Provider Attestation - Scribe documentation  All medical record entries made by the Scribe were at my direction and  personally dictated by me. I have reviewed the chart and agree that the record accurately reflects my personal performance of the history, physical exam, discussion and plan.   Sean Cloud MD

## 2023-09-12 NOTE — PROGRESS NOTES
PROGRESS NOTE    Subjective   Chief complaint: Elida Benson is a 78 y.o. female who is a long term care patient being seen and evaluated for monthly general medical care and follow-up.   Weakness    HPI:   patient presents for general medical care and f/u.  Patient seen and examined at bedside.  No issues per nursing.  Patient has no acute complaints.  Patient working in therapy due to weakness. Requires assistance for transfer, ADL's and mobility. Denies feeling down or thoughts of harming self or others. Denies cough or wheezing. Denies chest pain or headache. No acute distress.       Objective   Vital signs:  124/72, 98%    Physical Exam  Constitutional:       General: She is not in acute distress.  Eyes:      Extraocular Movements: Extraocular movements intact.   Cardiovascular:      Rate and Rhythm: Normal rate and regular rhythm.   Pulmonary:      Effort: Pulmonary effort is normal.      Breath sounds: Normal breath sounds.   Abdominal:      General: Bowel sounds are normal.      Palpations: Abdomen is soft.   Musculoskeletal:      Cervical back: Neck supple.      Right lower leg: No edema.      Left lower leg: No edema.   Neurological:      Mental Status: She is alert.   Psychiatric:         Mood and Affect: Mood normal.         Behavior: Behavior is cooperative.         Assessment/Plan   Problem List Items Addressed This Visit       Chronic obstructive pulmonary disease (CMS/HCC)     Stable, no shortness of breath or wheezing  On room air         Depression     Mood stable  Continue home med         Hypertension, essential     Blood pressure controlled  Continue antihypertensives  Continue to monitor BP         Weakness - Primary     Continue working in therapy          Medications, treatments, and labs reviewed  Continue medications and treatments as listed in [facility]    Scribe Attestation  By signing my name below, IHelen, Scribkelsie   attest that this documentation has been prepared under the  direction and in the presence of Sean Cloud MD.    Provider Attestation - Scribe documentation  All medical record entries made by the Scribe were at my direction and personally dictated by me. I have reviewed the chart and agree that the record accurately reflects my personal performance of the history, physical exam, discussion and plan.    1. Weakness        2. Hypertension, essential        3. Depression, unspecified depression type        4. Unilateral emphysema (CMS/HCC)

## 2023-09-12 NOTE — LETTER
Patient: Elida Benson  : 1945    Encounter Date: 2023    PROGRESS NOTE    Subjective  Chief complaint: Elida Benson is a 78 y.o. female who is an acute skilled patient being seen and evaluated for weakness    HPI:  Therapy has been working with the patient to improve strength and endurance with ADLs, transfers, and mobility.  Patient continues to work toward goals. Patient at parallel bars with min assist. Static balance activities completed with good carryover.  Actively participates with skilled interventions with encouragement.  Patient is stable and has no new complaints.  Nursing staff voices no new concerns today.      Objective  Vital signs: 105/67,66,96% O2 via NC,     Physical Exam  Constitutional:       General: She is not in acute distress.  Eyes:      Extraocular Movements: Extraocular movements intact.   Cardiovascular:      Rate and Rhythm: Normal rate and regular rhythm.   Pulmonary:      Effort: Pulmonary effort is normal.      Breath sounds: Normal breath sounds.   Abdominal:      General: Bowel sounds are normal.      Palpations: Abdomen is soft.   Musculoskeletal:      Cervical back: Neck supple.      Right lower leg: No edema.      Left lower leg: No edema.   Neurological:      Mental Status: She is alert.   Psychiatric:         Mood and Affect: Mood normal.         Behavior: Behavior is cooperative.         Assessment/Plan  Problem List Items Addressed This Visit       Depression     Mood stable  Continue meds         Weakness     Continue working in therapy         Type 2 diabetes mellitus without complication (CMS/HCC)     Cont glucoscan with SS            Medications, treatments, and labs reviewed  Continue medications and treatments as listed in EMR    Scribe Attestation  I, Shalini Quiroz   attest that this documentation has been prepared under the direction and in the presence of Sean Cloud MD.     Provider Attestation - Scribe documentation  All  medical record entries made by the Scribe were at my direction and personally dictated by me. I have reviewed the chart and agree that the record accurately reflects my personal performance of the history, physical exam, discussion and plan.   Sean Cloud MD      Electronically Signed By: Sean Cloud MD   9/12/23  7:19 PM

## 2023-09-13 ENCOUNTER — NURSING HOME VISIT (OUTPATIENT)
Dept: POST ACUTE CARE | Facility: EXTERNAL LOCATION | Age: 78
End: 2023-09-13
Payer: MEDICARE

## 2023-09-13 DIAGNOSIS — Z79.4 TYPE 2 DIABETES MELLITUS WITHOUT COMPLICATION, WITH LONG-TERM CURRENT USE OF INSULIN (MULTI): ICD-10-CM

## 2023-09-13 DIAGNOSIS — R53.1 WEAKNESS: Primary | ICD-10-CM

## 2023-09-13 DIAGNOSIS — I10 HYPERTENSION, ESSENTIAL: ICD-10-CM

## 2023-09-13 DIAGNOSIS — E11.9 TYPE 2 DIABETES MELLITUS WITHOUT COMPLICATION, WITH LONG-TERM CURRENT USE OF INSULIN (MULTI): ICD-10-CM

## 2023-09-13 DIAGNOSIS — G30.8 MILD ALZHEIMER'S DEMENTIA OF OTHER ONSET, UNSPECIFIED WHETHER BEHAVIORAL, PSYCHOTIC, OR MOOD DISTURBANCE OR ANXIETY (MULTI): ICD-10-CM

## 2023-09-13 DIAGNOSIS — J44.9 COPD WITHOUT EXACERBATION (MULTI): ICD-10-CM

## 2023-09-13 DIAGNOSIS — F02.A0 MILD ALZHEIMER'S DEMENTIA OF OTHER ONSET, UNSPECIFIED WHETHER BEHAVIORAL, PSYCHOTIC, OR MOOD DISTURBANCE OR ANXIETY (MULTI): ICD-10-CM

## 2023-09-13 PROCEDURE — 99309 SBSQ NF CARE MODERATE MDM 30: CPT | Performed by: NURSE PRACTITIONER

## 2023-09-13 NOTE — LETTER
Patient: Elida Benson  : 1945    Encounter Date: 2023    PROGRESS NOTE    Subjective  Chief complaint: Elida Benson is a 78 y.o. female who is a acute skilled care patient being seen and evaluated for weakness.    HPI:  HPI  Patient continues to work towards goals in therapy to increase strength and improve functional mobility.  Completed 2 sets of 15 reps of BLE therapy exercises.  Completed transfer training with Max A, static balance activities during standing with CGA, dynamic balance activities during sitting and range of motion techniques.  Also performed UB and LB dressing, supine>sit and SPT EOB>wc with Max Assist.  Patient is stable and has no complaints at this time.  No new concerns reported by nursing.    Objective  Vital signs:  18, 98/60, 97.7, 68, 96%,   Physical Exam  Constitutional:       General: She is not in acute distress.  Eyes:      Extraocular Movements: Extraocular movements intact.   Cardiovascular:      Rate and Rhythm: Normal rate and regular rhythm.   Pulmonary:      Effort: Pulmonary effort is normal.      Breath sounds: Normal breath sounds.   Abdominal:      General: Bowel sounds are normal.      Palpations: Abdomen is soft.      Comments: Ileostomy   Musculoskeletal:      Cervical back: Neck supple.      Right lower leg: No edema.      Left lower leg: No edema.      Comments: Generalized weakness   Neurological:      Mental Status: She is alert.   Psychiatric:         Mood and Affect: Mood normal.         Behavior: Behavior is cooperative.         Assessment/Plan  Problem List Items Addressed This Visit       COPD without exacerbation (CMS/HCC)     Stable, no shortness of breath or wheezing         Dementia (CMS/[facility])     Speech therapy for cognition         Hypertension, essential     Blood pressure controlled  Continue antihypertensives  Continue to monitor BP         Type 2 diabetes mellitus without complication, with long-term current use of insulin  (CMS/[facility])     FBG at goal  Monitor glucoscan         Weakness - Primary     Medications, treatments, and labs reviewed  Continue medications and treatments as listed in EMR    Scribe Attestation  I, Shalini Murray   attest that this documentation has been prepared under the direction and in the presence of MI Triplett    Provider Attestation - Scribe documentation  All medical record entries made by the Scribe were at my direction and personally dictated by me. I have reviewed the chart and agree that the record accurately reflects my personal performance of the history, physical exam, discussion and plan.   MI Triplett        Electronically Signed By: MI Triplett   9/23/23  7:40 PM

## 2023-09-14 ENCOUNTER — NURSING HOME VISIT (OUTPATIENT)
Dept: POST ACUTE CARE | Facility: EXTERNAL LOCATION | Age: 78
End: 2023-09-14
Payer: MEDICARE

## 2023-09-14 DIAGNOSIS — I10 HYPERTENSION, ESSENTIAL: ICD-10-CM

## 2023-09-14 DIAGNOSIS — R53.1 WEAKNESS: Primary | ICD-10-CM

## 2023-09-14 DIAGNOSIS — J44.9 COPD WITHOUT EXACERBATION (MULTI): ICD-10-CM

## 2023-09-14 PROCEDURE — 99308 SBSQ NF CARE LOW MDM 20: CPT | Performed by: INTERNAL MEDICINE

## 2023-09-14 NOTE — PROGRESS NOTES
Service: Orthopaedics     Subjective Data:   TAMARA PALMER is a 77 year old Female who is Hospital Day # 5 and POD #1 for 1. C3-6 posterior fusion with lateral mass screws;2. ;3. ;4. ;5.     Doing well postoperatively. No complaints. Fells well. No headaches/chest pain/weakness/numbness.    Objective Data:     Objective Information:      T   P  R  BP   MAP  SpO2   Value  36.6  90  16  141/92      100%  Date/Time 1/21 7:26 1/21 7:26 1/21 7:26 1/21 7:26    1/21 7:26  Range  (36.6C - 36.9C )  (75 - 93 )  (10 - 18 )  (127 - 150 )/ (85 - 99 )    (81% - 100% )   As of 20-Jan-2023 21:45:00, patient is on 2 L/min of oxygen via nasal cannula.  Highest temp of 36.9 C was recorded at 1/20 21:45      Pain reported at 1/21 6:01: 2 = Mild    Physical Exam Narrative:  ·  Physical Exam:    · Physical Exam:  - Constitutional: No acute distress, although not cooperative with exam  - Eyes: EOM grossly intact  - Head/Neck: Trachea midline  - Respiratory/Thorax: Normal work of breathing  - Cardiovascular: RRR on peripheral palpation  - Gastrointestinal: Nondistended  - Psychological: Appropriate mood/behavior  - Skin: Warm and dry. Additional findings in musculoskeletal evaluation  - Musculoskeletal:  SILT BUE/BLE  C5: SILT   Deltoid 5/5 Left; 5/5 Right  C6: SILT   Poor understanding of what she needs to do, but moving wrists at least antibragivy  C7: SILT   Triceps: 5/5 Left; 5/5 Right  C8: SILT   Finger flexion: 5/5 Left; 5/5 Right  T1: SILT    Interossei: Left exam limited by baseline contracture; 5/5 Right      L1: SILT       L2: SILT      Hip flexors 5/5 Left; 5/5 Right  L3: SILT      Knee extension 5/5 Left; 5/5 Right  L4: SILT      Tib Ant. (Dorsiflexion) 5/5 Left (limited by baseline contracture); 5/5 Right  L5: SILT      EHL5/5 Left; 5/5 Right  S1: SILT      Plantar flexion 5/5 Left; 5/5 Right    Patellar reflex: 2+   Bilaterally    Negative Johnson bilaterally   Negative Babinski bilaterally      Recent Lab  Results:    Results:      ---------- Recent Arterial Blood Gas Results----------     1/20/2023 09:23  pO2 92  pH 7.30  pCO2 44  SO2 98  Base Excess -4.7null    Assessment and Plan:   Daily Risk Screen:  ·  Does patient have an indwelling urinary catheter? yes   ·  Plan for indwelling urinary catheter removal today? yes     Code Status:  ·  Code Status Full Code     Assessment:    77F PMHx dementia, cervical stenosis now s/p C5 corpectomy, C4-6 ACDF on 1/17, C4-6 PSF with lateral mass screws w/Dr. Robb. Doing very well.     PLAN:   Weightbearing Status: WBAT BLE  Precautions: Maintain soft collar at all times  Pain: PO/IV pain meds   Postoperative ABx for 24 hours completed  DVT PPx: SCDs, early ambulation, hold chemoprophylaxis   Dressing: soft dressing  Drain:DHemovac x 1  [place]: will remove today   FEN: MIVFs; HLIV with good PO intake  Diet: Regular diet   Pulm: IS every 2 hrs, maintain O2 sat >92%  Bowel Regimen: Senna, Colace  Decadron 4mg q6h x4 doses completed.   PT/OT consult    Dispo: SNF pending drain removal, PT    Roberto Tolentino MD  Orthopaedic Surgery  PGY-2  Pager: [phone]  Available via Doc Halo    While admitted, this patient will be followed by the Ortho Spine Team. Please reach out to the below residents with any questions (all available via doc halo).  1st call: Roberto Tolentino PGY-2 ([phone])  2nd call: Atnhony Justice, PGY-4 ([phone])    Please call [phone] between 6PM and 6AM on week days and on weekends.          Attestation:   Note Completion:  I am a:  Resident/Fellow   Attending Attestation I saw and evaluated the patient.  I personally obtained the key and critical portions of the history and physical exam or was physically present for key and  critical portions performed by the resident/fellow. I reviewed the resident/fellow?s documentation and discussed the patient with the resident/fellow.  I agree with the resident/fellow?s medical decision making as documented in the note.     I personally  evaluated the patient on 21-Jan-2023         Electronic Signatures:  Irvin Robb)  (Signed 23-Jan-2023 16:17)   Authored: Note Completion   Co-Signer: Service, Subjective Data, Objective Data, Assessment and Plan, Note Completion  Roberto Tolentino (Resident))  (Signed 21-Jan-2023 09:21)   Authored: Service, Subjective Data, Objective Data, Assessment  and Plan, Note Completion      Last Updated: 23-Jan-2023 16:17 by Irvin Robb)

## 2023-09-14 NOTE — PROGRESS NOTES
Service: Orthopaedics     Subjective Data:   TAMARA PALMER is a 77 year old Female who is Hospital Day # 4 and POD #0 for 1. C3-6 posterior fusion with lateral mass screws;2. ;3. ;4. ;5.     Jah reports pain is well controlled, ready for OR today.    Objective Data:     Objective Information:      T   P  R  BP   MAP  SpO2   Value  36.7  85  14  150/99      99%  Date/Time 1/20 16:57 1/20 16:57 1/20 16:57 1/20 16:57    1/20 16:57  Range  (36.6C - 37.5C )  (84 - 98 )  (10 - 19 )  (110 - 150 )/ (80 - 99 )    (81% - 99% )   As of 20-Jan-2023 14:11:00, patient is on 2 L/min of oxygen via nasal cannula.  Highest temp of 37.5 C was recorded at 1/19 7:15      Pain reported at 1/20 16:34: 10 = Severe    Physical Exam Narrative:  ·  Physical Exam:    · Physical Exam:  - Constitutional: No acute distress, although not cooperative with exam  - Eyes: EOM grossly intact  - Head/Neck: Trachea midline  - Respiratory/Thorax: Normal work of breathing  - Cardiovascular: RRR on peripheral palpation  - Gastrointestinal: Nondistended  - Psychological: Appropriate mood/behavior  - Skin: Warm and dry. Additional findings in musculoskeletal evaluation  - Musculoskeletal:  SILT BUE/BLE  Grossly moving all motor groups antigravity or greater, does have restricted ROM of R ankle, R hand at baseline  Not willing to participate in exam further     Recent Lab Results:    Results:    CBC: 1/20/2023 05:54              \     Hgb     /                              \     11.3 L    /  WBC  ----------------  Plt               8.6       ----------------    303              /     Hct     \                              /     38.7       \            RBC: 4.09     MCV: 95     Neutrophil %: 67.7      BMP: 1/20/2023 05:54  NA+        Cl-     BUN  /                         138    104    13  /  --------------------------------  Glucose                ---------------------------  50 LL    K+     HCO3-   Creat \                         3.8  22     0.58  \  Calcium : 9.0     Anion Gap : 16        ---------- Recent Arterial Blood Gas Results----------     1/20/2023 09:23  pO2 92  pH 7.30  pCO2 44  SO2 98  Base Excess -4.7null    Assessment and Plan:   Daily Risk Screen:  ·  Does patient have an indwelling urinary catheter? yes   ·  Plan for indwelling urinary catheter removal today? yes     Code Status:  ·  Code Status Full Code     Assessment:    77F PMHx dementia, cervical stenosis now s/p C5 corpectomy, C4-6 ACDF on 1/17 w/Dr. Robb. Doing well. No new events. Return to OR today for posterior fixation.     PLAN:   Weightbearing Status: WBAT BLE  Precautions: Maintain soft collar at all times  Pain: PO/IV pain meds   Postoperative ABx for 24 hours completed  DVT PPx: SCDs, early ambulation, hold chemoprophylaxis in setting of recent spine surgery and in preparation for today's surgery   Dressing: soft dressing  Drain: Drain removed   [place]: will remove POD1   FEN: MIVFs; HLIV with good PO intake  Diet: NPO for OR today   Pulm: IS every 2 hrs, maintain O2 sat >92%  Bowel Regimen: Senna, Colace  Decadron 4mg q6h x4 doses completed.   PT/OT consult    Dispo: pending hospital course after next surgery    Roberto Tolentino MD  Orthopaedic Surgery  PGY-2  Pager: [phone]  Available via Doc Halo    While admitted, this patient will be followed by the Ortho Spine Team. Please reach out to the below residents with any questions (all available via doc halo).  1st call: Roberto Tolentino PGY-2 ([phone])  2nd call: Anthony Justice, PGY-4 ([phone])    Please call [phone] between 6PM and 6AM on week days and on weekends.          Attestation:   Note Completion:  I am a:  Resident/Fellow   Attending Attestation I saw and evaluated the patient.  I personally obtained the key and critical portions of the history and physical exam or was physically present for key and  critical portions performed by the resident/fellow. I reviewed the resident/fellow?s documentation and discussed the patient  with the resident/fellow.  I agree with the resident/fellow?s medical decision making as documented in the note.     I personally evaluated the patient on 20-Jan-2023         Electronic Signatures:  Irvin Robb (MD)  (Signed 23-Jan-2023 16:18)   Authored: Note Completion   Co-Signer: Service, Subjective Data, Objective Data, Assessment and Plan, Note Completion  Roberto Tolentino (Resident))  (Signed 20-Jan-2023 19:08)   Authored: Service, Subjective Data, Objective Data, Assessment  and Plan, Note Completion      Last Updated: 23-Jan-2023 16:18 by Irvin Robb)

## 2023-09-14 NOTE — PROGRESS NOTES
Service: Orthopaedics     Subjective Data:   TAMARA PALMER is a 77 year old Female who is Hospital Day # 6 and POD #2 for 1. C3-6 posterior fusion with lateral mass screws;2. ;3. ;4. ;5.     Complaining of neck and back pain. Eating javi crackers and ginger ale without difficulty. No headaches/chest pain/weakness/numbness.    Objective Data:     Objective Information:      T   P  R  BP   MAP  SpO2   Value  36.8  98  16  124/84      91%  Date/Time 1/22 7:53 1/22 7:53 1/22 7:53 1/22 7:53    1/22 7:53  Range  (36.5C - 36.9C )  (86 - 99 )  (16 - 18 )  (122 - 142 )/ (79 - 100 )    (91% - 100% )   As of 21-Jan-2023 10:45:00, patient is on 1 L/min of oxygen via room air.  Highest temp of 36.9 C was recorded at 1/21 6:01      Pain reported at 1/22 7:53: 7 = Severe    Physical Exam Narrative:  ·  Physical Exam:    · Physical Exam:  - Constitutional: No acute distress, although not cooperative with exam  - Eyes: EOM grossly intact  - Head/Neck: Trachea midline  - Respiratory/Thorax: Normal work of breathing  - Cardiovascular: RRR on peripheral palpation  - Gastrointestinal: Nondistended  - Psychological: Appropriate mood/behavior  - Skin: Warm and dry. Additional findings in musculoskeletal evaluation  - Musculoskeletal:  SILT BUE/BLE  C5: SILT   Deltoid 5/5 Left; 5/5 Right  C6: SILT   Poor understanding of what she needs to do, but moving wrists at least antigravity  C7: SILT   Triceps: 5/5 Left; 5/5 Right  C8: SILT   Finger flexion: 5/5 Left; 5/5 Right  T1: SILT    Interossei: Left exam limited by baseline contracture; 5/5 Right      L1: SILT       L2: SILT      Hip flexors 5/5 Left; 5/5 Right  L3: SILT      Knee extension 5/5 Left; 5/5 Right  L4: SILT      Tib Ant. (Dorsiflexion) 5/5 Left (limited by baseline contracture); 5/5 Right  L5: SILT      EHL5/5 Left; 5/5 Right  S1: SILT      Plantar flexion 5/5 Left; 5/5 Right    Patellar reflex: 2+   Bilaterally    Negative Johnson bilaterally   Negative Babinski  bilaterally      Recent Lab Results:    Results:    CBC: 1/22/2023 06:31              \     Hgb     /                              \     11.1 L    /  WBC  ----------------  Plt               11.1       ----------------    375              /     Hct     \                              /     36.1       \            RBC: 4.00     MCV: 90           BMP: 1/22/2023 06:31  NA+        Cl-     BUN  /                         138    102    16  /  --------------------------------  Glucose                ---------------------------  87    K+     HCO3-   Creat \                         4.1  26    0.57  \  Calcium : 8.5 L    Anion Gap : 14      Assessment and Plan:   Daily Risk Screen:  ·  Does patient have an indwelling urinary catheter? yes   ·  Plan for indwelling urinary catheter removal today? yes     Code Status:  ·  Code Status Full Code     Assessment:    77F PMHx dementia, cervical stenosis now s/p C5 corpectomy, C4-6 ACDF on 1/17, C4-6 PSF with lateral mass screws w/Dr. Robb.     PLAN:   Weightbearing Status: WBAT BLE  Precautions: Maintain soft collar at all times  Pain: PO/IV pain meds   Postoperative ABx for 24 hours completed  DVT PPx: SCDs, early ambulation, hold chemoprophylaxis   Dressing: soft dressing  Drain:DHemovac x 1 removed 1/22  [place]: none  FEN: MIVFs; HLIV with good PO intake  Diet: Regular diet   Pulm: IS every 2 hrs, maintain O2 sat >92%  Bowel Regimen: Senna, Colace  Decadron 4mg q6h x4 doses completed.   PT/OT consult    Dispo: Medically ready for SNF placement    Anthony Justice MD  Orthopaedic Surgery  Pager: [phone], Doc[facility]alo preferred    While admitted, this patient will be  followed by the Ortho Spine Team. Please reach out to the below residents with any questions (all available via doc halo).  1st call: Roberto Tolentino PGY-2 ([phone])  2nd call: Anthony Justice, PGY-4 ([phone])    Please call [phone] between 6PM and 6AM on week days and on weekends.          Attestation:   Note Completion:  I am a:   Resident/Fellow   Attending Attestation I saw and evaluated the patient.  I personally obtained the key and critical portions of the history and physical exam or was physically present for key and  critical portions performed by the resident/fellow. I reviewed the resident/fellow?s documentation and discussed the patient with the resident/fellow.  I agree with the resident/fellow?s medical decision making as documented in the note.     I personally evaluated the patient on 22-Jan-2023         Electronic Signatures:  Irvin Robb)  (Signed 23-Jan-2023 16:17)   Authored: Note Completion   Co-Signer: Service, Subjective Data, Objective Data, Assessment and Plan, Note Completion  [place] (Resident))  (Signed 22-Jan-2023 11:17)   Authored: Service, Subjective Data, Objective Data, Assessment  and Plan, Note Completion      Last Updated: 23-Jan-2023 16:17 by Irvin Robb)

## 2023-09-14 NOTE — PROGRESS NOTES
Service: Orthopaedics     Subjective Data:   TAMARA PALMER is a 77 year old Female who is Hospital Day # 2 and POD #1 for 1. C4-6 ACDF;2. Partial corpectomy of C4 and C6;3. C5 corpectomy;4. ;5.     Patient reports pain is well controlled. Denies chest pain, shortness of breath, or nausea. Feeling comfortable overall. Some confusion (baseline dementia).    Objective Data:     Objective Information:      T   P  R  BP   MAP  SpO2   Value  36.6  88  18  116/75   94  99%  Date/Time 1/18 8:28 1/18 8:28 1/18 8:28 1/18 8:28  1/18 3:20 1/18 8:28  Range  (36.5C - 36.8C )  (88 - 95 )  (17 - 18 )  (110 - 134 )/ (75 - 85 )  (88 - 101 )  (97% - 99% )      Pain reported at 1/18 1:45: 5 = Moderate    Physical Exam Narrative:  ·  Physical Exam:    · Physical Exam:  - Constitutional: No acute distress, although not cooperative with exam  - Eyes: EOM grossly intact  - Head/Neck: Trachea midline  - Respiratory/Thorax: Normal work of breathing  - Cardiovascular: RRR on peripheral palpation  - Gastrointestinal: Nondistended  - Psychological: Appropriate mood/behavior  - Skin: Warm and dry. Additional findings in musculoskeletal evaluation  - Musculoskeletal:  SILT BUE/BLE  Grossly moving all motor groups antigravity or greater, does have restricted ROM of R ankle at baseline  Not willing to participate in exam further     Recent Lab Results:    Results:    CBC: 1/18/2023 06:08              \     Hgb     /                              \     10.6 L    /  WBC  ----------------  Plt               12.8 H    ----------------    314              /     Hct     \                              /     35.6 L    \            RBC: 3.81 L    MCV: 93           BMP: 1/18/2023 06:08  NA+        Cl-     BUN  /                         137    105    15  /  --------------------------------  Glucose                ---------------------------  190 H    K+     HCO3-   Creat \                         4.7  21    0.73  \  Calcium : 8.7     Anion  Gap : 16        ---------- Recent Arterial Blood Gas Results----------     1/17/2023 10:45  pO2 92  24 h range: ( 76 - 92 )  pH 7.35  24 h range: ( 7.35 - 7.39 )  pCO2 39  24 h range: ( 36 - 39 )  SO2 99  24 h range: ( 97 - 99 )  Base Excess -3.8  24 h range: ( -3.8 - -2.7 )null    Assessment and Plan:   Daily Risk Screen:  ·  Does patient have an indwelling urinary catheter? yes   ·  Plan for indwelling urinary catheter removal today? yes     Code Status:  ·  Code Status Full Code     Advance Care Planning:  Advance Care Planning: I evaluated the patient  and determined the patient's capacity to understand the risks, benefits and alternatives to treatment. I elicited the patient's goals for treatment and reviewed advance directives and medical orders for life sustaining treatment. The patient was given  an opportunity to review a blank advance directive as appropriate.     Assessment:    77F PMHx dementia, cervical stenosis now s/p C5 corpectomy, C4-6 ACDF on 1/17 w/Dr. Robb. Doing well. Plan for return to OR 1/20 for posterior fixation    PLAN:   Weightbearing Status: WBAT BLE  Precautions: Maintain soft collar at all times  Pain: PO/IV pain meds   Postoperative ABx for 24 hours  DVT PPx: SCDs, early ambulation, hold chemoprophylaxis in setting of recent spine surgery  Dressing: soft dressing  Drain: HV Drain x1, record output q8h, will generally remove when <50cc (lumbar) or <30cc (cervical) q shift x2-3 shifts, will maintain today   [place]: will remove POD1   FEN: MIVFs; HLIV with good PO intake  Diet: Clear liquid diet. Advance as tolerated if no nausea and + bowel sounds  Pulm: IS every 2 hrs, maintain O2 sat >92%  Bowel Regimen: Senna, Colace  Decadron 4mg q6h x4 doses to complete today  PT/OT consult    Dispo: pending hospital course after next surgery    Roberto Tolentino MD  Orthopaedic Surgery  PGY-2  Pager: [phone]  Available via [facility]    While admitted, this patient will be followed by the Ortho Spine Team.  Please reach out to the below residents with any questions (all available via doc halo).  1st call: Roberto Tolentino PGY-2 ([phone])  2nd call: Anthony Justice, PGY-4 ([phone])    Please call [phone] between 6PM and 6AM on week days and on weekends.          Attestation:   Note Completion:  I am a:  Resident/Fellow   Attending Attestation I saw and evaluated the patient.  I personally obtained the key and critical portions of the history and physical exam or was physically present for key and  critical portions performed by the resident/fellow. I reviewed the resident/fellow?s documentation and discussed the patient with the resident/fellow.  I agree with the resident/fellow?s medical decision making as documented in the note.     I personally evaluated the patient on 18-Jan-2023         Electronic Signatures:  Irvin Robb)  (Signed 23-Jan-2023 16:18)   Authored: Note Completion   Co-Signer: Service, Subjective Data, Objective Data, Assessment and Plan, Note Completion  Roberto Tolentino (Resident))  (Signed 18-Jan-2023 08:39)   Authored: Service, Subjective Data, Objective Data, Assessment  and Plan, Note Completion      Last Updated: 23-Jan-2023 16:18 by Irvin Robb)

## 2023-09-14 NOTE — PROGRESS NOTES
Service: Orthopaedics     Subjective Data:   TAMARA PALMER is a 77 year old Female who is Hospital Day # 3 and POD #2 for 1. C4-6 ACDF;2. Partial corpectomy of C4 and C6;3. C5 corpectomy;4. ;5.     Patient reports pain is well controlled. Denies chest pain, shortness of breath, or nausea. Feeling comfortable overall. Some confusion (baseline dementia). Overall doing well. Ready for surgery tomorrow.    Objective Data:     Objective Information:      T   P  R  BP   MAP  SpO2   Value  36.6  90  18  143/89   94  95%  Date/Time 1/19 21:26 1/19 21:26 1/19 21:26 1/19 21:26  1/18 3:20 1/19 21:26  Range  (36.5C - 37.5C )  (85 - 98 )  (17 - 19 )  (110 - 143 )/ (68 - 89 )  (94 - 101 )  (89% - 99% )  Highest temp of 37.5 C was recorded at 1/19 7:15      Pain reported at 1/19 8:44: 8 = Severe    Physical Exam Narrative:  ·  Physical Exam:    · Physical Exam:  - Constitutional: No acute distress, although not cooperative with exam  - Eyes: EOM grossly intact  - Head/Neck: Trachea midline  - Respiratory/Thorax: Normal work of breathing  - Cardiovascular: RRR on peripheral palpation  - Gastrointestinal: Nondistended  - Psychological: Appropriate mood/behavior  - Skin: Warm and dry. Additional findings in musculoskeletal evaluation  - Musculoskeletal:  SILT BUE/BLE  Grossly moving all motor groups antigravity or greater, does have restricted ROM of R ankle at baseline  Not willing to participate in exam further     Recent Lab Results:    Results:    CBC: 1/19/2023 21:14              \     Hgb     /                              \     10.8 L    /  WBC  ----------------  Plt               9.9       ----------------    315              /     Hct     \                              /     34.8 L    \            RBC: 3.87 L    MCV: 90     Neutrophil %: 67.7      BMP: 1/19/2023 21:14  NA+        Cl-     BUN  /                         139    104    14  /  --------------------------------  Glucose                 ---------------------------  82    K+     HCO3-   Creat \                         4.0  25    0.62  \  Calcium : 8.8     Anion Gap : 14      Assessment and Plan:   Daily Risk Screen:  ·  Does patient have an indwelling urinary catheter? yes   ·  Plan for indwelling urinary catheter removal today? yes     Code Status:  ·  Code Status Full Code     Assessment:    77F PMHx dementia, cervical stenosis now s/p C5 corpectomy, C4-6 ACDF on 1/17 w/Dr. Robb. Doing well. No new events. Plan for return to OR 1/20 for posterior fixation.    PLAN:   Weightbearing Status: WBAT BLE  Precautions: Maintain soft collar at all times  Pain: PO/IV pain meds   Postoperative ABx for 24 hours  DVT PPx: SCDs, early ambulation, hold chemoprophylaxis in setting of recent spine surgery  Dressing: soft dressing  Drain: HV Drain x1, record output q8h, will generally remove when <50cc (lumbar) or <30cc (cervical) q shift x2-3 shifts, will maintain today   [place]: will remove POD1   FEN: MIVFs; HLIV with good PO intake  Diet: Clear liquid diet. Advance as tolerated if no nausea and + bowel sounds  Pulm: IS every 2 hrs, maintain O2 sat >92%  Bowel Regimen: Senna, Colace  Decadron 4mg q6h x4 doses completed.   PT/OT consult    Dispo: pending hospital course after next surgery    Roberto Tolentino MD  Orthopaedic Surgery  PGY-2  Pager: [phone]  Available via [facility]    While admitted, this patient will be followed by the Ortho Spine Team. Please reach out to the below residents with any questions (all available via doc halo).  1st call: Roberto Tolentino PGY-2 ([phone])  2nd call: Anthony Justice, PGY-4 ([phone])    Please call [phone] between 6PM and 6AM on week days and on weekends.          Attestation:   Note Completion:  I am a:  Resident/Fellow   Attending Attestation I saw and evaluated the patient.  I personally obtained the key and critical portions of the history and physical exam or was physically present for key and  critical portions performed by the  resident/fellow. I reviewed the resident/fellow?s documentation and discussed the patient with the resident/fellow.  I agree with the resident/fellow?s medical decision making as documented in the note.     I personally evaluated the patient on 19-Jan-2023         Electronic Signatures:  Irvin Robb (MD)  (Signed 23-Jan-2023 16:18)   Authored: Note Completion   Co-Signer: Service, Subjective Data, Objective Data, Assessment and Plan, Note Completion  Roberto Tolentino (Resident))  (Signed 19-Jan-2023 23:35)   Authored: Service, Subjective Data, Objective Data, Assessment  and Plan, Note Completion      Last Updated: 23-Jan-2023 16:18 by Irvin Robb)

## 2023-09-14 NOTE — LETTER
Patient: Elida Benson  : 1945    Encounter Date: 2023    PROGRESS NOTE    Subjective  Chief complaint: Elida Benson is a 78 y.o. female who is an acute skilled patient being seen and evaluated for weakness    HPI:   patient continues to work in therapy.  Requires assistance for transfers ADLs and mobility.  No new issues or concerns.  No acute distress.   Denies chest pain or headache.   Denies shortness of breath or wheezing.      Objective  Vital signs:   124/72, 98%    Physical Exam  Constitutional:       General: She is not in acute distress.  Eyes:      Extraocular Movements: Extraocular movements intact.   Cardiovascular:      Rate and Rhythm: Normal rate and regular rhythm.   Pulmonary:      Effort: Pulmonary effort is normal.      Breath sounds: Normal breath sounds.   Abdominal:      General: Bowel sounds are normal.      Palpations: Abdomen is soft.   Musculoskeletal:      Cervical back: Neck supple.      Right lower leg: No edema.      Left lower leg: No edema.   Neurological:      Mental Status: She is alert.   Psychiatric:         Mood and Affect: Mood normal.         Behavior: Behavior is cooperative.         Assessment/Plan  Problem List Items Addressed This Visit       COPD without exacerbation (CMS/HCC)     Stable, no shortness of breath or wheezing         Hypertension, essential     Blood pressure controlled  Continue antihypertensives  Continue to monitor BP         Weakness - Primary     Continue working in therapy          Medications, treatments, and labs reviewed  Continue medications and treatments as listed in PCC    Scribe Attestation  By signing my name below, IHelen Scribe   attest that this documentation has been prepared under the direction and in the presence of Sean Cloud MD.    Provider Attestation - Scribe documentation  All medical record entries made by the Scribe were at my direction and personally dictated by me. I have reviewed the chart  and agree that the record accurately reflects my personal performance of the history, physical exam, discussion and plan.  1. Weakness        2. Hypertension, essential        3. COPD without exacerbation (CMS/[facility])              Electronically Signed By: Sean Cloud MD   9/14/23  3:33 PM

## 2023-09-14 NOTE — PROGRESS NOTES
Service: Orthopaedics     Subjective Data:   TAMARA PALMER is a 77 year old Female who is Hospital Day # 7 and POD #3 for 1. C3-6 posterior fusion with lateral mass screws;2. ;3. ;4. ;5.     No overnight events. Patient feels well and is looking forward to DC to SNF today. No shortness of breath. No chest pain. No headaches.    Objective Data:     Objective Information:      T   P  R  BP   MAP  SpO2   Value  36.6  109  18  143/92      93%  Date/Time 1/23 11:06 1/23 11:06 1/23 11:06 1/23 11:06    1/23 11:06  Range  (36.3C - 36.9C )  (89 - 111 )  (16 - 19 )  (124 - 150 )/ (79 - 100 )    (90% - 97% )  Highest temp of 36.9 C was recorded at 1/22 4:30      Pain reported at 1/23 8:29: 9 = Severe    Physical Exam Narrative:  ·  Physical Exam:    · Physical Exam:  - Constitutional: No acute distress, although not cooperative with exam  - Eyes: EOM grossly intact  - Head/Neck: Trachea midline  - Respiratory/Thorax: Normal work of breathing  - Cardiovascular: RRR on peripheral palpation  - Gastrointestinal: Nondistended  - Psychological: Appropriate mood/behavior  - Skin: Warm and dry. Additional findings in musculoskeletal evaluation  - Musculoskeletal:  SILT BUE/BLE  C5: SILT   Deltoid 4/5 Left; 4/5 Right (pain limited)   C6: SILT   Still having difficulty understanding this movement, but moving wrists at least antigravity  C7: SILT   Triceps: 5/5 Left; 5/5 Right  C8: SILT   Finger flexion: 5/5 Left; 5/5 Right  T1: SILT    Interossei: Left exam limited by baseline contracture; 5/5 Right      L1: SILT       L2: SILT      Hip flexors 5/5 Left; 5/5 Right  L3: SILT      Knee extension 5/5 Left; 5/5 Right  L4: SILT      Tib Ant. (Dorsiflexion) 5/5 Left (limited by baseline contracture); 5/5 Right  L5: SILT      EHL5/5 Left; 5/5 Right  S1: SILT      Plantar flexion 5/5 Left; 5/5 Right    Patellar reflex: 2+   Bilaterally    Negative Johnson bilaterally   Negative Babinski bilaterally      Recent Lab  Results:    Results:    CBC: 1/23/2023 05:53              \     Hgb     /                              \     11.0 L    /  WBC  ----------------  Plt               8.6       ----------------    350              /     Hct     \                              /     35.4 L    \            RBC: 3.90 L    MCV: 91           BMP: 1/23/2023 05:53  NA+        Cl-     BUN  /                         139    103    13  /  --------------------------------  Glucose                ---------------------------  102 H    K+     HCO3-   Creat \                         4.0  26    0.55  \  Calcium : 8.3 L    Anion Gap : 14      Assessment and Plan:   Daily Risk Screen:  ·  Does patient have an indwelling urinary catheter? yes   ·  Plan for indwelling urinary catheter removal today? yes     Code Status:  ·  Code Status Full Code     Assessment:    77F PMHx dementia, cervical stenosis now s/p C5 corpectomy, C4-6 ACDF on 1/17, C4-6 PSF with lateral mass screws w/Dr. Robb. Doing well. Satting well on room air,  but is more tachycardic today. Could be due to patient's pain and recently started amlodipine. Having some calf tenderness LLE when pressing firmly, so will obtain DVT scan to rule out any vascular pathology in setting of recent surgery.    PLAN:   Weightbearing Status: WBAT BLE  Precautions: Maintain soft collar at all times  Pain: PO/IV pain meds   Postoperative ABx for 24 hours completed  DVT PPx: SCDs, early ambulation, hold chemoprophylaxis   Dressing: soft dressing  Drain: Hemovac x 1 removed 1/22  [place]: none  FEN: MIVFs; HLIV with good PO intake  Diet: Regular diet   Pulm: IS every 2 hrs, maintain O2 sat >92%  Bowel Regimen: Senna, Colace  Decadron 4mg q6h x4 doses completed.   PT/OT consult    Dispo: Medically ready for SNF placement pending result of DVT scan    Roberto Tolentino MD  Orthopaedic Surgery  PGY-2  Pager: [phone]  Available via Doc [facility]    While admitted, this patient will be  followed by the Ortho Spine Team.  Please reach out to the below residents with any questions (all available via doc halo).  1st call: Roberto Tolentino PGY-2 ([phone])  2nd call: Anthony Justice, PGY-4 ([phone])    Please call [phone] between 6PM and 6AM on week days and on weekends.          Attestation:   Note Completion:  I am a:  Resident/Fellow   Attending Attestation I saw and evaluated the patient.  I personally obtained the key and critical portions of the history and physical exam or was physically present for key and  critical portions performed by the resident/fellow. I reviewed the resident/fellow?s documentation and discussed the patient with the resident/fellow.  I agree with the resident/fellow?s medical decision making as documented in the note.     I personally evaluated the patient on 23-Jan-2023         Electronic Signatures:  Irvin Robb)  (Signed 23-Jan-2023 16:17)   Authored: Note Completion   Co-Signer: Service, Subjective Data, Objective Data, Assessment and Plan, Note Completion  Roberto Tolentino (Resident))  (Signed 23-Jan-2023 14:07)   Authored: Service, Subjective Data, Objective Data, Assessment  and Plan, Note Completion      Last Updated: 23-Jan-2023 16:17 by Irvin Robb)

## 2023-09-14 NOTE — PROGRESS NOTES
PROGRESS NOTE    Subjective   Chief complaint: Elida Benson is a 78 y.o. female who is an acute skilled patient being seen and evaluated for weakness    HPI:   patient continues to work in therapy.  Requires assistance for transfers ADLs and mobility.  No new issues or concerns.  No acute distress.   Denies chest pain or headache.   Denies shortness of breath or wheezing.      Objective   Vital signs:   124/72, 98%    Physical Exam  Constitutional:       General: She is not in acute distress.  Eyes:      Extraocular Movements: Extraocular movements intact.   Cardiovascular:      Rate and Rhythm: Normal rate and regular rhythm.   Pulmonary:      Effort: Pulmonary effort is normal.      Breath sounds: Normal breath sounds.   Abdominal:      General: Bowel sounds are normal.      Palpations: Abdomen is soft.   Musculoskeletal:      Cervical back: Neck supple.      Right lower leg: No edema.      Left lower leg: No edema.   Neurological:      Mental Status: She is alert.   Psychiatric:         Mood and Affect: Mood normal.         Behavior: Behavior is cooperative.         Assessment/Plan   Problem List Items Addressed This Visit       COPD without exacerbation (CMS/HCC)     Stable, no shortness of breath or wheezing         Hypertension, essential     Blood pressure controlled  Continue antihypertensives  Continue to monitor BP         Weakness - Primary     Continue working in therapy          Medications, treatments, and labs reviewed  Continue medications and treatments as listed in PCC    Scribe Attestation  By signing my name below, I, Shalini Arnold   attest that this documentation has been prepared under the direction and in the presence of Sean Cloud MD.    Provider Attestation - Scribe documentation  All medical record entries made by the Scribe were at my direction and personally dictated by me. I have reviewed the chart and agree that the record accurately reflects my personal performance of  the history, physical exam, discussion and plan.  1. Weakness        2. Hypertension, essential        3. COPD without exacerbation (CMS/[facility])

## 2023-09-15 ENCOUNTER — NURSING HOME VISIT (OUTPATIENT)
Dept: POST ACUTE CARE | Facility: EXTERNAL LOCATION | Age: 78
End: 2023-09-15
Payer: MEDICARE

## 2023-09-15 DIAGNOSIS — F02.A0 MILD ALZHEIMER'S DEMENTIA, UNSPECIFIED TIMING OF DEMENTIA ONSET, UNSPECIFIED WHETHER BEHAVIORAL, PSYCHOTIC, OR MOOD DISTURBANCE OR ANXIETY (MULTI): ICD-10-CM

## 2023-09-15 DIAGNOSIS — R53.1 WEAKNESS: Primary | ICD-10-CM

## 2023-09-15 DIAGNOSIS — G30.9 MILD ALZHEIMER'S DEMENTIA, UNSPECIFIED TIMING OF DEMENTIA ONSET, UNSPECIFIED WHETHER BEHAVIORAL, PSYCHOTIC, OR MOOD DISTURBANCE OR ANXIETY (MULTI): ICD-10-CM

## 2023-09-15 DIAGNOSIS — I10 HYPERTENSION, ESSENTIAL: ICD-10-CM

## 2023-09-15 DIAGNOSIS — J44.9 COPD WITHOUT EXACERBATION (MULTI): ICD-10-CM

## 2023-09-15 DIAGNOSIS — E11.9 TYPE 2 DIABETES MELLITUS WITHOUT COMPLICATION, WITH LONG-TERM CURRENT USE OF INSULIN (MULTI): ICD-10-CM

## 2023-09-15 DIAGNOSIS — Z79.4 TYPE 2 DIABETES MELLITUS WITHOUT COMPLICATION, WITH LONG-TERM CURRENT USE OF INSULIN (MULTI): ICD-10-CM

## 2023-09-15 PROCEDURE — 99309 SBSQ NF CARE MODERATE MDM 30: CPT | Performed by: NURSE PRACTITIONER

## 2023-09-15 NOTE — LETTER
Patient: Elida Benson  : 1945    Encounter Date: 09/15/2023    PROGRESS NOTE    Subjective  Chief complaint: Elida Benson is a 78 y.o. female who is a acute skilled care patient being seen and evaluated for weakness.    HPI:  HPI  Therapy has been working with the patient to improve strength and endurance with ADLs, transfers, mobility and cognition.  Patient continues to work toward goals.  Patient sat on edge of bed with standby assist and BUE support.  Performed stand pivot with FWW from EOB<>wheelchair with moderate assist, voice cues and no loss of balance.  Patient is stable and has no new complaints.  Nursing staff voices no new concerns today.    Objective  Vital signs:   18, 107/68, 98.8, 67, 96%,   Physical Exam  Constitutional:       General: She is not in acute distress.  Eyes:      Extraocular Movements: Extraocular movements intact.   Cardiovascular:      Rate and Rhythm: Normal rate and regular rhythm.   Pulmonary:      Effort: Pulmonary effort is normal.      Breath sounds: Normal breath sounds.   Abdominal:      General: Bowel sounds are normal.      Palpations: Abdomen is soft.      Comments: Ileostomy   Musculoskeletal:      Cervical back: Neck supple.      Right lower leg: No edema.      Left lower leg: No edema.      Comments: Generalized weakness   Neurological:      Mental Status: She is alert.   Psychiatric:         Mood and Affect: Mood normal.         Behavior: Behavior is cooperative.         Assessment/Plan  Problem List Items Addressed This Visit       COPD without exacerbation (CMS/HCC)     Stable, no shortness of breath or wheezing         Dementia (CMS/HCC)    Hypertension, essential     Blood pressure controlled  Continue antihypertensives  Continue to monitor BP         Type 2 diabetes mellitus without complication, with long-term current use of insulin (CMS/HCC)     FBG at goal  Monitor glucoscan         Weakness - Primary     Continue working in  therapy  Requires wheelchair for mobility and assist with ADLs and transfers          Medications, treatments, and labs reviewed  Continue medications and treatments as listed in EMR    Scribe Attestation  ISharon Scribe   attest that this documentation has been prepared under the direction and in the presence of MI Triplett    Provider Attestation - Scribe documentation  All medical record entries made by the Scribe were at my direction and personally dictated by me. I have reviewed the chart and agree that the record accurately reflects my personal performance of the history, physical exam, discussion and plan.   MI Triplett        Electronically Signed By: MI Triplett   9/24/23  5:34 PM

## 2023-09-18 ENCOUNTER — NURSING HOME VISIT (OUTPATIENT)
Dept: POST ACUTE CARE | Facility: EXTERNAL LOCATION | Age: 78
End: 2023-09-18
Payer: MEDICARE

## 2023-09-18 DIAGNOSIS — J44.9 COPD WITHOUT EXACERBATION (MULTI): ICD-10-CM

## 2023-09-18 DIAGNOSIS — R53.1 WEAKNESS: ICD-10-CM

## 2023-09-18 DIAGNOSIS — Z79.4 TYPE 2 DIABETES MELLITUS WITHOUT COMPLICATION, WITH LONG-TERM CURRENT USE OF INSULIN (MULTI): ICD-10-CM

## 2023-09-18 DIAGNOSIS — E11.9 TYPE 2 DIABETES MELLITUS WITHOUT COMPLICATION, WITH LONG-TERM CURRENT USE OF INSULIN (MULTI): ICD-10-CM

## 2023-09-18 DIAGNOSIS — I10 HYPERTENSION, ESSENTIAL: ICD-10-CM

## 2023-09-18 PROCEDURE — 99308 SBSQ NF CARE LOW MDM 20: CPT | Performed by: INTERNAL MEDICINE

## 2023-09-18 NOTE — LETTER
Patient: Elida Benson  : 1945    Encounter Date: 2023    PROGRESS NOTE    Subjective  Chief complaint: Elida Benson is a 78 y.o. female who is an acute skilled patient being seen and evaluated for weakness    HPI:  Patient has been working in therapy to improve strength, endurance, and ADLs.  Patient continues to work toward goals. Patient completed static balance activities during standing at FWW with Mod A & STS wc<>FWW with Mod A. Patient actively participates with skilled interventions and compliant with trained techniques. No new concerns today.  Denies n/v/f/c pain.        Objective  Vital signs: 104/76,76,97.7,18,98%,     Physical Exam  Constitutional:       General: She is not in acute distress.  Eyes:      Extraocular Movements: Extraocular movements intact.   Cardiovascular:      Rate and Rhythm: Normal rate and regular rhythm.   Pulmonary:      Effort: Pulmonary effort is normal.      Breath sounds: Normal breath sounds.   Abdominal:      General: Bowel sounds are normal.      Palpations: Abdomen is soft.   Musculoskeletal:      Cervical back: Neck supple.      Right lower leg: No edema.      Left lower leg: No edema.   Neurological:      Mental Status: She is alert.   Psychiatric:         Mood and Affect: Mood normal.         Behavior: Behavior is cooperative.         Assessment/Plan  Problem List Items Addressed This Visit       COPD without exacerbation (CMS/HCC)     Stable, no shortness of breath or wheezing         Hypertension, essential     Blood pressure controlled  Continue antihypertensives  Continue to monitor BP         Weakness     Continue working in therapy         Type 2 diabetes mellitus without complication (CMS/HCC)     Cont glucoscan with SS            Medications, treatments, and labs reviewed  Continue medications and treatments as listed in EMR    Scribe Attestation  I, Shalini Quiroz   attest that this documentation has been prepared under the  direction and in the presence of Sean Cloud MD.     Provider Attestation - Scribe documentation  All medical record entries made by the Scribe were at my direction and personally dictated by me. I have reviewed the chart and agree that the record accurately reflects my personal performance of the history, physical exam, discussion and plan.   Sean Cloud MD      Electronically Signed By: Sean Cloud MD   9/18/23  3:27 PM

## 2023-09-18 NOTE — PROGRESS NOTES
PROGRESS NOTE    Subjective   Chief complaint: Elida Benson is a 78 y.o. female who is an acute skilled patient being seen and evaluated for weakness    HPI:  Patient has been working in therapy to improve strength, endurance, and ADLs.  Patient continues to work toward goals. Patient completed static balance activities during standing at FWW with Mod A & STS wc<>FWW with Mod A. Patient actively participates with skilled interventions and compliant with trained techniques. No new concerns today.  Denies n/v/f/c pain.        Objective   Vital signs: 104/76,76,97.7,18,98%,     Physical Exam  Constitutional:       General: She is not in acute distress.  Eyes:      Extraocular Movements: Extraocular movements intact.   Cardiovascular:      Rate and Rhythm: Normal rate and regular rhythm.   Pulmonary:      Effort: Pulmonary effort is normal.      Breath sounds: Normal breath sounds.   Abdominal:      General: Bowel sounds are normal.      Palpations: Abdomen is soft.   Musculoskeletal:      Cervical back: Neck supple.      Right lower leg: No edema.      Left lower leg: No edema.   Neurological:      Mental Status: She is alert.   Psychiatric:         Mood and Affect: Mood normal.         Behavior: Behavior is cooperative.         Assessment/Plan   Problem List Items Addressed This Visit       COPD without exacerbation (CMS/HCC)     Stable, no shortness of breath or wheezing         Hypertension, essential     Blood pressure controlled  Continue antihypertensives  Continue to monitor BP         Weakness     Continue working in therapy         Type 2 diabetes mellitus without complication (CMS/HCC)     Cont glucoscan with SS            Medications, treatments, and labs reviewed  Continue medications and treatments as listed in EMR    Scribe Attestation  I, Ana Maria Connell, Hectoribe   attest that this documentation has been prepared under the direction and in the presence of Sean Cloud MD.     Provider  Attestation - Scribe documentation  All medical record entries made by the Scribe were at my direction and personally dictated by me. I have reviewed the chart and agree that the record accurately reflects my personal performance of the history, physical exam, discussion and plan.   Sean Cloud MD

## 2023-09-19 ENCOUNTER — NURSING HOME VISIT (OUTPATIENT)
Dept: POST ACUTE CARE | Facility: EXTERNAL LOCATION | Age: 78
End: 2023-09-19
Payer: MEDICARE

## 2023-09-19 DIAGNOSIS — E11.9 TYPE 2 DIABETES MELLITUS WITHOUT COMPLICATION, WITH LONG-TERM CURRENT USE OF INSULIN (MULTI): ICD-10-CM

## 2023-09-19 DIAGNOSIS — Z79.4 TYPE 2 DIABETES MELLITUS WITHOUT COMPLICATION, WITH LONG-TERM CURRENT USE OF INSULIN (MULTI): ICD-10-CM

## 2023-09-19 DIAGNOSIS — J44.9 COPD WITHOUT EXACERBATION (MULTI): ICD-10-CM

## 2023-09-19 DIAGNOSIS — R53.1 WEAKNESS: ICD-10-CM

## 2023-09-19 DIAGNOSIS — I10 HYPERTENSION, ESSENTIAL: ICD-10-CM

## 2023-09-19 PROCEDURE — 99308 SBSQ NF CARE LOW MDM 20: CPT | Performed by: INTERNAL MEDICINE

## 2023-09-19 NOTE — PROGRESS NOTES
PROGRESS NOTE    Subjective   Chief complaint: Elida Benson is a 78 y.o. female who is a acute skilled care patient being seen and evaluated for weakness.    HPI:  HPI  Patient continues to work towards goals in therapy to increase strength and improve functional mobility.  Completed 2 sets of 15 reps of BLE therapy exercises.  Completed transfer training with Max A, static balance activities during standing with CGA, dynamic balance activities during sitting and range of motion techniques.  Also performed UB and LB dressing, supine>sit and SPT EOB>wc with Max Assist.  Patient is stable and has no complaints at this time.  No new concerns reported by nursing.    Objective   Vital signs:  18, 98/60, 97.7, 68, 96%,   Physical Exam  Constitutional:       General: She is not in acute distress.  Eyes:      Extraocular Movements: Extraocular movements intact.   Cardiovascular:      Rate and Rhythm: Normal rate and regular rhythm.   Pulmonary:      Effort: Pulmonary effort is normal.      Breath sounds: Normal breath sounds.   Abdominal:      General: Bowel sounds are normal.      Palpations: Abdomen is soft.      Comments: Ileostomy   Musculoskeletal:      Cervical back: Neck supple.      Right lower leg: No edema.      Left lower leg: No edema.      Comments: Generalized weakness   Neurological:      Mental Status: She is alert.   Psychiatric:         Mood and Affect: Mood normal.         Behavior: Behavior is cooperative.         Assessment/Plan   Problem List Items Addressed This Visit       COPD without exacerbation (CMS/HCC)     Stable, no shortness of breath or wheezing         Dementia (CMS/HCC)     Speech therapy for cognition         Hypertension, essential     Blood pressure controlled  Continue antihypertensives  Continue to monitor BP         Type 2 diabetes mellitus without complication, with long-term current use of insulin (CMS/HCC)     FBG at goal  Monitor glucoscan         Weakness - Primary      Medications, treatments, and labs reviewed  Continue medications and treatments as listed in EMR    Scribe Attestation  I, Shalini Murray   attest that this documentation has been prepared under the direction and in the presence of MI Triplett    Provider Attestation - Scribe documentation  All medical record entries made by the Scribe were at my direction and personally dictated by me. I have reviewed the chart and agree that the record accurately reflects my personal performance of the history, physical exam, discussion and plan.   MI Triplett

## 2023-09-19 NOTE — PROGRESS NOTES
PROGRESS NOTE    Subjective   Chief complaint: Elida Benson is a 78 y.o. female who is an acute skilled patient being seen and evaluated for weakness    HPI:  Patient continues to work with therapy to reach goals. STS wc<>FWW withMod A and static balance activities during standing at FWW with Mod A. Patient actively participates with skilled interventions and is making progress. No nursing concerns, denies n,v,f,c,pain.       Objective   Vital signs: 113/75,79,18,97.7    Physical Exam  Constitutional:       General: She is not in acute distress.  Eyes:      Extraocular Movements: Extraocular movements intact.   Cardiovascular:      Rate and Rhythm: Normal rate and regular rhythm.   Pulmonary:      Effort: Pulmonary effort is normal.      Breath sounds: Normal breath sounds.   Abdominal:      General: Bowel sounds are normal.      Palpations: Abdomen is soft.   Musculoskeletal:      Cervical back: Neck supple.      Right lower leg: No edema.      Left lower leg: No edema.   Neurological:      Mental Status: She is alert.   Psychiatric:         Mood and Affect: Mood normal.         Behavior: Behavior is cooperative.         Assessment/Plan   Problem List Items Addressed This Visit       COPD without exacerbation (CMS/HCC)     Stable, no shortness of breath or wheezing         Hypertension, essential     Blood pressure controlled  Continue antihypertensives  Continue to monitor BP         Weakness     Continue working in therapy         Type 2 diabetes mellitus without complication (CMS/HCC)     Cont glucoscan with SS            Medications, treatments, and labs reviewed  Continue medications and treatments as listed in EMR    Scribe Attestation  I, Shalini Quiroz   attest that this documentation has been prepared under the direction and in the presence of Sean Cloud MD.     Provider Attestation - Scribe documentation  All medical record entries made by the Scribe were at my direction and personally  dictated by me. I have reviewed the chart and agree that the record accurately reflects my personal performance of the history, physical exam, discussion and plan.   Sean Cloud MD

## 2023-09-19 NOTE — PROGRESS NOTES
PROGRESS NOTE    Subjective   Chief complaint: Elida Benson is a 78 y.o. female who is a acute skilled care patient being seen and evaluated for weakness.    HPI:  HPI  Therapy has been working with the patient to improve strength and endurance with ADLs, transfers, mobility and cognition.  Patient continues to work toward goals.  Patient sat on edge of bed with standby assist and BUE support.  Performed stand pivot with FWW from EOB<>wheelchair with moderate assist, voice cues and no loss of balance.  Patient is stable and has no new complaints.  Nursing staff voices no new concerns today.    Objective   Vital signs:   18, 107/68, 98.8, 67, 96%,   Physical Exam  Constitutional:       General: She is not in acute distress.  Eyes:      Extraocular Movements: Extraocular movements intact.   Cardiovascular:      Rate and Rhythm: Normal rate and regular rhythm.   Pulmonary:      Effort: Pulmonary effort is normal.      Breath sounds: Normal breath sounds.   Abdominal:      General: Bowel sounds are normal.      Palpations: Abdomen is soft.      Comments: Ileostomy   Musculoskeletal:      Cervical back: Neck supple.      Right lower leg: No edema.      Left lower leg: No edema.      Comments: Generalized weakness   Neurological:      Mental Status: She is alert.   Psychiatric:         Mood and Affect: Mood normal.         Behavior: Behavior is cooperative.         Assessment/Plan   Problem List Items Addressed This Visit       COPD without exacerbation (CMS/HCC)     Stable, no shortness of breath or wheezing         Dementia (CMS/HCC)    Hypertension, essential     Blood pressure controlled  Continue antihypertensives  Continue to monitor BP         Type 2 diabetes mellitus without complication, with long-term current use of insulin (CMS/HCC)     FBG at goal  Monitor glucoscan         Weakness - Primary     Continue working in therapy  Requires wheelchair for mobility and assist with ADLs and transfers           Medications, treatments, and labs reviewed  Continue medications and treatments as listed in EMR    Scribe Attestation  I, Shalini Murray   attest that this documentation has been prepared under the direction and in the presence of MI Triplett    Provider Attestation - Scribe documentation  All medical record entries made by the Scribe were at my direction and personally dictated by me. I have reviewed the chart and agree that the record accurately reflects my personal performance of the history, physical exam, discussion and plan.   MI Triplett

## 2023-09-19 NOTE — LETTER
Patient: Elida Benson  : 1945    Encounter Date: 2023    PROGRESS NOTE    Subjective  Chief complaint: Elida Benson is a 78 y.o. female who is an acute skilled patient being seen and evaluated for weakness    HPI:  Patient continues to work with therapy to reach goals. STS wc<>FWW withMod A and static balance activities during standing at FWW with Mod A. Patient actively participates with skilled interventions and is making progress. No nursing concerns, denies n,v,f,c,pain.       Objective  Vital signs: 113/75,79,18,97.7    Physical Exam  Constitutional:       General: She is not in acute distress.  Eyes:      Extraocular Movements: Extraocular movements intact.   Cardiovascular:      Rate and Rhythm: Normal rate and regular rhythm.   Pulmonary:      Effort: Pulmonary effort is normal.      Breath sounds: Normal breath sounds.   Abdominal:      General: Bowel sounds are normal.      Palpations: Abdomen is soft.   Musculoskeletal:      Cervical back: Neck supple.      Right lower leg: No edema.      Left lower leg: No edema.   Neurological:      Mental Status: She is alert.   Psychiatric:         Mood and Affect: Mood normal.         Behavior: Behavior is cooperative.         Assessment/Plan  Problem List Items Addressed This Visit       COPD without exacerbation (CMS/HCC)     Stable, no shortness of breath or wheezing         Hypertension, essential     Blood pressure controlled  Continue antihypertensives  Continue to monitor BP         Weakness     Continue working in therapy         Type 2 diabetes mellitus without complication (CMS/HCC)     Cont glucoscan with SS            Medications, treatments, and labs reviewed  Continue medications and treatments as listed in EMR    Scribe Attestation  I, Shalini Quiroz   attest that this documentation has been prepared under the direction and in the presence of Sean Cloud MD.     Provider Attestation - Scribe documentation  All  medical record entries made by the Scribe were at my direction and personally dictated by me. I have reviewed the chart and agree that the record accurately reflects my personal performance of the history, physical exam, discussion and plan.   Sean Cloud MD      Electronically Signed By: Sean Cloud MD   9/19/23  4:32 PM

## 2023-09-20 ENCOUNTER — NURSING HOME VISIT (OUTPATIENT)
Dept: POST ACUTE CARE | Facility: EXTERNAL LOCATION | Age: 78
End: 2023-09-20
Payer: MEDICARE

## 2023-09-20 DIAGNOSIS — R53.1 WEAKNESS: Primary | ICD-10-CM

## 2023-09-20 DIAGNOSIS — J44.9 COPD WITHOUT EXACERBATION (MULTI): ICD-10-CM

## 2023-09-20 DIAGNOSIS — Z79.4 TYPE 2 DIABETES MELLITUS WITHOUT COMPLICATION, WITH LONG-TERM CURRENT USE OF INSULIN (MULTI): ICD-10-CM

## 2023-09-20 DIAGNOSIS — I10 HYPERTENSION, ESSENTIAL: ICD-10-CM

## 2023-09-20 DIAGNOSIS — E11.9 TYPE 2 DIABETES MELLITUS WITHOUT COMPLICATION, WITH LONG-TERM CURRENT USE OF INSULIN (MULTI): ICD-10-CM

## 2023-09-20 PROCEDURE — 99309 SBSQ NF CARE MODERATE MDM 30: CPT | Performed by: NURSE PRACTITIONER

## 2023-09-20 NOTE — PROGRESS NOTES
PROGRESS NOTE    Subjective   Chief complaint: Elida Benson is a 78 y.o. female who is a acute skilled care patient being seen and evaluated for weakness.    HPI:  HPI   Patient with PMH of HTN, COPD, diabetes, history of pleural effusion, spinal stenosis, OA, dementia, insomnia and depression admitted to SNF for therapy d/t weakness after recent hospitalization for diverticulitis.   Patient requires assist with ADLs and transfers.  Therapy to eval and treat.  No new complaints.      Objective   Vital signs:  18, 100/74, 97.3, 65, 97%,   Physical Exam  Constitutional:       General: She is not in acute distress.  Eyes:      Extraocular Movements: Extraocular movements intact.   Cardiovascular:      Rate and Rhythm: Normal rate and regular rhythm.   Pulmonary:      Effort: Pulmonary effort is normal.      Breath sounds: Normal breath sounds.   Abdominal:      General: Bowel sounds are normal.      Palpations: Abdomen is soft.      Comments: Ileostomy   Musculoskeletal:      Cervical back: Neck supple.      Right lower leg: No edema.      Left lower leg: No edema.      Comments: Generalized weakness   Neurological:      Mental Status: She is alert.   Psychiatric:         Mood and Affect: Mood normal.         Behavior: Behavior is cooperative.         Assessment/Plan   Problem List Items Addressed This Visit       COPD without exacerbation (CMS/HCC)     Stable, no shortness of breath or wheezing         Dementia (CMS/HCC)    Hypertension, essential     Blood pressure controlled  Continue antihypertensives  Continue to monitor BP         Type 2 diabetes mellitus without complication, with long-term current use of insulin (CMS/HCC)     FBG at goal  Monitor glucoscan         Weakness - Primary     Continue working in therapy  Requires wheelchair for mobility and assist with ADLs and transfers          Medications, treatments, and labs reviewed  Continue medications and treatments as listed in EMR    Scribe  Attestation  I, Shalini Murray   attest that this documentation has been prepared under the direction and in the presence of MI Triplett    Provider Attestation - Scribe documentation  All medical record entries made by the Scribe were at my direction and personally dictated by me. I have reviewed the chart and agree that the record accurately reflects my personal performance of the history, physical exam, discussion and plan.   MI Triplett

## 2023-09-20 NOTE — LETTER
Patient: Elida Benson  : 1945    Encounter Date: 2023    PROGRESS NOTE    Subjective  Chief complaint: Elida Benson is a 78 y.o. female who is a acute skilled care patient being seen and evaluated for weakness.    HPI:  HPI  Patient is skilled for PT/OT/ST.  Patient is able to ambulate 10' with FWW and Mod A.  Requires partial/Mod A for bed mobility and chair<>bed transfer.  Requires substantial/Max A for sit to stand transfer.  Patient has no complaints.  Remains on ATB therapy for spinal infection.  No adverse reaction.  Patient is afebrile.  Denies n/v.  No new concerns voiced by staff.    Objective  Vital signs:   18, 96/66, 97.1, 72, 98%,   Physical Exam  Constitutional:       General: She is not in acute distress.  Eyes:      Extraocular Movements: Extraocular movements intact.   Cardiovascular:      Rate and Rhythm: Normal rate and regular rhythm.   Pulmonary:      Effort: Pulmonary effort is normal.      Breath sounds: Normal breath sounds.   Abdominal:      General: Bowel sounds are normal.      Palpations: Abdomen is soft.      Comments: Ileostomy   Musculoskeletal:      Cervical back: Neck supple.      Right lower leg: No edema.      Left lower leg: No edema.      Comments: Generalized weakness   Neurological:      Mental Status: She is alert.   Psychiatric:         Mood and Affect: Mood normal.         Behavior: Behavior is cooperative.         Assessment/Plan  Problem List Items Addressed This Visit       COPD without exacerbation (CMS/HCC)     Stable, no shortness of breath or wheezing         Hypertension, essential     Blood pressure controlled  Continue antihypertensives  Continue to monitor BP         Type 2 diabetes mellitus without complication, with long-term current use of insulin (CMS/[facility])     FBG at goal  Monitor glucoscan         Weakness - Primary     Continue working in therapy  Making progress, able to walk short distances now          Medications, treatments,  and labs reviewed  Continue medications and treatments as listed in EMR    Scribe Attestation  I, Shalini Murray   attest that this documentation has been prepared under the direction and in the presence of MI Triplett    Provider Attestation - Scribe documentation  All medical record entries made by the Scribe were at my direction and personally dictated by me. I have reviewed the chart and agree that the record accurately reflects my personal performance of the history, physical exam, discussion and plan.   MI Triplett        Electronically Signed By: MI Triplett   9/30/23 10:59 AM

## 2023-09-21 ENCOUNTER — NURSING HOME VISIT (OUTPATIENT)
Dept: POST ACUTE CARE | Facility: EXTERNAL LOCATION | Age: 78
End: 2023-09-21
Payer: MEDICARE

## 2023-09-21 DIAGNOSIS — R53.1 WEAKNESS: Primary | ICD-10-CM

## 2023-09-21 DIAGNOSIS — N82.4 COLOVAGINAL FISTULA: ICD-10-CM

## 2023-09-21 DIAGNOSIS — R63.4 WEIGHT LOSS: ICD-10-CM

## 2023-09-21 DIAGNOSIS — B99.9 INFECTION: ICD-10-CM

## 2023-09-21 PROCEDURE — 99308 SBSQ NF CARE LOW MDM 20: CPT | Performed by: INTERNAL MEDICINE

## 2023-09-21 NOTE — PROGRESS NOTES
PROGRESS NOTE    Subjective   Chief complaint: Elida Benson is a 78 y.o. female who is an acute skilled patient being seen and evaluated for weakness    HPI:   patient in therapy d/t generalized weakness.  Patient presents for f/u.  Continues to work toward goals in therapy.   Patient requires assistance with transfers ADLs and mobility.   Pain from recent surgery is controlled.   Patient noted to have weight loss.    Denies nausea, vomiting or diarrhea.  patient continues on Keflex which is lifelong for suppressive treatment for C-spine infection.   No adverse  reaction noted from antibiotic therapy. No other complaints at this time.      Objective   Vital signs:  124/76, 98%    Physical Exam  Constitutional:       General: She is not in acute distress.  Eyes:      Extraocular Movements: Extraocular movements intact.   Cardiovascular:      Rate and Rhythm: Normal rate and regular rhythm.   Pulmonary:      Effort: Pulmonary effort is normal.      Breath sounds: Normal breath sounds.   Abdominal:      General: Bowel sounds are normal.      Palpations: Abdomen is soft.   Musculoskeletal:      Cervical back: Neck supple.      Right lower leg: No edema.      Left lower leg: No edema.   Neurological:      Mental Status: She is alert.   Psychiatric:         Mood and Affect: Mood normal.         Behavior: Behavior is cooperative.         Assessment/Plan   Problem List Items Addressed This Visit       Weakness - Primary     Continue working in therapy         Colovaginal fistula     Status post surgery  Follow-up with surgeon  Therapy  Pain meds         Weight loss       Start Remeron   monitor intake         Infection      C-spine   continue on lifelong Keflex as suppressive treatment          Medications, treatments, and labs reviewed  Continue medications and treatments as listed in [facility]    Scribe Attestation  By signing my name below, IHelen, Scribe   attest that this documentation has been prepared under  the direction and in the presence of Sean Cloud MD.    Provider Attestation - Scribe documentation  All medical record entries made by the Scribe were at my direction and personally dictated by me. I have reviewed the chart and agree that the record accurately reflects my personal performance of the history, physical exam, discussion and plan.  1. Weakness        2. Colovaginal fistula        3. Weight loss        4. Infection

## 2023-09-21 NOTE — LETTER
Patient: Elida Benson  : 1945    Encounter Date: 2023    PROGRESS NOTE    Subjective  Chief complaint: Elida Benson is a 78 y.o. female who is an acute skilled patient being seen and evaluated for weakness    HPI:   patient in therapy d/t generalized weakness.  Patient presents for f/u.  Continues to work toward goals in therapy.   Patient requires assistance with transfers ADLs and mobility.   Pain from recent surgery is controlled.   Patient noted to have weight loss.    Denies nausea, vomiting or diarrhea.  patient continues on Keflex which is lifelong for suppressive treatment for C-spine infection.   No adverse  reaction noted from antibiotic therapy. No other complaints at this time.      Objective  Vital signs:  124/76, 98%    Physical Exam  Constitutional:       General: She is not in acute distress.  Eyes:      Extraocular Movements: Extraocular movements intact.   Cardiovascular:      Rate and Rhythm: Normal rate and regular rhythm.   Pulmonary:      Effort: Pulmonary effort is normal.      Breath sounds: Normal breath sounds.   Abdominal:      General: Bowel sounds are normal.      Palpations: Abdomen is soft.   Musculoskeletal:      Cervical back: Neck supple.      Right lower leg: No edema.      Left lower leg: No edema.   Neurological:      Mental Status: She is alert.   Psychiatric:         Mood and Affect: Mood normal.         Behavior: Behavior is cooperative.         Assessment/Plan  Problem List Items Addressed This Visit       Weakness - Primary     Continue working in therapy         Colovaginal fistula     Status post surgery  Follow-up with surgeon  Therapy  Pain meds         Weight loss       Start Remeron   monitor intake         Infection      C-spine   continue on lifelong Keflex as suppressive treatment          Medications, treatments, and labs reviewed  Continue medications and treatments as listed in [facility]    Scribe Attestation  By signing my name below, Helen SHABAZZ  Shalini Cornejo   attest that this documentation has been prepared under the direction and in the presence of Sean Cloud MD.    Provider Attestation - Scribe documentation  All medical record entries made by the Scribe were at my direction and personally dictated by me. I have reviewed the chart and agree that the record accurately reflects my personal performance of the history, physical exam, discussion and plan.  1. Weakness        2. Colovaginal fistula        3. Weight loss        4. Infection              Electronically Signed By: Sean Cloud MD   9/21/23  2:15 PM

## 2023-09-22 ENCOUNTER — NURSING HOME VISIT (OUTPATIENT)
Dept: POST ACUTE CARE | Facility: EXTERNAL LOCATION | Age: 78
End: 2023-09-22
Payer: MEDICARE

## 2023-09-22 DIAGNOSIS — R10.9 ABDOMINAL PAIN, UNSPECIFIED ABDOMINAL LOCATION: ICD-10-CM

## 2023-09-22 DIAGNOSIS — J44.9 COPD WITHOUT EXACERBATION (MULTI): ICD-10-CM

## 2023-09-22 DIAGNOSIS — I10 HYPERTENSION, ESSENTIAL: ICD-10-CM

## 2023-09-22 DIAGNOSIS — Z79.4 TYPE 2 DIABETES MELLITUS WITHOUT COMPLICATION, WITH LONG-TERM CURRENT USE OF INSULIN (MULTI): ICD-10-CM

## 2023-09-22 DIAGNOSIS — R63.4 WEIGHT LOSS: ICD-10-CM

## 2023-09-22 DIAGNOSIS — R53.1 WEAKNESS: Primary | ICD-10-CM

## 2023-09-22 DIAGNOSIS — E11.9 TYPE 2 DIABETES MELLITUS WITHOUT COMPLICATION, WITH LONG-TERM CURRENT USE OF INSULIN (MULTI): ICD-10-CM

## 2023-09-22 PROCEDURE — 99309 SBSQ NF CARE MODERATE MDM 30: CPT | Performed by: NURSE PRACTITIONER

## 2023-09-22 NOTE — LETTER
Patient: Elida Benson  : 1945    Encounter Date: 2023    PROGRESS NOTE    Subjective  Chief complaint: Elida Benson is a 78 y.o. female who is a acute skilled care patient being seen and evaluated for abdominal pain and weakness.    HPI:  HPI   Patient with complaint of abdominal pain.  Has had 24 pound weight loss.  Recently started on Remeron.  Ate approximately 50% of meal this morning.  Continues working with therapy.  Requires Min A for scooting and transitioning from supine<>sit.  Requires Min A/CGA for sit <> stand from bed and Mod A from wheelchair.  Able to ambulate 8 feet with FWW and Mod A.  Patient requires max cues for encouragement to participate.  No further concerns reported.    Objective  Vital signs:   18, 108/73, 98.2, 64, 96%,   Physical Exam  Constitutional:       General: She is not in acute distress.  Eyes:      Extraocular Movements: Extraocular movements intact.   Cardiovascular:      Rate and Rhythm: Normal rate and regular rhythm.   Pulmonary:      Effort: Pulmonary effort is normal.      Breath sounds: Normal breath sounds.   Abdominal:      General: Bowel sounds are normal.      Palpations: Abdomen is soft.      Comments: Tender to palpation   Musculoskeletal:      Cervical back: Neck supple.      Right lower leg: No edema.      Left lower leg: No edema.      Comments: Generalized weakness   Neurological:      Mental Status: She is alert.   Psychiatric:         Mood and Affect: Mood normal.         Behavior: Behavior is cooperative.         Assessment/Plan  Problem List Items Addressed This Visit       Abdominal pain     Obtain BMP CBC and KUB         COPD without exacerbation (CMS/HCC)     Stable, no shortness of breath or wheezing         Hypertension, essential     Blood pressure controlled  Continue antihypertensives  Continue to monitor BP         Type 2 diabetes mellitus without complication, with long-term current use of insulin (CMS/HCC)     FBG at  goal  Monitor glucoscan         Weakness - Primary     Continue working in therapy  Able to walk short distances          Weight loss     Dietitian following  Supplements per dietitian  Continue Remeron  Obtain labs  Continue to encourage p.o. intake  Continue to monitor weight          Medications, treatments, and labs reviewed  Continue medications and treatments as listed in EMR    Scribe Attestation  Sharon SHABAZZ Scribe   attest that this documentation has been prepared under the direction and in the presence of MI Triplett    Provider Attestation - Scribe documentation  All medical record entries made by the Scribe were at my direction and personally dictated by me. I have reviewed the chart and agree that the record accurately reflects my personal performance of the history, physical exam, discussion and plan.   MI Triplett        Electronically Signed By: MI Triplett   9/30/23  6:27 PM

## 2023-09-23 PROBLEM — R32 INCONTINENCE OF URINE: Status: RESOLVED | Noted: 2023-05-26 | Resolved: 2023-09-23

## 2023-09-23 PROBLEM — R27.0 ATAXIA: Status: RESOLVED | Noted: 2023-05-26 | Resolved: 2023-09-23

## 2023-09-23 PROBLEM — Z79.4 TYPE 2 DIABETES MELLITUS WITHOUT COMPLICATION, WITH LONG-TERM CURRENT USE OF INSULIN (MULTI): Status: ACTIVE | Noted: 2023-05-19

## 2023-09-23 PROBLEM — G47.00 INSOMNIA: Status: RESOLVED | Noted: 2023-03-15 | Resolved: 2023-09-23

## 2023-09-23 PROBLEM — K57.92 DIVERTICULITIS: Status: RESOLVED | Noted: 2023-05-26 | Resolved: 2023-09-23

## 2023-09-23 PROBLEM — D72.829 LEUKOCYTOSIS: Status: RESOLVED | Noted: 2023-08-31 | Resolved: 2023-09-23

## 2023-09-23 PROBLEM — R79.89 LOW VITAMIN D LEVEL: Status: RESOLVED | Noted: 2023-03-12 | Resolved: 2023-09-23

## 2023-09-23 PROBLEM — U07.1 COVID: Status: RESOLVED | Noted: 2023-08-31 | Resolved: 2023-09-23

## 2023-09-23 PROBLEM — B99.9 INFECTION: Status: RESOLVED | Noted: 2023-09-21 | Resolved: 2023-09-23

## 2023-09-25 ENCOUNTER — NURSING HOME VISIT (OUTPATIENT)
Dept: POST ACUTE CARE | Facility: EXTERNAL LOCATION | Age: 78
End: 2023-09-25
Payer: MEDICARE

## 2023-09-25 DIAGNOSIS — R63.4 WEIGHT LOSS: ICD-10-CM

## 2023-09-25 DIAGNOSIS — E11.9 TYPE 2 DIABETES MELLITUS WITHOUT COMPLICATION, WITH LONG-TERM CURRENT USE OF INSULIN (MULTI): ICD-10-CM

## 2023-09-25 DIAGNOSIS — Z79.4 TYPE 2 DIABETES MELLITUS WITHOUT COMPLICATION, WITH LONG-TERM CURRENT USE OF INSULIN (MULTI): ICD-10-CM

## 2023-09-25 DIAGNOSIS — N82.4 COLOVAGINAL FISTULA: ICD-10-CM

## 2023-09-25 DIAGNOSIS — R53.1 WEAKNESS: Primary | ICD-10-CM

## 2023-09-25 PROCEDURE — 99308 SBSQ NF CARE LOW MDM 20: CPT | Performed by: INTERNAL MEDICINE

## 2023-09-25 NOTE — PROGRESS NOTES
PROGRESS NOTE    Subjective   Chief complaint: Elida Benson is a 78 y.o. female who is an acute skilled patient being seen and evaluated for weakness    HPI:  Patient has been working in therapy to improve strength, endurance, and ADLs.  Patient continues to work toward goals.  Patient amb FWW 10ft, 3ft with Mod A.  Pt CGA for EOB STS and from lower surfaces like wc Mod A. Pt stood statically at FWW 2min with CGA. P+ standing balance. Patient continues on ATB for spinal infection prophylactic. Suggest to nursing that family\patient consider hospice for patient comfort. No new concerns today.  Denies n/v/f/c pain.        Objective   Vital signs: 96/61,68,96%,     Physical Exam  Constitutional:       General: She is not in acute distress.  Eyes:      Extraocular Movements: Extraocular movements intact.   Cardiovascular:      Rate and Rhythm: Normal rate and regular rhythm.   Pulmonary:      Effort: Pulmonary effort is normal.      Breath sounds: Normal breath sounds.   Abdominal:      General: Bowel sounds are normal.      Palpations: Abdomen is soft.   Musculoskeletal:      Cervical back: Neck supple.      Right lower leg: No edema.      Left lower leg: No edema.   Neurological:      Mental Status: She is alert.   Psychiatric:         Mood and Affect: Mood normal.         Behavior: Behavior is cooperative.         Assessment/Plan   Problem List Items Addressed This Visit       Weakness - Primary     Continue working in therapy         Type 2 diabetes mellitus without complication, with long-term current use of insulin (CMS/[facility])     Cont glucoscan with SS           Colovaginal fistula     Status post surgery  Follow-up with surgeon  Therapy  Pain meds         Weight loss     Remeron  Monitor           Medications, treatments, and labs reviewed  Continue medications and treatments as listed in EMR    Scribe Attestation  I, Shalini Quiroz   attest that this documentation has been prepared under the  direction and in the presence of Sean Cloud MD.     Provider Attestation - Scribe documentation  All medical record entries made by the Scribe were at my direction and personally dictated by me. I have reviewed the chart and agree that the record accurately reflects my personal performance of the history, physical exam, discussion and plan.   Sean Cloud MD

## 2023-09-25 NOTE — PROGRESS NOTES
PROGRESS NOTE    Subjective   Chief complaint: Elida Benson is a 78 y.o. female who is a acute skilled care patient being seen and evaluated for weakness.    HPI:  HPI  Patient is skilled for PT/OT/ST.  Patient is able to ambulate 10' with FWW and Mod A.  Requires partial/Mod A for bed mobility and chair<>bed transfer.  Requires substantial/Max A for sit to stand transfer.  Patient has no complaints.  Remains on ATB therapy for spinal infection.  No adverse reaction.  Patient is afebrile.  Denies n/v.  No new concerns voiced by staff.    Objective   Vital signs:   18, 96/66, 97.1, 72, 98%,   Physical Exam  Constitutional:       General: She is not in acute distress.  Eyes:      Extraocular Movements: Extraocular movements intact.   Cardiovascular:      Rate and Rhythm: Normal rate and regular rhythm.   Pulmonary:      Effort: Pulmonary effort is normal.      Breath sounds: Normal breath sounds.   Abdominal:      General: Bowel sounds are normal.      Palpations: Abdomen is soft.      Comments: Ileostomy   Musculoskeletal:      Cervical back: Neck supple.      Right lower leg: No edema.      Left lower leg: No edema.      Comments: Generalized weakness   Neurological:      Mental Status: She is alert.   Psychiatric:         Mood and Affect: Mood normal.         Behavior: Behavior is cooperative.         Assessment/Plan   Problem List Items Addressed This Visit       COPD without exacerbation (CMS/HCC)     Stable, no shortness of breath or wheezing         Hypertension, essential     Blood pressure controlled  Continue antihypertensives  Continue to monitor BP         Type 2 diabetes mellitus without complication, with long-term current use of insulin (CMS/HCC)     FBG at goal  Monitor glucoscan         Weakness - Primary     Continue working in therapy  Making progress, able to walk short distances now          Medications, treatments, and labs reviewed  Continue medications and treatments as listed in  EMR    Scribe Attestation  I, Shalini Murray   attest that this documentation has been prepared under the direction and in the presence of MI Triplett    Provider Attestation - Scribe documentation  All medical record entries made by the Scribe were at my direction and personally dictated by me. I have reviewed the chart and agree that the record accurately reflects my personal performance of the history, physical exam, discussion and plan.   MI Triplett

## 2023-09-25 NOTE — LETTER
Patient: Elida Besnon  : 1945    Encounter Date: 2023    PROGRESS NOTE    Subjective  Chief complaint: Elida Benson is a 78 y.o. female who is an acute skilled patient being seen and evaluated for weakness    HPI:  Patient has been working in therapy to improve strength, endurance, and ADLs.  Patient continues to work toward goals.  Patient amb FWW 10ft, 3ft with Mod A.  Pt CGA for EOB STS and from lower surfaces like wc Mod A. Pt stood statically at FWW 2min with CGA. P+ standing balance. Patient continues on ATB for spinal infection prophylactic. Suggest to nursing that family\patient consider hospice for patient comfort. No new concerns today.  Denies n/v/f/c pain.        Objective  Vital signs: 96/61,68,96%,     Physical Exam  Constitutional:       General: She is not in acute distress.  Eyes:      Extraocular Movements: Extraocular movements intact.   Cardiovascular:      Rate and Rhythm: Normal rate and regular rhythm.   Pulmonary:      Effort: Pulmonary effort is normal.      Breath sounds: Normal breath sounds.   Abdominal:      General: Bowel sounds are normal.      Palpations: Abdomen is soft.   Musculoskeletal:      Cervical back: Neck supple.      Right lower leg: No edema.      Left lower leg: No edema.   Neurological:      Mental Status: She is alert.   Psychiatric:         Mood and Affect: Mood normal.         Behavior: Behavior is cooperative.         Assessment/Plan  Problem List Items Addressed This Visit       Weakness - Primary     Continue working in therapy         Type 2 diabetes mellitus without complication, with long-term current use of insulin (CMS/[facility])     Cont glucoscan with SS           Colovaginal fistula     Status post surgery  Follow-up with surgeon  Therapy  Pain meds         Weight loss     Remeron  Monitor           Medications, treatments, and labs reviewed  Continue medications and treatments as listed in EMR    Scribe Attestation  I, Patience  Shalini Connell   attest that this documentation has been prepared under the direction and in the presence of Sean Cloud MD.     Provider Attestation - Scribe documentation  All medical record entries made by the Scribe were at my direction and personally dictated by me. I have reviewed the chart and agree that the record accurately reflects my personal performance of the history, physical exam, discussion and plan.   Sean Cloud MD      Electronically Signed By: Sean Cloud MD   9/25/23  4:37 PM

## 2023-09-26 ENCOUNTER — NURSING HOME VISIT (OUTPATIENT)
Dept: POST ACUTE CARE | Facility: EXTERNAL LOCATION | Age: 78
End: 2023-09-26
Payer: MEDICARE

## 2023-09-26 DIAGNOSIS — E11.9 TYPE 2 DIABETES MELLITUS WITHOUT COMPLICATION, WITH LONG-TERM CURRENT USE OF INSULIN (MULTI): ICD-10-CM

## 2023-09-26 DIAGNOSIS — E87.5 HYPERKALEMIA: ICD-10-CM

## 2023-09-26 DIAGNOSIS — N82.4 COLOVAGINAL FISTULA: ICD-10-CM

## 2023-09-26 DIAGNOSIS — R53.1 WEAKNESS: ICD-10-CM

## 2023-09-26 DIAGNOSIS — Z79.4 TYPE 2 DIABETES MELLITUS WITHOUT COMPLICATION, WITH LONG-TERM CURRENT USE OF INSULIN (MULTI): ICD-10-CM

## 2023-09-26 PROCEDURE — 99308 SBSQ NF CARE LOW MDM 20: CPT | Performed by: INTERNAL MEDICINE

## 2023-09-26 NOTE — PROGRESS NOTES
PROGRESS NOTE    Subjective   Chief complaint: Elida Benson is a 78 y.o. female who is an acute skilled patient being seen and evaluated for weakness    HPI:  Patient in therapy d/t generalized weakness.  Patient presents for f/u.  Continues to work toward goals in therapy. Patient completed Recumbent bike x 15 min. Pt amb FWW 5ft, 3ft, 4ft, 2ft with Min A and wc follow. Pt required standing rest breaks d/t fatigue.Potassium level was elevated yesterday, awaiting results of labs from today.  No new complaints at this time.       Objective   Vital signs: 125/82,67,97%,     Physical Exam  Constitutional:       General: She is not in acute distress.  Eyes:      Extraocular Movements: Extraocular movements intact.   Cardiovascular:      Rate and Rhythm: Normal rate and regular rhythm.   Pulmonary:      Effort: Pulmonary effort is normal.      Breath sounds: Normal breath sounds.   Abdominal:      General: Bowel sounds are normal.      Palpations: Abdomen is soft.   Musculoskeletal:      Cervical back: Neck supple.      Right lower leg: No edema.      Left lower leg: No edema.   Neurological:      Mental Status: She is alert.   Psychiatric:         Mood and Affect: Mood normal.         Behavior: Behavior is cooperative.         Assessment/Plan   Problem List Items Addressed This Visit       Weakness     Continue working in therapy         Type 2 diabetes mellitus without complication, with long-term current use of insulin (CMS/[facility])     Cont glucoscan with SS           Colovaginal fistula     Status post surgery  Follow-up with surgeon  Therapy  Pain meds         Hyperkalemia     Monitor            Medications, treatments, and labs reviewed  Continue medications and treatments as listed in EMR    Scribe Attestation  I, Shalini Quiroz   attest that this documentation has been prepared under the direction and in the presence of Sean Cloud MD.     Provider Attestation - Scribe documentation  All  medical record entries made by the Scribe were at my direction and personally dictated by me. I have reviewed the chart and agree that the record accurately reflects my personal performance of the history, physical exam, discussion and plan.   Sean Cloud MD

## 2023-09-26 NOTE — LETTER
Patient: Elida Benson  : 1945    Encounter Date: 2023    PROGRESS NOTE    Subjective  Chief complaint: Elida Benson is a 78 y.o. female who is an acute skilled patient being seen and evaluated for weakness    HPI:  Patient in therapy d/t generalized weakness.  Patient presents for f/u.  Continues to work toward goals in therapy. Patient completed Recumbent bike x 15 min. Pt amb FWW 5ft, 3ft, 4ft, 2ft with Min A and wc follow. Pt required standing rest breaks d/t fatigue.Potassium level was elevated yesterday, awaiting results of labs from today.  No new complaints at this time.       Objective  Vital signs: 125/82,67,97%,     Physical Exam  Constitutional:       General: She is not in acute distress.  Eyes:      Extraocular Movements: Extraocular movements intact.   Cardiovascular:      Rate and Rhythm: Normal rate and regular rhythm.   Pulmonary:      Effort: Pulmonary effort is normal.      Breath sounds: Normal breath sounds.   Abdominal:      General: Bowel sounds are normal.      Palpations: Abdomen is soft.   Musculoskeletal:      Cervical back: Neck supple.      Right lower leg: No edema.      Left lower leg: No edema.   Neurological:      Mental Status: She is alert.   Psychiatric:         Mood and Affect: Mood normal.         Behavior: Behavior is cooperative.         Assessment/Plan  Problem List Items Addressed This Visit       Weakness     Continue working in therapy         Type 2 diabetes mellitus without complication, with long-term current use of insulin (CMS/[facility])     Cont glucoscan with SS           Colovaginal fistula     Status post surgery  Follow-up with surgeon  Therapy  Pain meds         Hyperkalemia     Monitor            Medications, treatments, and labs reviewed  Continue medications and treatments as listed in EMR    Scribe Attestation  I, Shalini Quiroz   attest that this documentation has been prepared under the direction and in the presence of  Sean Cloud MD.     Provider Attestation - Scribe documentation  All medical record entries made by the Scribe were at my direction and personally dictated by me. I have reviewed the chart and agree that the record accurately reflects my personal performance of the history, physical exam, discussion and plan.   Sean Cloud MD      Electronically Signed By: Sean Cloud MD   9/26/23 12:37 PM

## 2023-09-27 ENCOUNTER — NURSING HOME VISIT (OUTPATIENT)
Dept: POST ACUTE CARE | Facility: EXTERNAL LOCATION | Age: 78
End: 2023-09-27
Payer: MEDICARE

## 2023-09-27 DIAGNOSIS — F02.A0 MILD ALZHEIMER'S DEMENTIA, UNSPECIFIED TIMING OF DEMENTIA ONSET, UNSPECIFIED WHETHER BEHAVIORAL, PSYCHOTIC, OR MOOD DISTURBANCE OR ANXIETY (MULTI): ICD-10-CM

## 2023-09-27 DIAGNOSIS — G30.9 MILD ALZHEIMER'S DEMENTIA, UNSPECIFIED TIMING OF DEMENTIA ONSET, UNSPECIFIED WHETHER BEHAVIORAL, PSYCHOTIC, OR MOOD DISTURBANCE OR ANXIETY (MULTI): ICD-10-CM

## 2023-09-27 DIAGNOSIS — I10 HYPERTENSION, ESSENTIAL: ICD-10-CM

## 2023-09-27 DIAGNOSIS — Z79.4 TYPE 2 DIABETES MELLITUS WITHOUT COMPLICATION, WITH LONG-TERM CURRENT USE OF INSULIN (MULTI): ICD-10-CM

## 2023-09-27 DIAGNOSIS — R53.1 WEAKNESS: Primary | ICD-10-CM

## 2023-09-27 DIAGNOSIS — J44.9 COPD WITHOUT EXACERBATION (MULTI): ICD-10-CM

## 2023-09-27 DIAGNOSIS — E11.9 TYPE 2 DIABETES MELLITUS WITHOUT COMPLICATION, WITH LONG-TERM CURRENT USE OF INSULIN (MULTI): ICD-10-CM

## 2023-09-27 PROCEDURE — 99309 SBSQ NF CARE MODERATE MDM 30: CPT | Performed by: NURSE PRACTITIONER

## 2023-09-27 NOTE — PROGRESS NOTES
PROGRESS NOTE    Subjective   Chief complaint: Elida Benson is a 78 y.o. female who is a acute skilled care patient being seen and evaluated for abdominal pain and weakness.    HPI:  HPI   Patient with complaint of abdominal pain.  Has had 24 pound weight loss.  Recently started on Remeron.  Ate approximately 50% of meal this morning.  Continues working with therapy.  Requires Min A for scooting and transitioning from supine<>sit.  Requires Min A/CGA for sit <> stand from bed and Mod A from wheelchair.  Able to ambulate 8 feet with FWW and Mod A.  Patient requires max cues for encouragement to participate.  No further concerns reported.    Objective   Vital signs:   18, 108/73, 98.2, 64, 96%,   Physical Exam  Constitutional:       General: She is not in acute distress.  Eyes:      Extraocular Movements: Extraocular movements intact.   Cardiovascular:      Rate and Rhythm: Normal rate and regular rhythm.   Pulmonary:      Effort: Pulmonary effort is normal.      Breath sounds: Normal breath sounds.   Abdominal:      General: Bowel sounds are normal.      Palpations: Abdomen is soft.      Comments: Tender to palpation   Musculoskeletal:      Cervical back: Neck supple.      Right lower leg: No edema.      Left lower leg: No edema.      Comments: Generalized weakness   Neurological:      Mental Status: She is alert.   Psychiatric:         Mood and Affect: Mood normal.         Behavior: Behavior is cooperative.         Assessment/Plan   Problem List Items Addressed This Visit       Abdominal pain     Obtain BMP CBC and KUB         COPD without exacerbation (CMS/HCC)     Stable, no shortness of breath or wheezing         Hypertension, essential     Blood pressure controlled  Continue antihypertensives  Continue to monitor BP         Type 2 diabetes mellitus without complication, with long-term current use of insulin (CMS/HCC)     FBG at goal  Monitor glucoscan         Weakness - Primary     Continue working in  therapy  Able to walk short distances          Weight loss     Dietitian following  Supplements per dietitian  Continue Remeron  Obtain labs  Continue to encourage p.o. intake  Continue to monitor weight          Medications, treatments, and labs reviewed  Continue medications and treatments as listed in EMR    Scribe Attestation  ISharon Scribe   attest that this documentation has been prepared under the direction and in the presence of MI Triplett    Provider Attestation - Scribe documentation  All medical record entries made by the Scribe were at my direction and personally dictated by me. I have reviewed the chart and agree that the record accurately reflects my personal performance of the history, physical exam, discussion and plan.   MI Triplett

## 2023-09-27 NOTE — LETTER
Patient: Elida Benson  : 1945    Encounter Date: 2023    PROGRESS NOTE    Subjective  Chief complaint: Elida Benson is a 78 y.o. female who is a acute skilled care patient being seen and evaluated for weakness.    HPI:  HPI  Patient is working in therapy due to weakness.  Working on strengthening exercises/activities, gait training, transfers and ADLs.  Ambulates short distances with FWW and Min A.  Requires CGA for sit<>stand from edge of bed and Mod A from lower surfaces.  Patient has no complaints.  Denies n/v/f/c.  No new concerns reported by staff.    Objective  Vital signs: 18, 105/76, 97.1, 70, 94%,   Physical Exam  Constitutional:       General: She is not in acute distress.  Eyes:      Extraocular Movements: Extraocular movements intact.   Cardiovascular:      Rate and Rhythm: Normal rate and regular rhythm.   Pulmonary:      Effort: Pulmonary effort is normal.      Breath sounds: Normal breath sounds.   Abdominal:      General: Bowel sounds are normal.      Palpations: Abdomen is soft.   Musculoskeletal:      Cervical back: Neck supple.      Right lower leg: No edema.      Left lower leg: No edema.      Comments: Generalized weakness   Neurological:      Mental Status: She is alert.   Psychiatric:         Mood and Affect: Mood normal.         Behavior: Behavior is cooperative.         Assessment/Plan  Problem List Items Addressed This Visit       COPD without exacerbation (CMS/HCC)     Stable, no shortness of breath or wheezing         Dementia (CMS/HCC)    Hypertension, essential     Blood pressure controlled  Continue antihypertensives  Continue to monitor BP         Type 2 diabetes mellitus without complication, with long-term current use of insulin (CMS/HCC)     FBG at goal  Monitor glucoscan         Weakness - Primary     Continue working in therapy            Medications, treatments, and labs reviewed  Continue medications and treatments as listed in EMR    Scribe  Attestation  I, Shalini Murray   attest that this documentation has been prepared under the direction and in the presence of MI Triplett    Provider Attestation - Scribe documentation  All medical record entries made by the Scribe were at my direction and personally dictated by me. I have reviewed the chart and agree that the record accurately reflects my personal performance of the history, physical exam, discussion and plan.   MI Triplett        Electronically Signed By: MI Triplett   10/7/23  9:23 AM

## 2023-09-27 NOTE — PROGRESS NOTES
History Of Present Illness  Elida Benson is a 78 y.o. female who presented multiple times to the hospital with severe diverticulitis with associated abscess, vaginal drainage, and UTI.  She is treated with antibiotics and cool down.  She had an attempted colonoscopy, which was aborted due to fibrosis in the sigmoid and was not improving and presented for resection.     8/16/2023 OPERATION/PROCEDURE:   Single-incision laparoscopic anterior resection with laparoscopic splenic flexure takedown, colorectal anastomosis, diverting loop ileostomy, flexible sigmoidoscopy, and Omentopexy    FINAL DIAGNOSIS   A. RECTO SIGMOID RESECTIONS: -- SEGMENT OF COLON WITH MARKED ACTIVE INFLAMMATION, WITH GRANULOMATOUS INFLAMMATION AND PENETRATING ULCERS, SEE NOTE. Note: The inflammatory changes noted include fissure-like ulcers with prominent fibrosis, scattered noncaseating granulomas and prominent lymphoid nodules in the pericolonic soft tissues. The differential is that of Crohn's like diverticulitis versus Crohn's disease in a patient with pre-existing diverticulitis. Crohn's like diverticulitis is much more common and is also supported by the lack of significant mucosal-based inflammation, or other sequela of Crohn's disease. -- 1 LYMPH NODE WITH NO SIGNIFICANT PATHOLOGIC FINDING.     Diagnosed with Covid while in hospital.  Was discharge to SNF.  She just returned home from the nursing facility yesterday.  She was not happy at the nursing home as they did not know how to take care of the ileostomy.  The nurses were changing the bag about once per week.  They think the nurses were emptying the ileostomy frequently.  The stool consistency varies from watery to thick.  She is not taking any imodium.  She has intermittent dizziness.  She is urinating frequently and the urine is light yellow - no longer foul smelling no vaginal drainage.   Appetite is not good.  She has intermittent bloating.   Denies nausea/vomiting.  Today's  weight is 116.  She was 133lb in July.  Denies fevers.  Has not passed any mucous from her rectum.   Denies discharge from the vagina. She was hospitalized for PNA postop    Vitals:    10/04/23 1106   BP: 116/75   Pulse: 80   Temp: 36.8 °C (98.2 °F)         Review of Systems  Constitutional: Negative for fever, chills, anorexia, weight loss, malaise     ENMT: Negative for nasal discharge, congestion, ear pain, mouth pain, throat pain     Respiratory: Negative for cough, hemoptysis, wheezing, shortness of breath     Cardiac: Negative for chest pain, dyspnea on exertion, orthopnea, palpitations, syncope     Gastrointestinal: Negative for nausea, vomiting, diarrhea, constipation, abdominal pain,  (+)DIVERTICUITIS    Genitourinary: Negative for discharge, dysuria, flank pain, frequency, hematuria     Musculoskeletal: Negative for decreased ROM, pain, swelling, weakness     Neurological: Negative for dizziness, confusion, headache, seizures, syncope     Psychiatric: Negative for mood changes, anxiety, hallucinations, sleep changes, suicidal ideas     Skin: Negative for mass, pain, itching, rash, ulcer     Endocrine: Negative for heat intolerance, cold intolerance, excessive sweating, polyuria, excess thirst     Hematologic/Lymph: Negative for anemia, bruising, easy bleeding, night sweats, petechiae, history of DVT/PE or cancer     Allergic/Immunologic: Negative for anaphylaxis, itchy/ teary eyes, itching, sneezing, swelling      PE:  Gen - well appearing - NAD   Abd - very soft, nondistended, mildly tender  all over, the incision is healed with a 5 mm opening with granulation tissue and liquid at the base.  Stoma is pink and healthy and well everted. Skin looks pretty good.   Extr - very thin no edema, using a wheelchair    CT 09/03 reviewed and there is a dot of air near the CRA.  Otherwise no abscess under the wound.     Impression - Pt s/p anterior resection with DLI for obstructing and fistulizing diverticulitis.   Doing well  Moderate malnutrition     Plan   Protein loading  No further dietary or lifting restrictions.    Needs active rehabilitation  Once weight is up then we will get GGE   If negative and back up to 125 and walking can plan on stoma closure

## 2023-09-28 ENCOUNTER — NURSING HOME VISIT (OUTPATIENT)
Dept: POST ACUTE CARE | Facility: EXTERNAL LOCATION | Age: 78
End: 2023-09-28
Payer: MEDICARE

## 2023-09-28 DIAGNOSIS — J44.9 COPD WITHOUT EXACERBATION (MULTI): ICD-10-CM

## 2023-09-28 DIAGNOSIS — I10 HYPERTENSION, ESSENTIAL: ICD-10-CM

## 2023-09-28 DIAGNOSIS — R53.1 WEAKNESS: Primary | ICD-10-CM

## 2023-09-28 PROCEDURE — 99308 SBSQ NF CARE LOW MDM 20: CPT | Performed by: INTERNAL MEDICINE

## 2023-09-28 NOTE — LETTER
Patient: Elida Benson  : 1945    Encounter Date: 2023    PROGRESS NOTE    Subjective  Chief complaint: Elida Benson is a 78 y.o. female who is an acute skilled patient being seen and evaluated for weakness    HPI:   patient seen and examined at bedside.  Patient denies n/v/f/c.  Continues working in therapy.  No new complaints.   Patient requires assistance for transfers ADLs and mobility.   Denies chest pain or headache.  Denies shortness of breath or wheezing.  No acute distress.      Objective  Vital signs:   129/72, 98%    Physical Exam  Constitutional:       General: She is not in acute distress.  Eyes:      Extraocular Movements: Extraocular movements intact.   Cardiovascular:      Rate and Rhythm: Normal rate and regular rhythm.   Pulmonary:      Effort: Pulmonary effort is normal.      Breath sounds: Normal breath sounds.   Abdominal:      General: Bowel sounds are normal.      Palpations: Abdomen is soft.   Musculoskeletal:      Cervical back: Neck supple.      Right lower leg: No edema.      Left lower leg: No edema.   Neurological:      Mental Status: She is alert.   Psychiatric:         Mood and Affect: Mood normal.         Behavior: Behavior is cooperative.         Assessment/Plan  Problem List Items Addressed This Visit       COPD without exacerbation (CMS/[facility])     Stable, no shortness of breath or wheezing         Hypertension, essential     Blood pressure controlled  Continue antihypertensives  Continue to monitor BP         Weakness - Primary     Continue working in therapy  Requires wheelchair for mobility and assist with ADLs and transfers          Medications, treatments, and labs reviewed  Continue medications and treatments as listed in PCC    Scribe Attestation  By signing my name below, IHelen Scribe   attest that this documentation has been prepared under the direction and in the presence of Sean Cloud MD.    Provider Attestation - Scribe  documentation  All medical record entries made by the Scribe were at my direction and personally dictated by me. I have reviewed the chart and agree that the record accurately reflects my personal performance of the history, physical exam, discussion and plan.  1. Weakness        2. Hypertension, essential        3. COPD without exacerbation (CMS/[facility])              Electronically Signed By: Sean Cloud MD   9/28/23  1:32 PM

## 2023-09-28 NOTE — PROGRESS NOTES
PROGRESS NOTE    Subjective   Chief complaint: Elida Benson is a 78 y.o. female who is an acute skilled patient being seen and evaluated for weakness    HPI:   patient seen and examined at bedside.  Patient denies n/v/f/c.  Continues working in therapy.  No new complaints.   Patient requires assistance for transfers ADLs and mobility.   Denies chest pain or headache.  Denies shortness of breath or wheezing.  No acute distress.      Objective   Vital signs:   129/72, 98%    Physical Exam  Constitutional:       General: She is not in acute distress.  Eyes:      Extraocular Movements: Extraocular movements intact.   Cardiovascular:      Rate and Rhythm: Normal rate and regular rhythm.   Pulmonary:      Effort: Pulmonary effort is normal.      Breath sounds: Normal breath sounds.   Abdominal:      General: Bowel sounds are normal.      Palpations: Abdomen is soft.   Musculoskeletal:      Cervical back: Neck supple.      Right lower leg: No edema.      Left lower leg: No edema.   Neurological:      Mental Status: She is alert.   Psychiatric:         Mood and Affect: Mood normal.         Behavior: Behavior is cooperative.         Assessment/Plan   Problem List Items Addressed This Visit       COPD without exacerbation (CMS/HCC)     Stable, no shortness of breath or wheezing         Hypertension, essential     Blood pressure controlled  Continue antihypertensives  Continue to monitor BP         Weakness - Primary     Continue working in therapy  Requires wheelchair for mobility and assist with ADLs and transfers          Medications, treatments, and labs reviewed  Continue medications and treatments as listed in PCC    Scribe Attestation  By signing my name below, Helen SHABAZZ Scribe   attest that this documentation has been prepared under the direction and in the presence of Sean Cloud MD.    Provider Attestation - Scribe documentation  All medical record entries made by the Scribe were at my direction and  personally dictated by me. I have reviewed the chart and agree that the record accurately reflects my personal performance of the history, physical exam, discussion and plan.  1. Weakness        2. Hypertension, essential        3. COPD without exacerbation (CMS/[facility])

## 2023-09-29 ENCOUNTER — NURSING HOME VISIT (OUTPATIENT)
Dept: POST ACUTE CARE | Facility: EXTERNAL LOCATION | Age: 78
End: 2023-09-29
Payer: MEDICARE

## 2023-09-29 DIAGNOSIS — J44.9 COPD WITHOUT EXACERBATION (MULTI): ICD-10-CM

## 2023-09-29 DIAGNOSIS — G30.9 MILD ALZHEIMER'S DEMENTIA, UNSPECIFIED TIMING OF DEMENTIA ONSET, UNSPECIFIED WHETHER BEHAVIORAL, PSYCHOTIC, OR MOOD DISTURBANCE OR ANXIETY (MULTI): ICD-10-CM

## 2023-09-29 DIAGNOSIS — E11.9 TYPE 2 DIABETES MELLITUS WITHOUT COMPLICATION, WITH LONG-TERM CURRENT USE OF INSULIN (MULTI): Primary | ICD-10-CM

## 2023-09-29 DIAGNOSIS — Z79.4 TYPE 2 DIABETES MELLITUS WITHOUT COMPLICATION, WITH LONG-TERM CURRENT USE OF INSULIN (MULTI): Primary | ICD-10-CM

## 2023-09-29 DIAGNOSIS — F02.A0 MILD ALZHEIMER'S DEMENTIA, UNSPECIFIED TIMING OF DEMENTIA ONSET, UNSPECIFIED WHETHER BEHAVIORAL, PSYCHOTIC, OR MOOD DISTURBANCE OR ANXIETY (MULTI): ICD-10-CM

## 2023-09-29 PROCEDURE — 99308 SBSQ NF CARE LOW MDM 20: CPT | Performed by: NURSE PRACTITIONER

## 2023-09-29 NOTE — LETTER
Patient: Elida Benson  : 1945    Encounter Date: 2023    PROGRESS NOTE    Subjective  Chief complaint: Elida Benson is a 78 y.o. female who is a acute skilled care patient being seen and evaluated for weakness.    HPI:  HPI   Patient in therapy d/t generalized weakness.  Patient presents for f/u.  Continues to work toward goals in therapy.  .  Patient ambulating short distances with front wheel walker and minimal assist.  Requires contact-guard assist for sit to stand transfer from edge of bed and moderate assist from lower surfaces.  Patient has no new complaints at this time.  No concerns voiced by nursing.  Objective  Vital signs:   18, 96/58, 97.5, 62, 97%,   Physical Exam  Constitutional:       General: She is not in acute distress.  Eyes:      Extraocular Movements: Extraocular movements intact.   Cardiovascular:      Rate and Rhythm: Normal rate and regular rhythm.   Pulmonary:      Effort: Pulmonary effort is normal.      Breath sounds: Normal breath sounds.   Abdominal:      General: Bowel sounds are normal.      Palpations: Abdomen is soft.   Musculoskeletal:      Cervical back: Neck supple.      Right lower leg: No edema.      Left lower leg: No edema.      Comments: Generalized weakness   Neurological:      Mental Status: She is alert.   Psychiatric:         Mood and Affect: Mood normal.         Behavior: Behavior is cooperative.         Assessment/Plan  Problem List Items Addressed This Visit       COPD without exacerbation (CMS/HCC)     Stable, no shortness of breath or wheezing  On room air         Dementia (CMS/HCC)    Type 2 diabetes mellitus without complication, with long-term current use of insulin (CMS/HCC) - Primary     FBG at goal  Monitor glucoscan          Medications, treatments, and labs reviewed  Continue medications and treatments as listed in EMR    Scribe Attestation  I, Sharon Kwok , Scribe   attest that this documentation has been prepared under the  direction and in the presence of MI Triplett    Provider Attestation - Scribe documentation  All medical record entries made by the Scribe were at my direction and personally dictated by me. I have reviewed the chart and agree that the record accurately reflects my personal performance of the history, physical exam, discussion and plan.   MI Triplett        Electronically Signed By: MI Triplett   10/7/23  1:27 PM

## 2023-09-30 PROBLEM — R10.9 ABDOMINAL PAIN: Status: ACTIVE | Noted: 2023-09-30

## 2023-09-30 NOTE — ASSESSMENT & PLAN NOTE
Dietitian following  Supplements per dietitian  Continue Remeron  Obtain labs  Continue to encourage p.o. intake  Continue to monitor weight

## 2023-10-02 ENCOUNTER — NURSING HOME VISIT (OUTPATIENT)
Dept: POST ACUTE CARE | Facility: EXTERNAL LOCATION | Age: 78
End: 2023-10-02
Payer: MEDICARE

## 2023-10-02 DIAGNOSIS — J44.9 COPD WITHOUT EXACERBATION (MULTI): ICD-10-CM

## 2023-10-02 DIAGNOSIS — I10 HYPERTENSION, ESSENTIAL: ICD-10-CM

## 2023-10-02 DIAGNOSIS — R53.1 WEAKNESS: Primary | ICD-10-CM

## 2023-10-02 DIAGNOSIS — F02.A0 MILD ALZHEIMER'S DEMENTIA, UNSPECIFIED TIMING OF DEMENTIA ONSET, UNSPECIFIED WHETHER BEHAVIORAL, PSYCHOTIC, OR MOOD DISTURBANCE OR ANXIETY (MULTI): ICD-10-CM

## 2023-10-02 DIAGNOSIS — G30.9 MILD ALZHEIMER'S DEMENTIA, UNSPECIFIED TIMING OF DEMENTIA ONSET, UNSPECIFIED WHETHER BEHAVIORAL, PSYCHOTIC, OR MOOD DISTURBANCE OR ANXIETY (MULTI): ICD-10-CM

## 2023-10-02 DIAGNOSIS — I10 HYPERTENSION, ESSENTIAL: Primary | ICD-10-CM

## 2023-10-02 PROCEDURE — 99308 SBSQ NF CARE LOW MDM 20: CPT | Performed by: INTERNAL MEDICINE

## 2023-10-02 NOTE — OP NOTE
Post Operative Note:     PreOp Diagnosis: Cervical stenosis with instability,  cord compression, myelopathy C4-6   Post-Procedure Diagnosis: Cervical stenosis with  instability, cord compression, myelopathy C4-6   Procedure: C5 corpectomy  partial corpectomy C4, C6  ASF C4-6 with cage, plate instrumentation   Surgeon: Clarisse   Resident/Fellow/Other Assistant: Rajan Vargas PA-C  PGY-2   Estimated Blood Loss (mL): 25   Specimen: no   Findings: Cervical stenosis   Patient Returned To/Condition: PACU/stable   Drains and/or Catheters: qing BELTRÁN     Attestation:   Note Completion:  I am a: Advanced Practice Provider   Attending Only - Shared Visit with Advanced Practice Provider This is a shared visit.  I have reviewed the Advanced Practice Provider?s encounter note, approve the Advanced Practice Provider?s documentation,  and provide the following additional information from my personal encounter.    Attending Attestation I was present for the entire procedure    Comments/ Additional Findings    see above          Electronic Signatures:  Irvin Robb)  (Signed 06-Feb-2023 14:10)   Authored: Post Operative Note, Note Completion   Co-Signer: Post Operative Note, Note Completion  Ricco Vargas (PAC)  (Signed 17-Jan-2023 14:57)   Authored: Post Operative Note, Note Completion      Last Updated: 06-Feb-2023 14:10 by Irvin Robb)

## 2023-10-02 NOTE — OP NOTE
Post Operative Note:     PreOp Diagnosis: Cervical stenosis with cord compression,  myelopathy C4-6 s/p anterior corpectomy and fusion   Post-Procedure Diagnosis: Cervical stenosis with  cord compression, myelopathy C4-6 s/p anterior corpectomy and fusion   Procedure: PSf with instrumentation C3-6  application/ removal douglas   Surgeon: Clarisse   Resident/Fellow/Other Assistant: Rajan Vargas PA-C  PGY-2   Estimated Blood Loss (mL): 100   Specimen: no   Findings: Cervical stenosis   Patient Returned To/Condition: PACU/stable   Drains and/or Catheters: Hemovac, longo     Attestation:   Note Completion:  I am a: Advanced Practice Provider   Attending Only - Shared Visit with Advanced Practice Provider This is a shared visit.  I have reviewed the Advanced Practice Provider?s encounter note, approve the Advanced Practice Provider?s documentation,  and provide the following additional information from my personal encounter.    Attending Attestation I was present for the entire procedure    Comments/ Additional Findings    see above          Electronic Signatures:  Irvin Robb)  (Signed 06-Feb-2023 14:14)   Authored: Post Operative Note, Note Completion   Co-Signer: Post Operative Note, Note Completion  Ricco Vargas (PAC)  (Signed 20-Jan-2023 11:01)   Authored: Post Operative Note, Note Completion      Last Updated: 06-Feb-2023 14:14 by Irvin Robb)

## 2023-10-02 NOTE — LETTER
Patient: Elida Benson  : 1945    Encounter Date: 10/02/2023    PROGRESS NOTE    Subjective  Chief complaint: Elida Benson is a 78 y.o. female who is an acute skilled patient being seen and evaluated for weakness    HPI:   patient with dementia has no new complaints or concerns today.  Continues working towards goals in therapy.  Denies constitutional symptoms. Patient requires assistance for transfers ADLs and mobility. Denies SOB or wheezing. Denies headache or chest pain. Mentation at baseline. No acute distress.         Objective  Vital signs: 124/75, 98%    Physical Exam  Constitutional:       General: She is not in acute distress.  Eyes:      Extraocular Movements: Extraocular movements intact.   Cardiovascular:      Rate and Rhythm: Normal rate and regular rhythm.   Pulmonary:      Effort: Pulmonary effort is normal.      Breath sounds: Normal breath sounds.   Abdominal:      General: Bowel sounds are normal.      Palpations: Abdomen is soft.   Musculoskeletal:      Cervical back: Neck supple.      Right lower leg: No edema.      Left lower leg: No edema.   Neurological:      Mental Status: She is alert.   Psychiatric:         Mood and Affect: Mood normal.         Behavior: Behavior is cooperative.         Assessment/Plan  Problem List Items Addressed This Visit       COPD without exacerbation (CMS/HCC)     Stable, no shortness of breath or wheezing         Dementia (CMS/[facility])     Speech therapy for cognition         Hypertension, essential     Blood pressure controlled  Continue antihypertensives  Continue to monitor BP         Weakness - Primary     Continue working in therapy  Able to walk short distances           Medications, treatments, and labs reviewed  Continue medications and treatments as listed in [facility]    Scribe Attestation  By signing my name below, IHelen, Scribkelsie   attest that this documentation has been prepared under the direction and in the presence of Sean Cloud  MD.    Provider Attestation - Scribe documentation  All medical record entries made by the Scribe were at my direction and personally dictated by me. I have reviewed the chart and agree that the record accurately reflects my personal performance of the history, physical exam, discussion and plan.  1. Weakness        2. Hypertension, essential        3. COPD without exacerbation (CMS/HCC)        4. Mild Alzheimer's dementia, unspecified timing of dementia onset, unspecified whether behavioral, psychotic, or mood disturbance or anxiety (CMS/HCC)              Electronically Signed By: Sean Cloud MD   10/3/23  5:01 PM

## 2023-10-03 ENCOUNTER — NURSING HOME VISIT (OUTPATIENT)
Dept: POST ACUTE CARE | Facility: EXTERNAL LOCATION | Age: 78
End: 2023-10-03
Payer: MEDICARE

## 2023-10-03 DIAGNOSIS — I10 HYPERTENSION, ESSENTIAL: ICD-10-CM

## 2023-10-03 DIAGNOSIS — Z79.4 TYPE 2 DIABETES MELLITUS WITHOUT COMPLICATION, WITH LONG-TERM CURRENT USE OF INSULIN (MULTI): ICD-10-CM

## 2023-10-03 DIAGNOSIS — E11.9 TYPE 2 DIABETES MELLITUS WITHOUT COMPLICATION, WITH LONG-TERM CURRENT USE OF INSULIN (MULTI): ICD-10-CM

## 2023-10-03 PROCEDURE — 99315 NF DSCHRG MGMT 30 MIN/LESS: CPT | Performed by: INTERNAL MEDICINE

## 2023-10-03 NOTE — PROGRESS NOTES
PROGRESS NOTE    Subjective   Chief complaint: Elida Benson is a 78 y.o. female who is an acute skilled patient being seen and evaluated for weakness    HPI:  Patient has been working in therapy to improve strength, endurance, and ADLs.  Patient was given NOM[place] and family did decide to appeal. However the appeal was lost with patient discharging home today, 10/3. Patient has [facility] or home assistance. Patient discharging on ATB therapy for wound. No adverse reactions noted and patient remains afebrile. Patient is stable with no new concerns today.  Denies n/v/f/c pain.        Objective   Vital signs: 106/81,75,98%    Physical Exam  Constitutional:       General: She is not in acute distress.  Eyes:      Extraocular Movements: Extraocular movements intact.   Cardiovascular:      Rate and Rhythm: Normal rate and regular rhythm.   Pulmonary:      Effort: Pulmonary effort is normal.      Breath sounds: Normal breath sounds.   Abdominal:      General: Bowel sounds are normal.      Palpations: Abdomen is soft.   Musculoskeletal:      Cervical back: Neck supple.      Right lower leg: No edema.      Left lower leg: No edema.   Neurological:      Mental Status: She is alert.   Psychiatric:         Mood and Affect: Mood normal.         Behavior: Behavior is cooperative.         Assessment/Plan   Problem List Items Addressed This Visit       Hypertension, essential     Blood pressure controlled  Continue antihypertensives  Continue to monitor BP         Type 2 diabetes mellitus without complication, with long-term current use of insulin (CMS/[facility])     Cont glucoscan with SS            Medications, treatments, and labs reviewed  Continue medications and treatments as listed in EMR  Prognosis fair  Follow up with PCP within 2 weeks  Medications called into pharmacy by office  Condition at discharge is stable    Scribe Attestation  I, Shalini Quiroz   attest that this documentation has been prepared under the  direction and in the presence of Sean Cloud MD.     Provider Attestation - Scribe documentation  All medical record entries made by the Scribe were at my direction and personally dictated by me. I have reviewed the chart and agree that the record accurately reflects my personal performance of the history, physical exam, discussion and plan.   Sean Cloud MD

## 2023-10-03 NOTE — LETTER
Patient: Elida Benson  : 1945    Encounter Date: 10/03/2023    PROGRESS NOTE    Subjective  Chief complaint: Elida Benson is a 78 y.o. female who is an acute skilled patient being seen and evaluated for weakness    HPI:  Patient has been working in therapy to improve strength, endurance, and ADLs.  Patient was given NOMNC and family did decide to appeal. However the appeal was lost with patient discharging home today, 10/3. Patient has [facility] or home assistance. Patient discharging on ATB therapy for wound. No adverse reactions noted and patient remains afebrile. Patient is stable with no new concerns today.  Denies n/v/f/c pain.        Objective  Vital signs: 106/81,75,98%    Physical Exam  Constitutional:       General: She is not in acute distress.  Eyes:      Extraocular Movements: Extraocular movements intact.   Cardiovascular:      Rate and Rhythm: Normal rate and regular rhythm.   Pulmonary:      Effort: Pulmonary effort is normal.      Breath sounds: Normal breath sounds.   Abdominal:      General: Bowel sounds are normal.      Palpations: Abdomen is soft.   Musculoskeletal:      Cervical back: Neck supple.      Right lower leg: No edema.      Left lower leg: No edema.   Neurological:      Mental Status: She is alert.   Psychiatric:         Mood and Affect: Mood normal.         Behavior: Behavior is cooperative.         Assessment/Plan  Problem List Items Addressed This Visit       Hypertension, essential     Blood pressure controlled  Continue antihypertensives  Continue to monitor BP         Type 2 diabetes mellitus without complication, with long-term current use of insulin (CMS/[facility])     Cont glucoscan with SS            Medications, treatments, and labs reviewed  Continue medications and treatments as listed in EMR  Prognosis fair  Follow up with PCP within 2 weeks  Medications called into pharmacy by office  Condition at discharge is stable    Scribe Attestation  Ana Maria SHABAZZ,  Scribe   attest that this documentation has been prepared under the direction and in the presence of Sean Cloud MD.     Provider Attestation - Scribe documentation  All medical record entries made by the Scribe were at my direction and personally dictated by me. I have reviewed the chart and agree that the record accurately reflects my personal performance of the history, physical exam, discussion and plan.   Sean Cloud MD      Electronically Signed By: Sean Cloud MD   10/3/23  2:31 PM

## 2023-10-03 NOTE — PROGRESS NOTES
PROGRESS NOTE    Subjective   Chief complaint: Elida Benson is a 78 y.o. female who is a acute skilled care patient being seen and evaluated for weakness.    HPI:  HPI  Patient is working in therapy due to weakness.  Working on strengthening exercises/activities, gait training, transfers and ADLs.  Ambulates short distances with FWW and Min A.  Requires CGA for sit<>stand from edge of bed and Mod A from lower surfaces.  Patient has no complaints.  Denies n/v/f/c.  No new concerns reported by staff.    Objective   Vital signs: 18, 105/76, 97.1, 70, 94%,   Physical Exam  Constitutional:       General: She is not in acute distress.  Eyes:      Extraocular Movements: Extraocular movements intact.   Cardiovascular:      Rate and Rhythm: Normal rate and regular rhythm.   Pulmonary:      Effort: Pulmonary effort is normal.      Breath sounds: Normal breath sounds.   Abdominal:      General: Bowel sounds are normal.      Palpations: Abdomen is soft.   Musculoskeletal:      Cervical back: Neck supple.      Right lower leg: No edema.      Left lower leg: No edema.      Comments: Generalized weakness   Neurological:      Mental Status: She is alert.   Psychiatric:         Mood and Affect: Mood normal.         Behavior: Behavior is cooperative.         Assessment/Plan   Problem List Items Addressed This Visit       COPD without exacerbation (CMS/HCC)     Stable, no shortness of breath or wheezing         Dementia (CMS/HCC)    Hypertension, essential     Blood pressure controlled  Continue antihypertensives  Continue to monitor BP         Type 2 diabetes mellitus without complication, with long-term current use of insulin (CMS/HCC)     FBG at goal  Monitor glucoscan         Weakness - Primary     Continue working in therapy            Medications, treatments, and labs reviewed  Continue medications and treatments as listed in EMR    Scribe Attestation  I, Sharon Kwok , Scribe   attest that this documentation has been  prepared under the direction and in the presence of MI Triplett    Provider Attestation - Scribe documentation  All medical record entries made by the Scribe were at my direction and personally dictated by me. I have reviewed the chart and agree that the record accurately reflects my personal performance of the history, physical exam, discussion and plan.   MI Triplett

## 2023-10-03 NOTE — PROGRESS NOTES
PROGRESS NOTE    Subjective   Chief complaint: Elida Benson is a 78 y.o. female who is an acute skilled patient being seen and evaluated for weakness    HPI:   patient with dementia has no new complaints or concerns today.  Continues working towards goals in therapy.  Denies constitutional symptoms. Patient requires assistance for transfers ADLs and mobility. Denies SOB or wheezing. Denies headache or chest pain. Mentation at baseline. No acute distress.         Objective   Vital signs: 124/75, 98%    Physical Exam  Constitutional:       General: She is not in acute distress.  Eyes:      Extraocular Movements: Extraocular movements intact.   Cardiovascular:      Rate and Rhythm: Normal rate and regular rhythm.   Pulmonary:      Effort: Pulmonary effort is normal.      Breath sounds: Normal breath sounds.   Abdominal:      General: Bowel sounds are normal.      Palpations: Abdomen is soft.   Musculoskeletal:      Cervical back: Neck supple.      Right lower leg: No edema.      Left lower leg: No edema.   Neurological:      Mental Status: She is alert.   Psychiatric:         Mood and Affect: Mood normal.         Behavior: Behavior is cooperative.         Assessment/Plan   Problem List Items Addressed This Visit       COPD without exacerbation (CMS/[facility])     Stable, no shortness of breath or wheezing         Dementia (CMS/[facility])     Speech therapy for cognition         Hypertension, essential     Blood pressure controlled  Continue antihypertensives  Continue to monitor BP         Weakness - Primary     Continue working in therapy  Able to walk short distances           Medications, treatments, and labs reviewed  Continue medications and treatments as listed in PCC    Scribe Attestation  By signing my name below, Helen SHABAZZ, Hectoribkelsie   attest that this documentation has been prepared under the direction and in the presence of Sean Cloud MD.    Provider Attestation - Scribe documentation  All medical record  entries made by the Scribe were at my direction and personally dictated by me. I have reviewed the chart and agree that the record accurately reflects my personal performance of the history, physical exam, discussion and plan.  1. Weakness        2. Hypertension, essential        3. COPD without exacerbation (CMS/HCC)        4. Mild Alzheimer's dementia, unspecified timing of dementia onset, unspecified whether behavioral, psychotic, or mood disturbance or anxiety (CMS/HCC)

## 2023-10-04 ENCOUNTER — OFFICE VISIT (OUTPATIENT)
Dept: SURGERY | Facility: CLINIC | Age: 78
End: 2023-10-04
Payer: MEDICARE

## 2023-10-04 VITALS
TEMPERATURE: 98.2 F | HEIGHT: 58 IN | SYSTOLIC BLOOD PRESSURE: 116 MMHG | WEIGHT: 116 LBS | DIASTOLIC BLOOD PRESSURE: 75 MMHG | BODY MASS INDEX: 24.35 KG/M2 | HEART RATE: 80 BPM

## 2023-10-04 DIAGNOSIS — K57.20 DIVERTICULITIS OF LARGE INTESTINE WITH PERFORATION, UNSPECIFIED BLEEDING STATUS: Primary | ICD-10-CM

## 2023-10-04 PROCEDURE — 3074F SYST BP LT 130 MM HG: CPT | Performed by: COLON & RECTAL SURGERY

## 2023-10-04 PROCEDURE — 1036F TOBACCO NON-USER: CPT | Performed by: COLON & RECTAL SURGERY

## 2023-10-04 PROCEDURE — 1159F MED LIST DOCD IN RCRD: CPT | Performed by: COLON & RECTAL SURGERY

## 2023-10-04 PROCEDURE — 3078F DIAST BP <80 MM HG: CPT | Performed by: COLON & RECTAL SURGERY

## 2023-10-04 PROCEDURE — 1125F AMNT PAIN NOTED PAIN PRSNT: CPT | Performed by: COLON & RECTAL SURGERY

## 2023-10-04 PROCEDURE — 99024 POSTOP FOLLOW-UP VISIT: CPT | Performed by: COLON & RECTAL SURGERY

## 2023-10-04 PROCEDURE — 1160F RVW MEDS BY RX/DR IN RCRD: CPT | Performed by: COLON & RECTAL SURGERY

## 2023-10-04 ASSESSMENT — PAIN SCALES - GENERAL: PAINLEVEL: 6

## 2023-10-04 NOTE — NURSING NOTE
"WO nursing visit outcome: Stoma and pouching system were assessed. Recommendations for pouching system were provided. Daughter was provided hands-on pouching lesson.       [facility] next scheduled visit/plan: TBD    Stoma Type: loop ileostomy  Kike: No  Diameter: 1 1/8\"  Location: RUQ  Protrusion: Budded  Mucosal Condition and Color: Moist, Red  Mucocutaneous Junction: Intact  Peristomal Skin: Maceration  Location of Skin Impairment: circumferentially, close to the stoma  Peristomal Contour: Flat  Supportive Tissue: Soft  Character of Output:  watery with food particles, dark green  Emptying Frequency: difficult to determine; just released from SNF yesterday  Removed/Current Pouching System: Isis Premier flat with clamp closure  Current Wearing Time: about 3 days (pouch was dated 10/1)    Recommendations:   Skin Care: stoma powder to denuded skin as needed with pouch change  Pouching System: added convexity: [place] Premier 8578 soft convex with barrier ring, lock n' roll, belt  Wear Time: 3-4 days  Other: TBD    Incision: at about umbilicus, small open wound with moist, red wound base, incision was cleaned and then packed with 1/4\" packing strip; covered with gauze  Drain: n/a    Supplier:  will have [facility]    Comments: Daughter was provided with additional supplies and handwritten instructions for pouching and wound care    Time Increment: 60 minutes    RIVAS AshleyN, RN, CWOCN     "

## 2023-10-04 NOTE — PROGRESS NOTES
PROGRESS NOTE    Subjective   Chief complaint: Elida Benson is a 78 y.o. female who is a acute skilled care patient being seen and evaluated for weakness.    HPI:  HPI   Patient in therapy d/t generalized weakness.  Patient presents for f/u.  Continues to work toward goals in therapy.  .  Patient ambulating short distances with front wheel walker and minimal assist.  Requires contact-guard assist for sit to stand transfer from edge of bed and moderate assist from lower surfaces.  Patient has no new complaints at this time.  No concerns voiced by nursing.  Objective   Vital signs:   18, 96/58, 97.5, 62, 97%,   Physical Exam  Constitutional:       General: She is not in acute distress.  Eyes:      Extraocular Movements: Extraocular movements intact.   Cardiovascular:      Rate and Rhythm: Normal rate and regular rhythm.   Pulmonary:      Effort: Pulmonary effort is normal.      Breath sounds: Normal breath sounds.   Abdominal:      General: Bowel sounds are normal.      Palpations: Abdomen is soft.   Musculoskeletal:      Cervical back: Neck supple.      Right lower leg: No edema.      Left lower leg: No edema.      Comments: Generalized weakness   Neurological:      Mental Status: She is alert.   Psychiatric:         Mood and Affect: Mood normal.         Behavior: Behavior is cooperative.         Assessment/Plan   Problem List Items Addressed This Visit       COPD without exacerbation (CMS/HCC)     Stable, no shortness of breath or wheezing  On room air         Dementia (CMS/HCC)    Type 2 diabetes mellitus without complication, with long-term current use of insulin (CMS/HCC) - Primary     FBG at goal  Monitor glucoscan          Medications, treatments, and labs reviewed  Continue medications and treatments as listed in EMR    Scribe Attestation  I, Shalini Murray   attest that this documentation has been prepared under the direction and in the presence of PARAG Triplett-CNP    Provider  Attestation - Scribe documentation  All medical record entries made by the Scribe were at my direction and personally dictated by me. I have reviewed the chart and agree that the record accurately reflects my personal performance of the history, physical exam, discussion and plan.   Radha Lay, APRN-CNP

## 2023-10-05 ENCOUNTER — HOME HEALTH ADMISSION (OUTPATIENT)
Dept: HOME HEALTH SERVICES | Facility: HOME HEALTH | Age: 78
End: 2023-10-05
Payer: MEDICARE

## 2023-10-06 ENCOUNTER — APPOINTMENT (OUTPATIENT)
Dept: HOME HEALTH SERVICES | Facility: HOME HEALTH | Age: 78
End: 2023-10-06
Payer: MEDICARE

## 2023-10-06 DIAGNOSIS — E44.1 MILD PROTEIN-CALORIE MALNUTRITION (MULTI): Primary | ICD-10-CM

## 2023-10-06 DIAGNOSIS — M51.16 LUMBAR DISC HERNIATION WITH RADICULOPATHY: Primary | ICD-10-CM

## 2023-10-06 RX ORDER — INFANT FORMULA, IRON/DHA/ARA 2.07G/1
1 LIQUID (ML) ORAL 2 TIMES DAILY
Qty: 237 ML | Refills: 0 | Status: SHIPPED | OUTPATIENT
Start: 2023-10-06 | End: 2023-10-20

## 2023-10-06 RX ORDER — TRAMADOL HYDROCHLORIDE 50 MG/1
50 TABLET ORAL 2 TIMES DAILY
Qty: 60 TABLET | Refills: 0 | Status: SHIPPED | OUTPATIENT
Start: 2023-10-06 | End: 2023-11-05

## 2023-10-06 NOTE — PROGRESS NOTES
Subjective   Patient ID: Elida Benson is a 78 y.o. female who presents for No chief complaint on file..    HPI     Review of Systems    Objective   There were no vitals taken for this visit.    Physical Exam    Assessment/Plan

## 2023-10-10 ENCOUNTER — HOME CARE VISIT (OUTPATIENT)
Dept: HOME HEALTH SERVICES | Facility: HOME HEALTH | Age: 78
End: 2023-10-10
Payer: MEDICARE

## 2023-10-10 VITALS
BODY MASS INDEX: 24.24 KG/M2 | WEIGHT: 116 LBS | DIASTOLIC BLOOD PRESSURE: 65 MMHG | SYSTOLIC BLOOD PRESSURE: 131 MMHG | RESPIRATION RATE: 16 BRPM

## 2023-10-10 PROCEDURE — 0023 HH SOC

## 2023-10-10 PROCEDURE — 169592 NO-PAY CLAIM PROCEDURE

## 2023-10-10 PROCEDURE — 1090000001 HH PPS REVENUE CREDIT

## 2023-10-10 PROCEDURE — G0299 HHS/HOSPICE OF RN EA 15 MIN: HCPCS | Mod: HHH

## 2023-10-10 PROCEDURE — 1090000002 HH PPS REVENUE DEBIT

## 2023-10-10 ASSESSMENT — ACTIVITIES OF DAILY LIVING (ADL)
ENTERING_EXITING_HOME: ONE PERSON
BATHING ASSESSED: 1
BATHING_CURRENT_FUNCTION: MODERATE ASSIST
OASIS_M1830: 02
AMBULATION ASSISTANCE: ONE PERSON
AMBULATION ASSISTANCE: 1

## 2023-10-10 ASSESSMENT — ENCOUNTER SYMPTOMS
LOWEST PAIN SEVERITY IN PAST 24 HOURS: 0/10
SUBJECTIVE PAIN PROGRESSION: RESOLVED
HIGHEST PAIN SEVERITY IN PAST 24 HOURS: 3/10
DEPRESSION: 0
CHANGE IN APPETITE: INCREASED
LAST BOWEL MOVEMENT: 66757
LIMITED RANGE OF MOTION: 1
OCCASIONAL FEELINGS OF UNSTEADINESS: 1
DESCRIPTION OF MEMORY LOSS: SHORT TERM
PAIN: 1
PAIN SEVERITY GOAL: 0/10
APPETITE LEVEL: GOOD
LOSS OF SENSATION IN FEET: 0
PERSON REPORTING PAIN: PATIENT

## 2023-10-10 ASSESSMENT — PAIN SCALES - PAIN ASSESSMENT IN ADVANCED DEMENTIA (PAINAD)
BODYLANGUAGE: 0 - RELAXED.
CONSOLABILITY: 0
BODYLANGUAGE: 0
FACIALEXPRESSION: 0
FACIALEXPRESSION: 0 - SMILING OR INEXPRESSIVE.
NEGVOCALIZATION: 0 - NONE.
BREATHING: 0
NEGVOCALIZATION: 0
CONSOLABILITY: 0 - NO NEED TO CONSOLE.
TOTALSCORE: 0

## 2023-10-11 PROCEDURE — 1090000001 HH PPS REVENUE CREDIT

## 2023-10-11 PROCEDURE — 1090000002 HH PPS REVENUE DEBIT

## 2023-10-12 ENCOUNTER — HOME CARE VISIT (OUTPATIENT)
Dept: HOME HEALTH SERVICES | Facility: HOME HEALTH | Age: 78
End: 2023-10-12
Payer: MEDICARE

## 2023-10-12 PROCEDURE — 1090000002 HH PPS REVENUE DEBIT

## 2023-10-12 PROCEDURE — G0151 HHCP-SERV OF PT,EA 15 MIN: HCPCS | Mod: HHH

## 2023-10-12 PROCEDURE — 1090000001 HH PPS REVENUE CREDIT

## 2023-10-12 RX ORDER — LOSARTAN POTASSIUM 50 MG/1
TABLET ORAL
Qty: 90 TABLET | Refills: 1 | Status: SHIPPED | OUTPATIENT
Start: 2023-10-12 | End: 2024-03-27 | Stop reason: ALTCHOICE

## 2023-10-12 NOTE — HOME HEALTH
Patient is a 78-year-old female recently admitted to hospital for elective surgery for Colovaginal fistula, takedown and diverting loop ileostomy. During her hospital stay, she was found to be Covid positive and then discharged to a skilled nursing facility for rehab. During her stay in rehab, she had continued abdominal discomfort. Patient now home under the care of her daughter. past medical history is significant for hypertension, re current diverticulitis C-spine infection, a fib on Eliquis dementia and depression, dm, post laminectomy syndrome, oa, lumbar radiculopathy, copd r foot drop. Pt is well supported by dtr. Pt states no pain currently due to tramadol. No recent falls. At home with hospital bed rolling walker, bedside commode, tub bench. Prior to recent admission pt was supervised with all adls  and used ad for fall prevention. Required CGA to exit home vi a steps.

## 2023-10-13 ENCOUNTER — TELEPHONE (OUTPATIENT)
Dept: SURGERY | Facility: CLINIC | Age: 78
End: 2023-10-13
Payer: MEDICARE

## 2023-10-13 ENCOUNTER — LAB (OUTPATIENT)
Dept: LAB | Facility: LAB | Age: 78
End: 2023-10-13
Payer: MEDICARE

## 2023-10-13 DIAGNOSIS — R31.0 GROSS HEMATURIA: ICD-10-CM

## 2023-10-13 DIAGNOSIS — R31.0 GROSS HEMATURIA: Primary | ICD-10-CM

## 2023-10-13 LAB
APPEARANCE UR: ABNORMAL
BILIRUB UR STRIP.AUTO-MCNC: NEGATIVE MG/DL
COLOR UR: YELLOW
GLUCOSE UR STRIP.AUTO-MCNC: NEGATIVE MG/DL
KETONES UR STRIP.AUTO-MCNC: NEGATIVE MG/DL
LEUKOCYTE ESTERASE UR QL STRIP.AUTO: ABNORMAL
MUCOUS THREADS #/AREA URNS AUTO: ABNORMAL /LPF
NITRITE UR QL STRIP.AUTO: NEGATIVE
PH UR STRIP.AUTO: 5 [PH]
PROT UR STRIP.AUTO-MCNC: NEGATIVE MG/DL
RBC # UR STRIP.AUTO: ABNORMAL /UL
RBC #/AREA URNS AUTO: >20 /HPF
SP GR UR STRIP.AUTO: 1.02
UROBILINOGEN UR STRIP.AUTO-MCNC: <2 MG/DL
WBC #/AREA URNS AUTO: ABNORMAL /HPF
YEAST BUDDING #/AREA UR COMP ASSIST: PRESENT /HPF

## 2023-10-13 PROCEDURE — 87086 URINE CULTURE/COLONY COUNT: CPT

## 2023-10-13 PROCEDURE — 81001 URINALYSIS AUTO W/SCOPE: CPT

## 2023-10-13 PROCEDURE — 1090000002 HH PPS REVENUE DEBIT

## 2023-10-13 PROCEDURE — 1090000001 HH PPS REVENUE CREDIT

## 2023-10-13 NOTE — TELEPHONE ENCOUNTER
Kaelyn called back and said she took the urine specimen to the lab at [facility].    Spoke with Kaelyn. Lab needs order placed. Specimen is there. No dysuria just blood in stool No F/C.     From: Felipe Shafer   Sent: Friday, October 13, 2023 1:09 PM  To: Pat Sellers [email or web address]>  Cc: Radha Meyer [email or web address]>  Subject: Elida Benson [id number]    Kaelyn, pts daughter, calling for the pt.  Kaelyn said the pt had blood in her urine today.  Kaelyn phone:  [phone]

## 2023-10-14 VITALS
OXYGEN SATURATION: 96 % | DIASTOLIC BLOOD PRESSURE: 70 MMHG | HEART RATE: 78 BPM | SYSTOLIC BLOOD PRESSURE: 102 MMHG | TEMPERATURE: 97.2 F | RESPIRATION RATE: 18 BRPM

## 2023-10-14 LAB — BACTERIA UR CULT: NORMAL

## 2023-10-14 PROCEDURE — 1090000002 HH PPS REVENUE DEBIT

## 2023-10-14 PROCEDURE — 1090000001 HH PPS REVENUE CREDIT

## 2023-10-14 ASSESSMENT — ACTIVITIES OF DAILY LIVING (ADL)
DRESSING_LB_CURRENT_FUNCTION: MODERATE ASSIST
AMBULATION ASSISTANCE: ONE PERSON
AMBULATION ASSISTANCE ON FLAT SURFACES: 1
AMBULATION ASSISTANCE: 1

## 2023-10-14 ASSESSMENT — ENCOUNTER SYMPTOMS
HIGHEST PAIN SEVERITY IN PAST 24 HOURS: 6/10
PAIN: 1
PERSON REPORTING PAIN: PATIENT
MUSCLE WEAKNESS: 1
LOWEST PAIN SEVERITY IN PAST 24 HOURS: 5/10
SUBJECTIVE PAIN PROGRESSION: UNCHANGED
PAIN SEVERITY GOAL: 3/10

## 2023-10-14 ASSESSMENT — PAIN SCALES - PAIN ASSESSMENT IN ADVANCED DEMENTIA (PAINAD)
NEGVOCALIZATION: 0
TOTALSCORE: 0
BODYLANGUAGE: 0
BODYLANGUAGE: 0 - RELAXED.
FACIALEXPRESSION: 0 - SMILING OR INEXPRESSIVE.
CONSOLABILITY: 0 - NO NEED TO CONSOLE.
FACIALEXPRESSION: 0
CONSOLABILITY: 0
NEGVOCALIZATION: 0 - NONE.
BREATHING: 0

## 2023-10-15 PROCEDURE — 1090000002 HH PPS REVENUE DEBIT

## 2023-10-15 PROCEDURE — 1090000001 HH PPS REVENUE CREDIT

## 2023-10-16 ENCOUNTER — HOME CARE VISIT (OUTPATIENT)
Dept: HOME HEALTH SERVICES | Facility: HOME HEALTH | Age: 78
End: 2023-10-16
Payer: MEDICARE

## 2023-10-16 ENCOUNTER — TELEPHONE (OUTPATIENT)
Dept: SURGERY | Facility: CLINIC | Age: 78
End: 2023-10-16
Payer: MEDICARE

## 2023-10-16 VITALS
OXYGEN SATURATION: 100 % | SYSTOLIC BLOOD PRESSURE: 92 MMHG | RESPIRATION RATE: 16 BRPM | DIASTOLIC BLOOD PRESSURE: 52 MMHG | HEART RATE: 68 BPM

## 2023-10-16 PROCEDURE — 1090000001 HH PPS REVENUE CREDIT

## 2023-10-16 PROCEDURE — G0300 HHS/HOSPICE OF LPN EA 15 MIN: HCPCS | Mod: HHH

## 2023-10-16 PROCEDURE — 1090000002 HH PPS REVENUE DEBIT

## 2023-10-16 ASSESSMENT — ENCOUNTER SYMPTOMS
APPETITE LEVEL: GOOD
DENIES PAIN: 1
CHANGE IN APPETITE: UNCHANGED
LAST BOWEL MOVEMENT: 66763

## 2023-10-16 ASSESSMENT — PAIN SCALES - PAIN ASSESSMENT IN ADVANCED DEMENTIA (PAINAD): BREATHING: 0

## 2023-10-16 NOTE — TELEPHONE ENCOUNTER
Spoke with Kaelyn, patient's daughter.  Patient is s/p 8/16/2023 LX Anterior resection with CRA, divetiing loop ileostomy.  DX: Diverticulitis with colovaginal and colovesicular fistula.  Had blood in urine on Friday. Was sent for UA and Culture. Culture showed no growth.  No further bleeding episodes since Friday per Kaelyn.  Discussed with Dr. Kelley.  Since she had a colovesicular fistula, Will keep an eye on the bleeding.  If continues will send to urology for work up.    Radha Meyer RN

## 2023-10-16 NOTE — HOME HEALTH
SN VISIT COMPLETE. PT ALERT AND ORIENTATED X 3.  DAUGHTER DONALD PRESENT.  PT LYING IN BED UPON ARRIVAL.  DENIES PAIN SOB OR FALLS. PT APPETITE IS FAIR, PT ABLE TO MAKE NEEDS KNOWN.  PT  BM WNL HAS OSTOMY STOMA LOOKS HEALTHY STOOL IS A LITTLE LIQUID BUT HAS SOME FORM TO STOOL. DAUGHTER AND PT INDEPENDENT WITH OSTOMY CHANGES.  INQUIRED ABOUT ORDER FOR SUPPLIES. FOR OSTOMY.  PT EDUCATED ON FALLS PREVENTION SKIN BARRIER BREAKDOWN AND WHEN TO CALL DR OR NURSE.  UNDERSTANDING VERBILZED NO OTHER CONCERNS.

## 2023-10-17 ENCOUNTER — HOME CARE VISIT (OUTPATIENT)
Dept: HOME HEALTH SERVICES | Facility: HOME HEALTH | Age: 78
End: 2023-10-17
Payer: MEDICARE

## 2023-10-17 VITALS — HEART RATE: 69 BPM | SYSTOLIC BLOOD PRESSURE: 98 MMHG | DIASTOLIC BLOOD PRESSURE: 76 MMHG

## 2023-10-17 PROCEDURE — 1090000002 HH PPS REVENUE DEBIT

## 2023-10-17 PROCEDURE — G0152 HHCP-SERV OF OT,EA 15 MIN: HCPCS | Mod: HHH

## 2023-10-17 PROCEDURE — 1090000001 HH PPS REVENUE CREDIT

## 2023-10-17 ASSESSMENT — ENCOUNTER SYMPTOMS
PERSON REPORTING PAIN: PATIENT
SUBJECTIVE PAIN PROGRESSION: GRADUALLY IMPROVING
PAIN: 1
PAIN SEVERITY GOAL: 0/10
LOWEST PAIN SEVERITY IN PAST 24 HOURS: 0/10
HIGHEST PAIN SEVERITY IN PAST 24 HOURS: 7/10
PAIN LOCATION: ABDOMEN

## 2023-10-17 ASSESSMENT — ACTIVITIES OF DAILY LIVING (ADL)
FEEDING ASSESSED: 1
GROOMING ASSESSED: 1
DRESSING_LB_CURRENT_FUNCTION: MAXIMUM ASSIST
DRESSING_UB_CURRENT_FUNCTION: MODERATE ASSIST
FEEDING EQUIPMENT USED: SET UP
GROOMING_CURRENT_FUNCTION: MODERATE ASSIST
TOILETING: 1
TOILETING: MODERATE ASSIST

## 2023-10-18 ENCOUNTER — HOME CARE VISIT (OUTPATIENT)
Dept: HOME HEALTH SERVICES | Facility: HOME HEALTH | Age: 78
End: 2023-10-18
Payer: MEDICARE

## 2023-10-18 ENCOUNTER — TELEPHONE (OUTPATIENT)
Dept: SURGERY | Facility: CLINIC | Age: 78
End: 2023-10-18
Payer: MEDICARE

## 2023-10-18 PROCEDURE — 1090000001 HH PPS REVENUE CREDIT

## 2023-10-18 PROCEDURE — 1090000002 HH PPS REVENUE DEBIT

## 2023-10-19 ENCOUNTER — HOME CARE VISIT (OUTPATIENT)
Dept: HOME HEALTH SERVICES | Facility: HOME HEALTH | Age: 78
End: 2023-10-19
Payer: MEDICARE

## 2023-10-19 ENCOUNTER — APPOINTMENT (OUTPATIENT)
Dept: HOME HEALTH SERVICES | Facility: HOME HEALTH | Age: 78
End: 2023-10-19
Payer: MEDICARE

## 2023-10-19 PROCEDURE — 1090000002 HH PPS REVENUE DEBIT

## 2023-10-19 PROCEDURE — 1090000001 HH PPS REVENUE CREDIT

## 2023-10-20 ENCOUNTER — HOME CARE VISIT (OUTPATIENT)
Dept: HOME HEALTH SERVICES | Facility: HOME HEALTH | Age: 78
End: 2023-10-20
Payer: MEDICARE

## 2023-10-20 VITALS
RESPIRATION RATE: 17 BRPM | DIASTOLIC BLOOD PRESSURE: 72 MMHG | TEMPERATURE: 98.3 F | HEART RATE: 81 BPM | SYSTOLIC BLOOD PRESSURE: 106 MMHG

## 2023-10-20 PROCEDURE — G0158 HHC OT ASSISTANT EA 15: HCPCS | Mod: HHH

## 2023-10-20 PROCEDURE — 1090000001 HH PPS REVENUE CREDIT

## 2023-10-20 PROCEDURE — G0156 HHCP-SVS OF AIDE,EA 15 MIN: HCPCS | Mod: HHH

## 2023-10-20 PROCEDURE — 1090000002 HH PPS REVENUE DEBIT

## 2023-10-20 ASSESSMENT — ENCOUNTER SYMPTOMS: DENIES PAIN: 1

## 2023-10-21 PROCEDURE — 1090000001 HH PPS REVENUE CREDIT

## 2023-10-21 PROCEDURE — 1090000002 HH PPS REVENUE DEBIT

## 2023-10-22 PROCEDURE — 1090000001 HH PPS REVENUE CREDIT

## 2023-10-22 PROCEDURE — 1090000002 HH PPS REVENUE DEBIT

## 2023-10-23 ENCOUNTER — HOME CARE VISIT (OUTPATIENT)
Dept: HOME HEALTH SERVICES | Facility: HOME HEALTH | Age: 78
End: 2023-10-23
Payer: MEDICARE

## 2023-10-23 VITALS
RESPIRATION RATE: 16 BRPM | DIASTOLIC BLOOD PRESSURE: 69 MMHG | TEMPERATURE: 98.3 F | HEART RATE: 63 BPM | SYSTOLIC BLOOD PRESSURE: 98 MMHG

## 2023-10-23 PROCEDURE — 1090000001 HH PPS REVENUE CREDIT

## 2023-10-23 PROCEDURE — G0158 HHC OT ASSISTANT EA 15: HCPCS | Mod: HHH

## 2023-10-23 PROCEDURE — 1090000002 HH PPS REVENUE DEBIT

## 2023-10-23 ASSESSMENT — PAIN DESCRIPTION - PAIN TYPE: TYPE: CHRONIC PAIN

## 2023-10-24 PROCEDURE — 1090000002 HH PPS REVENUE DEBIT

## 2023-10-24 PROCEDURE — 1090000001 HH PPS REVENUE CREDIT

## 2023-10-25 ENCOUNTER — APPOINTMENT (OUTPATIENT)
Dept: HOME HEALTH SERVICES | Facility: HOME HEALTH | Age: 78
End: 2023-10-25
Payer: MEDICARE

## 2023-10-25 ENCOUNTER — HOME CARE VISIT (OUTPATIENT)
Dept: HOME HEALTH SERVICES | Facility: HOME HEALTH | Age: 78
End: 2023-10-25
Payer: MEDICARE

## 2023-10-25 VITALS
HEART RATE: 64 BPM | RESPIRATION RATE: 16 BRPM | TEMPERATURE: 98.4 F | DIASTOLIC BLOOD PRESSURE: 59 MMHG | SYSTOLIC BLOOD PRESSURE: 94 MMHG

## 2023-10-25 DIAGNOSIS — G89.4 CHRONIC PAIN SYNDROME: ICD-10-CM

## 2023-10-25 PROCEDURE — 1090000002 HH PPS REVENUE DEBIT

## 2023-10-25 PROCEDURE — 1090000001 HH PPS REVENUE CREDIT

## 2023-10-25 PROCEDURE — G0300 HHS/HOSPICE OF LPN EA 15 MIN: HCPCS | Mod: HHH

## 2023-10-25 PROCEDURE — G0158 HHC OT ASSISTANT EA 15: HCPCS | Mod: HHH

## 2023-10-25 ASSESSMENT — ENCOUNTER SYMPTOMS
PAIN LOCATION - PAIN DURATION: TODAY
PAIN LOCATION - RELIEVING FACTORS: REST
PAIN LOCATION: ABDOMEN
PAIN LOCATION - PAIN SEVERITY: 4/10
PAIN: 1
PAIN LOCATION - EXACERBATING FACTORS: TOUCH
PAIN LOCATION - PAIN FREQUENCY: INFREQUENT
PAIN SEVERITY GOAL: 0/10
HIGHEST PAIN SEVERITY IN PAST 24 HOURS: 5/10
PERSON REPORTING PAIN: PATIENT
LOWEST PAIN SEVERITY IN PAST 24 HOURS: 2/10

## 2023-10-25 NOTE — TELEPHONE ENCOUNTER
Result Communication    Resulted Orders   Urinalysis with Reflex Microscopic   Result Value Ref Range    Color, Urine Yellow Straw, Yellow    Appearance, Urine Hazy (N) Clear    Specific Gravity, Urine 1.016 1.005 - 1.035    pH, Urine 5.0 5.0, 5.5, 6.0, 6.5, 7.0, 7.5, 8.0    Protein, Urine NEGATIVE NEGATIVE mg/dL    Glucose, Urine NEGATIVE NEGATIVE mg/dL    Blood, Urine MODERATE (2+) (A) NEGATIVE    Ketones, Urine NEGATIVE NEGATIVE mg/dL    Bilirubin, Urine NEGATIVE NEGATIVE    Urobilinogen, Urine <2.0 <2.0 mg/dL    Nitrite, Urine NEGATIVE NEGATIVE    Leukocyte Esterase, Urine SMALL (1+) (A) NEGATIVE   Urine culture   Result Value Ref Range    Urine Culture No significant growth    Urinalysis Microscopic Only   Result Value Ref Range    WBC, Urine 21-50 (A) 1-5, NONE /HPF    RBC, Urine >20 (A) NONE, 1-2, 3-5 /HPF    Budding Yeast, Urine PRESENT (A) NONE /HPF    Mucus, Urine 1+ Reference range not established. /LPF       2:58 PM      Results were successfully communicated with the  daugther  and they acknowledged their understanding.

## 2023-10-26 ENCOUNTER — APPOINTMENT (OUTPATIENT)
Dept: HOME HEALTH SERVICES | Facility: HOME HEALTH | Age: 78
End: 2023-10-26
Payer: MEDICARE

## 2023-10-26 ENCOUNTER — HOME CARE VISIT (OUTPATIENT)
Dept: HOME HEALTH SERVICES | Facility: HOME HEALTH | Age: 78
End: 2023-10-26
Payer: MEDICARE

## 2023-10-26 PROCEDURE — 1090000002 HH PPS REVENUE DEBIT

## 2023-10-26 PROCEDURE — 1090000001 HH PPS REVENUE CREDIT

## 2023-10-26 PROCEDURE — G0156 HHCP-SVS OF AIDE,EA 15 MIN: HCPCS | Mod: HHH

## 2023-10-26 RX ORDER — DULOXETIN HYDROCHLORIDE 30 MG/1
30 CAPSULE, DELAYED RELEASE ORAL 2 TIMES DAILY
Qty: 60 CAPSULE | Refills: 0 | Status: SHIPPED | OUTPATIENT
Start: 2023-10-26 | End: 2023-11-27 | Stop reason: SDUPTHER

## 2023-10-27 ENCOUNTER — OFFICE VISIT (OUTPATIENT)
Dept: PRIMARY CARE | Facility: CLINIC | Age: 78
End: 2023-10-27
Payer: MEDICARE

## 2023-10-27 VITALS
DIASTOLIC BLOOD PRESSURE: 60 MMHG | SYSTOLIC BLOOD PRESSURE: 86 MMHG | HEIGHT: 58 IN | WEIGHT: 117 LBS | HEART RATE: 70 BPM | BODY MASS INDEX: 24.56 KG/M2

## 2023-10-27 DIAGNOSIS — Z23 NEED FOR INFLUENZA VACCINATION: ICD-10-CM

## 2023-10-27 DIAGNOSIS — D64.9 ANEMIA, UNSPECIFIED TYPE: ICD-10-CM

## 2023-10-27 DIAGNOSIS — F33.41 RECURRENT MAJOR DEPRESSIVE DISORDER, IN PARTIAL REMISSION (CMS-HCC): ICD-10-CM

## 2023-10-27 DIAGNOSIS — I27.20 PULMONARY HYPERTENSION, UNSPECIFIED (MULTI): Primary | ICD-10-CM

## 2023-10-27 DIAGNOSIS — R63.4 WEIGHT LOSS: ICD-10-CM

## 2023-10-27 DIAGNOSIS — Z93.2 ILEOSTOMY STATUS (MULTI): ICD-10-CM

## 2023-10-27 DIAGNOSIS — Z79.4 TYPE 2 DIABETES MELLITUS WITH DIABETIC NEUROPATHY, WITH LONG-TERM CURRENT USE OF INSULIN (MULTI): ICD-10-CM

## 2023-10-27 DIAGNOSIS — I48.11 LONGSTANDING PERSISTENT ATRIAL FIBRILLATION (MULTI): ICD-10-CM

## 2023-10-27 DIAGNOSIS — I10 HYPERTENSION, ESSENTIAL: ICD-10-CM

## 2023-10-27 DIAGNOSIS — R26.2 DIFFICULTY IN WALKING, NOT ELSEWHERE CLASSIFIED: ICD-10-CM

## 2023-10-27 DIAGNOSIS — E11.40 TYPE 2 DIABETES MELLITUS WITH DIABETIC NEUROPATHY, WITH LONG-TERM CURRENT USE OF INSULIN (MULTI): ICD-10-CM

## 2023-10-27 DIAGNOSIS — G89.4 CHRONIC PAIN SYNDROME: ICD-10-CM

## 2023-10-27 PROBLEM — R57.1 HYPOVOLEMIC SHOCK (MULTI): Status: ACTIVE | Noted: 2023-10-27

## 2023-10-27 PROBLEM — I49.9 ARRHYTHMIA: Status: ACTIVE | Noted: 2023-07-12

## 2023-10-27 PROBLEM — J90 PLEURAL EFFUSION, NOT ELSEWHERE CLASSIFIED: Status: ACTIVE | Noted: 2023-09-07

## 2023-10-27 PROBLEM — R34 OLIGURIA: Status: ACTIVE | Noted: 2023-10-27

## 2023-10-27 PROBLEM — N32.81 OVERACTIVE BLADDER: Status: ACTIVE | Noted: 2017-06-27

## 2023-10-27 PROBLEM — E63.9 UNDERNUTRITION: Status: ACTIVE | Noted: 2023-10-27

## 2023-10-27 PROBLEM — H04.209 EYE TEARING: Status: ACTIVE | Noted: 2023-10-27

## 2023-10-27 PROBLEM — E87.20 LACTIC ACIDOSIS: Status: ACTIVE | Noted: 2023-10-27

## 2023-10-27 PROBLEM — T14.8XXA NONHEALING NONSURGICAL WOUND: Status: ACTIVE | Noted: 2023-10-27

## 2023-10-27 PROBLEM — K57.92 DIVERTICULITIS OF INTESTINE, PART UNSPECIFIED, WITHOUT PERFORATION OR ABSCESS WITHOUT BLEEDING: Status: ACTIVE | Noted: 2023-05-26

## 2023-10-27 PROBLEM — Z90.49 STATUS POST LAPAROSCOPIC COLECTOMY: Status: ACTIVE | Noted: 2023-10-27

## 2023-10-27 PROBLEM — M62.50 MUSCLE WASTING AND ATROPHY, NOT ELSEWHERE CLASSIFIED, UNSPECIFIED SITE: Status: ACTIVE | Noted: 2018-07-19

## 2023-10-27 PROBLEM — M19.012 PRIMARY OSTEOARTHRITIS, LEFT SHOULDER: Status: ACTIVE | Noted: 2023-05-26

## 2023-10-27 PROBLEM — R21 PAPULAR RASH: Status: ACTIVE | Noted: 2023-10-27

## 2023-10-27 PROBLEM — E86.1: Status: ACTIVE | Noted: 2023-10-27

## 2023-10-27 PROBLEM — F33.9 MAJOR DEPRESSIVE DISORDER, RECURRENT, UNSPECIFIED (CMS-HCC): Status: ACTIVE | Noted: 2018-07-19

## 2023-10-27 PROBLEM — M46.35: Status: ACTIVE | Noted: 2023-09-07

## 2023-10-27 PROBLEM — M79.646 FINGER PAIN: Status: ACTIVE | Noted: 2023-10-27

## 2023-10-27 PROBLEM — K57.32 DIVERTICULITIS, COLON: Status: ACTIVE | Noted: 2023-10-27

## 2023-10-27 PROBLEM — M25.571 ANKLE PAIN, RIGHT: Status: ACTIVE | Noted: 2023-10-27

## 2023-10-27 PROBLEM — M65.341 TRIGGER RING FINGER OF RIGHT HAND: Status: ACTIVE | Noted: 2023-10-27

## 2023-10-27 PROBLEM — I21.4 NSTEMI, INITIAL EPISODE OF CARE (MULTI): Status: ACTIVE | Noted: 2023-10-27

## 2023-10-27 PROBLEM — I48.91 ATRIAL FIBRILLATION (MULTI): Status: ACTIVE | Noted: 2023-10-27

## 2023-10-27 LAB
ALBUMIN SERPL BCP-MCNC: 3.9 G/DL (ref 3.4–5)
ALP SERPL-CCNC: 87 U/L (ref 33–136)
ALT SERPL W P-5'-P-CCNC: 20 U/L (ref 7–45)
ANION GAP SERPL CALC-SCNC: 14 MMOL/L (ref 10–20)
AST SERPL W P-5'-P-CCNC: 20 U/L (ref 9–39)
BASOPHILS # BLD AUTO: 0.04 X10*3/UL (ref 0–0.1)
BASOPHILS NFR BLD AUTO: 0.7 %
BILIRUB SERPL-MCNC: 0.2 MG/DL (ref 0–1.2)
BUN SERPL-MCNC: 27 MG/DL (ref 6–23)
CALCIUM SERPL-MCNC: 9.2 MG/DL (ref 8.6–10.6)
CHLORIDE SERPL-SCNC: 106 MMOL/L (ref 98–107)
CO2 SERPL-SCNC: 22 MMOL/L (ref 21–32)
CREAT SERPL-MCNC: 0.85 MG/DL (ref 0.5–1.05)
EOSINOPHIL # BLD AUTO: 0.08 X10*3/UL (ref 0–0.4)
EOSINOPHIL NFR BLD AUTO: 1.5 %
ERYTHROCYTE [DISTWIDTH] IN BLOOD BY AUTOMATED COUNT: 17.1 % (ref 11.5–14.5)
GFR SERPL CREATININE-BSD FRML MDRD: 71 ML/MIN/1.73M*2
GLUCOSE SERPL-MCNC: 114 MG/DL (ref 74–99)
HCT VFR BLD AUTO: 43 % (ref 36–46)
HGB BLD-MCNC: 12.8 G/DL (ref 12–16)
IMM GRANULOCYTES # BLD AUTO: 0.01 X10*3/UL (ref 0–0.5)
IMM GRANULOCYTES NFR BLD AUTO: 0.2 % (ref 0–0.9)
LYMPHOCYTES # BLD AUTO: 1.35 X10*3/UL (ref 0.8–3)
LYMPHOCYTES NFR BLD AUTO: 24.8 %
MCH RBC QN AUTO: 29.4 PG (ref 26–34)
MCHC RBC AUTO-ENTMCNC: 29.8 G/DL (ref 32–36)
MCV RBC AUTO: 99 FL (ref 80–100)
MONOCYTES # BLD AUTO: 0.5 X10*3/UL (ref 0.05–0.8)
MONOCYTES NFR BLD AUTO: 9.2 %
NEUTROPHILS # BLD AUTO: 3.46 X10*3/UL (ref 1.6–5.5)
NEUTROPHILS NFR BLD AUTO: 63.6 %
NRBC BLD-RTO: 0 /100 WBCS (ref 0–0)
PLATELET # BLD AUTO: 259 X10*3/UL (ref 150–450)
PMV BLD AUTO: 10.2 FL (ref 7.5–11.5)
POTASSIUM SERPL-SCNC: 4.2 MMOL/L (ref 3.5–5.3)
PROT SERPL-MCNC: 6.8 G/DL (ref 6.4–8.2)
RBC # BLD AUTO: 4.36 X10*6/UL (ref 4–5.2)
SODIUM SERPL-SCNC: 138 MMOL/L (ref 136–145)
WBC # BLD AUTO: 5.4 X10*3/UL (ref 4.4–11.3)

## 2023-10-27 PROCEDURE — 90686 IIV4 VACC NO PRSV 0.5 ML IM: CPT | Performed by: FAMILY MEDICINE

## 2023-10-27 PROCEDURE — 80053 COMPREHEN METABOLIC PANEL: CPT

## 2023-10-27 PROCEDURE — 1159F MED LIST DOCD IN RCRD: CPT | Performed by: FAMILY MEDICINE

## 2023-10-27 PROCEDURE — G0180 MD CERTIFICATION HHA PATIENT: HCPCS | Performed by: FAMILY MEDICINE

## 2023-10-27 PROCEDURE — 99214 OFFICE O/P EST MOD 30 MIN: CPT | Performed by: FAMILY MEDICINE

## 2023-10-27 PROCEDURE — 3078F DIAST BP <80 MM HG: CPT | Performed by: FAMILY MEDICINE

## 2023-10-27 PROCEDURE — 1090000002 HH PPS REVENUE DEBIT

## 2023-10-27 PROCEDURE — 1090000001 HH PPS REVENUE CREDIT

## 2023-10-27 PROCEDURE — 1125F AMNT PAIN NOTED PAIN PRSNT: CPT | Performed by: FAMILY MEDICINE

## 2023-10-27 PROCEDURE — G0008 ADMIN INFLUENZA VIRUS VAC: HCPCS | Performed by: FAMILY MEDICINE

## 2023-10-27 PROCEDURE — 36415 COLL VENOUS BLD VENIPUNCTURE: CPT

## 2023-10-27 PROCEDURE — 3074F SYST BP LT 130 MM HG: CPT | Performed by: FAMILY MEDICINE

## 2023-10-27 PROCEDURE — 85025 COMPLETE CBC W/AUTO DIFF WBC: CPT

## 2023-10-27 PROCEDURE — 1036F TOBACCO NON-USER: CPT | Performed by: FAMILY MEDICINE

## 2023-10-27 PROCEDURE — 1160F RVW MEDS BY RX/DR IN RCRD: CPT | Performed by: FAMILY MEDICINE

## 2023-10-27 RX ORDER — TALC
POWDER (GRAM) TOPICAL
COMMUNITY

## 2023-10-27 RX ORDER — AMIODARONE HYDROCHLORIDE 200 MG/1
TABLET ORAL
COMMUNITY
End: 2023-11-03 | Stop reason: SDUPTHER

## 2023-10-27 RX ORDER — ACETAMINOPHEN 325 MG/1
TABLET ORAL
COMMUNITY

## 2023-10-27 RX ORDER — CEPHALEXIN 500 MG/1
CAPSULE ORAL
Status: ON HOLD | COMMUNITY
End: 2024-04-11 | Stop reason: WASHOUT

## 2023-10-27 RX ORDER — UBIDECARENONE 75 MG
CAPSULE ORAL
COMMUNITY
End: 2024-01-19 | Stop reason: ALTCHOICE

## 2023-10-27 ASSESSMENT — ENCOUNTER SYMPTOMS
MUSCULOSKELETAL NEGATIVE: 1
NEUROLOGICAL NEGATIVE: 1
CONSTITUTIONAL NEGATIVE: 1
RESPIRATORY NEGATIVE: 1
GASTROINTESTINAL NEGATIVE: 1
CARDIOVASCULAR NEGATIVE: 1

## 2023-10-27 NOTE — PROGRESS NOTES
Pt comes in for fu from having stomach surgery and then went to rehab facility. Today- pt has no concerns.

## 2023-10-27 NOTE — PROGRESS NOTES
"Subjective   Patient ID: Elida Benson is a 78 y.o. female who presents for No chief complaint on file..    HPI sp hemicolectomy w bag secondary to diverticular inf, pt in rehab and on high protien formula to help rebuild strength for eventual re-anastamosis of colon to rectum , pt following w colorectal , htn off amlodipine as bp down w wt loss, dm well cont , anemia     Review of Systems   Constitutional: Negative.    HENT: Negative.     Respiratory: Negative.     Cardiovascular: Negative.    Gastrointestinal: Negative.         Sp hemicolectomy w external colosctomy healing slowly but well    Musculoskeletal: Negative.         Significant physical weakness sp surg and inf, pt getting pt and ot at home now sp rehab center   Neurological: Negative.        Objective   BP 86/60   Pulse 70   Ht 1.473 m (4' 10\")   Wt 53.1 kg (117 lb)   BMI 24.45 kg/m²     Physical Exam  Vitals reviewed.   Constitutional:       Appearance: Normal appearance. She is normal weight.   Eyes:      Extraocular Movements: Extraocular movements intact.      Conjunctiva/sclera: Conjunctivae normal.      Pupils: Pupils are equal, round, and reactive to light.   Cardiovascular:      Rate and Rhythm: Normal rate and regular rhythm.      Pulses: Normal pulses.      Heart sounds: Normal heart sounds.   Pulmonary:      Effort: Pulmonary effort is normal.      Breath sounds: Normal breath sounds.   Abdominal:      General: Bowel sounds are normal.      Palpations: Abdomen is soft.      Comments: Colostomy doing well    Musculoskeletal:         General: Normal range of motion.      Comments: Physical deconditioning and leg weakness,    Skin:     General: Skin is warm and dry.   Neurological:      General: No focal deficit present.      Mental Status: She is alert and oriented to person, place, and time. Mental status is at baseline.         Assessment/Plan   Problem List Items Addressed This Visit             ICD-10-CM    Anemia D64.9    Relevant " Orders    CBC and Auto Differential    Atrial fibrillation (CMS/[facility]) I48.91    Chronic pain syndrome G89.4    Depression F32.A    Difficulty in walking, not elsewhere classified R26.2    Hypertension, essential I10    Ileostomy status (CMS/[facility]) Z93.2    Pulmonary hypertension, unspecified (CMS/[facility]) - Primary I27.20    Relevant Orders    Comprehensive Metabolic Panel    Type 2 diabetes mellitus with diabetic neuropathy, unspecified (CMS/[facility]) E11.40    Weight loss R63.4     Other Visit Diagnoses         Codes    Need for influenza vaccination     Z23    Relevant Orders    Flu vaccine (IIV4) age 6 months and greater, preservative free

## 2023-10-28 PROCEDURE — 1090000002 HH PPS REVENUE DEBIT

## 2023-10-28 PROCEDURE — 1090000001 HH PPS REVENUE CREDIT

## 2023-10-29 PROCEDURE — 1090000002 HH PPS REVENUE DEBIT

## 2023-10-29 PROCEDURE — 1090000001 HH PPS REVENUE CREDIT

## 2023-10-30 ENCOUNTER — HOME CARE VISIT (OUTPATIENT)
Dept: HOME HEALTH SERVICES | Facility: HOME HEALTH | Age: 78
End: 2023-10-30
Payer: MEDICARE

## 2023-10-30 ENCOUNTER — TELEPHONE (OUTPATIENT)
Dept: PRIMARY CARE | Facility: CLINIC | Age: 78
End: 2023-10-30
Payer: MEDICARE

## 2023-10-30 VITALS
SYSTOLIC BLOOD PRESSURE: 110 MMHG | HEART RATE: 69 BPM | TEMPERATURE: 97.6 F | DIASTOLIC BLOOD PRESSURE: 78 MMHG | RESPIRATION RATE: 16 BRPM

## 2023-10-30 PROCEDURE — 1090000002 HH PPS REVENUE DEBIT

## 2023-10-30 PROCEDURE — G0158 HHC OT ASSISTANT EA 15: HCPCS | Mod: HHH

## 2023-10-30 PROCEDURE — 1090000001 HH PPS REVENUE CREDIT

## 2023-10-30 ASSESSMENT — PAIN DESCRIPTION - PAIN TYPE: TYPE: ACUTE PAIN

## 2023-10-30 NOTE — TELEPHONE ENCOUNTER
Spoke with pt    ----- Message from David Swift MD sent at 10/29/2023 10:42 AM EDT -----  All results are stable  no change

## 2023-10-31 ENCOUNTER — HOME CARE VISIT (OUTPATIENT)
Dept: HOME HEALTH SERVICES | Facility: HOME HEALTH | Age: 78
End: 2023-10-31
Payer: MEDICARE

## 2023-10-31 VITALS
SYSTOLIC BLOOD PRESSURE: 102 MMHG | HEART RATE: 80 BPM | TEMPERATURE: 97.6 F | DIASTOLIC BLOOD PRESSURE: 68 MMHG | OXYGEN SATURATION: 100 %

## 2023-10-31 PROCEDURE — 1090000001 HH PPS REVENUE CREDIT

## 2023-10-31 PROCEDURE — 1090000002 HH PPS REVENUE DEBIT

## 2023-10-31 PROCEDURE — G0151 HHCP-SERV OF PT,EA 15 MIN: HCPCS | Mod: HHH

## 2023-10-31 NOTE — HOME HEALTH
Patient visited today for physical therapy to address lower extremity strengthening, transfers, and ambulation. Today patient is able to transfer Sit to stand from bed with just supervision. Sit to stand from wheelchair. Requires moderate assist initially after four repetitions, patient able to stand from wheelchair with supervision to contact guard assist. Encouraged being up to chair more versus laying in bed. Patient able to ambulate  today household distances with contact guard assistance using rolling walker and right AFO. Performed seated home exercises 15 reps ea. Patient verbalized that she was slightly fatigued after today’s visit. Patient also reported that abdominal pain has resolved. Overall, pt engaged in [facility] therapy and feeling better in general.

## 2023-11-01 ENCOUNTER — HOME CARE VISIT (OUTPATIENT)
Dept: HOME HEALTH SERVICES | Facility: HOME HEALTH | Age: 78
End: 2023-11-01
Payer: MEDICARE

## 2023-11-01 ENCOUNTER — APPOINTMENT (OUTPATIENT)
Dept: HOME HEALTH SERVICES | Facility: HOME HEALTH | Age: 78
End: 2023-11-01
Payer: MEDICARE

## 2023-11-01 VITALS
DIASTOLIC BLOOD PRESSURE: 73 MMHG | TEMPERATURE: 97.6 F | RESPIRATION RATE: 17 BRPM | SYSTOLIC BLOOD PRESSURE: 106 MMHG | HEART RATE: 71 BPM

## 2023-11-01 DIAGNOSIS — I48.11 LONGSTANDING PERSISTENT ATRIAL FIBRILLATION (MULTI): Primary | ICD-10-CM

## 2023-11-01 PROCEDURE — 1090000001 HH PPS REVENUE CREDIT

## 2023-11-01 PROCEDURE — G0158 HHC OT ASSISTANT EA 15: HCPCS | Mod: HHH

## 2023-11-01 PROCEDURE — G0157 HHC PT ASSISTANT EA 15: HCPCS | Mod: HHH

## 2023-11-01 PROCEDURE — 1090000002 HH PPS REVENUE DEBIT

## 2023-11-01 SDOH — HEALTH STABILITY: PHYSICAL HEALTH
EXERCISE COMMENTS: INSTRUCTED PT AND PERFORMED SEATED THERAPEUTIC EXERCISES TO INCREASE STRENGTH AND IMPROVE FUNCTIONAL TRANSFERS AND GAIT WITH LAQ, HIP FLEX, HIP ABD/ADD WITH RESISTANCE AND HAMSTRING CURLS WITH THERABAND, 3 SETS OF 10 WITH VERBAL CUEING FOR PROPER FOR

## 2023-11-01 SDOH — HEALTH STABILITY: PHYSICAL HEALTH
EXERCISE COMMENTS: M AND TECHNIQUE.PT  EDUCATED IN HOME EXERCISE PROGRAM OF ABOVE EXERCISES TO ENCOURAGE FOLLOW THROUGH IN ORDER TO IMPROVE OUTCOME AND CARRY OVER WITH RETURN DEMONSTRATION OF VERBAL UNDERSTANDING.ISSUED HANDOUTS OF HEP AND INSTRUCTED PT TO PERFORM TWIC

## 2023-11-01 SDOH — HEALTH STABILITY: PHYSICAL HEALTH: EXERCISE TYPE: BLE STRENGTHENING

## 2023-11-01 SDOH — HEALTH STABILITY: PHYSICAL HEALTH
EXERCISE COMMENTS: E DAILY AS TOLERATED.  PT IS VERY WEAK FROM SURGERY, HOSPITAL STAY, AND COVID.  PT DEMONSTRATES INCREASE COGNITIVE DECLINE SINCE LAST SEEN IN AUGUST.

## 2023-11-01 ASSESSMENT — PAIN SCALES - PAIN ASSESSMENT IN ADVANCED DEMENTIA (PAINAD)
FACIALEXPRESSION: 0 - SMILING OR INEXPRESSIVE.
CONSOLABILITY: 0
CONSOLABILITY: 0 - NO NEED TO CONSOLE.
NEGVOCALIZATION: 0 - NONE.
BODYLANGUAGE: 0
BODYLANGUAGE: 0 - RELAXED.
NEGVOCALIZATION: 0
BREATHING: 0
FACIALEXPRESSION: 0
TOTALSCORE: 0

## 2023-11-01 ASSESSMENT — ENCOUNTER SYMPTOMS
PAIN LOCATION - PAIN SEVERITY: 7/10
PAIN LOCATION - PAIN SEVERITY: 7/10
MUSCLE WEAKNESS: 1
PERSON REPORTING PAIN: PATIENT
PAIN LOCATION: RIGHT ARM
OCCASIONAL FEELINGS OF UNSTEADINESS: 0
PAIN: 1
PAIN LOCATION: LEFT KNEE
SUBJECTIVE PAIN PROGRESSION: UNCHANGED
PAIN LOCATION: BACK
PAIN LOCATION - RELIEVING FACTORS: REST
LOWEST PAIN SEVERITY IN PAST 24 HOURS: 3/10
PAIN LOCATION - PAIN FREQUENCY: WITH ACTIVITY
PAIN LOCATION - PAIN QUALITY: ACHY
PERSON REPORTING PAIN: PATIENT
HIGHEST PAIN SEVERITY IN PAST 24 HOURS: 6/10
PAIN LOCATION - PAIN SEVERITY: 6/10
PAIN SEVERITY GOAL: 0/10
LOWEST PAIN SEVERITY IN PAST 24 HOURS: 2/10
PAIN SEVERITY GOAL: 0/10
PAIN LOCATION: LEFT ARM
HIGHEST PAIN SEVERITY IN PAST 24 HOURS: 8/10
SUBJECTIVE PAIN PROGRESSION: UNCHANGED
PAIN LOCATION - PAIN SEVERITY: 5/10
PAIN: 1

## 2023-11-01 ASSESSMENT — ACTIVITIES OF DAILY LIVING (ADL)
AMBULATION ASSISTANCE: STAND BY ASSIST
AMBULATION ASSISTANCE ON FLAT SURFACES: 1

## 2023-11-02 ENCOUNTER — HOME CARE VISIT (OUTPATIENT)
Dept: HOME HEALTH SERVICES | Facility: HOME HEALTH | Age: 78
End: 2023-11-02
Payer: MEDICARE

## 2023-11-02 VITALS
RESPIRATION RATE: 12 BRPM | TEMPERATURE: 98.4 F | SYSTOLIC BLOOD PRESSURE: 100 MMHG | HEART RATE: 86 BPM | DIASTOLIC BLOOD PRESSURE: 60 MMHG

## 2023-11-02 PROCEDURE — G0156 HHCP-SVS OF AIDE,EA 15 MIN: HCPCS | Mod: HHH

## 2023-11-02 PROCEDURE — 1090000002 HH PPS REVENUE DEBIT

## 2023-11-02 PROCEDURE — G0299 HHS/HOSPICE OF RN EA 15 MIN: HCPCS | Mod: HHH

## 2023-11-02 PROCEDURE — 1090000001 HH PPS REVENUE CREDIT

## 2023-11-02 RX ORDER — AMIODARONE HYDROCHLORIDE 200 MG/1
TABLET ORAL
OUTPATIENT
Start: 2023-11-02

## 2023-11-02 SDOH — ECONOMIC STABILITY: GENERAL

## 2023-11-02 ASSESSMENT — ACTIVITIES OF DAILY LIVING (ADL)
MONEY MANAGEMENT (EXPENSES/BILLS): NEEDS ASSISTANCE
AMBULATION ASSISTANCE: 1

## 2023-11-02 ASSESSMENT — ENCOUNTER SYMPTOMS
LOSS OF SENSATION IN FEET: 0
PERSON REPORTING PAIN: PATIENT
STOOL FREQUENCY: MORE THAN TWICE DAILY
FORGETFULNESS: 1
DEPRESSION: 0
APPETITE LEVEL: FAIR
DENIES PAIN: 1

## 2023-11-02 NOTE — HOME HEALTH
WOC nursing visit     stoma type:  size:1 1/8  location: rt side  protrustion:yes   mucocutaneous junction:intact  mucosal condition and color: red moist   Peristomal Skin: discolorad  Peristomal contour:flat  character of output: varies     pouching system used : 1 piece precut chris 1 1/8 convex  accessories used: remover and ring    martin has not done any pouching family is doing it.     it was last changed 10 25  explaied should be changed 2 x weekly., had dgt participate in care today    and she did great. reviewed samples and recieved ordered supplies.   dgt to set up appoint with maria teresa.

## 2023-11-03 ENCOUNTER — TELEPHONE (OUTPATIENT)
Dept: PRIMARY CARE | Facility: CLINIC | Age: 78
End: 2023-11-03
Payer: MEDICARE

## 2023-11-03 DIAGNOSIS — I49.9 CARDIAC ARRHYTHMIA, UNSPECIFIED CARDIAC ARRHYTHMIA TYPE: Primary | ICD-10-CM

## 2023-11-03 PROCEDURE — 1090000002 HH PPS REVENUE DEBIT

## 2023-11-03 PROCEDURE — 1090000001 HH PPS REVENUE CREDIT

## 2023-11-03 RX ORDER — AMIODARONE HYDROCHLORIDE 200 MG/1
TABLET ORAL
Qty: 60 TABLET | Refills: 0 | Status: SHIPPED | OUTPATIENT
Start: 2023-11-03 | End: 2023-12-08 | Stop reason: SDUPTHER

## 2023-11-03 NOTE — TELEPHONE ENCOUNTER
Daughter says when they went to  the amiodarone she was told that the physician would not fill it. Patients daughter would like to know why.

## 2023-11-04 PROCEDURE — 1090000001 HH PPS REVENUE CREDIT

## 2023-11-04 PROCEDURE — 1090000002 HH PPS REVENUE DEBIT

## 2023-11-04 ASSESSMENT — ENCOUNTER SYMPTOMS
DENIES PAIN: 1
CHANGE IN APPETITE: UNCHANGED
APPETITE LEVEL: GOOD

## 2023-11-05 PROCEDURE — 1090000002 HH PPS REVENUE DEBIT

## 2023-11-05 PROCEDURE — 1090000001 HH PPS REVENUE CREDIT

## 2023-11-06 ENCOUNTER — HOME CARE VISIT (OUTPATIENT)
Dept: HOME HEALTH SERVICES | Facility: HOME HEALTH | Age: 78
End: 2023-11-06
Payer: MEDICARE

## 2023-11-06 PROCEDURE — G0157 HHC PT ASSISTANT EA 15: HCPCS | Mod: HHH

## 2023-11-06 PROCEDURE — 1090000002 HH PPS REVENUE DEBIT

## 2023-11-06 PROCEDURE — 1090000001 HH PPS REVENUE CREDIT

## 2023-11-06 SDOH — HEALTH STABILITY: PHYSICAL HEALTH: EXERCISE TYPE: BLE STRENGTHERNING

## 2023-11-06 SDOH — HEALTH STABILITY: PHYSICAL HEALTH: EXERCISE COMMENTS: M AND TECHNIQUE.

## 2023-11-06 ASSESSMENT — ACTIVITIES OF DAILY LIVING (ADL)
AMBULATION_DISTANCE/DURATION_TOLERATED: 30-40FT
AMBULATION ASSISTANCE: STAND BY ASSIST
AMBULATION ASSISTANCE ON FLAT SURFACES: 1
CURRENT_FUNCTION: STAND BY ASSIST

## 2023-11-06 ASSESSMENT — ENCOUNTER SYMPTOMS
LOSS OF SENSATION IN FEET: 0
MUSCLE WEAKNESS: 1
OCCASIONAL FEELINGS OF UNSTEADINESS: 1
PERSON REPORTING PAIN: PATIENT
DENIES PAIN: 1
DEPRESSION: 0

## 2023-11-07 ENCOUNTER — HOME CARE VISIT (OUTPATIENT)
Dept: HOME HEALTH SERVICES | Facility: HOME HEALTH | Age: 78
End: 2023-11-07
Payer: MEDICARE

## 2023-11-07 VITALS
TEMPERATURE: 98.3 F | DIASTOLIC BLOOD PRESSURE: 74 MMHG | RESPIRATION RATE: 17 BRPM | HEART RATE: 90 BPM | SYSTOLIC BLOOD PRESSURE: 116 MMHG

## 2023-11-07 PROCEDURE — 1090000001 HH PPS REVENUE CREDIT

## 2023-11-07 PROCEDURE — 1090000002 HH PPS REVENUE DEBIT

## 2023-11-07 PROCEDURE — G0158 HHC OT ASSISTANT EA 15: HCPCS | Mod: HHH

## 2023-11-08 PROCEDURE — 1090000001 HH PPS REVENUE CREDIT

## 2023-11-08 PROCEDURE — 1090000002 HH PPS REVENUE DEBIT

## 2023-11-09 ENCOUNTER — HOME CARE VISIT (OUTPATIENT)
Dept: HOME HEALTH SERVICES | Facility: HOME HEALTH | Age: 78
End: 2023-11-09
Payer: MEDICARE

## 2023-11-09 VITALS
OXYGEN SATURATION: 100 % | HEART RATE: 64 BPM | SYSTOLIC BLOOD PRESSURE: 100 MMHG | TEMPERATURE: 98.6 F | DIASTOLIC BLOOD PRESSURE: 60 MMHG

## 2023-11-09 PROCEDURE — 0023 HH SOC

## 2023-11-09 PROCEDURE — G0156 HHCP-SVS OF AIDE,EA 15 MIN: HCPCS | Mod: HHH

## 2023-11-09 PROCEDURE — 1090000002 HH PPS REVENUE DEBIT

## 2023-11-09 PROCEDURE — G0151 HHCP-SERV OF PT,EA 15 MIN: HCPCS | Mod: HHH

## 2023-11-09 PROCEDURE — G0300 HHS/HOSPICE OF LPN EA 15 MIN: HCPCS | Mod: HHH

## 2023-11-09 PROCEDURE — 1090000001 HH PPS REVENUE CREDIT

## 2023-11-09 ASSESSMENT — ENCOUNTER SYMPTOMS
DENIES PAIN: 1
MUSCLE WEAKNESS: 1

## 2023-11-09 ASSESSMENT — ACTIVITIES OF DAILY LIVING (ADL)
AMBULATION ASSISTANCE ON FLAT SURFACES: 1
AMBULATION ASSISTANCE: STAND BY ASSIST
CURRENT_FUNCTION: STAND BY ASSIST

## 2023-11-10 VITALS
OXYGEN SATURATION: 100 % | HEART RATE: 94 BPM | RESPIRATION RATE: 16 BRPM | SYSTOLIC BLOOD PRESSURE: 124 MMHG | DIASTOLIC BLOOD PRESSURE: 82 MMHG

## 2023-11-10 PROCEDURE — 1090000001 HH PPS REVENUE CREDIT

## 2023-11-10 PROCEDURE — 1090000002 HH PPS REVENUE DEBIT

## 2023-11-10 ASSESSMENT — ENCOUNTER SYMPTOMS
CHANGE IN APPETITE: UNCHANGED
APPETITE LEVEL: GOOD

## 2023-11-10 NOTE — HOME HEALTH
Pt visited for reasessment today.  States overall improvement in transfers and ambulation.  Pt states no pain today.  She has been eating a meal at dining table.  Gradually lessening time in bed.  States satisfaction of care provided by PTA.  Pt demonstrates improvement in bed mobility and transfers on this reasessment.  Recommend continiued care to improve stair climbing.

## 2023-11-11 ENCOUNTER — HOME CARE VISIT (OUTPATIENT)
Dept: HOME HEALTH SERVICES | Facility: HOME HEALTH | Age: 78
End: 2023-11-11
Payer: MEDICARE

## 2023-11-11 VITALS
TEMPERATURE: 97.6 F | DIASTOLIC BLOOD PRESSURE: 68 MMHG | HEART RATE: 77 BPM | SYSTOLIC BLOOD PRESSURE: 108 MMHG | RESPIRATION RATE: 17 BRPM

## 2023-11-11 PROCEDURE — 1090000001 HH PPS REVENUE CREDIT

## 2023-11-11 PROCEDURE — 1090000002 HH PPS REVENUE DEBIT

## 2023-11-11 PROCEDURE — G0158 HHC OT ASSISTANT EA 15: HCPCS | Mod: HHH

## 2023-11-12 PROCEDURE — 1090000002 HH PPS REVENUE DEBIT

## 2023-11-12 PROCEDURE — 1090000001 HH PPS REVENUE CREDIT

## 2023-11-13 ENCOUNTER — HOME CARE VISIT (OUTPATIENT)
Dept: HOME HEALTH SERVICES | Facility: HOME HEALTH | Age: 78
End: 2023-11-13
Payer: MEDICARE

## 2023-11-13 PROCEDURE — 1090000001 HH PPS REVENUE CREDIT

## 2023-11-13 PROCEDURE — 1090000002 HH PPS REVENUE DEBIT

## 2023-11-13 PROCEDURE — G0157 HHC PT ASSISTANT EA 15: HCPCS | Mod: HHH

## 2023-11-13 SDOH — HEALTH STABILITY: PHYSICAL HEALTH: EXERCISE TYPE: BLE STRENGTHENING

## 2023-11-13 SDOH — HEALTH STABILITY: PHYSICAL HEALTH
EXERCISE COMMENTS: INSTRUCTED PT AND PERFORMED SEATED AND STANDING  THERAPEUTIC EXERCISES TO INCREASE STRENGTH OF BLE AND CORE AND IMPROVE FUNCTIONAL TRANSFERS AND GAIT WITH LAQ, HIP FLEX, HIP ABD/ADD WITH RESISTANCE AND HAMSTRING CURLS WITH THERABAND, 3 SETS OF 10 WIT

## 2023-11-13 SDOH — HEALTH STABILITY: PHYSICAL HEALTH
EXERCISE COMMENTS: H VERBAL CUEING FOR PROPER FORM AND TECHNIQUE.  PT DEMONSTRATES POOR ENDURANCE DURING THERAPY SESSION, REQUIRING SEVERAL REST PERIODS.

## 2023-11-13 ASSESSMENT — PAIN SCALES - PAIN ASSESSMENT IN ADVANCED DEMENTIA (PAINAD)
BODYLANGUAGE: 0
FACIALEXPRESSION: 0
NEGVOCALIZATION: 0 - NONE.
BODYLANGUAGE: 0 - RELAXED.
FACIALEXPRESSION: 0 - SMILING OR INEXPRESSIVE.
NEGVOCALIZATION: 0
BREATHING: 0

## 2023-11-13 ASSESSMENT — ACTIVITIES OF DAILY LIVING (ADL): AMBULATION ASSISTANCE ON FLAT SURFACES: 1

## 2023-11-13 ASSESSMENT — ENCOUNTER SYMPTOMS
LOSS OF SENSATION IN FEET: 0
DEPRESSION: 0
DENIES PAIN: 1
OCCASIONAL FEELINGS OF UNSTEADINESS: 1

## 2023-11-14 ENCOUNTER — HOME CARE VISIT (OUTPATIENT)
Dept: HOME HEALTH SERVICES | Facility: HOME HEALTH | Age: 78
End: 2023-11-14
Payer: MEDICARE

## 2023-11-14 PROCEDURE — G0152 HHCP-SERV OF OT,EA 15 MIN: HCPCS | Mod: HHH

## 2023-11-14 PROCEDURE — 1090000002 HH PPS REVENUE DEBIT

## 2023-11-14 PROCEDURE — 1090000001 HH PPS REVENUE CREDIT

## 2023-11-15 ENCOUNTER — HOME CARE VISIT (OUTPATIENT)
Dept: HOME HEALTH SERVICES | Facility: HOME HEALTH | Age: 78
End: 2023-11-15
Payer: MEDICARE

## 2023-11-15 PROCEDURE — 1090000002 HH PPS REVENUE DEBIT

## 2023-11-15 PROCEDURE — G0157 HHC PT ASSISTANT EA 15: HCPCS | Mod: HHH

## 2023-11-15 PROCEDURE — 1090000001 HH PPS REVENUE CREDIT

## 2023-11-15 SDOH — HEALTH STABILITY: PHYSICAL HEALTH: EXERCISE COMMENTS: E DAILY AS TOLERATED.

## 2023-11-15 SDOH — HEALTH STABILITY: PHYSICAL HEALTH: EXERCISE TYPE: BLE STRENGTHENING

## 2023-11-15 ASSESSMENT — ACTIVITIES OF DAILY LIVING (ADL)
TOILETING: MODERATE ASSIST
BATHING_CURRENT_FUNCTION: MINIMUM ASSIST
DRESSING_UB_CURRENT_FUNCTION: MINIMUM ASSIST
AMBULATION ASSISTANCE ON FLAT SURFACES: 1
TOILETING: MINIMUM ASSIST
BATHING ASSESSED: 1
GROOMING_CURRENT_FUNCTION: STAND BY ASSIST
TOILETING: 1
FEEDING ASSESSED: 1
GROOMING ASSESSED: 1
AMBULATION ASSISTANCE: 1
BATHING_CURRENT_FUNCTION: MODERATE ASSIST
AMBULATION_DISTANCE/DURATION_TOLERATED: 30-40FT
DRESSING_LB_CURRENT_FUNCTION: MODERATE ASSIST
FEEDING: MINIMUM ASSIST
AMBULATION ASSISTANCE: STAND BY ASSIST

## 2023-11-15 ASSESSMENT — ENCOUNTER SYMPTOMS
OCCASIONAL FEELINGS OF UNSTEADINESS: 1
LOSS OF SENSATION IN FEET: 0
DEPRESSION: 0
ARTHRALGIAS: 1
DENIES PAIN: 1
PAIN: 1
PERSON REPORTING PAIN: PATIENT
PAIN LOCATION: ABDOMEN
MUSCLE WEAKNESS: 1
SUBJECTIVE PAIN PROGRESSION: GRADUALLY IMPROVING

## 2023-11-16 ENCOUNTER — HOME CARE VISIT (OUTPATIENT)
Dept: HOME HEALTH SERVICES | Facility: HOME HEALTH | Age: 78
End: 2023-11-16
Payer: MEDICARE

## 2023-11-16 ENCOUNTER — TELEPHONE (OUTPATIENT)
Dept: SURGERY | Facility: CLINIC | Age: 78
End: 2023-11-16
Payer: MEDICARE

## 2023-11-16 VITALS
OXYGEN SATURATION: 97 % | RESPIRATION RATE: 16 BRPM | SYSTOLIC BLOOD PRESSURE: 122 MMHG | DIASTOLIC BLOOD PRESSURE: 82 MMHG | HEART RATE: 74 BPM

## 2023-11-16 DIAGNOSIS — K57.32 DIVERTICULITIS OF LARGE INTESTINE WITHOUT PERFORATION OR ABSCESS WITHOUT BLEEDING: ICD-10-CM

## 2023-11-16 PROCEDURE — 1090000001 HH PPS REVENUE CREDIT

## 2023-11-16 PROCEDURE — 1090000002 HH PPS REVENUE DEBIT

## 2023-11-16 PROCEDURE — G0300 HHS/HOSPICE OF LPN EA 15 MIN: HCPCS | Mod: HHH

## 2023-11-16 ASSESSMENT — ENCOUNTER SYMPTOMS
APPETITE LEVEL: GOOD
CHANGE IN APPETITE: UNCHANGED
STOOL FREQUENCY: DAILY

## 2023-11-16 NOTE — TELEPHONE ENCOUNTER
Attempted to return call to patient's daughter Kaelyn at [phone]. Message left that I was returning her call.  I invited her to reach me at [phone].    When she returns the call will inquire what her mom's weight is, is she up and walking/moving.   Radha Meyer RN

## 2023-11-16 NOTE — TELEPHONE ENCOUNTER
Spoke with patient's daughter Loren.  She reports that her mom has gained four pounds.  She has been doing PT and OT twice per week.  She is eating everything in sight.  They know that her weight needs to be up to 125 lb prior to stoma closure.  We can schedule the GGE for next month and then return to see Dr. Kelley to discuss if she approves scheduling the next surgery.  I have asked them to stop at our office and  more protein shakes.    Radha Meyer RN    
Alert and oriented to person, place and time

## 2023-11-16 NOTE — HOME HEALTH
122/82  16  98.2  74    Need supplies urine collected awaiting order  Adequate: hears normal conversation without difficulty

## 2023-11-17 ENCOUNTER — HOME CARE VISIT (OUTPATIENT)
Dept: HOME HEALTH SERVICES | Facility: HOME HEALTH | Age: 78
End: 2023-11-17
Payer: MEDICARE

## 2023-11-17 VITALS
SYSTOLIC BLOOD PRESSURE: 108 MMHG | RESPIRATION RATE: 17 BRPM | TEMPERATURE: 97.1 F | DIASTOLIC BLOOD PRESSURE: 70 MMHG | HEART RATE: 69 BPM

## 2023-11-17 PROCEDURE — 1090000001 HH PPS REVENUE CREDIT

## 2023-11-17 PROCEDURE — G0158 HHC OT ASSISTANT EA 15: HCPCS | Mod: HHH

## 2023-11-17 PROCEDURE — 1090000002 HH PPS REVENUE DEBIT

## 2023-11-17 ASSESSMENT — PAIN DESCRIPTION - PAIN TYPE: TYPE: CHRONIC PAIN

## 2023-11-18 PROCEDURE — 1090000002 HH PPS REVENUE DEBIT

## 2023-11-18 PROCEDURE — 1090000001 HH PPS REVENUE CREDIT

## 2023-11-19 PROCEDURE — 1090000002 HH PPS REVENUE DEBIT

## 2023-11-19 PROCEDURE — 1090000001 HH PPS REVENUE CREDIT

## 2023-11-20 ENCOUNTER — HOME CARE VISIT (OUTPATIENT)
Dept: HOME HEALTH SERVICES | Facility: HOME HEALTH | Age: 78
End: 2023-11-20
Payer: MEDICARE

## 2023-11-20 PROCEDURE — 1090000001 HH PPS REVENUE CREDIT

## 2023-11-20 PROCEDURE — 1090000002 HH PPS REVENUE DEBIT

## 2023-11-20 PROCEDURE — G0157 HHC PT ASSISTANT EA 15: HCPCS | Mod: HHH

## 2023-11-20 SDOH — HEALTH STABILITY: PHYSICAL HEALTH
EXERCISE COMMENTS: INSTRUCTED PT AND PERFORMED SEATED AND STANDING THERAPEUTIC EXERCISES TO INCREASE STRENGTH AND IMPROVE FUNCTIONAL TRANSFERS AND GAIT WITH LAQ, HIP FLEX, HIP ABD/ADD WITH RESISTANCE AND HAMSTRING CURLS WITH THERABAND, 2 SETS OF 10 WITH VERBAL CUEING F

## 2023-11-20 SDOH — HEALTH STABILITY: PHYSICAL HEALTH: EXERCISE TYPE: BLE STRENGTHENING

## 2023-11-20 SDOH — HEALTH STABILITY: PHYSICAL HEALTH: EXERCISE COMMENTS: OR PROPER FORM AND TECHNIQUE.PT DEMONSTRATES GOOD UNDERSTANDING AND TEACH BACK OF HEP

## 2023-11-20 ASSESSMENT — PAIN SCALES - PAIN ASSESSMENT IN ADVANCED DEMENTIA (PAINAD)
BREATHING: 0
FACIALEXPRESSION: 0
CONSOLABILITY: 0
BODYLANGUAGE: 0 - RELAXED.
BODYLANGUAGE: 0
NEGVOCALIZATION: 0
CONSOLABILITY: 0 - NO NEED TO CONSOLE.
TOTALSCORE: 0
FACIALEXPRESSION: 0 - SMILING OR INEXPRESSIVE.
NEGVOCALIZATION: 0 - NONE.

## 2023-11-20 ASSESSMENT — ENCOUNTER SYMPTOMS
HIGHEST PAIN SEVERITY IN PAST 24 HOURS: 6/10
PAIN LOCATION - PAIN SEVERITY: 5/10
PAIN LOCATION - PAIN SEVERITY: 5/10
PERSON REPORTING PAIN: PATIENT
LOWEST PAIN SEVERITY IN PAST 24 HOURS: 2/10
SUBJECTIVE PAIN PROGRESSION: GRADUALLY IMPROVING
DEPRESSION: 0
PAIN: 1
LOSS OF SENSATION IN FEET: 0
PAIN LOCATION: HEAD
PAIN LOCATION: RIGHT HIP
PAIN SEVERITY GOAL: 1/10
OCCASIONAL FEELINGS OF UNSTEADINESS: 1

## 2023-11-20 ASSESSMENT — ACTIVITIES OF DAILY LIVING (ADL)
AMBULATION ASSISTANCE ON FLAT SURFACES: 1
AMBULATION ASSISTANCE: STAND BY ASSIST
AMBULATION ASSISTANCE: 1
AMBULATION_DISTANCE/DURATION_TOLERATED: 60-70FT

## 2023-11-21 ENCOUNTER — HOME CARE VISIT (OUTPATIENT)
Dept: HOME HEALTH SERVICES | Facility: HOME HEALTH | Age: 78
End: 2023-11-21
Payer: MEDICARE

## 2023-11-21 VITALS
SYSTOLIC BLOOD PRESSURE: 121 MMHG | HEART RATE: 75 BPM | TEMPERATURE: 97.7 F | DIASTOLIC BLOOD PRESSURE: 72 MMHG | RESPIRATION RATE: 17 BRPM

## 2023-11-21 PROCEDURE — 1090000001 HH PPS REVENUE CREDIT

## 2023-11-21 PROCEDURE — 1090000002 HH PPS REVENUE DEBIT

## 2023-11-21 PROCEDURE — G0158 HHC OT ASSISTANT EA 15: HCPCS | Mod: HHH

## 2023-11-21 ASSESSMENT — ENCOUNTER SYMPTOMS
PAIN SEVERITY GOAL: 0/10
PAIN LOCATION: LEFT ARM
PAIN: 1
PAIN LOCATION - PAIN FREQUENCY: CONSTANT
LOWEST PAIN SEVERITY IN PAST 24 HOURS: 3/10
SUBJECTIVE PAIN PROGRESSION: UNCHANGED
PAIN LOCATION - PAIN SEVERITY: 6/10
PERSON REPORTING PAIN: PATIENT
HIGHEST PAIN SEVERITY IN PAST 24 HOURS: 7/10
PAIN LOCATION - PAIN QUALITY: ACHE
PAIN LOCATION - RELIEVING FACTORS: MEDICATION AND REST

## 2023-11-22 ENCOUNTER — HOME CARE VISIT (OUTPATIENT)
Dept: HOME HEALTH SERVICES | Facility: HOME HEALTH | Age: 78
End: 2023-11-22
Payer: MEDICARE

## 2023-11-22 PROCEDURE — G0300 HHS/HOSPICE OF LPN EA 15 MIN: HCPCS | Mod: HHH

## 2023-11-22 PROCEDURE — 1090000002 HH PPS REVENUE DEBIT

## 2023-11-22 PROCEDURE — G0157 HHC PT ASSISTANT EA 15: HCPCS | Mod: HHH

## 2023-11-22 PROCEDURE — 1090000001 HH PPS REVENUE CREDIT

## 2023-11-23 PROCEDURE — 1090000001 HH PPS REVENUE CREDIT

## 2023-11-23 PROCEDURE — 1090000002 HH PPS REVENUE DEBIT

## 2023-11-23 SDOH — HEALTH STABILITY: PHYSICAL HEALTH: EXERCISE TYPE: BLE STRENTHENING

## 2023-11-23 SDOH — HEALTH STABILITY: PHYSICAL HEALTH: EXERCISE COMMENTS: E DAILY AS TOLERATED.ISSUED HANDOUTS OF HEP AND INSTRUCTED PT TO PERFORM TWICE DAILY AS TOLERATED.

## 2023-11-23 ASSESSMENT — ENCOUNTER SYMPTOMS
LOSS OF SENSATION IN FEET: 0
DENIES PAIN: 1
OCCASIONAL FEELINGS OF UNSTEADINESS: 1
PERSON REPORTING PAIN: PATIENT
DEPRESSION: 0
MUSCLE WEAKNESS: 1

## 2023-11-23 ASSESSMENT — PAIN SCALES - PAIN ASSESSMENT IN ADVANCED DEMENTIA (PAINAD)
FACIALEXPRESSION: 0
NEGVOCALIZATION: 0
CONSOLABILITY: 0 - NO NEED TO CONSOLE.
NEGVOCALIZATION: 0 - NONE.
CONSOLABILITY: 0
BODYLANGUAGE: 0
BREATHING: 0
TOTALSCORE: 0
FACIALEXPRESSION: 0 - SMILING OR INEXPRESSIVE.
BODYLANGUAGE: 0 - RELAXED.

## 2023-11-23 ASSESSMENT — ACTIVITIES OF DAILY LIVING (ADL)
AMBULATION ASSISTANCE ON FLAT SURFACES: 1
CURRENT_FUNCTION: MINIMUM ASSIST
AMBULATION ASSISTANCE: 1
AMBULATION_DISTANCE/DURATION_TOLERATED: 40-50FT
AMBULATION ASSISTANCE: STAND BY ASSIST
PHYSICAL TRANSFERS ASSESSED: 1

## 2023-11-24 ENCOUNTER — HOME CARE VISIT (OUTPATIENT)
Dept: HOME HEALTH SERVICES | Facility: HOME HEALTH | Age: 78
End: 2023-11-24
Payer: MEDICARE

## 2023-11-24 PROCEDURE — G0158 HHC OT ASSISTANT EA 15: HCPCS | Mod: HHH

## 2023-11-24 PROCEDURE — 1090000002 HH PPS REVENUE DEBIT

## 2023-11-24 PROCEDURE — 1090000001 HH PPS REVENUE CREDIT

## 2023-11-25 VITALS
SYSTOLIC BLOOD PRESSURE: 98 MMHG | RESPIRATION RATE: 16 BRPM | HEART RATE: 64 BPM | DIASTOLIC BLOOD PRESSURE: 68 MMHG | OXYGEN SATURATION: 97 %

## 2023-11-25 PROCEDURE — 1090000001 HH PPS REVENUE CREDIT

## 2023-11-25 PROCEDURE — 1090000002 HH PPS REVENUE DEBIT

## 2023-11-25 ASSESSMENT — ENCOUNTER SYMPTOMS
APPETITE LEVEL: GOOD
CHANGE IN APPETITE: UNCHANGED

## 2023-11-26 PROCEDURE — 1090000002 HH PPS REVENUE DEBIT

## 2023-11-26 PROCEDURE — 1090000001 HH PPS REVENUE CREDIT

## 2023-11-27 ENCOUNTER — HOME CARE VISIT (OUTPATIENT)
Dept: HOME HEALTH SERVICES | Facility: HOME HEALTH | Age: 78
End: 2023-11-27
Payer: MEDICARE

## 2023-11-27 DIAGNOSIS — G30.9 MILD ALZHEIMER'S DEMENTIA, UNSPECIFIED TIMING OF DEMENTIA ONSET, UNSPECIFIED WHETHER BEHAVIORAL, PSYCHOTIC, OR MOOD DISTURBANCE OR ANXIETY (MULTI): Primary | ICD-10-CM

## 2023-11-27 DIAGNOSIS — G89.4 CHRONIC PAIN SYNDROME: ICD-10-CM

## 2023-11-27 DIAGNOSIS — F02.A0 MILD ALZHEIMER'S DEMENTIA, UNSPECIFIED TIMING OF DEMENTIA ONSET, UNSPECIFIED WHETHER BEHAVIORAL, PSYCHOTIC, OR MOOD DISTURBANCE OR ANXIETY (MULTI): Primary | ICD-10-CM

## 2023-11-27 PROCEDURE — 1090000002 HH PPS REVENUE DEBIT

## 2023-11-27 PROCEDURE — 1090000001 HH PPS REVENUE CREDIT

## 2023-11-27 PROCEDURE — G0157 HHC PT ASSISTANT EA 15: HCPCS | Mod: HHH

## 2023-11-27 RX ORDER — DULOXETIN HYDROCHLORIDE 30 MG/1
30 CAPSULE, DELAYED RELEASE ORAL 2 TIMES DAILY
Qty: 60 CAPSULE | Refills: 0 | Status: SHIPPED | OUTPATIENT
Start: 2023-11-27 | End: 2023-12-23 | Stop reason: SDUPTHER

## 2023-11-27 RX ORDER — DONEPEZIL HYDROCHLORIDE 5 MG/1
TABLET, FILM COATED ORAL
Qty: 90 TABLET | Refills: 1 | Status: SHIPPED | OUTPATIENT
Start: 2023-11-27

## 2023-11-28 ENCOUNTER — HOME CARE VISIT (OUTPATIENT)
Dept: HOME HEALTH SERVICES | Facility: HOME HEALTH | Age: 78
End: 2023-11-28
Payer: MEDICARE

## 2023-11-28 VITALS
SYSTOLIC BLOOD PRESSURE: 124 MMHG | HEART RATE: 84 BPM | RESPIRATION RATE: 17 BRPM | TEMPERATURE: 97.6 F | DIASTOLIC BLOOD PRESSURE: 74 MMHG

## 2023-11-28 PROCEDURE — 1090000002 HH PPS REVENUE DEBIT

## 2023-11-28 PROCEDURE — 1090000001 HH PPS REVENUE CREDIT

## 2023-11-28 PROCEDURE — G0158 HHC OT ASSISTANT EA 15: HCPCS | Mod: HHH

## 2023-11-28 ASSESSMENT — ACTIVITIES OF DAILY LIVING (ADL)
AMBULATION ASSISTANCE ON FLAT SURFACES: 1
CURRENT_FUNCTION: ONE PERSON
AMBULATION_DISTANCE/DURATION_TOLERATED: 30-40FT
AMBULATION ASSISTANCE: ONE PERSON

## 2023-11-28 ASSESSMENT — ENCOUNTER SYMPTOMS
DEPRESSION: 0
LOSS OF SENSATION IN FEET: 0
DENIES PAIN: 1
OCCASIONAL FEELINGS OF UNSTEADINESS: 1
MUSCLE WEAKNESS: 1

## 2023-11-28 ASSESSMENT — PAIN SCALES - PAIN ASSESSMENT IN ADVANCED DEMENTIA (PAINAD)
NEGVOCALIZATION: 0 - NONE.
BODYLANGUAGE: 0
BREATHING: 0
FACIALEXPRESSION: 0 - SMILING OR INEXPRESSIVE.
FACIALEXPRESSION: 0
CONSOLABILITY: 0 - NO NEED TO CONSOLE.
BODYLANGUAGE: 0 - RELAXED.
CONSOLABILITY: 0
TOTALSCORE: 0
NEGVOCALIZATION: 0

## 2023-11-29 ENCOUNTER — HOME CARE VISIT (OUTPATIENT)
Dept: HOME HEALTH SERVICES | Facility: HOME HEALTH | Age: 78
End: 2023-11-29
Payer: MEDICARE

## 2023-11-29 ENCOUNTER — TELEPHONE (OUTPATIENT)
Dept: PRIMARY CARE | Facility: CLINIC | Age: 78
End: 2023-11-29
Payer: MEDICARE

## 2023-11-29 PROCEDURE — 1090000002 HH PPS REVENUE DEBIT

## 2023-11-29 PROCEDURE — G0152 HHCP-SERV OF OT,EA 15 MIN: HCPCS | Mod: HHH

## 2023-11-29 PROCEDURE — 1090000001 HH PPS REVENUE CREDIT

## 2023-11-29 PROCEDURE — G0157 HHC PT ASSISTANT EA 15: HCPCS | Mod: HHH

## 2023-11-29 SDOH — HEALTH STABILITY: PHYSICAL HEALTH: EXERCISE TYPE: BLE STRENGTHENING

## 2023-11-29 SDOH — HEALTH STABILITY: PHYSICAL HEALTH: EXERCISE COMMENTS: R PROPER FORM AND TECHNIQUE.

## 2023-11-29 SDOH — HEALTH STABILITY: PHYSICAL HEALTH
EXERCISE COMMENTS: INSTRUCTED PT AND PERFORMED SEATED AND STANDING THERAPEUTIC EXERCISES TO INCREASE STRENGTH AND IMPROVE FUNCTIONAL TRANSFERS AND GAIT WITH LAQ, HIP FLEX, HIP ABD/ADD WITH RESISTANCE AND HAMSTRING CURLS WITH THERABAND, 2SETS OF 10 WITH VERBAL CUEING FO

## 2023-11-29 ASSESSMENT — PAIN SCALES - PAIN ASSESSMENT IN ADVANCED DEMENTIA (PAINAD)
FACIALEXPRESSION: 0
CONSOLABILITY: 0
BREATHING: 0
NEGVOCALIZATION: 0
BODYLANGUAGE: 0 - RELAXED.
BODYLANGUAGE: 0
NEGVOCALIZATION: 0 - NONE.
TOTALSCORE: 0
FACIALEXPRESSION: 0 - SMILING OR INEXPRESSIVE.
CONSOLABILITY: 0 - NO NEED TO CONSOLE.

## 2023-11-29 ASSESSMENT — ENCOUNTER SYMPTOMS
OCCASIONAL FEELINGS OF UNSTEADINESS: 1
DENIES PAIN: 1
PERSON REPORTING PAIN: PATIENT
MUSCLE WEAKNESS: 1

## 2023-11-29 ASSESSMENT — ACTIVITIES OF DAILY LIVING (ADL)
AMBULATION_DISTANCE/DURATION_TOLERATED: 20-30FT
AMBULATION ASSISTANCE ON FLAT SURFACES: 1
AMBULATION ASSISTANCE: STAND BY ASSIST
CURRENT_FUNCTION: ONE PERSON

## 2023-11-30 PROCEDURE — 1090000001 HH PPS REVENUE CREDIT

## 2023-11-30 PROCEDURE — 1090000002 HH PPS REVENUE DEBIT

## 2023-12-01 ENCOUNTER — HOME CARE VISIT (OUTPATIENT)
Dept: HOME HEALTH SERVICES | Facility: HOME HEALTH | Age: 78
End: 2023-12-01
Payer: MEDICARE

## 2023-12-01 VITALS
RESPIRATION RATE: 18 BRPM | SYSTOLIC BLOOD PRESSURE: 110 MMHG | TEMPERATURE: 97.8 F | DIASTOLIC BLOOD PRESSURE: 70 MMHG | HEART RATE: 66 BPM

## 2023-12-01 PROCEDURE — G0299 HHS/HOSPICE OF RN EA 15 MIN: HCPCS | Mod: HHH

## 2023-12-01 PROCEDURE — 1090000001 HH PPS REVENUE CREDIT

## 2023-12-01 PROCEDURE — 1090000002 HH PPS REVENUE DEBIT

## 2023-12-01 ASSESSMENT — ENCOUNTER SYMPTOMS
DENIES PAIN: 1
CHANGE IN APPETITE: UNCHANGED
APPETITE LEVEL: FAIR

## 2023-12-01 NOTE — HOME HEALTH
patient sleeping upon arrival and dgt too.   dgt reports changed pouch earlier this week and felt no need to change today. doing well no more leaks with current system    [id number] chris [id number] chris and [id number] remover   call made to [facility] to place order as patient was able to order again on 11 25  now will be able to order again o 1 2 24  placed on contine care with [facility] for future orders to dgt.    mid abd wound with mucousy drainage.     in agreement to nursing discharge knows can call with quesitons or concers.   Is aware to call [facility] for future orders.     patient reports doing much better with getting around environment. walking more and feeling stronger.

## 2023-12-01 NOTE — HOME HEALTH
DISCIPLINE: nursing   DATE OF DISCIPLINE DISCHARGE: 12/1/2023  REASON FOR DISCHARGE:  dgt independent in pouching and wound care,   EVALUATION OF GOALS: knows who to call for help.   SUMMARY OF CARE PROVIDED:  Wound and osomty care and assessment    signs and symptoms of infection and ways to prevent infection   DISCHARGE INSTRUCTIONS GIVEN: How to obtain supplies, who and when to call with concerns.  SERVICES REMAINING: NONE   NOMNC OBTAINED:yes

## 2023-12-02 PROCEDURE — 1090000002 HH PPS REVENUE DEBIT

## 2023-12-02 PROCEDURE — 1090000001 HH PPS REVENUE CREDIT

## 2023-12-03 PROCEDURE — 1090000002 HH PPS REVENUE DEBIT

## 2023-12-03 PROCEDURE — 1090000001 HH PPS REVENUE CREDIT

## 2023-12-03 SDOH — ECONOMIC STABILITY: HOUSING INSECURITY: HOME SAFETY: NO NEW CONCERNS

## 2023-12-03 ASSESSMENT — ACTIVITIES OF DAILY LIVING (ADL)
GROOMING_CURRENT_FUNCTION: SUPERVISION
BATHING_CURRENT_FUNCTION: MINIMUM ASSIST
TOILETING: 1
TOILETING: SUPERVISION
GROOMING ASSESSED: 1
FEEDING ASSESSED: 1
DRESSING_LB_CURRENT_FUNCTION: STAND BY ASSIST
BATHING ASSESSED: 1

## 2023-12-03 ASSESSMENT — ENCOUNTER SYMPTOMS
PAIN: NO CHANGES
DENIES PAIN: 1

## 2023-12-04 ENCOUNTER — HOME CARE VISIT (OUTPATIENT)
Dept: HOME HEALTH SERVICES | Facility: HOME HEALTH | Age: 78
End: 2023-12-04
Payer: MEDICARE

## 2023-12-04 PROCEDURE — 1090000002 HH PPS REVENUE DEBIT

## 2023-12-04 PROCEDURE — 1090000001 HH PPS REVENUE CREDIT

## 2023-12-04 PROCEDURE — G0157 HHC PT ASSISTANT EA 15: HCPCS | Mod: HHH

## 2023-12-04 SDOH — HEALTH STABILITY: PHYSICAL HEALTH: EXERCISE TYPE: BLE STRENGTHENING

## 2023-12-04 SDOH — HEALTH STABILITY: PHYSICAL HEALTH: EXERCISE COMMENTS: M AND TECHNIQUE.

## 2023-12-04 ASSESSMENT — ACTIVITIES OF DAILY LIVING (ADL)
CURRENT_FUNCTION: MINIMUM ASSIST
PHYSICAL TRANSFERS ASSESSED: 1
AMBULATION ASSISTANCE: ONE PERSON
AMBULATION_DISTANCE/DURATION_TOLERATED: 30-40FT
AMBULATION ASSISTANCE ON FLAT SURFACES: 1
AMBULATION ASSISTANCE: 1

## 2023-12-04 ASSESSMENT — PAIN SCALES - PAIN ASSESSMENT IN ADVANCED DEMENTIA (PAINAD)
BODYLANGUAGE: 0
BODYLANGUAGE: 0 - RELAXED.
NEGVOCALIZATION: 0
FACIALEXPRESSION: 0 - SMILING OR INEXPRESSIVE.
TOTALSCORE: 0
CONSOLABILITY: 0 - NO NEED TO CONSOLE.
FACIALEXPRESSION: 0
CONSOLABILITY: 0
BREATHING: 0
NEGVOCALIZATION: 0 - NONE.

## 2023-12-04 ASSESSMENT — ENCOUNTER SYMPTOMS
PAIN LOCATION: LEFT HAND
PAIN LOCATION - PAIN SEVERITY: 5/10
PAIN LOCATION - PAIN SEVERITY: 5/10
HIGHEST PAIN SEVERITY IN PAST 24 HOURS: 5/10
PAIN SEVERITY GOAL: 1/10
PAIN LOCATION: RIGHT HAND
LIMITED RANGE OF MOTION: 1
PAIN LOCATION - PAIN SEVERITY: 5/10
PAIN LOCATION: LEFT ARM
PAIN: 1
PAIN LOCATION - PAIN SEVERITY: 5/10
DEPRESSION: 0
MUSCLE WEAKNESS: 1
SUBJECTIVE PAIN PROGRESSION: WAXING AND WANING
PAIN LOCATION: LEFT SHOULDER
PAIN LOCATION - PAIN SEVERITY: 5/10
PAIN LOCATION: RIGHT ARM
PERSON REPORTING PAIN: PATIENT
OCCASIONAL FEELINGS OF UNSTEADINESS: 1
LOSS OF SENSATION IN FEET: 0
LOWEST PAIN SEVERITY IN PAST 24 HOURS: 2/10

## 2023-12-05 PROCEDURE — 1090000002 HH PPS REVENUE DEBIT

## 2023-12-05 PROCEDURE — 1090000001 HH PPS REVENUE CREDIT

## 2023-12-06 PROCEDURE — 1090000001 HH PPS REVENUE CREDIT

## 2023-12-06 PROCEDURE — 1090000002 HH PPS REVENUE DEBIT

## 2023-12-07 ENCOUNTER — HOME CARE VISIT (OUTPATIENT)
Dept: HOME HEALTH SERVICES | Facility: HOME HEALTH | Age: 78
End: 2023-12-07
Payer: MEDICARE

## 2023-12-07 VITALS
TEMPERATURE: 97.5 F | OXYGEN SATURATION: 98 % | SYSTOLIC BLOOD PRESSURE: 110 MMHG | DIASTOLIC BLOOD PRESSURE: 60 MMHG | HEART RATE: 70 BPM

## 2023-12-07 PROCEDURE — 1090000001 HH PPS REVENUE CREDIT

## 2023-12-07 PROCEDURE — 1090000002 HH PPS REVENUE DEBIT

## 2023-12-07 PROCEDURE — G0151 HHCP-SERV OF PT,EA 15 MIN: HCPCS | Mod: HHH

## 2023-12-07 PROCEDURE — 1090000003 HH PPS REVENUE ADJ

## 2023-12-07 SDOH — ECONOMIC STABILITY: HOUSING INSECURITY: HOME SAFETY: RECOMMEND ADDITIONAL HANDRAILS ON SIDESTEPS.

## 2023-12-07 ASSESSMENT — PAIN SCALES - PAIN ASSESSMENT IN ADVANCED DEMENTIA (PAINAD)
BODYLANGUAGE: 0 - RELAXED.
BODYLANGUAGE: 0
TOTALSCORE: 0
FACIALEXPRESSION: 0
FACIALEXPRESSION: 0 - SMILING OR INEXPRESSIVE.
NEGVOCALIZATION: 0 - NONE.
CONSOLABILITY: 0 - NO NEED TO CONSOLE.
BREATHING: 0
CONSOLABILITY: 0
NEGVOCALIZATION: 0

## 2023-12-07 ASSESSMENT — ENCOUNTER SYMPTOMS
DENIES PAIN: 1
MUSCLE WEAKNESS: 1

## 2023-12-07 ASSESSMENT — ACTIVITIES OF DAILY LIVING (ADL): HOME_HEALTH_OASIS: 01

## 2023-12-07 NOTE — HOME HEALTH
PT dc with home exercise program today.  Pt does admit difficulty with self motivation to perform her exs.  Made suggestions to set an alarm clock or smart phone daily to perform home ex program.  Pt reports no pain currently.

## 2023-12-08 ENCOUNTER — OFFICE VISIT (OUTPATIENT)
Dept: CARDIOLOGY | Facility: CLINIC | Age: 78
End: 2023-12-08
Payer: MEDICARE

## 2023-12-08 VITALS
OXYGEN SATURATION: 95 % | HEART RATE: 76 BPM | SYSTOLIC BLOOD PRESSURE: 108 MMHG | HEIGHT: 57 IN | DIASTOLIC BLOOD PRESSURE: 62 MMHG | BODY MASS INDEX: 28.05 KG/M2 | WEIGHT: 130 LBS

## 2023-12-08 DIAGNOSIS — R93.1 ABNORMAL ECHOCARDIOGRAM: ICD-10-CM

## 2023-12-08 DIAGNOSIS — I48.0 PAROXYSMAL ATRIAL FIBRILLATION (MULTI): Primary | ICD-10-CM

## 2023-12-08 DIAGNOSIS — I48.11 LONGSTANDING PERSISTENT ATRIAL FIBRILLATION (MULTI): ICD-10-CM

## 2023-12-08 DIAGNOSIS — I42.8 INFILTRATIVE CARDIOMYOPATHY (MULTI): ICD-10-CM

## 2023-12-08 DIAGNOSIS — I49.9 CARDIAC ARRHYTHMIA, UNSPECIFIED CARDIAC ARRHYTHMIA TYPE: ICD-10-CM

## 2023-12-08 PROCEDURE — 1125F AMNT PAIN NOTED PAIN PRSNT: CPT | Performed by: STUDENT IN AN ORGANIZED HEALTH CARE EDUCATION/TRAINING PROGRAM

## 2023-12-08 PROCEDURE — 1159F MED LIST DOCD IN RCRD: CPT | Performed by: STUDENT IN AN ORGANIZED HEALTH CARE EDUCATION/TRAINING PROGRAM

## 2023-12-08 PROCEDURE — 1036F TOBACCO NON-USER: CPT | Performed by: STUDENT IN AN ORGANIZED HEALTH CARE EDUCATION/TRAINING PROGRAM

## 2023-12-08 PROCEDURE — 1160F RVW MEDS BY RX/DR IN RCRD: CPT | Performed by: STUDENT IN AN ORGANIZED HEALTH CARE EDUCATION/TRAINING PROGRAM

## 2023-12-08 PROCEDURE — 3074F SYST BP LT 130 MM HG: CPT | Performed by: STUDENT IN AN ORGANIZED HEALTH CARE EDUCATION/TRAINING PROGRAM

## 2023-12-08 PROCEDURE — 99205 OFFICE O/P NEW HI 60 MIN: CPT | Performed by: STUDENT IN AN ORGANIZED HEALTH CARE EDUCATION/TRAINING PROGRAM

## 2023-12-08 PROCEDURE — 3078F DIAST BP <80 MM HG: CPT | Performed by: STUDENT IN AN ORGANIZED HEALTH CARE EDUCATION/TRAINING PROGRAM

## 2023-12-08 RX ORDER — AMIODARONE HYDROCHLORIDE 200 MG/1
TABLET ORAL
Qty: 60 TABLET | Refills: 11 | Status: SHIPPED | OUTPATIENT
Start: 2023-12-08

## 2023-12-08 RX ORDER — METOPROLOL SUCCINATE 25 MG/1
25 TABLET, EXTENDED RELEASE ORAL DAILY
Qty: 30 TABLET | Refills: 11 | Status: SHIPPED | OUTPATIENT
Start: 2023-12-08 | End: 2024-03-27 | Stop reason: SDUPTHER

## 2023-12-08 RX ORDER — TRAMADOL HYDROCHLORIDE 50 MG/1
TABLET ORAL
COMMUNITY
End: 2023-12-23 | Stop reason: SDUPTHER

## 2023-12-08 ASSESSMENT — ACTIVITIES OF DAILY LIVING (ADL)
AMBULATION ASSISTANCE: 1
AMBULATION ASSISTANCE: STAND BY ASSIST

## 2023-12-08 NOTE — PROGRESS NOTES
Referred by Dr. Swift for New Patient Visit (PCP referral. Recent hospitalization. New onset afib)     HPI:    Elida Benson is a 78 y.o. female with pertinent history of dementia, hypertension, diverticulitis status post laparoscopic anterior resection with ileostomy on 8/16/2023, paroxysmal atrial fibrillation on amiodarone and Eliquis, preserved ejection fraction with suspected myocardial cleft versus false tendon, moderate concentric hypertrophy, moderate left atrial enlargement on echo performed 8/19/2023, preserved ejection fraction with moderate septal thickness, abnormal strain imaging concerning for possible infiltrative heart disease on echo performed 8/22/2023 presents to cardiology clinic to establish care.     She is accompanied by her daughter provides history and has questions.  She is doing relatively well.  She does note some occasional palpitations but they resolve with rest relatively quickly.  She is been compliant with medications.  We have prolonged discussion about the natural history of atrial fibrillation.  We also discussed abnormalities on her prior echocardiograms and the natural history of infiltrative cardiomyopathy.  Dyspnea on exertion stable.  No exacerbating or relieving factors.  Patient denies chest pain and angina.  Pt denies orthopnea, and paroxysmal nocturnal dyspnea.  Pt denies worsening lower extremity edema.  Pt denies syncope.  No recent falls.  No fever or chills.  No cough.  No change in bowel or bladder habits.  No sick contacts.  No recent travel.    12 point review of systems including (Constitutional, Eyes, ENMT, Respiratory, Cardiac, Gastrointestinal, Neurological, Psychiatric, and Hematologic) was performed and is otherwise negative.    Past medical history reviewed:   has a past medical history of Dementia (CMS/HCC), Depression, Diverticulitis, Encounter for other preprocedural examination (06/29/2018), Hypertension, essential, Other malaise (04/21/2022), and  Rectovaginal fistula.    Past surgical history reviewed:   has a past surgical history that includes Carpal tunnel release (08/27/2013); Knee Arthroplasty (08/27/2013); Total hip arthroplasty (08/27/2013); and Other surgical history (08/16/2023).    Social history reviewed:   reports that she has quit smoking. Her smoking use included cigarettes. She has never used smokeless tobacco. No history on file for alcohol use and drug use.     Family history reviewed:    Family History   Problem Relation Name Age of Onset    No Known Problems Mother      No Known Problems Father         Allergies reviewed: Shellfish containing products     Medications reviewed:   Current Outpatient Medications   Medication Instructions    acetaminophen (Tylenol) 325 mg tablet Give 2 tablet by mouth every 4 hours as needed for Pain    amiodarone (Pacerone) 200 mg tablet Give 1 tablet by mouth in the morning for ANTIARRHYTHMIC    apixaban (Eliquis) 5 mg tablet Give 1 tablet by mouth two times a day for blood thinner    biotin 1 mg tablet 1 tab(s) orally once a day    cephalexin (Keflex) 500 mg capsule TAKE ONE CAPSULE BY MOUTH EVERY 12 HOURS    cholecalciferol (Vitamin D-3) 1,250 mcg (50,000 unit) capsule Take 50,000 Units by mouth once each week.    cyanocobalamin (Vitamin B-12) 500 mcg tablet 1 tab(s) orally once a day, As Needed    donepezil (Aricept) 5 mg tablet 1 tab(s) orally once (at bedtime)    DULoxetine (CYMBALTA) 30 mg, oral, 2 times daily    gabapentin (Neurontin) 600 mg tablet 1 tab(s) oral 3 times a day    losartan (Cozaar) 50 mg tablet 1 tab(s) oral once a day    melatonin 3 mg tablet Give 2 tablet by mouth as needed for insomnia administer at bedtime    topiramate (Topamax) 100 mg tablet TAKE ONE TABLET BY MOUTH TWO TIMES A DAY    traMADol (Ultram) 50 mg tablet oral        Vitals reviewed: Visit Vitals  /62   Pulse 76       Physical Exam:   General:  Patient is awake, alert, and oriented.  Patient is in no acute  distress.  HEENT:  Pupils equal and reactive.  Normocephalic.  Moist mucosa.    Neck:  No thyromegaly.  Normal Jugular Venous Pressure.  Cardiovascular:  Regular rate and rhythm.  Normal S1 and S2.  1/6 EDER.  Pulmonary:  Clear to auscultation bilaterally.  Abdomen:  Soft. Non-tender.   Non-distended.  Positive bowel sounds.  Lower Extremities:  2+ pedal pulses. No LE edema.  Neurologic:  Cranial nerves intact.  No focal deficit.   Skin: Skin warm and dry, normal skin turgor.   Psychiatric: Normal affect.    Last Labs:  CBC -      Lab Results   Component Value Date    WBC 5.4 10/27/2023    HGB 12.8 10/27/2023    HCT 43.0 10/27/2023     10/27/2023        CMP-  Lab Results   Component Value Date    GLUCOSE 114 (H) 10/27/2023     10/27/2023    K 4.2 10/27/2023     10/27/2023    CO2 22 10/27/2023    ANIONGAP 14 10/27/2023    BUN 27 (H) 10/27/2023    CREATININE 0.85 10/27/2023    EGFR 71 10/27/2023    CALCIUM 9.2 10/27/2023    PHOS 2.9 08/20/2023    PROT 6.8 10/27/2023    ALBUMIN 3.9 10/27/2023    AST 20 10/27/2023    ALT 20 10/27/2023    ALKPHOS 87 10/27/2023    BILITOT 0.2 10/27/2023        LIPIDS-  Lab Results   Component Value Date    CHOL 182 05/26/2023    TRIG 102 05/26/2023    HDL 77.2 05/26/2023    CHHDL 2.4 05/26/2023    VLDL 20 05/26/2023        OTHERS-  Lab Results   Component Value Date    HGBA1C 7.2 (A) 09/05/2023        I personally reviewed the patient's recent vitals, labs, medications, orders, EKGs, pertinent cardiac imaging/ echocardiography.    Assessment and Plan:    Elida Benson is a 78 y.o. female with pertinent history of dementia, hypertension, diverticulitis status post laparoscopic anterior resection with ileostomy on 8/16/2023, paroxysmal atrial fibrillation on amiodarone and Eliquis, preserved ejection fraction with suspected myocardial cleft versus false tendon, moderate concentric hypertrophy, moderate left atrial enlargement on echo performed 8/19/2023, preserved  ejection fraction with moderate septal thickness, abnormal strain imaging concerning for possible infiltrative heart disease on echo performed 8/22/2023 presents to cardiology clinic to establish care. She is accompanied by her daughter provides history and has questions.  She is doing relatively well.  She does note some occasional palpitations but they resolve with rest relatively quickly.  She is been compliant with medications.  We have prolonged discussion about the natural history of atrial fibrillation.  We also discussed abnormalities on her prior echocardiograms and the natural history of infiltrative cardiomyopathy.  Dyspnea on exertion stable.     We will start metoprolol succinate 25 mg daily.    We will arrange for cardiac MRI for further evaluation of possible infiltrative cardiomyopathy.    Please continue remaining cardiac medications including amiodarone 200 mg once daily, losartan 25 mg daily, and Eliquis 5 mg twice daily.  Refills were sent to her pharmacy.    Please followup with me in Cardiology clinic within the next 2 to 3 months.  Please return to clinic sooner or seek emergent care if your symptoms reoccur or worsen.    Thank you for allowing me to participate in their care.  Please feel free to call me with any further questions or concerns.        Cesar Ignacio MD, FACC, TAIWO ABARCA  Division of Cardiovascular Medicine  Medical Director, [facility]  [facility]  Assistant Clinical Professor, Medicine  [employer]  [email or web address]  Office:  [phone]

## 2023-12-08 NOTE — PATIENT INSTRUCTIONS
We will start metoprolol succinate 25 mg daily.    We will arrange for cardiac MRI for further evaluation of possible infiltrative cardiomyopathy.    Please continue remaining cardiac medications including amiodarone 200 mg once daily, losartan 25 mg daily, and Eliquis 5 mg twice daily.  Refills were sent to her pharmacy.    Please followup with me in Cardiology clinic within the next 2 to 3 months.  Please return to clinic sooner or seek emergent care if your symptoms reoccur or worsen.

## 2023-12-08 NOTE — CASE COMMUNICATION
Pt discharged from home care PT. Has met her maximal functional status. CG daughter instructed in providing safe care, transfers, ambulation with patient.

## 2023-12-14 NOTE — H&P (VIEW-ONLY)
History Of Present Illness  Elida Benson is a 78 y.o. female who presented multiple times to the hospital with severe diverticulitis with associated abscess, vaginal drainage, and UTI.  She is treated with antibiotics and cool down.  She had an attempted colonoscopy, which was aborted due to fibrosis in the sigmoid and was not improving and presented for resection.     Pt denies any abdominal pain, she is doing well with the stoma - she did have a leak yesterday - the only 1.  She is eating like a horse.  She has gained 10 lbs since last visit!  She has no vaginal drainage or UTI since the last time that I saw her.  She is home and they are working on rehabing with her daughter.  She was able to get to her grandsons for the holidays.  No oxygen at home.  Her Hb is back up to normal, Cr is normal as well.       8/16/2023 OPERATION/PROCEDURE:   Single-incision laparoscopic anterior resection with laparoscopic splenic flexure takedown, colorectal anastomosis, diverting loop ileostomy, flexible sigmoidoscopy, and Omentopexy     FINAL DIAGNOSIS   A. RECTO SIGMOID RESECTIONS: -- SEGMENT OF COLON WITH MARKED ACTIVE INFLAMMATION, WITH GRANULOMATOUS INFLAMMATION AND PENETRATING ULCERS, SEE NOTE. Note: The inflammatory changes noted include fissure-like ulcers with prominent fibrosis, scattered noncaseating granulomas and prominent lymphoid nodules in the pericolonic soft tissues. The differential is that of Crohn's like diverticulitis versus Crohn's disease in a patient with pre-existing diverticulitis. Crohn's like diverticulitis is much more common and is also supported by the lack of significant mucosal-based inflammation, or other sequela of Crohn's disease. -- 1 LYMPH NODE WITH NO SIGNIFICANT PATHOLOGIC FINDING.       9/03/2023 CT ABD/PELVIS   1.  Interval removal of deep pelvic drain, with previously seen air and fluid in this location essentially resolved.  2.  Small organizing fluid collections in the mid and  lower abdomen adjacent to several small bowel loops and colon, stable or decreasing.  3.  Remainder as above.    Post op complicated with Covid and PNA diagnosis.  Was discharge to rehab facility.  Patient was examined on October 4, 2023. Her weight at this visit was 116 lb.  She was instructed to protein load and gain weight prior to considering ileostomy reversal.  Her weight as of December 8, 2023 was 130 lb.  The order for the GGE was placed.  Patient returns to discuss results.    12/20/2023 GGE:  Limited exam due to patient discomfort as described above. No definite evidence of abnormal contrast extravasation of the opacified portions of the proximal sigmoid colon and rectum.      Review of Systems  Constitutional: Negative for fever, chills, anorexia, weight loss, malaise             ENMT: Negative for nasal discharge, congestion, ear pain, mouth pain, throat pain                 Respiratory: Negative for cough, hemoptysis, wheezing, shortness of breath                Cardiac: Negative for chest pain, dyspnea on exertion, orthopnea, palpitations, syncope         Gastrointestinal: Negative for nausea, vomiting, diarrhea, constipation, abdominal pain,  (+)DIVERTICUITIS     Genitourinary: Negative for discharge, dysuria, flank pain, frequency, hematuria          Musculoskeletal: Negative for decreased ROM, pain, swelling, weakness          Neurological: Negative for dizziness, confusion, headache, seizures, syncope               Psychiatric: Negative for mood changes, anxiety, hallucinations, sleep changes, suicidal ideas        Skin: Negative for mass, pain, itching, rash, ulcer            Endocrine: Negative for heat intolerance, cold intolerance, excessive sweating, polyuria, excess thirst         Hematologic/Lymph: Negative for anemia, bruising, easy bleeding, night sweats, petechiae, history of DVT/PE or cancer               Allergic/Immunologic: Negative for anaphylaxis, itchy/ teary eyes, itching,  sneezing, swelling    Physical Exam  Constitutional: Well developed, awake/alert/oriented x3, no distress, alert and cooperative   Gastrointestinal: Nondistended, soft, non-tender,   Extremities: normal extremities, no cyanosis edema, contusions or wounds   Neurological: alert and oriented x3, normal strength, in a wheelchair    Impression - Pt s/p Sigmoid resection for severe diverticulitis with a CVF - doing SO MUCH better  Inadequate GGE    Plan   Pt declined a flex sig today   Plan for flex sig before anesthesia and then will go to sleep and move to table if negative for leak    Discussed the risks of leakage and need for a permanent stoma, infection, bleeding, injury to other organs, UTI, wound infection, blood clots, hernia, need for further operation, need for blood products and anesthetic complications. Urinary retention.  Nerve dysfunction.

## 2023-12-15 ENCOUNTER — HOME CARE VISIT (OUTPATIENT)
Dept: HOME HEALTH SERVICES | Facility: HOME HEALTH | Age: 78
End: 2023-12-15

## 2023-12-15 ENCOUNTER — APPOINTMENT (OUTPATIENT)
Dept: ORTHOPEDIC SURGERY | Facility: CLINIC | Age: 78
End: 2023-12-15
Payer: MEDICARE

## 2023-12-20 ENCOUNTER — HOSPITAL ENCOUNTER (OUTPATIENT)
Dept: RADIOLOGY | Facility: HOSPITAL | Age: 78
Discharge: HOME | End: 2023-12-20
Payer: MEDICARE

## 2023-12-20 DIAGNOSIS — K57.32 DIVERTICULITIS OF LARGE INTESTINE WITHOUT PERFORATION OR ABSCESS WITHOUT BLEEDING: ICD-10-CM

## 2023-12-20 PROCEDURE — 74270 X-RAY XM COLON 1CNTRST STD: CPT | Performed by: RADIOLOGY

## 2023-12-20 PROCEDURE — 2550000001 HC RX 255 CONTRASTS: Performed by: COLON & RECTAL SURGERY

## 2023-12-20 PROCEDURE — 74270 X-RAY XM COLON 1CNTRST STD: CPT

## 2023-12-20 RX ADMIN — DIATRIZOATE MEGLUMINE AND DIATRIZOATE SODIUM 240 ML: 660; 100 LIQUID ORAL; RECTAL at 12:45

## 2023-12-22 ENCOUNTER — OFFICE VISIT (OUTPATIENT)
Dept: PRIMARY CARE | Facility: CLINIC | Age: 78
End: 2023-12-22
Payer: MEDICARE

## 2023-12-22 DIAGNOSIS — J44.9 COPD WITHOUT EXACERBATION (MULTI): ICD-10-CM

## 2023-12-22 DIAGNOSIS — M50.00 CERVICAL DISC DISORDER WITH MYELOPATHY: ICD-10-CM

## 2023-12-22 DIAGNOSIS — G99.2 MYELOPATHY CONCURRENT WITH AND DUE TO SPINAL STENOSIS OF CERVICAL REGION (MULTI): ICD-10-CM

## 2023-12-22 DIAGNOSIS — E11.40 TYPE 2 DIABETES MELLITUS WITH DIABETIC NEUROPATHY, WITHOUT LONG-TERM CURRENT USE OF INSULIN (MULTI): ICD-10-CM

## 2023-12-22 DIAGNOSIS — I10 HYPERTENSION, ESSENTIAL: ICD-10-CM

## 2023-12-22 DIAGNOSIS — M15.9 PRIMARY OSTEOARTHRITIS INVOLVING MULTIPLE JOINTS: ICD-10-CM

## 2023-12-22 DIAGNOSIS — G89.4 CHRONIC PAIN SYNDROME: ICD-10-CM

## 2023-12-22 DIAGNOSIS — M48.02 MYELOPATHY CONCURRENT WITH AND DUE TO SPINAL STENOSIS OF CERVICAL REGION (MULTI): ICD-10-CM

## 2023-12-22 DIAGNOSIS — I48.11 LONGSTANDING PERSISTENT ATRIAL FIBRILLATION (MULTI): Primary | ICD-10-CM

## 2023-12-22 DIAGNOSIS — M96.1 POSTLAMINECTOMY SYNDROME, LUMBAR: ICD-10-CM

## 2023-12-22 LAB
AMPHETAMINES UR QL SCN: NORMAL
BARBITURATES UR QL SCN: NORMAL
BENZODIAZ UR QL SCN: NORMAL
BZE UR QL SCN: NORMAL
CANNABINOIDS UR QL SCN: NORMAL
FENTANYL+NORFENTANYL UR QL SCN: NORMAL
OPIATES UR QL SCN: NORMAL
OXYCODONE+OXYMORPHONE UR QL SCN: NORMAL
PCP UR QL SCN: NORMAL

## 2023-12-22 PROCEDURE — 1125F AMNT PAIN NOTED PAIN PRSNT: CPT | Performed by: FAMILY MEDICINE

## 2023-12-22 PROCEDURE — 1036F TOBACCO NON-USER: CPT | Performed by: FAMILY MEDICINE

## 2023-12-22 PROCEDURE — 99214 OFFICE O/P EST MOD 30 MIN: CPT | Performed by: FAMILY MEDICINE

## 2023-12-22 PROCEDURE — 1160F RVW MEDS BY RX/DR IN RCRD: CPT | Performed by: FAMILY MEDICINE

## 2023-12-22 PROCEDURE — 1159F MED LIST DOCD IN RCRD: CPT | Performed by: FAMILY MEDICINE

## 2023-12-22 PROCEDURE — 80307 DRUG TEST PRSMV CHEM ANLYZR: CPT

## 2023-12-22 ASSESSMENT — ENCOUNTER SYMPTOMS
MYALGIAS: 1
CONSTITUTIONAL NEGATIVE: 1
GASTROINTESTINAL NEGATIVE: 1
ARTHRALGIAS: 1
BACK PAIN: 1
NECK STIFFNESS: 1
NECK PAIN: 1
RESPIRATORY NEGATIVE: 1
CARDIOVASCULAR NEGATIVE: 1

## 2023-12-22 NOTE — PROGRESS NOTES
Subjective   Patient ID: Elida Benson is a 78 y.o. female who presents for No chief complaint on file..    HPI pt fu , dm, htn, chol, chf, chronic pain syndrom sp laminectomy and cervicle myelopathy     Review of Systems   Constitutional: Negative.    HENT: Negative.     Respiratory: Negative.     Cardiovascular: Negative.    Gastrointestinal: Negative.    Musculoskeletal:  Positive for arthralgias, back pain, gait problem, myalgias, neck pain and neck stiffness.       Objective   There were no vitals taken for this visit.    Physical Exam  Vitals reviewed.   Constitutional:       Appearance: Normal appearance. She is normal weight.   Eyes:      Extraocular Movements: Extraocular movements intact.      Conjunctiva/sclera: Conjunctivae normal.      Pupils: Pupils are equal, round, and reactive to light.   Cardiovascular:      Rate and Rhythm: Normal rate and regular rhythm.      Pulses: Normal pulses.      Heart sounds: Normal heart sounds.   Pulmonary:      Effort: Pulmonary effort is normal.      Breath sounds: Normal breath sounds.   Abdominal:      General: Bowel sounds are normal.      Palpations: Abdomen is soft.   Musculoskeletal:         General: Normal range of motion.      Comments: Chronic p[ain in arms and legs   Skin:     General: Skin is warm and dry.   Neurological:      General: No focal deficit present.      Mental Status: She is alert and oriented to person, place, and time. Mental status is at baseline.       OARRS:  No data recorded  I have personally reviewed the OARRS report for Elida Benson. I have considered the risks of abuse, dependence, addiction and diversion and I believe that it is clinically appropriate for Elida Benson to be prescribed this medication    Is the patient prescribed a combination of a benzodiazepine and opioid?  no    Last Urine Drug Screen / ordered today: Yes  No results found for this or any previous visit (from the past 8760 hour(s)).  N/A  Will know  [facility]      Controlled Substance Agreement:  Date of the Last Agreement: October 22  Reviewed Controlled Substance Agreement including but not limited to the benefits, risks, and alternatives to treatment with a Controlled Substance medication(s).    Opioids:  What is the patient's goal of therapy? Pain modification  Is this being achieved with current treatment? yes    I have calculated the patient's Morphine Dose Equivalent (MED):   I have considered referral to Pain Management and/or a specialist, and do not feel it is necessary at this time.    I feel that it is clinically indicated to continue this current medication regimen after consideration of alternative therapies, and other non-opioid treatment.    Opioid Risk Screening:  Total Score Risk Category  TOTAL SCORE CATEGORY: Low Risk (0-3) (12/22/2023  1:00 PM)        Pain Assessment:  Analgesia  What was your pain level on average during the past week?: 5  What was your pain level at its worst during the past week?: 9  What percentage of your pain has been relieved during the past week?: 3 %  Is the amount of pain relief you are now obtaining from your current pain relievers enough to make a real difference in your life?: Y  Query to Clinician: Is the patient's pain relief clinically significant?: Yes    Activities of Daily Living  Physical Functioning: Better  Family Relationships: Better  Social Relationships: Better  Sleep Patterns: Better    Adverse Events  Is patient experiencing any side effects from current pain relievers?: N  Patient's Overall Severity of Side Effects: None      Assessment  Is your overall impression that this patient is benefiting from opioid therapy?: Yes      Assessment/Plan   Problem List Items Addressed This Visit             ICD-10-CM    Atrial fibrillation (CMS/HCC) - Primary I48.91    Cervical disc disorder with myelopathy M50.00    Chronic pain syndrome G89.4    Relevant Orders    Drug Screen, Urine With Reflex to Confirmation     COPD without exacerbation (CMS/[facility]) J44.9    Hypertension, essential I10    Myelopathy concurrent with and due to spinal stenosis of cervical region (CMS/[facility]) M48.02, G99.2    Osteoarthritis, multiple sites M15.9    Postlaminectomy syndrome, lumbar M96.1    Type 2 diabetes mellitus with diabetic neuropathy, unspecified (CMS/[facility]) E11.40

## 2023-12-23 DIAGNOSIS — G89.4 CHRONIC PAIN SYNDROME: ICD-10-CM

## 2023-12-23 RX ORDER — TRAMADOL HYDROCHLORIDE 50 MG/1
50 TABLET ORAL 2 TIMES DAILY
Qty: 60 TABLET | Refills: 0 | Status: SHIPPED | OUTPATIENT
Start: 2023-12-23 | End: 2024-01-29 | Stop reason: SDUPTHER

## 2023-12-23 RX ORDER — DULOXETIN HYDROCHLORIDE 30 MG/1
30 CAPSULE, DELAYED RELEASE ORAL 2 TIMES DAILY
Qty: 60 CAPSULE | Refills: 0 | OUTPATIENT
Start: 2023-12-23

## 2023-12-23 RX ORDER — DULOXETIN HYDROCHLORIDE 30 MG/1
30 CAPSULE, DELAYED RELEASE ORAL 2 TIMES DAILY
Qty: 180 CAPSULE | Refills: 0 | Status: SHIPPED | OUTPATIENT
Start: 2023-12-23

## 2023-12-23 RX ORDER — TRAMADOL HYDROCHLORIDE 50 MG/1
TABLET ORAL
Qty: 60 TABLET | Refills: 0 | OUTPATIENT
Start: 2023-12-23

## 2023-12-26 NOTE — PROGRESS NOTES
CHIEF COMPLAINT: No chief complaint on file.    History: 78 y.o. female presents to the office today for evaluation of her left shoulder.  She has known left shoulder end-stage arthritis.  We have given her 2 cortisone injections previously, the most recent of which was 6 months ago.  She had relief from this up until a few weeks ago.  She is also recovering from cervical spine surgery which she says she is doing well from.  She also had a bout of GI surgery.  Overall, she continues to be recovering, and is not interested in surgery at this time.  Difficulty with using the left shoulder.  Significant pain is anterior and deep.  Requesting repeat injection today.    Past medical history, past surgical history, medications, allergies, family history, social history, and review of systems were reviewed today.    A 12 point review of systems was negative other than as stated in the HPI.    Past Medical History:   Diagnosis Date    Dementia (CMS/HCC)     Depression     Diverticulitis     Encounter for other preprocedural examination 06/29/2018    Preoperative clearance    Hypertension, essential     Other malaise 04/21/2022    Physical deconditioning    Rectovaginal fistula         Allergies   Allergen Reactions    Shellfish Containing Products Swelling        Past Surgical History:   Procedure Laterality Date    CARPAL TUNNEL RELEASE  08/27/2013    Neuroplasty Decompression Median Nerve At Carpal Tunnel    KNEE ARTHROPLASTY  08/27/2013    Knee Arthroplasty    OTHER SURGICAL HISTORY  08/16/2023    LAPAROSCPIC ANTERIOR RESECTION, DIVERTING LOOP ILEOSTOMY    TOTAL HIP ARTHROPLASTY  08/27/2013    Total Hip Replacement        Family History   Problem Relation Name Age of Onset    No Known Problems Mother      No Known Problems Father          Social History     Socioeconomic History    Marital status:      Spouse name: Not on file    Number of children: Not on file    Years of education: Not on file    Highest  education level: Not on file   Occupational History    Not on file   Tobacco Use    Smoking status: Former     Types: Cigarettes    Smokeless tobacco: Never   Substance and Sexual Activity    Alcohol use: Not on file    Drug use: Not on file    Sexual activity: Not on file   Other Topics Concern    Not on file   Social History Narrative    Not on file     Social Determinants of Health     Financial Resource Strain: Not on file   Food Insecurity: Not on file   Transportation Needs: No Transportation Needs (12/7/2023)    OASIS : Transportation     Lack of Transportation (Medical): No     Lack of Transportation (Non-Medical): No     Patient Unable or Declines to Respond: No   Physical Activity: Not on file   Stress: Not on file   Social Connections: Feeling Socially Integrated (12/7/2023)    OASIS : Social Isolation     Frequency of experiencing loneliness or isolation: Never   Intimate Partner Violence: Not on file   Housing Stability: Not on file        CURRENT MEDICATIONS:   Current Outpatient Medications   Medication Sig Dispense Refill    acetaminophen (Tylenol) 325 mg tablet Give 2 tablet by mouth every 4 hours as needed for Pain      amiodarone (Pacerone) 200 mg tablet Give 1 tablet by mouth in the morning for ANTIARRHYTHMIC 60 tablet 11    apixaban (Eliquis) 5 mg tablet Give 1 tablet by mouth two times a day for blood thinner 180 tablet 11    biotin 1 mg tablet 1 tab(s) orally once a day      cephalexin (Keflex) 500 mg capsule TAKE ONE CAPSULE BY MOUTH EVERY 12 HOURS      cholecalciferol (Vitamin D-3) 1,250 mcg (50,000 unit) capsule Take 50,000 Units by mouth once each week.      cyanocobalamin (Vitamin B-12) 500 mcg tablet 1 tab(s) orally once a day, As Needed      donepezil (Aricept) 5 mg tablet 1 tab(s) orally once (at bedtime) 90 tablet 1    DULoxetine (Cymbalta) 30 mg DR capsule Take 1 capsule (30 mg) by mouth 2 times a day. 180 capsule 0    gabapentin (Neurontin) 600 mg tablet 1 tab(s) oral 3  "times a day      losartan (Cozaar) 50 mg tablet 1 tab(s) oral once a day 90 tablet 1    melatonin 3 mg tablet Give 2 tablet by mouth as needed for insomnia administer at bedtime      metoprolol succinate XL (Toprol-XL) 25 mg 24 hr tablet Take 1 tablet (25 mg) by mouth once daily. Do not crush or chew. 30 tablet 11    topiramate (Topamax) 100 mg tablet TAKE ONE TABLET BY MOUTH TWO TIMES A DAY      traMADol (Ultram) 50 mg tablet Take 1 tablet (50 mg) by mouth 2 times a day. 60 tablet 0     No current facility-administered medications for this visit.       Physical Examination:      11/17/2023     3:17 PM 11/21/2023     1:15 PM 11/22/2023    12:16 PM 11/28/2023     1:07 PM 12/1/2023    12:40 PM 12/7/2023    12:40 PM 12/8/2023    11:32 AM   Vitals   Systolic 108 121 98 124 110 110 108   Diastolic 70 72 68 74 70 60 62   Heart Rate 69 75 64 84 66 70 76   Temp 36.2 °C (97.1 °F) 36.5 °C (97.7 °F)  36.4 °C (97.6 °F) 36.6 °C (97.8 °F) 36.4 °C (97.5 °F)    Resp 17 17 16 17 18     Height (in)       1.448 m (4' 9\")   Weight (lb)       130   BMI       28.13 kg/m2   BSA (m2)       1.54 m2   Visit Report       Report      There is no height or weight on file to calculate BMI.    Well-appearing, appears stated age, pleasant and cooperative, appropriate mood and behavior. Height and weight reviewed. Alert and oriented x3.  Auditory function intact.  No acute distress.  Intact ocular function, ARIC, EOMI. Breathing is unlabored .  There is no evidence of jugular venous distension. Skin appearance is normal without evidence of rash or other lesions. 2+ radial pulses bilaterally, fingers pink and wwp, good capillary refill, no pitting edema. No appreciable lymphadenopathy in bilateral upper extremities. SILT throughout both upper extremities, median/radial/ulnar/musculocutaneous/axillary nerve motor and sensory intact (except for abnormalities noted in focused musculoskeletal exam section below).     On exam of bilateral upper " extremities, very limited range of motion of the left shoulder.  Active forward flexion to 40, external rotation to neutral.  On the right she has active forward flexion to 100, external rotation to 30.    Imaging: Previous radiographs of the left shoulder show end-stage arthritis of the left shoulder    Assessment: Arthritis of the left shoulder    Plan: Patient with end-stage arthritis of the left shoulder.  She has responded well to cortisone injections previously and is not interested in surgery at this time.  She has multiple medical morbidities.  We performed a repeat injection today.  She will follow-up with us as needed going forward.    Injection was performed, please see separate procedure note, patient tolerated well.   Patient ID: Elida Benson is a 78 y.o. female.    L Inj/Asp: L subacromial bursa on 12/27/2023 2:43 PM  Indications: pain  Details: 22 G needle, posterior approach  Medications: 80 mg triamcinolone acetonide 40 mg/mL; 5 mL lidocaine 10 mg/mL (1 %)  Outcome: tolerated well, no immediate complications  Procedure, treatment alternatives, risks and benefits explained, specific risks discussed. Consent was given by the patient. Immediately prior to procedure a time out was called to verify the correct patient, procedure, equipment, support staff and site/side marked as required. Patient was prepped and draped in the usual sterile fashion.           Dragon software was used to dictate this note, please be aware that minor errors in transcription may be present.    Telly Chu MD    Shoulder/Elbow Surgery  [facility]

## 2023-12-27 ENCOUNTER — OFFICE VISIT (OUTPATIENT)
Dept: ORTHOPEDIC SURGERY | Facility: CLINIC | Age: 78
End: 2023-12-27
Payer: MEDICARE

## 2023-12-27 DIAGNOSIS — M25.512 LEFT SHOULDER PAIN, UNSPECIFIED CHRONICITY: ICD-10-CM

## 2023-12-27 DIAGNOSIS — M19.012 ARTHRITIS OF LEFT SHOULDER REGION: Primary | ICD-10-CM

## 2023-12-27 PROCEDURE — 99213 OFFICE O/P EST LOW 20 MIN: CPT | Performed by: STUDENT IN AN ORGANIZED HEALTH CARE EDUCATION/TRAINING PROGRAM

## 2023-12-27 PROCEDURE — 1036F TOBACCO NON-USER: CPT | Performed by: STUDENT IN AN ORGANIZED HEALTH CARE EDUCATION/TRAINING PROGRAM

## 2023-12-27 PROCEDURE — 1125F AMNT PAIN NOTED PAIN PRSNT: CPT | Performed by: STUDENT IN AN ORGANIZED HEALTH CARE EDUCATION/TRAINING PROGRAM

## 2023-12-27 PROCEDURE — 1160F RVW MEDS BY RX/DR IN RCRD: CPT | Performed by: STUDENT IN AN ORGANIZED HEALTH CARE EDUCATION/TRAINING PROGRAM

## 2023-12-27 PROCEDURE — 20610 DRAIN/INJ JOINT/BURSA W/O US: CPT | Performed by: STUDENT IN AN ORGANIZED HEALTH CARE EDUCATION/TRAINING PROGRAM

## 2023-12-27 PROCEDURE — 1159F MED LIST DOCD IN RCRD: CPT | Performed by: STUDENT IN AN ORGANIZED HEALTH CARE EDUCATION/TRAINING PROGRAM

## 2023-12-27 PROCEDURE — 2500000005 HC RX 250 GENERAL PHARMACY W/O HCPCS: Performed by: STUDENT IN AN ORGANIZED HEALTH CARE EDUCATION/TRAINING PROGRAM

## 2023-12-27 PROCEDURE — 2500000004 HC RX 250 GENERAL PHARMACY W/ HCPCS (ALT 636 FOR OP/ED): Performed by: STUDENT IN AN ORGANIZED HEALTH CARE EDUCATION/TRAINING PROGRAM

## 2023-12-27 RX ORDER — TRIAMCINOLONE ACETONIDE 40 MG/ML
80 INJECTION, SUSPENSION INTRA-ARTICULAR; INTRAMUSCULAR
Status: COMPLETED | OUTPATIENT
Start: 2023-12-27 | End: 2023-12-27

## 2023-12-27 RX ORDER — LIDOCAINE HYDROCHLORIDE 10 MG/ML
5 INJECTION INFILTRATION; PERINEURAL
Status: COMPLETED | OUTPATIENT
Start: 2023-12-27 | End: 2023-12-27

## 2023-12-27 RX ADMIN — LIDOCAINE HYDROCHLORIDE 5 ML: 10 INJECTION, SOLUTION INFILTRATION; PERINEURAL at 14:43

## 2023-12-27 RX ADMIN — TRIAMCINOLONE ACETONIDE 80 MG: 400 INJECTION, SUSPENSION INTRA-ARTICULAR; INTRAMUSCULAR at 14:43

## 2023-12-28 ASSESSMENT — ACTIVITIES OF DAILY LIVING (ADL): OASIS_M1830: 02

## 2024-01-10 ENCOUNTER — OFFICE VISIT (OUTPATIENT)
Dept: SURGERY | Facility: CLINIC | Age: 79
End: 2024-01-10
Payer: MEDICARE

## 2024-01-10 VITALS
TEMPERATURE: 97.8 F | WEIGHT: 127 LBS | BODY MASS INDEX: 27.48 KG/M2 | DIASTOLIC BLOOD PRESSURE: 60 MMHG | HEART RATE: 77 BPM | SYSTOLIC BLOOD PRESSURE: 107 MMHG

## 2024-01-10 DIAGNOSIS — K57.32 DIVERTICULITIS, COLON: Primary | ICD-10-CM

## 2024-01-10 DIAGNOSIS — Z93.2 ILEOSTOMY PRESENT (MULTI): ICD-10-CM

## 2024-01-10 PROCEDURE — 1160F RVW MEDS BY RX/DR IN RCRD: CPT | Performed by: COLON & RECTAL SURGERY

## 2024-01-10 PROCEDURE — 3074F SYST BP LT 130 MM HG: CPT | Performed by: COLON & RECTAL SURGERY

## 2024-01-10 PROCEDURE — 3078F DIAST BP <80 MM HG: CPT | Performed by: COLON & RECTAL SURGERY

## 2024-01-10 PROCEDURE — 99213 OFFICE O/P EST LOW 20 MIN: CPT | Performed by: COLON & RECTAL SURGERY

## 2024-01-10 PROCEDURE — 1036F TOBACCO NON-USER: CPT | Performed by: COLON & RECTAL SURGERY

## 2024-01-10 PROCEDURE — 1126F AMNT PAIN NOTED NONE PRSNT: CPT | Performed by: COLON & RECTAL SURGERY

## 2024-01-10 ASSESSMENT — PAIN SCALES - GENERAL: PAINLEVEL: 0-NO PAIN

## 2024-01-10 ASSESSMENT — ENCOUNTER SYMPTOMS: DEPRESSION: 1

## 2024-01-10 NOTE — NURSING NOTE
[facility] nursing visit outcome: Stoma and pouching system were assessed.     [facility] next scheduled visit/plan: TBD - Ms. Benson is being scheduled for LI reversal    Stoma Type: Loop Ileostomy  Kike: No  Location: RUQ  Protrusion: Budded  Mucosal Condition and Color: Moist, Red  Mucocutaneous Junction: Intact  Peristomal Skin:  reports that skin is in good condition; pouch was changed as it had just been changed yesterday and she's her for scheduling stoma reversal  Location of Skin Impairment: n/a  Character of Output:  usually thick; one time it got very watery which caused a leak - only happened once  Emptying Frequency: about 5 times  Removed/Current Pouching System: Pyote Premier 8578 soft convex lock n' roll pouch with barrier ring  Current Wearing Time: about 4-5 Days    Recommendations: TBD - will reassess if LI is not reversed    Comments: Daughter was present. She assists with stoma care.     Time Increment: 15 minutes    Marilin Fowler, RIVASN, RN, CWOCN

## 2024-01-11 PROBLEM — Z93.2 ILEOSTOMY PRESENT (MULTI): Status: ACTIVE | Noted: 2024-01-10

## 2024-01-11 RX ORDER — METRONIDAZOLE 500 MG/100ML
500 INJECTION, SOLUTION INTRAVENOUS ONCE
Status: CANCELLED | OUTPATIENT
Start: 2024-01-11 | End: 2024-01-11

## 2024-01-11 RX ORDER — HEPARIN SODIUM 5000 [USP'U]/ML
5000 INJECTION, SOLUTION INTRAVENOUS; SUBCUTANEOUS ONCE
Status: CANCELLED | OUTPATIENT
Start: 2024-01-11 | End: 2024-01-11

## 2024-01-17 ENCOUNTER — DOCUMENTATION (OUTPATIENT)
Dept: CARDIOLOGY | Facility: CLINIC | Age: 79
End: 2024-01-17
Payer: MEDICARE

## 2024-01-17 NOTE — PROGRESS NOTES
Our cardiology office at the [facility] has been asked to perform perioperative risk stratification.  Prior cardiac testing including blood work, ECG, echocardiography, stress testing, ischemic evaluations, and functional status have been reviewed.    The patient is LOW TO INTERMEDIATE risk for moderate risk surgery and can PROCEED without further risk stratification.  Please contact us with any further questions or concerns.    -Beta-blocker therapy should be continued throughout surgery.    -Eliquis Xarelto can be held for 3 days prior to surgery and restarted when safe from a surgical perspective.        Cesar Ignacio MD, FACC, TAIWO ABARCA  Medical Director, [facility] [place]  Clinical , [facility] [employer]  [email or web address]

## 2024-01-19 ENCOUNTER — TELEMEDICINE CLINICAL SUPPORT (OUTPATIENT)
Dept: PREADMISSION TESTING | Facility: HOSPITAL | Age: 79
End: 2024-01-19
Payer: MEDICARE

## 2024-01-19 DIAGNOSIS — G89.4 CHRONIC PAIN SYNDROME: ICD-10-CM

## 2024-01-19 DIAGNOSIS — Z93.2 ILEOSTOMY PRESENT (MULTI): ICD-10-CM

## 2024-01-19 DIAGNOSIS — K57.32 DIVERTICULITIS, COLON: ICD-10-CM

## 2024-01-19 RX ORDER — GABAPENTIN 600 MG/1
600 TABLET ORAL 3 TIMES DAILY
Qty: 90 TABLET | Refills: 0 | Status: SHIPPED | OUTPATIENT
Start: 2024-01-19 | End: 2024-03-04

## 2024-01-19 RX ORDER — MULTIVITAMIN/IRON/FOLIC ACID 18MG-0.4MG
1 TABLET ORAL DAILY
COMMUNITY

## 2024-01-19 RX ORDER — DOCUSATE SODIUM 100 MG/1
1 CAPSULE, LIQUID FILLED ORAL 2 TIMES DAILY PRN
COMMUNITY

## 2024-01-24 ENCOUNTER — LAB (OUTPATIENT)
Dept: LAB | Facility: LAB | Age: 79
End: 2024-01-24
Payer: MEDICARE

## 2024-01-24 ENCOUNTER — PRE-ADMISSION TESTING (OUTPATIENT)
Dept: PREADMISSION TESTING | Facility: HOSPITAL | Age: 79
End: 2024-01-24
Payer: MEDICARE

## 2024-01-24 VITALS
HEIGHT: 55 IN | OXYGEN SATURATION: 98 % | DIASTOLIC BLOOD PRESSURE: 70 MMHG | SYSTOLIC BLOOD PRESSURE: 123 MMHG | RESPIRATION RATE: 16 BRPM | TEMPERATURE: 98.3 F | BODY MASS INDEX: 28.32 KG/M2 | HEART RATE: 81 BPM | WEIGHT: 122.36 LBS

## 2024-01-24 DIAGNOSIS — I48.0 PAROXYSMAL ATRIAL FIBRILLATION (MULTI): ICD-10-CM

## 2024-01-24 DIAGNOSIS — Z93.2 ILEOSTOMY PRESENT (MULTI): ICD-10-CM

## 2024-01-24 DIAGNOSIS — R93.1 ABNORMAL ECHOCARDIOGRAM: ICD-10-CM

## 2024-01-24 DIAGNOSIS — I10 HYPERTENSION, ESSENTIAL: ICD-10-CM

## 2024-01-24 DIAGNOSIS — K57.32 DIVERTICULITIS, COLON: ICD-10-CM

## 2024-01-24 DIAGNOSIS — I42.8 INFILTRATIVE CARDIOMYOPATHY (MULTI): ICD-10-CM

## 2024-01-24 DIAGNOSIS — I10 HYPERTENSION, ESSENTIAL: Primary | ICD-10-CM

## 2024-01-24 LAB
ABO GROUP (TYPE) IN BLOOD: NORMAL
ANION GAP SERPL CALC-SCNC: 14 MMOL/L (ref 10–20)
ANTIBODY SCREEN: NORMAL
BASOPHILS # BLD AUTO: 0.02 X10*3/UL (ref 0–0.1)
BASOPHILS NFR BLD AUTO: 0.3 %
BUN SERPL-MCNC: 25 MG/DL (ref 6–23)
CALCIUM SERPL-MCNC: 9 MG/DL (ref 8.6–10.3)
CHLORIDE SERPL-SCNC: 106 MMOL/L (ref 98–107)
CO2 SERPL-SCNC: 24 MMOL/L (ref 21–32)
CREAT SERPL-MCNC: 0.92 MG/DL (ref 0.5–1.05)
EGFRCR SERPLBLD CKD-EPI 2021: 65 ML/MIN/1.73M*2
EOSINOPHIL # BLD AUTO: 0.02 X10*3/UL (ref 0–0.4)
EOSINOPHIL NFR BLD AUTO: 0.3 %
ERYTHROCYTE [DISTWIDTH] IN BLOOD BY AUTOMATED COUNT: 13.7 % (ref 11.5–14.5)
GLUCOSE SERPL-MCNC: 173 MG/DL (ref 74–99)
HCT VFR BLD AUTO: 46.7 % (ref 36–46)
HGB BLD-MCNC: 14 G/DL (ref 12–16)
IMM GRANULOCYTES # BLD AUTO: 0.01 X10*3/UL (ref 0–0.5)
IMM GRANULOCYTES NFR BLD AUTO: 0.2 % (ref 0–0.9)
LYMPHOCYTES # BLD AUTO: 1.56 X10*3/UL (ref 0.8–3)
LYMPHOCYTES NFR BLD AUTO: 26.3 %
MCH RBC QN AUTO: 28.5 PG (ref 26–34)
MCHC RBC AUTO-ENTMCNC: 30 G/DL (ref 32–36)
MCV RBC AUTO: 95 FL (ref 80–100)
MONOCYTES # BLD AUTO: 0.6 X10*3/UL (ref 0.05–0.8)
MONOCYTES NFR BLD AUTO: 10.1 %
NEUTROPHILS # BLD AUTO: 3.73 X10*3/UL (ref 1.6–5.5)
NEUTROPHILS NFR BLD AUTO: 62.8 %
NRBC BLD-RTO: 0 /100 WBCS (ref 0–0)
PLATELET # BLD AUTO: 308 X10*3/UL (ref 150–450)
POTASSIUM SERPL-SCNC: 4.4 MMOL/L (ref 3.5–5.3)
RBC # BLD AUTO: 4.92 X10*6/UL (ref 4–5.2)
RH FACTOR (ANTIGEN D): NORMAL
SODIUM SERPL-SCNC: 140 MMOL/L (ref 136–145)
WBC # BLD AUTO: 5.9 X10*3/UL (ref 4.4–11.3)

## 2024-01-24 PROCEDURE — 86900 BLOOD TYPING SEROLOGIC ABO: CPT

## 2024-01-24 PROCEDURE — 80048 BASIC METABOLIC PNL TOTAL CA: CPT

## 2024-01-24 PROCEDURE — 86850 RBC ANTIBODY SCREEN: CPT

## 2024-01-24 PROCEDURE — 93005 ELECTROCARDIOGRAM TRACING: CPT | Performed by: NURSE PRACTITIONER

## 2024-01-24 PROCEDURE — 87081 CULTURE SCREEN ONLY: CPT | Mod: AHULAB | Performed by: NURSE PRACTITIONER

## 2024-01-24 PROCEDURE — 99214 OFFICE O/P EST MOD 30 MIN: CPT | Performed by: NURSE PRACTITIONER

## 2024-01-24 PROCEDURE — 85025 COMPLETE CBC W/AUTO DIFF WBC: CPT

## 2024-01-24 PROCEDURE — 86901 BLOOD TYPING SEROLOGIC RH(D): CPT

## 2024-01-24 PROCEDURE — 36415 COLL VENOUS BLD VENIPUNCTURE: CPT

## 2024-01-24 RX ORDER — CHLORHEXIDINE GLUCONATE ORAL RINSE 1.2 MG/ML
SOLUTION DENTAL
Qty: 473 ML | Refills: 0 | Status: ON HOLD | OUTPATIENT
Start: 2024-01-24 | End: 2024-02-05 | Stop reason: ALTCHOICE

## 2024-01-24 ASSESSMENT — ENCOUNTER SYMPTOMS
LIMITED RANGE OF MOTION: 1
NEUROLOGICAL NEGATIVE: 1
CONSTITUTIONAL NEGATIVE: 1
NECK NEGATIVE: 1
ENDOCRINE NEGATIVE: 1
SHORTNESS OF BREATH: 1
ARTHRALGIAS: 1
CARDIOVASCULAR NEGATIVE: 1
EYE DISCHARGE: 1

## 2024-01-24 NOTE — CPM/PAT H&P
[facility] Evaluation       Name: Elida Benson (Elida Benson)  /Age: 1945/78 y.o.     In-Person       Date of Consult: 24    Referring Provider:  Dr. Kelley    Date, Surgery, and Length: 24, ileostomy closure, 90 minutes    Patient presents to [facility] for perioperative risk assessment prior to scheduled surgery. Patient presented in  with several episodes of severe diverticulitis with associated abscess. She is s/p single-incision laparoscopic anterior resection with laparoscopic splenic flexure takedown, colorectal anastomosis, diverting loop ileostomy, flexible sigmoidoscopy, and Omentopexy on 23. Patient will now proceed with ileostomy reversal.        This note was created in part upon personal review of patient's medical records.      Pt denies any past history of anesthetic complications such as PONV, awareness, prolonged sedation, dental damage, aspiration, cardiac arrest, difficult intubation, difficult I.V. access or unexpected hospital admissions.  No history of malignant hyperthermia and or pseudocholinesterase deficiency.    No history of blood transfusions.    The patient is not a Yazidism and will accept blood and blood products if medically indicated.     Type and screen sent.       Past Medical History:   Diagnosis Date    A-fib (CMS/HCC)     Managed on meds, Eliquis stoppage and cardiac clearance in [facility] from Dr. Ignacio    Cervical myelopathy (CMS/HCC)     Chronic pain syndrome     COPD (chronic obstructive pulmonary disease) (CMS/HCC)     denies, she is a former smoker but quit in     Dementia (CMS/HCC)     Depression     Diabetic neuropathy (CMS/HCC)     Diverticulitis     DM (diabetes mellitus) (CMS/HCC)     denies but A1C= 7.2% on 23    LORENZ (dyspnea on exertion)     Stable per cards note    HLD (hyperlipidemia)     Hypertension, essential     Infiltrative cardiomyopathy (CMS/HCC)     Echo 23, following with Dr. 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    Other malaise 2022    Physical deconditioning    Palpitations     on occasion, has afib followed by cardiology    Post laminectomy syndrome     Rectovaginal fistula     Spinal stenosis of cervical region     UTI (urinary tract infection)        Past Surgical History:   Procedure Laterality Date    CARPAL TUNNEL RELEASE  2013    Neuroplasty Decompression Median Nerve At Carpal Tunnel    ILEOSTOMY      KNEE ARTHROPLASTY  2013    Knee Arthroplasty    LAMINECTOMY      LUMBAR FUSION      OTHER SURGICAL HISTORY  2023    LAPAROSCPIC ANTERIOR RESECTION, DIVERTING LOOP ILEOSTOMY    SPINAL CORD STIMULATOR IMPLANT      TOTAL HIP ARTHROPLASTY  2013    Total Hip Replacement     Family History   Problem Relation Name Age of Onset    No Known Problems Mother      No Known Problems Father      No Known Problems Sister      No Known Problems Sister      No Known Problems Sister      No Known Problems Sister         Allergies   Allergen Reactions    Shellfish Containing Products Swelling     Social History     Tobacco Use   Smoking Status Former    Packs/day: .25    Types: Cigarettes    Quit date:     Years since quittin.0   Smokeless Tobacco Never     Social History     Substance and Sexual Activity   Alcohol Use Yes    Comment: drinks beer or liquor a few times a year     Social History     Substance and Sexual Activity   Drug Use Never         Current Outpatient Medications:     acetaminophen (Tylenol) 325 mg tablet, Give 2 tablet by mouth every 4 hours as needed for Pain, Disp: , Rfl:     amiodarone (Pacerone) 200 mg tablet, Give 1 tablet by mouth in the morning for ANTIARRHYTHMIC, Disp: 60 tablet, Rfl: 11    apixaban (Eliquis) 5 mg tablet, Give 1 tablet by mouth two times a day for blood thinner, Disp: 180 tablet, Rfl: 11    b complex 0.4 mg tablet, Take 1 tablet by mouth once daily., Disp: , Rfl:     biotin 1 mg tablet, 1 tab(s) orally once  a day, Disp: , Rfl:     cephalexin (Keflex) 500 mg capsule, TAKE ONE CAPSULE BY MOUTH EVERY 12 HOURS, Disp: , Rfl:     cholecalciferol (Vitamin D-3) 1,250 mcg (50,000 unit) capsule, Take 50,000 Units by mouth once each week., Disp: , Rfl:     donepezil (Aricept) 5 mg tablet, 1 tab(s) orally once (at bedtime), Disp: 90 tablet, Rfl: 1    DULoxetine (Cymbalta) 30 mg DR capsule, Take 1 capsule (30 mg) by mouth 2 times a day., Disp: 180 capsule, Rfl: 0    gabapentin (Neurontin) 600 mg tablet, TAKE ONE TABLET BY MOUTH THREE TIMES A DAY, Disp: 90 tablet, Rfl: 0    losartan (Cozaar) 50 mg tablet, 1 tab(s) oral once a day, Disp: 90 tablet, Rfl: 1    melatonin 3 mg tablet, Give 2 tablet by mouth as needed for insomnia administer at bedtime, Disp: , Rfl:     metoprolol succinate XL (Toprol-XL) 25 mg 24 hr tablet, Take 1 tablet (25 mg) by mouth once daily. Do not crush or chew., Disp: 30 tablet, Rfl: 11    traMADol (Ultram) 50 mg tablet, Take 1 tablet (50 mg) by mouth 2 times a day., Disp: 60 tablet, Rfl: 0    chlorhexidine (Peridex) 0.12 % solution, SWISH AND SPIT 15ML FOR 30 SECONDS NIGHT PRIOR TO SURGERY AND MORNING OF SURGERY, Disp: 473 mL, Rfl: 0    docusate sodium (Colace) 100 mg capsule, Take 1 capsule (100 mg) by mouth 2 times a day as needed for constipation., Disp: , Rfl:     PAT ROS:   Constitutional:   neg    Neuro/Psych:   neg    Eyes:    discharge   use of corrective lenses  Ears:   neg    Nose:   neg    Mouth:   neg    Throat:   neg    Neck:   neg    Cardio:   neg    Respiratory:    baseline   shortness of breath  Endocrine:   neg    GI:    Consistent output   :   neg    Musculoskeletal:    arthralgias   decreased ROM  Hematologic:   neg    Skin:  neg        Physical Exam  Vitals reviewed. Physical exam within normal limits.          PAT AIRWAY:   Airway:     Mallampati::  II    Neck ROM::  Full   No broken teeth, no dentures and no missing teeth        Visit Vitals  /70   Pulse 81   Temp 36.8 °C (98.3  "°F)   Resp 16   Ht 1.4 m (4' 7.12\")   Wt 55.5 kg (122 lb 5.7 oz)   SpO2 98%   BMI 28.32 kg/m²   Smoking Status Former   BSA 1.47 m²     Assessment and Plan:     Patient is a 78 year old female scheduled for ileostomy closure with Dr. Kelley on 2/5/24.    Patient is at acceptable risk to proceed with planned surgical procedure. Further cardiac risk stratification deferred at this time.    Plan    Neuro:    Mild dementia    Cardiovascular:    Patient denies any chest pain, tightness, heaviness, pressure, radiating pain, palpitations, irregular heartbeats, lightheadedness, cough, congestion, shortness of breath, LORENZ, PND, near syncope, weight loss or gain.    Fair functional capacity- generalized weakness s/p multiple hospitalizations 2023. Using cane and rollator to assist with ambulation.     EKG in [facility] on 1/24/24:  Normal sinus rhythm HR 75 bpm  RSR or QR pattern in V1 suggests right ventricular conduction delay  Possible anterior infarct, age undetermined  Abnormal ECG    RCRI: 0 Risk of Mace: 3.9%    Follows with Dr. Cesar Montes. Per Dr. Montes on 1/17/23:    \"The patient is LOW TO INTERMEDIATE risk for moderate risk surgery and can PROCEED without further risk stratification.  Please contact us with any further questions or concerns.     -Beta-blocker therapy should be continued throughout surgery.     -Eliquis Xarelto can be held for 3 days prior to surgery and restarted when safe from a surgical perspective\".    A-fib- patient to continue Amiodarone. Eliquis to be stopped 3 days prior to surgery. Ok per Dr. Montes.    HTN- controlled on current medication regimen. Will hold Losartan DOS. Metoprolol can be continued.    Pulm:  Known or suspected HARRIET is considered an independent risk factor for difficult mask ventilation, difficult intubation or both.  Increased vigilance is recommended with the use of narcotics due to an increased risk for opioid induced respiratory depression.  The patient may benefit " from continuous pulse oximetry to monitor for hypoxic events until baseline Sp02 is normal on room air.    Stop bang=3, HTN, obesity, age >50    GI/:    Diverticulitis- s/p ileostomy placement now pending reversal    Heme:  Patient instructed to ambulate as soon as possible postoperatively to decrease thromboembolic risk.    Initiate mechanical DVT prophylaxis as soon as possible and initiate chemical prophylaxis when deemed safe from a bleeding standpoint post surgery.    Caprini=4       Risk assessment complete.  Patient is scheduled for a intermediate surgical risk procedure. She is considered medically optimized for the planned procedure.      Labs/testing obtained in PAT on 1/24/24: CBC, BMP, T&S, MRSA, EKG.    Lab Results   Component Value Date    WBC 5.9 01/24/2024    HGB 14.0 01/24/2024    HCT 46.7 (H) 01/24/2024    MCV 95 01/24/2024     01/24/2024     Lab Results   Component Value Date    GLUCOSE 173 (H) 01/24/2024    CALCIUM 9.0 01/24/2024     01/24/2024    K 4.4 01/24/2024    CO2 24 01/24/2024     01/24/2024    BUN 25 (H) 01/24/2024    CREATININE 0.92 01/24/2024     Follow up: MRSA pending    Preoperative medication instructions were provided and reviewed with the patient.  Any additional testing or evaluation was explained to the patient.  Nothing by mouth instructions were discussed and patient's questions were answered prior to conclusion to this encounter.  Patient verbalized understanding of preoperative instructions given in preadmission testing; discharge instructions available in EMR.    This note was dictated with speech recognition.  Minor errors may have been detected during use of speech recognition.

## 2024-01-24 NOTE — PREPROCEDURE INSTRUCTIONS
Medication List            Accurate as of January 24, 2024  2:22 PM. Always use your most recent med list.                acetaminophen 325 mg tablet  Commonly known as: Tylenol  Medication Adjustments for Surgery: Other (Comment)  Notes to patient: May take day of surgery     amiodarone 200 mg tablet  Commonly known as: Pacerone  Give 1 tablet by mouth in the morning for ANTIARRHYTHMIC  Medication Adjustments for Surgery: Other (Comment)  Notes to patient: May take day of surgery     apixaban 5 mg tablet  Commonly known as: Eliquis  Give 1 tablet by mouth two times a day for blood thinner  Medication Adjustments for Surgery: Stop 3 days before surgery  Notes to patient: Ok to stop for 3 days per Dr. Montes. Last dose 2/1/24     b complex 0.4 mg tablet  Medication Adjustments for Surgery: Stop 7 days before surgery     biotin 1 mg tablet  Medication Adjustments for Surgery: Stop 7 days before surgery     cephalexin 500 mg capsule  Commonly known as: Keflex  Medication Adjustments for Surgery: Other (Comment)  Notes to patient: May take day of surgery     chlorhexidine 0.12 % solution  Commonly known as: Peridex  SWISH AND SPIT 15ML FOR 30 SECONDS NIGHT PRIOR TO SURGERY AND MORNING OF SURGERY     cholecalciferol 50,000 unit capsule  Commonly known as: Vitamin D-3  Medication Adjustments for Surgery: Stop 7 days before surgery     docusate sodium 100 mg capsule  Commonly known as: Colace  Medication Adjustments for Surgery: Stop 1 day before surgery     donepezil 5 mg tablet  Commonly known as: Aricept  1 tab(s) orally once (at bedtime)  Medication Adjustments for Surgery: Other (Comment)  Notes to patient: May take night before surgery     DULoxetine 30 mg DR capsule  Commonly known as: Cymbalta  Take 1 capsule (30 mg) by mouth 2 times a day.  Medication Adjustments for Surgery: Other (Comment)  Notes to patient: May take day of surgery     gabapentin 600 mg tablet  Commonly known as: Neurontin  TAKE ONE TABLET  BY MOUTH THREE TIMES A DAY  Medication Adjustments for Surgery: Other (Comment)  Notes to patient: May take day of surgery     losartan 50 mg tablet  Commonly known as: Cozaar  1 tab(s) oral once a day  Medication Adjustments for Surgery: Stop 1 day before surgery     melatonin 3 mg tablet  Medication Adjustments for Surgery: Other (Comment)  Notes to patient: May take night before surgery     metoprolol succinate XL 25 mg 24 hr tablet  Commonly known as: Toprol-XL  Take 1 tablet (25 mg) by mouth once daily. Do not crush or chew.  Medication Adjustments for Surgery: Other (Comment)  Notes to patient: May take day of surgery     traMADol 50 mg tablet  Commonly known as: Ultram  Take 1 tablet (50 mg) by mouth 2 times a day.  Medication Adjustments for Surgery: Other (Comment)  Notes to patient: May take day of surgery            CONTACT SURGEON'S OFFICE IF YOU DEVELOP:  * Fever = 100.4 F   * New respiratory symptoms (e.g. cough, shortness of breath, respiratory distress, sore throat)  * Recent loss of taste or smell  *Flu like symptoms such as headache, fatigue or gastrointestinal symptoms  * You develop any open sores, shingles, burning or painful urination   AND/OR:  * You no longer wish to have the surgery.  * Any other personal circumstances change that may lead to the need to cancel or defer this surgery.  *You were admitted to any hospital within one week of your planned procedure.    SMOKING:  *Quitting smoking can make a huge difference to your health and recovery from surgery.    *If you need help with quitting, call 0-158-QUIT-NOW.    THE DAY BEFORE SURGERY:  *Do not eat any food after midnight the night before surgery.   *You are permitted to drink clear liquids (i.e. water, black coffee, tea, clear broth, apple juice) up to 2 hours before your surgery.  DIABETICS:  Please check fasting blood sugar  upon waking up.  If fasting sugar is <80 mg/dl, please drink 100ml/3oz of apple juice no later than 2 hours  prior to surgery.      SURGICAL TIME  *You will be contacted between 2 p.m. and 6 p.m. the business day before your surgery with your arrival time.  *If you haven't received a call by 6pm, call [phone].  *Scheduled surgery times may change and you will be notified if this occurs-check your personal voicemail for any updates.    ON THE MORNING OF SURGERY:  *Wear comfortable, loose fitting clothing.   *Do not use moisturizers, creams, lotions or perfume.  *All jewelry and valuables should be left at home.  *Prosthetic devices such as contact lenses, hearing aids, dentures, eyelash extensions, hairpins and body piercing must be removed before surgery.    BRING WITH YOU:  *Photo ID and insurance card  *Current list of medicines and allergies  *Pacemaker/Defibrillator/Heart stent cards  *CPAP machine and mask  *Slings/splints/crutches  *Copy of your complete Advanced Directive/DHPOA-if applicable  *Neurostimulator implant remote    PARKING AND ARRIVAL:  *Check in at the Main Entrance desk and let them know you are here for surgery.  *You will be directed to the 2nd floor surgical waiting area.    AFTER OUTPATIENT SURGERY:  *A responsible adult MUST accompany you at the time of discharge and stay with you for 24 hours after your surgery.  *You may NOT drive yourself home after surgery.  *You may use a taxi or ride sharing service ([employer], [employer]) to return home ONLY if you are accompanied by a friend or family member.  *Instructions for resuming your medications will be provided by your surgeon.

## 2024-01-25 LAB
ATRIAL RATE: 75 BPM
P AXIS: 40 DEGREES
P OFFSET: 198 MS
P ONSET: 135 MS
PR INTERVAL: 180 MS
Q ONSET: 225 MS
QRS COUNT: 12 BEATS
QRS DURATION: 88 MS
QT INTERVAL: 402 MS
QTC CALCULATION(BAZETT): 448 MS
QTC FREDERICIA: 432 MS
R AXIS: -11 DEGREES
T AXIS: 86 DEGREES
T OFFSET: 426 MS
VENTRICULAR RATE: 75 BPM

## 2024-01-26 LAB — STAPHYLOCOCCUS SPEC CULT: NORMAL

## 2024-01-29 ENCOUNTER — OFFICE VISIT (OUTPATIENT)
Dept: PRIMARY CARE | Facility: CLINIC | Age: 79
End: 2024-01-29
Payer: MEDICARE

## 2024-01-29 VITALS
SYSTOLIC BLOOD PRESSURE: 106 MMHG | HEIGHT: 55 IN | WEIGHT: 127 LBS | BODY MASS INDEX: 29.39 KG/M2 | HEART RATE: 77 BPM | DIASTOLIC BLOOD PRESSURE: 74 MMHG

## 2024-01-29 DIAGNOSIS — E11.40 TYPE 2 DIABETES MELLITUS WITH DIABETIC NEUROPATHY, WITHOUT LONG-TERM CURRENT USE OF INSULIN (MULTI): ICD-10-CM

## 2024-01-29 DIAGNOSIS — I48.11 LONGSTANDING PERSISTENT ATRIAL FIBRILLATION (MULTI): ICD-10-CM

## 2024-01-29 DIAGNOSIS — G89.4 CHRONIC PAIN SYNDROME: ICD-10-CM

## 2024-01-29 DIAGNOSIS — J44.9 COPD WITHOUT EXACERBATION (MULTI): ICD-10-CM

## 2024-01-29 DIAGNOSIS — F51.01 PRIMARY INSOMNIA: ICD-10-CM

## 2024-01-29 DIAGNOSIS — M46.32: ICD-10-CM

## 2024-01-29 DIAGNOSIS — M48.02 MYELOPATHY CONCURRENT WITH AND DUE TO SPINAL STENOSIS OF CERVICAL REGION (MULTI): ICD-10-CM

## 2024-01-29 DIAGNOSIS — G99.2 MYELOPATHY CONCURRENT WITH AND DUE TO SPINAL STENOSIS OF CERVICAL REGION (MULTI): ICD-10-CM

## 2024-01-29 DIAGNOSIS — I27.20 PULMONARY HYPERTENSION, UNSPECIFIED (MULTI): ICD-10-CM

## 2024-01-29 DIAGNOSIS — R53.1 WEAKNESS: ICD-10-CM

## 2024-01-29 DIAGNOSIS — F33.41 RECURRENT MAJOR DEPRESSIVE DISORDER, IN PARTIAL REMISSION (CMS-HCC): ICD-10-CM

## 2024-01-29 DIAGNOSIS — F02.A0 MILD ALZHEIMER'S DEMENTIA, UNSPECIFIED TIMING OF DEMENTIA ONSET, UNSPECIFIED WHETHER BEHAVIORAL, PSYCHOTIC, OR MOOD DISTURBANCE OR ANXIETY (MULTI): ICD-10-CM

## 2024-01-29 DIAGNOSIS — G30.9 MILD ALZHEIMER'S DEMENTIA, UNSPECIFIED TIMING OF DEMENTIA ONSET, UNSPECIFIED WHETHER BEHAVIORAL, PSYCHOTIC, OR MOOD DISTURBANCE OR ANXIETY (MULTI): ICD-10-CM

## 2024-01-29 DIAGNOSIS — Z93.2 ILEOSTOMY PRESENT (MULTI): ICD-10-CM

## 2024-01-29 DIAGNOSIS — I10 HYPERTENSION, ESSENTIAL: Primary | ICD-10-CM

## 2024-01-29 DIAGNOSIS — I42.8 INFILTRATIVE CARDIOMYOPATHY (MULTI): ICD-10-CM

## 2024-01-29 DIAGNOSIS — M50.00 CERVICAL DISC DISORDER WITH MYELOPATHY: ICD-10-CM

## 2024-01-29 PROBLEM — D62 ACUTE POSTHEMORRHAGIC ANEMIA: Status: ACTIVE | Noted: 2023-08-26

## 2024-01-29 PROBLEM — M62.81 MUSCLE WEAKNESS OF UPPER EXTREMITY: Status: ACTIVE | Noted: 2024-01-29

## 2024-01-29 PROBLEM — F45.41 PSYCHOGENIC PAIN: Status: ACTIVE | Noted: 2023-05-26

## 2024-01-29 PROBLEM — E87.20 ACIDOSIS, UNSPECIFIED: Status: ACTIVE | Noted: 2023-08-26

## 2024-01-29 LAB — POC HEMOGLOBIN A1C: 7.7 % (ref 4.2–6.5)

## 2024-01-29 PROCEDURE — 1159F MED LIST DOCD IN RCRD: CPT | Performed by: FAMILY MEDICINE

## 2024-01-29 PROCEDURE — 1160F RVW MEDS BY RX/DR IN RCRD: CPT | Performed by: FAMILY MEDICINE

## 2024-01-29 PROCEDURE — 83036 HEMOGLOBIN GLYCOSYLATED A1C: CPT | Performed by: FAMILY MEDICINE

## 2024-01-29 PROCEDURE — 3074F SYST BP LT 130 MM HG: CPT | Performed by: FAMILY MEDICINE

## 2024-01-29 PROCEDURE — 99214 OFFICE O/P EST MOD 30 MIN: CPT | Performed by: FAMILY MEDICINE

## 2024-01-29 PROCEDURE — 3078F DIAST BP <80 MM HG: CPT | Performed by: FAMILY MEDICINE

## 2024-01-29 PROCEDURE — 1126F AMNT PAIN NOTED NONE PRSNT: CPT | Performed by: FAMILY MEDICINE

## 2024-01-29 PROCEDURE — 1036F TOBACCO NON-USER: CPT | Performed by: FAMILY MEDICINE

## 2024-01-29 RX ORDER — OXYCODONE HYDROCHLORIDE 5 MG/1
CAPSULE ORAL
COMMUNITY
End: 2024-04-17 | Stop reason: HOSPADM

## 2024-01-29 RX ORDER — TRAZODONE HYDROCHLORIDE 50 MG/1
50 TABLET ORAL NIGHTLY PRN
Qty: 30 TABLET | Refills: 5 | Status: SHIPPED | OUTPATIENT
Start: 2024-01-29 | End: 2025-01-28

## 2024-01-29 RX ORDER — TOPIRAMATE 100 MG/1
TABLET, FILM COATED ORAL
COMMUNITY

## 2024-01-29 RX ORDER — TRAMADOL HYDROCHLORIDE 50 MG/1
50 TABLET ORAL 2 TIMES DAILY
Qty: 60 TABLET | Refills: 2 | Status: ON HOLD | OUTPATIENT
Start: 2024-01-29 | End: 2024-02-12 | Stop reason: SDUPTHER

## 2024-01-29 ASSESSMENT — ENCOUNTER SYMPTOMS
CONSTITUTIONAL NEGATIVE: 1
CARDIOVASCULAR NEGATIVE: 1
MYALGIAS: 1
SLEEP DISTURBANCE: 1
BACK PAIN: 1
WEAKNESS: 1
OCCASIONAL FEELINGS OF UNSTEADINESS: 1
DEPRESSION: 0
GASTROINTESTINAL NEGATIVE: 1
ARTHRALGIAS: 1
RESPIRATORY NEGATIVE: 1
LOSS OF SENSATION IN FEET: 0

## 2024-01-29 NOTE — PROGRESS NOTES
Pt comes in for surg clearance. Pt is having her ostomy reversal on feb 5. Pt had her lab work done this past Friday.

## 2024-01-29 NOTE — PROGRESS NOTES
Subjective   Patient ID: Elida Benson is a 78 y.o. female who presents for No chief complaint on file..    HPI pt here for fu on dm , chronic pain , htn, insomniea.,   Having a colostomy reversal next week    Review of Systems   Constitutional: Negative.    HENT: Negative.     Respiratory: Negative.     Cardiovascular: Negative.    Gastrointestinal: Negative.    Musculoskeletal:  Positive for arthralgias, back pain, gait problem and myalgias.   Neurological:  Positive for weakness.        R leg weakness w brace sp neuro event   Psychiatric/Behavioral:  Positive for sleep disturbance.    OARRS:  No data recorded  I have personally reviewed the OARRS report for Elida Bensno. I have considered the risks of abuse, dependence, addiction and diversion and I believe that it is clinically appropriate for Elida Benson to be prescribed this medication    Is the patient prescribed a combination of a benzodiazepine and opioid?  No    Last Urine Drug Screen / ordered today: Yes  Recent Results (from the past 8760 hour(s))   Drug Screen, Urine With Reflex to Confirmation    Collection Time: 12/22/23  2:09 PM   Result Value Ref Range    Amphetamine Screen, Urine Presumptive Negative Presumptive Negative    Barbiturate Screen, Urine Presumptive Negative Presumptive Negative    Benzodiazepines Screen, Urine Presumptive Negative Presumptive Negative    Cannabinoid Screen, Urine Presumptive Negative Presumptive Negative    Cocaine Metabolite Screen, Urine Presumptive Negative Presumptive Negative    Fentanyl Screen, Urine Presumptive Negative Presumptive Negative    Opiate Screen, Urine Presumptive Negative Presumptive Negative    Oxycodone Screen, Urine Presumptive Negative Presumptive Negative    PCP Screen, Urine Presumptive Negative Presumptive Negative     Results are as expected.         Controlled Substance Agreement:  Date of the Last Agreement: 12/23  Reviewed Controlled Substance Agreement including but not  "limited to the benefits, risks, and alternatives to treatment with a Controlled Substance medication(s).    Opioids:  What is the patient's goal of therapy? Pain modification   Is this being achieved with current treatment? yes    I have calculated the patient's Morphine Dose Equivalent (MED):   I have considered referral to Pain Management and/or a specialist, and do not feel it is necessary at this time.    I feel that it is clinically indicated to continue this current medication regimen after consideration of alternative therapies, and other non-opioid treatment.    Opioid Risk Screening:  Total Score Risk Category  TOTAL SCORE CATEGORY: Low Risk (0-3) (12/22/2023  1:00 PM)        Pain Assessment:  Analgesia  What was your pain level on average during the past week?: 4  What was your pain level at its worst during the past week?: 7  What percentage of your pain has been relieved during the past week?: 50 %  Is the amount of pain relief you are now obtaining from your current pain relievers enough to make a real difference in your life?: Y  Query to Clinician: Is the patient's pain relief clinically significant?: Yes    Activities of Daily Living  Physical Functioning: Better  Family Relationships: Better  Social Relationships: Better  Mood: Better  Sleep Patterns: Better  Overall Functioning: Better    Adverse Events  Is patient experiencing any side effects from current pain relievers?: N  Patient's Overall Severity of Side Effects: None      Assessment  Is your overall impression that this patient is benefiting from opioid therapy?: Yes  Specific Analgesic Plan: Continue present regimen        Objective   /74   Pulse 77   Ht 1.397 m (4' 7\")   Wt 57.6 kg (127 lb)   BMI 29.52 kg/m²     Physical Exam  Vitals reviewed.   Constitutional:       Appearance: Normal appearance. She is normal weight.   Eyes:      Extraocular Movements: Extraocular movements intact.      Conjunctiva/sclera: Conjunctivae normal. "      Pupils: Pupils are equal, round, and reactive to light.   Cardiovascular:      Rate and Rhythm: Normal rate and regular rhythm.      Pulses: Normal pulses.      Heart sounds: Normal heart sounds.   Pulmonary:      Effort: Pulmonary effort is normal.      Breath sounds: Normal breath sounds.   Abdominal:      General: Bowel sounds are normal.      Palpations: Abdomen is soft.   Musculoskeletal:         General: Normal range of motion.      Comments: Weakness in upper and lower esxt from cervicle disease   Skin:     General: Skin is warm and dry.   Neurological:      General: No focal deficit present.      Mental Status: She is alert and oriented to person, place, and time. Mental status is at baseline.      Motor: Weakness present.      Gait: Gait abnormal.         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    Myelopathy concurrent with and due to spinal stenosis of cervical region (CMS/[facility]) M48.02, G99.2    Pulmonary hypertension, unspecified (CMS/[facility]) I27.20    Type 2 diabetes mellitus with diabetic neuropathy, unspecified (CMS/[facility]) E11.40    Relevant Orders    POCT glycosylated hemoglobin (Hb A1C) manually resulted (Completed)    Weakness R53.1    Ileostomy present (CMS/[facility]) Z93.2     Other Visit Diagnoses         Codes    Infection of intervertebral disc (pyogenic), cervical region (CMS/[facility])     M46.32        Dm will cont on current meds, once she has reconecvtion as diet changes will robin need to change mediaction to accomadate

## 2024-02-05 ENCOUNTER — HOSPITAL ENCOUNTER (INPATIENT)
Facility: HOSPITAL | Age: 79
LOS: 7 days | Discharge: NURSING, REHAB OR LONG-TERM CARE | DRG: 330 | End: 2024-02-12
Attending: COLON & RECTAL SURGERY | Admitting: COLON & RECTAL SURGERY
Payer: MEDICARE

## 2024-02-05 ENCOUNTER — ANESTHESIA EVENT (OUTPATIENT)
Dept: OPERATING ROOM | Facility: HOSPITAL | Age: 79
DRG: 330 | End: 2024-02-05
Payer: MEDICARE

## 2024-02-05 ENCOUNTER — ANESTHESIA (OUTPATIENT)
Dept: OPERATING ROOM | Facility: HOSPITAL | Age: 79
DRG: 330 | End: 2024-02-05
Payer: MEDICARE

## 2024-02-05 DIAGNOSIS — K57.32 DIVERTICULITIS, COLON: Primary | ICD-10-CM

## 2024-02-05 DIAGNOSIS — G89.4 CHRONIC PAIN SYNDROME: ICD-10-CM

## 2024-02-05 DIAGNOSIS — Z93.2 ILEOSTOMY PRESENT (MULTI): ICD-10-CM

## 2024-02-05 DIAGNOSIS — N30.00 ACUTE CYSTITIS WITHOUT HEMATURIA: ICD-10-CM

## 2024-02-05 LAB
GLUCOSE BLD MANUAL STRIP-MCNC: 115 MG/DL (ref 74–99)
GLUCOSE BLD MANUAL STRIP-MCNC: 137 MG/DL (ref 74–99)

## 2024-02-05 PROCEDURE — 2500000005 HC RX 250 GENERAL PHARMACY W/O HCPCS: Performed by: ANESTHESIOLOGY

## 2024-02-05 PROCEDURE — 9420000001 HC RT PATIENT EDUCATION 5 MIN

## 2024-02-05 PROCEDURE — 1100000001 HC PRIVATE ROOM DAILY

## 2024-02-05 PROCEDURE — 44620 REPAIR BOWEL OPENING: CPT | Performed by: COLON & RECTAL SURGERY

## 2024-02-05 PROCEDURE — 45330 DIAGNOSTIC SIGMOIDOSCOPY: CPT | Performed by: COLON & RECTAL SURGERY

## 2024-02-05 PROCEDURE — A44620 PR CLOSE ENTEROSTOMY: Performed by: ANESTHESIOLOGY

## 2024-02-05 PROCEDURE — 3700000001 HC GENERAL ANESTHESIA TIME - INITIAL BASE CHARGE: Performed by: COLON & RECTAL SURGERY

## 2024-02-05 PROCEDURE — 2500000004 HC RX 250 GENERAL PHARMACY W/ HCPCS (ALT 636 FOR OP/ED): Performed by: NURSE PRACTITIONER

## 2024-02-05 PROCEDURE — 2500000004 HC RX 250 GENERAL PHARMACY W/ HCPCS (ALT 636 FOR OP/ED): Performed by: ANESTHESIOLOGY

## 2024-02-05 PROCEDURE — 2500000005 HC RX 250 GENERAL PHARMACY W/O HCPCS: Performed by: NURSE ANESTHETIST, CERTIFIED REGISTERED

## 2024-02-05 PROCEDURE — 0DBB0ZZ EXCISION OF ILEUM, OPEN APPROACH: ICD-10-PCS | Performed by: COLON & RECTAL SURGERY

## 2024-02-05 PROCEDURE — 2500000004 HC RX 250 GENERAL PHARMACY W/ HCPCS (ALT 636 FOR OP/ED): Performed by: COLON & RECTAL SURGERY

## 2024-02-05 PROCEDURE — 82947 ASSAY GLUCOSE BLOOD QUANT: CPT

## 2024-02-05 PROCEDURE — 99100 ANES PT EXTEME AGE<1 YR&>70: CPT | Performed by: ANESTHESIOLOGY

## 2024-02-05 PROCEDURE — 7100000002 HC RECOVERY ROOM TIME - EACH INCREMENTAL 1 MINUTE: Performed by: COLON & RECTAL SURGERY

## 2024-02-05 PROCEDURE — A44620 PR CLOSE ENTEROSTOMY: Performed by: NURSE ANESTHETIST, CERTIFIED REGISTERED

## 2024-02-05 PROCEDURE — 2500000005 HC RX 250 GENERAL PHARMACY W/O HCPCS: Performed by: COLON & RECTAL SURGERY

## 2024-02-05 PROCEDURE — A4217 STERILE WATER/SALINE, 500 ML: HCPCS | Performed by: COLON & RECTAL SURGERY

## 2024-02-05 PROCEDURE — 2500000004 HC RX 250 GENERAL PHARMACY W/ HCPCS (ALT 636 FOR OP/ED): Performed by: NURSE ANESTHETIST, CERTIFIED REGISTERED

## 2024-02-05 PROCEDURE — 7100000001 HC RECOVERY ROOM TIME - INITIAL BASE CHARGE: Performed by: COLON & RECTAL SURGERY

## 2024-02-05 PROCEDURE — 2500000001 HC RX 250 WO HCPCS SELF ADMINISTERED DRUGS (ALT 637 FOR MEDICARE OP): Performed by: COLON & RECTAL SURGERY

## 2024-02-05 PROCEDURE — 2500000001 HC RX 250 WO HCPCS SELF ADMINISTERED DRUGS (ALT 637 FOR MEDICARE OP): Performed by: ANESTHESIOLOGY

## 2024-02-05 PROCEDURE — 2720000007 HC OR 272 NO HCPCS: Performed by: COLON & RECTAL SURGERY

## 2024-02-05 PROCEDURE — 3600000003 HC OR TIME - INITIAL BASE CHARGE - PROCEDURE LEVEL THREE: Performed by: COLON & RECTAL SURGERY

## 2024-02-05 PROCEDURE — 3600000008 HC OR TIME - EACH INCREMENTAL 1 MINUTE - PROCEDURE LEVEL THREE: Performed by: COLON & RECTAL SURGERY

## 2024-02-05 PROCEDURE — 3700000002 HC GENERAL ANESTHESIA TIME - EACH INCREMENTAL 1 MINUTE: Performed by: COLON & RECTAL SURGERY

## 2024-02-05 RX ORDER — ONDANSETRON HYDROCHLORIDE 2 MG/ML
4 INJECTION, SOLUTION INTRAVENOUS EVERY 8 HOURS PRN
Status: CANCELLED | OUTPATIENT
Start: 2024-02-05

## 2024-02-05 RX ORDER — ALBUTEROL SULFATE 0.83 MG/ML
2.5 SOLUTION RESPIRATORY (INHALATION) ONCE AS NEEDED
Status: DISCONTINUED | OUTPATIENT
Start: 2024-02-05 | End: 2024-02-05 | Stop reason: HOSPADM

## 2024-02-05 RX ORDER — ENOXAPARIN SODIUM 100 MG/ML
40 INJECTION SUBCUTANEOUS EVERY 24 HOURS
Status: CANCELLED | OUTPATIENT
Start: 2024-02-05

## 2024-02-05 RX ORDER — SODIUM CHLORIDE 9 MG/ML
40 INJECTION, SOLUTION INTRAVENOUS CONTINUOUS
Status: CANCELLED | OUTPATIENT
Start: 2024-02-05

## 2024-02-05 RX ORDER — ONDANSETRON HYDROCHLORIDE 2 MG/ML
INJECTION, SOLUTION INTRAVENOUS AS NEEDED
Status: DISCONTINUED | OUTPATIENT
Start: 2024-02-05 | End: 2024-02-05

## 2024-02-05 RX ORDER — HEPARIN SODIUM 5000 [USP'U]/ML
5000 INJECTION, SOLUTION INTRAVENOUS; SUBCUTANEOUS ONCE
Status: COMPLETED | OUTPATIENT
Start: 2024-02-05 | End: 2024-02-05

## 2024-02-05 RX ORDER — METOPROLOL SUCCINATE 25 MG/1
25 TABLET, EXTENDED RELEASE ORAL DAILY
Status: DISCONTINUED | OUTPATIENT
Start: 2024-02-05 | End: 2024-02-12 | Stop reason: HOSPADM

## 2024-02-05 RX ORDER — NALOXONE HYDROCHLORIDE 0.4 MG/ML
0.2 INJECTION, SOLUTION INTRAMUSCULAR; INTRAVENOUS; SUBCUTANEOUS EVERY 5 MIN PRN
Status: DISCONTINUED | OUTPATIENT
Start: 2024-02-05 | End: 2024-02-12 | Stop reason: HOSPADM

## 2024-02-05 RX ORDER — PROPOFOL 10 MG/ML
INJECTION, EMULSION INTRAVENOUS AS NEEDED
Status: DISCONTINUED | OUTPATIENT
Start: 2024-02-05 | End: 2024-02-05

## 2024-02-05 RX ORDER — OXYCODONE HYDROCHLORIDE 5 MG/1
5 TABLET ORAL EVERY 4 HOURS PRN
Status: CANCELLED | OUTPATIENT
Start: 2024-02-05

## 2024-02-05 RX ORDER — PROPOFOL 10 MG/ML
INJECTION, EMULSION INTRAVENOUS CONTINUOUS PRN
Status: DISCONTINUED | OUTPATIENT
Start: 2024-02-05 | End: 2024-02-05

## 2024-02-05 RX ORDER — ROCURONIUM BROMIDE 10 MG/ML
INJECTION, SOLUTION INTRAVENOUS AS NEEDED
Status: DISCONTINUED | OUTPATIENT
Start: 2024-02-05 | End: 2024-02-05

## 2024-02-05 RX ORDER — ONDANSETRON HYDROCHLORIDE 2 MG/ML
4 INJECTION, SOLUTION INTRAVENOUS EVERY 8 HOURS PRN
Status: DISCONTINUED | OUTPATIENT
Start: 2024-02-05 | End: 2024-02-12 | Stop reason: HOSPADM

## 2024-02-05 RX ORDER — OXYCODONE HYDROCHLORIDE 5 MG/1
5 TABLET ORAL EVERY 4 HOURS PRN
Status: DISCONTINUED | OUTPATIENT
Start: 2024-02-05 | End: 2024-02-12 | Stop reason: HOSPADM

## 2024-02-05 RX ORDER — ACETAMINOPHEN 325 MG/1
650 TABLET ORAL ONCE
Status: DISCONTINUED | OUTPATIENT
Start: 2024-02-05 | End: 2024-02-05 | Stop reason: HOSPADM

## 2024-02-05 RX ORDER — SODIUM CHLORIDE, SODIUM LACTATE, POTASSIUM CHLORIDE, CALCIUM CHLORIDE 600; 310; 30; 20 MG/100ML; MG/100ML; MG/100ML; MG/100ML
INJECTION, SOLUTION INTRAVENOUS CONTINUOUS PRN
Status: DISCONTINUED | OUTPATIENT
Start: 2024-02-05 | End: 2024-02-05

## 2024-02-05 RX ORDER — LIDOCAINE HYDROCHLORIDE 20 MG/ML
INJECTION, SOLUTION EPIDURAL; INFILTRATION; INTRACAUDAL; PERINEURAL AS NEEDED
Status: DISCONTINUED | OUTPATIENT
Start: 2024-02-05 | End: 2024-02-05

## 2024-02-05 RX ORDER — SODIUM CHLORIDE 0.9 G/100ML
IRRIGANT IRRIGATION AS NEEDED
Status: DISCONTINUED | OUTPATIENT
Start: 2024-02-05 | End: 2024-02-05 | Stop reason: HOSPADM

## 2024-02-05 RX ORDER — METRONIDAZOLE 500 MG/100ML
500 INJECTION, SOLUTION INTRAVENOUS ONCE
Status: COMPLETED | OUTPATIENT
Start: 2024-02-05 | End: 2024-02-05

## 2024-02-05 RX ORDER — GABAPENTIN 300 MG/1
600 CAPSULE ORAL 3 TIMES DAILY
Status: DISCONTINUED | OUTPATIENT
Start: 2024-02-05 | End: 2024-02-12 | Stop reason: HOSPADM

## 2024-02-05 RX ORDER — DULOXETIN HYDROCHLORIDE 30 MG/1
30 CAPSULE, DELAYED RELEASE ORAL 2 TIMES DAILY
Status: DISCONTINUED | OUTPATIENT
Start: 2024-02-05 | End: 2024-02-12 | Stop reason: HOSPADM

## 2024-02-05 RX ORDER — CEPHALEXIN 500 MG/1
500 CAPSULE ORAL EVERY 12 HOURS
Status: DISCONTINUED | OUTPATIENT
Start: 2024-02-05 | End: 2024-02-09 | Stop reason: ALTCHOICE

## 2024-02-05 RX ORDER — SODIUM CHLORIDE, SODIUM LACTATE, POTASSIUM CHLORIDE, CALCIUM CHLORIDE 600; 310; 30; 20 MG/100ML; MG/100ML; MG/100ML; MG/100ML
100 INJECTION, SOLUTION INTRAVENOUS CONTINUOUS
Status: DISCONTINUED | OUTPATIENT
Start: 2024-02-05 | End: 2024-02-05 | Stop reason: HOSPADM

## 2024-02-05 RX ORDER — GABAPENTIN 300 MG/1
300 CAPSULE ORAL 3 TIMES DAILY
Status: CANCELLED | OUTPATIENT
Start: 2024-02-05

## 2024-02-05 RX ORDER — OXYCODONE HYDROCHLORIDE 5 MG/1
5 TABLET ORAL EVERY 4 HOURS PRN
Status: DISCONTINUED | OUTPATIENT
Start: 2024-02-05 | End: 2024-02-05 | Stop reason: HOSPADM

## 2024-02-05 RX ORDER — TOPIRAMATE 100 MG/1
100 TABLET, FILM COATED ORAL 2 TIMES DAILY
Status: DISCONTINUED | OUTPATIENT
Start: 2024-02-05 | End: 2024-02-12 | Stop reason: HOSPADM

## 2024-02-05 RX ORDER — CEFAZOLIN 1 G/1
INJECTION, POWDER, FOR SOLUTION INTRAVENOUS AS NEEDED
Status: DISCONTINUED | OUTPATIENT
Start: 2024-02-05 | End: 2024-02-05

## 2024-02-05 RX ORDER — KETOROLAC TROMETHAMINE 30 MG/ML
15 INJECTION, SOLUTION INTRAMUSCULAR; INTRAVENOUS EVERY 6 HOURS SCHEDULED
Status: CANCELLED | OUTPATIENT
Start: 2024-02-05 | End: 2024-02-07

## 2024-02-05 RX ORDER — CEFAZOLIN SODIUM 1 G/50ML
1 SOLUTION INTRAVENOUS ONCE
Status: DISCONTINUED | OUTPATIENT
Start: 2024-02-05 | End: 2024-02-05 | Stop reason: HOSPADM

## 2024-02-05 RX ORDER — ENOXAPARIN SODIUM 100 MG/ML
40 INJECTION SUBCUTANEOUS EVERY 24 HOURS
Status: DISCONTINUED | OUTPATIENT
Start: 2024-02-05 | End: 2024-02-07

## 2024-02-05 RX ORDER — PHENYLEPHRINE HCL IN 0.9% NACL 1 MG/10 ML
SYRINGE (ML) INTRAVENOUS AS NEEDED
Status: DISCONTINUED | OUTPATIENT
Start: 2024-02-05 | End: 2024-02-05

## 2024-02-05 RX ORDER — DEXAMETHASONE SODIUM PHOSPHATE 4 MG/ML
INJECTION, SOLUTION INTRA-ARTICULAR; INTRALESIONAL; INTRAMUSCULAR; INTRAVENOUS; SOFT TISSUE AS NEEDED
Status: DISCONTINUED | OUTPATIENT
Start: 2024-02-05 | End: 2024-02-05

## 2024-02-05 RX ORDER — ONDANSETRON 4 MG/1
4 TABLET, ORALLY DISINTEGRATING ORAL EVERY 8 HOURS PRN
Status: DISCONTINUED | OUTPATIENT
Start: 2024-02-05 | End: 2024-02-12 | Stop reason: HOSPADM

## 2024-02-05 RX ORDER — KETOROLAC TROMETHAMINE 30 MG/ML
15 INJECTION, SOLUTION INTRAMUSCULAR; INTRAVENOUS EVERY 6 HOURS SCHEDULED
Status: DISPENSED | OUTPATIENT
Start: 2024-02-05 | End: 2024-02-07

## 2024-02-05 RX ORDER — ACETAMINOPHEN 325 MG/1
650 TABLET ORAL EVERY 6 HOURS
Status: CANCELLED | OUTPATIENT
Start: 2024-02-05

## 2024-02-05 RX ORDER — AMIODARONE HYDROCHLORIDE 200 MG/1
200 TABLET ORAL DAILY
Status: DISCONTINUED | OUTPATIENT
Start: 2024-02-05 | End: 2024-02-12 | Stop reason: HOSPADM

## 2024-02-05 RX ORDER — TRAZODONE HYDROCHLORIDE 50 MG/1
50 TABLET ORAL NIGHTLY PRN
Status: DISCONTINUED | OUTPATIENT
Start: 2024-02-05 | End: 2024-02-12 | Stop reason: HOSPADM

## 2024-02-05 RX ORDER — LOSARTAN POTASSIUM 50 MG/1
50 TABLET ORAL DAILY
Status: DISCONTINUED | OUTPATIENT
Start: 2024-02-05 | End: 2024-02-12 | Stop reason: HOSPADM

## 2024-02-05 RX ORDER — DONEPEZIL HYDROCHLORIDE 5 MG/1
5 TABLET, FILM COATED ORAL NIGHTLY
Status: DISCONTINUED | OUTPATIENT
Start: 2024-02-05 | End: 2024-02-12 | Stop reason: HOSPADM

## 2024-02-05 RX ORDER — LIDOCAINE HYDROCHLORIDE 10 MG/ML
0.1 INJECTION, SOLUTION EPIDURAL; INFILTRATION; INTRACAUDAL; PERINEURAL ONCE
Status: DISCONTINUED | OUTPATIENT
Start: 2024-02-05 | End: 2024-02-05 | Stop reason: HOSPADM

## 2024-02-05 RX ORDER — BUPIVACAINE HYDROCHLORIDE 5 MG/ML
INJECTION, SOLUTION PERINEURAL AS NEEDED
Status: DISCONTINUED | OUTPATIENT
Start: 2024-02-05 | End: 2024-02-05 | Stop reason: HOSPADM

## 2024-02-05 RX ORDER — MEPERIDINE HYDROCHLORIDE 25 MG/ML
12.5 INJECTION INTRAMUSCULAR; INTRAVENOUS; SUBCUTANEOUS EVERY 10 MIN PRN
Status: DISCONTINUED | OUTPATIENT
Start: 2024-02-05 | End: 2024-02-05 | Stop reason: HOSPADM

## 2024-02-05 RX ORDER — OXYCODONE HYDROCHLORIDE 5 MG/1
10 TABLET ORAL EVERY 4 HOURS PRN
Status: DISCONTINUED | OUTPATIENT
Start: 2024-02-05 | End: 2024-02-12 | Stop reason: HOSPADM

## 2024-02-05 RX ORDER — FENTANYL CITRATE 50 UG/ML
INJECTION, SOLUTION INTRAMUSCULAR; INTRAVENOUS AS NEEDED
Status: DISCONTINUED | OUTPATIENT
Start: 2024-02-05 | End: 2024-02-05

## 2024-02-05 RX ORDER — NALOXONE HYDROCHLORIDE 1 MG/ML
0.2 INJECTION INTRAMUSCULAR; INTRAVENOUS; SUBCUTANEOUS EVERY 5 MIN PRN
Status: CANCELLED | OUTPATIENT
Start: 2024-02-05

## 2024-02-05 RX ORDER — LABETALOL HYDROCHLORIDE 5 MG/ML
5 INJECTION, SOLUTION INTRAVENOUS ONCE AS NEEDED
Status: DISCONTINUED | OUTPATIENT
Start: 2024-02-05 | End: 2024-02-05 | Stop reason: HOSPADM

## 2024-02-05 RX ORDER — ACETAMINOPHEN 325 MG/1
650 TABLET ORAL EVERY 6 HOURS
Status: DISCONTINUED | OUTPATIENT
Start: 2024-02-05 | End: 2024-02-12 | Stop reason: HOSPADM

## 2024-02-05 RX ORDER — ONDANSETRON HYDROCHLORIDE 2 MG/ML
4 INJECTION, SOLUTION INTRAVENOUS ONCE AS NEEDED
Status: COMPLETED | OUTPATIENT
Start: 2024-02-05 | End: 2024-02-05

## 2024-02-05 RX ORDER — ONDANSETRON 4 MG/1
4 TABLET, ORALLY DISINTEGRATING ORAL EVERY 8 HOURS PRN
Status: CANCELLED | OUTPATIENT
Start: 2024-02-05

## 2024-02-05 RX ORDER — SODIUM CHLORIDE 9 MG/ML
40 INJECTION, SOLUTION INTRAVENOUS CONTINUOUS
Status: DISCONTINUED | OUTPATIENT
Start: 2024-02-05 | End: 2024-02-06

## 2024-02-05 RX ORDER — OXYCODONE HYDROCHLORIDE 5 MG/1
10 TABLET ORAL EVERY 4 HOURS PRN
Status: CANCELLED | OUTPATIENT
Start: 2024-02-05

## 2024-02-05 RX ADMIN — Medication 4 L/MIN: at 12:03

## 2024-02-05 RX ADMIN — SODIUM CHLORIDE 40 ML/HR: 9 INJECTION, SOLUTION INTRAVENOUS at 14:20

## 2024-02-05 RX ADMIN — METRONIDAZOLE 500 MG: 500 INJECTION, SOLUTION INTRAVENOUS at 10:55

## 2024-02-05 RX ADMIN — ROCURONIUM BROMIDE 20 MG: 10 INJECTION, SOLUTION INTRAVENOUS at 11:17

## 2024-02-05 RX ADMIN — KETOROLAC TROMETHAMINE 15 MG: 30 INJECTION, SOLUTION INTRAMUSCULAR; INTRAVENOUS at 14:15

## 2024-02-05 RX ADMIN — PROPOFOL 60 MG: 10 INJECTION, EMULSION INTRAVENOUS at 10:48

## 2024-02-05 RX ADMIN — OXYCODONE HYDROCHLORIDE 5 MG: 5 TABLET ORAL at 13:05

## 2024-02-05 RX ADMIN — SODIUM CHLORIDE, POTASSIUM CHLORIDE, SODIUM LACTATE AND CALCIUM CHLORIDE 100 ML/HR: 600; 310; 30; 20 INJECTION, SOLUTION INTRAVENOUS at 12:34

## 2024-02-05 RX ADMIN — ACETAMINOPHEN 650 MG: 325 TABLET ORAL at 14:15

## 2024-02-05 RX ADMIN — ONDANSETRON 4 MG: 2 INJECTION INTRAMUSCULAR; INTRAVENOUS at 10:48

## 2024-02-05 RX ADMIN — TOPIRAMATE 100 MG: 100 TABLET, FILM COATED ORAL at 16:01

## 2024-02-05 RX ADMIN — ROCURONIUM BROMIDE 40 MG: 10 INJECTION, SOLUTION INTRAVENOUS at 10:48

## 2024-02-05 RX ADMIN — KETOROLAC TROMETHAMINE 15 MG: 30 INJECTION, SOLUTION INTRAMUSCULAR; INTRAVENOUS at 21:05

## 2024-02-05 RX ADMIN — HEPARIN SODIUM 5000 UNITS: 5000 INJECTION INTRAVENOUS; SUBCUTANEOUS at 10:03

## 2024-02-05 RX ADMIN — DONEPEZIL HYDROCHLORIDE 5 MG: 5 TABLET ORAL at 21:07

## 2024-02-05 RX ADMIN — LIDOCAINE HYDROCHLORIDE 50 MG: 20 INJECTION, SOLUTION EPIDURAL; INFILTRATION; INTRACAUDAL; PERINEURAL at 10:48

## 2024-02-05 RX ADMIN — ENOXAPARIN SODIUM 40 MG: 40 INJECTION SUBCUTANEOUS at 21:05

## 2024-02-05 RX ADMIN — HYDROMORPHONE HYDROCHLORIDE 0.5 MG: 1 INJECTION, SOLUTION INTRAMUSCULAR; INTRAVENOUS; SUBCUTANEOUS at 12:55

## 2024-02-05 RX ADMIN — ACETAMINOPHEN 650 MG: 325 TABLET ORAL at 21:06

## 2024-02-05 RX ADMIN — TRAZODONE HYDROCHLORIDE 50 MG: 50 TABLET ORAL at 21:05

## 2024-02-05 RX ADMIN — GABAPENTIN 600 MG: 300 CAPSULE ORAL at 16:01

## 2024-02-05 RX ADMIN — FENTANYL CITRATE 75 MCG: 50 INJECTION, SOLUTION INTRAMUSCULAR; INTRAVENOUS at 10:48

## 2024-02-05 RX ADMIN — DEXAMETHASONE SODIUM PHOSPHATE 4 MG: 4 INJECTION, SOLUTION INTRAMUSCULAR; INTRAVENOUS at 10:48

## 2024-02-05 RX ADMIN — SODIUM CHLORIDE, POTASSIUM CHLORIDE, SODIUM LACTATE AND CALCIUM CHLORIDE: 600; 310; 30; 20 INJECTION, SOLUTION INTRAVENOUS at 10:37

## 2024-02-05 RX ADMIN — PROPOFOL 20 MG: 10 INJECTION, EMULSION INTRAVENOUS at 10:58

## 2024-02-05 RX ADMIN — OXYCODONE HYDROCHLORIDE 5 MG: 5 TABLET ORAL at 18:01

## 2024-02-05 RX ADMIN — TOPIRAMATE 100 MG: 100 TABLET, FILM COATED ORAL at 21:06

## 2024-02-05 RX ADMIN — FENTANYL CITRATE 25 MCG: 50 INJECTION, SOLUTION INTRAMUSCULAR; INTRAVENOUS at 11:59

## 2024-02-05 RX ADMIN — SUGAMMADEX 200 MG: 100 INJECTION, SOLUTION INTRAVENOUS at 11:25

## 2024-02-05 RX ADMIN — DULOXETINE HYDROCHLORIDE 30 MG: 30 CAPSULE, DELAYED RELEASE ORAL at 16:01

## 2024-02-05 RX ADMIN — PROPOFOL 100 MCG/KG/MIN: 10 INJECTION, EMULSION INTRAVENOUS at 10:48

## 2024-02-05 RX ADMIN — ONDANSETRON 4 MG: 2 INJECTION INTRAMUSCULAR; INTRAVENOUS at 12:32

## 2024-02-05 RX ADMIN — DULOXETINE HYDROCHLORIDE 30 MG: 30 CAPSULE, DELAYED RELEASE ORAL at 21:06

## 2024-02-05 RX ADMIN — CEFAZOLIN 2 G: 1 INJECTION, POWDER, FOR SOLUTION INTRAMUSCULAR; INTRAVENOUS at 10:45

## 2024-02-05 RX ADMIN — GABAPENTIN 600 MG: 300 CAPSULE ORAL at 21:06

## 2024-02-05 RX ADMIN — Medication 100 MCG: at 11:10

## 2024-02-05 SDOH — SOCIAL STABILITY: SOCIAL INSECURITY: DOES ANYONE TRY TO KEEP YOU FROM HAVING/CONTACTING OTHER FRIENDS OR DOING THINGS OUTSIDE YOUR HOME?: NO

## 2024-02-05 SDOH — SOCIAL STABILITY: SOCIAL INSECURITY: WERE YOU ABLE TO COMPLETE ALL THE BEHAVIORAL HEALTH SCREENINGS?: YES

## 2024-02-05 SDOH — ECONOMIC STABILITY: INCOME INSECURITY: IN THE LAST 12 MONTHS, WAS THERE A TIME WHEN YOU WERE NOT ABLE TO PAY THE MORTGAGE OR RENT ON TIME?: NO

## 2024-02-05 SDOH — ECONOMIC STABILITY: HOUSING INSECURITY
IN THE LAST 12 MONTHS, WAS THERE A TIME WHEN YOU DID NOT HAVE A STEADY PLACE TO SLEEP OR SLEPT IN A SHELTER (INCLUDING NOW)?: NO

## 2024-02-05 SDOH — SOCIAL STABILITY: SOCIAL INSECURITY: HAS ANYONE EVER THREATENED TO HURT YOUR FAMILY OR YOUR PETS?: NO

## 2024-02-05 SDOH — SOCIAL STABILITY: SOCIAL INSECURITY: ABUSE: ADULT

## 2024-02-05 SDOH — ECONOMIC STABILITY: TRANSPORTATION INSECURITY
IN THE PAST 12 MONTHS, HAS LACK OF TRANSPORTATION KEPT YOU FROM MEETINGS, WORK, OR FROM GETTING THINGS NEEDED FOR DAILY LIVING?: NO

## 2024-02-05 SDOH — HEALTH STABILITY: MENTAL HEALTH: CURRENT SMOKER: 0

## 2024-02-05 SDOH — SOCIAL STABILITY: SOCIAL INSECURITY: HAVE YOU HAD THOUGHTS OF HARMING ANYONE ELSE?: YES

## 2024-02-05 SDOH — SOCIAL STABILITY: SOCIAL INSECURITY: ARE THERE ANY APPARENT SIGNS OF INJURIES/BEHAVIORS THAT COULD BE RELATED TO ABUSE/NEGLECT?: NO

## 2024-02-05 SDOH — SOCIAL STABILITY: SOCIAL INSECURITY: DO YOU FEEL ANYONE HAS EXPLOITED OR TAKEN ADVANTAGE OF YOU FINANCIALLY OR OF YOUR PERSONAL PROPERTY?: NO

## 2024-02-05 SDOH — ECONOMIC STABILITY: INCOME INSECURITY: HOW HARD IS IT FOR YOU TO PAY FOR THE VERY BASICS LIKE FOOD, HOUSING, MEDICAL CARE, AND HEATING?: NOT HARD AT ALL

## 2024-02-05 SDOH — ECONOMIC STABILITY: TRANSPORTATION INSECURITY
IN THE PAST 12 MONTHS, HAS THE LACK OF TRANSPORTATION KEPT YOU FROM MEDICAL APPOINTMENTS OR FROM GETTING MEDICATIONS?: NO

## 2024-02-05 SDOH — SOCIAL STABILITY: SOCIAL INSECURITY: DO YOU FEEL UNSAFE GOING BACK TO THE PLACE WHERE YOU ARE LIVING?: NO

## 2024-02-05 SDOH — SOCIAL STABILITY: SOCIAL INSECURITY: ARE YOU OR HAVE YOU BEEN THREATENED OR ABUSED PHYSICALLY, EMOTIONALLY, OR SEXUALLY BY ANYONE?: NO

## 2024-02-05 SDOH — ECONOMIC STABILITY: HOUSING INSECURITY: IN THE LAST 12 MONTHS, HOW MANY PLACES HAVE YOU LIVED?: 1

## 2024-02-05 ASSESSMENT — ACTIVITIES OF DAILY LIVING (ADL)
LACK_OF_TRANSPORTATION: NO
TOILETING: NEEDS ASSISTANCE
ASSISTIVE_DEVICE: WALKER
JUDGMENT_ADEQUATE_SAFELY_COMPLETE_DAILY_ACTIVITIES: YES
HEARING - RIGHT EAR: FUNCTIONAL
BATHING: INDEPENDENT
HEARING - LEFT EAR: FUNCTIONAL
PATIENT'S MEMORY ADEQUATE TO SAFELY COMPLETE DAILY ACTIVITIES?: YES
ADEQUATE_TO_COMPLETE_ADL: YES
GROOMING: INDEPENDENT
FEEDING YOURSELF: INDEPENDENT
DRESSING YOURSELF: INDEPENDENT
WALKS IN HOME: NEEDS ASSISTANCE

## 2024-02-05 ASSESSMENT — LIFESTYLE VARIABLES
HOW OFTEN DO YOU HAVE A DRINK CONTAINING ALCOHOL: MONTHLY OR LESS
HOW MANY STANDARD DRINKS CONTAINING ALCOHOL DO YOU HAVE ON A TYPICAL DAY: 1 OR 2
HOW OFTEN DO YOU HAVE 6 OR MORE DRINKS ON ONE OCCASION: NEVER
AUDIT-C TOTAL SCORE: 1
SKIP TO QUESTIONS 9-10: 1
AUDIT-C TOTAL SCORE: 1

## 2024-02-05 ASSESSMENT — COGNITIVE AND FUNCTIONAL STATUS - GENERAL
PERSONAL GROOMING: A LITTLE
STANDING UP FROM CHAIR USING ARMS: A LITTLE
TOILETING: A LITTLE
TURNING FROM BACK TO SIDE WHILE IN FLAT BAD: A LITTLE
TURNING FROM BACK TO SIDE WHILE IN FLAT BAD: A LITTLE
MOVING FROM LYING ON BACK TO SITTING ON SIDE OF FLAT BED WITH BEDRAILS: A LITTLE
DAILY ACTIVITIY SCORE: 18
DRESSING REGULAR LOWER BODY CLOTHING: A LITTLE
TOILETING: A LITTLE
MOVING TO AND FROM BED TO CHAIR: A LITTLE
PERSONAL GROOMING: A LITTLE
DRESSING REGULAR LOWER BODY CLOTHING: A LITTLE
MOVING FROM LYING ON BACK TO SITTING ON SIDE OF FLAT BED WITH BEDRAILS: A LITTLE
WALKING IN HOSPITAL ROOM: A LITTLE
CLIMB 3 TO 5 STEPS WITH RAILING: A LITTLE
MOVING TO AND FROM BED TO CHAIR: A LITTLE
HELP NEEDED FOR BATHING: A LITTLE
DRESSING REGULAR UPPER BODY CLOTHING: A LITTLE
MOBILITY SCORE: 18
WALKING IN HOSPITAL ROOM: A LITTLE
PATIENT BASELINE BEDBOUND: NO
STANDING UP FROM CHAIR USING ARMS: A LITTLE
HELP NEEDED FOR BATHING: A LITTLE
DRESSING REGULAR UPPER BODY CLOTHING: A LITTLE
DAILY ACTIVITIY SCORE: 19
MOBILITY SCORE: 18
EATING MEALS: A LITTLE
CLIMB 3 TO 5 STEPS WITH RAILING: A LITTLE

## 2024-02-05 ASSESSMENT — PAIN SCALES - GENERAL
PAINLEVEL_OUTOF10: 3
PAINLEVEL_OUTOF10: 3
PAINLEVEL_OUTOF10: 0 - NO PAIN
PAINLEVEL_OUTOF10: 6
PAINLEVEL_OUTOF10: 2
PAINLEVEL_OUTOF10: 8
PAINLEVEL_OUTOF10: 0 - NO PAIN
PAINLEVEL_OUTOF10: 3
PAINLEVEL_OUTOF10: 2

## 2024-02-05 ASSESSMENT — PAIN - FUNCTIONAL ASSESSMENT
PAIN_FUNCTIONAL_ASSESSMENT: 0-10

## 2024-02-05 ASSESSMENT — COLUMBIA-SUICIDE SEVERITY RATING SCALE - C-SSRS
6. HAVE YOU EVER DONE ANYTHING, STARTED TO DO ANYTHING, OR PREPARED TO DO ANYTHING TO END YOUR LIFE?: NO
2. HAVE YOU ACTUALLY HAD ANY THOUGHTS OF KILLING YOURSELF?: NO
1. IN THE PAST MONTH, HAVE YOU WISHED YOU WERE DEAD OR WISHED YOU COULD GO TO SLEEP AND NOT WAKE UP?: NO

## 2024-02-05 ASSESSMENT — PAIN DESCRIPTION - LOCATION: LOCATION: ABDOMEN

## 2024-02-05 ASSESSMENT — PATIENT HEALTH QUESTIONNAIRE - PHQ9
1. LITTLE INTEREST OR PLEASURE IN DOING THINGS: NOT AT ALL
2. FEELING DOWN, DEPRESSED OR HOPELESS: NOT AT ALL
SUM OF ALL RESPONSES TO PHQ9 QUESTIONS 1 & 2: 0

## 2024-02-05 NOTE — POST-PROCEDURE NOTE
1239: received handoff report from Deanna menjivar  1250: updated family via phone call; pt tolerating applesauce at this time  1255: medicated for c/o pain. See MAR. Pt denies nausea  1305: medicated for c/o pain. See MAR. Pt denies nausea  1308: jalil corbett rn to give reports  1315: report sent via message to chelle menjivar  1324: pt placed in for transport  1327: family updated via phone call  1330: transportation at bedside  1331: pt discontented from monitors  1333: pt transported via stretcher to rm 732

## 2024-02-05 NOTE — ANESTHESIA PREPROCEDURE EVALUATION
Patient: Elida Benson    Procedure Information       Date/Time: 02/05/24 1100    Procedure: Ileostomy Closure    Location: [facility] OR 04 / [facility]    Surgeons: Alexia Kelley MD            Relevant Problems   Cardiovascular   (+) Arrhythmia   (+) Atrial fibrillation (CMS/[facility])   (+) Hypertension, essential   (+) NSTEMI, initial episode of care (CMS/HCC)   (+) Pulmonary hypertension, unspecified (CMS/[facility])      Endocrine   (+) Type 2 diabetes mellitus with diabetic neuropathy, unspecified (CMS/HCC)   (+) Type 2 diabetes mellitus without complication, with long-term current use of insulin (CMS/[facility])      Neuro/Psych   (+) Dementia (CMS/[facility])   (+) Depression   (+) Lumbar disc herniation with radiculopathy   (+) Lumbar radiculopathy   (+) Major depressive disorder, recurrent, unspecified (CMS/[facility])      Pulmonary   (+) COPD without exacerbation (CMS/HCC)   (+) Pulmonary hypertension, unspecified (CMS/[facility])      Hematology   (+) Acute posthemorrhagic anemia   (+) Anemia      Musculoskeletal   (+) Chronic pain syndrome   (+) Infection of intervertebral disc (pyogenic), thoracolumbar region (CMS/[facility])   (+) Lumbar disc herniation with radiculopathy   (+) Lumbar scoliosis   (+) Lumbar stenosis with neurogenic claudication   (+) Myelopathy concurrent with and due to spinal stenosis of cervical region (CMS/[facility])   (+) Osteoarthritis, multiple sites   (+) Primary localized osteoarthrosis, lower leg   (+) Primary osteoarthritis, left shoulder      Infectious Disease   (+) Infection of intervertebral disc (pyogenic), thoracolumbar region (CMS/[facility])      Other   (+) Infection of intervertebral disc (pyogenic), thoracolumbar region (CMS/[facility])       Clinical information reviewed:   Tobacco  Allergies  Meds   Med Hx  Surg Hx   Fam Hx  Soc Hx        NPO Detail:  NPO/Void Status  Carbohydrate Drink Given Prior to Surgery? : N  Date of Last Liquid: 02/05/24  Time of Last Liquid: 0700  Date of Last Solid: 02/04/24  Time of  Last Solid: 1900  Last Intake Type: Clear fluids  Time of Last Void: 0958    Last eliquis 4 days ago  Spinal cord stimulator off per daughter (daughter has remote)     Physical Exam    Airway  Mallampati: II  TM distance: >3 FB  Neck ROM: full     Cardiovascular    Dental - normal exam     Pulmonary    Abdominal            Anesthesia Plan    History of general anesthesia?: yes  History of complications of general anesthesia?: no    ASA 3     general     The patient is not a current smoker.  Patient did not smoke on day of procedure.    intravenous induction   Postoperative administration of opioids is intended.  Trial extubation is planned.  Anesthetic plan and risks discussed with patient.  Use of blood products discussed with patient who consented to blood products.    Plan discussed with CRNA.    Plan general endotracheal anesthesia with peripheral IV placement and ASA standard noninvasive monitors.  The possibility of blood product transfusion was also described in detail.  Risks, benefits, alternatives of this plan were described in detail to the patient, who indicated understanding and agreed to proceed.    Jason Trinh MD

## 2024-02-05 NOTE — CARE PLAN
The patient's goals for the shift include      The clinical goals for the shift include pain managment      Problem: Pain  Goal: My pain/discomfort is manageable  Outcome: Progressing     Problem: Safety  Goal: Patient will be injury free during hospitalization  Outcome: Progressing  Goal: I will remain free of falls  Outcome: Progressing     Problem: Daily Care  Goal: Daily care needs are met  Outcome: Progressing     Problem: Psychosocial Needs  Goal: Demonstrates ability to cope with hospitalization/illness  Outcome: Progressing  Goal: Collaborate with me, my family, and caregiver to identify my specific goals  Outcome: Progressing  Flowsheets (Taken 2/5/2024 1711)  Cultural Requests During Hospitalization: none  Spiritual Requests During Hospitalization: none     Problem: Discharge Barriers  Goal: My discharge needs are met  Outcome: Progressing

## 2024-02-05 NOTE — ANESTHESIA PROCEDURE NOTES
Airway  Date/Time: 2/5/2024 11:03 AM  Urgency: elective    Airway not difficult    Staffing  Performed: SRNA   Authorized by: Jason Trinh MD    Performed by: PARAG Reyes-FADI, DNP  Patient location during procedure: OR    Indications and Patient Condition  Indications for airway management: anesthesia and airway protection  Spontaneous ventilation: present  Sedation level: deep  Preoxygenated: yes  Patient position: sniffing  MILS maintained throughout  Mask difficulty assessment: 1 - vent by mask    Final Airway Details  Final airway type: endotracheal airway      Successful airway: ETT  Cuffed: yes   Successful intubation technique: direct laryngoscopy  Facilitating devices/methods: intubating stylet  Endotracheal tube insertion site: oral  Blade: Mila  Blade size: #3  ETT size (mm): 7.5  Cormack-Lehane Classification: grade IIa - partial view of glottis  Placement verified by: chest auscultation and capnometry   Cuff volume (mL): 3  Measured from: lips  ETT to lips (cm): 21  Number of attempts at approach: 1  Ventilation between attempts: none  Number of other approaches attempted: 0

## 2024-02-05 NOTE — OP NOTE
Ileostomy Closure Operative Note     Date: 2024  OR Location: [facility] A OR    Name: Elida Benson, : 1945, Age: 78 y.o., MRN: [id number], Sex: female    Diagnosis  Pre-op Diagnosis     * Diverticulitis, colon [K57.32]     * Ileostomy present (CMS/HCC) [Z93.2] Post-op Diagnosis     * Diverticulitis, colon [K57.32]     * Ileostomy present (CMS/HCC) [Z93.2]     Procedures  Ileostomy Closure  48398 - ME CLOSURE ENTEROSTOMY LG/SMALL INTESTINE      Surgeons      * Alexia Kelley - Primary    Resident/Fellow/Other Assistant:  Surgeon(s) and Role:    Procedure Summary  Anesthesia: General  ASA: III  Anesthesia Staff: Anesthesiologist: Jason Trinh MD  CRNA: PARAG Reyes-FADI, DNP  Estimated Blood Loss: 10mL  Intra-op Medications:   Administrations occurring from 1100 to 1230 on 24:   Medication Name Total Dose   BUPivacaine HCl (Marcaine) 0.5 % (5 mg/mL) injection 30 mL   sodium chloride 0.9 % irrigation solution 1,000 mL   oxygen (O2) therapy 108 L              Anesthesia Record               Intraprocedure I/O Totals          Intake    Propofol Drip 0.00 mL    The total shown is the total volume documented since Anesthesia Start was filed.    LR infusion 600.00 mL    metroNIDAZOLE (Flagyl) 500 mg in NaCl (iso-os) 100 mL 100.00 mL    Total Intake 700 mL       Output    Est. Blood Loss 50 mL    Total Output 50 mL       Net    Net Volume 650 mL          Specimen: No specimens collected     Staff:   Circulator: Will Darden RN  Relief Scrub: Kristine ORLANDO RN  Scrub Person: Domonique Bingham; Pauline Ramírez         Drains and/or Catheters: * None in log *    Findings: Minimal intraabdominal adhesions, no serosal tears    Indications: Elida Benson is an 78 y.o. female who is having surgery for Diverticulitis, colon [K57.32]  Ileostomy present (CMS/HCC) [Z93.2].     The patient was seen in the preoperative area. The risks, benefits, complications, treatment options, non-operative  alternatives, expected recovery and outcomes were discussed with the patient. The possibilities of reaction to medication, pulmonary aspiration, injury to surrounding structures, bleeding, recurrent infection, the need for additional procedures, failure to diagnose a condition, and creating a complication requiring transfusion or operation were discussed with the patient. The patient concurred with the proposed plan, giving informed consent.  The site of surgery was properly noted/marked if necessary per policy. The patient has been actively warmed in preoperative area. Preoperative antibiotics have been ordered and given within 1 hours of incision. Venous thrombosis prophylaxis have been ordered including bilateral sequential compression devices and chemical prophylaxis    Procedure Details: Pt was kept on the cart in the supine position and MAC anesthesia was induced.  Anal exam was normal.  The pediatric scope was inserted into the anus with ease.  The rectum was normal there was some mucous.  I was able to introduce to the proximal rectum to the anastomosis which was wide open.  There was visible staple line that appearing normal and intact.  There was some staple line that was stool covered, but no evidence of granulation, and again the anastomosis was wide open so we decided to proceed.      Patient was moved to the OR bed in the supine position.  The huddle was performed, and general endotracheal anesthesia was induced.  IV antibiotics and a heparin shot were administered, and the patient was prepped using a combination of liquid Betadine and ChloraPrep and taped and draped in the usual sterile fashion.    The stoma was instilled using 0.5% marcaine.  A circumferential incision was made around the stoma using the Bovie cautery the subcutaneous tissues were divided using Metzenbaum scissors and Bovie cautery.  The peritoneal cavity was entered and the stoma was dissected free from the surrounding fascial  tissues.  No serosal tears were created.      The stoma was everted and the skin was excised and discarded.  A handsewn end to end anastomosis was created.   The bowel was reapproximated using 3.0 Vicryl seromuscular stitches, followed by 3.0 Ethibond full thickness sutures.  The bowel was stool and airtight and was returned to the peritoneal cavity there were minimal adhesions.  The fascia was then closed using interrupted figure-of-eight 0 PDS sutures the area was instilled using the remainder of the 0.5% Marcaine.  The skin was pursestringed down using 3.0 Vicryl and covered using a Primapore and ABD and tape.      Complications:  None; patient tolerated the procedure well.    Disposition: PACU - hemodynamically stable.  Condition: stable         Attending Attestation: I performed the procedure.    Alexia Kelley  Phone Number: [phone]

## 2024-02-06 LAB
ANION GAP SERPL CALC-SCNC: 11 MMOL/L (ref 10–20)
BUN SERPL-MCNC: 32 MG/DL (ref 6–23)
CALCIUM SERPL-MCNC: 8.2 MG/DL (ref 8.6–10.3)
CHLORIDE SERPL-SCNC: 107 MMOL/L (ref 98–107)
CO2 SERPL-SCNC: 20 MMOL/L (ref 21–32)
CREAT SERPL-MCNC: 1.14 MG/DL (ref 0.5–1.05)
EGFRCR SERPLBLD CKD-EPI 2021: 50 ML/MIN/1.73M*2
ERYTHROCYTE [DISTWIDTH] IN BLOOD BY AUTOMATED COUNT: 14.6 % (ref 11.5–14.5)
GLUCOSE SERPL-MCNC: 168 MG/DL (ref 74–99)
HCT VFR BLD AUTO: 37.9 % (ref 36–46)
HGB BLD-MCNC: 11.3 G/DL (ref 12–16)
MCH RBC QN AUTO: 29 PG (ref 26–34)
MCHC RBC AUTO-ENTMCNC: 29.8 G/DL (ref 32–36)
MCV RBC AUTO: 97 FL (ref 80–100)
NRBC BLD-RTO: 0 /100 WBCS (ref 0–0)
PLATELET # BLD AUTO: 227 X10*3/UL (ref 150–450)
POTASSIUM SERPL-SCNC: 4.7 MMOL/L (ref 3.5–5.3)
RBC # BLD AUTO: 3.89 X10*6/UL (ref 4–5.2)
SODIUM SERPL-SCNC: 133 MMOL/L (ref 136–145)
WBC # BLD AUTO: 8.3 X10*3/UL (ref 4.4–11.3)

## 2024-02-06 PROCEDURE — 2500000004 HC RX 250 GENERAL PHARMACY W/ HCPCS (ALT 636 FOR OP/ED)

## 2024-02-06 PROCEDURE — 2500000004 HC RX 250 GENERAL PHARMACY W/ HCPCS (ALT 636 FOR OP/ED): Performed by: COLON & RECTAL SURGERY

## 2024-02-06 PROCEDURE — 2500000001 HC RX 250 WO HCPCS SELF ADMINISTERED DRUGS (ALT 637 FOR MEDICARE OP): Performed by: COLON & RECTAL SURGERY

## 2024-02-06 PROCEDURE — 99232 SBSQ HOSP IP/OBS MODERATE 35: CPT

## 2024-02-06 PROCEDURE — 80048 BASIC METABOLIC PNL TOTAL CA: CPT | Performed by: COLON & RECTAL SURGERY

## 2024-02-06 PROCEDURE — 1100000001 HC PRIVATE ROOM DAILY

## 2024-02-06 PROCEDURE — 2500000002 HC RX 250 W HCPCS SELF ADMINISTERED DRUGS (ALT 637 FOR MEDICARE OP, ALT 636 FOR OP/ED): Performed by: COLON & RECTAL SURGERY

## 2024-02-06 PROCEDURE — 85027 COMPLETE CBC AUTOMATED: CPT | Performed by: COLON & RECTAL SURGERY

## 2024-02-06 PROCEDURE — 97161 PT EVAL LOW COMPLEX 20 MIN: CPT | Mod: GP

## 2024-02-06 RX ADMIN — METOPROLOL SUCCINATE 25 MG: 25 TABLET, EXTENDED RELEASE ORAL at 09:14

## 2024-02-06 RX ADMIN — GABAPENTIN 600 MG: 300 CAPSULE ORAL at 21:07

## 2024-02-06 RX ADMIN — TOPIRAMATE 100 MG: 100 TABLET, FILM COATED ORAL at 21:07

## 2024-02-06 RX ADMIN — KETOROLAC TROMETHAMINE 15 MG: 30 INJECTION, SOLUTION INTRAMUSCULAR; INTRAVENOUS at 23:45

## 2024-02-06 RX ADMIN — OXYCODONE HYDROCHLORIDE 10 MG: 5 TABLET ORAL at 09:13

## 2024-02-06 RX ADMIN — KETOROLAC TROMETHAMINE 15 MG: 30 INJECTION, SOLUTION INTRAMUSCULAR; INTRAVENOUS at 13:03

## 2024-02-06 RX ADMIN — SODIUM CHLORIDE 500 ML: 9 INJECTION, SOLUTION INTRAVENOUS at 21:06

## 2024-02-06 RX ADMIN — OXYCODONE HYDROCHLORIDE 5 MG: 5 TABLET ORAL at 03:53

## 2024-02-06 RX ADMIN — GABAPENTIN 600 MG: 300 CAPSULE ORAL at 09:14

## 2024-02-06 RX ADMIN — KETOROLAC TROMETHAMINE 15 MG: 30 INJECTION, SOLUTION INTRAMUSCULAR; INTRAVENOUS at 06:27

## 2024-02-06 RX ADMIN — ENOXAPARIN SODIUM 40 MG: 40 INJECTION SUBCUTANEOUS at 21:06

## 2024-02-06 RX ADMIN — DULOXETINE HYDROCHLORIDE 30 MG: 30 CAPSULE, DELAYED RELEASE ORAL at 09:14

## 2024-02-06 RX ADMIN — ACETAMINOPHEN 650 MG: 325 TABLET ORAL at 01:32

## 2024-02-06 RX ADMIN — DULOXETINE HYDROCHLORIDE 30 MG: 30 CAPSULE, DELAYED RELEASE ORAL at 21:07

## 2024-02-06 RX ADMIN — KETOROLAC TROMETHAMINE 15 MG: 30 INJECTION, SOLUTION INTRAMUSCULAR; INTRAVENOUS at 18:37

## 2024-02-06 RX ADMIN — KETOROLAC TROMETHAMINE 15 MG: 30 INJECTION, SOLUTION INTRAMUSCULAR; INTRAVENOUS at 01:32

## 2024-02-06 RX ADMIN — DONEPEZIL HYDROCHLORIDE 5 MG: 5 TABLET ORAL at 21:07

## 2024-02-06 RX ADMIN — TRAZODONE HYDROCHLORIDE 50 MG: 50 TABLET ORAL at 21:07

## 2024-02-06 RX ADMIN — TOPIRAMATE 100 MG: 100 TABLET, FILM COATED ORAL at 09:14

## 2024-02-06 RX ADMIN — CEPHALEXIN 500 MG: 500 CAPSULE ORAL at 14:50

## 2024-02-06 RX ADMIN — GABAPENTIN 600 MG: 300 CAPSULE ORAL at 14:50

## 2024-02-06 RX ADMIN — CEPHALEXIN 500 MG: 500 CAPSULE ORAL at 01:32

## 2024-02-06 RX ADMIN — ACETAMINOPHEN 650 MG: 325 TABLET ORAL at 14:50

## 2024-02-06 RX ADMIN — ACETAMINOPHEN 650 MG: 325 TABLET ORAL at 09:15

## 2024-02-06 RX ADMIN — AMIODARONE HYDROCHLORIDE 200 MG: 200 TABLET ORAL at 09:14

## 2024-02-06 RX ADMIN — ACETAMINOPHEN 650 MG: 325 TABLET ORAL at 21:06

## 2024-02-06 ASSESSMENT — COGNITIVE AND FUNCTIONAL STATUS - GENERAL
CLIMB 3 TO 5 STEPS WITH RAILING: A LOT
MOVING TO AND FROM BED TO CHAIR: A LOT
TOILETING: A LITTLE
STANDING UP FROM CHAIR USING ARMS: A LITTLE
DRESSING REGULAR UPPER BODY CLOTHING: A LITTLE
MOVING FROM LYING ON BACK TO SITTING ON SIDE OF FLAT BED WITH BEDRAILS: A LOT
TURNING FROM BACK TO SIDE WHILE IN FLAT BAD: A LOT
WALKING IN HOSPITAL ROOM: A LOT
HELP NEEDED FOR BATHING: A LITTLE
STANDING UP FROM CHAIR USING ARMS: A LOT
MOBILITY SCORE: 11
PERSONAL GROOMING: A LITTLE
TURNING FROM BACK TO SIDE WHILE IN FLAT BAD: A LITTLE
WALKING IN HOSPITAL ROOM: A LITTLE
CLIMB 3 TO 5 STEPS WITH RAILING: TOTAL
DAILY ACTIVITIY SCORE: 19
DRESSING REGULAR LOWER BODY CLOTHING: A LITTLE
MOVING TO AND FROM BED TO CHAIR: A LITTLE
MOBILITY SCORE: 18

## 2024-02-06 ASSESSMENT — ACTIVITIES OF DAILY LIVING (ADL)
LACK_OF_TRANSPORTATION: NO
ADL_ASSISTANCE: NEEDS ASSISTANCE

## 2024-02-06 ASSESSMENT — PAIN SCALES - GENERAL
PAINLEVEL_OUTOF10: 7
PAINLEVEL_OUTOF10: 6
PAINLEVEL_OUTOF10: 5 - MODERATE PAIN
PAINLEVEL_OUTOF10: 6
PAINLEVEL_OUTOF10: 5 - MODERATE PAIN
PAINLEVEL_OUTOF10: 5 - MODERATE PAIN
PAINLEVEL_OUTOF10: 8
PAINLEVEL_OUTOF10: 5 - MODERATE PAIN
PAINLEVEL_OUTOF10: 6
PAINLEVEL_OUTOF10: 4
PAINLEVEL_OUTOF10: 4

## 2024-02-06 ASSESSMENT — PAIN DESCRIPTION - LOCATION
LOCATION: ABDOMEN

## 2024-02-06 ASSESSMENT — PAIN - FUNCTIONAL ASSESSMENT: PAIN_FUNCTIONAL_ASSESSMENT: 0-10

## 2024-02-06 NOTE — CARE PLAN
The patient's goals for the shift include      The clinical goals for the shift include pain managment      Problem: Pain  Goal: My pain/discomfort is manageable  Outcome: Progressing

## 2024-02-06 NOTE — PROGRESS NOTES
02/06/24 0936   Discharge Planning   Living Arrangements Alone   Support Systems Family members   Assistance Needed Uses a walker and daughters stop by daily   Type of Residence Private residence   Number of Stairs to Enter Residence 3   Number of Stairs Within Residence 0  (Has first floor living)   Do you have animals or pets at home? No   Who is requesting discharge planning? Other (Comment)  (TCC admission assessment)   Home or Post Acute Services In home services   Type of Home Care Services Home OT;Home PT   Patient expects to be discharged to: Home   Does the patient need discharge transport arranged? No   Financial Resource Strain   How hard is it for you to pay for the very basics like food, housing, medical care, and heating? Not hard   Housing Stability   In the last 12 months, was there a time when you were not able to pay the mortgage or rent on time? N   In the last 12 months, how many places have you lived? 1   In the last 12 months, was there a time when you did not have a steady place to sleep or slept in a shelter (including now)? N   Transportation Needs   In the past 12 months, has lack of transportation kept you from medical appointments or from getting medications? no   In the past 12 months, has lack of transportation kept you from meetings, work, or from getting things needed for daily living? No   Patient Choice   Patient / Family choosing to utilize agency / facility established prior to hospitalization Yes     Met with patient at the bedside to discuss discharge plan.  She lives alone and her daughters check on her daily, as well as, take her to appointments and errands.  She would like homecare upon discharge with [facility].  Daughter will transport home when medically cleared for discharge.  PLAN: PT to see patient, surgery following, RBF  ADOD: 2-3 days  Roselyn Monsalve RN     Transitional Care Coordinator Note @ 6034  Patient was seen by therapy and a recommendation of moderate intensity  therapy.  Requested PCN provide patient with SNF choices.  Roselyn Monsalve RN

## 2024-02-06 NOTE — PROGRESS NOTES
2/6/2024 2:47 PM I met with patient. I discussed PT recommendations. I discussed home care does not provide  daily rehab services. Patient agrees to SNF. I provided SNF list. She said she would like to go to a facility that she has been to before but could not remember the name. She asked that I call her daughter. I spoke with daughter. Daughter thinks the facility was [facility]. I have sent a referral. I asked daughter to provide two back up SNF choices. Guerline BALDWIN

## 2024-02-06 NOTE — PROGRESS NOTES
"Elida Benson is a 78 y.o. female on day 1 of admission presenting with Ileostomy present (CMS/[facility]).    Subjective   Patient is awake in bed this morning. She reports having some pain still but controlled with pain meds. She is tolerating diet denies nausea/vomiting. +Gas -BM.        Objective     Physical Exam  Constitutional:       Appearance: Normal appearance.   Cardiovascular:      Rate and Rhythm: Normal rate and regular rhythm.   Pulmonary:      Effort: Pulmonary effort is normal.      Comments: Non labored breathing on RA  Abdominal:      General: There is distension.      Palpations: Abdomen is soft.      Tenderness: There is no abdominal tenderness.      Comments: Lap site C/D/I, edges well approximated, covered in Dermabond. Old stoma site, no erythema or drainage noted from the area.    Genitourinary:     Comments: Purewick in place  Skin:     General: Skin is warm and dry.   Neurological:      General: No focal deficit present.      Mental Status: She is alert and oriented to person, place, and time. Mental status is at baseline.   Psychiatric:         Attention and Perception: Attention normal.         Mood and Affect: Mood normal.         Speech: Speech normal.         Behavior: Behavior normal.         Cognition and Memory: Cognition normal.         Last Recorded Vitals  Blood pressure 104/69, pulse 72, temperature 36.9 °C (98.4 °F), temperature source Temporal, resp. rate 16, height 1.397 m (4' 7\"), weight 60 kg (132 lb 3.2 oz), SpO2 99 %.  Intake/Output last 3 Shifts:  I/O last 3 completed shifts:  In: 1172.7 (20.5 mL/kg) [I.V.:1072.7 (18.8 mL/kg); IV Piggyback:100]  Out: 600 (10.5 mL/kg) [Urine:550 (0.3 mL/kg/hr); Blood:50]  Dosing Weight: 57.2 kg     Relevant Results  Scheduled medications  acetaminophen, 650 mg, oral, q6h  amiodarone, 200 mg, oral, Daily  cephalexin, 500 mg, oral, q12h  donepezil, 5 mg, oral, Nightly  DULoxetine, 30 mg, oral, BID  enoxaparin, 40 mg, subcutaneous, " q24h  gabapentin, 600 mg, oral, TID  ketorolac, 15 mg, intravenous, q6h CLARISSA  losartan, 50 mg, oral, Daily  metoprolol succinate XL, 25 mg, oral, Daily  topiramate, 100 mg, oral, BID      Continuous medications  sodium chloride 0.9%, 40 mL/hr, Last Rate: 40 mL/hr (02/06/24 0209)      PRN medications  PRN medications: HYDROmorphone, naloxone, ondansetron ODT **OR** ondansetron, oxyCODONE, oxyCODONE, oxygen, traZODone   Results for orders placed or performed during the hospital encounter of 02/05/24 (from the past 24 hour(s))   POCT GLUCOSE   Result Value Ref Range    POCT Glucose 137 (H) 74 - 99 mg/dL   Basic Metabolic Panel   Result Value Ref Range    Glucose 168 (H) 74 - 99 mg/dL    Sodium 133 (L) 136 - 145 mmol/L    Potassium 4.7 3.5 - 5.3 mmol/L    Chloride 107 98 - 107 mmol/L    Bicarbonate 20 (L) 21 - 32 mmol/L    Anion Gap 11 10 - 20 mmol/L    Urea Nitrogen 32 (H) 6 - 23 mg/dL    Creatinine 1.14 (H) 0.50 - 1.05 mg/dL    eGFR 50 (L) >60 mL/min/1.73m*2    Calcium 8.2 (L) 8.6 - 10.3 mg/dL   CBC   Result Value Ref Range    WBC 8.3 4.4 - 11.3 x10*3/uL    nRBC 0.0 0.0 - 0.0 /100 WBCs    RBC 3.89 (L) 4.00 - 5.20 x10*6/uL    Hemoglobin 11.3 (L) 12.0 - 16.0 g/dL    Hematocrit 37.9 36.0 - 46.0 %    MCV 97 80 - 100 fL    MCH 29.0 26.0 - 34.0 pg    MCHC 29.8 (L) 32.0 - 36.0 g/dL    RDW 14.6 (H) 11.5 - 14.5 %    Platelets 227 150 - 450 x10*3/uL     No orders to display          Assessment/Plan   Principal Problem:    Ileostomy present (CMS/HCC)  Active Problems:    Diverticulitis, colon    Neuro: Acute post-op pain, well controlled     Hx; Depression, anxiety, dementia  - Continue home Cymbalta. Topamax and Aricept   - OOB ambulate as tolerated at least 5 times per day  - Continue current pain control regimen    Cardiovascular:  HR: 72    BP: 104/69     Hx: HLD, HTN  - VS Q4h  - Continue home Amiodarone, Metoprolol, Losartan    Pulmonary: Non labored breathing on RA  - Encourage IS  - Encourage  ambulation    Gastrointestinal: POD 1 Ileostomy Closure Findings: Minimal intraabdominal adhesions, no serosal tears      Hx: Diverticulitis  - Regular diet  - PRN antiemetic  - continue current bowel regimen     Genitourinary: Purewick  - continue mIVF  - Monitor I&Os   - Cr: 1.14  - Continue to trend electrolytes with daily labs    Hematology:   - H/H: 11.3/37.9  - No active s/s of bleeding  - DVT prophylaxis: SCDs/Lovenox  - Eliquis Held    Infectious Disease:   - WBC: 8.3  - Afebrile, VSS  - Continue to monitor for s/s of infection  - Continue to monitor WBC with daily CBC    Dispo: Patient doing well POD 1. Need to get OOB and spend time up in chair. Awaiting return of bowel function         I spent 35 minutes in the professional and overall care of this patient.      Terrell Dixon, PA-C    Pt looking great, minimal pain, + gas, no BM     Gen - Pt sitting up in chair, looks great in NAD  Abd - soft ND NT, wound open and clean,   Extr - VERY thin - no edema, braces on     Impression - Pt POD # 1 s/p stoma closure = doing great  Plan   PT to see and eval for rehab - will discuss with Kaelyn daughter  OOB   Soft diet   Stop IVF   Home medications.

## 2024-02-06 NOTE — PROGRESS NOTES
Physical Therapy    Physical Therapy Evaluation & Treatment    Patient Name: Elida Benson  MRN: [id number]  Today's Date: 2/6/2024   Time Calculation  Start Time: 1336  Stop Time: 1354  Time Calculation (min): 18 min    Assessment/Plan   PT Assessment  PT Assessment Results: Decreased strength, Decreased endurance, Impaired balance, Decreased mobility, Decreased skin integrity, Pain  Rehab Prognosis: Good  Barriers to Discharge: none for PT  Evaluation/Treatment Tolerance: Patient limited by fatigue  Medical Staff Made Aware: Yes  Strengths: Housing layout, Support of extended family/friends, Access to adaptive/assistive products  Barriers to Participation: Comorbidities  End of Session Communication: Bedside nurse  Assessment Comment: Pt presents with weakness, decreased ambulation and transfers, and moderate unsteadiness; can benefit from skilled PT intervention to assist with discharge planning and address the aforementioned issues to enable the pt to return to their prior level of function, which was independent with ww.  End of Session Patient Position: Up in chair, Alarm on   IP OR SWING BED PT PLAN  Inpatient or Swing Bed: Inpatient  PT Plan  Treatment/Interventions: Bed mobility, Transfer training, Gait training, Neuromuscular re-education, Balance training, Strengthening, Endurance training, Therapeutic exercise, Therapeutic activity  PT Plan: Skilled PT  PT Frequency: 3 times per week  PT Discharge Recommendations: Moderate intensity level of continued care  PT Recommended Transfer Status: Assist x1 (for transfers, assist x 2 for any ambulation)  PT - OK to Discharge: Yes (PT POC established)      Subjective     General Visit Information:  General  Reason for Referral: Pt admitted with diverticulitis with abscess, vaginal drainage and UTI, now s/p ileostomy reversal.  Past Medical History Relevant to Rehab: a fib, cervical mylopathy, COPD, dementia, DM, HLD, HTN, spinal stenosis,  diverticulitis  Family/Caregiver Present: No  Prior to Session Communication: Bedside nurse  Patient Position Received: Up in chair, Alarm on  Preferred Learning Style: auditory  General Comment: Pt is pleasant and cooperative, receptive to PT. Ok by RN to work with PT.  Home Living:  Home Living  Type of Home: House  Lives With: Alone  Home Adaptive Equipment: Walker rolling or standard (R AFO)  Home Layout: One level  Home Access: Stairs to enter with rails  Entrance Stairs-Rails: Both  Entrance Stairs-Number of Steps: 3  Home Living Comments: Dtr stops over daily to assist with ADLs.  Prior Level of Function:  Prior Function Per Pt/Caregiver Report  Level of [place]: Needs assistance with ADLs, Needs assistance with homemaking  Receives Help From:  (dtr)  ADL Assistance: Needs assistance  Homemaking Assistance: Needs assistance  Ambulatory Assistance: Independent (with ww)  Prior Function Comments: Pt homebound.  Precautions:  Precautions  Medical Precautions: Fall precautions    Objective   Pain:  Pain Assessment  Pain Assessment: 0-10  Pain Score: 5 - Moderate pain  Pain Type: Surgical pain  Pain Location: Abdomen  Cognition:  Cognition  Overall Cognitive Status: Impaired  Orientation Level: Oriented X4  Memory: Exceptions to WFL  Long-Term Memory: Impaired  Safety/Judgement: Exceptions to WFL  Insight: Within function limits  Impulsive: Within functional limits    General Assessments:  General Observation  General Observation: Pt is deconditioned and weaker than baseline.     Activity Tolerance  Endurance: Tolerates less than 10 min exercise, no significant change in vital signs  Activity Tolerance Comments: Pt is deconditioned and fatigues easily.    Perception  Inattention/Neglect: Appears intact  Initiation: Appears intact  Motor Planning: Appears intact    Coordination  Movements are Fluid and Coordinated: Yes      Static Sitting Balance  Static Sitting-Balance Support: Feet supported, Bilateral upper  extremity supported  Static Sitting-Level of Assistance: Close supervision  Dynamic Sitting Balance  Dynamic Sitting-Balance Support: Bilateral upper extremity supported, Feet supported  Dynamic Sitting-Balance: Forward lean (pushing R foot into AFO)  Dynamic Sitting-Comments: close supervision    Static Standing Balance  Static Standing-Balance Support: Bilateral upper extremity supported  Static Standing-Level of Assistance: Minimum assistance  Static Standing-Comment/Number of Minutes: posterior center of balance  Functional Assessments:       Transfers  Transfer: Yes  Transfer 1  Technique 1: Sit to stand, Stand to sit  Transfer Device 1: Walker  Transfer Level of Assistance 1: Moderate assistance, Minimal verbal cues, Minimal tactile cues  Trials/Comments 1: x 10 seconds, then pt returned to sitting with uncontrolled descent. Provided cues for safety.  Transfers 2  Technique 2: Sit to stand, Stand to sit  Transfer Device 2: Walker  Transfer Level of Assistance 2: Moderate assistance, Minimal verbal cues, Minimal tactile cues  Trials/Comments 2: Pt provided instruction in safe sit<->stand technique to enable them to move in/out of chair safely; pt required min tactile and verbal cues for proper hand placements and to scoot to edge of sitting surface to facilitate ease of sit->stand, and to line up to and reach back for sitting surface before sitting.    Ambulation/Gait Training  Ambulation/Gait Training Performed: Yes  Ambulation/Gait Training 1  Surface 1:  (Pt completed pre-gait activity: marching in place)  Device 1: Rolling walker  Assistance 1: Moderate assistance, Minimal verbal cues  Quality of Gait 1:  (decreased step height, flexed posture)  Comments/Distance (ft) 1: marching in place x 10 seconds (Pt fatigues easily and required to sit back down to rest.)  Extremity/Trunk Assessments:  RLE   RLE : Exceptions to WFL  Strength RLE  R Hip Flexion: 3-/5  R Knee Extension: 3/5  R Ankle Dorsiflexion: 0/5  R  Ankle Plantar Flexion: 1/5  LLE   LLE :  (grossly 4/5)  Treatments:  Therapeutic Exercise  Therapeutic Exercise Performed: Yes  Therapeutic Exercise Activity 1: B seated hip flexion, LAQ x 5 reps each. Instructed pt to complete 5x per day, and to increase to 10 reps as able.  Outcome Measures:  [facility] Basic Mobility  Turning from your back to your side while in a flat bed without using bedrails: A lot  Moving from lying on your back to sitting on the side of a flat bed without using bedrails: A lot  Moving to and from bed to chair (including a wheelchair): A lot  Standing up from a chair using your arms (e.g. wheelchair or bedside chair): A lot  To walk in hospital room: A lot  Climbing 3-5 steps with railing: Total  Basic Mobility - Total Score: 11    Encounter Problems       Encounter Problems (Active)       Balance       STG - Maintains dynamic standing balance with upper extremity support x 5 min with min A       Start:  02/06/24    Expected End:  02/20/24       INTERVENTIONS:  1. Practice standing with minimal support.  2. Educate patient about standing tolerance.  3. Educate patient about independence with gait, transfers, and ADL's.  4. Educate patient about use of assistive device.  5. Educate patient about self-directed care.         STG - Maintains static standing balance with upper extremity support x 5 min with CGA       Start:  02/06/24    Expected End:  02/20/24       INTERVENTIONS:  1. Practice standing with minimal support.  2. Educate patient about standing tolerance.  3. Educate patient about independence with gait, transfers, and ADL's.  4. Educate patient about use of assistive device.  5. Educate patient about self-directed care.            Mobility       STG - Patient will ambulate with ww 50' x 2 with min A       Start:  02/06/24    Expected End:  02/20/24            Increase BLE strength to attain functional goals achieved through supine, seated, and standing TE.       Start:  02/06/24     Expected End:  02/20/24               Transfers       STG - Transfer from bed to chair with ww with CGA       Start:  02/06/24    Expected End:  02/20/24            STG - Patient to transfer to and from sit to supine with CGA       Start:  02/06/24    Expected End:  02/20/24            STG - Patient will transfer sit to and from stand with CGA       Start:  02/06/24    Expected End:  02/20/24                   Education Documentation  Home Exercise Program, taught by Nohemy Sanabria, PT at 2/6/2024  2:19 PM.  Learner: Patient  Readiness: Acceptance  Method: Explanation  Response: Verbalizes Understanding, Demonstrated Understanding    Mobility Training, taught by Nohemy Sanabria, PT at 2/6/2024  2:19 PM.  Learner: Patient  Readiness: Acceptance  Method: Explanation  Response: Verbalizes Understanding, Demonstrated Understanding    Education Comments  No comments found.

## 2024-02-06 NOTE — PROGRESS NOTES
Music Therapy Note    Therapy Session  Referral Type: Pain rounds  Visit Type: New visit  Session Start Time: 1222  Session End Time: 1227  Intervention Delivery: In-person  Conflict of Service: None     Pre-assessment  Mood/Affect: Calm, Cooperative  Verbalized Emotional State: Acceptance         Treatment/Interventions  Music Therapy Interventions: Assessment    Post-assessment  Total Session Time (min): 5 minutes    Narrative  Assessment Detail: MT met with pt at bedside to assess for pain management needs with focus on increasing education in non-pharmacologic pain management via introduction and offering of available services.  Intervention: Pt expressed interest in music therapy services and stated preference for gospel, jazz, and rock and roll music. Pt requested MT return later today or tomorrow for session.  Follow-up: Will reattempt as able.    Education Documentation  Coping Strategies, taught by Kellie Malloy at 2/6/2024 12:32 PM.  Learner: Patient  Readiness: Acceptance  Method: Explanation  Response: Verbalizes Understanding    Focal Points, taught by Kellie Malloy at 2/6/2024 12:32 PM.  Learner: Patient  Readiness: Acceptance  Method: Explanation  Response: Verbalizes Understanding    Pain Management, taught by Kellie Malloy at 2/6/2024 12:32 PM.  Learner: Patient  Readiness: Acceptance  Method: Explanation  Response: Verbalizes Understanding

## 2024-02-06 NOTE — CONSULTS
Nutrition Assessment Note    Reason for Assessment  Reason for Assessment: Provider consult order (Diet education)    Pt admitted for:  Diverticulitis, colon [K57.32]  Ileostomy present (CMS/[facility]) [Z93.2]    Chart reviewed and pt visited.  Provided pt with minimal residue and fiber diet handout.    Past Medical History:   Diagnosis Date    A-fib (CMS/HCC)     Managed on meds, Eliquis stoppage and cardiac clearance in [facility] from Dr. Ignacio    Cervical myelopathy (CMS/HCC)     Chronic pain syndrome     COPD (chronic obstructive pulmonary disease) (CMS/HCC)     denies, she is a former smoker but quit in 1984    Dementia (CMS/HCC)     Depression     Diabetic neuropathy (CMS/HCC)     Diverticulitis     DM (diabetes mellitus) (CMS/HCC)     denies but A1C= 7.2% on 9/5/23    LORENZ (dyspnea on exertion)     Stable per cards note    HLD (hyperlipidemia)     Hypertension, essential     Infiltrative cardiomyopathy (CMS/HCC)     Echo 8/22/23, following with Dr. Ignacio    OA (ocular albinism) (CMS/HCC)     multiple sites    Other malaise 04/21/2022    Physical deconditioning    Palpitations     on occasion, has afib followed by cardiology    Post laminectomy syndrome     Rectovaginal fistula     Spinal stenosis of cervical region     UTI (urinary tract infection)      Results for orders placed or performed during the hospital encounter of 02/05/24 (from the past 24 hour(s))   Basic Metabolic Panel   Result Value Ref Range    Glucose 168 (H) 74 - 99 mg/dL    Sodium 133 (L) 136 - 145 mmol/L    Potassium 4.7 3.5 - 5.3 mmol/L    Chloride 107 98 - 107 mmol/L    Bicarbonate 20 (L) 21 - 32 mmol/L    Anion Gap 11 10 - 20 mmol/L    Urea Nitrogen 32 (H) 6 - 23 mg/dL    Creatinine 1.14 (H) 0.50 - 1.05 mg/dL    eGFR 50 (L) >60 mL/min/1.73m*2    Calcium 8.2 (L) 8.6 - 10.3 mg/dL   CBC   Result Value Ref Range    WBC 8.3 4.4 - 11.3 x10*3/uL    nRBC 0.0 0.0 - 0.0 /100 WBCs    RBC 3.89 (L) 4.00 - 5.20 x10*6/uL    Hemoglobin 11.3 (L) 12.0 - 16.0  "g/dL    Hematocrit 37.9 36.0 - 46.0 %    MCV 97 80 - 100 fL    MCH 29.0 26.0 - 34.0 pg    MCHC 29.8 (L) 32.0 - 36.0 g/dL    RDW 14.6 (H) 11.5 - 14.5 %    Platelets 227 150 - 450 x10*3/uL     Scheduled medications  acetaminophen, 650 mg, oral, q6h  amiodarone, 200 mg, oral, Daily  cephalexin, 500 mg, oral, q12h  donepezil, 5 mg, oral, Nightly  DULoxetine, 30 mg, oral, BID  enoxaparin, 40 mg, subcutaneous, q24h  gabapentin, 600 mg, oral, TID  ketorolac, 15 mg, intravenous, q6h CLARISSA  losartan, 50 mg, oral, Daily  metoprolol succinate XL, 25 mg, oral, Daily  topiramate, 100 mg, oral, BID      Continuous medications  sodium chloride 0.9%, 40 mL/hr, Last Rate: 40 mL/hr (02/06/24 0209)      PRN medications  PRN medications: HYDROmorphone, naloxone, ondansetron ODT **OR** ondansetron, oxyCODONE, oxyCODONE, oxygen, traZODone  Dietary Orders (From admission, onward)       Start     Ordered    02/05/24 1349  Adult diet Low fiber  Diet effective now        Question:  Diet type  Answer:  Low fiber    02/05/24 1348    02/05/24 1349  Oral Supplements - Ensure Surgery  Until discontinued        Comments: Drink 3 cartons daily for 5 days. Nutrition services please send 15 cartons ONE TIME only.   Question:  Select supplement:  Answer:  Chau    02/05/24 1348                  Anthropometrics:  Height: 139.7 cm (4' 7\")  Weight: 60 kg (132 lb 3.2 oz)  BMI (Calculated): 30.73    Wt Readings from Last 12 Encounters:   02/06/24 60 kg (132 lb 3.2 oz)   01/29/24 57.6 kg (127 lb)   01/24/24 55.5 kg (122 lb 5.7 oz)   01/10/24 57.6 kg (127 lb)   12/08/23 59 kg (130 lb)   10/27/23 53.1 kg (117 lb)   10/10/23 52.6 kg (116 lb)   10/04/23 52.6 kg (116 lb)   07/26/23 60.3 kg (133 lb)   06/16/23 61.3 kg (135 lb 4 oz)   05/30/23 59.9 kg (132 lb)   05/26/23 59 kg (130 lb)     Weight Change  Significant Weight Loss: No    Nutrition Focused Physical Findings:  Edema  Edema: none    Physical Findings (Nutrition Deficiency/Toxicity)  Skin: Positive " (abdominal incision)     Education Documentation  Nutrition Care Manual, taught by Deb Hamm RDN, LD at 2/6/2024  1:08 PM.  Learner: Patient  Readiness: Acceptance  Method: Explanation, Handout  Response: Verbalizes Understanding  Comment: Educated pt on minimal residue and fiber diet, foods recommended and foods not recommended.        Follow Up  Time Spent (min): 45 minutes  Last Date of Nutrition Visit: 02/06/24  Nutrition Follow-Up Needed?: Dietitian to reassess per policy  Follow up Comment: [facility] Diet EDU

## 2024-02-06 NOTE — PROGRESS NOTES
Pharmacy Medication History Review    Elida Benson is a 78 y.o. female admitted for Ileostomy present (CMS/[facility]). Pharmacy reviewed the patient's [id number] medications and allergies for accuracy.    The list below reflectives the updated PTA list. Please review each medication in order reconciliation for additional clarification and justification.  Medications Prior to Admission   Medication Sig Dispense Refill Last Dose    acetaminophen (Tylenol) 325 mg tablet Give 2 tablet by mouth every 4 hours as needed for Pain   2/5/2024    amiodarone (Pacerone) 200 mg tablet Give 1 tablet by mouth in the morning for ANTIARRHYTHMIC 60 tablet 11 2/5/2024    apixaban (Eliquis) 5 mg tablet Give 1 tablet by mouth two times a day for blood thinner 180 tablet 11 Past Week    b complex 0.4 mg tablet Take 1 tablet by mouth once daily.   Past Week    biotin 1 mg tablet 1 tab(s) orally once a day   Past Week    cephalexin (Keflex) 500 mg capsule TAKE ONE CAPSULE BY MOUTH EVERY 12 HOURS   2/5/2024    cholecalciferol (Vitamin D-3) 1,250 mcg (50,000 unit) capsule Take 50,000 Units by mouth once each week.   Past Week    docusate sodium (Colace) 100 mg capsule Take 1 capsule (100 mg) by mouth 2 times a day as needed for constipation.   Past Week    donepezil (Aricept) 5 mg tablet 1 tab(s) orally once (at bedtime) 90 tablet 1 2/4/2024    DULoxetine (Cymbalta) 30 mg DR capsule Take 1 capsule (30 mg) by mouth 2 times a day. 180 capsule 0 2/5/2024    gabapentin (Neurontin) 600 mg tablet TAKE ONE TABLET BY MOUTH THREE TIMES A DAY 90 tablet 0 2/5/2024    losartan (Cozaar) 50 mg tablet 1 tab(s) oral once a day 90 tablet 1 Past Week    melatonin 3 mg tablet Give 2 tablet by mouth as needed for insomnia administer at bedtime   2/4/2024    metoprolol succinate XL (Toprol-XL) 25 mg 24 hr tablet Take 1 tablet (25 mg) by mouth once daily. Do not crush or chew. 30 tablet 11 2/5/2024    traMADol (Ultram) 50 mg tablet Take 1 tablet (50 mg) by  mouth 2 times a day. 60 tablet 2 Past Week        The list below reflectives the updated allergy list. Please review each documented allergy for additional clarification and justification.  Allergies  Reviewed by Jason Trinh MD on 2/5/2024        Severity Reactions Comments    Shellfish Containing Products Not Specified Swelling             Below are additional concerns with the patient's PTA list.      María Flores CPhT

## 2024-02-07 LAB
ANION GAP SERPL CALC-SCNC: 11 MMOL/L (ref 10–20)
BUN SERPL-MCNC: 48 MG/DL (ref 6–23)
CALCIUM SERPL-MCNC: 8.1 MG/DL (ref 8.6–10.3)
CHLORIDE SERPL-SCNC: 107 MMOL/L (ref 98–107)
CO2 SERPL-SCNC: 21 MMOL/L (ref 21–32)
CREAT SERPL-MCNC: 1.68 MG/DL (ref 0.5–1.05)
EGFRCR SERPLBLD CKD-EPI 2021: 31 ML/MIN/1.73M*2
ERYTHROCYTE [DISTWIDTH] IN BLOOD BY AUTOMATED COUNT: 14.8 % (ref 11.5–14.5)
GLUCOSE SERPL-MCNC: 196 MG/DL (ref 74–99)
HCT VFR BLD AUTO: 37.8 % (ref 36–46)
HGB BLD-MCNC: 11.2 G/DL (ref 12–16)
MCH RBC QN AUTO: 29.3 PG (ref 26–34)
MCHC RBC AUTO-ENTMCNC: 29.6 G/DL (ref 32–36)
MCV RBC AUTO: 99 FL (ref 80–100)
NRBC BLD-RTO: 0 /100 WBCS (ref 0–0)
PLATELET # BLD AUTO: 196 X10*3/UL (ref 150–450)
POTASSIUM SERPL-SCNC: 4.6 MMOL/L (ref 3.5–5.3)
RBC # BLD AUTO: 3.82 X10*6/UL (ref 4–5.2)
SODIUM SERPL-SCNC: 134 MMOL/L (ref 136–145)
WBC # BLD AUTO: 6.4 X10*3/UL (ref 4.4–11.3)

## 2024-02-07 PROCEDURE — 2500000002 HC RX 250 W HCPCS SELF ADMINISTERED DRUGS (ALT 637 FOR MEDICARE OP, ALT 636 FOR OP/ED): Performed by: COLON & RECTAL SURGERY

## 2024-02-07 PROCEDURE — 2500000004 HC RX 250 GENERAL PHARMACY W/ HCPCS (ALT 636 FOR OP/ED)

## 2024-02-07 PROCEDURE — 85027 COMPLETE CBC AUTOMATED: CPT

## 2024-02-07 PROCEDURE — 99233 SBSQ HOSP IP/OBS HIGH 50: CPT

## 2024-02-07 PROCEDURE — 80048 BASIC METABOLIC PNL TOTAL CA: CPT

## 2024-02-07 PROCEDURE — 2500000001 HC RX 250 WO HCPCS SELF ADMINISTERED DRUGS (ALT 637 FOR MEDICARE OP): Performed by: COLON & RECTAL SURGERY

## 2024-02-07 PROCEDURE — 2500000004 HC RX 250 GENERAL PHARMACY W/ HCPCS (ALT 636 FOR OP/ED): Performed by: COLON & RECTAL SURGERY

## 2024-02-07 PROCEDURE — 1100000001 HC PRIVATE ROOM DAILY

## 2024-02-07 PROCEDURE — 36415 COLL VENOUS BLD VENIPUNCTURE: CPT

## 2024-02-07 RX ORDER — SODIUM CHLORIDE, SODIUM LACTATE, POTASSIUM CHLORIDE, CALCIUM CHLORIDE 600; 310; 30; 20 MG/100ML; MG/100ML; MG/100ML; MG/100ML
75 INJECTION, SOLUTION INTRAVENOUS CONTINUOUS
Status: DISCONTINUED | OUTPATIENT
Start: 2024-02-07 | End: 2024-02-09

## 2024-02-07 RX ORDER — ENOXAPARIN SODIUM 100 MG/ML
30 INJECTION SUBCUTANEOUS EVERY 24 HOURS
Status: DISCONTINUED | OUTPATIENT
Start: 2024-02-07 | End: 2024-02-10

## 2024-02-07 RX ADMIN — ACETAMINOPHEN 650 MG: 325 TABLET ORAL at 02:45

## 2024-02-07 RX ADMIN — OXYCODONE HYDROCHLORIDE 5 MG: 5 TABLET ORAL at 20:24

## 2024-02-07 RX ADMIN — ACETAMINOPHEN 650 MG: 325 TABLET ORAL at 10:34

## 2024-02-07 RX ADMIN — ACETAMINOPHEN 650 MG: 325 TABLET ORAL at 20:24

## 2024-02-07 RX ADMIN — TOPIRAMATE 100 MG: 100 TABLET, FILM COATED ORAL at 20:24

## 2024-02-07 RX ADMIN — DULOXETINE HYDROCHLORIDE 30 MG: 30 CAPSULE, DELAYED RELEASE ORAL at 20:24

## 2024-02-07 RX ADMIN — KETOROLAC TROMETHAMINE 15 MG: 30 INJECTION, SOLUTION INTRAMUSCULAR; INTRAVENOUS at 06:27

## 2024-02-07 RX ADMIN — SODIUM CHLORIDE 500 ML: 9 INJECTION, SOLUTION INTRAVENOUS at 06:27

## 2024-02-07 RX ADMIN — ACETAMINOPHEN 650 MG: 325 TABLET ORAL at 14:12

## 2024-02-07 RX ADMIN — ENOXAPARIN SODIUM 30 MG: 30 INJECTION SUBCUTANEOUS at 20:24

## 2024-02-07 RX ADMIN — DONEPEZIL HYDROCHLORIDE 5 MG: 5 TABLET ORAL at 20:24

## 2024-02-07 RX ADMIN — SODIUM CHLORIDE, POTASSIUM CHLORIDE, SODIUM LACTATE AND CALCIUM CHLORIDE 75 ML/HR: 600; 310; 30; 20 INJECTION, SOLUTION INTRAVENOUS at 08:30

## 2024-02-07 RX ADMIN — DULOXETINE HYDROCHLORIDE 30 MG: 30 CAPSULE, DELAYED RELEASE ORAL at 10:34

## 2024-02-07 RX ADMIN — SODIUM CHLORIDE, SODIUM LACTATE, POTASSIUM CHLORIDE, AND CALCIUM CHLORIDE 500 ML: 600; 310; 30; 20 INJECTION, SOLUTION INTRAVENOUS at 08:30

## 2024-02-07 RX ADMIN — CEPHALEXIN 500 MG: 500 CAPSULE ORAL at 02:45

## 2024-02-07 RX ADMIN — TOPIRAMATE 100 MG: 100 TABLET, FILM COATED ORAL at 10:34

## 2024-02-07 RX ADMIN — CEPHALEXIN 500 MG: 500 CAPSULE ORAL at 14:12

## 2024-02-07 RX ADMIN — AMIODARONE HYDROCHLORIDE 200 MG: 200 TABLET ORAL at 10:34

## 2024-02-07 ASSESSMENT — COGNITIVE AND FUNCTIONAL STATUS - GENERAL
DRESSING REGULAR UPPER BODY CLOTHING: A LITTLE
PERSONAL GROOMING: A LITTLE
STANDING UP FROM CHAIR USING ARMS: A LITTLE
DRESSING REGULAR LOWER BODY CLOTHING: A LITTLE
MOBILITY SCORE: 18
CLIMB 3 TO 5 STEPS WITH RAILING: A LOT
MOVING TO AND FROM BED TO CHAIR: A LITTLE
TOILETING: A LITTLE
TURNING FROM BACK TO SIDE WHILE IN FLAT BAD: A LITTLE
WALKING IN HOSPITAL ROOM: A LITTLE
HELP NEEDED FOR BATHING: A LITTLE
DAILY ACTIVITIY SCORE: 19

## 2024-02-07 ASSESSMENT — PAIN SCALES - GENERAL
PAINLEVEL_OUTOF10: 3
PAINLEVEL_OUTOF10: 2
PAINLEVEL_OUTOF10: 4

## 2024-02-07 NOTE — CARE PLAN
The patient's goals for the shift include      The clinical goals for the shift include pain management      Problem: Pain  Goal: My pain/discomfort is manageable  Outcome: Progressing

## 2024-02-07 NOTE — PROGRESS NOTES
POD 2 ileostomy reversal.   Patient and family do not want a semi-private room which is what [facility] has available.  DaughterKaelyn, requested referrals be placed to:  1. [facility]  2. [facility]  3. [facility]  Added daughterKaelyn to emergency contact.  Patient will need auth for accepting facility.  ADOD: 1-2 days  Roselyn Monsalve RN

## 2024-02-07 NOTE — PROGRESS NOTES
"Elida Benson is a 78 y.o. female on day 2 of admission presenting with Ileostomy present (CMS/[facility]).    Subjective   Hypotensive overnight, asymptomatic. Received 500 ml bolus x 2. Continues to be hypotensive this morning- SBP in the 80s, another 500 ml bolus given. Is very drowsy. Denies pain, nausea, vomiting, lightheadedness, SOB, chest pain. Has had several Bms and is tolerating diet.        Objective     PE:  Constitutional: calm and cooperative, drowsy.   Eyes: PERRL, clear sclera   ENMT: Moist mucous membranes  Head/Neck: Neck supple, no JVD  Cardiovascular: RRR. 2+ equal pulses of the distal extremities.  No BLE edema   Respiratory/Thorax: Diminished bases, clear upper lobes. Good symmetric chest expansion. On RA  Gastrointestinal: Abdomen distended, soft, non tender. Umbilical laparotomy site c/d/i, well approximate with Dermabond, no erythema or drainage. Old stoma site clean and dry- no erythema or drainage noted.   Genitourinary: Incontinent with brief on, voiding   Musculoskeletal: ROM intact, no joint swelling, normal strength  Extremities: No peripheral edema  Neurological: A&Ox3, No focal deficits   Psychological: Appropriate mood and behavior  Skin: Warm and dry      Last Recorded Vitals  Blood pressure (!) 87/49, pulse 63, temperature 36.6 °C (97.9 °F), resp. rate 16, height 1.397 m (4' 7\"), weight 60 kg (132 lb 3.2 oz), SpO2 98 %.  Intake/Output last 3 Shifts:  I/O last 3 completed shifts:  In: 972.7 (17 mL/kg) [I.V.:472.7 (8.3 mL/kg); IV Piggyback:500]  Out: 750 (13.1 mL/kg) [Urine:750 (0.4 mL/kg/hr)]  Dosing Weight: 57.2 kg     Relevant Results  Scheduled medications  acetaminophen, 650 mg, oral, q6h  amiodarone, 200 mg, oral, Daily  cephalexin, 500 mg, oral, q12h  donepezil, 5 mg, oral, Nightly  DULoxetine, 30 mg, oral, BID  enoxaparin, 30 mg, subcutaneous, q24h  [Held by provider] gabapentin, 600 mg, oral, TID  [Held by provider] ketorolac, 15 mg, intravenous, q6h CLRAISSA  [Held by provider] " losartan, 50 mg, oral, Daily  metoprolol succinate XL, 25 mg, oral, Daily  topiramate, 100 mg, oral, BID      Continuous medications  lactated Ringer's, 75 mL/hr, Last Rate: 75 mL/hr (02/07/24 0830)      PRN medications  PRN medications: HYDROmorphone, naloxone, ondansetron ODT **OR** ondansetron, oxyCODONE, oxyCODONE, oxygen, [Held by provider] traZODone  Results for orders placed or performed during the hospital encounter of 02/05/24 (from the past 24 hour(s))   Basic Metabolic Panel   Result Value Ref Range    Glucose 196 (H) 74 - 99 mg/dL    Sodium 134 (L) 136 - 145 mmol/L    Potassium 4.6 3.5 - 5.3 mmol/L    Chloride 107 98 - 107 mmol/L    Bicarbonate 21 21 - 32 mmol/L    Anion Gap 11 10 - 20 mmol/L    Urea Nitrogen 48 (H) 6 - 23 mg/dL    Creatinine 1.68 (H) 0.50 - 1.05 mg/dL    eGFR 31 (L) >60 mL/min/1.73m*2    Calcium 8.1 (L) 8.6 - 10.3 mg/dL   CBC   Result Value Ref Range    WBC 6.4 4.4 - 11.3 x10*3/uL    nRBC 0.0 0.0 - 0.0 /100 WBCs    RBC 3.82 (L) 4.00 - 5.20 x10*6/uL    Hemoglobin 11.2 (L) 12.0 - 16.0 g/dL    Hematocrit 37.8 36.0 - 46.0 %    MCV 99 80 - 100 fL    MCH 29.3 26.0 - 34.0 pg    MCHC 29.6 (L) 32.0 - 36.0 g/dL    RDW 14.8 (H) 11.5 - 14.5 %    Platelets 196 150 - 450 x10*3/uL               Assessment/Plan   Principal Problem:    Ileostomy present (CMS/[facility])  Active Problems:    Diverticulitis, colon    Elida Benson is a 78 y.o. female who is POD 2 for ileostomy closure. Post op findings showed minimal intraabdominal adhesions, no serosal tears.     A/P:     Neuro: Drowsy, little to no pain on assessment  Hx: MDD, anxiety, dementia, chronic pain syndrome   - Continue scheduled Tylenol  - Holding gabapentin (home med) and toradol for now. DC trazodone  - continue home topiramate, duloxetine, and donepezil    - PRN oxycodone (avoid if able)   - OOB/ ambulate 5x per day- PT/OT following    CV: RRR, asymptomatic hypotension (1 L total bolus overnight) likely 2/2 hypovolemia   Hx: afib, HTN,  NSTEMI  - 500 ml LR bolus this AM, IVF started   - orthostatic BP  - continue metoprolol and amiodarone for afib with hold parameters   - holding home losartan   - VS every 4 hours and as needed     Pulm: Diminished in bases, on RA  Hx: pulmonary HTN, COPD  - IS every hour while awake   - Pulse ox every 4 hours with VS   - RT following     GI: POD 2 ileostomy reversal. Abdomen soft, nontender, distended. +BM. Tolerating diet. Old stoma site cleand and dry, laparotomy site c/d/i, well approximate with Dermabond, no erythema or drainage.  - continue low fiber diet, ensure surgery shakes  - ERAS protocol   - dietician following  - PRN antiemetic     : post op LENORE 2/2 hypovolemia. Creatinine 0.92-->1.14-->1.68  - 500 ml fluid bolus  - IVF @ 75 ml/hr  - Monitor I&Os   - daily BMP    HEME: H&H stable 11.3/37.9-->11.2/37.8  Hx: anemia   - DVT pxx: SCDs/ ambulate/ lovenox  - no s/s of bleeding    ID: WBC 6.4  - Monitor for s/s infection    Dispo: Discussed with Dr. Kelley. Trend labs and monitor vital signs. Will reevaluate this afternoon. Planning for SNF at time of dc.        I spent 35 minutes in the professional and overall care of this patient.      Sandy Castillo, APRN-CNP    Pt with a little pain in the RUQ, she has had 2 BM's and is eating well and feels hungry  Low BP overnight    Gen:  Pt looks ok, sleepy and a little more out of it  Abd : incision Clean and closed, soft mildly tender nondistended  Extr very thin    Impression   MICHAEL - likely prerenal - dehydration   Hold BP meds - she may have more absorption now without the stoma and is over treated  Continue IVF  Continue diet   Watch renal function  OOB once BP up more

## 2024-02-08 ENCOUNTER — APPOINTMENT (OUTPATIENT)
Dept: RADIOLOGY | Facility: HOSPITAL | Age: 79
DRG: 330 | End: 2024-02-08
Payer: MEDICARE

## 2024-02-08 LAB
ANION GAP SERPL CALC-SCNC: 10 MMOL/L (ref 10–20)
BUN SERPL-MCNC: 35 MG/DL (ref 6–23)
CALCIUM SERPL-MCNC: 8 MG/DL (ref 8.6–10.3)
CHLORIDE SERPL-SCNC: 111 MMOL/L (ref 98–107)
CO2 SERPL-SCNC: 18 MMOL/L (ref 21–32)
CREAT SERPL-MCNC: 0.87 MG/DL (ref 0.5–1.05)
EGFRCR SERPLBLD CKD-EPI 2021: 69 ML/MIN/1.73M*2
ERYTHROCYTE [DISTWIDTH] IN BLOOD BY AUTOMATED COUNT: 15.1 % (ref 11.5–14.5)
GLUCOSE SERPL-MCNC: 178 MG/DL (ref 74–99)
HCT VFR BLD AUTO: 35.7 % (ref 36–46)
HGB BLD-MCNC: 10.6 G/DL (ref 12–16)
MCH RBC QN AUTO: 28.8 PG (ref 26–34)
MCHC RBC AUTO-ENTMCNC: 29.7 G/DL (ref 32–36)
MCV RBC AUTO: 97 FL (ref 80–100)
NRBC BLD-RTO: 0 /100 WBCS (ref 0–0)
PLATELET # BLD AUTO: 210 X10*3/UL (ref 150–450)
POTASSIUM SERPL-SCNC: 4.4 MMOL/L (ref 3.5–5.3)
RBC # BLD AUTO: 3.68 X10*6/UL (ref 4–5.2)
SODIUM SERPL-SCNC: 135 MMOL/L (ref 136–145)
WBC # BLD AUTO: 6.6 X10*3/UL (ref 4.4–11.3)

## 2024-02-08 PROCEDURE — 2500000001 HC RX 250 WO HCPCS SELF ADMINISTERED DRUGS (ALT 637 FOR MEDICARE OP)

## 2024-02-08 PROCEDURE — 2500000002 HC RX 250 W HCPCS SELF ADMINISTERED DRUGS (ALT 637 FOR MEDICARE OP, ALT 636 FOR OP/ED): Performed by: COLON & RECTAL SURGERY

## 2024-02-08 PROCEDURE — 82374 ASSAY BLOOD CARBON DIOXIDE: CPT

## 2024-02-08 PROCEDURE — 2500000004 HC RX 250 GENERAL PHARMACY W/ HCPCS (ALT 636 FOR OP/ED): Performed by: COLON & RECTAL SURGERY

## 2024-02-08 PROCEDURE — 36415 COLL VENOUS BLD VENIPUNCTURE: CPT

## 2024-02-08 PROCEDURE — 97530 THERAPEUTIC ACTIVITIES: CPT | Mod: GP,CQ | Performed by: PHYSICAL THERAPY ASSISTANT

## 2024-02-08 PROCEDURE — 2500000001 HC RX 250 WO HCPCS SELF ADMINISTERED DRUGS (ALT 637 FOR MEDICARE OP): Performed by: COLON & RECTAL SURGERY

## 2024-02-08 PROCEDURE — 71045 X-RAY EXAM CHEST 1 VIEW: CPT

## 2024-02-08 PROCEDURE — 2500000004 HC RX 250 GENERAL PHARMACY W/ HCPCS (ALT 636 FOR OP/ED)

## 2024-02-08 PROCEDURE — 85027 COMPLETE CBC AUTOMATED: CPT

## 2024-02-08 PROCEDURE — 71045 X-RAY EXAM CHEST 1 VIEW: CPT | Performed by: RADIOLOGY

## 2024-02-08 PROCEDURE — 99232 SBSQ HOSP IP/OBS MODERATE 35: CPT

## 2024-02-08 PROCEDURE — 1100000001 HC PRIVATE ROOM DAILY

## 2024-02-08 RX ADMIN — DONEPEZIL HYDROCHLORIDE 5 MG: 5 TABLET ORAL at 21:42

## 2024-02-08 RX ADMIN — ACETAMINOPHEN 650 MG: 325 TABLET ORAL at 02:27

## 2024-02-08 RX ADMIN — ACETAMINOPHEN 650 MG: 325 TABLET ORAL at 21:42

## 2024-02-08 RX ADMIN — ENOXAPARIN SODIUM 30 MG: 30 INJECTION SUBCUTANEOUS at 21:42

## 2024-02-08 RX ADMIN — OXYCODONE HYDROCHLORIDE 10 MG: 5 TABLET ORAL at 06:22

## 2024-02-08 RX ADMIN — DULOXETINE HYDROCHLORIDE 30 MG: 30 CAPSULE, DELAYED RELEASE ORAL at 09:37

## 2024-02-08 RX ADMIN — METOPROLOL SUCCINATE 25 MG: 25 TABLET, EXTENDED RELEASE ORAL at 09:37

## 2024-02-08 RX ADMIN — SODIUM CHLORIDE, POTASSIUM CHLORIDE, SODIUM LACTATE AND CALCIUM CHLORIDE 75 ML/HR: 600; 310; 30; 20 INJECTION, SOLUTION INTRAVENOUS at 02:29

## 2024-02-08 RX ADMIN — CEPHALEXIN 500 MG: 500 CAPSULE ORAL at 14:46

## 2024-02-08 RX ADMIN — AMIODARONE HYDROCHLORIDE 200 MG: 200 TABLET ORAL at 09:37

## 2024-02-08 RX ADMIN — ACETAMINOPHEN 650 MG: 325 TABLET ORAL at 14:46

## 2024-02-08 RX ADMIN — CEPHALEXIN 500 MG: 500 CAPSULE ORAL at 02:27

## 2024-02-08 RX ADMIN — TOPIRAMATE 100 MG: 100 TABLET, FILM COATED ORAL at 21:42

## 2024-02-08 RX ADMIN — ACETAMINOPHEN 650 MG: 325 TABLET ORAL at 08:00

## 2024-02-08 RX ADMIN — DULOXETINE HYDROCHLORIDE 30 MG: 30 CAPSULE, DELAYED RELEASE ORAL at 21:42

## 2024-02-08 RX ADMIN — TOPIRAMATE 100 MG: 100 TABLET, FILM COATED ORAL at 09:37

## 2024-02-08 ASSESSMENT — COGNITIVE AND FUNCTIONAL STATUS - GENERAL
HELP NEEDED FOR BATHING: A LITTLE
DRESSING REGULAR UPPER BODY CLOTHING: A LITTLE
CLIMB 3 TO 5 STEPS WITH RAILING: TOTAL
CLIMB 3 TO 5 STEPS WITH RAILING: TOTAL
DAILY ACTIVITIY SCORE: 14
MOVING FROM LYING ON BACK TO SITTING ON SIDE OF FLAT BED WITH BEDRAILS: A LOT
MOBILITY SCORE: 11
MOVING TO AND FROM BED TO CHAIR: A LOT
MOVING TO AND FROM BED TO CHAIR: A LITTLE
STANDING UP FROM CHAIR USING ARMS: A LITTLE
WALKING IN HOSPITAL ROOM: A LOT
DRESSING REGULAR LOWER BODY CLOTHING: A LOT
TOILETING: A LOT
TOILETING: A LITTLE
TURNING FROM BACK TO SIDE WHILE IN FLAT BAD: A LOT
DAILY ACTIVITIY SCORE: 19
STANDING UP FROM CHAIR USING ARMS: A LOT
DRESSING REGULAR UPPER BODY CLOTHING: A LOT
MOBILITY SCORE: 11
HELP NEEDED FOR BATHING: A LOT
PERSONAL GROOMING: A LITTLE
MOBILITY SCORE: 18
TURNING FROM BACK TO SIDE WHILE IN FLAT BAD: A LOT
PERSONAL GROOMING: A LOT
WALKING IN HOSPITAL ROOM: TOTAL
MOVING FROM LYING ON BACK TO SITTING ON SIDE OF FLAT BED WITH BEDRAILS: A LITTLE
STANDING UP FROM CHAIR USING ARMS: A LOT
TURNING FROM BACK TO SIDE WHILE IN FLAT BAD: A LITTLE
CLIMB 3 TO 5 STEPS WITH RAILING: A LOT
WALKING IN HOSPITAL ROOM: A LITTLE
MOVING TO AND FROM BED TO CHAIR: A LOT
DRESSING REGULAR LOWER BODY CLOTHING: A LITTLE

## 2024-02-08 ASSESSMENT — PAIN SCALES - GENERAL
PAINLEVEL_OUTOF10: 9
PAINLEVEL_OUTOF10: 3
PAINLEVEL_OUTOF10: 0 - NO PAIN
PAINLEVEL_OUTOF10: 4
PAINLEVEL_OUTOF10: 6

## 2024-02-08 ASSESSMENT — PAIN - FUNCTIONAL ASSESSMENT
PAIN_FUNCTIONAL_ASSESSMENT: 0-10
PAIN_FUNCTIONAL_ASSESSMENT: 0-10

## 2024-02-08 ASSESSMENT — PAIN DESCRIPTION - ORIENTATION: ORIENTATION: ANTERIOR

## 2024-02-08 NOTE — PROGRESS NOTES
Patient is s/p ileostomy reversal.  Plan is to go to [facility] when medically cleared.  Will need PT/OT notes to initiate pre-cert.  ADOD: Saturday or Sunday  Roselyn Monsalve RN

## 2024-02-08 NOTE — PROGRESS NOTES
"Elida Benson is a 78 y.o. female on day 3 of admission presenting with Ileostomy present (CMS/[facility]).    Subjective   Patient is awake in bed this morning. She reports Feeling \"lousy\" denies nausea, vomiting or increased pain. She has been OOB. She reports feeling more bloated after eating all of her breakfast.        Objective     Physical Exam  Constitutional:       Appearance: Normal appearance.   Cardiovascular:      Rate and Rhythm: Normal rate and regular rhythm.   Pulmonary:      Effort: Pulmonary effort is normal.      Comments: Non labored breathing on RA  Abdominal:      General: There is distension (More bloated and firm in epigastric area. Softer in Lower quadrants.).      Palpations: Abdomen is soft.      Tenderness: There is no abdominal tenderness.      Comments: Lap site C/D/I, edges well approximated, covered in Dermabond. Old stoma site, no erythema or drainage noted from the area.    Genitourinary:     Comments: Voiding independently  Skin:     General: Skin is warm and dry.   Neurological:      General: No focal deficit present.      Mental Status: She is alert and oriented to person, place, and time. Mental status is at baseline.   Psychiatric:         Attention and Perception: Attention normal.         Mood and Affect: Mood normal.         Speech: Speech normal.         Behavior: Behavior normal.         Cognition and Memory: Cognition normal.         Last Recorded Vitals  Blood pressure 103/69, pulse 86, temperature 36.6 °C (97.8 °F), resp. rate 16, height 1.397 m (4' 7\"), weight 61.6 kg (135 lb 14.4 oz), SpO2 92 %.  Intake/Output last 3 Shifts:  I/O last 3 completed shifts:  In: 1575 (27.5 mL/kg) [P.O.:700; I.V.:375 (6.6 mL/kg); IV Piggyback:500]  Out: 1700 (29.7 mL/kg) [Urine:1700 (0.8 mL/kg/hr)]  Dosing Weight: 57.2 kg     Relevant Results  Scheduled medications  acetaminophen, 650 mg, oral, q6h  amiodarone, 200 mg, oral, Daily  cephalexin, 500 mg, oral, q12h  donepezil, 5 mg, oral, " Nightly  DULoxetine, 30 mg, oral, BID  enoxaparin, 30 mg, subcutaneous, q24h  [Held by provider] gabapentin, 600 mg, oral, TID  [Held by provider] losartan, 50 mg, oral, Daily  metoprolol succinate XL, 25 mg, oral, Daily  topiramate, 100 mg, oral, BID      Continuous medications  lactated Ringer's, 75 mL/hr, Last Rate: 75 mL/hr (02/08/24 0229)      PRN medications  PRN medications: naloxone, ondansetron ODT **OR** ondansetron, oxyCODONE, oxyCODONE, oxygen, [Held by provider] traZODone   Results for orders placed or performed during the hospital encounter of 02/05/24 (from the past 24 hour(s))   Basic Metabolic Panel   Result Value Ref Range    Glucose 178 (H) 74 - 99 mg/dL    Sodium 135 (L) 136 - 145 mmol/L    Potassium 4.4 3.5 - 5.3 mmol/L    Chloride 111 (H) 98 - 107 mmol/L    Bicarbonate 18 (L) 21 - 32 mmol/L    Anion Gap 10 10 - 20 mmol/L    Urea Nitrogen 35 (H) 6 - 23 mg/dL    Creatinine 0.87 0.50 - 1.05 mg/dL    eGFR 69 >60 mL/min/1.73m*2    Calcium 8.0 (L) 8.6 - 10.3 mg/dL   CBC   Result Value Ref Range    WBC 6.6 4.4 - 11.3 x10*3/uL    nRBC 0.0 0.0 - 0.0 /100 WBCs    RBC 3.68 (L) 4.00 - 5.20 x10*6/uL    Hemoglobin 10.6 (L) 12.0 - 16.0 g/dL    Hematocrit 35.7 (L) 36.0 - 46.0 %    MCV 97 80 - 100 fL    MCH 28.8 26.0 - 34.0 pg    MCHC 29.7 (L) 32.0 - 36.0 g/dL    RDW 15.1 (H) 11.5 - 14.5 %    Platelets 210 150 - 450 x10*3/uL     No orders to display          Assessment/Plan   Principal Problem:    Ileostomy present (CMS/[facility])  Active Problems:    Diverticulitis, colon    Neuro: Acute post-op pain, well controlled     Hx; Depression, anxiety, dementia  - Continue home Cymbalta. Topamax and Aricept   - OOB ambulate as tolerated at least 5 times per day  - Continue current pain control regimen    Cardiovascular:  HR: 86    BP: 103/69     Hx: HLD, HTN  - VS Q4h  - Continue home Amiodarone, Metoprolol  - Holding Losartan  - Bps have been better, denies and lightheadedness or dizziness.     Pulmonary: Non labored  breathing on RA  - Encourage IS  - Encourage ambulation    Gastrointestinal: POD 3 Ileostomy Closure Findings: Minimal intraabdominal adhesions, no serosal tears      Hx: Diverticulitis  - Regular diet, Go slow  - PRN antiemetic  - continue current bowel regimen     Genitourinary: Voiding independently  - continue mIVF  - Monitor I&Os   - Cr: 0.87  - Continue to trend electrolytes with daily labs    Hematology:   - H/H: 11.2/37.8 -> 10.6/35.7  - No active s/s of bleeding  - DVT prophylaxis: SCDs/Lovenox  - Eliquis Held    Infectious Disease:   - WBC: 6.4 -> 6.3  - Afebrile, VSS  - Continue to monitor for s/s of infection  - Continue to monitor WBC with daily CBC    Dispo: Patient doing well POD 3. Going slower with diet. More moving today. Discussed plan with Dr. Kelley.         I spent 35 minutes in the professional and overall care of this patient.    Addendum  CXR: this after noon showed   Increasing perihilar and basilar interstitial prominence suggesting  interstitial edema with patchy opacities.    Multifocal pneumonia might also have this appearance  If patient becomes febrile can start ABX for PNA, for now in abscense of fever or elevated WBC will hold off on ABX.      Terrell Dixon PA-C    Pt more sleepy today, states she doesn't feel great    Gen - laying in bed, less with it today  Abd - softly distended, closure site closed, tender to palpation, no peritoneal signs  Extr - veyr thin    Impression - Pt POD # 3 s/p stoma closure   She looks ok by vitals/labs, but still not great clinically unsure if this is subclinical infection or just delerium related to her dementia    Plan   Check U/A and CXR  OOB if possible  Lovenox/SCD's   PT to eval may need SNF

## 2024-02-08 NOTE — PROGRESS NOTES
Physical Therapy    Physical Therapy Treatment    Patient Name: Elida Benson  MRN: [id number]  Today's Date: 2/9/2024  Time Calculation  Start Time: 1402  Stop Time: 1426  Time Calculation (min): 24 min       Assessment/Plan   PT Assessment  End of Session Communication: Bedside nurse  End of Session Patient Position: Up in chair, Alarm on  PT Plan  Inpatient/Swing Bed or Outpatient: Inpatient  PT Plan  Treatment/Interventions: Bed mobility, Transfer training, Therapeutic activity  PT Plan: Skilled PT  PT Frequency: 3 times per week  PT Discharge Recommendations: Moderate intensity level of continued care  PT Recommended Transfer Status: Assist x1 (for transfers, assist x 2 for any ambulation)  PT - OK to Discharge: Yes (per PT POC)      General Visit Information:   PT  Visit  PT Received On: 02/08/24  General  Reason for Referral: Pt admitted with diverticulitis with abscess, vaginal drainage and UTI, now s/p ileostomy reversal.  Patient Position Received: Bed, 3 rail up, Alarm on  Preferred Learning Style: auditory, kinesthetic, verbal  General Comment: Pt is pleasant and cooperative, receptive to PT.    Subjective   Precautions:     Vital Signs:       Objective   Pain:  Pain Assessment  Pain Assessment: 0-10  Pain Score: 3  Cognition:     Postural Control:     Extremity/Trunk Assessments:    Activity Tolerance:  Activity Tolerance  Endurance: Decreased tolerance for upright activites  Treatments:       Bed Mobility  Bed Mobility: Yes  Bed Mobility 1  Bed Mobility 1: Supine to sitting, Rolling right, Scooting  Level of Assistance 1: Maximum assistance, Moderate verbal cues, Moderate tactile cues       Transfers  Transfer: Yes  Transfer 1  Transfer From 1: Bed to, Chair without arms to  Technique 1: Lateral  Transfer Device 1: Gait belt  Transfer Level of Assistance 1: Maximum assistance, Moderate verbal cues, Moderate tactile cues  Trials/Comments 1:  (pt req increased cues and assistance to complete  transfer)    Outcome Measures:  [facility] Basic Mobility  Turning from your back to your side while in a flat bed without using bedrails: A lot  Moving from lying on your back to sitting on the side of a flat bed without using bedrails: A lot  Moving to and from bed to chair (including a wheelchair): A lot  Standing up from a chair using your arms (e.g. wheelchair or bedside chair): A lot  To walk in hospital room: A lot  Climbing 3-5 steps with railing: Total  Basic Mobility - Total Score: 11    Education Documentation  No documentation found.  Education Comments  No comments found.        OP EDUCATION:       Encounter Problems       Encounter Problems (Active)       Balance       STG - Maintains dynamic standing balance with upper extremity support x 5 min with min A (Progressing)       Start:  02/06/24    Expected End:  02/20/24       INTERVENTIONS:  1. Practice standing with minimal support.  2. Educate patient about standing tolerance.  3. Educate patient about independence with gait, transfers, and ADL's.  4. Educate patient about use of assistive device.  5. Educate patient about self-directed care.         STG - Maintains static standing balance with upper extremity support x 5 min with CGA (Progressing)       Start:  02/06/24    Expected End:  02/20/24       INTERVENTIONS:  1. Practice standing with minimal support.  2. Educate patient about standing tolerance.  3. Educate patient about independence with gait, transfers, and ADL's.  4. Educate patient about use of assistive device.  5. Educate patient about self-directed care.            Mobility       STG - Patient will ambulate with ww 50' x 2 with min A (Progressing)       Start:  02/06/24    Expected End:  02/20/24            Increase BLE strength to attain functional goals achieved through supine, seated, and standing TE. (Progressing)       Start:  02/06/24    Expected End:  02/20/24               Transfers       STG - Transfer from bed to chair with ww  with CGA (Progressing)       Start:  02/06/24    Expected End:  02/20/24            STG - Patient to transfer to and from sit to supine with CGA (Progressing)       Start:  02/06/24    Expected End:  02/20/24            STG - Patient will transfer sit to and from stand with CGA (Progressing)       Start:  02/06/24    Expected End:  02/20/24

## 2024-02-09 LAB
ALBUMIN SERPL BCP-MCNC: 3 G/DL (ref 3.4–5)
ALP SERPL-CCNC: 67 U/L (ref 33–136)
ALT SERPL W P-5'-P-CCNC: 9 U/L (ref 7–45)
ANION GAP SERPL CALC-SCNC: 12 MMOL/L (ref 10–20)
APPEARANCE UR: CLEAR
AST SERPL W P-5'-P-CCNC: 9 U/L (ref 9–39)
BACTERIA #/AREA URNS AUTO: ABNORMAL /HPF
BILIRUB SERPL-MCNC: 0.4 MG/DL (ref 0–1.2)
BILIRUB UR STRIP.AUTO-MCNC: NEGATIVE MG/DL
BUN SERPL-MCNC: 22 MG/DL (ref 6–23)
CALCIUM SERPL-MCNC: 7.9 MG/DL (ref 8.6–10.3)
CHLORIDE SERPL-SCNC: 111 MMOL/L (ref 98–107)
CO2 SERPL-SCNC: 21 MMOL/L (ref 21–32)
COLOR UR: YELLOW
CREAT SERPL-MCNC: 0.65 MG/DL (ref 0.5–1.05)
EGFRCR SERPLBLD CKD-EPI 2021: >90 ML/MIN/1.73M*2
ERYTHROCYTE [DISTWIDTH] IN BLOOD BY AUTOMATED COUNT: 15.5 % (ref 11.5–14.5)
GLUCOSE SERPL-MCNC: 191 MG/DL (ref 74–99)
GLUCOSE UR STRIP.AUTO-MCNC: NEGATIVE MG/DL
HCT VFR BLD AUTO: 37.1 % (ref 36–46)
HGB BLD-MCNC: 11 G/DL (ref 12–16)
KETONES UR STRIP.AUTO-MCNC: NEGATIVE MG/DL
LEUKOCYTE ESTERASE UR QL STRIP.AUTO: ABNORMAL
MCH RBC QN AUTO: 28.2 PG (ref 26–34)
MCHC RBC AUTO-ENTMCNC: 29.6 G/DL (ref 32–36)
MCV RBC AUTO: 95 FL (ref 80–100)
NITRITE UR QL STRIP.AUTO: NEGATIVE
NRBC BLD-RTO: 0 /100 WBCS (ref 0–0)
PH UR STRIP.AUTO: 5 [PH]
PLATELET # BLD AUTO: 241 X10*3/UL (ref 150–450)
POTASSIUM SERPL-SCNC: 4.1 MMOL/L (ref 3.5–5.3)
PROT SERPL-MCNC: 5.7 G/DL (ref 6.4–8.2)
PROT UR STRIP.AUTO-MCNC: ABNORMAL MG/DL
RBC # BLD AUTO: 3.9 X10*6/UL (ref 4–5.2)
RBC # UR STRIP.AUTO: NEGATIVE /UL
RBC #/AREA URNS AUTO: ABNORMAL /HPF
SODIUM SERPL-SCNC: 140 MMOL/L (ref 136–145)
SP GR UR STRIP.AUTO: 1.03
SQUAMOUS #/AREA URNS AUTO: ABNORMAL /HPF
UROBILINOGEN UR STRIP.AUTO-MCNC: <2 MG/DL
WBC # BLD AUTO: 8.1 X10*3/UL (ref 4.4–11.3)
WBC #/AREA URNS AUTO: ABNORMAL /HPF

## 2024-02-09 PROCEDURE — 1100000001 HC PRIVATE ROOM DAILY

## 2024-02-09 PROCEDURE — 2500000004 HC RX 250 GENERAL PHARMACY W/ HCPCS (ALT 636 FOR OP/ED): Performed by: COLON & RECTAL SURGERY

## 2024-02-09 PROCEDURE — 80053 COMPREHEN METABOLIC PANEL: CPT

## 2024-02-09 PROCEDURE — 85027 COMPLETE CBC AUTOMATED: CPT

## 2024-02-09 PROCEDURE — 36415 COLL VENOUS BLD VENIPUNCTURE: CPT

## 2024-02-09 PROCEDURE — 2500000001 HC RX 250 WO HCPCS SELF ADMINISTERED DRUGS (ALT 637 FOR MEDICARE OP)

## 2024-02-09 PROCEDURE — 2500000001 HC RX 250 WO HCPCS SELF ADMINISTERED DRUGS (ALT 637 FOR MEDICARE OP): Performed by: COLON & RECTAL SURGERY

## 2024-02-09 PROCEDURE — 97165 OT EVAL LOW COMPLEX 30 MIN: CPT | Mod: GO

## 2024-02-09 PROCEDURE — 2500000002 HC RX 250 W HCPCS SELF ADMINISTERED DRUGS (ALT 637 FOR MEDICARE OP, ALT 636 FOR OP/ED): Performed by: COLON & RECTAL SURGERY

## 2024-02-09 PROCEDURE — 87086 URINE CULTURE/COLONY COUNT: CPT

## 2024-02-09 PROCEDURE — 99232 SBSQ HOSP IP/OBS MODERATE 35: CPT

## 2024-02-09 PROCEDURE — 2500000004 HC RX 250 GENERAL PHARMACY W/ HCPCS (ALT 636 FOR OP/ED)

## 2024-02-09 PROCEDURE — 81001 URINALYSIS AUTO W/SCOPE: CPT

## 2024-02-09 RX ORDER — CEFTRIAXONE 1 G/50ML
1 INJECTION, SOLUTION INTRAVENOUS EVERY 24 HOURS
Status: DISCONTINUED | OUTPATIENT
Start: 2024-02-09 | End: 2024-02-12 | Stop reason: HOSPADM

## 2024-02-09 RX ORDER — CIPROFLOXACIN 2 MG/ML
400 INJECTION, SOLUTION INTRAVENOUS EVERY 12 HOURS
Status: DISCONTINUED | OUTPATIENT
Start: 2024-02-09 | End: 2024-02-09 | Stop reason: ALTCHOICE

## 2024-02-09 RX ADMIN — BENZOCAINE AND MENTHOL 1 LOZENGE: 15; 3.6 LOZENGE ORAL at 11:39

## 2024-02-09 RX ADMIN — ACETAMINOPHEN 650 MG: 325 TABLET ORAL at 20:38

## 2024-02-09 RX ADMIN — DULOXETINE HYDROCHLORIDE 30 MG: 30 CAPSULE, DELAYED RELEASE ORAL at 08:41

## 2024-02-09 RX ADMIN — METOPROLOL SUCCINATE 25 MG: 25 TABLET, EXTENDED RELEASE ORAL at 08:41

## 2024-02-09 RX ADMIN — OXYCODONE HYDROCHLORIDE 5 MG: 5 TABLET ORAL at 20:38

## 2024-02-09 RX ADMIN — TOPIRAMATE 100 MG: 100 TABLET, FILM COATED ORAL at 20:39

## 2024-02-09 RX ADMIN — DONEPEZIL HYDROCHLORIDE 5 MG: 5 TABLET ORAL at 20:39

## 2024-02-09 RX ADMIN — TOPIRAMATE 100 MG: 100 TABLET, FILM COATED ORAL at 08:41

## 2024-02-09 RX ADMIN — AMIODARONE HYDROCHLORIDE 200 MG: 200 TABLET ORAL at 08:41

## 2024-02-09 RX ADMIN — ACETAMINOPHEN 650 MG: 325 TABLET ORAL at 02:53

## 2024-02-09 RX ADMIN — DULOXETINE HYDROCHLORIDE 30 MG: 30 CAPSULE, DELAYED RELEASE ORAL at 20:39

## 2024-02-09 RX ADMIN — CEPHALEXIN 500 MG: 500 CAPSULE ORAL at 02:53

## 2024-02-09 RX ADMIN — CEFTRIAXONE SODIUM 1 G: 1 INJECTION, SOLUTION INTRAVENOUS at 11:40

## 2024-02-09 RX ADMIN — ACETAMINOPHEN 650 MG: 325 TABLET ORAL at 13:00

## 2024-02-09 RX ADMIN — OXYCODONE HYDROCHLORIDE 5 MG: 5 TABLET ORAL at 13:01

## 2024-02-09 RX ADMIN — ACETAMINOPHEN 650 MG: 325 TABLET ORAL at 08:41

## 2024-02-09 RX ADMIN — ENOXAPARIN SODIUM 30 MG: 30 INJECTION SUBCUTANEOUS at 20:39

## 2024-02-09 ASSESSMENT — COGNITIVE AND FUNCTIONAL STATUS - GENERAL
STANDING UP FROM CHAIR USING ARMS: A LOT
CLIMB 3 TO 5 STEPS WITH RAILING: TOTAL
PERSONAL GROOMING: A LOT
DRESSING REGULAR LOWER BODY CLOTHING: A LOT
DAILY ACTIVITIY SCORE: 14
DRESSING REGULAR UPPER BODY CLOTHING: A LOT
WALKING IN HOSPITAL ROOM: TOTAL
HELP NEEDED FOR BATHING: A LOT
TOILETING: A LOT
MOBILITY SCORE: 11
DRESSING REGULAR UPPER BODY CLOTHING: A LOT
TURNING FROM BACK TO SIDE WHILE IN FLAT BAD: A LOT
DRESSING REGULAR LOWER BODY CLOTHING: A LOT
TOILETING: A LOT
MOVING TO AND FROM BED TO CHAIR: A LOT
MOVING FROM LYING ON BACK TO SITTING ON SIDE OF FLAT BED WITH BEDRAILS: A LITTLE
DAILY ACTIVITIY SCORE: 14
HELP NEEDED FOR BATHING: A LOT
PERSONAL GROOMING: A LOT

## 2024-02-09 ASSESSMENT — PAIN SCALES - GENERAL
PAINLEVEL_OUTOF10: 0 - NO PAIN
PAINLEVEL_OUTOF10: 6
PAINLEVEL_OUTOF10: 0 - NO PAIN
PAINLEVEL_OUTOF10: 2
PAINLEVEL_OUTOF10: 6
PAINLEVEL_OUTOF10: 0 - NO PAIN
PAINLEVEL_OUTOF10: 0 - NO PAIN

## 2024-02-09 ASSESSMENT — ACTIVITIES OF DAILY LIVING (ADL)
BATHING_ASSISTANCE: MINIMAL
BATHING_ASSISTANCE: MAXIMAL
TOILETING_ASSISTANCE: MINIMAL
ADL_ASSISTANCE: NEEDS ASSISTANCE

## 2024-02-09 ASSESSMENT — PAIN - FUNCTIONAL ASSESSMENT
PAIN_FUNCTIONAL_ASSESSMENT: 0-10

## 2024-02-09 ASSESSMENT — PAIN DESCRIPTION - LOCATION
LOCATION: ABDOMEN
LOCATION: ABDOMEN

## 2024-02-09 ASSESSMENT — PAIN DESCRIPTION - DESCRIPTORS: DESCRIPTORS: TENDER

## 2024-02-09 NOTE — PROGRESS NOTES
Occupational Therapy    Evaluation    Patient Name: Elida Benson  MRN: [id number]  Today's Date: 2/9/2024  Time Calculation  Start Time: 0732  Stop Time: 0750  Time Calculation (min): 18 min    Assessment  IP OT Assessment  OT Assessment:  (Patient is s/p Ileostomy reversal and requires extensive assist with ADLS and transfers. Patient is confused and has decreased flexibility, balance defictis, and would benefit from moderate intensity therapy to maximize functional independence.)  Prognosis: Good  Evaluation/Treatment Tolerance: Patient tolerated treatment well  Medical Staff Made Aware: Yes  End of Session Communication: Bedside nurse  End of Session Patient Position: Up in chair, Alarm on  Plan:  Treatment Interventions: ADL retraining, Functional transfer training, Endurance training, Patient/family training, Neuromuscular reeducation  OT Frequency: 3 times per week  OT Discharge Recommendations: Moderate intensity level of continued care  Equipment Recommended upon Discharge:  (Hip Kit)  OT - OK to Discharge: Yes    Subjective   Current Problem:  1. Diverticulitis, colon        2. Ileostomy present (CMS/[facility])          General:  General  Reason for Referral: Ileostomy Reversal  Past Medical History Relevant to Rehab:  (Cervical Disc Disorder, Dementia, Htn, Hypokalemia, Fibromyalgia)  Patient Position Received: Bed, 3 rail up, Alarm on  General Comment: Patient is confused, decreased insight and agitated at times.  Precautions:  Medical Precautions:  (Falls Precautions, Abdominial Precautions)  Pain:  Pain Assessment  Pain Assessment: 0-10  Pain Score: 0 - No pain    Objective   Cognition:  Orientation Level: Disoriented to situation, Disoriented to time, Disoriented to place  Safety/Judgement:  (Decreased insight)   Home Living:  Type of Home:  (2 story home with 3 steps, bed/bath on first floor, walk in shower, raised toilet seat.)  Lives With: Adult children   Prior Function:  Level of [place]: Needs  assistance with ADLs, Needs assistance with homemaking  Receives Help From: Family  ADL Assistance: Needs assistance  Bath: Minimal  Toileting: Minimal  Dressing: Minimal  Homemaking Assistance: Needs assistance  Meal Prep:  (Dtr performs for patient)  Ambulatory Assistance: Needs assistance (with cane)  ADL:  Eating Assistance: Stand by  Grooming Assistance: Minimal  Bathing Assistance: Maximal  UE Dressing Assistance: Moderate  LE Dressing Assistance: Total  Toileting Assistance with Device: Moderate  Functional Assistance: Moderate  Activity Tolerance:  Endurance: Tolerates 10 - 20 min exercise with multiple rests  Activity Tolerance Comments: Fair activity tolerance.  Bed Mobility/Transfers: Bed Mobility  Bed Mobility: Yes  Bed Mobility 1  Bed Mobility 1: Supine to sitting  Level of Assistance 1: Maximum assistance  Bed Mobility Comments 1: via log roll, increased time required.    Transfers  Transfer: Yes  Transfer 1  Transfer From 1: Bed to  Transfer to 1: Chair with arms  Technique 1: Sit to stand  Transfer Device 1: Walker (Mod A x 2)  Transfer Level of Assistance 1: Moderate assistance  Trials/Comments 1: x2 and cues for hand placement    Sitting Balance:  Static Sitting Balance  Static Sitting-Comment/Number of Minutes: Good Balance  Dynamic Sitting Balance  Dynamic Sitting-Comments: Fair Balance  Standing Balance:  Static Standing Balance  Static Standing-Comment/Number of Minutes: Fair balance  Dynamic Standing Balance  Dynamic Standing-Comments: Fair Balance.        Hand Function:  Hand Function  Gross Grasp: Functional  Coordination: Functional    Outcome Measures: [facility] Daily Activity  Putting on and taking off regular lower body clothing: A lot  Bathing (including washing, rinsing, drying): A lot  Putting on and taking off regular upper body clothing: A lot  Toileting, which includes using toilet, bedpan or urinal: A lot  Taking care of personal grooming such as brushing teeth: A lot  Eating Meals:  None  Daily Activity - Total Score: 14      Education Documentation  ADL Training, taught by Aaron Etienne OT at 2/9/2024  9:34 AM.  Learner: Patient  Readiness: Acceptance  Method: Explanation  Response: Needs Reinforcement    Education Comments  No comments found.      Goals:   Encounter Problems       Encounter Problems (Active)       ADLs       Patient will perform UB and LB bathing with CGA  level of assistance.        Start:  02/09/24    Expected End:  02/23/24            Patient with complete upper body dressing with contact guard assist level of assistance donning and doffing all UE clothes with no adaptive equipment while edge of bed        Start:  02/09/24    Expected End:  02/23/24            Patient with complete lower body dressing with contact guard assist level of assistance donning and doffing all LE clothes  with reacher, sock-aid, and dressing stick  while edge of bed        Start:  02/09/24    Expected End:  02/23/24            Patient will complete daily grooming tasks brushing teeth and washing face/hair with independent level of assistance.       Start:  02/09/24    Expected End:  02/23/24            Patient will complete toileting including hygiene clothing management/hygiene with independent level of assistance and raised toilet seat.       Start:  02/09/24    Expected End:  02/23/24               BALANCE       Patientt will maintain static standing balance during ADL task with contact guard assist level of assistance drop down in order to demonstrate decreased risk of falling and improved postural control.       Start:  02/09/24    Expected End:  02/23/24                     COGNITION/SAFETY       Patient to be A & O x 3 with no verbal cues to perform ADLs and transfers with min cues for sequencing and safety.        Start:  02/09/24    Expected End:  02/23/24                 TRANSFERS       Patient will perform bed mobility contact guard assist level of assistance and bed rails in  order to improve safety and independence with mobility       Start:  02/09/24    Expected End:  02/23/24

## 2024-02-09 NOTE — PROGRESS NOTES
Music Therapy Note  Therapy Session  Referral Type: Pain rounds  Visit Type: New visit  Session Start Time: 1308  Session End Time: 1311  Intervention Delivery: In-person  Family Present for Session: None  Number of staff members present: 0     Pre-assessment  Unable to Assess Reason: Cognitive limitation  Mood/Affect: Confused         Treatment/Interventions  Music Therapy Interventions: Assessment    Post-assessment  Unable to Assess Reason: Did not provide expressive therapy intervention  Total Session Time (min): 3 minutes    Narrative  Assessment Detail: Upon assessment pt showed signs of confusion. Pt statements at time were unintelligible. MT introduced self and services, offering music interventions to assist with this admission. Pt did not respond to MT but asked MT to remove her head covering and food. MT did as pt asked and MT reintroduced services, pt declined but agreed to a f/u.  Follow-up: MT will follow up with pt as able    Education Documentation  No documentation found.

## 2024-02-09 NOTE — CARE PLAN
The patient's goals for the shift include      The clinical goals for the shift include Patient to remain comfortable throughout shift    Over the shift, the patient did not make progress toward the following goals. Barriers to progression include   Problem: Pain  Goal: My pain/discomfort is manageable  Outcome: Progressing     Problem: Safety  Goal: Patient will be injury free during hospitalization  Outcome: Progressing  Goal: I will remain free of falls  Outcome: Progressing     Problem: Daily Care  Goal: Daily care needs are met  Outcome: Progressing     Problem: Psychosocial Needs  Goal: Demonstrates ability to cope with hospitalization/illness  Outcome: Progressing  Goal: Collaborate with me, my family, and caregiver to identify my specific goals  Outcome: Progressing     Problem: Discharge Barriers  Goal: My discharge needs are met  Outcome: Progressing   . Recommendations to address these barriers include .

## 2024-02-09 NOTE — CARE PLAN
The patient's goals for the shift include      The clinical goals for the shift include Patient to remain comfortable throughout shift      Problem: Pain  Goal: My pain/discomfort is manageable  Outcome: Progressing     Problem: Safety  Goal: Patient will be injury free during hospitalization  Outcome: Progressing  Goal: I will remain free of falls  Outcome: Progressing     Problem: Daily Care  Goal: Daily care needs are met  Outcome: Progressing     Problem: Psychosocial Needs  Goal: Demonstrates ability to cope with hospitalization/illness  Outcome: Progressing  Goal: Collaborate with me, my family, and caregiver to identify my specific goals  Outcome: Progressing     Problem: Discharge Barriers  Goal: My discharge needs are met  Outcome: Progressing

## 2024-02-09 NOTE — PROGRESS NOTES
Met with patient and her daughter, Kaelyn at the bedside.  Patient slightly confused and needed to be reoriented to who her daughter was.  UA came back positive and she is being treated for a UTI.  Requested [facility] team start auth for [facility] [place].  ADOD: Saturday  DISP: [facility]  Roselyn Monsalve RN     Transitional Care Coordinator Note @ 150  Pending ref # [id number]   Roselyn Monsalve RN

## 2024-02-09 NOTE — PROGRESS NOTES
"Elida Benson is a 78 y.o. female on day 4 of admission presenting with Ileostomy present (CMS/[facility]).    Subjective   Patient is awake in bed this morning. She is more confused this morning. When ask how she is feeling she replied \"crunched and bunched\". Did not answer questions appropriately.        Objective     Physical Exam  Constitutional:       Appearance: Normal appearance.   Cardiovascular:      Rate and Rhythm: Normal rate and regular rhythm.   Pulmonary:      Effort: Pulmonary effort is normal.      Comments: Non labored breathing on RA  Abdominal:      General: There is distension.      Palpations: Abdomen is soft.      Tenderness: There is no abdominal tenderness.      Comments: Lap site C/D/I, edges well approximated, covered in Dermabond. Old stoma site, no erythema or drainage noted from the area.    Genitourinary:     Comments: Purewick  Skin:     General: Skin is warm and dry.   Neurological:      General: No focal deficit present.      Mental Status: She is alert and oriented to person, place, and time. Mental status is at baseline.   Psychiatric:         Attention and Perception: Attention normal.         Mood and Affect: Mood normal.         Speech: Speech normal.         Behavior: Behavior normal.         Cognition and Memory: Cognition normal.         Last Recorded Vitals  Blood pressure 111/74, pulse 75, temperature 36.3 °C (97.3 °F), temperature source Temporal, resp. rate 16, height 1.397 m (4' 7\"), weight 65.8 kg (145 lb), SpO2 91 %.  Intake/Output last 3 Shifts:  I/O last 3 completed shifts:  In: 1792.5 (31.3 mL/kg) [P.O.:520; I.V.:1272.5 (22.2 mL/kg)]  Out: 1600 (28 mL/kg) [Urine:1600 (0.8 mL/kg/hr)]  Dosing Weight: 57.2 kg     Relevant Results  Scheduled medications  acetaminophen, 650 mg, oral, q6h  amiodarone, 200 mg, oral, Daily  cefTRIAXone, 1 g, intravenous, q24h  donepezil, 5 mg, oral, Nightly  DULoxetine, 30 mg, oral, BID  enoxaparin, 30 mg, subcutaneous, q24h  [Held by " provider] gabapentin, 600 mg, oral, TID  [Held by provider] losartan, 50 mg, oral, Daily  metoprolol succinate XL, 25 mg, oral, Daily  topiramate, 100 mg, oral, BID      Continuous medications       PRN medications  PRN medications: benzocaine-menthol, naloxone, ondansetron ODT **OR** ondansetron, oxyCODONE, oxyCODONE, oxygen, [Held by provider] traZODone   Results for orders placed or performed during the hospital encounter of 02/05/24 (from the past 24 hour(s))   Urinalysis with Reflex Microscopic   Result Value Ref Range    Color, Urine Yellow Straw, Yellow    Appearance, Urine Clear Clear    Specific Gravity, Urine 1.027 1.005 - 1.035    pH, Urine 5.0 5.0, 5.5, 6.0, 6.5, 7.0, 7.5, 8.0    Protein, Urine 30 (1+) (N) NEGATIVE mg/dL    Glucose, Urine NEGATIVE NEGATIVE mg/dL    Blood, Urine NEGATIVE NEGATIVE    Ketones, Urine NEGATIVE NEGATIVE mg/dL    Bilirubin, Urine NEGATIVE NEGATIVE    Urobilinogen, Urine <2.0 <2.0 mg/dL    Nitrite, Urine NEGATIVE NEGATIVE    Leukocyte Esterase, Urine TRACE (A) NEGATIVE   Microscopic Only, Urine   Result Value Ref Range    WBC, Urine 6-10 (A) 1-5, NONE /HPF    RBC, Urine 3-5 NONE, 1-2, 3-5 /HPF    Squamous Epithelial Cells, Urine 1-9 (SPARSE) Reference range not established. /HPF    Bacteria, Urine 1+ (A) NONE SEEN /HPF   CBC   Result Value Ref Range    WBC 8.1 4.4 - 11.3 x10*3/uL    nRBC 0.0 0.0 - 0.0 /100 WBCs    RBC 3.90 (L) 4.00 - 5.20 x10*6/uL    Hemoglobin 11.0 (L) 12.0 - 16.0 g/dL    Hematocrit 37.1 36.0 - 46.0 %    MCV 95 80 - 100 fL    MCH 28.2 26.0 - 34.0 pg    MCHC 29.6 (L) 32.0 - 36.0 g/dL    RDW 15.5 (H) 11.5 - 14.5 %    Platelets 241 150 - 450 x10*3/uL   Comprehensive Metabolic Panel   Result Value Ref Range    Glucose 191 (H) 74 - 99 mg/dL    Sodium 140 136 - 145 mmol/L    Potassium 4.1 3.5 - 5.3 mmol/L    Chloride 111 (H) 98 - 107 mmol/L    Bicarbonate 21 21 - 32 mmol/L    Anion Gap 12 10 - 20 mmol/L    Urea Nitrogen 22 6 - 23 mg/dL    Creatinine 0.65 0.50 - 1.05  mg/dL    eGFR >90 >60 mL/min/1.73m*2    Calcium 7.9 (L) 8.6 - 10.3 mg/dL    Albumin 3.0 (L) 3.4 - 5.0 g/dL    Alkaline Phosphatase 67 33 - 136 U/L    Total Protein 5.7 (L) 6.4 - 8.2 g/dL    AST 9 9 - 39 U/L    Bilirubin, Total 0.4 0.0 - 1.2 mg/dL    ALT 9 7 - 45 U/L     XR chest 1 view   Final Result   Increasing perihilar and basilar interstitial prominence suggesting   interstitial edema with patchy opacities.        Multifocal pneumonia might also have this appearance.        Signed by: Rick Ugalde 2/8/2024 12:45 PM   Dictation workstation:   [id number]             Assessment/Plan   Principal Problem:    Ileostomy present (CMS/[facility])  Active Problems:    Diverticulitis, colon    Neuro: Acute post-op pain, well controlled     Hx; Depression, anxiety, dementia  - Continue home Cymbalta. Topamax and Aricept   - OOB ambulate as tolerated at least 5 times per day  - Continue current pain control regimen    Cardiovascular:  HR: 75    BP: 111/74     Hx: HLD, HTN  - VS Q4h  - Continue home Amiodarone, Metoprolol  - Holding Losartan  - Bps have been better, denies and lightheadedness or dizziness.     Pulmonary: Non labored breathing on RA  - Encourage IS  - Encourage ambulation    Gastrointestinal: POD 4 Ileostomy Closure Findings: Minimal intraabdominal adhesions, no serosal tears      Hx: Diverticulitis  - Regular diet, Go slow  - PRN antiemetic  - continue current bowel regimen     Genitourinary: Purewick  - continue mIVF  - Monitor I&Os   - Cr: 0.65  - UA showing UTI      - Starting in Ceftriaxone  - Continue to trend electrolytes with daily labs    Hematology:   - H/H: 10.6/35.7 -> 11.0/37.1  - No active s/s of bleeding  - DVT prophylaxis: SCDs/Lovenox  - Eliquis Held    Infectious Disease:   - WBC: 6.3 -> 8.1  - Afebrile, VSS  - UTI     - Ceftriaxone  - Continue to monitor WBC with daily CBC    Dispo: Patient doing well POD 4. Will start ABX for UTI. More moving today. Discussed plan with Dr. Kelley.         I  spent 35 minutes in the professional and overall care of this patient.    Terrell Dixon PA-C

## 2024-02-10 LAB
ANION GAP SERPL CALC-SCNC: 10 MMOL/L (ref 10–20)
BUN SERPL-MCNC: 19 MG/DL (ref 6–23)
CALCIUM SERPL-MCNC: 8.3 MG/DL (ref 8.6–10.3)
CHLORIDE SERPL-SCNC: 112 MMOL/L (ref 98–107)
CO2 SERPL-SCNC: 23 MMOL/L (ref 21–32)
CREAT SERPL-MCNC: 0.63 MG/DL (ref 0.5–1.05)
EGFRCR SERPLBLD CKD-EPI 2021: >90 ML/MIN/1.73M*2
ERYTHROCYTE [DISTWIDTH] IN BLOOD BY AUTOMATED COUNT: 15.5 % (ref 11.5–14.5)
GLUCOSE SERPL-MCNC: 125 MG/DL (ref 74–99)
HCT VFR BLD AUTO: 36 % (ref 36–46)
HGB BLD-MCNC: 10.7 G/DL (ref 12–16)
MCH RBC QN AUTO: 28.8 PG (ref 26–34)
MCHC RBC AUTO-ENTMCNC: 29.7 G/DL (ref 32–36)
MCV RBC AUTO: 97 FL (ref 80–100)
NRBC BLD-RTO: 0 /100 WBCS (ref 0–0)
PLATELET # BLD AUTO: 226 X10*3/UL (ref 150–450)
POTASSIUM SERPL-SCNC: 4 MMOL/L (ref 3.5–5.3)
RBC # BLD AUTO: 3.71 X10*6/UL (ref 4–5.2)
SODIUM SERPL-SCNC: 141 MMOL/L (ref 136–145)
WBC # BLD AUTO: 8.1 X10*3/UL (ref 4.4–11.3)

## 2024-02-10 PROCEDURE — 36415 COLL VENOUS BLD VENIPUNCTURE: CPT

## 2024-02-10 PROCEDURE — 2500000002 HC RX 250 W HCPCS SELF ADMINISTERED DRUGS (ALT 637 FOR MEDICARE OP, ALT 636 FOR OP/ED): Performed by: COLON & RECTAL SURGERY

## 2024-02-10 PROCEDURE — 2500000001 HC RX 250 WO HCPCS SELF ADMINISTERED DRUGS (ALT 637 FOR MEDICARE OP): Performed by: COLON & RECTAL SURGERY

## 2024-02-10 PROCEDURE — 85027 COMPLETE CBC AUTOMATED: CPT

## 2024-02-10 PROCEDURE — 2500000001 HC RX 250 WO HCPCS SELF ADMINISTERED DRUGS (ALT 637 FOR MEDICARE OP)

## 2024-02-10 PROCEDURE — 2500000004 HC RX 250 GENERAL PHARMACY W/ HCPCS (ALT 636 FOR OP/ED)

## 2024-02-10 PROCEDURE — 1100000001 HC PRIVATE ROOM DAILY

## 2024-02-10 PROCEDURE — 80048 BASIC METABOLIC PNL TOTAL CA: CPT

## 2024-02-10 PROCEDURE — 99233 SBSQ HOSP IP/OBS HIGH 50: CPT

## 2024-02-10 RX ORDER — ALUMINUM HYDROXIDE, MAGNESIUM HYDROXIDE, AND SIMETHICONE 1200; 120; 1200 MG/30ML; MG/30ML; MG/30ML
10 SUSPENSION ORAL 4 TIMES DAILY PRN
Status: DISCONTINUED | OUTPATIENT
Start: 2024-02-10 | End: 2024-02-12 | Stop reason: HOSPADM

## 2024-02-10 RX ADMIN — ACETAMINOPHEN 650 MG: 325 TABLET ORAL at 02:37

## 2024-02-10 RX ADMIN — DONEPEZIL HYDROCHLORIDE 5 MG: 5 TABLET ORAL at 21:29

## 2024-02-10 RX ADMIN — ENOXAPARIN SODIUM 30 MG: 30 INJECTION SUBCUTANEOUS at 21:30

## 2024-02-10 RX ADMIN — TOPIRAMATE 100 MG: 100 TABLET, FILM COATED ORAL at 21:29

## 2024-02-10 RX ADMIN — TOPIRAMATE 100 MG: 100 TABLET, FILM COATED ORAL at 08:32

## 2024-02-10 RX ADMIN — ACETAMINOPHEN 650 MG: 325 TABLET ORAL at 08:32

## 2024-02-10 RX ADMIN — AMIODARONE HYDROCHLORIDE 200 MG: 200 TABLET ORAL at 08:32

## 2024-02-10 RX ADMIN — CEFTRIAXONE SODIUM 1 G: 1 INJECTION, SOLUTION INTRAVENOUS at 11:07

## 2024-02-10 RX ADMIN — DULOXETINE HYDROCHLORIDE 30 MG: 30 CAPSULE, DELAYED RELEASE ORAL at 08:32

## 2024-02-10 RX ADMIN — METOPROLOL SUCCINATE 25 MG: 25 TABLET, EXTENDED RELEASE ORAL at 08:32

## 2024-02-10 RX ADMIN — ACETAMINOPHEN 650 MG: 325 TABLET ORAL at 21:29

## 2024-02-10 RX ADMIN — OXYCODONE HYDROCHLORIDE 5 MG: 5 TABLET ORAL at 21:29

## 2024-02-10 RX ADMIN — ACETAMINOPHEN 650 MG: 325 TABLET ORAL at 15:05

## 2024-02-10 RX ADMIN — DULOXETINE HYDROCHLORIDE 30 MG: 30 CAPSULE, DELAYED RELEASE ORAL at 21:29

## 2024-02-10 ASSESSMENT — COGNITIVE AND FUNCTIONAL STATUS - GENERAL
CLIMB 3 TO 5 STEPS WITH RAILING: TOTAL
TURNING FROM BACK TO SIDE WHILE IN FLAT BAD: A LOT
PERSONAL GROOMING: A LOT
TOILETING: A LOT
MOVING TO AND FROM BED TO CHAIR: A LOT
MOVING FROM LYING ON BACK TO SITTING ON SIDE OF FLAT BED WITH BEDRAILS: A LITTLE
CLIMB 3 TO 5 STEPS WITH RAILING: TOTAL
STANDING UP FROM CHAIR USING ARMS: A LOT
DRESSING REGULAR LOWER BODY CLOTHING: A LOT
MOVING TO AND FROM BED TO CHAIR: A LOT
PERSONAL GROOMING: A LITTLE
DRESSING REGULAR LOWER BODY CLOTHING: A LOT
EATING MEALS: A LITTLE
TOILETING: A LOT
WALKING IN HOSPITAL ROOM: TOTAL
DRESSING REGULAR UPPER BODY CLOTHING: A LITTLE
DAILY ACTIVITIY SCORE: 15
HELP NEEDED FOR BATHING: A LOT
MOBILITY SCORE: 11
HELP NEEDED FOR BATHING: A LOT
MOBILITY SCORE: 11
WALKING IN HOSPITAL ROOM: TOTAL
STANDING UP FROM CHAIR USING ARMS: A LOT
TURNING FROM BACK TO SIDE WHILE IN FLAT BAD: A LOT
MOVING FROM LYING ON BACK TO SITTING ON SIDE OF FLAT BED WITH BEDRAILS: A LITTLE
DAILY ACTIVITIY SCORE: 15
DRESSING REGULAR UPPER BODY CLOTHING: A LITTLE

## 2024-02-10 ASSESSMENT — PAIN SCALES - GENERAL
PAINLEVEL_OUTOF10: 0 - NO PAIN
PAINLEVEL_OUTOF10: 6
PAINLEVEL_OUTOF10: 0 - NO PAIN
PAINLEVEL_OUTOF10: 10 - WORST POSSIBLE PAIN
PAINLEVEL_OUTOF10: 6

## 2024-02-10 ASSESSMENT — PAIN - FUNCTIONAL ASSESSMENT
PAIN_FUNCTIONAL_ASSESSMENT: 0-10

## 2024-02-10 ASSESSMENT — PAIN DESCRIPTION - LOCATION
LOCATION: ABDOMEN
LOCATION: SHOULDER

## 2024-02-10 ASSESSMENT — PAIN DESCRIPTION - ORIENTATION: ORIENTATION: LEFT

## 2024-02-10 NOTE — CARE PLAN
Pt is  ready  for  dc.   Insurance  auth is pending.  team asked to escalate.   Mary Nayak, MSW, LSW

## 2024-02-10 NOTE — PROGRESS NOTES
"Elida Benson is a 78 y.o. female on day 5 of admission presenting with Ileostomy present (CMS/[facility]), from previous surgical intervention required for management of diverticulitis. Patient is now POD#5 s/p ileostomy closure, post operative findings significant for minimal intraabdominal adhesions, no serosal tears.     Subjective   Patient with some mild complaints of abdominal pain and indigestion this morning. Tolerating PO without N/V. Passing gas and leaking some stool.        Objective   PE:  Constitutional: A&Ox3, calm and cooperative  Eyes: clear sclera   ENMT: Moist mucous membranes  Head/Neck: Neck supple  Cardiovascular: Normal rate and regular rhythm.  Respiratory/Thorax: CTAB, No rales, rhonchi, or wheezes. Good symmetric chest expansion.   Gastrointestinal:     Genitourinary: Voiding independently- purewick in place   Musculoskeletal: ROM intact- appears to have generalized weakness  Extremities: No peripheral edema  Neurological: A&Ox3  Psychological: Appropriate mood and behavior  Skin: Warm and dry    Last Recorded Vitals  Blood pressure 121/89, pulse 74, temperature 36.2 °C (97.2 °F), temperature source Temporal, resp. rate 16, height 1.397 m (4' 7\"), weight 65.8 kg (145 lb), SpO2 95 %.  Intake/Output last 3 Shifts:  I/O last 3 completed shifts:  In: 1818.8 (31.8 mL/kg) [P.O.:600; I.V.:1118.8 (19.6 mL/kg); IV Piggyback:100]  Out: 650 (11.4 mL/kg) [Urine:650 (0.3 mL/kg/hr)]  Dosing Weight: 57.2 kg     Relevant Results  Results for orders placed or performed during the hospital encounter of 02/05/24 (from the past 24 hour(s))   Basic Metabolic Panel   Result Value Ref Range    Glucose 125 (H) 74 - 99 mg/dL    Sodium 141 136 - 145 mmol/L    Potassium 4.0 3.5 - 5.3 mmol/L    Chloride 112 (H) 98 - 107 mmol/L    Bicarbonate 23 21 - 32 mmol/L    Anion Gap 10 10 - 20 mmol/L    Urea Nitrogen 19 6 - 23 mg/dL    Creatinine 0.63 0.50 - 1.05 mg/dL    eGFR >90 >60 mL/min/1.73m*2    Calcium 8.3 (L) 8.6 - 10.3 " mg/dL   CBC   Result Value Ref Range    WBC 8.1 4.4 - 11.3 x10*3/uL    nRBC 0.0 0.0 - 0.0 /100 WBCs    RBC 3.71 (L) 4.00 - 5.20 x10*6/uL    Hemoglobin 10.7 (L) 12.0 - 16.0 g/dL    Hematocrit 36.0 36.0 - 46.0 %    MCV 97 80 - 100 fL    MCH 28.8 26.0 - 34.0 pg    MCHC 29.7 (L) 32.0 - 36.0 g/dL    RDW 15.5 (H) 11.5 - 14.5 %    Platelets 226 150 - 450 x10*3/uL     XR chest 1 view   Final Result   Increasing perihilar and basilar interstitial prominence suggesting   interstitial edema with patchy opacities.        Multifocal pneumonia might also have this appearance.        Signed by: Rick Ugalde 2/8/2024 12:45 PM   Dictation workstation:   [id number]        Assessment/Plan   Principal Problem:    Ileostomy present (CMS/[facility])  Active Problems:    Diverticulitis, colon    Elida Benson is a 78 y.o. female on day 5 of admission presenting with Ileostomy present (CMS/[facility]), from previous surgical intervention required for management of diverticulitis. Patient is now POD#5 s/p ileostomy closure, post operative findings significant for minimal intraabdominal adhesions, no serosal tears.     GI: POD #5- Abdomen slightly distended, soft, appropriately tender, no peritoneal signs, old stoma site well approximate with Dermabond, no erythema or drainage    - low fiber diet   - ensure surgery TID X 5 days  - Chewing gum   - PRN antiemetic   - Maalox PRN  - keep incisions clean and dry    Neuro: Acute post operative pain, continue current regimen   Hx: dementia, depression, lumbar disc herniation with radiculopathy, chronic pain syndrome   - OOB/ ambulate 5x per day   - OARRS Reviewed: 30 day average MME= 15.33  - continue home Aricept, cymbalta, topamax   - Neurontin and trazadone on hold for confusion        CV: RRR  Hx: Arrhythmia, A. Fib, HTN, NSTEMI, pulmonary HTN  - VS every 4 hours   - continue home amiodarone, metoprolol   - home losartan on hold     Pulm: on RA, no increased WOB  Hx: COPD  - IS every hour while awake  "  - Pulse ox every 4 hours with VS     : + UTI  - Rocephin --> likely home on PO course of antibiotics   - Monitor I&Os   - repeat BMP in the AM if still in house    HEME: DVT Proph, H/Hct 10.7/36  Hx: anemia   - SCDs/ ambulate/Lovenox    - repeat CBC in the AM if still in house     Endo: DM2  - continue to monitor BS, will initiate ISS if >200    ID: WBC 8.1  - Monitor for s/s infection     Dispo: Discussed with Dr. Kelley, discharge pending SNF placement, cleared for discharge from surgical prospective, would anticipate SNF placement within the next 24-48 hours.   .  I spent 35 minutes in the professional and overall care of this patient.      Addendum: Patient seen at the bedside this evening, doing okay, awaiting placement.    Marianna Herrera, APRN-CNP    Pt states that she feels \"great\"    Gen - smiling, laughing, ,no pain, looks great  Abd - much softer nondistended,nontender  Extr - thin    Impression - doing great    Plan   Finish 7 days of abx - home on abx amenable to culture  OOB   Lovenox/SCD's   Home meds   Ready for dispo    "

## 2024-02-10 NOTE — CARE PLAN
Problem: Pain  Goal: My pain/discomfort is manageable  Outcome: Progressing     Problem: Safety  Goal: Patient will be injury free during hospitalization  Outcome: Progressing  Goal: I will remain free of falls  Outcome: Progressing     Problem: Daily Care  Goal: Daily care needs are met  Outcome: Progressing

## 2024-02-11 LAB
ANION GAP SERPL CALC-SCNC: 13 MMOL/L (ref 10–20)
BACTERIA UR CULT: ABNORMAL
BUN SERPL-MCNC: 19 MG/DL (ref 6–23)
CALCIUM SERPL-MCNC: 8.2 MG/DL (ref 8.6–10.3)
CHLORIDE SERPL-SCNC: 112 MMOL/L (ref 98–107)
CO2 SERPL-SCNC: 20 MMOL/L (ref 21–32)
CREAT SERPL-MCNC: 0.65 MG/DL (ref 0.5–1.05)
EGFRCR SERPLBLD CKD-EPI 2021: >90 ML/MIN/1.73M*2
ERYTHROCYTE [DISTWIDTH] IN BLOOD BY AUTOMATED COUNT: 15.8 % (ref 11.5–14.5)
GLUCOSE SERPL-MCNC: 127 MG/DL (ref 74–99)
HCT VFR BLD AUTO: 33.6 % (ref 36–46)
HGB BLD-MCNC: 10.3 G/DL (ref 12–16)
MCH RBC QN AUTO: 29.2 PG (ref 26–34)
MCHC RBC AUTO-ENTMCNC: 30.7 G/DL (ref 32–36)
MCV RBC AUTO: 95 FL (ref 80–100)
NRBC BLD-RTO: 0.2 /100 WBCS (ref 0–0)
PLATELET # BLD AUTO: 255 X10*3/UL (ref 150–450)
POTASSIUM SERPL-SCNC: 3.8 MMOL/L (ref 3.5–5.3)
RBC # BLD AUTO: 3.53 X10*6/UL (ref 4–5.2)
SODIUM SERPL-SCNC: 141 MMOL/L (ref 136–145)
WBC # BLD AUTO: 8.4 X10*3/UL (ref 4.4–11.3)

## 2024-02-11 PROCEDURE — 2500000002 HC RX 250 W HCPCS SELF ADMINISTERED DRUGS (ALT 637 FOR MEDICARE OP, ALT 636 FOR OP/ED): Performed by: COLON & RECTAL SURGERY

## 2024-02-11 PROCEDURE — 80048 BASIC METABOLIC PNL TOTAL CA: CPT

## 2024-02-11 PROCEDURE — 85027 COMPLETE CBC AUTOMATED: CPT

## 2024-02-11 PROCEDURE — 2500000001 HC RX 250 WO HCPCS SELF ADMINISTERED DRUGS (ALT 637 FOR MEDICARE OP): Performed by: COLON & RECTAL SURGERY

## 2024-02-11 PROCEDURE — 99233 SBSQ HOSP IP/OBS HIGH 50: CPT

## 2024-02-11 PROCEDURE — 2500000001 HC RX 250 WO HCPCS SELF ADMINISTERED DRUGS (ALT 637 FOR MEDICARE OP)

## 2024-02-11 PROCEDURE — 1100000001 HC PRIVATE ROOM DAILY

## 2024-02-11 PROCEDURE — 36415 COLL VENOUS BLD VENIPUNCTURE: CPT

## 2024-02-11 PROCEDURE — 2500000004 HC RX 250 GENERAL PHARMACY W/ HCPCS (ALT 636 FOR OP/ED)

## 2024-02-11 RX ORDER — POLYETHYLENE GLYCOL 3350 17 G/17G
17 POWDER, FOR SOLUTION ORAL DAILY PRN
Status: DISCONTINUED | OUTPATIENT
Start: 2024-02-11 | End: 2024-02-12 | Stop reason: HOSPADM

## 2024-02-11 RX ADMIN — DULOXETINE HYDROCHLORIDE 30 MG: 30 CAPSULE, DELAYED RELEASE ORAL at 09:33

## 2024-02-11 RX ADMIN — ACETAMINOPHEN 650 MG: 325 TABLET ORAL at 02:37

## 2024-02-11 RX ADMIN — DULOXETINE HYDROCHLORIDE 30 MG: 30 CAPSULE, DELAYED RELEASE ORAL at 21:01

## 2024-02-11 RX ADMIN — METOPROLOL SUCCINATE 25 MG: 25 TABLET, EXTENDED RELEASE ORAL at 09:33

## 2024-02-11 RX ADMIN — ACETAMINOPHEN 650 MG: 325 TABLET ORAL at 09:33

## 2024-02-11 RX ADMIN — ACETAMINOPHEN 650 MG: 325 TABLET ORAL at 20:58

## 2024-02-11 RX ADMIN — APIXABAN 5 MG: 5 TABLET, FILM COATED ORAL at 01:27

## 2024-02-11 RX ADMIN — ACETAMINOPHEN 650 MG: 325 TABLET ORAL at 14:45

## 2024-02-11 RX ADMIN — DONEPEZIL HYDROCHLORIDE 5 MG: 5 TABLET ORAL at 21:01

## 2024-02-11 RX ADMIN — TOPIRAMATE 100 MG: 100 TABLET, FILM COATED ORAL at 21:01

## 2024-02-11 RX ADMIN — APIXABAN 5 MG: 5 TABLET, FILM COATED ORAL at 11:25

## 2024-02-11 RX ADMIN — AMIODARONE HYDROCHLORIDE 200 MG: 200 TABLET ORAL at 09:33

## 2024-02-11 RX ADMIN — TOPIRAMATE 100 MG: 100 TABLET, FILM COATED ORAL at 09:33

## 2024-02-11 RX ADMIN — APIXABAN 5 MG: 5 TABLET, FILM COATED ORAL at 23:23

## 2024-02-11 RX ADMIN — OXYCODONE HYDROCHLORIDE 5 MG: 5 TABLET ORAL at 11:30

## 2024-02-11 RX ADMIN — CEFTRIAXONE SODIUM 1 G: 1 INJECTION, SOLUTION INTRAVENOUS at 11:26

## 2024-02-11 ASSESSMENT — PAIN - FUNCTIONAL ASSESSMENT
PAIN_FUNCTIONAL_ASSESSMENT: 0-10

## 2024-02-11 ASSESSMENT — PAIN SCALES - GENERAL
PAINLEVEL_OUTOF10: 6
PAINLEVEL_OUTOF10: 0 - NO PAIN

## 2024-02-11 ASSESSMENT — COGNITIVE AND FUNCTIONAL STATUS - GENERAL
DRESSING REGULAR UPPER BODY CLOTHING: A LITTLE
TURNING FROM BACK TO SIDE WHILE IN FLAT BAD: A LOT
DRESSING REGULAR LOWER BODY CLOTHING: A LOT
CLIMB 3 TO 5 STEPS WITH RAILING: TOTAL
DAILY ACTIVITIY SCORE: 16
STANDING UP FROM CHAIR USING ARMS: A LOT
HELP NEEDED FOR BATHING: A LOT
TOILETING: A LOT
MOVING FROM LYING ON BACK TO SITTING ON SIDE OF FLAT BED WITH BEDRAILS: A LITTLE
WALKING IN HOSPITAL ROOM: TOTAL
MOBILITY SCORE: 11
MOVING TO AND FROM BED TO CHAIR: A LOT
PERSONAL GROOMING: A LITTLE

## 2024-02-11 ASSESSMENT — PAIN DESCRIPTION - LOCATION: LOCATION: TOE (COMMENT WHICH ONE)

## 2024-02-11 ASSESSMENT — PAIN DESCRIPTION - ORIENTATION: ORIENTATION: RIGHT

## 2024-02-11 NOTE — PROGRESS NOTES
"Elida Benson is a 78 y.o. female on day 6 of admission presenting with Ileostomy present (CMS/[facility]), from previous surgical intervention required for management of diverticulitis. Patient is now POD#6 s/p ileostomy closure, post operative findings significant for minimal intraabdominal adhesions, no serosal tears.     Subjective   Patient with some mild complaints of abdominal pain, has not taken anything for pain yet. Tolerating PO without N/V. Passing gas and having some liquid stools.        Objective   PE:  Constitutional: A&Ox3, calm and cooperative  Eyes: clear sclera   ENMT: Moist mucous membranes  Head/Neck: Neck supple  Cardiovascular: Normal rate and regular rhythm.  Respiratory/Thorax: CTAB, Good symmetric chest expansion.   Gastrointestinal: Abdomen slightly distended, soft, appropriately tender, no peritoneal signs, old stoma site well approximate with Dermabond, no erythema or drainage    Genitourinary: Voiding independently- purewick in place   Musculoskeletal: ROM intact- appears to have generalized weakness  Extremities: No peripheral edema  Neurological: A&Ox3  Psychological: Appropriate mood and behavior  Skin: Warm and dry    Last Recorded Vitals  Blood pressure 100/68, pulse 78, temperature 36.6 °C (97.9 °F), temperature source Temporal, resp. rate 16, height 1.397 m (4' 7\"), weight 65.8 kg (145 lb), SpO2 93 %.  Intake/Output last 3 Shifts:  I/O last 3 completed shifts:  In: 750 (13.1 mL/kg) [P.O.:600; IV Piggyback:150]  Out: 1200 (21 mL/kg) [Urine:1200 (0.6 mL/kg/hr)]  Dosing Weight: 57.2 kg     Relevant Results  Results for orders placed or performed during the hospital encounter of 02/05/24 (from the past 24 hour(s))   Basic Metabolic Panel   Result Value Ref Range    Glucose 127 (H) 74 - 99 mg/dL    Sodium 141 136 - 145 mmol/L    Potassium 3.8 3.5 - 5.3 mmol/L    Chloride 112 (H) 98 - 107 mmol/L    Bicarbonate 20 (L) 21 - 32 mmol/L    Anion Gap 13 10 - 20 mmol/L    Urea Nitrogen 19 6 - " 23 mg/dL    Creatinine 0.65 0.50 - 1.05 mg/dL    eGFR >90 >60 mL/min/1.73m*2    Calcium 8.2 (L) 8.6 - 10.3 mg/dL   CBC   Result Value Ref Range    WBC 8.4 4.4 - 11.3 x10*3/uL    nRBC 0.2 (H) 0.0 - 0.0 /100 WBCs    RBC 3.53 (L) 4.00 - 5.20 x10*6/uL    Hemoglobin 10.3 (L) 12.0 - 16.0 g/dL    Hematocrit 33.6 (L) 36.0 - 46.0 %    MCV 95 80 - 100 fL    MCH 29.2 26.0 - 34.0 pg    MCHC 30.7 (L) 32.0 - 36.0 g/dL    RDW 15.8 (H) 11.5 - 14.5 %    Platelets 255 150 - 450 x10*3/uL     XR chest 1 view   Final Result   Increasing perihilar and basilar interstitial prominence suggesting   interstitial edema with patchy opacities.        Multifocal pneumonia might also have this appearance.        Signed by: Rick Ugalde 2/8/2024 12:45 PM   Dictation workstation:   [id number]        Assessment/Plan   Principal Problem:    Ileostomy present (CMS/HCC)  Active Problems:    Diverticulitis, colon    Elida Benson is a 78 y.o. female on day 6 of admission presenting with Ileostomy present (CMS/HCC), from previous surgical intervention required for management of diverticulitis. Patient is now POD#6 s/p ileostomy closure, post operative findings significant for minimal intraabdominal adhesions, no serosal tears.     GI: POD #6- Abdomen slightly distended, soft, appropriately tender, no peritoneal signs, old stoma site well approximate with Dermabond, no erythema or drainage    - low fiber diet   - ensure surgery TID X 5 days  - Chewing gum   - PRN antiemetic   - Maalox PRN  - keep incisions clean and dry    Neuro: Acute post operative pain, continue current regimen   Hx: dementia, depression, lumbar disc herniation with radiculopathy, chronic pain syndrome   - OOB/ ambulate 5x per day   - OARRS Reviewed: 30 day average MME= 15.33  - continue home Aricept, cymbalta, topamax   - Neurontin and trazadone on hold for confusion        CV: RRR  Hx: Arrhythmia, A. Fib, HTN, NSTEMI, pulmonary HTN  - restarted eliquis   - VS every 4 hours   -  continue home amiodarone, metoprolol   - home losartan on hold     Pulm: on RA, no increased WOB  Hx: COPD  - IS every hour while awake   - Pulse ox every 4 hours with VS     : + UTI  - Rocephin --> home on PO antibiotics for 7 days at discharge  - Monitor I&Os   - repeat BMP in the AM if still in house    HEME: DVT Proph, H/Hct 10.3/33.6  Hx: anemia   - SCDs/ ambulate/Lovenox    - repeat CBC in the AM if still in house     Endo: DM2  - continue to monitor BS, will initiate ISS if >200    ID: WBC 8.4  - Monitor for s/s infection     Dispo: Discussed with Dr. Kelley, discharge pending SNF placement, cleared for discharge from surgical prospective, would anticipate SNF placement within the next 24-48 hours.   .  I spent 35 minutes in the professional and overall care of this patient.    Addendum: Patient seen at the bedside with afternoon, resting quietly, no change from this morning.     Marianna Herrera, APRN-CNP

## 2024-02-11 NOTE — PROGRESS NOTES
Spoke with patient's daughter and they would like [facility] in [place] vs [facility] [place].  [facility] called [facility] as the family wants a private room and [facility] does not have one available.  Continue with plan of going to [place] [facility] [place].  Waiting on insurance auth.  Roselyn Monsalve RN

## 2024-02-11 NOTE — CARE PLAN
The patient's goals for the shift include      The clinical goals for the shift include Pt to remain free from falls this shift      Problem: Pain  Goal: My pain/discomfort is manageable  Outcome: Met     Problem: Safety  Goal: Patient will be injury free during hospitalization  Outcome: Met  Goal: I will remain free of falls  Outcome: Met     Problem: Daily Care  Goal: Daily care needs are met  Outcome: Met     Problem: Psychosocial Needs  Goal: Demonstrates ability to cope with hospitalization/illness  Outcome: Met  Goal: Collaborate with me, my family, and caregiver to identify my specific goals  Outcome: Met     Problem: Discharge Barriers  Goal: My discharge needs are met  Outcome: Progressing     Problem: Pain - Adult  Goal: Verbalizes/displays adequate comfort level or baseline comfort level  Outcome: Met     Problem: Safety - Adult  Goal: Free from fall injury  Outcome: Met     Problem: Discharge Planning  Goal: Discharge to home or other facility with appropriate resources  Outcome: Progressing     Problem: Chronic Conditions and Co-morbidities  Goal: Patient's chronic conditions and co-morbidity symptoms are monitored and maintained or improved  Outcome: Met     Problem: Skin  Goal: Decreased wound size/increased tissue granulation at next dressing change  Outcome: Met  Goal: Participates in plan/prevention/treatment measures  Outcome: Met  Goal: Prevent/manage excess moisture  Outcome: Met  Goal: Prevent/minimize sheer/friction injuries  Outcome: Met  Goal: Promote/optimize nutrition  Outcome: Met  Goal: Promote skin healing  Outcome: Met     Problem: Diabetes  Goal: Achieve decreasing blood glucose levels by end of shift  Outcome: Met  Goal: Increase stability of blood glucose readings by end of shift  Outcome: Met  Goal: Decrease in ketones present in urine by end of shift  Outcome: Met  Goal: Maintain electrolyte levels within acceptable range throughout shift  Outcome: Met  Goal: Maintain glucose  levels >70mg/dl to <250mg/dl throughout shift  Outcome: Met  Goal: No changes in neurological exam by end of shift  Outcome: Met  Goal: Learn about and adhere to nutrition recommendations by end of shift  Outcome: Met  Goal: Vital signs within normal range for age by end of shift  Outcome: Met  Goal: Increase self care and/or family involovement by end of shift  Outcome: Met  Goal: Receive DSME education by end of shift  Outcome: Met     Problem: Pain  Goal: Takes deep breaths with improved pain control throughout the shift  Outcome: Met  Goal: Turns in bed with improved pain control throughout the shift  Outcome: Met  Goal: Walks with improved pain control throughout the shift  Outcome: Met  Goal: Performs ADL's with improved pain control throughout shift  Outcome: Met  Goal: Participates in PT with improved pain control throughout the shift  Outcome: Met  Goal: Free from opioid side effects throughout the shift  Outcome: Met  Goal: Free from acute confusion related to pain meds throughout the shift  Outcome: Met

## 2024-02-11 NOTE — CARE PLAN
The patient's goals for the shift include      The clinical goals for the shift include Patient to remain free of fall during shift      Problem: Pain  Goal: My pain/discomfort is manageable  Outcome: Progressing     Problem: Safety  Goal: Patient will be injury free during hospitalization  Outcome: Progressing  Goal: I will remain free of falls  Outcome: Progressing     Problem: Daily Care  Goal: Daily care needs are met  Outcome: Progressing     Problem: Psychosocial Needs  Goal: Demonstrates ability to cope with hospitalization/illness  Outcome: Progressing  Goal: Collaborate with me, my family, and caregiver to identify my specific goals  Outcome: Progressing     Problem: Discharge Barriers  Goal: My discharge needs are met  Outcome: Progressing     Problem: Pain - Adult  Goal: Verbalizes/displays adequate comfort level or baseline comfort level  Outcome: Progressing     Problem: Safety - Adult  Goal: Free from fall injury  Outcome: Progressing     Problem: Discharge Planning  Goal: Discharge to home or other facility with appropriate resources  Outcome: Progressing     Problem: Chronic Conditions and Co-morbidities  Goal: Patient's chronic conditions and co-morbidity symptoms are monitored and maintained or improved  Outcome: Progressing     Problem: Skin  Goal: Decreased wound size/increased tissue granulation at next dressing change  Outcome: Progressing  Goal: Participates in plan/prevention/treatment measures  Outcome: Progressing  Goal: Prevent/manage excess moisture  Outcome: Progressing  Goal: Prevent/minimize sheer/friction injuries  Outcome: Progressing  Goal: Promote/optimize nutrition  Outcome: Progressing  Goal: Promote skin healing  Outcome: Progressing     Problem: Diabetes  Goal: Achieve decreasing blood glucose levels by end of shift  Outcome: Progressing  Goal: Increase stability of blood glucose readings by end of shift  Outcome: Progressing  Goal: Decrease in ketones present in urine by end  of shift  Outcome: Progressing  Goal: Maintain electrolyte levels within acceptable range throughout shift  Outcome: Progressing  Goal: Maintain glucose levels >70mg/dl to <250mg/dl throughout shift  Outcome: Progressing  Goal: No changes in neurological exam by end of shift  Outcome: Progressing  Goal: Learn about and adhere to nutrition recommendations by end of shift  Outcome: Progressing  Goal: Vital signs within normal range for age by end of shift  Outcome: Progressing  Goal: Increase self care and/or family involovement by end of shift  Outcome: Progressing  Goal: Receive DSME education by end of shift  Outcome: Progressing     Problem: Pain  Goal: Takes deep breaths with improved pain control throughout the shift  Outcome: Progressing  Goal: Turns in bed with improved pain control throughout the shift  Outcome: Progressing  Goal: Walks with improved pain control throughout the shift  Outcome: Progressing  Goal: Performs ADL's with improved pain control throughout shift  Outcome: Progressing  Goal: Participates in PT with improved pain control throughout the shift  Outcome: Progressing  Goal: Free from opioid side effects throughout the shift  Outcome: Progressing  Goal: Free from acute confusion related to pain meds throughout the shift  Outcome: Progressing

## 2024-02-12 VITALS
DIASTOLIC BLOOD PRESSURE: 78 MMHG | SYSTOLIC BLOOD PRESSURE: 126 MMHG | OXYGEN SATURATION: 95 % | HEIGHT: 55 IN | RESPIRATION RATE: 16 BRPM | TEMPERATURE: 97.8 F | HEART RATE: 57 BPM | BODY MASS INDEX: 32.7 KG/M2 | WEIGHT: 141.31 LBS

## 2024-02-12 LAB
ALBUMIN SERPL BCP-MCNC: 2.9 G/DL (ref 3.4–5)
ALP SERPL-CCNC: 67 U/L (ref 33–136)
ALT SERPL W P-5'-P-CCNC: 9 U/L (ref 7–45)
ANION GAP SERPL CALC-SCNC: 12 MMOL/L (ref 10–20)
AST SERPL W P-5'-P-CCNC: 10 U/L (ref 9–39)
BILIRUB SERPL-MCNC: 0.3 MG/DL (ref 0–1.2)
BUN SERPL-MCNC: 21 MG/DL (ref 6–23)
CALCIUM SERPL-MCNC: 8.5 MG/DL (ref 8.6–10.3)
CHLORIDE SERPL-SCNC: 109 MMOL/L (ref 98–107)
CO2 SERPL-SCNC: 23 MMOL/L (ref 21–32)
CREAT SERPL-MCNC: 0.8 MG/DL (ref 0.5–1.05)
EGFRCR SERPLBLD CKD-EPI 2021: 76 ML/MIN/1.73M*2
ERYTHROCYTE [DISTWIDTH] IN BLOOD BY AUTOMATED COUNT: 15.9 % (ref 11.5–14.5)
GLUCOSE SERPL-MCNC: 121 MG/DL (ref 74–99)
HCT VFR BLD AUTO: 38.1 % (ref 36–46)
HGB BLD-MCNC: 11.3 G/DL (ref 12–16)
MCH RBC QN AUTO: 28.3 PG (ref 26–34)
MCHC RBC AUTO-ENTMCNC: 29.7 G/DL (ref 32–36)
MCV RBC AUTO: 95 FL (ref 80–100)
NRBC BLD-RTO: 0 /100 WBCS (ref 0–0)
PLATELET # BLD AUTO: 279 X10*3/UL (ref 150–450)
POTASSIUM SERPL-SCNC: 4.1 MMOL/L (ref 3.5–5.3)
PROT SERPL-MCNC: 6.1 G/DL (ref 6.4–8.2)
RBC # BLD AUTO: 4 X10*6/UL (ref 4–5.2)
SODIUM SERPL-SCNC: 140 MMOL/L (ref 136–145)
WBC # BLD AUTO: 10.1 X10*3/UL (ref 4.4–11.3)

## 2024-02-12 PROCEDURE — 2500000001 HC RX 250 WO HCPCS SELF ADMINISTERED DRUGS (ALT 637 FOR MEDICARE OP): Performed by: COLON & RECTAL SURGERY

## 2024-02-12 PROCEDURE — 2500000004 HC RX 250 GENERAL PHARMACY W/ HCPCS (ALT 636 FOR OP/ED)

## 2024-02-12 PROCEDURE — 2500000002 HC RX 250 W HCPCS SELF ADMINISTERED DRUGS (ALT 637 FOR MEDICARE OP, ALT 636 FOR OP/ED): Performed by: COLON & RECTAL SURGERY

## 2024-02-12 PROCEDURE — 80053 COMPREHEN METABOLIC PANEL: CPT

## 2024-02-12 PROCEDURE — 2500000001 HC RX 250 WO HCPCS SELF ADMINISTERED DRUGS (ALT 637 FOR MEDICARE OP)

## 2024-02-12 PROCEDURE — 99231 SBSQ HOSP IP/OBS SF/LOW 25: CPT

## 2024-02-12 PROCEDURE — 85027 COMPLETE CBC AUTOMATED: CPT

## 2024-02-12 PROCEDURE — 36415 COLL VENOUS BLD VENIPUNCTURE: CPT

## 2024-02-12 PROCEDURE — 97535 SELF CARE MNGMENT TRAINING: CPT | Mod: GO

## 2024-02-12 RX ORDER — TRAMADOL HYDROCHLORIDE 50 MG/1
50 TABLET ORAL 2 TIMES DAILY
Qty: 6 TABLET | Refills: 0 | Status: SHIPPED | OUTPATIENT
Start: 2024-02-12 | End: 2024-02-15

## 2024-02-12 RX ORDER — CEFTRIAXONE 1 G/50ML
1 INJECTION, SOLUTION INTRAVENOUS EVERY 24 HOURS
Qty: 350 ML | Refills: 0 | Status: SHIPPED | OUTPATIENT
Start: 2024-02-12 | End: 2024-02-19

## 2024-02-12 RX ORDER — ONDANSETRON 4 MG/1
4 TABLET, ORALLY DISINTEGRATING ORAL EVERY 8 HOURS PRN
Qty: 20 TABLET | Refills: 0 | Status: SHIPPED | OUTPATIENT
Start: 2024-02-12

## 2024-02-12 RX ADMIN — ACETAMINOPHEN 650 MG: 325 TABLET ORAL at 02:56

## 2024-02-12 RX ADMIN — ACETAMINOPHEN 650 MG: 325 TABLET ORAL at 08:09

## 2024-02-12 RX ADMIN — DULOXETINE HYDROCHLORIDE 30 MG: 30 CAPSULE, DELAYED RELEASE ORAL at 08:08

## 2024-02-12 RX ADMIN — APIXABAN 5 MG: 5 TABLET, FILM COATED ORAL at 11:18

## 2024-02-12 RX ADMIN — TOPIRAMATE 100 MG: 100 TABLET, FILM COATED ORAL at 08:09

## 2024-02-12 RX ADMIN — METOPROLOL SUCCINATE 25 MG: 25 TABLET, EXTENDED RELEASE ORAL at 08:08

## 2024-02-12 RX ADMIN — CEFTRIAXONE SODIUM 1 G: 1 INJECTION, SOLUTION INTRAVENOUS at 11:18

## 2024-02-12 RX ADMIN — AMIODARONE HYDROCHLORIDE 200 MG: 200 TABLET ORAL at 08:08

## 2024-02-12 ASSESSMENT — COGNITIVE AND FUNCTIONAL STATUS - GENERAL
STANDING UP FROM CHAIR USING ARMS: A LITTLE
PERSONAL GROOMING: A LITTLE
WALKING IN HOSPITAL ROOM: A LITTLE
HELP NEEDED FOR BATHING: A LOT
CLIMB 3 TO 5 STEPS WITH RAILING: A LOT
TOILETING: A LOT
DRESSING REGULAR UPPER BODY CLOTHING: A LOT
MOBILITY SCORE: 17
MOVING FROM LYING ON BACK TO SITTING ON SIDE OF FLAT BED WITH BEDRAILS: A LITTLE
DRESSING REGULAR LOWER BODY CLOTHING: TOTAL
TURNING FROM BACK TO SIDE WHILE IN FLAT BAD: A LITTLE
DAILY ACTIVITIY SCORE: 14
MOVING TO AND FROM BED TO CHAIR: A LITTLE

## 2024-02-12 ASSESSMENT — PAIN - FUNCTIONAL ASSESSMENT
PAIN_FUNCTIONAL_ASSESSMENT: 0-10

## 2024-02-12 ASSESSMENT — PAIN SCALES - GENERAL
PAINLEVEL_OUTOF10: 0 - NO PAIN
PAINLEVEL_OUTOF10: 0 - NO PAIN
PAINLEVEL_OUTOF10: 4
PAINLEVEL_OUTOF10: 8

## 2024-02-12 ASSESSMENT — ACTIVITIES OF DAILY LIVING (ADL): HOME_MANAGEMENT_TIME_ENTRY: 25

## 2024-02-12 ASSESSMENT — PAIN DESCRIPTION - LOCATION: LOCATION: ABDOMEN

## 2024-02-12 NOTE — DISCHARGE SUMMARY
"Discharge Diagnosis  Ileostomy present (CMS/[facility])    Issues Requiring Follow-Up  Regular post-op visits     Test Results Pending At Discharge  Pending Labs       No current pending labs.            Hospital Course  78 yr old female s/p Ileostomy Closure on 02/05 with Dr. Kelley. Please see operative report for full details. Patient tolerated the procedure well and recovered briefly in PACU before being transitioned to regular nursing floor. Post-op course was uncomplicated. Diet was advanced as tolerated.  IV medication transitioned to oral as diet advanced. On the day of discharge, the pt was tolerating a diet, pain was controlled on PO pain medication, and they were ambulating and voiding spontaneously. They were discharged to SNF in stable condition with instructions to follow up as outpatient.      Pertinent Physical Exam At Time of Discharge  Physical Exam  Constitutional:       Appearance: Normal appearance.   Cardiovascular:      Rate and Rhythm: Normal rate and regular rhythm.   Pulmonary:      Effort: Pulmonary effort is normal.      Comments: Non labored breathing on RA  Abdominal:      General: There is no distension.      Palpations: Abdomen is soft.      Tenderness: There is no abdominal tenderness.      Comments: Lap sites C/D/I, edges well approximated, covered in Dermabond.   Skin:     General: Skin is warm and dry.   Neurological:      General: No focal deficit present.      Mental Status: She is alert and oriented to person, place, and time. Mental status is at baseline.   Psychiatric:         Attention and Perception: Attention normal.         Mood and Affect: Mood normal.         Speech: Speech normal.         Behavior: Behavior normal.         Cognition and Memory: Cognition normal.     /84   Pulse 64   Temp 36.6 °C (97.8 °F)   Resp 16   Ht 1.397 m (4' 7\")   Wt 64.1 kg (141 lb 5 oz)   SpO2 99%   BMI 32.84 kg/m²      Home Medications     Medication List      START taking these " medications     ondansetron ODT 4 mg disintegrating tablet; Commonly known as:   Zofran-ODT; Take 1 tablet (4 mg) by mouth every 8 hours if needed for   nausea or vomiting.     CONTINUE taking these medications     acetaminophen 325 mg tablet; Commonly known as: Tylenol   amiodarone 200 mg tablet; Commonly known as: Pacerone; Give 1 tablet by   mouth in the morning for ANTIARRHYTHMIC   apixaban 5 mg tablet; Commonly known as: Eliquis; Give 1 tablet by mouth   two times a day for blood thinner   b complex 0.4 mg tablet   biotin 1 mg tablet   cephalexin 500 mg capsule; Commonly known as: Keflex   cholecalciferol 50,000 unit capsule; Commonly known as: Vitamin D-3   docusate sodium 100 mg capsule; Commonly known as: Colace   donepezil 5 mg tablet; Commonly known as: Aricept; 1 tab(s) orally once   (at bedtime)   DULoxetine 30 mg DR capsule; Commonly known as: Cymbalta; Take 1 capsule   (30 mg) by mouth 2 times a day.   gabapentin 600 mg tablet; Commonly known as: Neurontin; TAKE ONE TABLET   BY MOUTH THREE TIMES A DAY   losartan 50 mg tablet; Commonly known as: Cozaar; 1 tab(s) oral once a   day   melatonin 3 mg tablet   metoprolol succinate XL 25 mg 24 hr tablet; Commonly known as:   Toprol-XL; Take 1 tablet (25 mg) by mouth once daily. Do not crush or   chew.   oxyCODONE 5 mg immediate release capsule; Commonly known as: Oxy-IR   topiramate 100 mg tablet; Commonly known as: Topamax   traMADol 50 mg tablet; Commonly known as: Ultram; Take 1 tablet (50 mg)   by mouth 2 times a day.     ASK your doctor about these medications     traZODone 50 mg tablet; Commonly known as: Desyrel; Take 1 tablet (50   mg) by mouth as needed at bedtime for sleep.       Outpatient Follow-Up  Future Appointments   Date Time Provider Department Center   3/8/2024 11:45 AM Cesar ORLANDO MD [id number] None   3/13/2024 10:00 AM Pat Sellers, APRN-CNP [id number] [facility]       Terrell Dixon PA-C

## 2024-02-12 NOTE — PROGRESS NOTES
2/12/2024 10:36 AM Daughter informed of 2:00 PM discharge transport to [facility]

## 2024-02-12 NOTE — PROGRESS NOTES
Occupational Therapy    Occupational Therapy Treatment    Name: Elida Benson  MRN: [id number]  : 1945  Date: 24  Time Calculation  Start Time: 825  Stop Time: 850  Time Calculation (min): 25 min    Assessment:  Medical Staff Made Aware: Yes  End of Session Communication: Bedside nurse  End of Session Patient Position: Up in chair, Alarm on  Plan:  Treatment Interventions: ADL retraining, Functional transfer training, Endurance training, Patient/family training, Neuromuscular reeducation  OT Frequency: 3 times per week  OT Discharge Recommendations: Moderate intensity level of continued care  Equipment Recommended upon Discharge:  (Hip Kit)  OT - OK to Discharge: Yes    Subjective   Previous Visit Info:  OT Last Visit  OT Received On: 24  General:  General  Reason for Referral: Ileostomy Reversal  Prior to Session Communication: Bedside nurse  Patient Position Received: Bed, 3 rail up  General Comment: Patient requires max encouragement to participate in therapy session. Max cues for sequencing. Patient able to answer questions more appropriately.  Precautions:  Medical Precautions: Fall precautions (Abdominal Precautions.)    Pain Assessment:  Pain Assessment  Pain Assessment: 0-10  Pain Score: 8  Pain Type: Surgical pain  Pain Location: Abdomen     Objective   Activities of Daily Living:      Grooming  Grooming Level of Assistance: Minimum assistance  Grooming Where Assessed: Edge of bed  Grooming Comments: To wash face.              UE Dressing  UE Dressing Level of Assistance: Maximum assistance  UE Dressing Where Assessed: Edge of bed  UE Dressing Comments: Max cues for sequencing, assist with arms in sleeves.    LE Dressing  LE Dressing: Yes  Pants Level of Assistance: Dependent  Sock Level of Assistance: Dependent  LE Dressing Where Assessed: Edge of bed  LE Dressing Comments: Unable to cross legs to don pants.         Functional Standing Tolerance:     Bed Mobility/Transfers: Bed  Mobility  Bed Mobility: Yes  Bed Mobility 1  Bed Mobility 1: Supine to sitting  Level of Assistance 1: Moderate assistance  Bed Mobility Comments 1: Assist with upper and lower trunk.    Transfers  Transfer: Yes  Transfer 1  Transfer From 1: Bed to  Transfer to 1: Chair with arms  Technique 1: Sit to stand  Transfer Device 1: Walker  Transfer Level of Assistance 1: Moderate assistance  Trials/Comments 1: X 2    Sitting Balance:  Static Sitting Balance  Static Sitting-Comment/Number of Minutes: Good Balance  Dynamic Sitting Balance  Dynamic Sitting-Comments: Good Balance  Standing Balance:  Static Standing Balance  Static Standing-Comment/Number of Minutes: Fair Balance  Dynamic Standing Balance  Dynamic Standing-Comments: Fair Balance    Outcome Measures:  [facility] Daily Activity  Putting on and taking off regular lower body clothing: Total  Bathing (including washing, rinsing, drying): A lot  Putting on and taking off regular upper body clothing: A lot  Toileting, which includes using toilet, bedpan or urinal: A lot  Taking care of personal grooming such as brushing teeth: A little  Eating Meals: None  Daily Activity - Total Score: 14    Education Documentation  ADL Training, taught by Aaron Etienne OT at 2/12/2024  9:01 AM.  Learner: Patient  Readiness: Acceptance  Method: Explanation  Response: Needs Reinforcement    Education Comments  No comments found.      Goals:  Encounter Problems       Encounter Problems (Active)       ADLs       Patient will perform UB and LB bathing with CGA  level of assistance.  (Not Progressing)       Start:  02/09/24    Expected End:  02/23/24            Patient with complete upper body dressing with contact guard assist level of assistance donning and doffing all UE clothes with no adaptive equipment while edge of bed  (Progressing)       Start:  02/09/24    Expected End:  02/23/24            Patient with complete lower body dressing with contact guard assist level of  assistance donning and doffing all LE clothes  with reacher, sock-aid, and dressing stick  while edge of bed  (Not Progressing)       Start:  02/09/24    Expected End:  02/23/24            Patient will complete daily grooming tasks brushing teeth and washing face/hair with independent level of assistance. (Progressing)       Start:  02/09/24    Expected End:  02/23/24            Patient will complete toileting including hygiene clothing management/hygiene with independent level of assistance and raised toilet seat. (Progressing)       Start:  02/09/24    Expected End:  02/23/24               BALANCE       Patientt will maintain static standing balance during ADL task with contact guard assist level of assistance drop down in order to demonstrate decreased risk of falling and improved postural control. (Progressing)       Start:  02/09/24    Expected End:  02/23/24                 COGNITION/SAFETY       Patient to be A & O x 3 with no verbal cues to perform ADLs and transfers with min cues for sequencing and safety.  (Progressing)       Start:  02/09/24    Expected End:  02/23/24                 TRANSFERS       Patient will perform bed mobility contact guard assist level of assistance and bed rails in order to improve safety and independence with mobility (Progressing)       Start:  02/09/24    Expected End:  02/23/24

## 2024-02-12 NOTE — PROGRESS NOTES
Requested [facility] team escalate the auth for [facility] [place].  Pending#[id number]  Roselyn Monsalve RN     Transitional Care Coordinator Note @ 9080  Pre-cert has been approved.  Must admit between 2/11-2/15  Secure chat to MD to see if she is medically ready for discharge.  Surgery has signed off.  Roselyn Monsalve RN     Transitional Care Coordinator Note @ 0465  Patient is clear for discharge.  Daughter would like to speak MD or nurse.  Surgical PA to call Kaelyn garcia.  Uploaded IV antibiotic script to SNF.  Requested transport be set up by [facility].  Roselyn Monsalve RN     Transitional Care Coordinator Note @ 0883  The [employer] Vehicle you requested for Elida FISHER in unit/room [facility] 732 on 02/12/2024 is scheduled to arrive at 2:00pm EST! [facility] is handling this ride and you can contact them at [phone].  Nurse to call report to [phone]  Roselyn Monsalve RN

## 2024-02-12 NOTE — CARE PLAN
The patient's goals for the shift include      The clinical goals for the shift include Patient pain control in place      Problem: Discharge Barriers  Goal: My discharge needs are met  Outcome: Progressing     Problem: Discharge Planning  Goal: Discharge to home or other facility with appropriate resources  Outcome: Progressing

## 2024-02-13 ENCOUNTER — NURSING HOME VISIT (OUTPATIENT)
Dept: POST ACUTE CARE | Facility: EXTERNAL LOCATION | Age: 79
End: 2024-02-13
Payer: MEDICARE

## 2024-02-13 DIAGNOSIS — G30.9 MILD ALZHEIMER'S DEMENTIA, UNSPECIFIED TIMING OF DEMENTIA ONSET, UNSPECIFIED WHETHER BEHAVIORAL, PSYCHOTIC, OR MOOD DISTURBANCE OR ANXIETY (MULTI): ICD-10-CM

## 2024-02-13 DIAGNOSIS — K57.32 DIVERTICULITIS, COLON: Primary | ICD-10-CM

## 2024-02-13 DIAGNOSIS — I48.11 LONGSTANDING PERSISTENT ATRIAL FIBRILLATION (MULTI): ICD-10-CM

## 2024-02-13 DIAGNOSIS — I10 HYPERTENSION, ESSENTIAL: ICD-10-CM

## 2024-02-13 DIAGNOSIS — J44.9 COPD WITHOUT EXACERBATION (MULTI): ICD-10-CM

## 2024-02-13 DIAGNOSIS — N39.0 URINARY TRACT INFECTION WITHOUT HEMATURIA, SITE UNSPECIFIED: ICD-10-CM

## 2024-02-13 DIAGNOSIS — F32.A DEPRESSION, UNSPECIFIED DEPRESSION TYPE: ICD-10-CM

## 2024-02-13 DIAGNOSIS — F02.A0 MILD ALZHEIMER'S DEMENTIA, UNSPECIFIED TIMING OF DEMENTIA ONSET, UNSPECIFIED WHETHER BEHAVIORAL, PSYCHOTIC, OR MOOD DISTURBANCE OR ANXIETY (MULTI): ICD-10-CM

## 2024-02-13 PROBLEM — F33.9 MAJOR DEPRESSIVE DISORDER, RECURRENT, UNSPECIFIED (CMS-HCC): Status: RESOLVED | Noted: 2018-07-19 | Resolved: 2024-02-13

## 2024-02-13 PROBLEM — H04.209 EYE TEARING: Status: RESOLVED | Noted: 2023-10-27 | Resolved: 2024-02-13

## 2024-02-13 PROBLEM — R26.2 DIFFICULTY IN WALKING, NOT ELSEWHERE CLASSIFIED: Status: RESOLVED | Noted: 2018-07-19 | Resolved: 2024-02-13

## 2024-02-13 PROBLEM — M62.81 MUSCLE WEAKNESS OF UPPER EXTREMITY: Status: RESOLVED | Noted: 2024-01-29 | Resolved: 2024-02-13

## 2024-02-13 PROBLEM — K57.92 DIVERTICULITIS OF INTESTINE, PART UNSPECIFIED, WITHOUT PERFORATION OR ABSCESS WITHOUT BLEEDING: Status: RESOLVED | Noted: 2023-05-26 | Resolved: 2024-02-13

## 2024-02-13 PROBLEM — M79.646 FINGER PAIN: Status: RESOLVED | Noted: 2023-10-27 | Resolved: 2024-02-13

## 2024-02-13 PROBLEM — Z93.2 ILEOSTOMY STATUS (MULTI): Status: RESOLVED | Noted: 2023-08-26 | Resolved: 2024-02-13

## 2024-02-13 PROCEDURE — 99305 1ST NF CARE MODERATE MDM 35: CPT | Performed by: INTERNAL MEDICINE

## 2024-02-13 NOTE — LETTER
Patient: Elida Benson  : 1945    Encounter Date: 2024    HISTORY & PHYSICAL    Subjective  Chief complaint: Elida Benson is a 78 y.o. female who is being seen and evaluated for multiple medical problems.  Patient admitted to SNF for therapy due to weakness after recent hospitalization.    HPI:  HPI  Patient has a past medical history of severe diverticulitis with associated abscess.  Colonoscopy was attempted, aborted due to fibrosis and sigmoid, patient presenting for resection.  Patient is status post ileostomy closure on 2024.  Patient did well postoperatively with an uncomplicated postop course.  Patient's diet was advanced and tolerated well.  Patient's pain was well-controlled on p.o. pain meds and patient was ambulating and voiding.  Patient evaluated by PT OT recommending SNF for rehab.  Patient found to have UTI, started on antibiotic, tolerating without adverse effects.  Patient was seen and examined at bedside, appears to be in no apparent distress.  Denies chest pain or shortness of breath.  Afebrile.  Pain is well-controlled.    Past Medical History:   Diagnosis Date   • A-fib (CMS/HCC)     Managed on meds, Eliquis stoppage [facility] Ignacio   • Cervical myelopathy (CMS/HCC)    • Chronic pain syndrome    • COPD (chronic obstructive pulmonary disease) (CMS/HCC)     denies, she is a former smoker but quit in    • Dementia (CMS/HCC)    • Depression    • Diabetic neuropathy (CMS/HCC)    • Diverticulitis    • DM (diabetes mellitus) (CMS/HCC)     denies but A1C= 7.2% on 23   • LORENZ (dyspnea on exertion)     Stable per cards note   • HLD (hyperlipidemia)    • Hypertension, essential    • Infiltrative cardiomyopathy (CMS/HCC)     Echo 23, following with Dr. Ignacio   • OA (ocular albinism) (CMS/HCC)     multiple sites   • Other malaise 2022    Physical deconditioning   • Palpitations     on occasion, has afib followed by cardiology   •  Post laminectomy syndrome    • Rectovaginal fistula    • Spinal stenosis of cervical region    • UTI (urinary tract infection)        Past Surgical History:   Procedure Laterality Date   • CARPAL TUNNEL RELEASE  2013    Neuroplasty Decompression Median Nerve At Carpal Tunnel   • ILEOSTOMY     • KNEE ARTHROPLASTY  2013    Knee Arthroplasty   • LAMINECTOMY     • LUMBAR FUSION     • OTHER SURGICAL HISTORY  2023    LAPAROSCPIC ANTERIOR RESECTION, DIVERTING LOOP ILEOSTOMY   • SPINAL CORD STIMULATOR IMPLANT     • TOTAL HIP ARTHROPLASTY  2013    Total Hip Replacement       Family History   Problem Relation Name Age of Onset   • No Known Problems Mother     • No Known Problems Father     • No Known Problems Sister     • No Known Problems Sister     • No Known Problems Sister     • No Known Problems Sister         Social History     Socioeconomic History   • Marital status:      Spouse name: Not on file   • Number of children: Not on file   • Years of education: Not on file   • Highest education level: Not on file   Occupational History   • Not on file   Tobacco Use   • Smoking status: Former     Packs/day: .25     Types: Cigarettes     Quit date:      Years since quittin.1   • Smokeless tobacco: Never   Vaping Use   • Vaping Use: Never used   Substance and Sexual Activity   • Alcohol use: Yes     Comment: drinks beer or liquor a few times a year   • Drug use: Never   • Sexual activity: Defer   Other Topics Concern   • Not on file   Social History Narrative   • Not on file     Social Determinants of Health     Financial Resource Strain: Low Risk  (2024)    Overall Financial Resource Strain (CARDIA)    • Difficulty of Paying Living Expenses: Not hard at all   Food Insecurity: Not on file   Transportation Needs: No Transportation Needs (2024)    PRAPARE - Transportation    • Lack of Transportation (Medical): No    • Lack of Transportation (Non-Medical): No   Physical  Activity: Not on file   Stress: Not on file   Social Connections: Feeling Socially Integrated (12/7/2023)    OASIS : Social Isolation    • Frequency of experiencing loneliness or isolation: Never   Intimate Partner Violence: Not on file   Housing Stability: Low Risk  (2/6/2024)    Housing Stability Vital Sign    • Unable to Pay for Housing in the Last Year: No    • Number of Places Lived in the Last Year: 1    • Unstable Housing in the Last Year: No       Vital signs: 133/83 100.5, 69, 18, 93%    Objective  Physical Exam  Constitutional:       General: She is not in acute distress.  Eyes:      Extraocular Movements: Extraocular movements intact.   Cardiovascular:      Rate and Rhythm: Normal rate and regular rhythm.   Pulmonary:      Effort: Pulmonary effort is normal.      Breath sounds: Normal breath sounds.   Abdominal:      General: Bowel sounds are normal.      Palpations: Abdomen is soft.   Musculoskeletal:      Cervical back: Neck supple.      Right lower leg: No edema.      Left lower leg: No edema.   Skin:     Comments: Surgical dressing, clean, dry and intact   Neurological:      Mental Status: She is alert.   Psychiatric:         Mood and Affect: Mood normal.         Behavior: Behavior is cooperative.         Assessment/Plan  Problem List Items Addressed This Visit       COPD without exacerbation (CMS/HCC)     Stable, monitor for shortness of breath and wheezing.         Dementia (CMS/HCC)     Assist with care as needed  Donepezil         Depression     Monitor mood and behaviors.  Duloxetine         Hypertension, essential     Monitor blood pressure  Losartan potassium  Toprol-XL         Atrial fibrillation (CMS/HCC)     Amiodarone  Monitor heart rate         Diverticulitis, colon - Primary     Status post ileostomy closure  F/u with colorectal surgery.  Monitor surgical incision         UTI (urinary tract infection)     Ceftriaxone until complete, 2/20/2024          Hospital records  reviewed  Medications, treatments, and labs reviewed  Continue medications and treatments as listed in EMR  Discussed with nursing and therapy      Scribe Attestation  I, Shalini Hernandez   attest that this documentation has been prepared under the direction and in the presence of Sean Cloud MD    Provider Attestation - Scribe documentation  All medical record entries made by the Scribe were at my direction and personally dictated by me. I have reviewed the chart and agree that the record accurately reflects my personal performance of the history, physical exam, discussion and plan.   Sean Cloud MD      Electronically Signed By: Sean Cloud MD   2/13/24  4:51 PM

## 2024-02-13 NOTE — PROGRESS NOTES
HISTORY & PHYSICAL    Subjective   Chief complaint: Elida Benson is a 78 y.o. female who is being seen and evaluated for multiple medical problems.  Patient admitted to SNF for therapy due to weakness after recent hospitalization.    HPI:  HPI  Patient has a past medical history of severe diverticulitis with associated abscess.  Colonoscopy was attempted, aborted due to fibrosis and sigmoid, patient presenting for resection.  Patient is status post ileostomy closure on 2/5/2024.  Patient did well postoperatively with an uncomplicated postop course.  Patient's diet was advanced and tolerated well.  Patient's pain was well-controlled on p.o. pain meds and patient was ambulating and voiding.  Patient evaluated by PT OT recommending SNF for rehab.  Patient found to have UTI, started on antibiotic, tolerating without adverse effects.  Patient was seen and examined at bedside, appears to be in no apparent distress.  Denies chest pain or shortness of breath.  Afebrile.  Pain is well-controlled.    Past Medical History:   Diagnosis Date    A-fib (CMS/HCC)     Managed on meds, Eliquis stoppage and cardiac clearance in [facility] from Dr. Ignacio    Cervical myelopathy (CMS/HCC)     Chronic pain syndrome     COPD (chronic obstructive pulmonary disease) (CMS/HCC)     denies, she is a former smoker but quit in 1984    Dementia (CMS/HCC)     Depression     Diabetic neuropathy (CMS/HCC)     Diverticulitis     DM (diabetes mellitus) (CMS/HCC)     denies but A1C= 7.2% on 9/5/23    LORENZ (dyspnea on exertion)     Stable per cards note    HLD (hyperlipidemia)     Hypertension, essential     Infiltrative cardiomyopathy (CMS/HCC)     Echo 8/22/23, following with Dr. Ignacio    OA (ocular albinism) (CMS/HCC)     multiple sites    Other malaise 04/21/2022    Physical deconditioning    Palpitations     on occasion, has afib followed by cardiology    Post laminectomy syndrome     Rectovaginal fistula     Spinal stenosis of cervical region      UTI (urinary tract infection)        Past Surgical History:   Procedure Laterality Date    CARPAL TUNNEL RELEASE  2013    Neuroplasty Decompression Median Nerve At Carpal Tunnel    ILEOSTOMY      KNEE ARTHROPLASTY  2013    Knee Arthroplasty    LAMINECTOMY      LUMBAR FUSION      OTHER SURGICAL HISTORY  2023    LAPAROSCPIC ANTERIOR RESECTION, DIVERTING LOOP ILEOSTOMY    SPINAL CORD STIMULATOR IMPLANT      TOTAL HIP ARTHROPLASTY  2013    Total Hip Replacement       Family History   Problem Relation Name Age of Onset    No Known Problems Mother      No Known Problems Father      No Known Problems Sister      No Known Problems Sister      No Known Problems Sister      No Known Problems Sister         Social History     Socioeconomic History    Marital status:      Spouse name: Not on file    Number of children: Not on file    Years of education: Not on file    Highest education level: Not on file   Occupational History    Not on file   Tobacco Use    Smoking status: Former     Packs/day: .25     Types: Cigarettes     Quit date:      Years since quittin.1    Smokeless tobacco: Never   Vaping Use    Vaping Use: Never used   Substance and Sexual Activity    Alcohol use: Yes     Comment: drinks beer or liquor a few times a year    Drug use: Never    Sexual activity: Defer   Other Topics Concern    Not on file   Social History Narrative    Not on file     Social Determinants of Health     Financial Resource Strain: Low Risk  (2024)    Overall Financial Resource Strain (CARDIA)     Difficulty of Paying Living Expenses: Not hard at all   Food Insecurity: Not on file   Transportation Needs: No Transportation Needs (2024)    PRAPARE - Transportation     Lack of Transportation (Medical): No     Lack of Transportation (Non-Medical): No   Physical Activity: Not on file   Stress: Not on file   Social Connections: Feeling Socially Integrated (2023)    OASIS : Social  Isolation     Frequency of experiencing loneliness or isolation: Never   Intimate Partner Violence: Not on file   Housing Stability: Low Risk  (2/6/2024)    Housing Stability Vital Sign     Unable to Pay for Housing in the Last Year: No     Number of Places Lived in the Last Year: 1     Unstable Housing in the Last Year: No       Vital signs: 133/83 100.5, 69, 18, 93%    Objective   Physical Exam  Constitutional:       General: She is not in acute distress.  Eyes:      Extraocular Movements: Extraocular movements intact.   Cardiovascular:      Rate and Rhythm: Normal rate and regular rhythm.   Pulmonary:      Effort: Pulmonary effort is normal.      Breath sounds: Normal breath sounds.   Abdominal:      General: Bowel sounds are normal.      Palpations: Abdomen is soft.   Musculoskeletal:      Cervical back: Neck supple.      Right lower leg: No edema.      Left lower leg: No edema.   Skin:     Comments: Surgical dressing, clean, dry and intact   Neurological:      Mental Status: She is alert.   Psychiatric:         Mood and Affect: Mood normal.         Behavior: Behavior is cooperative.         Assessment/Plan   Problem List Items Addressed This Visit       COPD without exacerbation (CMS/HCC)     Stable, monitor for shortness of breath and wheezing.         Dementia (CMS/HCC)     Assist with care as needed  Donepezil         Depression     Monitor mood and behaviors.  Duloxetine         Hypertension, essential     Monitor blood pressure  Losartan potassium  Toprol-XL         Atrial fibrillation (CMS/HCC)     Amiodarone  Monitor heart rate         Diverticulitis, colon - Primary     Status post ileostomy closure  F/u with colorectal surgery.  Monitor surgical incision         UTI (urinary tract infection)     Ceftriaxone until complete, 2/20/2024          Hospital records reviewed  Medications, treatments, and labs reviewed  Continue medications and treatments as listed in EMR  Discussed with nursing and  therapy      Scribe Attestation  I, Shalini Hernandez   attest that this documentation has been prepared under the direction and in the presence of Sean Cloud MD    Provider Attestation - Scribe documentation  All medical record entries made by the Scribe were at my direction and personally dictated by me. I have reviewed the chart and agree that the record accurately reflects my personal performance of the history, physical exam, discussion and plan.   Sean Cloud MD

## 2024-02-14 ENCOUNTER — NURSING HOME VISIT (OUTPATIENT)
Dept: POST ACUTE CARE | Facility: EXTERNAL LOCATION | Age: 79
End: 2024-02-14
Payer: MEDICARE

## 2024-02-14 DIAGNOSIS — K57.32 DIVERTICULITIS, COLON: ICD-10-CM

## 2024-02-14 DIAGNOSIS — R53.1 WEAKNESS: Primary | ICD-10-CM

## 2024-02-14 DIAGNOSIS — J44.9 COPD WITHOUT EXACERBATION (MULTI): ICD-10-CM

## 2024-02-14 DIAGNOSIS — I10 HYPERTENSION, ESSENTIAL: ICD-10-CM

## 2024-02-14 DIAGNOSIS — F02.A0 MILD ALZHEIMER'S DEMENTIA, UNSPECIFIED TIMING OF DEMENTIA ONSET, UNSPECIFIED WHETHER BEHAVIORAL, PSYCHOTIC, OR MOOD DISTURBANCE OR ANXIETY (MULTI): ICD-10-CM

## 2024-02-14 DIAGNOSIS — G30.9 MILD ALZHEIMER'S DEMENTIA, UNSPECIFIED TIMING OF DEMENTIA ONSET, UNSPECIFIED WHETHER BEHAVIORAL, PSYCHOTIC, OR MOOD DISTURBANCE OR ANXIETY (MULTI): ICD-10-CM

## 2024-02-14 PROCEDURE — 99309 SBSQ NF CARE MODERATE MDM 30: CPT | Performed by: NURSE PRACTITIONER

## 2024-02-14 NOTE — LETTER
Patient: Elida Benson  : 1945    Encounter Date: 2024    PROGRESS NOTE    Subjective  Chief complaint: Elida Benson is a 78 y.o. female who is an acute skilled patient being seen and evaluated for weakness    HPI:  HPI  This patient was admitted to SNF for therapy due to generalized weakness and for medical management after recent hospitalization for diverticulitis status post ileostomy closure and UTI.  Patient tolerating antibiotic without adverse effects.  Therapy to evaluate and establish plan of care and goals.  Patient seen and examined at bedside in no apparent distress.  Nursing staff voices no new concerns today.  Patient denies nausea vomiting fever chills.  Patient denies chest pain or shortness of breath.  Nursing staff voicing no new concerns at this time    Objective  Vital signs: 108/71, 98.6, 72, 18, 95%    Physical Exam  Constitutional:       General: She is not in acute distress.  Eyes:      Extraocular Movements: Extraocular movements intact.   Cardiovascular:      Rate and Rhythm: Normal rate and regular rhythm.   Pulmonary:      Effort: Pulmonary effort is normal.      Breath sounds: Normal breath sounds.   Abdominal:      General: Bowel sounds are normal.      Palpations: Abdomen is soft.   Musculoskeletal:      Cervical back: Neck supple.      Right lower leg: No edema.      Left lower leg: No edema.   Neurological:      Mental Status: She is alert.      Motor: Weakness present.   Psychiatric:         Mood and Affect: Mood normal.         Behavior: Behavior is cooperative.         Assessment/Plan  Problem List Items Addressed This Visit       COPD without exacerbation (CMS/HCC)     Stable, monitor for shortness of breath and wheezing.         Dementia (CMS/HCC)     Assist with care as needed  Donepezil  Monitor         Hypertension, essential     Monitor blood pressure  Losartan potassium  Toprol-XL  BP at goal         Weakness - Primary     Therapy to evaluate and  establish plan of care         Diverticulitis, colon     Status post ileostomy closure  F/u with colorectal surgery.  Monitor surgical incision          Medications, treatments, and labs reviewed  Continue medications and treatments as listed in EMR      Scribe Attestation  ISintia Scribe   attest that this documentation has been prepared under the direction and in the presence of MI Triplett    Provider Attestation - Scribe documentation  All medical record entries made by the Scribe were at my direction and personally dictated by me. I have reviewed the chart and agree that the record accurately reflects my personal performance of the history, physical exam, discussion and plan.   MI Triplett        Electronically Signed By: MI Triplett   2/23/24 11:10 AM

## 2024-02-15 ENCOUNTER — TELEPHONE (OUTPATIENT)
Dept: SURGERY | Facility: CLINIC | Age: 79
End: 2024-02-15
Payer: MEDICARE

## 2024-02-15 ENCOUNTER — NURSING HOME VISIT (OUTPATIENT)
Dept: POST ACUTE CARE | Facility: EXTERNAL LOCATION | Age: 79
End: 2024-02-15
Payer: MEDICARE

## 2024-02-15 DIAGNOSIS — F02.A0 MILD ALZHEIMER'S DEMENTIA, UNSPECIFIED TIMING OF DEMENTIA ONSET, UNSPECIFIED WHETHER BEHAVIORAL, PSYCHOTIC, OR MOOD DISTURBANCE OR ANXIETY (MULTI): ICD-10-CM

## 2024-02-15 DIAGNOSIS — J44.9 COPD WITHOUT EXACERBATION (MULTI): ICD-10-CM

## 2024-02-15 DIAGNOSIS — R53.1 WEAKNESS: Primary | ICD-10-CM

## 2024-02-15 DIAGNOSIS — G30.9 MILD ALZHEIMER'S DEMENTIA, UNSPECIFIED TIMING OF DEMENTIA ONSET, UNSPECIFIED WHETHER BEHAVIORAL, PSYCHOTIC, OR MOOD DISTURBANCE OR ANXIETY (MULTI): ICD-10-CM

## 2024-02-15 DIAGNOSIS — I10 HYPERTENSION, ESSENTIAL: ICD-10-CM

## 2024-02-15 DIAGNOSIS — K57.32 DIVERTICULITIS, COLON: ICD-10-CM

## 2024-02-15 DIAGNOSIS — I48.11 LONGSTANDING PERSISTENT ATRIAL FIBRILLATION (MULTI): ICD-10-CM

## 2024-02-15 PROBLEM — E11.40 TYPE 2 DIABETES MELLITUS WITH DIABETIC NEUROPATHY, UNSPECIFIED (MULTI): Status: RESOLVED | Noted: 2018-07-19 | Resolved: 2024-02-15

## 2024-02-15 PROCEDURE — 99308 SBSQ NF CARE LOW MDM 20: CPT | Performed by: INTERNAL MEDICINE

## 2024-02-15 NOTE — TELEPHONE ENCOUNTER
Returned call to Sol.  I was told she was in a meeting.  I was transferred to her  and message was left inviting her to please contact me about Elida Benson.  Phone number provided.  Radha Meyer RN      From: Felipe Shafer <[email or web address]>   Sent: Thursday, February 15, 2024 1:47 PM  To: Radha Meyer <[email or web address]>  Subject: Elida Benson MRN [id number]     Sol, nurse manager at [facility] ([place], [place]) returned your call  Sol phone [phone]

## 2024-02-15 NOTE — TELEPHONE ENCOUNTER
Spoke with Kaelyn the patient's daughter.  Elida is at [facility].  Noted to have drainage from the stoma closure site.  Kaelyn does not have any more info.  I called the SNF at [phone] on Wednesday and again this am.  I have asked to speak with the nurse and each time I call I am placed on hold for 10 minutes and then I am told the nurse is not available.  I leave my name and contact info but have not heard back from the SNF.  Kaelyn is aware of this.  I did explain that stoma closure sites are left open to drain.  Should be covered with gauze until drainage has stopped and wound is closed.  If drainage is thick pus, either send me a picture, come for appointment of have the SNF medical doctor evaluate the patient.  Radha Meyer RN

## 2024-02-15 NOTE — PROGRESS NOTES
PROGRESS NOTE    Subjective   Chief complaint: Elida Benson is a 78 y.o. female who is an acute skilled patient being seen and evaluated for weakness    HPI:  HPI  Patient is working in therapy following hospitalization.  Therapy is focusing on therapeutic exercise and activities, neuromuscular reeducation and gait training, ADLs and self-care.  Patient seen and examined at bedside, appears to be in no apparent distress.  Afebrile.  Denies chest pain or shortness of breath.  Nursing staff voicing no new concerns at this time.  Mentation at baseline.    Objective   Vital signs: 118/72, 97.3, 74, 18, 98%    Physical Exam  Constitutional:       General: She is not in acute distress.  Eyes:      Extraocular Movements: Extraocular movements intact.   Cardiovascular:      Rate and Rhythm: Normal rate and regular rhythm.   Pulmonary:      Effort: Pulmonary effort is normal.      Breath sounds: Normal breath sounds.   Abdominal:      General: Bowel sounds are normal.      Palpations: Abdomen is soft.   Musculoskeletal:      Cervical back: Neck supple.      Right lower leg: No edema.      Left lower leg: No edema.   Skin:     Comments: Surgical dressing, clean, dry and intact   Neurological:      Mental Status: She is alert.      Motor: Weakness present.   Psychiatric:         Mood and Affect: Mood normal.         Behavior: Behavior is cooperative.         Assessment/Plan   Problem List Items Addressed This Visit       COPD without exacerbation (CMS/HCC)     Stable, monitor for shortness of breath and wheezing.         Dementia (CMS/HCC)     Assist with care as needed  Donepezil  Monitor         Hypertension, essential     Monitor blood pressure  Losartan potassium  Toprol-XL         Weakness - Primary     Continue to work towards goals in therapy established         Atrial fibrillation (CMS/HCC)     Amiodarone  Monitor heart rate         Diverticulitis, colon     Status post ileostomy closure  F/u with colorectal  surgery.  Monitor surgical incision          Medications, treatments, and labs reviewed  Continue medications and treatments as listed in EMR      Scribe Attestation  I, Shalini Hernandez   attest that this documentation has been prepared under the direction and in the presence of Sean Cloud MD    Provider Attestation - Scribe documentation  All medical record entries made by the Scribe were at my direction and personally dictated by me. I have reviewed the chart and agree that the record accurately reflects my personal performance of the history, physical exam, discussion and plan.   Sean Cloud MD

## 2024-02-15 NOTE — LETTER
Patient: Elida Benson  : 1945    Encounter Date: 02/15/2024    PROGRESS NOTE    Subjective  Chief complaint: Elida Benson is a 78 y.o. female who is an acute skilled patient being seen and evaluated for weakness    HPI:  HPI  Patient is working in therapy following hospitalization.  Therapy is focusing on therapeutic exercise and activities, neuromuscular reeducation and gait training, ADLs and self-care.  Patient seen and examined at bedside, appears to be in no apparent distress.  Afebrile.  Denies chest pain or shortness of breath.  Nursing staff voicing no new concerns at this time.  Mentation at baseline.    Objective  Vital signs: 118/72, 97.3, 74, 18, 98%    Physical Exam  Constitutional:       General: She is not in acute distress.  Eyes:      Extraocular Movements: Extraocular movements intact.   Cardiovascular:      Rate and Rhythm: Normal rate and regular rhythm.   Pulmonary:      Effort: Pulmonary effort is normal.      Breath sounds: Normal breath sounds.   Abdominal:      General: Bowel sounds are normal.      Palpations: Abdomen is soft.   Musculoskeletal:      Cervical back: Neck supple.      Right lower leg: No edema.      Left lower leg: No edema.   Skin:     Comments: Surgical dressing, clean, dry and intact   Neurological:      Mental Status: She is alert.      Motor: Weakness present.   Psychiatric:         Mood and Affect: Mood normal.         Behavior: Behavior is cooperative.         Assessment/Plan  Problem List Items Addressed This Visit       COPD without exacerbation (CMS/HCC)     Stable, monitor for shortness of breath and wheezing.         Dementia (CMS/HCC)     Assist with care as needed  Donepezil  Monitor         Hypertension, essential     Monitor blood pressure  Losartan potassium  Toprol-XL         Weakness - Primary     Continue to work towards goals in therapy established         Atrial fibrillation (CMS/HCC)     Amiodarone  Monitor heart rate          Diverticulitis, colon     Status post ileostomy closure  F/u with colorectal surgery.  Monitor surgical incision          Medications, treatments, and labs reviewed  Continue medications and treatments as listed in EMR      Scribe Attestation  I, Shalini Hernandez   attest that this documentation has been prepared under the direction and in the presence of Sean Cloud MD    Provider Attestation - Scribe documentation  All medical record entries made by the Scribe were at my direction and personally dictated by me. I have reviewed the chart and agree that the record accurately reflects my personal performance of the history, physical exam, discussion and plan.   Sean Cloud MD        Electronically Signed By: Sean Cloud MD   2/15/24  2:09 PM

## 2024-02-16 ENCOUNTER — NURSING HOME VISIT (OUTPATIENT)
Dept: POST ACUTE CARE | Facility: EXTERNAL LOCATION | Age: 79
End: 2024-02-16
Payer: MEDICARE

## 2024-02-16 DIAGNOSIS — F02.A0 MILD ALZHEIMER'S DEMENTIA, UNSPECIFIED TIMING OF DEMENTIA ONSET, UNSPECIFIED WHETHER BEHAVIORAL, PSYCHOTIC, OR MOOD DISTURBANCE OR ANXIETY (MULTI): ICD-10-CM

## 2024-02-16 DIAGNOSIS — R53.1 WEAKNESS: Primary | ICD-10-CM

## 2024-02-16 DIAGNOSIS — J44.9 COPD WITHOUT EXACERBATION (MULTI): ICD-10-CM

## 2024-02-16 DIAGNOSIS — K57.32 DIVERTICULITIS, COLON: ICD-10-CM

## 2024-02-16 DIAGNOSIS — G30.9 MILD ALZHEIMER'S DEMENTIA, UNSPECIFIED TIMING OF DEMENTIA ONSET, UNSPECIFIED WHETHER BEHAVIORAL, PSYCHOTIC, OR MOOD DISTURBANCE OR ANXIETY (MULTI): ICD-10-CM

## 2024-02-16 DIAGNOSIS — I10 HYPERTENSION, ESSENTIAL: ICD-10-CM

## 2024-02-16 PROCEDURE — 99309 SBSQ NF CARE MODERATE MDM 30: CPT | Performed by: NURSE PRACTITIONER

## 2024-02-16 NOTE — LETTER
Patient: Elida Benson  : 1945    Encounter Date: 2024    PROGRESS NOTE    Subjective  Chief complaint: Elida Benson is a 78 y.o. female who is an acute skilled patient being seen and evaluated for weakness    HPI:  HPI  Patient is working in therapy following hospitalization for diverticulitis.  Therapy is working with patient on therapeutic activities, skilled interventions focused on facilitation of position in space and transfer training.  Patient does require min assist to complete transfers.  Patient was seen and examined at bedside, appears to be in no acute distress.  Denies chest pain or shortness of breath.  Afebrile.    Objective  Vital signs: 126/76, 97.6, 74, 18, 97%    Physical Exam  Constitutional:       General: She is not in acute distress.  Eyes:      Extraocular Movements: Extraocular movements intact.   Cardiovascular:      Rate and Rhythm: Normal rate and regular rhythm.   Pulmonary:      Effort: Pulmonary effort is normal.      Breath sounds: Normal breath sounds.   Abdominal:      General: Bowel sounds are normal.      Palpations: Abdomen is soft.   Musculoskeletal:      Cervical back: Neck supple.      Left lower leg: Edema present.   Neurological:      Mental Status: She is alert.      Motor: Weakness present.   Psychiatric:         Mood and Affect: Mood normal.         Behavior: Behavior is cooperative.         Assessment/Plan  Problem List Items Addressed This Visit       COPD without exacerbation (CMS/HCC)     Stable, monitor for shortness of breath and wheezing.         Dementia (CMS/HCC)     Assist with care as needed  Donepezil  Monitor         Diverticulitis, colon     Status post ileostomy closure  F/u with colorectal surgery.  Monitor surgical incision         Hypertension, essential     Blood pressure is at goal.  Monitor blood pressure  Losartan potassium  Toprol-XL         Weakness - Primary     Continue to progress towards goals in therapy           Medications, treatments, and labs reviewed  Continue medications and treatments as listed in EMR      Scribe Attestation  I, Shalini Hernandez   attest that this documentation has been prepared under the direction and in the presence of MI Triplett    Provider Attestation - Scribe documentation  All medical record entries made by the Scribe were at my direction and personally dictated by me. I have reviewed the chart and agree that the record accurately reflects my personal performance of the history, physical exam, discussion and plan.   MI Triplett        Electronically Signed By: MI Triplett   2/21/24 10:41 AM

## 2024-02-19 ENCOUNTER — TELEPHONE (OUTPATIENT)
Dept: SURGERY | Facility: CLINIC | Age: 79
End: 2024-02-19
Payer: MEDICARE

## 2024-02-19 ENCOUNTER — NURSING HOME VISIT (OUTPATIENT)
Dept: POST ACUTE CARE | Facility: EXTERNAL LOCATION | Age: 79
End: 2024-02-19
Payer: MEDICARE

## 2024-02-19 DIAGNOSIS — R53.1 WEAKNESS: ICD-10-CM

## 2024-02-19 DIAGNOSIS — F32.A DEPRESSION, UNSPECIFIED DEPRESSION TYPE: ICD-10-CM

## 2024-02-19 DIAGNOSIS — F02.A0 MILD ALZHEIMER'S DEMENTIA, UNSPECIFIED TIMING OF DEMENTIA ONSET, UNSPECIFIED WHETHER BEHAVIORAL, PSYCHOTIC, OR MOOD DISTURBANCE OR ANXIETY (MULTI): ICD-10-CM

## 2024-02-19 DIAGNOSIS — G30.9 MILD ALZHEIMER'S DEMENTIA, UNSPECIFIED TIMING OF DEMENTIA ONSET, UNSPECIFIED WHETHER BEHAVIORAL, PSYCHOTIC, OR MOOD DISTURBANCE OR ANXIETY (MULTI): ICD-10-CM

## 2024-02-19 DIAGNOSIS — Z79.4 TYPE 2 DIABETES MELLITUS WITHOUT COMPLICATION, WITH LONG-TERM CURRENT USE OF INSULIN (MULTI): ICD-10-CM

## 2024-02-19 DIAGNOSIS — J44.9 COPD WITHOUT EXACERBATION (MULTI): ICD-10-CM

## 2024-02-19 DIAGNOSIS — E11.9 TYPE 2 DIABETES MELLITUS WITHOUT COMPLICATION, WITH LONG-TERM CURRENT USE OF INSULIN (MULTI): ICD-10-CM

## 2024-02-19 DIAGNOSIS — I10 HYPERTENSION, ESSENTIAL: ICD-10-CM

## 2024-02-19 PROCEDURE — 99308 SBSQ NF CARE LOW MDM 20: CPT | Performed by: INTERNAL MEDICINE

## 2024-02-19 NOTE — PROGRESS NOTES
PROGRESS NOTE    Subjective   Chief complaint: Elida Benson is a 78 y.o. female who is an acute skilled patient being seen and evaluated for weakness    HPI:  Patient has been working in therapy to improve strength, endurance, and ADLs.  Patient continues to work toward goals. She is on prophylactic atb therapy with no adverse reactions.  No new concerns today.  Denies n/v/f/c pain.        Objective   Vital signs: 121/72,78,93%    Physical Exam  Constitutional:       General: She is not in acute distress.  Eyes:      Extraocular Movements: Extraocular movements intact.   Cardiovascular:      Rate and Rhythm: Normal rate and regular rhythm.   Pulmonary:      Effort: Pulmonary effort is normal.      Breath sounds: Normal breath sounds.   Abdominal:      General: Bowel sounds are normal.      Palpations: Abdomen is soft.   Musculoskeletal:      Cervical back: Neck supple.      Right lower leg: No edema.      Left lower leg: No edema.   Skin:     Comments: Surgical dressing, clean, dry and intact   Neurological:      Mental Status: She is alert.      Motor: Weakness present.   Psychiatric:         Mood and Affect: Mood normal.         Behavior: Behavior is cooperative.         Assessment/Plan   Problem List Items Addressed This Visit       COPD without exacerbation (CMS/HCC)     Stable, monitor for shortness of breath and wheezing.         Dementia (CMS/HCC)     Assist with care as needed  Donepezil  Monitor         Depression     Monitor mood and behaviors.  Duloxetine         Hypertension, essential     Monitor blood pressure  Losartan potassium  Toprol-XL         Weakness     Continue to work towards goals in therapy established         Type 2 diabetes mellitus without complication, with long-term current use of insulin (CMS/HCC)     Monitor glucoscan          Medications, treatments, and labs reviewed  Continue medications and treatments as listed in EMR    Shalini Attdieteration  I, Shalini Quiroz    attest that this documentation has been prepared under the direction and in the presence of Sean Cloud MD.     Provider Attestation - Scribe documentation  All medical record entries made by the Scribe were at my direction and personally dictated by me. I have reviewed the chart and agree that the record accurately reflects my personal performance of the history, physical exam, discussion and plan.   Sean Cloud MD

## 2024-02-19 NOTE — LETTER
Patient: Elida Benson  : 1945    Encounter Date: 2024    PROGRESS NOTE    Subjective  Chief complaint: Elida Benson is a 78 y.o. female who is an acute skilled patient being seen and evaluated for weakness    HPI:  Patient has been working in therapy to improve strength, endurance, and ADLs.  Patient continues to work toward goals. She is on prophylactic atb therapy with no adverse reactions.  No new concerns today.  Denies n/v/f/c pain.        Objective  Vital signs: 121/72,78,93%    Physical Exam  Constitutional:       General: She is not in acute distress.  Eyes:      Extraocular Movements: Extraocular movements intact.   Cardiovascular:      Rate and Rhythm: Normal rate and regular rhythm.   Pulmonary:      Effort: Pulmonary effort is normal.      Breath sounds: Normal breath sounds.   Abdominal:      General: Bowel sounds are normal.      Palpations: Abdomen is soft.   Musculoskeletal:      Cervical back: Neck supple.      Right lower leg: No edema.      Left lower leg: No edema.   Skin:     Comments: Surgical dressing, clean, dry and intact   Neurological:      Mental Status: She is alert.      Motor: Weakness present.   Psychiatric:         Mood and Affect: Mood normal.         Behavior: Behavior is cooperative.         Assessment/Plan  Problem List Items Addressed This Visit       COPD without exacerbation (CMS/HCC)     Stable, monitor for shortness of breath and wheezing.         Dementia (CMS/[facility])     Assist with care as needed  Donepezil  Monitor         Depression     Monitor mood and behaviors.  Duloxetine         Hypertension, essential     Monitor blood pressure  Losartan potassium  Toprol-XL         Weakness     Continue to work towards goals in therapy established         Type 2 diabetes mellitus without complication, with long-term current use of insulin (CMS/HCC)     Monitor glucoscan          Medications, treatments, and labs reviewed  Continue medications and treatments as  listed in EMR    Scribe Attestation  I, Shalini Quiroz   attest that this documentation has been prepared under the direction and in the presence of Sean Cloud MD.     Provider Attestation - Scribe documentation  All medical record entries made by the Scribe were at my direction and personally dictated by me. I have reviewed the chart and agree that the record accurately reflects my personal performance of the history, physical exam, discussion and plan.   Sean Cloud MD      Electronically Signed By: Sean Cloud MD   2/19/24  6:10 PM

## 2024-02-19 NOTE — TELEPHONE ENCOUNTER
Spoke with Kyakay at [facility].  Elida is doing well.  She reports that they are changing the patient's dressing over the old ileostomy site once per day.   They do note serous fluid.  Denies redness, induration, and purulent fluids.  No fevers/chills.  Patient does not have any pain.  Kaykay reports that the patient is moving her bowels but unsure how often.  She reports that the patient has been eating well and no reports of n/v.  I provided my name and office number in case they need anything.  Radha Meyer RN      From: Felipe Shafer   Sent: Thursday, February 15, 2024 1:47 PM  To: Radha Meyer <[email or web address]>  Subject: Elida Benson MRN [id number]     Sol nurse manager at [facility] returned your call  Sol phone [phone]

## 2024-02-20 ENCOUNTER — NURSING HOME VISIT (OUTPATIENT)
Dept: POST ACUTE CARE | Facility: EXTERNAL LOCATION | Age: 79
End: 2024-02-20
Payer: MEDICARE

## 2024-02-20 DIAGNOSIS — I10 HYPERTENSION, ESSENTIAL: ICD-10-CM

## 2024-02-20 DIAGNOSIS — F02.A0 MILD ALZHEIMER'S DEMENTIA, UNSPECIFIED TIMING OF DEMENTIA ONSET, UNSPECIFIED WHETHER BEHAVIORAL, PSYCHOTIC, OR MOOD DISTURBANCE OR ANXIETY (MULTI): ICD-10-CM

## 2024-02-20 DIAGNOSIS — J44.9 COPD WITHOUT EXACERBATION (MULTI): ICD-10-CM

## 2024-02-20 DIAGNOSIS — R53.1 WEAKNESS: ICD-10-CM

## 2024-02-20 DIAGNOSIS — F32.A DEPRESSION, UNSPECIFIED DEPRESSION TYPE: ICD-10-CM

## 2024-02-20 DIAGNOSIS — G30.9 MILD ALZHEIMER'S DEMENTIA, UNSPECIFIED TIMING OF DEMENTIA ONSET, UNSPECIFIED WHETHER BEHAVIORAL, PSYCHOTIC, OR MOOD DISTURBANCE OR ANXIETY (MULTI): ICD-10-CM

## 2024-02-20 PROCEDURE — 99308 SBSQ NF CARE LOW MDM 20: CPT | Performed by: INTERNAL MEDICINE

## 2024-02-20 NOTE — PROGRESS NOTES
PROGRESS NOTE    Subjective   Chief complaint: Elida Benson is a 78 y.o. female who is an acute skilled patient being seen and evaluated for weakness    HPI:  HPI  Patient is working in therapy following hospitalization for diverticulitis.  Therapy is working with patient on therapeutic activities, skilled interventions focused on facilitation of position in space and transfer training.  Patient does require min assist to complete transfers.  Patient was seen and examined at bedside, appears to be in no acute distress.  Denies chest pain or shortness of breath.  Afebrile.    Objective   Vital signs: 126/76, 97.6, 74, 18, 97%    Physical Exam  Constitutional:       General: She is not in acute distress.  Eyes:      Extraocular Movements: Extraocular movements intact.   Cardiovascular:      Rate and Rhythm: Normal rate and regular rhythm.   Pulmonary:      Effort: Pulmonary effort is normal.      Breath sounds: Normal breath sounds.   Abdominal:      General: Bowel sounds are normal.      Palpations: Abdomen is soft.   Musculoskeletal:      Cervical back: Neck supple.      Left lower leg: Edema present.   Neurological:      Mental Status: She is alert.      Motor: Weakness present.   Psychiatric:         Mood and Affect: Mood normal.         Behavior: Behavior is cooperative.         Assessment/Plan   Problem List Items Addressed This Visit       COPD without exacerbation (CMS/HCC)     Stable, monitor for shortness of breath and wheezing.         Dementia (CMS/HCC)     Assist with care as needed  Donepezil  Monitor         Diverticulitis, colon     Status post ileostomy closure  F/u with colorectal surgery.  Monitor surgical incision         Hypertension, essential     Blood pressure is at goal.  Monitor blood pressure  Losartan potassium  Toprol-XL         Weakness - Primary     Continue to progress towards goals in therapy          Medications, treatments, and labs reviewed  Continue medications and treatments  as listed in EMR      Scribe Attestation  I, Shalini Hernandez   attest that this documentation has been prepared under the direction and in the presence of MI Triplett    Provider Attestation - Scribe documentation  All medical record entries made by the Scribe were at my direction and personally dictated by me. I have reviewed the chart and agree that the record accurately reflects my personal performance of the history, physical exam, discussion and plan.   MI Triplett

## 2024-02-20 NOTE — LETTER
Patient: Elida Benson  : 1945    Encounter Date: 2024    PROGRESS NOTE    Subjective  Chief complaint: Elida Benson is a 78 y.o. female who is an acute skilled patient being seen and evaluated for weakness    HPI:  Patient has been working in therapy to improve strength, endurance, and ADLs.  Patient continues to work toward goals.  No new concerns today.  Denies n/v/f/c pain.        Objective  Vital signs: 101/64,76,93%    Physical Exam  Constitutional:       General: She is not in acute distress.  Eyes:      Extraocular Movements: Extraocular movements intact.   Cardiovascular:      Rate and Rhythm: Normal rate and regular rhythm.   Pulmonary:      Effort: Pulmonary effort is normal.      Breath sounds: Normal breath sounds.   Abdominal:      General: Bowel sounds are normal.      Palpations: Abdomen is soft.   Musculoskeletal:      Cervical back: Neck supple.      Right lower leg: No edema.      Left lower leg: No edema.   Skin:     Comments: Surgical dressing, clean, dry and intact   Neurological:      Mental Status: She is alert.      Motor: Weakness present.   Psychiatric:         Mood and Affect: Mood normal.         Behavior: Behavior is cooperative.         Assessment/Plan  Problem List Items Addressed This Visit       COPD without exacerbation (CMS/HCC)     Stable on RA  monitor for shortness of breath and wheezing.         Dementia (CMS/HCC)     Assist with care as needed  Donepezil  Monitor         Depression     Monitor mood and behaviors.  Duloxetine         Hypertension, essential     Monitor blood pressure  Losartan potassium  Toprol-XL         Weakness     Continue to progress towards goals in therapy          Medications, treatments, and labs reviewed  Continue medications and treatments as listed in EMR    Scribe Attestation  I, Shalini Quiroz   attest that this documentation has been prepared under the direction and in the presence of Sean Cloud MD.      Provider Attestation - Scribe documentation  All medical record entries made by the Scribe were at my direction and personally dictated by me. I have reviewed the chart and agree that the record accurately reflects my personal performance of the history, physical exam, discussion and plan.   Sean Cloud MD      Electronically Signed By: Sean Cloud MD   2/20/24  5:25 PM

## 2024-02-20 NOTE — PROGRESS NOTES
PROGRESS NOTE    Subjective   Chief complaint: Elida Benson is a 78 y.o. female who is an acute skilled patient being seen and evaluated for weakness    HPI:  Patient has been working in therapy to improve strength, endurance, and ADLs.  Patient continues to work toward goals.  No new concerns today.  Denies n/v/f/c pain.        Objective   Vital signs: 101/64,76,93%    Physical Exam  Constitutional:       General: She is not in acute distress.  Eyes:      Extraocular Movements: Extraocular movements intact.   Cardiovascular:      Rate and Rhythm: Normal rate and regular rhythm.   Pulmonary:      Effort: Pulmonary effort is normal.      Breath sounds: Normal breath sounds.   Abdominal:      General: Bowel sounds are normal.      Palpations: Abdomen is soft.   Musculoskeletal:      Cervical back: Neck supple.      Right lower leg: No edema.      Left lower leg: No edema.   Skin:     Comments: Surgical dressing, clean, dry and intact   Neurological:      Mental Status: She is alert.      Motor: Weakness present.   Psychiatric:         Mood and Affect: Mood normal.         Behavior: Behavior is cooperative.         Assessment/Plan   Problem List Items Addressed This Visit       COPD without exacerbation (CMS/HCC)     Stable on RA  monitor for shortness of breath and wheezing.         Dementia (CMS/HCC)     Assist with care as needed  Donepezil  Monitor         Depression     Monitor mood and behaviors.  Duloxetine         Hypertension, essential     Monitor blood pressure  Losartan potassium  Toprol-XL         Weakness     Continue to progress towards goals in therapy          Medications, treatments, and labs reviewed  Continue medications and treatments as listed in EMR    Scribe Attestation  I, Shalini Quiroz   attest that this documentation has been prepared under the direction and in the presence of Sean Cloud MD.     Provider Attestation - Scribe documentation  All medical record entries made  by the Scribe were at my direction and personally dictated by me. I have reviewed the chart and agree that the record accurately reflects my personal performance of the history, physical exam, discussion and plan.   Sean Cloud MD

## 2024-02-21 ENCOUNTER — NURSING HOME VISIT (OUTPATIENT)
Dept: POST ACUTE CARE | Facility: EXTERNAL LOCATION | Age: 79
End: 2024-02-21
Payer: MEDICARE

## 2024-02-21 DIAGNOSIS — R53.1 WEAKNESS: Primary | ICD-10-CM

## 2024-02-21 DIAGNOSIS — K57.32 DIVERTICULITIS, COLON: ICD-10-CM

## 2024-02-21 DIAGNOSIS — I10 HYPERTENSION, ESSENTIAL: ICD-10-CM

## 2024-02-21 DIAGNOSIS — F02.A0 MILD ALZHEIMER'S DEMENTIA, UNSPECIFIED TIMING OF DEMENTIA ONSET, UNSPECIFIED WHETHER BEHAVIORAL, PSYCHOTIC, OR MOOD DISTURBANCE OR ANXIETY (MULTI): ICD-10-CM

## 2024-02-21 DIAGNOSIS — J44.9 COPD WITHOUT EXACERBATION (MULTI): ICD-10-CM

## 2024-02-21 DIAGNOSIS — G30.9 MILD ALZHEIMER'S DEMENTIA, UNSPECIFIED TIMING OF DEMENTIA ONSET, UNSPECIFIED WHETHER BEHAVIORAL, PSYCHOTIC, OR MOOD DISTURBANCE OR ANXIETY (MULTI): ICD-10-CM

## 2024-02-21 DIAGNOSIS — I48.11 LONGSTANDING PERSISTENT ATRIAL FIBRILLATION (MULTI): ICD-10-CM

## 2024-02-21 PROCEDURE — 99309 SBSQ NF CARE MODERATE MDM 30: CPT | Performed by: NURSE PRACTITIONER

## 2024-02-21 NOTE — LETTER
Patient: Elida Benson  : 1945    Encounter Date: 2024    PROGRESS NOTE    Subjective  Chief complaint: Elida Benson is a 78 y.o. female who is an acute skilled patient being seen and evaluated for weakness    HPI:  HPI  Patient is currently skilled for therapy following hospitalization.  Patient is working on gait training and transfers.  Patient is able to ambulate with walker and CGA for 50 feet.  Patient does require moderate assistance for transfers to be completed.  Patient was seen and examined at bedside, appears to be in no acute distress.  Denies chest pain or shortness of breath.  Patient is currently stable.  Reports no new concerns at this time.    Objective  Vital signs: 110/60, 98.6, 68, 18, 95%    Physical Exam  Constitutional:       General: She is not in acute distress.  Eyes:      Extraocular Movements: Extraocular movements intact.   Cardiovascular:      Rate and Rhythm: Normal rate and regular rhythm.   Pulmonary:      Effort: Pulmonary effort is normal.      Breath sounds: Normal breath sounds.   Abdominal:      General: There is no distension.      Palpations: Abdomen is soft.   Musculoskeletal:      Cervical back: Neck supple.      Right lower leg: Edema present.      Left lower leg: Edema present.   Skin:     Comments: Surgical dressing, clean, dry and intact   Neurological:      Mental Status: She is alert.      Motor: Weakness present.   Psychiatric:         Mood and Affect: Mood normal.         Behavior: Behavior is cooperative.         Assessment/Plan  Problem List Items Addressed This Visit       COPD without exacerbation (CMS/HCC)     Stable, monitor for shortness of breath and wheezing.         Dementia (CMS/HCC)     Assist with care as needed  Donepezil  Monitor         Hypertension, essential     Monitor blood pressure  Losartan potassium  Toprol-XL  BP at goal         Weakness - Primary     Continue to progress towards goals in therapy         Atrial  fibrillation (CMS/[facility])     Amiodarone  Monitor heart rate         Diverticulitis, colon     Status post ileostomy closure  F/u with colorectal surgery.  Monitor surgical incision          Medications, treatments, and labs reviewed  Continue medications and treatments as listed in EMR      Scribe Attestation  Sintia SHABAZZ Scribe   attest that this documentation has been prepared under the direction and in the presence of MI Triplett    Provider Attestation - Scribe documentation  All medical record entries made by the Scribe were at my direction and personally dictated by me. I have reviewed the chart and agree that the record accurately reflects my personal performance of the history, physical exam, discussion and plan.   MI Triplett        Electronically Signed By: MI Triplett   2/27/24  7:50 PM

## 2024-02-22 ENCOUNTER — NURSING HOME VISIT (OUTPATIENT)
Dept: POST ACUTE CARE | Facility: EXTERNAL LOCATION | Age: 79
End: 2024-02-22
Payer: MEDICARE

## 2024-02-22 DIAGNOSIS — F02.A0 MILD ALZHEIMER'S DEMENTIA, UNSPECIFIED TIMING OF DEMENTIA ONSET, UNSPECIFIED WHETHER BEHAVIORAL, PSYCHOTIC, OR MOOD DISTURBANCE OR ANXIETY (MULTI): ICD-10-CM

## 2024-02-22 DIAGNOSIS — G30.9 MILD ALZHEIMER'S DEMENTIA, UNSPECIFIED TIMING OF DEMENTIA ONSET, UNSPECIFIED WHETHER BEHAVIORAL, PSYCHOTIC, OR MOOD DISTURBANCE OR ANXIETY (MULTI): ICD-10-CM

## 2024-02-22 DIAGNOSIS — R53.1 WEAKNESS: Primary | ICD-10-CM

## 2024-02-22 DIAGNOSIS — K57.32 DIVERTICULITIS, COLON: ICD-10-CM

## 2024-02-22 DIAGNOSIS — I48.11 LONGSTANDING PERSISTENT ATRIAL FIBRILLATION (MULTI): ICD-10-CM

## 2024-02-22 DIAGNOSIS — J44.9 COPD WITHOUT EXACERBATION (MULTI): ICD-10-CM

## 2024-02-22 PROCEDURE — 99308 SBSQ NF CARE LOW MDM 20: CPT | Performed by: INTERNAL MEDICINE

## 2024-02-22 NOTE — PROGRESS NOTES
PROGRESS NOTE    Subjective   Chief complaint: Elida Benson is a 78 y.o. female who is an acute skilled patient being seen and evaluated for weakness    HPI:  HPI  Patient is continue to work in therapy due to generalized weakness.  Patient is working on gait training, able to ambulate 35 feet with front wheel walker and wheelchair following.  Noted that patient has decreased step length and height.  Patient is performing transfers at front wheel walker, requiring mod assist to min assist.  Patient was seen and examined at bedside, appears to be in no acute distress.  Nursing staff voicing no new concerns at this time.    Objective   Vital signs: 103/67, 97.4, 67, 18, 93%    Physical Exam  Constitutional:       General: She is not in acute distress.  Eyes:      Extraocular Movements: Extraocular movements intact.   Cardiovascular:      Rate and Rhythm: Normal rate and regular rhythm.   Pulmonary:      Effort: Pulmonary effort is normal.      Breath sounds: Normal breath sounds.   Abdominal:      General: Bowel sounds are normal.      Palpations: Abdomen is soft.   Musculoskeletal:      Cervical back: Neck supple.      Right lower leg: No edema.      Left lower leg: No edema.   Skin:     Comments: Surgical dressing, clean, dry and intact   Neurological:      Mental Status: She is alert.      Motor: Weakness present.   Psychiatric:         Mood and Affect: Mood normal.         Behavior: Behavior is cooperative.         Assessment/Plan   Problem List Items Addressed This Visit       COPD without exacerbation (CMS/HCC)     Stable, monitor for shortness of breath and wheezing.         Dementia (CMS/HCC)     Assist with care as needed  Donepezil  Monitor         Weakness - Primary     Continue working in therapy         Atrial fibrillation (CMS/HCC)     Amiodarone  Monitor heart rate         Diverticulitis, colon     Status post ileostomy closure  F/u with colorectal surgery.  Monitor surgical incision           Medications, treatments, and labs reviewed  Continue medications and treatments as listed in EMR      Scribe Attestation  I, Shalini Hernandez   attest that this documentation has been prepared under the direction and in the presence of Sean Cloud MD    Provider Attestation - Scribe documentation  All medical record entries made by the Scribe were at my direction and personally dictated by me. I have reviewed the chart and agree that the record accurately reflects my personal performance of the history, physical exam, discussion and plan.   Sean Cloud MD

## 2024-02-22 NOTE — LETTER
Patient: Elida Benson  : 1945    Encounter Date: 2024    PROGRESS NOTE    Subjective  Chief complaint: Elida Benson is a 78 y.o. female who is an acute skilled patient being seen and evaluated for weakness    HPI:  HPI  Patient is continue to work in therapy due to generalized weakness.  Patient is working on gait training, able to ambulate 35 feet with front wheel walker and wheelchair following.  Noted that patient has decreased step length and height.  Patient is performing transfers at front wheel walker, requiring mod assist to min assist.  Patient was seen and examined at bedside, appears to be in no acute distress.  Nursing staff voicing no new concerns at this time.    Objective  Vital signs: 103/67, 97.4, 67, 18, 93%    Physical Exam  Constitutional:       General: She is not in acute distress.  Eyes:      Extraocular Movements: Extraocular movements intact.   Cardiovascular:      Rate and Rhythm: Normal rate and regular rhythm.   Pulmonary:      Effort: Pulmonary effort is normal.      Breath sounds: Normal breath sounds.   Abdominal:      General: Bowel sounds are normal.      Palpations: Abdomen is soft.   Musculoskeletal:      Cervical back: Neck supple.      Right lower leg: No edema.      Left lower leg: No edema.   Skin:     Comments: Surgical dressing, clean, dry and intact   Neurological:      Mental Status: She is alert.      Motor: Weakness present.   Psychiatric:         Mood and Affect: Mood normal.         Behavior: Behavior is cooperative.         Assessment/Plan  Problem List Items Addressed This Visit       COPD without exacerbation (CMS/HCC)     Stable, monitor for shortness of breath and wheezing.         Dementia (CMS/HCC)     Assist with care as needed  Donepezil  Monitor         Weakness - Primary     Continue working in therapy         Atrial fibrillation (CMS/HCC)     Amiodarone  Monitor heart rate         Diverticulitis, colon     Status post ileostomy  closure  F/u with colorectal surgery.  Monitor surgical incision          Medications, treatments, and labs reviewed  Continue medications and treatments as listed in EMR      Scribe Attestation  I, Shalini Hernandez   attest that this documentation has been prepared under the direction and in the presence of Sean Cloud MD    Provider Attestation - Scribe documentation  All medical record entries made by the Scribe were at my direction and personally dictated by me. I have reviewed the chart and agree that the record accurately reflects my personal performance of the history, physical exam, discussion and plan.   Sean Cloud MD        Electronically Signed By: Sean Cloud MD   2/22/24  4:21 PM

## 2024-02-22 NOTE — PROGRESS NOTES
PROGRESS NOTE    Subjective   Chief complaint: Elida Benson is a 78 y.o. female who is an acute skilled patient being seen and evaluated for weakness    HPI:  HPI  This patient was admitted to SNF for therapy due to generalized weakness and for medical management after recent hospitalization for diverticulitis status post ileostomy closure and UTI.  Patient tolerating antibiotic without adverse effects.  Therapy to evaluate and establish plan of care and goals.  Patient seen and examined at bedside in no apparent distress.  Nursing staff voices no new concerns today.  Patient denies nausea vomiting fever chills.  Patient denies chest pain or shortness of breath.  Nursing staff voicing no new concerns at this time    Objective   Vital signs: 108/71, 98.6, 72, 18, 95%    Physical Exam  Constitutional:       General: She is not in acute distress.  Eyes:      Extraocular Movements: Extraocular movements intact.   Cardiovascular:      Rate and Rhythm: Normal rate and regular rhythm.   Pulmonary:      Effort: Pulmonary effort is normal.      Breath sounds: Normal breath sounds.   Abdominal:      General: Bowel sounds are normal.      Palpations: Abdomen is soft.   Musculoskeletal:      Cervical back: Neck supple.      Right lower leg: No edema.      Left lower leg: No edema.   Neurological:      Mental Status: She is alert.      Motor: Weakness present.   Psychiatric:         Mood and Affect: Mood normal.         Behavior: Behavior is cooperative.         Assessment/Plan   Problem List Items Addressed This Visit       COPD without exacerbation (CMS/HCC)     Stable, monitor for shortness of breath and wheezing.         Dementia (CMS/HCC)     Assist with care as needed  Donepezil  Monitor         Hypertension, essential     Monitor blood pressure  Losartan potassium  Toprol-XL  BP at goal         Weakness - Primary     Therapy to evaluate and establish plan of care         Diverticulitis, colon     Status post  ileostomy closure  F/u with colorectal surgery.  Monitor surgical incision          Medications, treatments, and labs reviewed  Continue medications and treatments as listed in EMR      Scribe Attestation  ISintia Scribe   attest that this documentation has been prepared under the direction and in the presence of MI Triplett    Provider Attestation - Scribe documentation  All medical record entries made by the Scribe were at my direction and personally dictated by me. I have reviewed the chart and agree that the record accurately reflects my personal performance of the history, physical exam, discussion and plan.   MI Triplett

## 2024-02-23 ENCOUNTER — NURSING HOME VISIT (OUTPATIENT)
Dept: POST ACUTE CARE | Facility: EXTERNAL LOCATION | Age: 79
End: 2024-02-23
Payer: MEDICARE

## 2024-02-23 DIAGNOSIS — I48.11 LONGSTANDING PERSISTENT ATRIAL FIBRILLATION (MULTI): ICD-10-CM

## 2024-02-23 DIAGNOSIS — J44.9 COPD WITHOUT EXACERBATION (MULTI): ICD-10-CM

## 2024-02-23 DIAGNOSIS — I10 HYPERTENSION, ESSENTIAL: ICD-10-CM

## 2024-02-23 DIAGNOSIS — G30.9 MILD ALZHEIMER'S DEMENTIA, UNSPECIFIED TIMING OF DEMENTIA ONSET, UNSPECIFIED WHETHER BEHAVIORAL, PSYCHOTIC, OR MOOD DISTURBANCE OR ANXIETY (MULTI): ICD-10-CM

## 2024-02-23 DIAGNOSIS — F02.A0 MILD ALZHEIMER'S DEMENTIA, UNSPECIFIED TIMING OF DEMENTIA ONSET, UNSPECIFIED WHETHER BEHAVIORAL, PSYCHOTIC, OR MOOD DISTURBANCE OR ANXIETY (MULTI): ICD-10-CM

## 2024-02-23 DIAGNOSIS — R53.1 WEAKNESS: Primary | ICD-10-CM

## 2024-02-23 PROCEDURE — 99309 SBSQ NF CARE MODERATE MDM 30: CPT | Performed by: NURSE PRACTITIONER

## 2024-02-23 NOTE — LETTER
Patient: Elida Benson  : 1945    Encounter Date: 2024    PROGRESS NOTE    Subjective  Chief complaint: Elida Benson is a 78 y.o. female who is an acute skilled patient being seen and evaluated for weakness    HPI:  HPI  Patient is continuing therapy due to generalized weakness.  Patient is working on gait training, transfers.  Patient is able to ambulate up to 50 feet with a walker requiring CGA.  Patient is performing transfers with moderate assistance.  Patient was seen and examined at bedside, appears to be in no acute distress.  Denies chest pain or shortness of breath.  Afebrile.  Nursing staff voicing no new concerns at this time.    Objective  Vital signs: 128/88, 98.3, 69, 18, 92%    Physical Exam  Constitutional:       General: She is not in acute distress.  Cardiovascular:      Rate and Rhythm: Normal rate and regular rhythm.   Pulmonary:      Effort: Pulmonary effort is normal.      Breath sounds: Normal breath sounds.   Abdominal:      General: Bowel sounds are normal. There is no distension.      Palpations: Abdomen is soft.      Tenderness: There is no abdominal tenderness.   Musculoskeletal:      Cervical back: Neck supple.      Right lower leg: No edema.      Left lower leg: No edema.   Neurological:      Mental Status: She is alert.      Motor: Weakness present.   Psychiatric:         Behavior: Behavior is cooperative.         Assessment/Plan  Problem List Items Addressed This Visit       COPD without exacerbation (CMS/HCC)     Stable, monitor for shortness of breath and wheezing.         Dementia (CMS/HCC)     Assist with care as needed  Donepezil  Monitor         Hypertension, essential     Monitor blood pressure  Losartan potassium  Toprol-XL  BP at goal         Weakness - Primary     Continue working towards goals in therapy         Atrial fibrillation (CMS/HCC)     Amiodarone  Monitor heart rate  Eliquis  Bleeding precautions          Medications, treatments, and labs  reviewed  Continue medications and treatments as listed in EMR      Scribe Attestation  Sintia SHABAZZ Scribe   attest that this documentation has been prepared under the direction and in the presence of MI Triplett    Provider Attestation - Scribe documentation  All medical record entries made by the Scribe were at my direction and personally dictated by me. I have reviewed the chart and agree that the record accurately reflects my personal performance of the history, physical exam, discussion and plan.   MI Triplett        Electronically Signed By: MI Triplett   2/29/24 12:30 AM

## 2024-02-26 ENCOUNTER — NURSING HOME VISIT (OUTPATIENT)
Dept: POST ACUTE CARE | Facility: EXTERNAL LOCATION | Age: 79
End: 2024-02-26
Payer: MEDICARE

## 2024-02-26 DIAGNOSIS — I10 HYPERTENSION, ESSENTIAL: ICD-10-CM

## 2024-02-26 DIAGNOSIS — J44.9 COPD WITHOUT EXACERBATION (MULTI): ICD-10-CM

## 2024-02-26 DIAGNOSIS — R53.1 WEAKNESS: ICD-10-CM

## 2024-02-26 DIAGNOSIS — I48.11 LONGSTANDING PERSISTENT ATRIAL FIBRILLATION (MULTI): ICD-10-CM

## 2024-02-26 DIAGNOSIS — G30.9 MILD ALZHEIMER'S DEMENTIA, UNSPECIFIED TIMING OF DEMENTIA ONSET, UNSPECIFIED WHETHER BEHAVIORAL, PSYCHOTIC, OR MOOD DISTURBANCE OR ANXIETY (MULTI): ICD-10-CM

## 2024-02-26 DIAGNOSIS — F02.A0 MILD ALZHEIMER'S DEMENTIA, UNSPECIFIED TIMING OF DEMENTIA ONSET, UNSPECIFIED WHETHER BEHAVIORAL, PSYCHOTIC, OR MOOD DISTURBANCE OR ANXIETY (MULTI): ICD-10-CM

## 2024-02-26 DIAGNOSIS — F32.A DEPRESSION, UNSPECIFIED DEPRESSION TYPE: ICD-10-CM

## 2024-02-26 PROCEDURE — 99308 SBSQ NF CARE LOW MDM 20: CPT | Performed by: INTERNAL MEDICINE

## 2024-02-26 NOTE — PROGRESS NOTES
PROGRESS NOTE    Subjective   Chief complaint: Elida Benson is a 78 y.o. female who is an acute skilled patient being seen and evaluated for weakness    HPI:  HPI  Patient is currently skilled for therapy following hospitalization.  Patient is working on gait training and transfers.  Patient is able to ambulate with walker and CGA for 50 feet.  Patient does require moderate assistance for transfers to be completed.  Patient was seen and examined at bedside, appears to be in no acute distress.  Denies chest pain or shortness of breath.  Patient is currently stable.  Reports no new concerns at this time.    Objective   Vital signs: 110/60, 98.6, 68, 18, 95%    Physical Exam  Constitutional:       General: She is not in acute distress.  Eyes:      Extraocular Movements: Extraocular movements intact.   Cardiovascular:      Rate and Rhythm: Normal rate and regular rhythm.   Pulmonary:      Effort: Pulmonary effort is normal.      Breath sounds: Normal breath sounds.   Abdominal:      General: There is no distension.      Palpations: Abdomen is soft.   Musculoskeletal:      Cervical back: Neck supple.      Right lower leg: Edema present.      Left lower leg: Edema present.   Skin:     Comments: Surgical dressing, clean, dry and intact   Neurological:      Mental Status: She is alert.      Motor: Weakness present.   Psychiatric:         Mood and Affect: Mood normal.         Behavior: Behavior is cooperative.         Assessment/Plan   Problem List Items Addressed This Visit       COPD without exacerbation (CMS/HCC)     Stable, monitor for shortness of breath and wheezing.         Dementia (CMS/HCC)     Assist with care as needed  Donepezil  Monitor         Hypertension, essential     Monitor blood pressure  Losartan potassium  Toprol-XL  BP at goal         Weakness - Primary     Continue to progress towards goals in therapy         Atrial fibrillation (CMS/HCC)     Amiodarone  Monitor heart rate         Diverticulitis,  colon     Status post ileostomy closure  F/u with colorectal surgery.  Monitor surgical incision          Medications, treatments, and labs reviewed  Continue medications and treatments as listed in EMR      Scribe Attestation  I, Shalini Hernandez   attest that this documentation has been prepared under the direction and in the presence of MI Triplett    Provider Attestation - Scribe documentation  All medical record entries made by the Scribe were at my direction and personally dictated by me. I have reviewed the chart and agree that the record accurately reflects my personal performance of the history, physical exam, discussion and plan.   MI Triplett

## 2024-02-26 NOTE — ASSESSMENT & PLAN NOTE
COVID-19 Pre-surgery screenin. Do you have an undiagnosed respiratory illness or symptoms such as coughing or sneezing? NO    2. Do you have an unexplained fever greater than 100.4 degrees Fahrenheit or 38 degrees Celsius?     NO    3. Have you had direct exposure to a patient who tested positive for Covid-19?    NO    4. Have you had any loss of your sense of taste or smell? Have you had N/V or sore throat? NO    Patient has been informed of visitor policy and asked to wear a mask upon entering the hospital   YES       Monitor mood and behaviors.  Duloxetine

## 2024-02-26 NOTE — PROGRESS NOTES
PROGRESS NOTE    Subjective   Chief complaint: Elida Benson is a 78 y.o. female who is an acute skilled patient being seen and evaluated for weakness    HPI:  Patient has been working in therapy to improve strength, endurance, and ADLs.  Patient continues to work toward goals. She is ambulating 50' with WW at [facility]. Transfers require mod assist.  No new concerns today.  Denies n/v/f/c pain.        Objective   Vital signs: 125/78,72,95%    Physical Exam  Constitutional:       General: She is not in acute distress.  Eyes:      Extraocular Movements: Extraocular movements intact.   Cardiovascular:      Rate and Rhythm: Normal rate and regular rhythm.   Pulmonary:      Effort: Pulmonary effort is normal.      Breath sounds: Normal breath sounds.   Abdominal:      General: Bowel sounds are normal.      Palpations: Abdomen is soft.   Musculoskeletal:      Cervical back: Neck supple.      Right lower leg: No edema.      Left lower leg: No edema.   Skin:     Comments: Surgical dressing, clean, dry and intact   Neurological:      Mental Status: She is alert.      Motor: Weakness present.   Psychiatric:         Mood and Affect: Mood normal.         Behavior: Behavior is cooperative.         Assessment/Plan   Problem List Items Addressed This Visit       COPD without exacerbation (CMS/HCC)     Stable  monitor for shortness of breath and wheezing.         Dementia (CMS/[facility])     Assist with care as needed  Donepezil  Monitor         Depression     Monitor mood and behaviors.  Duloxetine         Hypertension, essential     Monitor blood pressure  Losartan potassium  Toprol-XL  BP at goal         Weakness     Continue to progress towards goals in therapy         Atrial fibrillation (CMS/HCC)     Amiodarone  eliquis  Monitor heart rate          Medications, treatments, and labs reviewed  Continue medications and treatments as listed in EMR    Scribe Attestation  I, Shalini Quiroz   attest that this documentation has  been prepared under the direction and in the presence of Sean Cloud MD.     Provider Attestation - Scribe documentation  All medical record entries made by the Scribe were at my direction and personally dictated by me. I have reviewed the chart and agree that the record accurately reflects my personal performance of the history, physical exam, discussion and plan.   Sean Cloud MD

## 2024-02-26 NOTE — LETTER
Patient: Elida Benson  : 1945    Encounter Date: 2024    PROGRESS NOTE    Subjective  Chief complaint: Elida Benson is a 78 y.o. female who is an acute skilled patient being seen and evaluated for weakness    HPI:  Patient has been working in therapy to improve strength, endurance, and ADLs.  Patient continues to work toward goals. She is ambulating 50' with WW at [facility]. Transfers require mod assist.  No new concerns today.  Denies n/v/f/c pain.        Objective  Vital signs: 125/78,72,95%    Physical Exam  Constitutional:       General: She is not in acute distress.  Eyes:      Extraocular Movements: Extraocular movements intact.   Cardiovascular:      Rate and Rhythm: Normal rate and regular rhythm.   Pulmonary:      Effort: Pulmonary effort is normal.      Breath sounds: Normal breath sounds.   Abdominal:      General: Bowel sounds are normal.      Palpations: Abdomen is soft.   Musculoskeletal:      Cervical back: Neck supple.      Right lower leg: No edema.      Left lower leg: No edema.   Skin:     Comments: Surgical dressing, clean, dry and intact   Neurological:      Mental Status: She is alert.      Motor: Weakness present.   Psychiatric:         Mood and Affect: Mood normal.         Behavior: Behavior is cooperative.         Assessment/Plan  Problem List Items Addressed This Visit       COPD without exacerbation (CMS/HCC)     Stable  monitor for shortness of breath and wheezing.         Dementia (CMS/HCC)     Assist with care as needed  Donepezil  Monitor         Depression     Monitor mood and behaviors.  Duloxetine         Hypertension, essential     Monitor blood pressure  Losartan potassium  Toprol-XL  BP at goal         Weakness     Continue to progress towards goals in therapy         Atrial fibrillation (CMS/HCC)     Amiodarone  eliquis  Monitor heart rate          Medications, treatments, and labs reviewed  Continue medications and treatments as listed in EMR    Scribe  Attestation  I, Shalini Quiroz   attest that this documentation has been prepared under the direction and in the presence of Sean Cloud MD.     Provider Attestation - Scribe documentation  All medical record entries made by the Scribe were at my direction and personally dictated by me. I have reviewed the chart and agree that the record accurately reflects my personal performance of the history, physical exam, discussion and plan.   Sean Cloud MD      Electronically Signed By: Sean Cloud MD   2/26/24  6:57 PM

## 2024-02-27 ENCOUNTER — NURSING HOME VISIT (OUTPATIENT)
Dept: POST ACUTE CARE | Facility: EXTERNAL LOCATION | Age: 79
End: 2024-02-27
Payer: MEDICARE

## 2024-02-27 DIAGNOSIS — F32.A DEPRESSION, UNSPECIFIED DEPRESSION TYPE: ICD-10-CM

## 2024-02-27 DIAGNOSIS — R53.1 WEAKNESS: ICD-10-CM

## 2024-02-27 DIAGNOSIS — F02.A0 MILD ALZHEIMER'S DEMENTIA, UNSPECIFIED TIMING OF DEMENTIA ONSET, UNSPECIFIED WHETHER BEHAVIORAL, PSYCHOTIC, OR MOOD DISTURBANCE OR ANXIETY (MULTI): ICD-10-CM

## 2024-02-27 DIAGNOSIS — I10 HYPERTENSION, ESSENTIAL: ICD-10-CM

## 2024-02-27 DIAGNOSIS — J44.9 COPD WITHOUT EXACERBATION (MULTI): ICD-10-CM

## 2024-02-27 DIAGNOSIS — I48.11 LONGSTANDING PERSISTENT ATRIAL FIBRILLATION (MULTI): ICD-10-CM

## 2024-02-27 DIAGNOSIS — G30.9 MILD ALZHEIMER'S DEMENTIA, UNSPECIFIED TIMING OF DEMENTIA ONSET, UNSPECIFIED WHETHER BEHAVIORAL, PSYCHOTIC, OR MOOD DISTURBANCE OR ANXIETY (MULTI): ICD-10-CM

## 2024-02-27 PROCEDURE — 99308 SBSQ NF CARE LOW MDM 20: CPT | Performed by: INTERNAL MEDICINE

## 2024-02-27 NOTE — LETTER
Patient: Elida Benson  : 1945    Encounter Date: 2024    PROGRESS NOTE    Subjective  Chief complaint: Elida Benson is a 78 y.o. female who is an acute skilled patient being seen and evaluated for weakness    HPI:  Patient presents for f/u therapy and general medical care.  Patient seen and examined at [facility].  Therapy has been working with the patient to improve strength, endurance, ADLs, and transfers d/t generalized weakness. She is ambulating 50' with WW at [facility]. Completing transfers with mod assist. Patient has no acute concerns today.      Objective  Vital signs: 129/85,73,98%    Physical Exam  Constitutional:       General: She is not in acute distress.  Eyes:      Extraocular Movements: Extraocular movements intact.   Cardiovascular:      Rate and Rhythm: Normal rate and regular rhythm.   Pulmonary:      Effort: Pulmonary effort is normal.      Breath sounds: Normal breath sounds.   Abdominal:      General: Bowel sounds are normal.      Palpations: Abdomen is soft.   Musculoskeletal:      Cervical back: Neck supple.      Right lower leg: No edema.      Left lower leg: No edema.   Skin:     Comments: Surgical dressing, clean, dry and intact   Neurological:      Mental Status: She is alert.      Motor: Weakness present.   Psychiatric:         Mood and Affect: Mood normal.         Behavior: Behavior is cooperative.         Assessment/Plan  Problem List Items Addressed This Visit       COPD without exacerbation (CMS/HCC)     Stable, monitor for shortness of breath and wheezing.         Dementia (CMS/HCC)     Assist with care as needed  Donepezil  Monitor         Depression     Monitor mood and behaviors.  Duloxetine         Hypertension, essential     Monitor blood pressure  Losartan potassium  Toprol-XL  BP at goal         Weakness     Continue working towards goals in therapy         Atrial fibrillation (CMS/HCC)     Amiodarone  Monitor heart rate  Eliquis  Bleeding precautions           Medications, treatments, and labs reviewed  Continue medications and treatments as listed in EMR    Scribe Attestation  I, Shalini Quiroz   attest that this documentation has been prepared under the direction and in the presence of Sean Cloud MD.     Provider Attestation - Scribe documentation  All medical record entries made by the Scribe were at my direction and personally dictated by me. I have reviewed the chart and agree that the record accurately reflects my personal performance of the history, physical exam, discussion and plan.   Sean Cloud MD      Electronically Signed By: Sean Cloud MD   2/27/24  7:09 PM

## 2024-02-27 NOTE — PROGRESS NOTES
PROGRESS NOTE    Subjective   Chief complaint: Elida Benson is a 78 y.o. female who is an acute skilled patient being seen and evaluated for weakness    HPI:  HPI  Patient is continuing therapy due to generalized weakness.  Patient is working on gait training, transfers.  Patient is able to ambulate up to 50 feet with a walker requiring CGA.  Patient is performing transfers with moderate assistance.  Patient was seen and examined at bedside, appears to be in no acute distress.  Denies chest pain or shortness of breath.  Afebrile.  Nursing staff voicing no new concerns at this time.    Objective   Vital signs: 128/88, 98.3, 69, 18, 92%    Physical Exam  Constitutional:       General: She is not in acute distress.  Cardiovascular:      Rate and Rhythm: Normal rate and regular rhythm.   Pulmonary:      Effort: Pulmonary effort is normal.      Breath sounds: Normal breath sounds.   Abdominal:      General: Bowel sounds are normal. There is no distension.      Palpations: Abdomen is soft.      Tenderness: There is no abdominal tenderness.   Musculoskeletal:      Cervical back: Neck supple.      Right lower leg: No edema.      Left lower leg: No edema.   Neurological:      Mental Status: She is alert.      Motor: Weakness present.   Psychiatric:         Behavior: Behavior is cooperative.         Assessment/Plan   Problem List Items Addressed This Visit       COPD without exacerbation (CMS/HCC)     Stable, monitor for shortness of breath and wheezing.         Dementia (CMS/HCC)     Assist with care as needed  Donepezil  Monitor         Hypertension, essential     Monitor blood pressure  Losartan potassium  Toprol-XL  BP at goal         Weakness - Primary     Continue working towards goals in therapy         Atrial fibrillation (CMS/HCC)     Amiodarone  Monitor heart rate  Eliquis  Bleeding precautions          Medications, treatments, and labs reviewed  Continue medications and treatments as listed in EMR      Scribe  Attestation  I, Shalini Hernandez   attest that this documentation has been prepared under the direction and in the presence of MI Triplett    Provider Attestation - Scribe documentation  All medical record entries made by the Scribe were at my direction and personally dictated by me. I have reviewed the chart and agree that the record accurately reflects my personal performance of the history, physical exam, discussion and plan.   MI Triplett

## 2024-02-27 NOTE — ASSESSMENT & PLAN NOTE
Assist with care as needed  Donepezil  Monitor   Doxycycline Pregnancy And Lactation Text: This medication is Pregnancy Category D and not consider safe during pregnancy. It is also excreted in breast milk but is considered safe for shorter treatment courses.

## 2024-02-27 NOTE — PROGRESS NOTES
PROGRESS NOTE    Subjective   Chief complaint: Elida Benson is a 78 y.o. female who is an acute skilled patient being seen and evaluated for weakness    HPI:  Patient presents for f/u therapy and general medical care.  Patient seen and examined at beside.  Therapy has been working with the patient to improve strength, endurance, ADLs, and transfers d/t generalized weakness. She is ambulating 50' with WW at [facility]. Completing transfers with mod assist. Patient has no acute concerns today.      Objective   Vital signs: 129/85,73,98%    Physical Exam  Constitutional:       General: She is not in acute distress.  Eyes:      Extraocular Movements: Extraocular movements intact.   Cardiovascular:      Rate and Rhythm: Normal rate and regular rhythm.   Pulmonary:      Effort: Pulmonary effort is normal.      Breath sounds: Normal breath sounds.   Abdominal:      General: Bowel sounds are normal.      Palpations: Abdomen is soft.   Musculoskeletal:      Cervical back: Neck supple.      Right lower leg: No edema.      Left lower leg: No edema.   Skin:     Comments: Surgical dressing, clean, dry and intact   Neurological:      Mental Status: She is alert.      Motor: Weakness present.   Psychiatric:         Mood and Affect: Mood normal.         Behavior: Behavior is cooperative.         Assessment/Plan   Problem List Items Addressed This Visit       COPD without exacerbation (CMS/HCC)     Stable, monitor for shortness of breath and wheezing.         Dementia (CMS/HCC)     Assist with care as needed  Donepezil  Monitor         Depression     Monitor mood and behaviors.  Duloxetine         Hypertension, essential     Monitor blood pressure  Losartan potassium  Toprol-XL  BP at goal         Weakness     Continue working towards goals in therapy         Atrial fibrillation (CMS/HCC)     Amiodarone  Monitor heart rate  Eliquis  Bleeding precautions          Medications, treatments, and labs reviewed  Continue medications and  treatments as listed in EMR    Scribe Attestation  IAna Maria Scribe   attest that this documentation has been prepared under the direction and in the presence of Sean Cloud MD.     Provider Attestation - Scribe documentation  All medical record entries made by the Scribe were at my direction and personally dictated by me. I have reviewed the chart and agree that the record accurately reflects my personal performance of the history, physical exam, discussion and plan.   Sean Cloud MD

## 2024-02-28 ENCOUNTER — NURSING HOME VISIT (OUTPATIENT)
Dept: POST ACUTE CARE | Facility: EXTERNAL LOCATION | Age: 79
End: 2024-02-28
Payer: MEDICARE

## 2024-02-28 DIAGNOSIS — I48.11 LONGSTANDING PERSISTENT ATRIAL FIBRILLATION (MULTI): ICD-10-CM

## 2024-02-28 DIAGNOSIS — J44.9 COPD WITHOUT EXACERBATION (MULTI): ICD-10-CM

## 2024-02-28 DIAGNOSIS — R60.9 EDEMA, UNSPECIFIED TYPE: ICD-10-CM

## 2024-02-28 DIAGNOSIS — I10 HYPERTENSION, ESSENTIAL: ICD-10-CM

## 2024-02-28 DIAGNOSIS — G30.9 MILD ALZHEIMER'S DEMENTIA, UNSPECIFIED TIMING OF DEMENTIA ONSET, UNSPECIFIED WHETHER BEHAVIORAL, PSYCHOTIC, OR MOOD DISTURBANCE OR ANXIETY (MULTI): ICD-10-CM

## 2024-02-28 DIAGNOSIS — F02.A0 MILD ALZHEIMER'S DEMENTIA, UNSPECIFIED TIMING OF DEMENTIA ONSET, UNSPECIFIED WHETHER BEHAVIORAL, PSYCHOTIC, OR MOOD DISTURBANCE OR ANXIETY (MULTI): ICD-10-CM

## 2024-02-28 DIAGNOSIS — R53.1 WEAKNESS: Primary | ICD-10-CM

## 2024-02-28 PROCEDURE — 99309 SBSQ NF CARE MODERATE MDM 30: CPT | Performed by: NURSE PRACTITIONER

## 2024-02-28 NOTE — LETTER
Patient: Elida Benson  : 1945    Encounter Date: 2024    PROGRESS NOTE    Subjective  Chief complaint: Elida Benson is a 79 y.o. female who is an acute skilled patient being seen and evaluated for weakness    HPI:  HPI  Patient is continue to work in therapy following recent hospitalization.  Patient is working on gait training and transfers, ambulating with wheeled walker and CGA.  Patient is performing transfers requiring mod assist to complete.  Patient was seen and examined at bedside.  Patient had complaints of edema and increased dyspnea on exertion.  Patient denies chest pain.  Denies fever or chills.    Objective  Vital signs: 114/80, 97.3, 73, 18, 96%    Physical Exam  Constitutional:       General: She is not in acute distress.  Cardiovascular:      Rate and Rhythm: Normal rate and regular rhythm.   Pulmonary:      Effort: Pulmonary effort is normal.      Breath sounds: Normal breath sounds.   Abdominal:      General: Bowel sounds are normal. There is no distension.      Palpations: Abdomen is soft.      Tenderness: There is no abdominal tenderness.   Musculoskeletal:      Cervical back: Neck supple.      Right lower leg: Edema present.      Left lower leg: Edema present.   Neurological:      Mental Status: She is alert.      Motor: Weakness present.   Psychiatric:         Behavior: Behavior is cooperative.         Assessment/Plan  Problem List Items Addressed This Visit       Atrial fibrillation (CMS/[facility])     Amiodarone  Monitor heart rate  Eliquis  Bleeding precautions         COPD without exacerbation (CMS/[facility])     No wheezing  On room air         Dementia (CMS/[facility])     Assist with care as needed  Donepezil  Monitor         Edema     Start Lasix 40 mg and KCl 10 mEq x 4 days  Obtain BMP in 1 week         Hypertension, essential     Monitor blood pressure  Losartan potassium  Toprol-XL  BP at goal         Weakness - Primary     Continue to work towards goals in therapy           Medications, treatments, and labs reviewed  Continue medications and treatments as listed in EMR      Scribe Attestation  I, Shalini Hernandez   attest that this documentation has been prepared under the direction and in the presence of MI Triplett    Provider Attestation - Scribe documentation  All medical record entries made by the Scribe were at my direction and personally dictated by me. I have reviewed the chart and agree that the record accurately reflects my personal performance of the history, physical exam, discussion and plan.   MI Triplett        Electronically Signed By: MI Triplett   3/13/24 12:18 PM

## 2024-02-29 ENCOUNTER — NURSING HOME VISIT (OUTPATIENT)
Dept: POST ACUTE CARE | Facility: EXTERNAL LOCATION | Age: 79
End: 2024-02-29
Payer: MEDICARE

## 2024-02-29 DIAGNOSIS — F02.A0 MILD ALZHEIMER'S DEMENTIA, UNSPECIFIED TIMING OF DEMENTIA ONSET, UNSPECIFIED WHETHER BEHAVIORAL, PSYCHOTIC, OR MOOD DISTURBANCE OR ANXIETY (MULTI): ICD-10-CM

## 2024-02-29 DIAGNOSIS — F32.A DEPRESSION, UNSPECIFIED DEPRESSION TYPE: ICD-10-CM

## 2024-02-29 DIAGNOSIS — I10 HYPERTENSION, ESSENTIAL: ICD-10-CM

## 2024-02-29 DIAGNOSIS — R53.1 WEAKNESS: Primary | ICD-10-CM

## 2024-02-29 DIAGNOSIS — G30.9 MILD ALZHEIMER'S DEMENTIA, UNSPECIFIED TIMING OF DEMENTIA ONSET, UNSPECIFIED WHETHER BEHAVIORAL, PSYCHOTIC, OR MOOD DISTURBANCE OR ANXIETY (MULTI): ICD-10-CM

## 2024-02-29 DIAGNOSIS — J44.9 COPD WITHOUT EXACERBATION (MULTI): ICD-10-CM

## 2024-02-29 DIAGNOSIS — K57.32 DIVERTICULITIS, COLON: ICD-10-CM

## 2024-02-29 PROCEDURE — 99308 SBSQ NF CARE LOW MDM 20: CPT | Performed by: INTERNAL MEDICINE

## 2024-02-29 NOTE — PROGRESS NOTES
PROGRESS NOTE    Subjective   Chief complaint: Elida Benson is a 78 y.o. female who is an acute skilled patient being seen and evaluated for weakness    HPI:  HPI  Patient is working in therapy due to generalized weakness.  Patient is working on bed mobility requiring min assist to CGA.  Patient is able to perform transfers with 2 wheeled walker and min assist.  Patient is ambulating up to 50 feet with 2 wheeled walker and min assist.  Patient is working on stair negotiation, able to ambulate 1 stair with mod assist and both handrails.  Patient seen and examined at bedside, appears to be in no acute distress.  Denies chest pain or shortness of breath.  Denies nausea or vomiting.  Afebrile.  Patient stable.  Reports no new concerns at this time.    Objective   Vital signs: 124/68, 97.7, 76, 18, 96%    Physical Exam  Constitutional:       General: She is not in acute distress.  Eyes:      Extraocular Movements: Extraocular movements intact.   Cardiovascular:      Rate and Rhythm: Normal rate and regular rhythm.   Pulmonary:      Effort: Pulmonary effort is normal.      Breath sounds: Normal breath sounds.   Abdominal:      General: Bowel sounds are normal.      Palpations: Abdomen is soft.   Musculoskeletal:      Cervical back: Neck supple.      Right lower leg: No edema.      Left lower leg: No edema.   Skin:     Comments: Surgical dressing, clean, dry and intact   Neurological:      Mental Status: She is alert.      Motor: Weakness present.   Psychiatric:         Mood and Affect: Mood normal.         Behavior: Behavior is cooperative.         Assessment/Plan   Problem List Items Addressed This Visit       COPD without exacerbation (CMS/HCC)     Stable, monitor for shortness of breath and wheezing.         Dementia (CMS/HCC)     Assist with care as needed  Donepezil  Monitor         Depression     Monitor mood and behaviors.  Duloxetine         Hypertension, essential     Monitor blood pressure  Losartan  potassium  Toprol-XL  BP at goal         Weakness - Primary     Continue in therapy         Diverticulitis, colon     Status post ileostomy closure  F/u with colorectal surgery.          Medications, treatments, and labs reviewed  Continue medications and treatments as listed in EMR      Scribe Attestation  Sintia SHABAZZ Scribe   attest that this documentation has been prepared under the direction and in the presence of Sean Cloud MD    Provider Attestation - Scribe documentation  All medical record entries made by the Scribe were at my direction and personally dictated by me. I have reviewed the chart and agree that the record accurately reflects my personal performance of the history, physical exam, discussion and plan.   Sean Cloud MD

## 2024-02-29 NOTE — LETTER
Patient: Elida Benson  : 1945    Encounter Date: 2024    PROGRESS NOTE    Subjective  Chief complaint: Elida Benson is a 78 y.o. female who is an acute skilled patient being seen and evaluated for weakness    HPI:  HPI  Patient is working in therapy due to generalized weakness.  Patient is working on bed mobility requiring min assist to CGA.  Patient is able to perform transfers with 2 wheeled walker and min assist.  Patient is ambulating up to 50 feet with 2 wheeled walker and min assist.  Patient is working on stair negotiation, able to ambulate 1 stair with mod assist and both handrails.  Patient seen and examined at bedside, appears to be in no acute distress.  Denies chest pain or shortness of breath.  Denies nausea or vomiting.  Afebrile.  Patient stable.  Reports no new concerns at this time.    Objective  Vital signs: 124/68, 97.7, 76, 18, 96%    Physical Exam  Constitutional:       General: She is not in acute distress.  Eyes:      Extraocular Movements: Extraocular movements intact.   Cardiovascular:      Rate and Rhythm: Normal rate and regular rhythm.   Pulmonary:      Effort: Pulmonary effort is normal.      Breath sounds: Normal breath sounds.   Abdominal:      General: Bowel sounds are normal.      Palpations: Abdomen is soft.   Musculoskeletal:      Cervical back: Neck supple.      Right lower leg: No edema.      Left lower leg: No edema.   Skin:     Comments: Surgical dressing, clean, dry and intact   Neurological:      Mental Status: She is alert.      Motor: Weakness present.   Psychiatric:         Mood and Affect: Mood normal.         Behavior: Behavior is cooperative.         Assessment/Plan  Problem List Items Addressed This Visit       COPD without exacerbation (CMS/HCC)     Stable, monitor for shortness of breath and wheezing.         Dementia (CMS/HCC)     Assist with care as needed  Donepezil  Monitor         Depression     Monitor mood and behaviors.  Duloxetine          Hypertension, essential     Monitor blood pressure  Losartan potassium  Toprol-XL  BP at goal         Weakness - Primary     Continue in therapy         Diverticulitis, colon     Status post ileostomy closure  F/u with colorectal surgery.          Medications, treatments, and labs reviewed  Continue medications and treatments as listed in EMR      Scribe Attestation  ISintia Scribe   attest that this documentation has been prepared under the direction and in the presence of Sean Cloud MD    Provider Attestation - Scribe documentation  All medical record entries made by the Scribe were at my direction and personally dictated by me. I have reviewed the chart and agree that the record accurately reflects my personal performance of the history, physical exam, discussion and plan.   Sean Cloud MD        Electronically Signed By: Sean Cloud MD   2/29/24  8:49 PM

## 2024-03-02 ENCOUNTER — NURSING HOME VISIT (OUTPATIENT)
Dept: POST ACUTE CARE | Facility: EXTERNAL LOCATION | Age: 79
End: 2024-03-02
Payer: MEDICARE

## 2024-03-02 DIAGNOSIS — F02.A0 MILD ALZHEIMER'S DEMENTIA, UNSPECIFIED TIMING OF DEMENTIA ONSET, UNSPECIFIED WHETHER BEHAVIORAL, PSYCHOTIC, OR MOOD DISTURBANCE OR ANXIETY (MULTI): ICD-10-CM

## 2024-03-02 DIAGNOSIS — G30.9 MILD ALZHEIMER'S DEMENTIA, UNSPECIFIED TIMING OF DEMENTIA ONSET, UNSPECIFIED WHETHER BEHAVIORAL, PSYCHOTIC, OR MOOD DISTURBANCE OR ANXIETY (MULTI): ICD-10-CM

## 2024-03-02 DIAGNOSIS — I48.11 LONGSTANDING PERSISTENT ATRIAL FIBRILLATION (MULTI): ICD-10-CM

## 2024-03-02 DIAGNOSIS — R53.1 WEAKNESS: Primary | ICD-10-CM

## 2024-03-02 DIAGNOSIS — J44.9 COPD WITHOUT EXACERBATION (MULTI): ICD-10-CM

## 2024-03-02 DIAGNOSIS — I10 HYPERTENSION, ESSENTIAL: ICD-10-CM

## 2024-03-02 PROCEDURE — 99309 SBSQ NF CARE MODERATE MDM 30: CPT | Performed by: NURSE PRACTITIONER

## 2024-03-02 NOTE — LETTER
Patient: Elida Benson  : 1945    Encounter Date: 2024    PROGRESS NOTE    Subjective  Chief complaint: Elida Benson is a 79 y.o. female who is an acute skilled patient being seen and evaluated for weakness    HPI:  HPI  An interactive audio and/or video telecommunication system which permits real time communications between the patient (at the originating site) and provider (at a distant site) was utilized to provide this telehealth service after obtaining verbal consent.  Patient is continue to work in therapy due to generalized weakness.  Patient is working on gait training, transfers, therapeutic exercise and activities, neuromuscular reeducation and ADL tasks.  Patient appears to be in no acute distress.  Denies chest pain or shortness of breath.  Afebrile.    Objective  Vital signs: 128/66, 97.6, 20, 70, 98%    Physical Exam  Constitutional:       General: She is not in acute distress.  Eyes:      Extraocular Movements: Extraocular movements intact.   Pulmonary:      Effort: Pulmonary effort is normal.   Musculoskeletal:      Right lower leg: Edema present.      Left lower leg: Edema present.   Neurological:      Mental Status: She is alert.      Motor: Weakness present.   Psychiatric:         Mood and Affect: Mood normal.         Behavior: Behavior normal. Behavior is cooperative.         Assessment/Plan  Problem List Items Addressed This Visit       COPD without exacerbation (CMS/HCC)     Stable on RA   monitor for shortness of breath and wheezing.         Dementia (CMS/HCC)     Assist with care as needed  Donepezil  Monitor         Hypertension, essential     Monitor blood pressure  Losartan potassium  Toprol-XL  BP at goal         Weakness - Primary     Continue working in therapy towards established goals         Atrial fibrillation (CMS/HCC)     Amiodarone  Monitor heart rate  Eliquis  Bleeding precautions          Medications, treatments, and labs reviewed  Continue medications and  treatments as listed in EMR      Scribe Attestation  ISintia Scribe   attest that this documentation has been prepared under the direction and in the presence of MI Triplett    Provider Attestation - Scribe documentation  All medical record entries made by the Scribe were at my direction and personally dictated by me. I have reviewed the chart and agree that the record accurately reflects my personal performance of the history, physical exam, discussion and plan.   MI Triplett        Electronically Signed By: MI Triplett   3/13/24 11:47 AM

## 2024-03-04 ENCOUNTER — NURSING HOME VISIT (OUTPATIENT)
Dept: POST ACUTE CARE | Facility: EXTERNAL LOCATION | Age: 79
End: 2024-03-04
Payer: MEDICARE

## 2024-03-04 DIAGNOSIS — I10 HYPERTENSION, ESSENTIAL: ICD-10-CM

## 2024-03-04 DIAGNOSIS — G89.4 CHRONIC PAIN SYNDROME: ICD-10-CM

## 2024-03-04 DIAGNOSIS — J44.9 COPD WITHOUT EXACERBATION (MULTI): ICD-10-CM

## 2024-03-04 DIAGNOSIS — F32.A DEPRESSION, UNSPECIFIED DEPRESSION TYPE: ICD-10-CM

## 2024-03-04 DIAGNOSIS — F02.A0 MILD ALZHEIMER'S DEMENTIA, UNSPECIFIED TIMING OF DEMENTIA ONSET, UNSPECIFIED WHETHER BEHAVIORAL, PSYCHOTIC, OR MOOD DISTURBANCE OR ANXIETY (MULTI): ICD-10-CM

## 2024-03-04 DIAGNOSIS — R53.1 WEAKNESS: ICD-10-CM

## 2024-03-04 DIAGNOSIS — G30.9 MILD ALZHEIMER'S DEMENTIA, UNSPECIFIED TIMING OF DEMENTIA ONSET, UNSPECIFIED WHETHER BEHAVIORAL, PSYCHOTIC, OR MOOD DISTURBANCE OR ANXIETY (MULTI): ICD-10-CM

## 2024-03-04 PROCEDURE — 99309 SBSQ NF CARE MODERATE MDM 30: CPT | Performed by: INTERNAL MEDICINE

## 2024-03-04 RX ORDER — GABAPENTIN 600 MG/1
600 TABLET ORAL 3 TIMES DAILY
Qty: 90 TABLET | Refills: 0 | Status: SHIPPED | OUTPATIENT
Start: 2024-03-04

## 2024-03-04 NOTE — LETTER
Patient: Elida Benson  : 1945    Encounter Date: 2024    PROGRESS NOTE    Subjective  Chief complaint: Elida Benson is a 78 y.o. female who is an acute skilled patient being seen and evaluated for weakness    HPI:  Patient presents for f/u therapy and general medical care.  Patient seen and examined at beside.  Therapy has been working with the patient to improve strength, endurance, ADLs, and transfers d/t generalized weakness.  Patient has no acute concerns today.      Objective  Vital signs: 128\78,68,98%    Physical Exam  Constitutional:       General: She is not in acute distress.  Eyes:      Extraocular Movements: Extraocular movements intact.   Cardiovascular:      Rate and Rhythm: Normal rate and regular rhythm.   Pulmonary:      Effort: Pulmonary effort is normal.      Breath sounds: Normal breath sounds.   Abdominal:      General: Bowel sounds are normal.      Palpations: Abdomen is soft.   Musculoskeletal:      Cervical back: Neck supple.      Right lower leg: No edema.      Left lower leg: No edema.   Skin:     Comments: Surgical dressing, clean, dry and intact   Neurological:      Mental Status: She is alert.      Motor: Weakness present.   Psychiatric:         Mood and Affect: Mood normal.         Behavior: Behavior is cooperative.         Assessment/Plan  Problem List Items Addressed This Visit       COPD without exacerbation (CMS/HCC)     Stable on RA   monitor for shortness of breath and wheezing.         Dementia (CMS/HCC)     Assist with care as needed  Donepezil  Monitor         Depression     Monitor mood and behaviors.  Duloxetine         Hypertension, essential     Monitor blood pressure  Losartan potassium  Toprol-XL  BP at goal         Weakness     Continue in therapy          Medications, treatments, and labs reviewed  Continue medications and treatments as listed in EMR    Scribe Attestation  I, Shalini Quiroz   attest that this documentation has been  prepared under the direction and in the presence of Sean Cloud MD.     Provider Attestation - Scribe documentation  All medical record entries made by the Scribe were at my direction and personally dictated by me. I have reviewed the chart and agree that the record accurately reflects my personal performance of the history, physical exam, discussion and plan.   Sean Cloud MD      Electronically Signed By: Sean Cloud MD   3/4/24  5:03 PM

## 2024-03-05 ENCOUNTER — HOME HEALTH ADMISSION (OUTPATIENT)
Dept: HOME HEALTH SERVICES | Facility: HOME HEALTH | Age: 79
End: 2024-03-05
Payer: MEDICARE

## 2024-03-05 ENCOUNTER — DOCUMENTATION (OUTPATIENT)
Dept: HOME HEALTH SERVICES | Facility: HOME HEALTH | Age: 79
End: 2024-03-05
Payer: MEDICARE

## 2024-03-05 NOTE — HH CARE COORDINATION
Home Care received a Referral for Nursing, Physical Therapy, Occupational Therapy, and Home Health Aide. We have processed the referral for a Start of Care on 3/6-3/7/24.     If you have any questions or concerns, please feel free to contact us at [phone]. Follow the prompts, enter your five digit zip code, and you will be directed to your care team on [facility].

## 2024-03-08 ENCOUNTER — LAB (OUTPATIENT)
Dept: LAB | Facility: LAB | Age: 79
End: 2024-03-08
Payer: MEDICARE

## 2024-03-08 ENCOUNTER — HOME CARE VISIT (OUTPATIENT)
Dept: HOME HEALTH SERVICES | Facility: HOME HEALTH | Age: 79
End: 2024-03-08
Payer: MEDICARE

## 2024-03-08 ENCOUNTER — OFFICE VISIT (OUTPATIENT)
Dept: CARDIOLOGY | Facility: CLINIC | Age: 79
End: 2024-03-08
Payer: MEDICARE

## 2024-03-08 VITALS
BODY MASS INDEX: 33.79 KG/M2 | OXYGEN SATURATION: 95 % | HEART RATE: 74 BPM | WEIGHT: 146 LBS | SYSTOLIC BLOOD PRESSURE: 138 MMHG | HEIGHT: 55 IN | DIASTOLIC BLOOD PRESSURE: 72 MMHG

## 2024-03-08 DIAGNOSIS — I48.0 PAROXYSMAL ATRIAL FIBRILLATION (MULTI): ICD-10-CM

## 2024-03-08 DIAGNOSIS — I49.9 CARDIAC ARRHYTHMIA, UNSPECIFIED CARDIAC ARRHYTHMIA TYPE: ICD-10-CM

## 2024-03-08 DIAGNOSIS — I50.33 ACUTE ON CHRONIC DIASTOLIC HEART FAILURE (MULTI): ICD-10-CM

## 2024-03-08 DIAGNOSIS — I50.33 ACUTE ON CHRONIC DIASTOLIC HEART FAILURE (MULTI): Primary | ICD-10-CM

## 2024-03-08 DIAGNOSIS — I48.11 LONGSTANDING PERSISTENT ATRIAL FIBRILLATION (MULTI): ICD-10-CM

## 2024-03-08 DIAGNOSIS — R93.1 ABNORMAL ECHOCARDIOGRAM: ICD-10-CM

## 2024-03-08 DIAGNOSIS — I42.8 INFILTRATIVE CARDIOMYOPATHY (MULTI): ICD-10-CM

## 2024-03-08 LAB — BNP SERPL-MCNC: 231 PG/ML (ref 0–99)

## 2024-03-08 PROCEDURE — 1111F DSCHRG MED/CURRENT MED MERGE: CPT | Performed by: STUDENT IN AN ORGANIZED HEALTH CARE EDUCATION/TRAINING PROGRAM

## 2024-03-08 PROCEDURE — 80069 RENAL FUNCTION PANEL: CPT

## 2024-03-08 PROCEDURE — 83880 ASSAY OF NATRIURETIC PEPTIDE: CPT

## 2024-03-08 PROCEDURE — 1160F RVW MEDS BY RX/DR IN RCRD: CPT | Performed by: STUDENT IN AN ORGANIZED HEALTH CARE EDUCATION/TRAINING PROGRAM

## 2024-03-08 PROCEDURE — 1090000001 HH PPS REVENUE CREDIT

## 2024-03-08 PROCEDURE — 3078F DIAST BP <80 MM HG: CPT | Performed by: STUDENT IN AN ORGANIZED HEALTH CARE EDUCATION/TRAINING PROGRAM

## 2024-03-08 PROCEDURE — 3075F SYST BP GE 130 - 139MM HG: CPT | Performed by: STUDENT IN AN ORGANIZED HEALTH CARE EDUCATION/TRAINING PROGRAM

## 2024-03-08 PROCEDURE — 0023 HH SOC

## 2024-03-08 PROCEDURE — G0299 HHS/HOSPICE OF RN EA 15 MIN: HCPCS | Mod: HHH

## 2024-03-08 PROCEDURE — 99215 OFFICE O/P EST HI 40 MIN: CPT | Performed by: STUDENT IN AN ORGANIZED HEALTH CARE EDUCATION/TRAINING PROGRAM

## 2024-03-08 PROCEDURE — 1126F AMNT PAIN NOTED NONE PRSNT: CPT | Performed by: STUDENT IN AN ORGANIZED HEALTH CARE EDUCATION/TRAINING PROGRAM

## 2024-03-08 PROCEDURE — 1090000002 HH PPS REVENUE DEBIT

## 2024-03-08 PROCEDURE — 1036F TOBACCO NON-USER: CPT | Performed by: STUDENT IN AN ORGANIZED HEALTH CARE EDUCATION/TRAINING PROGRAM

## 2024-03-08 PROCEDURE — 1159F MED LIST DOCD IN RCRD: CPT | Performed by: STUDENT IN AN ORGANIZED HEALTH CARE EDUCATION/TRAINING PROGRAM

## 2024-03-08 PROCEDURE — 169592 NO-PAY CLAIM PROCEDURE

## 2024-03-08 PROCEDURE — 36415 COLL VENOUS BLD VENIPUNCTURE: CPT

## 2024-03-08 RX ORDER — FUROSEMIDE 20 MG/1
20 TABLET ORAL DAILY
Qty: 30 TABLET | Refills: 11 | Status: ON HOLD | OUTPATIENT
Start: 2024-03-08 | End: 2024-04-17

## 2024-03-08 NOTE — PROGRESS NOTES
Follow-up     HPI:    Elida Benson is a 78 y.o. female with pertinent history of dementia, hypertension, diverticulitis status post laparoscopic anterior resection with ileostomy on 8/16/2023, paroxysmal atrial fibrillation on amiodarone and Eliquis, preserved ejection fraction with suspected myocardial cleft versus false tendon, moderate concentric hypertrophy, moderate left atrial enlargement on echo performed 8/19/2023, preserved ejection fraction with moderate septal thickness, abnormal strain imaging concerning for possible infiltrative heart disease on echo performed 8/22/2023 presents to cardiology clinic for follow-up.    She is accompanied by her daughter provides history and has questions.  She is doing relatively well.  She does notice significant increase in lower extremity edema.  Dyspnea on exertion stable.  No exacerbating or relieving factors.  Patient denies chest pain and angina.  Pt denies orthopnea, and paroxysmal nocturnal dyspnea.  Pt denies syncope.  No recent falls.  No fever or chills.  No cough.  No change in bowel or bladder habits.  No sick contacts.  No recent travel.    12 point review of systems including (Constitutional, Eyes, ENMT, Respiratory, Cardiac, Gastrointestinal, Neurological, Psychiatric, and Hematologic) was performed and is otherwise negative.    Past medical history reviewed:   has a past medical history of A-fib (CMS/HCC), Cervical myelopathy (CMS/HCC), Chronic pain syndrome, COPD (chronic obstructive pulmonary disease) (CMS/HCC), Dementia (CMS/HCC), Depression, Diabetic neuropathy (CMS/HCC), Diverticulitis, DM (diabetes mellitus) (CMS/HCC), LORENZ (dyspnea on exertion), HLD (hyperlipidemia), Hypertension, essential, Infiltrative cardiomyopathy (CMS/HCC), OA (ocular albinism) (CMS/HCC), Other malaise (04/21/2022), Palpitations, Post laminectomy syndrome, Rectovaginal fistula, Spinal stenosis of cervical region, and UTI (urinary tract infection).    Past surgical  history reviewed:   has a past surgical history that includes Carpal tunnel release (08/27/2013); Knee Arthroplasty (08/27/2013); Total hip arthroplasty (08/27/2013); Other surgical history (08/16/2023); Ileostomy; Laminectomy; Lumbar fusion (2023); and Spinal cord stimulator implant (2022).    Social history reviewed:   reports that she quit smoking about 40 years ago. Her smoking use included cigarettes. She smoked an average of .25 packs per day. She has never used smokeless tobacco. She reports current alcohol use. She reports that she does not use drugs.     Family history reviewed:    Family History   Problem Relation Name Age of Onset    No Known Problems Mother      No Known Problems Father      No Known Problems Sister      No Known Problems Sister      No Known Problems Sister      No Known Problems Sister         Allergies reviewed: Shellfish containing products     Medications reviewed:   Current Outpatient Medications   Medication Instructions    acetaminophen (Tylenol) 325 mg tablet Give 2 tablet by mouth every 4 hours as needed for Pain    amiodarone (Pacerone) 200 mg tablet Give 1 tablet by mouth in the morning for ANTIARRHYTHMIC    apixaban (Eliquis) 5 mg tablet Give 1 tablet by mouth two times a day for blood thinner    b complex 0.4 mg tablet 1 tablet, oral, Daily    biotin 1 mg tablet 1 tab(s) orally once a day    cephalexin (Keflex) 500 mg capsule TAKE ONE CAPSULE BY MOUTH EVERY 12 HOURS    cholecalciferol (Vitamin D-3) 1,250 mcg (50,000 unit) capsule Take 50,000 Units by mouth once each week.    docusate sodium (COLACE) 100 mg, oral, 2 times daily PRN    donepezil (Aricept) 5 mg tablet 1 tab(s) orally once (at bedtime)    DULoxetine (CYMBALTA) 30 mg, oral, 2 times daily    gabapentin (NEURONTIN) 600 mg, oral, 3 times daily    losartan (Cozaar) 50 mg tablet 1 tab(s) oral once a day    melatonin 3 mg tablet Give 2 tablet by mouth as needed for insomnia administer at bedtime    metoprolol  succinate XL (TOPROL-XL) 25 mg, oral, Daily, Do not crush or chew.    ondansetron ODT (ZOFRAN-ODT) 4 mg, oral, Every 8 hours PRN    oxyCODONE (Oxy-IR) 5 mg immediate release capsule oxyCODONE HCl - 5 MG Oral Capsule TAKE 1 TO 2 CAPSULES EVERY 6 HOURS AS NEEDED FOR PAIN Quantity: 60 Refills: 0 Kaykay Palafox MD Start : 16-Sep-2020 Active    topiramate (Topamax) 100 mg tablet TAKE ONE TABLET BY MOUTH TWO TIMES A DAY    traZODone (DESYREL) 50 mg, oral, Nightly PRN        Vitals reviewed: Visit Vitals  /72   Pulse 74       Physical Exam:   General:  Patient is awake, alert, and oriented.  Patient is in no acute distress.  HEENT:  Pupils equal and reactive.  Normocephalic.  Moist mucosa.    Neck:  No thyromegaly.  11 cm jugular Venous Pressure.  Cardiovascular:  Regular rate and rhythm.  Normal S1 and S2.  1/6 EDER.  Pulmonary:  Clear to auscultation bilaterally.  Abdomen:  Soft. Non-tender.   Non-distended.  Positive bowel sounds.  Lower Extremities:  2+ pedal pulses.  1+ LE edema.  Neurologic:  Cranial nerves intact.  No focal deficit.   Skin: Skin warm and dry, normal skin turgor.   Psychiatric: Normal affect.    Last Labs:  CBC -      Lab Results   Component Value Date    WBC 10.1 02/12/2024    HGB 11.3 (L) 02/12/2024    HCT 38.1 02/12/2024     02/12/2024        CMP-  Lab Results   Component Value Date    GLUCOSE 121 (H) 02/12/2024     02/12/2024    K 4.1 02/12/2024     (H) 02/12/2024    CO2 23 02/12/2024    ANIONGAP 12 02/12/2024    BUN 21 02/12/2024    CREATININE 0.80 02/12/2024    EGFR 76 02/12/2024    CALCIUM 8.5 (L) 02/12/2024    PHOS 2.9 08/20/2023    PROT 6.1 (L) 02/12/2024    ALBUMIN 2.9 (L) 02/12/2024    AST 10 02/12/2024    ALT 9 02/12/2024    ALKPHOS 67 02/12/2024    BILITOT 0.3 02/12/2024        LIPIDS-  Lab Results   Component Value Date    CHOL 182 05/26/2023    TRIG 102 05/26/2023    HDL 77.2 05/26/2023    CHHDL 2.4 05/26/2023    VLDL 20 05/26/2023        OTHERS-  Lab Results    Component Value Date    HGBA1C 7.7 (A) 01/29/2024        I personally reviewed the patient's recent vitals, labs, medications, orders, EKGs, pertinent cardiac imaging/ echocardiography.    Assessment and Plan:    Elida Benson is a 78 y.o. female with pertinent history of dementia, hypertension, diverticulitis status post laparoscopic anterior resection with ileostomy on 8/16/2023, paroxysmal atrial fibrillation on amiodarone and Eliquis, preserved ejection fraction with suspected myocardial cleft versus false tendon, moderate concentric hypertrophy, moderate left atrial enlargement on echo performed 8/19/2023, preserved ejection fraction with moderate septal thickness, abnormal strain imaging concerning for possible infiltrative heart disease on echo performed 8/22/2023 presents to cardiology clinic for follow-up.  She is accompanied by her daughter provides history and has questions.  She is doing relatively well.  She does notice significant increase in lower extremity edema.  Dyspnea on exertion stable.  Unfortunately, she did not get a cardiac MRI after her last visit    We will start Lasix or furosemide 20 mg once daily.    We will check blood work today including renal function panel and BNP.    Please obtain knee or thigh high compression stockings that are 20-30 mmHg compression.  For compression socks, you can go to [employer], or any other store that sells durable medical goods.  Call in advance and find out when the stocking fitter is going to be there. Plan to go fairly early in the day. If you go later in the day, your legs may be swollen and you will not get a properly fit stocking. Put your compression socks on first thing in the morning and wear them until you are done for the day.    We will arrange for cardiac MRI for further evaluation of possible infiltrative cardiomyopathy.    Please continue remaining cardiac medications including amiodarone 200 mg once daily, losartan 25 mg  daily, Toprol succinate 25 mg daily, and Eliquis 5 mg twice daily.  Refills were sent to her pharmacy.    Please recheck blood work including renal function panel before your follow-up visit.    Please followup with me in Cardiology clinic within the next 6 weeks.  Please return to clinic sooner or seek emergent care if your symptoms reoccur or worsen.    Thank you for allowing me to participate in their care.  Please feel free to call me with any further questions or concerns.        Cesar Ignacio MD, FACC, TAIWO ABARCA  Division of Cardiovascular Medicine  Medical Director, [facility]  [facility]  Assistant Clinical Professor, Medicine  [employer]  [email or web address]  Office:  [phone]

## 2024-03-08 NOTE — PATIENT INSTRUCTIONS
We will start Lasix or furosemide 20 mg once daily.    We will check blood work today including renal function panel and BNP.    Please obtain knee or thigh high compression stockings that are 20-30 mmHg compression.  For compression socks, you can go to [employer], or any other store that sells durable medical goods.  Call in advance and find out when the stocking fitter is going to be there. Plan to go fairly early in the day. If you go later in the day, your legs may be swollen and you will not get a properly fit stocking. Put your compression socks on first thing in the morning and wear them until you are done for the day.    We will arrange for cardiac MRI for further evaluation of possible infiltrative cardiomyopathy.    Please continue remaining cardiac medications including amiodarone 200 mg once daily, losartan 25 mg daily, Toprol succinate 25 mg daily, and Eliquis 5 mg twice daily.  Refills were sent to her pharmacy.    Please recheck blood work including renal function panel before your follow-up visit.    Please followup with me in Cardiology clinic within the next 6 weeks.  Please return to clinic sooner or seek emergent care if your symptoms reoccur or worsen.

## 2024-03-09 LAB
ALBUMIN SERPL BCP-MCNC: 3.2 G/DL (ref 3.4–5)
ANION GAP SERPL CALC-SCNC: 14 MMOL/L (ref 10–20)
BUN SERPL-MCNC: 23 MG/DL (ref 6–23)
CALCIUM SERPL-MCNC: 9.1 MG/DL (ref 8.6–10.6)
CHLORIDE SERPL-SCNC: 108 MMOL/L (ref 98–107)
CO2 SERPL-SCNC: 24 MMOL/L (ref 21–32)
CREAT SERPL-MCNC: 0.79 MG/DL (ref 0.5–1.05)
EGFRCR SERPLBLD CKD-EPI 2021: 78 ML/MIN/1.73M*2
GLUCOSE SERPL-MCNC: 135 MG/DL (ref 74–99)
PHOSPHATE SERPL-MCNC: 3.6 MG/DL (ref 2.5–4.9)
POTASSIUM SERPL-SCNC: 4.5 MMOL/L (ref 3.5–5.3)
SODIUM SERPL-SCNC: 141 MMOL/L (ref 136–145)

## 2024-03-09 PROCEDURE — 1090000002 HH PPS REVENUE DEBIT

## 2024-03-09 PROCEDURE — 1090000001 HH PPS REVENUE CREDIT

## 2024-03-10 PROCEDURE — 1090000002 HH PPS REVENUE DEBIT

## 2024-03-10 PROCEDURE — 1090000001 HH PPS REVENUE CREDIT

## 2024-03-11 ENCOUNTER — HOME CARE VISIT (OUTPATIENT)
Dept: HOME HEALTH SERVICES | Facility: HOME HEALTH | Age: 79
End: 2024-03-11
Payer: MEDICARE

## 2024-03-11 ENCOUNTER — OFFICE VISIT (OUTPATIENT)
Dept: SURGERY | Facility: CLINIC | Age: 79
End: 2024-03-11
Payer: MEDICARE

## 2024-03-11 VITALS
WEIGHT: 144 LBS | BODY MASS INDEX: 33.33 KG/M2 | TEMPERATURE: 98.2 F | HEART RATE: 78 BPM | SYSTOLIC BLOOD PRESSURE: 135 MMHG | HEIGHT: 55 IN | DIASTOLIC BLOOD PRESSURE: 85 MMHG

## 2024-03-11 VITALS
HEIGHT: 55 IN | RESPIRATION RATE: 18 BRPM | WEIGHT: 146 LBS | DIASTOLIC BLOOD PRESSURE: 81 MMHG | SYSTOLIC BLOOD PRESSURE: 138 MMHG | TEMPERATURE: 97.9 F | HEART RATE: 73 BPM | OXYGEN SATURATION: 99 % | BODY MASS INDEX: 33.79 KG/M2

## 2024-03-11 VITALS
DIASTOLIC BLOOD PRESSURE: 70 MMHG | HEART RATE: 60 BPM | OXYGEN SATURATION: 93 % | TEMPERATURE: 97.4 F | SYSTOLIC BLOOD PRESSURE: 120 MMHG

## 2024-03-11 DIAGNOSIS — K57.32 DIVERTICULITIS, COLON: Primary | ICD-10-CM

## 2024-03-11 PROBLEM — R60.9 EDEMA: Status: ACTIVE | Noted: 2024-03-11

## 2024-03-11 PROCEDURE — G0151 HHCP-SERV OF PT,EA 15 MIN: HCPCS | Mod: HHH

## 2024-03-11 PROCEDURE — 1160F RVW MEDS BY RX/DR IN RCRD: CPT | Performed by: NURSE PRACTITIONER

## 2024-03-11 PROCEDURE — 99024 POSTOP FOLLOW-UP VISIT: CPT | Performed by: NURSE PRACTITIONER

## 2024-03-11 PROCEDURE — 1111F DSCHRG MED/CURRENT MED MERGE: CPT | Performed by: NURSE PRACTITIONER

## 2024-03-11 PROCEDURE — 3079F DIAST BP 80-89 MM HG: CPT | Performed by: NURSE PRACTITIONER

## 2024-03-11 PROCEDURE — 3075F SYST BP GE 130 - 139MM HG: CPT | Performed by: NURSE PRACTITIONER

## 2024-03-11 PROCEDURE — 1036F TOBACCO NON-USER: CPT | Performed by: NURSE PRACTITIONER

## 2024-03-11 PROCEDURE — 1090000002 HH PPS REVENUE DEBIT

## 2024-03-11 PROCEDURE — 1126F AMNT PAIN NOTED NONE PRSNT: CPT | Performed by: NURSE PRACTITIONER

## 2024-03-11 PROCEDURE — 1090000001 HH PPS REVENUE CREDIT

## 2024-03-11 PROCEDURE — 1159F MED LIST DOCD IN RCRD: CPT | Performed by: NURSE PRACTITIONER

## 2024-03-11 ASSESSMENT — ENCOUNTER SYMPTOMS
BOWEL PATTERN NORMAL: 1
DESCRIPTION OF MEMORY LOSS: SHORT TERM
APPETITE LEVEL: FAIR
PAIN: 1
LAST BOWEL MOVEMENT: 66906
PAIN SEVERITY GOAL: 0/10
PAIN LOCATION - PAIN QUALITY: ACHE
PAIN LOCATION - PAIN SEVERITY: 6/10
CHANGE IN APPETITE: VARYING
LOWEST PAIN SEVERITY IN PAST 24 HOURS: 2/10
STOOL FREQUENCY: LESS THAN DAILY
PAIN LOCATION - PAIN FREQUENCY: INTERMITTENT
NAUSEA: 1
PAIN LOCATION: GENERALIZED

## 2024-03-11 ASSESSMENT — PAIN SCALES - GENERAL: PAINLEVEL: 0-NO PAIN

## 2024-03-11 ASSESSMENT — PAIN SCALES - PAIN ASSESSMENT IN ADVANCED DEMENTIA (PAINAD)
CONSOLABILITY: 0 - NO NEED TO CONSOLE.
TOTALSCORE: 0
BODYLANGUAGE: 0 - RELAXED.
CONSOLABILITY: 0
BODYLANGUAGE: 0
BREATHING: 0
NEGVOCALIZATION: 0
FACIALEXPRESSION: 0
NEGVOCALIZATION: 0 - NONE.
FACIALEXPRESSION: 0 - SMILING OR INEXPRESSIVE.

## 2024-03-11 ASSESSMENT — ACTIVITIES OF DAILY LIVING (ADL)
OASIS_M1830: 05
AMBULATION ASSISTANCE: TWO PERSON
AMBULATION ASSISTANCE: 1
ENTERING_EXITING_HOME: TWO PERSON
AMBULATION ASSISTANCE: MAXIMUM ASSIST

## 2024-03-11 NOTE — PROGRESS NOTES
Chief complaint:  POV    History Of Present Illness  Elida Benson is a 78F who presented multiple times to the hospital with severe diverticulitis with associated abscess, vaginal drainage, and UTI.  She is treated with antibiotics and cool down.  She had an attempted colonoscopy, which was aborted due to fibrosis in the sigmoid and was not improving and presented for resection.     8/16/2023 OPERATION/PROCEDURE:   Single-incision laparoscopic anterior resection with laparoscopic splenic flexure takedown, colorectal anastomosis, diverting loop ileostomy, flexible sigmoidoscopy, and Omentopexy     FINAL DIAGNOSIS   A. RECTO SIGMOID RESECTIONS: -- SEGMENT OF COLON WITH MARKED ACTIVE INFLAMMATION, WITH GRANULOMATOUS INFLAMMATION AND PENETRATING ULCERS, SEE NOTE. Note: The inflammatory changes noted include fissure-like ulcers with prominent fibrosis, scattered noncaseating granulomas and prominent lymphoid nodules in the pericolonic soft tissues. The differential is that of Crohn's like diverticulitis versus Crohn's disease in a patient with pre-existing diverticulitis. Crohn's like diverticulitis is much more common and is also supported by the lack of significant mucosal-based inflammation, or other sequela of Crohn's disease. -- 1 LYMPH NODE WITH NO SIGNIFICANT PATHOLOGIC FINDING.       9/03/2023 CT ABD/PELVIS   1.  Interval removal of deep pelvic drain, with previously seen air and fluid in this location essentially resolved.  2.  Small organizing fluid collections in the mid and lower abdomen adjacent to several small bowel loops and colon, stable or decreasing.  3.  Remainder as above.    Post op complicated with Covid and PNA diagnosis.  Was discharge to rehab facility.    12/20/2023 GGE:  Limited exam due to patient discomfort as described above. No definite evidence of abnormal contrast extravasation of the opacified portions of the proximal sigmoid colon and rectum.    2/5/24 loop ileostomy  closure    Just got released from rehab.    Denies any F/C, N/V, CP/SOB, dysuria.   Appetite: good  Energy: improving  Weight: stable  BM: feels constipated. She had a BM today but stools are hard and she has to strain to go.      Review of Systems  Constitutional: Negative for fever, chills, anorexia, weight loss, malaise          ENMT: Negative for nasal discharge, congestion, ear pain, mouth pain, throat pain              Respiratory: Negative for cough, hemoptysis, wheezing, shortness of breath             Cardiac: Negative for chest pain, dyspnea on exertion, orthopnea, palpitations, syncope      Gastrointestinal: Negative for nausea, vomiting, diarrhea, constipation, abdominal pain,  (+)DIVERTICUITIS  Genitourinary: Negative for discharge, dysuria, flank pain, frequency, hematuria       Musculoskeletal: Negative for decreased ROM, pain, swelling, weakness       Neurological: Negative for dizziness, confusion, headache, seizures, syncope            Psychiatric: Negative for mood changes, anxiety, hallucinations, sleep changes, suicidal ideas     Skin: Negative for mass, pain, itching, rash, ulcer         Endocrine: Negative for heat intolerance, cold intolerance, excessive sweating, polyuria, excess thirst      Hematologic/Lymph: Negative for anemia, bruising, easy bleeding, night sweats, petechiae, history of DVT/PE or cancer            Allergic/Immunologic: Negative for anaphylaxis, itchy/ teary eyes, itching, sneezing, swelling    Physical Exam  Constitutional: Well developed, awake/alert/oriented x3, no distress, alert and cooperative   Gastrointestinal: Nondistended, soft, non-tender. Ileostomy closure site is healing.  Extremities: foot drop right foot  Neurological: alert and oriented x3, normal strength, in a wheelchair    Impression -  S/p: Sigmoid resection for severe diverticulitis with a CVF   S/p loop ileostomy closure on 2/5/24  Constipation, otherwise feeling well  Issues with foot drop    Plan    -Diet: OK to slowly advance diet.   -Activity: Normal activities can be resumed. No lifting greater than 10 lbs for a full 6 weeks following surgery   -Increase fluid intake: aim for 8-10, 8 oz glasses of water daily. Other beverages, such as juice, coffee, or tea may count toward this goal.  -Increase high fiber foods: fruits , vegetables, whole grains, and beans. Aim for 25-35 grams per day.  -Add a powder fiber supplement daily. Like Metamucil, Citrucel, Konsyl or Benefiber daily-follow instructions on the bottle. Take heaping teaspoon of fiber daily  -Avoiding sitting on the toilet for prolonged periods of time  -Avoid straining  -Follow-up 1 month  -All questions and concerns were answered. Encouraged to call with any question or concerns.    PARAG Becerra-CNP

## 2024-03-11 NOTE — PATIENT INSTRUCTIONS
-Diet as tolerated  -Increase fluid intake: aim for 8-10, 8 oz glasses of water daily. Other beverages, such as juice, coffee, or tea may count toward this goal.  -Increase high fiber foods: fruits , vegetables, whole grains, and beans. Aim for 25-35 grams per day.  -Add a powder fiber supplement daily. Like Metamucil, Citrucel, Konsyl or Benefiber daily-follow instructions on the bottle. Take heaping teaspoon of fiber daily  -Avoiding sitting on the toilet for prolonged periods of time  -Avoid straining

## 2024-03-11 NOTE — PROGRESS NOTES
PROGRESS NOTE    Subjective   Chief complaint: Elida Benson is a 79 y.o. female who is an acute skilled patient being seen and evaluated for weakness    HPI:  HPI  An interactive audio and/or video telecommunication system which permits real time communications between the patient (at the originating site) and provider (at a distant site) was utilized to provide this telehealth service after obtaining verbal consent.  Patient is continue to work in therapy due to generalized weakness.  Patient is working on gait training, transfers, therapeutic exercise and activities, neuromuscular reeducation and ADL tasks.  Patient appears to be in no acute distress.  Denies chest pain or shortness of breath.  Afebrile.    Objective   Vital signs: 128/66, 97.6, 20, 70, 98%    Physical Exam  Constitutional:       General: She is not in acute distress.  Eyes:      Extraocular Movements: Extraocular movements intact.   Pulmonary:      Effort: Pulmonary effort is normal.   Musculoskeletal:      Right lower leg: Edema present.      Left lower leg: Edema present.   Neurological:      Mental Status: She is alert.      Motor: Weakness present.   Psychiatric:         Mood and Affect: Mood normal.         Behavior: Behavior normal. Behavior is cooperative.         Assessment/Plan   Problem List Items Addressed This Visit       COPD without exacerbation (CMS/HCC)     Stable on RA   monitor for shortness of breath and wheezing.         Dementia (CMS/HCC)     Assist with care as needed  Donepezil  Monitor         Hypertension, essential     Monitor blood pressure  Losartan potassium  Toprol-XL  BP at goal         Weakness - Primary     Continue working in therapy towards established goals         Atrial fibrillation (CMS/HCC)     Amiodarone  Monitor heart rate  Eliquis  Bleeding precautions          Medications, treatments, and labs reviewed  Continue medications and treatments as listed in EMR      Shalini Carration  Sintia SHABAZZ  Shalini Mora   attest that this documentation has been prepared under the direction and in the presence of MI Triplett    Provider Attestation - Scribe documentation  All medical record entries made by the Scribe were at my direction and personally dictated by me. I have reviewed the chart and agree that the record accurately reflects my personal performance of the history, physical exam, discussion and plan.   MI Triplett

## 2024-03-11 NOTE — PROGRESS NOTES
PROGRESS NOTE    Subjective   Chief complaint: Elida Benson is a 79 y.o. female who is an acute skilled patient being seen and evaluated for weakness    HPI:  HPI  Patient is continue to work in therapy following recent hospitalization.  Patient is working on gait training and transfers, ambulating with wheeled walker and CGA.  Patient is performing transfers requiring mod assist to complete.  Patient was seen and examined at bedside.  Patient had complaints of edema and increased dyspnea on exertion.  Patient denies chest pain.  Denies fever or chills.    Objective   Vital signs: 114/80, 97.3, 73, 18, 96%    Physical Exam  Constitutional:       General: She is not in acute distress.  Cardiovascular:      Rate and Rhythm: Normal rate and regular rhythm.   Pulmonary:      Effort: Pulmonary effort is normal.      Breath sounds: Normal breath sounds.   Abdominal:      General: Bowel sounds are normal. There is no distension.      Palpations: Abdomen is soft.      Tenderness: There is no abdominal tenderness.   Musculoskeletal:      Cervical back: Neck supple.      Right lower leg: Edema present.      Left lower leg: Edema present.   Neurological:      Mental Status: She is alert.      Motor: Weakness present.   Psychiatric:         Behavior: Behavior is cooperative.         Assessment/Plan   Problem List Items Addressed This Visit       Atrial fibrillation (CMS/HCC)     Amiodarone  Monitor heart rate  Eliquis  Bleeding precautions         COPD without exacerbation (CMS/HCC)     No wheezing  On room air         Dementia (CMS/HCC)     Assist with care as needed  Donepezil  Monitor         Edema     Start Lasix 40 mg and KCl 10 mEq x 4 days  Obtain BMP in 1 week         Hypertension, essential     Monitor blood pressure  Losartan potassium  Toprol-XL  BP at goal         Weakness - Primary     Continue to work towards goals in therapy          Medications, treatments, and labs reviewed  Continue medications and  treatments as listed in EMR      Scribe Attestation  ISintia Scribe   attest that this documentation has been prepared under the direction and in the presence of MI Triplett    Provider Attestation - Scribe documentation  All medical record entries made by the Scribe were at my direction and personally dictated by me. I have reviewed the chart and agree that the record accurately reflects my personal performance of the history, physical exam, discussion and plan.   MI Triplett

## 2024-03-12 ENCOUNTER — HOME CARE VISIT (OUTPATIENT)
Dept: HOME HEALTH SERVICES | Facility: HOME HEALTH | Age: 79
End: 2024-03-12
Payer: MEDICARE

## 2024-03-12 VITALS
DIASTOLIC BLOOD PRESSURE: 80 MMHG | OXYGEN SATURATION: 97 % | TEMPERATURE: 97.1 F | RESPIRATION RATE: 16 BRPM | HEART RATE: 62 BPM | SYSTOLIC BLOOD PRESSURE: 110 MMHG

## 2024-03-12 PROCEDURE — G0152 HHCP-SERV OF OT,EA 15 MIN: HCPCS | Mod: HHH

## 2024-03-12 PROCEDURE — 1090000002 HH PPS REVENUE DEBIT

## 2024-03-12 PROCEDURE — 1090000001 HH PPS REVENUE CREDIT

## 2024-03-12 ASSESSMENT — ENCOUNTER SYMPTOMS
PAIN LOCATION - PAIN SEVERITY: 8/10
PAIN: 1
PAIN LOCATION - PAIN QUALITY: ACHE
LOWEST PAIN SEVERITY IN PAST 24 HOURS: 4/10
PAIN LOCATION - PAIN FREQUENCY: INTERMITTENT
PAIN LOCATION - PAIN FREQUENCY: CONSTANT
PAIN LOCATION - RELIEVING FACTORS: REST
PAIN LOCATION - PAIN QUALITY: ACHE
PAIN LOCATION - PAIN DURATION: CONSTANT
PAIN LOCATION - RELIEVING FACTORS: REST, MEDICATION
PERSON REPORTING PAIN: PATIENT
HIGHEST PAIN SEVERITY IN PAST 24 HOURS: 8/10
PAIN SEVERITY GOAL: 4/10
PAIN LOCATION - PAIN DURATION: INTERMITTENT
SUBJECTIVE PAIN PROGRESSION: WAXING AND WANING
PAIN LOCATION - EXACERBATING FACTORS: MORNING TIME
PAIN LOCATION: LEFT SHOULDER
PAIN LOCATION: ABDOMEN
PAIN LOCATION - EXACERBATING FACTORS: MOBILITY
PAIN LOCATION - PAIN SEVERITY: 5/10

## 2024-03-12 ASSESSMENT — ACTIVITIES OF DAILY LIVING (ADL)
DRESSING_UB_CURRENT_FUNCTION: MODERATE ASSIST
BATHING ASSESSED: 1
BATHING_CURRENT_FUNCTION: MAXIMUM ASSIST
WASHING_UPB_CURRENT_FUNCTION: MINIMUM ASSIST
DRESSING_LB_CURRENT_FUNCTION: MAXIMUM ASSIST
WASHING_LB_CURRENT_FUNCTION: MAXIMUM ASSIST

## 2024-03-13 ENCOUNTER — APPOINTMENT (OUTPATIENT)
Dept: SURGERY | Facility: CLINIC | Age: 79
End: 2024-03-13
Payer: MEDICARE

## 2024-03-13 PROCEDURE — 1090000001 HH PPS REVENUE CREDIT

## 2024-03-13 PROCEDURE — 1090000002 HH PPS REVENUE DEBIT

## 2024-03-13 ASSESSMENT — ENCOUNTER SYMPTOMS
ARTHRALGIAS: 1
LIMITED RANGE OF MOTION: 1
MUSCLE WEAKNESS: 1
HIGHEST PAIN SEVERITY IN PAST 24 HOURS: 6/10
LOWEST PAIN SEVERITY IN PAST 24 HOURS: 0/10
PAIN: 1
SUBJECTIVE PAIN PROGRESSION: WAXING AND WANING

## 2024-03-13 ASSESSMENT — ACTIVITIES OF DAILY LIVING (ADL)
AMBULATION ASSISTANCE ON FLAT SURFACES: 1
AMBULATION ASSISTANCE: ONE PERSON
CURRENT_FUNCTION: ONE PERSON

## 2024-03-13 NOTE — HOME HEALTH
80 yo female post ileostomy reversal in February.  Now home with home PT orders after inpatient rehab.  Pmhx sign for cervical myelopathy, afib, chronic pain syndrome, COPD, dementia, depression, diabetic neuropathy, r dropfoot, diverticulitis, hld, htn, Post laminectomy syndrome, Spinal stenosis, Lumar fusion 2023 and Spinal cord stimulator implant 2022, phyllis, tka in 2013. On this asessment pt is only ambulating 3 steps with rolling walker moderate assist with close wc follow.  She is bent foward at lumbar spine estimated 45 degrees in all phases of gait.  Pt does have r greater than left le edema which affects her gait due to discomfort in her feet and toes.  Pt is supported by her dtr.  Main concerns are improving gait and especially performing steps to enter and exit home.

## 2024-03-14 ENCOUNTER — HOME CARE VISIT (OUTPATIENT)
Dept: HOME HEALTH SERVICES | Facility: HOME HEALTH | Age: 79
End: 2024-03-14
Payer: MEDICARE

## 2024-03-14 VITALS
SYSTOLIC BLOOD PRESSURE: 108 MMHG | HEART RATE: 68 BPM | DIASTOLIC BLOOD PRESSURE: 70 MMHG | RESPIRATION RATE: 16 BRPM | TEMPERATURE: 97.9 F | OXYGEN SATURATION: 94 %

## 2024-03-14 PROCEDURE — 1090000002 HH PPS REVENUE DEBIT

## 2024-03-14 PROCEDURE — G0157 HHC PT ASSISTANT EA 15: HCPCS | Mod: HHH

## 2024-03-14 PROCEDURE — 1090000001 HH PPS REVENUE CREDIT

## 2024-03-14 PROCEDURE — G0152 HHCP-SERV OF OT,EA 15 MIN: HCPCS | Mod: HHH

## 2024-03-14 PROCEDURE — G0299 HHS/HOSPICE OF RN EA 15 MIN: HCPCS | Mod: HHH

## 2024-03-14 SDOH — HEALTH STABILITY: PHYSICAL HEALTH: EXERCISE TYPE: BLE STRENGTHENING

## 2024-03-14 SDOH — HEALTH STABILITY: PHYSICAL HEALTH: EXERCISE COMMENTS: M AND TECHNIQUE.

## 2024-03-14 ASSESSMENT — ACTIVITIES OF DAILY LIVING (ADL)
AMBULATION ASSISTANCE ON FLAT SURFACES: 1
GROOMING ASSESSED: 1
AMBULATION_DISTANCE/DURATION_TOLERATED: 60-70FT
GROOMING_CURRENT_FUNCTION: CONTACT GUARD ASSIST
AMBULATION ASSISTANCE: STAND BY ASSIST

## 2024-03-14 ASSESSMENT — ENCOUNTER SYMPTOMS
PAIN LOCATION: BACK
PAIN LOCATION - PAIN FREQUENCY: INTERMITTENT
MUSCLE WEAKNESS: 1
SUBJECTIVE PAIN PROGRESSION: GRADUALLY IMPROVING
PAIN: 1
PAIN LOCATION - PAIN DURATION: INTERMITTENT
PERSON REPORTING PAIN: PATIENT
PAIN LOCATION: ABDOMEN
PAIN LOCATION - EXACERBATING FACTORS: MOBILITY
DENIES PAIN: 1
PAIN LOCATION - PAIN QUALITY: ACHE
HIGHEST PAIN SEVERITY IN PAST 24 HOURS: 0/10
PERSON REPORTING PAIN: PATIENT
PAIN LOCATION - PAIN SEVERITY: 0/10
LOWEST PAIN SEVERITY IN PAST 24 HOURS: 6/10
APPETITE LEVEL: GOOD
SUBJECTIVE PAIN PROGRESSION: WAXING AND WANING
HIGHEST PAIN SEVERITY IN PAST 24 HOURS: 5/10
PAIN LOCATION: LEFT SHOULDER
PAIN: 1
PERSON REPORTING PAIN: PATIENT
MUSCLE WEAKNESS: 1
PAIN SEVERITY GOAL: 6/10
PAIN LOCATION - RELIEVING FACTORS: REST
CHANGE IN APPETITE: UNCHANGED

## 2024-03-15 ENCOUNTER — HOME CARE VISIT (OUTPATIENT)
Dept: HOME HEALTH SERVICES | Facility: HOME HEALTH | Age: 79
End: 2024-03-15
Payer: MEDICARE

## 2024-03-15 PROCEDURE — G0156 HHCP-SVS OF AIDE,EA 15 MIN: HCPCS | Mod: HHH

## 2024-03-15 PROCEDURE — 1090000001 HH PPS REVENUE CREDIT

## 2024-03-15 PROCEDURE — 1090000002 HH PPS REVENUE DEBIT

## 2024-03-16 ENCOUNTER — HOME CARE VISIT (OUTPATIENT)
Dept: HOME HEALTH SERVICES | Facility: HOME HEALTH | Age: 79
End: 2024-03-16
Payer: MEDICARE

## 2024-03-16 PROCEDURE — 1090000002 HH PPS REVENUE DEBIT

## 2024-03-16 PROCEDURE — 1090000001 HH PPS REVENUE CREDIT

## 2024-03-17 PROCEDURE — 1090000001 HH PPS REVENUE CREDIT

## 2024-03-17 PROCEDURE — 1090000002 HH PPS REVENUE DEBIT

## 2024-03-18 ENCOUNTER — HOME CARE VISIT (OUTPATIENT)
Dept: HOME HEALTH SERVICES | Facility: HOME HEALTH | Age: 79
End: 2024-03-18
Payer: MEDICARE

## 2024-03-18 PROCEDURE — 1090000001 HH PPS REVENUE CREDIT

## 2024-03-18 PROCEDURE — G0152 HHCP-SERV OF OT,EA 15 MIN: HCPCS | Mod: HHH

## 2024-03-18 PROCEDURE — 1090000002 HH PPS REVENUE DEBIT

## 2024-03-18 PROCEDURE — G0157 HHC PT ASSISTANT EA 15: HCPCS | Mod: HHH

## 2024-03-18 SDOH — HEALTH STABILITY: PHYSICAL HEALTH: EXERCISE COMMENTS: OR PROPER FORM AND TECHNIQUE.

## 2024-03-18 SDOH — HEALTH STABILITY: PHYSICAL HEALTH: EXERCISE TYPE: BLE STRENGTHENING

## 2024-03-18 SDOH — HEALTH STABILITY: PHYSICAL HEALTH
EXERCISE COMMENTS: INSTRUCTED PT AND PERFORMED SEATED AND STANDING THERAPEUTIC EXERCISES TO INCREASE STRENGTH AND IMPROVE FUNCTIONAL TRANSFERS AND GAIT WITH LAQ, HIP FLEX, HIP ABD/ADD WITH RESISTANCE AND HAMSTRING CURLS WITH THERABAND, 3 SETS OF 10 WITH VERBAL CUEING F

## 2024-03-18 ASSESSMENT — ENCOUNTER SYMPTOMS
OCCASIONAL FEELINGS OF UNSTEADINESS: 1
DENIES PAIN: 1
LOSS OF SENSATION IN FEET: 0
MUSCLE WEAKNESS: 1
DEPRESSION: 0
PERSON REPORTING PAIN: PATIENT

## 2024-03-18 ASSESSMENT — ACTIVITIES OF DAILY LIVING (ADL)
CURRENT_FUNCTION: ONE PERSON
CURRENT_FUNCTION: STAND BY ASSIST
AMBULATION ASSISTANCE: STAND BY ASSIST
AMBULATION_DISTANCE/DURATION_TOLERATED: 30-40FT
AMBULATION ASSISTANCE ON FLAT SURFACES: 1

## 2024-03-19 ENCOUNTER — HOME CARE VISIT (OUTPATIENT)
Dept: HOME HEALTH SERVICES | Facility: HOME HEALTH | Age: 79
End: 2024-03-19
Payer: MEDICARE

## 2024-03-19 PROCEDURE — 1090000001 HH PPS REVENUE CREDIT

## 2024-03-19 PROCEDURE — 1090000002 HH PPS REVENUE DEBIT

## 2024-03-19 ASSESSMENT — ENCOUNTER SYMPTOMS
PAIN LOCATION - RELIEVING FACTORS: REST, MEDICATION
PAIN LOCATION - PAIN QUALITY: ACHE
PAIN LOCATION - PAIN DURATION: INTERMITTENT
PAIN SEVERITY GOAL: 0/10
PAIN LOCATION - PAIN SEVERITY: 4/10
PAIN LOCATION - EXACERBATING FACTORS: ROM
PAIN LOCATION - PAIN FREQUENCY: INTERMITTENT
PERSON REPORTING PAIN: PATIENT
PAIN: 1
PAIN LOCATION: LEFT SHOULDER
LOWEST PAIN SEVERITY IN PAST 24 HOURS: 0/10
SUBJECTIVE PAIN PROGRESSION: WAXING AND WANING
HIGHEST PAIN SEVERITY IN PAST 24 HOURS: 4/10

## 2024-03-20 ENCOUNTER — HOME CARE VISIT (OUTPATIENT)
Dept: HOME HEALTH SERVICES | Facility: HOME HEALTH | Age: 79
End: 2024-03-20
Payer: MEDICARE

## 2024-03-20 VITALS
SYSTOLIC BLOOD PRESSURE: 100 MMHG | TEMPERATURE: 98.3 F | DIASTOLIC BLOOD PRESSURE: 70 MMHG | HEART RATE: 71 BPM | OXYGEN SATURATION: 97 %

## 2024-03-20 PROCEDURE — 1090000002 HH PPS REVENUE DEBIT

## 2024-03-20 PROCEDURE — 1090000001 HH PPS REVENUE CREDIT

## 2024-03-20 PROCEDURE — G0152 HHCP-SERV OF OT,EA 15 MIN: HCPCS | Mod: HHH

## 2024-03-20 ASSESSMENT — ACTIVITIES OF DAILY LIVING (ADL)
GROOMING ASSESSED: 1
DRESSING_UB_CURRENT_FUNCTION: MINIMUM ASSIST
ORAL_CARE_ASSESSED: 1
GROOMING_CURRENT_FUNCTION: CONTACT GUARD ASSIST
DRESSING_LB_CURRENT_FUNCTION: MODERATE ASSIST
BATHING ASSESSED: 1
BATHING_CURRENT_FUNCTION: MAXIMUM ASSIST
BATHING_CURRENT_FUNCTION: MODERATE ASSIST
ORAL_CARE_CURRENT_FUNCTION: NEEDS ASSISTANCE

## 2024-03-20 ASSESSMENT — ENCOUNTER SYMPTOMS
PAIN LOCATION - EXACERBATING FACTORS: SHOULDER FLEXION
PAIN: 1
PAIN LOCATION - PAIN QUALITY: ACHE
SUBJECTIVE PAIN PROGRESSION: WAXING AND WANING
PAIN LOCATION - RELIEVING FACTORS: REST, TYLENOL
PAIN LOCATION: LEFT SHOULDER
PAIN LOCATION - PAIN DURATION: INTERMITTENT
PAIN LOCATION - PAIN SEVERITY: 5/10
PAIN SEVERITY GOAL: 0/10
PAIN LOCATION - PAIN FREQUENCY: INTERMITTENT
HIGHEST PAIN SEVERITY IN PAST 24 HOURS: 5/10
LOWEST PAIN SEVERITY IN PAST 24 HOURS: 0/10
PERSON REPORTING PAIN: PATIENT

## 2024-03-21 ENCOUNTER — HOME CARE VISIT (OUTPATIENT)
Dept: HOME HEALTH SERVICES | Facility: HOME HEALTH | Age: 79
End: 2024-03-21
Payer: MEDICARE

## 2024-03-21 VITALS
TEMPERATURE: 98.6 F | OXYGEN SATURATION: 100 % | RESPIRATION RATE: 16 BRPM | HEART RATE: 54 BPM | DIASTOLIC BLOOD PRESSURE: 52 MMHG | SYSTOLIC BLOOD PRESSURE: 110 MMHG

## 2024-03-21 PROCEDURE — 1090000002 HH PPS REVENUE DEBIT

## 2024-03-21 PROCEDURE — G0156 HHCP-SVS OF AIDE,EA 15 MIN: HCPCS | Mod: HHH

## 2024-03-21 PROCEDURE — G0300 HHS/HOSPICE OF LPN EA 15 MIN: HCPCS | Mod: HHH

## 2024-03-21 PROCEDURE — G0157 HHC PT ASSISTANT EA 15: HCPCS | Mod: HHH

## 2024-03-21 PROCEDURE — 1090000001 HH PPS REVENUE CREDIT

## 2024-03-21 SDOH — HEALTH STABILITY: PHYSICAL HEALTH: EXERCISE COMMENTS: OR PROPER FORM AND TECHNIQUE.

## 2024-03-21 SDOH — HEALTH STABILITY: PHYSICAL HEALTH: EXERCISE TYPE: BLE STRENGTHENING

## 2024-03-21 ASSESSMENT — ENCOUNTER SYMPTOMS
DENIES PAIN: 1
PERSON REPORTING PAIN: PATIENT

## 2024-03-21 ASSESSMENT — ACTIVITIES OF DAILY LIVING (ADL)
AMBULATION ASSISTANCE ON FLAT SURFACES: 1
AMBULATION_DISTANCE/DURATION_TOLERATED: 30-40FT

## 2024-03-22 PROCEDURE — 1090000002 HH PPS REVENUE DEBIT

## 2024-03-22 PROCEDURE — 1090000001 HH PPS REVENUE CREDIT

## 2024-03-22 ASSESSMENT — ACTIVITIES OF DAILY LIVING (ADL)
AMBULATION ASSISTANCE: 1
TOILETING: 1
AMBULATION ASSISTANCE: INDEPENDENT
TOILETING: MINIMUM ASSIST

## 2024-03-22 ASSESSMENT — ENCOUNTER SYMPTOMS
LAST BOWEL MOVEMENT: 66920
APPETITE LEVEL: GOOD
CHANGE IN APPETITE: UNCHANGED

## 2024-03-23 PROCEDURE — 1090000001 HH PPS REVENUE CREDIT

## 2024-03-23 PROCEDURE — 1090000002 HH PPS REVENUE DEBIT

## 2024-03-24 PROCEDURE — 1090000001 HH PPS REVENUE CREDIT

## 2024-03-24 PROCEDURE — 1090000002 HH PPS REVENUE DEBIT

## 2024-03-25 ENCOUNTER — HOME CARE VISIT (OUTPATIENT)
Dept: HOME HEALTH SERVICES | Facility: HOME HEALTH | Age: 79
End: 2024-03-25
Payer: MEDICARE

## 2024-03-25 VITALS
SYSTOLIC BLOOD PRESSURE: 110 MMHG | TEMPERATURE: 98.2 F | DIASTOLIC BLOOD PRESSURE: 64 MMHG | OXYGEN SATURATION: 98 % | HEART RATE: 71 BPM

## 2024-03-25 PROCEDURE — 1090000001 HH PPS REVENUE CREDIT

## 2024-03-25 PROCEDURE — G0152 HHCP-SERV OF OT,EA 15 MIN: HCPCS | Mod: HHH

## 2024-03-25 PROCEDURE — 1090000002 HH PPS REVENUE DEBIT

## 2024-03-25 ASSESSMENT — ENCOUNTER SYMPTOMS
PAIN LOCATION - RELIEVING FACTORS: REST, TYLENOL
PAIN LOCATION - PAIN FREQUENCY: INTERMITTENT
PERSON REPORTING PAIN: PATIENT
PAIN LOCATION: LEFT SHOULDER
PAIN LOCATION - PAIN DURATION: INTERMITTENT
LOWEST PAIN SEVERITY IN PAST 24 HOURS: 0/10
HIGHEST PAIN SEVERITY IN PAST 24 HOURS: 5/10
PAIN LOCATION - PAIN QUALITY: ACHE
PAIN SEVERITY GOAL: 0/10
SUBJECTIVE PAIN PROGRESSION: UNCHANGED
PAIN LOCATION - PAIN SEVERITY: 5/10
PAIN: 1
PAIN LOCATION - EXACERBATING FACTORS: ROM

## 2024-03-26 ENCOUNTER — HOME CARE VISIT (OUTPATIENT)
Dept: HOME HEALTH SERVICES | Facility: HOME HEALTH | Age: 79
End: 2024-03-26
Payer: MEDICARE

## 2024-03-26 VITALS — TEMPERATURE: 98.2 F | DIASTOLIC BLOOD PRESSURE: 51 MMHG | SYSTOLIC BLOOD PRESSURE: 84 MMHG | HEART RATE: 62 BPM

## 2024-03-26 PROCEDURE — G0157 HHC PT ASSISTANT EA 15: HCPCS | Mod: HHH

## 2024-03-26 PROCEDURE — 1090000002 HH PPS REVENUE DEBIT

## 2024-03-26 PROCEDURE — 1090000001 HH PPS REVENUE CREDIT

## 2024-03-26 PROCEDURE — G0300 HHS/HOSPICE OF LPN EA 15 MIN: HCPCS | Mod: HHH

## 2024-03-26 PROCEDURE — G0156 HHCP-SVS OF AIDE,EA 15 MIN: HCPCS | Mod: HHH

## 2024-03-26 SDOH — HEALTH STABILITY: PHYSICAL HEALTH: EXERCISE TYPE: BLE STRENGTHENING EX

## 2024-03-26 SDOH — HEALTH STABILITY: PHYSICAL HEALTH
EXERCISE COMMENTS: M AND TECHNIQUE.PT PERFORMED STANDING EX FOR BLE TO INCLUDE HEEL RAISES, HS CURLS,HIP FLEXION, HIP ABD  AND STANDING MARCHING, 10-15 REPS EACH.

## 2024-03-26 ASSESSMENT — ENCOUNTER SYMPTOMS
LAST BOWEL MOVEMENT: 66924
DENIES PAIN: 1
MUSCLE WEAKNESS: 1
PAIN LOCATION: GENERALIZED
CHANGE IN APPETITE: UNCHANGED
MUSCLE WEAKNESS: 1
SUBJECTIVE PAIN PROGRESSION: UNCHANGED
HYPERTENSION: 1
PAIN SEVERITY GOAL: 0/10
OCCASIONAL FEELINGS OF UNSTEADINESS: 0
APPETITE LEVEL: GOOD
LOWEST PAIN SEVERITY IN PAST 24 HOURS: 5/10
DIZZINESS: 1
PAIN: 1
PERSON REPORTING PAIN: PATIENT
PERSON REPORTING PAIN: PATIENT
HIGHEST PAIN SEVERITY IN PAST 24 HOURS: 5/10

## 2024-03-26 ASSESSMENT — ACTIVITIES OF DAILY LIVING (ADL)
AMBULATION ASSISTANCE ON FLAT SURFACES: 1
CURRENT_FUNCTION: STAND BY ASSIST
AMBULATION_DISTANCE/DURATION_TOLERATED: 20-30FT
MONEY MANAGEMENT (EXPENSES/BILLS): TOTALLY DEPENDENT

## 2024-03-27 ENCOUNTER — HOME CARE VISIT (OUTPATIENT)
Dept: HOME HEALTH SERVICES | Facility: HOME HEALTH | Age: 79
End: 2024-03-27
Payer: MEDICARE

## 2024-03-27 VITALS — HEART RATE: 60 BPM | OXYGEN SATURATION: 97 % | TEMPERATURE: 98 F | RESPIRATION RATE: 16 BRPM

## 2024-03-27 DIAGNOSIS — I48.0 PAROXYSMAL ATRIAL FIBRILLATION (MULTI): Primary | ICD-10-CM

## 2024-03-27 PROCEDURE — 1090000001 HH PPS REVENUE CREDIT

## 2024-03-27 PROCEDURE — G0152 HHCP-SERV OF OT,EA 15 MIN: HCPCS | Mod: HHH

## 2024-03-27 PROCEDURE — 1090000002 HH PPS REVENUE DEBIT

## 2024-03-27 RX ORDER — METOPROLOL SUCCINATE 25 MG/1
25 TABLET, EXTENDED RELEASE ORAL DAILY
Qty: 90 TABLET | Refills: 3 | Status: SHIPPED | OUTPATIENT
Start: 2024-03-27 | End: 2024-05-06 | Stop reason: ALTCHOICE

## 2024-03-27 ASSESSMENT — ENCOUNTER SYMPTOMS
PAIN LOCATION: LEFT SHOULDER
PAIN LOCATION - PAIN QUALITY: ACHE
PAIN: 1
PAIN LOCATION - PAIN DURATION: INTERMITTENT
PAIN LOCATION - EXACERBATING FACTORS: MOBILITY
PAIN LOCATION - PAIN FREQUENCY: INTERMITTENT
PERSON REPORTING PAIN: PATIENT
PAIN LOCATION - RELIEVING FACTORS: ACHE
PAIN LOCATION - PAIN SEVERITY: 7/10

## 2024-03-28 ENCOUNTER — HOME CARE VISIT (OUTPATIENT)
Dept: HOME HEALTH SERVICES | Facility: HOME HEALTH | Age: 79
End: 2024-03-28
Payer: MEDICARE

## 2024-03-28 VITALS — DIASTOLIC BLOOD PRESSURE: 77 MMHG | HEART RATE: 75 BPM | SYSTOLIC BLOOD PRESSURE: 104 MMHG

## 2024-03-28 PROCEDURE — 1090000002 HH PPS REVENUE DEBIT

## 2024-03-28 PROCEDURE — 1090000001 HH PPS REVENUE CREDIT

## 2024-03-28 PROCEDURE — G0157 HHC PT ASSISTANT EA 15: HCPCS | Mod: HHH

## 2024-03-28 SDOH — HEALTH STABILITY: PHYSICAL HEALTH
EXERCISE COMMENTS: M AND TECHNIQUE.PT PERFORMED STANDING EX FOR BLE TO INCLUDE HEEL RAISES, HS CURLS,HIP FLEXION, HIP ABD  AND STANDING MARCHING, 10 REPS EACH.

## 2024-03-28 SDOH — HEALTH STABILITY: PHYSICAL HEALTH: EXERCISE TYPE: BLE STRENGTHENING EX

## 2024-03-28 ASSESSMENT — ENCOUNTER SYMPTOMS
LOWEST PAIN SEVERITY IN PAST 24 HOURS: 0/10
PAIN: 1
OCCASIONAL FEELINGS OF UNSTEADINESS: 1
PERSON REPORTING PAIN: PATIENT
MUSCLE WEAKNESS: 1
PAIN LOCATION - PAIN SEVERITY: 6/10
PAIN LOCATION - RELIEVING FACTORS: TYLENOL
PAIN LOCATION: ABDOMEN
SUBJECTIVE PAIN PROGRESSION: WAXING AND WANING
HIGHEST PAIN SEVERITY IN PAST 24 HOURS: 6/10
PAIN SEVERITY GOAL: 0/10

## 2024-03-28 ASSESSMENT — ACTIVITIES OF DAILY LIVING (ADL)
AMBULATION ASSISTANCE: STAND BY ASSIST
AMBULATION ASSISTANCE ON FLAT SURFACES: 1
AMBULATION_DISTANCE/DURATION_TOLERATED: 30-40FT
CURRENT_FUNCTION: STAND BY ASSIST

## 2024-03-29 PROCEDURE — 1090000002 HH PPS REVENUE DEBIT

## 2024-03-29 PROCEDURE — 1090000001 HH PPS REVENUE CREDIT

## 2024-03-30 PROCEDURE — 1090000002 HH PPS REVENUE DEBIT

## 2024-03-30 PROCEDURE — 1090000001 HH PPS REVENUE CREDIT

## 2024-03-31 PROCEDURE — 1090000001 HH PPS REVENUE CREDIT

## 2024-03-31 PROCEDURE — 1090000002 HH PPS REVENUE DEBIT

## 2024-04-01 ENCOUNTER — APPOINTMENT (OUTPATIENT)
Dept: PRIMARY CARE | Facility: CLINIC | Age: 79
End: 2024-04-01
Payer: MEDICARE

## 2024-04-01 ENCOUNTER — HOME CARE VISIT (OUTPATIENT)
Dept: HOME HEALTH SERVICES | Facility: HOME HEALTH | Age: 79
End: 2024-04-01
Payer: MEDICARE

## 2024-04-01 PROCEDURE — 1090000002 HH PPS REVENUE DEBIT

## 2024-04-01 PROCEDURE — 1090000001 HH PPS REVENUE CREDIT

## 2024-04-02 ENCOUNTER — HOME CARE VISIT (OUTPATIENT)
Dept: HOME HEALTH SERVICES | Facility: HOME HEALTH | Age: 79
End: 2024-04-02
Payer: MEDICARE

## 2024-04-02 VITALS
SYSTOLIC BLOOD PRESSURE: 112 MMHG | OXYGEN SATURATION: 97 % | DIASTOLIC BLOOD PRESSURE: 70 MMHG | RESPIRATION RATE: 16 BRPM | HEART RATE: 80 BPM | TEMPERATURE: 98.2 F

## 2024-04-02 PROCEDURE — 1090000002 HH PPS REVENUE DEBIT

## 2024-04-02 PROCEDURE — G0300 HHS/HOSPICE OF LPN EA 15 MIN: HCPCS | Mod: HHH

## 2024-04-02 PROCEDURE — 1090000001 HH PPS REVENUE CREDIT

## 2024-04-03 ENCOUNTER — HOME CARE VISIT (OUTPATIENT)
Dept: HOME HEALTH SERVICES | Facility: HOME HEALTH | Age: 79
End: 2024-04-03
Payer: MEDICARE

## 2024-04-03 PROCEDURE — 1090000001 HH PPS REVENUE CREDIT

## 2024-04-03 PROCEDURE — 1090000002 HH PPS REVENUE DEBIT

## 2024-04-04 ENCOUNTER — HOME CARE VISIT (OUTPATIENT)
Dept: HOME HEALTH SERVICES | Facility: HOME HEALTH | Age: 79
End: 2024-04-04
Payer: MEDICARE

## 2024-04-04 PROCEDURE — 1090000002 HH PPS REVENUE DEBIT

## 2024-04-04 PROCEDURE — G0157 HHC PT ASSISTANT EA 15: HCPCS | Mod: HHH

## 2024-04-04 PROCEDURE — 1090000001 HH PPS REVENUE CREDIT

## 2024-04-04 SDOH — HEALTH STABILITY: PHYSICAL HEALTH: EXERCISE TYPE: BLE STRENGTHENING

## 2024-04-04 SDOH — HEALTH STABILITY: PHYSICAL HEALTH: EXERCISE COMMENTS: OR PROPER FORM AND TECHNIQUE.

## 2024-04-04 ASSESSMENT — ENCOUNTER SYMPTOMS
PERSON REPORTING PAIN: PATIENT
DENIES PAIN: 1

## 2024-04-04 ASSESSMENT — ACTIVITIES OF DAILY LIVING (ADL): AMBULATION_DISTANCE/DURATION_TOLERATED: 20-30FT

## 2024-04-05 ENCOUNTER — HOME CARE VISIT (OUTPATIENT)
Dept: HOME HEALTH SERVICES | Facility: HOME HEALTH | Age: 79
End: 2024-04-05
Payer: MEDICARE

## 2024-04-05 PROCEDURE — G0152 HHCP-SERV OF OT,EA 15 MIN: HCPCS | Mod: HHH

## 2024-04-05 PROCEDURE — 1090000001 HH PPS REVENUE CREDIT

## 2024-04-05 PROCEDURE — 1090000002 HH PPS REVENUE DEBIT

## 2024-04-06 PROCEDURE — 1090000001 HH PPS REVENUE CREDIT

## 2024-04-06 PROCEDURE — 1090000002 HH PPS REVENUE DEBIT

## 2024-04-06 PROCEDURE — 1090000003 HH PPS REVENUE ADJ

## 2024-04-06 ASSESSMENT — ACTIVITIES OF DAILY LIVING (ADL)
WASHING_UPB_CURRENT_FUNCTION: MODERATE ASSIST
WASHING_LB_CURRENT_FUNCTION: MODERATE ASSIST
DRESSING_UB_CURRENT_FUNCTION: MINIMUM ASSIST
DRESSING_LB_CURRENT_FUNCTION: MODERATE ASSIST

## 2024-04-06 ASSESSMENT — ENCOUNTER SYMPTOMS
HIGHEST PAIN SEVERITY IN PAST 24 HOURS: 5/10
PAIN LOCATION - PAIN DURATION: INTERMITTENT
PAIN LOCATION - PAIN SEVERITY: 5/10
PAIN LOCATION - EXACERBATING FACTORS: LIFTING ARM
PAIN LOCATION - PAIN FREQUENCY: INTERMITTENT
PAIN LOCATION - RELIEVING FACTORS: REST, TYLENOL
PAIN: 1
PAIN SEVERITY GOAL: 0/10
APPETITE LEVEL: GOOD
CHANGE IN APPETITE: UNCHANGED
PAIN LOCATION - PAIN QUALITY: ACHE
LOWEST PAIN SEVERITY IN PAST 24 HOURS: 0/10
SUBJECTIVE PAIN PROGRESSION: WAXING AND WANING
PAIN LOCATION: LEFT SHOULDER
PERSON REPORTING PAIN: PATIENT

## 2024-04-07 PROCEDURE — 1090000001 HH PPS REVENUE CREDIT

## 2024-04-07 PROCEDURE — 1090000002 HH PPS REVENUE DEBIT

## 2024-04-08 ENCOUNTER — APPOINTMENT (OUTPATIENT)
Dept: RADIOLOGY | Facility: HOSPITAL | Age: 79
DRG: 193 | End: 2024-04-08
Payer: MEDICARE

## 2024-04-08 ENCOUNTER — HOME CARE VISIT (OUTPATIENT)
Dept: HOME HEALTH SERVICES | Facility: HOME HEALTH | Age: 79
End: 2024-04-08
Payer: MEDICARE

## 2024-04-08 ENCOUNTER — HOSPITAL ENCOUNTER (INPATIENT)
Facility: HOSPITAL | Age: 79
LOS: 9 days | Discharge: NURSING, REHAB OR LONG-TERM CARE | DRG: 193 | End: 2024-04-17
Attending: EMERGENCY MEDICINE | Admitting: INTERNAL MEDICINE
Payer: MEDICARE

## 2024-04-08 ENCOUNTER — APPOINTMENT (OUTPATIENT)
Dept: CARDIOLOGY | Facility: HOSPITAL | Age: 79
DRG: 193 | End: 2024-04-08
Payer: MEDICARE

## 2024-04-08 DIAGNOSIS — M79.89 LEG SWELLING: ICD-10-CM

## 2024-04-08 DIAGNOSIS — R60.0 LOCALIZED EDEMA: ICD-10-CM

## 2024-04-08 DIAGNOSIS — E11.9 TYPE 2 DIABETES MELLITUS WITHOUT COMPLICATION, WITH LONG-TERM CURRENT USE OF INSULIN (MULTI): ICD-10-CM

## 2024-04-08 DIAGNOSIS — J44.9 COPD WITHOUT EXACERBATION (MULTI): ICD-10-CM

## 2024-04-08 DIAGNOSIS — J18.9 PNEUMONIA, UNSPECIFIED ORGANISM: Primary | ICD-10-CM

## 2024-04-08 DIAGNOSIS — Z79.4 TYPE 2 DIABETES MELLITUS WITHOUT COMPLICATION, WITH LONG-TERM CURRENT USE OF INSULIN (MULTI): ICD-10-CM

## 2024-04-08 DIAGNOSIS — I50.33 ACUTE ON CHRONIC DIASTOLIC HEART FAILURE (MULTI): ICD-10-CM

## 2024-04-08 DIAGNOSIS — R60.9 EDEMA, UNSPECIFIED TYPE: ICD-10-CM

## 2024-04-08 DIAGNOSIS — I42.8 INFILTRATIVE CARDIOMYOPATHY (MULTI): ICD-10-CM

## 2024-04-08 DIAGNOSIS — J18.9 PNEUMONIA OF RIGHT UPPER LOBE DUE TO INFECTIOUS ORGANISM: ICD-10-CM

## 2024-04-08 LAB
ALBUMIN SERPL BCP-MCNC: 3.5 G/DL (ref 3.4–5)
ALP SERPL-CCNC: 68 U/L (ref 33–136)
ALT SERPL W P-5'-P-CCNC: 15 U/L (ref 7–45)
ANION GAP BLDV CALCULATED.4IONS-SCNC: 3 MMOL/L (ref 10–25)
ANION GAP SERPL CALC-SCNC: 13 MMOL/L (ref 10–20)
APTT PPP: 51 SECONDS (ref 27–38)
AST SERPL W P-5'-P-CCNC: 15 U/L (ref 9–39)
BASE EXCESS BLDV CALC-SCNC: 3.1 MMOL/L (ref -2–3)
BASOPHILS # BLD AUTO: 0.02 X10*3/UL (ref 0–0.1)
BASOPHILS NFR BLD AUTO: 0.2 %
BILIRUB SERPL-MCNC: 0.6 MG/DL (ref 0–1.2)
BNP SERPL-MCNC: 343 PG/ML (ref 0–99)
BODY TEMPERATURE: 37 DEGREES CELSIUS
BUN SERPL-MCNC: 15 MG/DL (ref 6–23)
CA-I BLDV-SCNC: 1.2 MMOL/L (ref 1.1–1.33)
CALCIUM SERPL-MCNC: 8.5 MG/DL (ref 8.6–10.3)
CARDIAC TROPONIN I PNL SERPL HS: 66 NG/L (ref 0–13)
CARDIAC TROPONIN I PNL SERPL HS: 69 NG/L (ref 0–13)
CHLORIDE BLDV-SCNC: 109 MMOL/L (ref 98–107)
CHLORIDE SERPL-SCNC: 108 MMOL/L (ref 98–107)
CO2 SERPL-SCNC: 23 MMOL/L (ref 21–32)
CREAT SERPL-MCNC: 0.73 MG/DL (ref 0.5–1.05)
EGFRCR SERPLBLD CKD-EPI 2021: 85 ML/MIN/1.73M*2
EOSINOPHIL # BLD AUTO: 0.01 X10*3/UL (ref 0–0.4)
EOSINOPHIL NFR BLD AUTO: 0.1 %
ERYTHROCYTE [DISTWIDTH] IN BLOOD BY AUTOMATED COUNT: 15.7 % (ref 11.5–14.5)
FLUAV RNA RESP QL NAA+PROBE: NOT DETECTED
FLUBV RNA RESP QL NAA+PROBE: NOT DETECTED
GLUCOSE BLDV-MCNC: 173 MG/DL (ref 74–99)
GLUCOSE SERPL-MCNC: 159 MG/DL (ref 74–99)
HCO3 BLDV-SCNC: 27 MMOL/L (ref 22–26)
HCT VFR BLD AUTO: 40.4 % (ref 36–46)
HCT VFR BLD EST: 38 % (ref 36–46)
HGB BLD-MCNC: 12.3 G/DL (ref 12–16)
HGB BLDV-MCNC: 12.7 G/DL (ref 12–16)
IMM GRANULOCYTES # BLD AUTO: 0.06 X10*3/UL (ref 0–0.5)
IMM GRANULOCYTES NFR BLD AUTO: 0.5 % (ref 0–0.9)
INHALED O2 CONCENTRATION: 44 %
INR PPP: 1.3 (ref 0.9–1.1)
LACTATE BLDV-SCNC: 1 MMOL/L (ref 0.4–2)
LYMPHOCYTES # BLD AUTO: 0.45 X10*3/UL (ref 0.8–3)
LYMPHOCYTES NFR BLD AUTO: 3.6 %
MCH RBC QN AUTO: 29.7 PG (ref 26–34)
MCHC RBC AUTO-ENTMCNC: 30.4 G/DL (ref 32–36)
MCV RBC AUTO: 98 FL (ref 80–100)
MONOCYTES # BLD AUTO: 0.58 X10*3/UL (ref 0.05–0.8)
MONOCYTES NFR BLD AUTO: 4.6 %
NEUTROPHILS # BLD AUTO: 11.42 X10*3/UL (ref 1.6–5.5)
NEUTROPHILS NFR BLD AUTO: 91 %
NRBC BLD-RTO: 0 /100 WBCS (ref 0–0)
OXYHGB MFR BLDV: 87.4 % (ref 45–75)
PCO2 BLDV: 38 MM HG (ref 41–51)
PH BLDV: 7.46 PH (ref 7.33–7.43)
PLATELET # BLD AUTO: 226 X10*3/UL (ref 150–450)
PO2 BLDV: 59 MM HG (ref 35–45)
POTASSIUM BLDV-SCNC: 4.3 MMOL/L (ref 3.5–5.3)
POTASSIUM SERPL-SCNC: 4.3 MMOL/L (ref 3.5–5.3)
PROT SERPL-MCNC: 6.3 G/DL (ref 6.4–8.2)
PROTHROMBIN TIME: 14.6 SECONDS (ref 9.8–12.8)
RBC # BLD AUTO: 4.14 X10*6/UL (ref 4–5.2)
RSV RNA RESP QL NAA+PROBE: NOT DETECTED
SAO2 % BLDV: 90 % (ref 45–75)
SARS-COV-2 RNA RESP QL NAA+PROBE: NOT DETECTED
SODIUM BLDV-SCNC: 135 MMOL/L (ref 136–145)
SODIUM SERPL-SCNC: 140 MMOL/L (ref 136–145)
WBC # BLD AUTO: 12.5 X10*3/UL (ref 4.4–11.3)

## 2024-04-08 PROCEDURE — 84132 ASSAY OF SERUM POTASSIUM: CPT | Mod: 91

## 2024-04-08 PROCEDURE — 2500000004 HC RX 250 GENERAL PHARMACY W/ HCPCS (ALT 636 FOR OP/ED)

## 2024-04-08 PROCEDURE — 71045 X-RAY EXAM CHEST 1 VIEW: CPT | Mod: FOREIGN READ | Performed by: RADIOLOGY

## 2024-04-08 PROCEDURE — 36415 COLL VENOUS BLD VENIPUNCTURE: CPT | Performed by: STUDENT IN AN ORGANIZED HEALTH CARE EDUCATION/TRAINING PROGRAM

## 2024-04-08 PROCEDURE — 0023 HH SOC

## 2024-04-08 PROCEDURE — 85730 THROMBOPLASTIN TIME PARTIAL: CPT | Mod: 91

## 2024-04-08 PROCEDURE — 94640 AIRWAY INHALATION TREATMENT: CPT

## 2024-04-08 PROCEDURE — 85025 COMPLETE CBC W/AUTO DIFF WBC: CPT

## 2024-04-08 PROCEDURE — 1090000001 HH PPS REVENUE CREDIT

## 2024-04-08 PROCEDURE — 93005 ELECTROCARDIOGRAM TRACING: CPT

## 2024-04-08 PROCEDURE — 84484 ASSAY OF TROPONIN QUANT: CPT

## 2024-04-08 PROCEDURE — 36415 COLL VENOUS BLD VENIPUNCTURE: CPT

## 2024-04-08 PROCEDURE — G0151 HHCP-SERV OF PT,EA 15 MIN: HCPCS | Mod: HHH

## 2024-04-08 PROCEDURE — 2500000002 HC RX 250 W HCPCS SELF ADMINISTERED DRUGS (ALT 637 FOR MEDICARE OP, ALT 636 FOR OP/ED): Performed by: INTERNAL MEDICINE

## 2024-04-08 PROCEDURE — 1210000001 HC SEMI-PRIVATE ROOM DAILY

## 2024-04-08 PROCEDURE — 71275 CT ANGIOGRAPHY CHEST: CPT | Mod: FOREIGN READ | Performed by: RADIOLOGY

## 2024-04-08 PROCEDURE — 2500000001 HC RX 250 WO HCPCS SELF ADMINISTERED DRUGS (ALT 637 FOR MEDICARE OP): Performed by: STUDENT IN AN ORGANIZED HEALTH CARE EDUCATION/TRAINING PROGRAM

## 2024-04-08 PROCEDURE — 74177 CT ABD & PELVIS W/CONTRAST: CPT | Mod: FOREIGN READ | Performed by: RADIOLOGY

## 2024-04-08 PROCEDURE — 96375 TX/PRO/DX INJ NEW DRUG ADDON: CPT

## 2024-04-08 PROCEDURE — 84132 ASSAY OF SERUM POTASSIUM: CPT

## 2024-04-08 PROCEDURE — 96365 THER/PROPH/DIAG IV INF INIT: CPT

## 2024-04-08 PROCEDURE — 1090000002 HH PPS REVENUE DEBIT

## 2024-04-08 PROCEDURE — 84484 ASSAY OF TROPONIN QUANT: CPT | Performed by: STUDENT IN AN ORGANIZED HEALTH CARE EDUCATION/TRAINING PROGRAM

## 2024-04-08 PROCEDURE — 1090000003 HH PPS REVENUE ADJ

## 2024-04-08 PROCEDURE — 71045 X-RAY EXAM CHEST 1 VIEW: CPT

## 2024-04-08 PROCEDURE — 70450 CT HEAD/BRAIN W/O DYE: CPT | Performed by: STUDENT IN AN ORGANIZED HEALTH CARE EDUCATION/TRAINING PROGRAM

## 2024-04-08 PROCEDURE — 74177 CT ABD & PELVIS W/CONTRAST: CPT

## 2024-04-08 PROCEDURE — 83880 ASSAY OF NATRIURETIC PEPTIDE: CPT

## 2024-04-08 PROCEDURE — 87637 SARSCOV2&INF A&B&RSV AMP PRB: CPT

## 2024-04-08 PROCEDURE — 2500000002 HC RX 250 W HCPCS SELF ADMINISTERED DRUGS (ALT 637 FOR MEDICARE OP, ALT 636 FOR OP/ED): Mod: MUE | Performed by: INTERNAL MEDICINE

## 2024-04-08 PROCEDURE — 71275 CT ANGIOGRAPHY CHEST: CPT

## 2024-04-08 PROCEDURE — 70450 CT HEAD/BRAIN W/O DYE: CPT

## 2024-04-08 PROCEDURE — 99285 EMERGENCY DEPT VISIT HI MDM: CPT | Mod: 25

## 2024-04-08 PROCEDURE — 96368 THER/DIAG CONCURRENT INF: CPT

## 2024-04-08 PROCEDURE — 2550000001 HC RX 255 CONTRASTS

## 2024-04-08 RX ORDER — TRAMADOL HYDROCHLORIDE 50 MG/1
50 TABLET ORAL EVERY 6 HOURS PRN
Status: DISCONTINUED | OUTPATIENT
Start: 2024-04-08 | End: 2024-04-17 | Stop reason: HOSPADM

## 2024-04-08 RX ORDER — PANTOPRAZOLE SODIUM 40 MG/1
40 TABLET, DELAYED RELEASE ORAL
Status: DISCONTINUED | OUTPATIENT
Start: 2024-04-09 | End: 2024-04-17 | Stop reason: HOSPADM

## 2024-04-08 RX ORDER — IPRATROPIUM BROMIDE AND ALBUTEROL SULFATE 2.5; .5 MG/3ML; MG/3ML
3 SOLUTION RESPIRATORY (INHALATION)
Status: DISCONTINUED | OUTPATIENT
Start: 2024-04-09 | End: 2024-04-17 | Stop reason: HOSPADM

## 2024-04-08 RX ORDER — ONDANSETRON HYDROCHLORIDE 2 MG/ML
4 INJECTION, SOLUTION INTRAVENOUS EVERY 6 HOURS PRN
Status: DISCONTINUED | OUTPATIENT
Start: 2024-04-08 | End: 2024-04-17 | Stop reason: HOSPADM

## 2024-04-08 RX ORDER — TALC
3 POWDER (GRAM) TOPICAL NIGHTLY PRN
Status: DISCONTINUED | OUTPATIENT
Start: 2024-04-08 | End: 2024-04-09

## 2024-04-08 RX ORDER — IPRATROPIUM BROMIDE AND ALBUTEROL SULFATE 2.5; .5 MG/3ML; MG/3ML
3 SOLUTION RESPIRATORY (INHALATION)
Status: DISCONTINUED | OUTPATIENT
Start: 2024-04-08 | End: 2024-04-08

## 2024-04-08 RX ORDER — POLYETHYLENE GLYCOL 3350 17 G/17G
17 POWDER, FOR SOLUTION ORAL DAILY
Status: DISCONTINUED | OUTPATIENT
Start: 2024-04-09 | End: 2024-04-17 | Stop reason: HOSPADM

## 2024-04-08 RX ORDER — CEFTRIAXONE 1 G/50ML
1 INJECTION, SOLUTION INTRAVENOUS EVERY 24 HOURS
Status: DISCONTINUED | OUTPATIENT
Start: 2024-04-09 | End: 2024-04-16

## 2024-04-08 RX ORDER — NAPROXEN SODIUM 220 MG/1
324 TABLET, FILM COATED ORAL ONCE
Status: COMPLETED | OUTPATIENT
Start: 2024-04-08 | End: 2024-04-08

## 2024-04-08 RX ORDER — ACETAMINOPHEN 325 MG/1
650 TABLET ORAL EVERY 6 HOURS PRN
Status: DISCONTINUED | OUTPATIENT
Start: 2024-04-08 | End: 2024-04-17 | Stop reason: HOSPADM

## 2024-04-08 RX ORDER — CEFTRIAXONE 1 G/50ML
1 INJECTION, SOLUTION INTRAVENOUS EVERY 24 HOURS
Status: DISCONTINUED | OUTPATIENT
Start: 2024-04-08 | End: 2024-04-08

## 2024-04-08 RX ORDER — BENZONATATE 100 MG/1
100 CAPSULE ORAL 3 TIMES DAILY PRN
Status: DISCONTINUED | OUTPATIENT
Start: 2024-04-08 | End: 2024-04-09

## 2024-04-08 RX ORDER — IPRATROPIUM BROMIDE AND ALBUTEROL SULFATE 2.5; .5 MG/3ML; MG/3ML
3 SOLUTION RESPIRATORY (INHALATION) EVERY 2 HOUR PRN
Status: DISCONTINUED | OUTPATIENT
Start: 2024-04-08 | End: 2024-04-17 | Stop reason: HOSPADM

## 2024-04-08 RX ADMIN — IOHEXOL 75 ML: 350 INJECTION, SOLUTION INTRAVENOUS at 19:39

## 2024-04-08 RX ADMIN — CEFTRIAXONE 2 G: 2 INJECTION, POWDER, FOR SOLUTION INTRAMUSCULAR; INTRAVENOUS at 18:18

## 2024-04-08 RX ADMIN — AZITHROMYCIN 500 MG: 500 INJECTION, POWDER, LYOPHILIZED, FOR SOLUTION INTRAVENOUS at 17:50

## 2024-04-08 RX ADMIN — ASPIRIN 81 MG 324 MG: 81 TABLET ORAL at 23:19

## 2024-04-08 RX ADMIN — IPRATROPIUM BROMIDE AND ALBUTEROL SULFATE 3 ML: 2.5; .5 SOLUTION RESPIRATORY (INHALATION) at 21:49

## 2024-04-08 RX ADMIN — HYDROMORPHONE HYDROCHLORIDE 0.2 MG: 0.2 INJECTION, SOLUTION INTRAMUSCULAR; INTRAVENOUS; SUBCUTANEOUS at 16:56

## 2024-04-08 ASSESSMENT — PAIN - FUNCTIONAL ASSESSMENT: PAIN_FUNCTIONAL_ASSESSMENT: 0-10

## 2024-04-08 ASSESSMENT — PAIN SCALES - GENERAL: PAINLEVEL_OUTOF10: 8

## 2024-04-08 ASSESSMENT — PAIN DESCRIPTION - PAIN TYPE: TYPE: ACUTE PAIN

## 2024-04-08 ASSESSMENT — PAIN DESCRIPTION - LOCATION: LOCATION: ABDOMEN

## 2024-04-08 ASSESSMENT — PAIN DESCRIPTION - DESCRIPTORS: DESCRIPTORS: SHOOTING

## 2024-04-08 NOTE — ED PROVIDER NOTES
CC: multiple medical complaints     History provided by: Patient and Family Member  Limitations to History: None    HPI:  Patient is 79-year-old female with a PMH of A-fib on Eliquis, COPD (not on home 02), dementia, diabetes, HLD, HTN, infiltrative cardiomyopathy, and diverticulitis s/p resection and ileostomy creation with closure on 2/5/24 who presents to the emergency department for shortness of breath.  Patient reports that for approximately 3 days she has had a productive cough, intermittent abdominal pain, and subjective fevers.  This morning she reports an episode of gross hemoptysis during a coughing episode.  She also developed a frontal headache this morning which was mildly relieved by Tylenol.  She denies chest pain or bloody stools.    External Records Reviewed: Previous ED records, inpatient records, and outpatient records  ???????????????????????????????????????????????????????????????  Triage Vitals:  T 37 °C (98.6 °F)  HR 88  /84  RR    O2 (!) 86 % None (Room air)    Physical Exam  Constitutional:       General: She is awake. She is in acute distress.      Appearance: She is ill-appearing. She is not toxic-appearing or diaphoretic.   Eyes:      Extraocular Movements: Extraocular movements intact.      Conjunctiva/sclera: Conjunctivae normal.      Pupils: Pupils are equal, round, and reactive to light.   Cardiovascular:      Rate and Rhythm: Normal rate and regular rhythm.      Pulses:           Radial pulses are 2+ on the right side and 2+ on the left side.        Dorsalis pedis pulses are 2+ on the right side and 2+ on the left side.      Heart sounds: Normal heart sounds and S1 normal.      No friction rub. No gallop.   Pulmonary:      Effort: Tachypnea present.      Breath sounds: Examination of the right-upper field reveals rhonchi. Examination of the right-middle field reveals rhonchi. Examination of the right-lower field reveals rhonchi and rales. Rhonchi and rales present. No  decreased breath sounds.   Abdominal:      General: Abdomen is protuberant. There is no distension.      Palpations: Abdomen is soft.      Tenderness: There is no abdominal tenderness. There is no guarding or rebound.      Comments: Right lower quadrant end ileostomy site with closure that appears to be healing appropriately with no signs of localized infection.   Musculoskeletal:      Right lower le+ Pitting Edema present.      Left lower le+ Pitting Edema present.   Skin:     General: Skin is warm and dry.      Capillary Refill: Capillary refill takes less than 2 seconds.   Neurological:      Mental Status: She is alert and oriented to person, place, and time.      Comments: Neurologic: Patient is awake and alert. Speech is clear. Face is symmetric without facial droop and facial sensation to light touch equal bilaterally. Uvula midline. Tongue protrusion midline. Hearing intact bilaterally. Full and equal shoulder shrug and head turn against resistance. 5/5 motor strength of UEs and LEs. Sensation to light touch intact in all four extremities. Finger to nose and heel to shin intact.   Psychiatric:         Behavior: Behavior is cooperative.        ???????????????????????????????????????????????????????????????  ED Course/Treatment/Medical Decision Making    EKG Interpretation:   normal sinus rhythm. Rate of 89 bpm.  Left axis deviation.  First-degree AV block present otherwise normal intervals. No acute ST elevations, depressions, or T wave inversions.  Grossly unchanged when compared to previous EKG completed on 2024.    Independent Interpretation of Studies:  I independently interpreted: CXR and EKG    Differential diagnoses considered include but ar not limited to: COPD exacerbation, pulmonary embolism, pneumothorax, pneumonia, viral illness, bowel obstruction, ACS    Social Determinants Limiting Care:  None identified         ED Course:  ED Course as of 04/10/24 1342   Mon 2024   6692  Troponin I, High Sensitivity(!!): 66 [[facility]]   1842 BNP(!): 343 [[facility]]   1843 XR chest 1 view  RUL infiltrate [[facility]]   2047 Troponin I, High Sensitivity(!!): 69  Stable [[facility]]   2047 CT head wo IV contrast  No acute intracranial pathology [[facility]]   2120 CT angio chest for pulmonary embolism  I independently interpreted: CT Chest which showed RUL consolidation, no PE [[facility]]      ED Course User Index  [[facility]] Elias Gamboa, DO         Diagnoses as of 04/10/24 1342   Pneumonia of right upper lobe due to infectious organism   Pneumonia, unspecified organism       MDM:  Patient is a 79-year-old female with above PMH who presents to the emergency department for chief complaint of shortness of breath.  Upon arrival patient's vital signs are remarkable for hypertension and hypoxia on room air saturating 86%.  The patient was immediately placed on oxygen via nasal cannula and titrated up to 6 L/min with improvement in oxygen saturation.  Patient's history and symptoms today are concerning for infectious lung process versus pulmonary embolism versus CHF exacerbation.  A CT angio PE has been ordered.  Given patient's recent abdominal surgery a CT abdominal pelvis is also ordered to evaluate the intermittent abdominal pain.  Given patient's Eliquis use and history of headache today a CT of the head has been ordered.  VBG remarkable for mild alkalosis 7.46 and pCO2 38 with a normal lactate.  CBC remarkable for leukocytosis with left shift. CXR remarkable for right upper lobe infiltrate.  The patient will be started on antibiotics for community-acquired pneumonia ceftriaxone and azithromycin.  Initial EKG is nonischemic and troponin is elevated at 86 which is consistent with patient's baseline.  Patient was signed out to oncoming ED resident in stable condition pending further CT results and further disposition.    Impression:  Signed out to oncoming ED resident at 6 PM    Disposition:  Signed out to oncoming ED resident at 6 PM    Patient was  staffed and discussed with ED attending Dr. Gen Garces, DO   Emergency Medicine, PGY-1       Procedures ? [employer] last updated 4/8/2024 4:17 PM          Roberth Garces,   Resident  04/10/24 9461

## 2024-04-08 NOTE — ED TRIAGE NOTES
Pt coming in today because she has been having some abd pain, has been throwing up, shaky when standing, and states she has been coughing up blood. Breathing and shortness of breath started yesterday. The coughing up blood started today. Gave her inhaler yesterday and stated that she was better but then this morning became worse. Legs started tingling and aching. Airway patent but is 86% on room air, placed on 2L of O2 in triage and pulse ox started to jump up. Has a hx of asthma and not a smoker. Has a hx of afib.

## 2024-04-09 ENCOUNTER — APPOINTMENT (OUTPATIENT)
Dept: VASCULAR MEDICINE | Facility: HOSPITAL | Age: 79
DRG: 193 | End: 2024-04-09
Payer: MEDICARE

## 2024-04-09 ENCOUNTER — HOME CARE VISIT (OUTPATIENT)
Dept: HOME HEALTH SERVICES | Facility: HOME HEALTH | Age: 79
End: 2024-04-09
Payer: MEDICARE

## 2024-04-09 LAB
ANION GAP SERPL CALC-SCNC: 14 MMOL/L (ref 10–20)
BUN SERPL-MCNC: 19 MG/DL (ref 6–23)
CALCIUM SERPL-MCNC: 8.2 MG/DL (ref 8.6–10.3)
CARDIAC TROPONIN I PNL SERPL HS: 86 NG/L (ref 0–13)
CHLORIDE SERPL-SCNC: 107 MMOL/L (ref 98–107)
CO2 SERPL-SCNC: 22 MMOL/L (ref 21–32)
CREAT SERPL-MCNC: 0.84 MG/DL (ref 0.5–1.05)
EGFRCR SERPLBLD CKD-EPI 2021: 72 ML/MIN/1.73M*2
ERYTHROCYTE [DISTWIDTH] IN BLOOD BY AUTOMATED COUNT: 16.4 % (ref 11.5–14.5)
GLUCOSE BLD MANUAL STRIP-MCNC: 109 MG/DL (ref 74–99)
GLUCOSE SERPL-MCNC: 114 MG/DL (ref 74–99)
HCT VFR BLD AUTO: 37.7 % (ref 36–46)
HGB BLD-MCNC: 11.3 G/DL (ref 12–16)
MCH RBC QN AUTO: 29.5 PG (ref 26–34)
MCHC RBC AUTO-ENTMCNC: 30 G/DL (ref 32–36)
MCV RBC AUTO: 98 FL (ref 80–100)
NRBC BLD-RTO: 0 /100 WBCS (ref 0–0)
PLATELET # BLD AUTO: 232 X10*3/UL (ref 150–450)
POTASSIUM SERPL-SCNC: 5.6 MMOL/L (ref 3.5–5.3)
RBC # BLD AUTO: 3.83 X10*6/UL (ref 4–5.2)
SODIUM SERPL-SCNC: 137 MMOL/L (ref 136–145)
WBC # BLD AUTO: 9.6 X10*3/UL (ref 4.4–11.3)

## 2024-04-09 PROCEDURE — 2500000002 HC RX 250 W HCPCS SELF ADMINISTERED DRUGS (ALT 637 FOR MEDICARE OP, ALT 636 FOR OP/ED): Mod: MUE | Performed by: NURSE PRACTITIONER

## 2024-04-09 PROCEDURE — 2500000002 HC RX 250 W HCPCS SELF ADMINISTERED DRUGS (ALT 637 FOR MEDICARE OP, ALT 636 FOR OP/ED): Mod: MUE | Performed by: EMERGENCY MEDICINE

## 2024-04-09 PROCEDURE — 2500000002 HC RX 250 W HCPCS SELF ADMINISTERED DRUGS (ALT 637 FOR MEDICARE OP, ALT 636 FOR OP/ED): Performed by: NURSE PRACTITIONER

## 2024-04-09 PROCEDURE — 36415 COLL VENOUS BLD VENIPUNCTURE: CPT | Performed by: NURSE PRACTITIONER

## 2024-04-09 PROCEDURE — 2500000004 HC RX 250 GENERAL PHARMACY W/ HCPCS (ALT 636 FOR OP/ED): Performed by: INTERNAL MEDICINE

## 2024-04-09 PROCEDURE — 2500000001 HC RX 250 WO HCPCS SELF ADMINISTERED DRUGS (ALT 637 FOR MEDICARE OP): Performed by: NURSE PRACTITIONER

## 2024-04-09 PROCEDURE — 93971 EXTREMITY STUDY: CPT | Performed by: INTERNAL MEDICINE

## 2024-04-09 PROCEDURE — 87899 AGENT NOS ASSAY W/OPTIC: CPT | Mod: AHULAB | Performed by: NURSE PRACTITIONER

## 2024-04-09 PROCEDURE — 82947 ASSAY GLUCOSE BLOOD QUANT: CPT

## 2024-04-09 PROCEDURE — 87449 NOS EACH ORGANISM AG IA: CPT | Mod: AHULAB | Performed by: NURSE PRACTITIONER

## 2024-04-09 PROCEDURE — 1090000002 HH PPS REVENUE DEBIT

## 2024-04-09 PROCEDURE — 1200000002 HC GENERAL ROOM WITH TELEMETRY DAILY

## 2024-04-09 PROCEDURE — 85027 COMPLETE CBC AUTOMATED: CPT | Performed by: NURSE PRACTITIONER

## 2024-04-09 PROCEDURE — 84484 ASSAY OF TROPONIN QUANT: CPT | Performed by: NURSE PRACTITIONER

## 2024-04-09 PROCEDURE — 1090000001 HH PPS REVENUE CREDIT

## 2024-04-09 PROCEDURE — 99233 SBSQ HOSP IP/OBS HIGH 50: CPT | Performed by: NURSE PRACTITIONER

## 2024-04-09 PROCEDURE — 2500000001 HC RX 250 WO HCPCS SELF ADMINISTERED DRUGS (ALT 637 FOR MEDICARE OP): Performed by: INTERNAL MEDICINE

## 2024-04-09 PROCEDURE — 93971 EXTREMITY STUDY: CPT

## 2024-04-09 PROCEDURE — 2500000002 HC RX 250 W HCPCS SELF ADMINISTERED DRUGS (ALT 637 FOR MEDICARE OP, ALT 636 FOR OP/ED): Performed by: EMERGENCY MEDICINE

## 2024-04-09 PROCEDURE — 94640 AIRWAY INHALATION TREATMENT: CPT

## 2024-04-09 PROCEDURE — 80048 BASIC METABOLIC PNL TOTAL CA: CPT | Performed by: NURSE PRACTITIONER

## 2024-04-09 PROCEDURE — 99222 1ST HOSP IP/OBS MODERATE 55: CPT | Performed by: INTERNAL MEDICINE

## 2024-04-09 RX ORDER — METOPROLOL SUCCINATE 25 MG/1
25 TABLET, EXTENDED RELEASE ORAL DAILY
Status: DISCONTINUED | OUTPATIENT
Start: 2024-04-09 | End: 2024-04-17 | Stop reason: HOSPADM

## 2024-04-09 RX ORDER — DOCUSATE SODIUM 100 MG/1
100 CAPSULE, LIQUID FILLED ORAL 2 TIMES DAILY
Status: DISCONTINUED | OUTPATIENT
Start: 2024-04-09 | End: 2024-04-17 | Stop reason: HOSPADM

## 2024-04-09 RX ORDER — DONEPEZIL HYDROCHLORIDE 5 MG/1
5 TABLET, FILM COATED ORAL NIGHTLY
Status: DISCONTINUED | OUTPATIENT
Start: 2024-04-09 | End: 2024-04-17 | Stop reason: HOSPADM

## 2024-04-09 RX ORDER — DULOXETIN HYDROCHLORIDE 30 MG/1
30 CAPSULE, DELAYED RELEASE ORAL 2 TIMES DAILY
Status: DISCONTINUED | OUTPATIENT
Start: 2024-04-09 | End: 2024-04-17 | Stop reason: HOSPADM

## 2024-04-09 RX ORDER — GABAPENTIN 300 MG/1
600 CAPSULE ORAL 3 TIMES DAILY
Status: DISCONTINUED | OUTPATIENT
Start: 2024-04-09 | End: 2024-04-17 | Stop reason: HOSPADM

## 2024-04-09 RX ORDER — BENZONATATE 100 MG/1
200 CAPSULE ORAL 3 TIMES DAILY PRN
Status: DISCONTINUED | OUTPATIENT
Start: 2024-04-09 | End: 2024-04-17 | Stop reason: HOSPADM

## 2024-04-09 RX ORDER — TALC
6 POWDER (GRAM) TOPICAL NIGHTLY PRN
Status: DISCONTINUED | OUTPATIENT
Start: 2024-04-09 | End: 2024-04-17 | Stop reason: HOSPADM

## 2024-04-09 RX ORDER — AMIODARONE HYDROCHLORIDE 200 MG/1
200 TABLET ORAL DAILY
Status: DISCONTINUED | OUTPATIENT
Start: 2024-04-09 | End: 2024-04-17 | Stop reason: HOSPADM

## 2024-04-09 RX ORDER — INSULIN LISPRO 100 [IU]/ML
0-5 INJECTION, SOLUTION INTRAVENOUS; SUBCUTANEOUS
Status: DISCONTINUED | OUTPATIENT
Start: 2024-04-09 | End: 2024-04-10

## 2024-04-09 RX ORDER — DEXTROSE 50 % IN WATER (D50W) INTRAVENOUS SYRINGE
12.5
Status: DISCONTINUED | OUTPATIENT
Start: 2024-04-09 | End: 2024-04-17 | Stop reason: HOSPADM

## 2024-04-09 RX ORDER — FUROSEMIDE 20 MG/1
20 TABLET ORAL DAILY
Status: DISCONTINUED | OUTPATIENT
Start: 2024-04-09 | End: 2024-04-12

## 2024-04-09 RX ORDER — TOPIRAMATE 25 MG/1
100 TABLET ORAL 2 TIMES DAILY
Status: DISCONTINUED | OUTPATIENT
Start: 2024-04-09 | End: 2024-04-17 | Stop reason: HOSPADM

## 2024-04-09 RX ORDER — TRAZODONE HYDROCHLORIDE 50 MG/1
50 TABLET ORAL NIGHTLY PRN
Status: DISCONTINUED | OUTPATIENT
Start: 2024-04-09 | End: 2024-04-17 | Stop reason: HOSPADM

## 2024-04-09 RX ORDER — DEXTROSE 50 % IN WATER (D50W) INTRAVENOUS SYRINGE
25
Status: DISCONTINUED | OUTPATIENT
Start: 2024-04-09 | End: 2024-04-17 | Stop reason: HOSPADM

## 2024-04-09 RX ADMIN — IPRATROPIUM BROMIDE AND ALBUTEROL SULFATE 3 ML: 2.5; .5 SOLUTION RESPIRATORY (INHALATION) at 06:26

## 2024-04-09 RX ADMIN — IPRATROPIUM BROMIDE AND ALBUTEROL SULFATE 3 ML: 2.5; .5 SOLUTION RESPIRATORY (INHALATION) at 20:17

## 2024-04-09 RX ADMIN — CEFTRIAXONE SODIUM 1 G: 1 INJECTION, SOLUTION INTRAVENOUS at 21:03

## 2024-04-09 RX ADMIN — GABAPENTIN 600 MG: 300 CAPSULE ORAL at 17:05

## 2024-04-09 RX ADMIN — METOPROLOL SUCCINATE 25 MG: 25 TABLET, EXTENDED RELEASE ORAL at 17:05

## 2024-04-09 RX ADMIN — AZITHROMYCIN MONOHYDRATE 500 MG: 500 INJECTION, POWDER, LYOPHILIZED, FOR SOLUTION INTRAVENOUS at 21:45

## 2024-04-09 RX ADMIN — BENZONATATE 100 MG: 100 CAPSULE ORAL at 11:57

## 2024-04-09 RX ADMIN — FUROSEMIDE 20 MG: 20 TABLET ORAL at 17:05

## 2024-04-09 RX ADMIN — GABAPENTIN 600 MG: 300 CAPSULE ORAL at 21:02

## 2024-04-09 RX ADMIN — DONEPEZIL HYDROCHLORIDE 5 MG: 5 TABLET ORAL at 21:02

## 2024-04-09 RX ADMIN — IPRATROPIUM BROMIDE AND ALBUTEROL SULFATE 3 ML: 2.5; .5 SOLUTION RESPIRATORY (INHALATION) at 13:51

## 2024-04-09 RX ADMIN — IPRATROPIUM BROMIDE AND ALBUTEROL SULFATE 3 ML: 2.5; .5 SOLUTION RESPIRATORY (INHALATION) at 07:35

## 2024-04-09 RX ADMIN — TOPIRAMATE 100 MG: 25 TABLET, FILM COATED ORAL at 21:02

## 2024-04-09 RX ADMIN — DULOXETINE HYDROCHLORIDE 30 MG: 30 CAPSULE, DELAYED RELEASE ORAL at 21:02

## 2024-04-09 RX ADMIN — PANTOPRAZOLE SODIUM 40 MG: 40 TABLET, DELAYED RELEASE ORAL at 07:00

## 2024-04-09 RX ADMIN — AMIODARONE HYDROCHLORIDE 200 MG: 200 TABLET ORAL at 17:05

## 2024-04-09 RX ADMIN — APIXABAN 5 MG: 5 TABLET, FILM COATED ORAL at 21:02

## 2024-04-09 SDOH — SOCIAL STABILITY: SOCIAL INSECURITY: DOES ANYONE TRY TO KEEP YOU FROM HAVING/CONTACTING OTHER FRIENDS OR DOING THINGS OUTSIDE YOUR HOME?: NO

## 2024-04-09 SDOH — SOCIAL STABILITY: SOCIAL INSECURITY
WITHIN THE LAST YEAR, HAVE YOU BEEN KICKED, HIT, SLAPPED, OR OTHERWISE PHYSICALLY HURT BY YOUR PARTNER OR EX-PARTNER?: NO

## 2024-04-09 SDOH — HEALTH STABILITY: MENTAL HEALTH: HOW MANY STANDARD DRINKS CONTAINING ALCOHOL DO YOU HAVE ON A TYPICAL DAY?: 1 OR 2

## 2024-04-09 SDOH — SOCIAL STABILITY: SOCIAL NETWORK
DO YOU BELONG TO ANY CLUBS OR ORGANIZATIONS SUCH AS CHURCH GROUPS UNIONS, FRATERNAL OR ATHLETIC GROUPS, OR SCHOOL GROUPS?: NO

## 2024-04-09 SDOH — SOCIAL STABILITY: SOCIAL INSECURITY: WITHIN THE LAST YEAR, HAVE YOU BEEN AFRAID OF YOUR PARTNER OR EX-PARTNER?: NO

## 2024-04-09 SDOH — SOCIAL STABILITY: SOCIAL INSECURITY: DO YOU FEEL UNSAFE GOING BACK TO THE PLACE WHERE YOU ARE LIVING?: NO

## 2024-04-09 SDOH — SOCIAL STABILITY: SOCIAL NETWORK: ARE YOU MARRIED, WIDOWED, DIVORCED, SEPARATED, NEVER MARRIED, OR LIVING WITH A PARTNER?: WIDOWED

## 2024-04-09 SDOH — SOCIAL STABILITY: SOCIAL INSECURITY: HAVE YOU HAD THOUGHTS OF HARMING ANYONE ELSE?: NO

## 2024-04-09 SDOH — SOCIAL STABILITY: SOCIAL INSECURITY: ARE THERE ANY APPARENT SIGNS OF INJURIES/BEHAVIORS THAT COULD BE RELATED TO ABUSE/NEGLECT?: NO

## 2024-04-09 SDOH — ECONOMIC STABILITY: INCOME INSECURITY: HOW HARD IS IT FOR YOU TO PAY FOR THE VERY BASICS LIKE FOOD, HOUSING, MEDICAL CARE, AND HEATING?: SOMEWHAT HARD

## 2024-04-09 SDOH — SOCIAL STABILITY: SOCIAL NETWORK: HOW OFTEN DO YOU ATTEND CHURCH OR RELIGIOUS SERVICES?: 1 TO 4 TIMES PER YEAR

## 2024-04-09 SDOH — SOCIAL STABILITY: SOCIAL INSECURITY: ARE YOU OR HAVE YOU BEEN THREATENED OR ABUSED PHYSICALLY, EMOTIONALLY, OR SEXUALLY BY ANYONE?: NO

## 2024-04-09 SDOH — SOCIAL STABILITY: SOCIAL INSECURITY: DO YOU FEEL ANYONE HAS EXPLOITED OR TAKEN ADVANTAGE OF YOU FINANCIALLY OR OF YOUR PERSONAL PROPERTY?: NO

## 2024-04-09 SDOH — ECONOMIC STABILITY: FOOD INSECURITY: WITHIN THE PAST 12 MONTHS, YOU WORRIED THAT YOUR FOOD WOULD RUN OUT BEFORE YOU GOT MONEY TO BUY MORE.: NEVER TRUE

## 2024-04-09 SDOH — SOCIAL STABILITY: SOCIAL NETWORK: HOW OFTEN DO YOU ATTENT MEETINGS OF THE CLUB OR ORGANIZATION YOU BELONG TO?: NEVER

## 2024-04-09 SDOH — HEALTH STABILITY: MENTAL HEALTH: HOW OFTEN DO YOU HAVE A DRINK CONTAINING ALCOHOL?: MONTHLY OR LESS

## 2024-04-09 SDOH — HEALTH STABILITY: MENTAL HEALTH
STRESS IS WHEN SOMEONE FEELS TENSE, NERVOUS, ANXIOUS, OR CAN'T SLEEP AT NIGHT BECAUSE THEIR MIND IS TROUBLED. HOW STRESSED ARE YOU?: NOT AT ALL

## 2024-04-09 SDOH — ECONOMIC STABILITY: INCOME INSECURITY: IN THE LAST 12 MONTHS, WAS THERE A TIME WHEN YOU WERE NOT ABLE TO PAY THE MORTGAGE OR RENT ON TIME?: NO

## 2024-04-09 SDOH — HEALTH STABILITY: MENTAL HEALTH: HOW OFTEN DO YOU HAVE 6 OR MORE DRINKS ON ONE OCCASION?: NEVER

## 2024-04-09 SDOH — SOCIAL STABILITY: SOCIAL INSECURITY
WITHIN THE LAST YEAR, HAVE TO BEEN RAPED OR FORCED TO HAVE ANY KIND OF SEXUAL ACTIVITY BY YOUR PARTNER OR EX-PARTNER?: NO

## 2024-04-09 SDOH — SOCIAL STABILITY: SOCIAL INSECURITY: WITHIN THE LAST YEAR, HAVE YOU BEEN HUMILIATED OR EMOTIONALLY ABUSED IN OTHER WAYS BY YOUR PARTNER OR EX-PARTNER?: NO

## 2024-04-09 SDOH — SOCIAL STABILITY: SOCIAL INSECURITY: HAS ANYONE EVER THREATENED TO HURT YOUR FAMILY OR YOUR PETS?: NO

## 2024-04-09 SDOH — ECONOMIC STABILITY: FOOD INSECURITY: WITHIN THE PAST 12 MONTHS, THE FOOD YOU BOUGHT JUST DIDN'T LAST AND YOU DIDN'T HAVE MONEY TO GET MORE.: NEVER TRUE

## 2024-04-09 SDOH — SOCIAL STABILITY: SOCIAL NETWORK
IN A TYPICAL WEEK, HOW MANY TIMES DO YOU TALK ON THE PHONE WITH FAMILY, FRIENDS, OR NEIGHBORS?: MORE THAN THREE TIMES A WEEK

## 2024-04-09 SDOH — SOCIAL STABILITY: SOCIAL INSECURITY: ABUSE: ADULT

## 2024-04-09 SDOH — SOCIAL STABILITY: SOCIAL NETWORK: HOW OFTEN DO YOU GET TOGETHER WITH FRIENDS OR RELATIVES?: MORE THAN THREE TIMES A WEEK

## 2024-04-09 SDOH — HEALTH STABILITY: PHYSICAL HEALTH: ON AVERAGE, HOW MANY MINUTES DO YOU ENGAGE IN EXERCISE AT THIS LEVEL?: 0 MIN

## 2024-04-09 SDOH — HEALTH STABILITY: PHYSICAL HEALTH: ON AVERAGE, HOW MANY DAYS PER WEEK DO YOU ENGAGE IN MODERATE TO STRENUOUS EXERCISE (LIKE A BRISK WALK)?: 0 DAYS

## 2024-04-09 ASSESSMENT — COGNITIVE AND FUNCTIONAL STATUS - GENERAL
DAILY ACTIVITIY SCORE: 20
WALKING IN HOSPITAL ROOM: A LOT
WALKING IN HOSPITAL ROOM: A LOT
DRESSING REGULAR LOWER BODY CLOTHING: A LITTLE
HELP NEEDED FOR BATHING: A LITTLE
MOVING TO AND FROM BED TO CHAIR: A LITTLE
MOVING TO AND FROM BED TO CHAIR: A LITTLE
PATIENT BASELINE BEDBOUND: NO
CLIMB 3 TO 5 STEPS WITH RAILING: A LOT
DRESSING REGULAR UPPER BODY CLOTHING: A LITTLE
CLIMB 3 TO 5 STEPS WITH RAILING: A LOT
DRESSING REGULAR UPPER BODY CLOTHING: A LITTLE
DRESSING REGULAR LOWER BODY CLOTHING: A LITTLE
HELP NEEDED FOR BATHING: A LITTLE
DRESSING REGULAR UPPER BODY CLOTHING: A LITTLE
TOILETING: A LITTLE
MOBILITY SCORE: 18
DAILY ACTIVITIY SCORE: 20
CLIMB 3 TO 5 STEPS WITH RAILING: A LOT
TOILETING: A LITTLE
STANDING UP FROM CHAIR USING ARMS: A LITTLE
DRESSING REGULAR LOWER BODY CLOTHING: A LITTLE
HELP NEEDED FOR BATHING: A LITTLE
WALKING IN HOSPITAL ROOM: A LOT
STANDING UP FROM CHAIR USING ARMS: A LITTLE
MOBILITY SCORE: 18
MOVING TO AND FROM BED TO CHAIR: A LITTLE
STANDING UP FROM CHAIR USING ARMS: A LITTLE
DAILY ACTIVITIY SCORE: 20
TOILETING: A LITTLE
MOBILITY SCORE: 18

## 2024-04-09 ASSESSMENT — ACTIVITIES OF DAILY LIVING (ADL)
PATIENT'S MEMORY ADEQUATE TO SAFELY COMPLETE DAILY ACTIVITIES?: YES
TOILETING: NEEDS ASSISTANCE
JUDGMENT_ADEQUATE_SAFELY_COMPLETE_DAILY_ACTIVITIES: YES
HEARING - LEFT EAR: FUNCTIONAL
FEEDING YOURSELF: INDEPENDENT
LACK_OF_TRANSPORTATION: NO
DRESSING YOURSELF: NEEDS ASSISTANCE
BATHING: NEEDS ASSISTANCE
WALKS IN HOME: NEEDS ASSISTANCE
GROOMING: NEEDS ASSISTANCE
HEARING - RIGHT EAR: FUNCTIONAL
ASSISTIVE_DEVICE: WALKER
LACK_OF_TRANSPORTATION: NO
ADEQUATE_TO_COMPLETE_ADL: YES

## 2024-04-09 ASSESSMENT — LIFESTYLE VARIABLES
SKIP TO QUESTIONS 9-10: 1
SKIP TO QUESTIONS 9-10: 1
AUDIT-C TOTAL SCORE: 1
SKIP TO QUESTIONS 9-10: 1
SUBSTANCE_ABUSE_PAST_12_MONTHS: NO
HOW MANY STANDARD DRINKS CONTAINING ALCOHOL DO YOU HAVE ON A TYPICAL DAY: 1 OR 2
HOW OFTEN DO YOU HAVE A DRINK CONTAINING ALCOHOL: MONTHLY OR LESS
PRESCIPTION_ABUSE_PAST_12_MONTHS: NO
AUDIT-C TOTAL SCORE: 1
HOW OFTEN DO YOU HAVE 6 OR MORE DRINKS ON ONE OCCASION: NEVER
HOW OFTEN DO YOU HAVE A DRINK CONTAINING ALCOHOL: MONTHLY OR LESS
SUBSTANCE_ABUSE_PAST_12_MONTHS: NO
HOW OFTEN DO YOU HAVE 6 OR MORE DRINKS ON ONE OCCASION: NEVER
AUDIT-C TOTAL SCORE: 1
PRESCIPTION_ABUSE_PAST_12_MONTHS: NO
AUDIT-C TOTAL SCORE: 1
HOW MANY STANDARD DRINKS CONTAINING ALCOHOL DO YOU HAVE ON A TYPICAL DAY: 1 OR 2
AUDIT-C TOTAL SCORE: 1

## 2024-04-09 ASSESSMENT — PATIENT HEALTH QUESTIONNAIRE - PHQ9
SUM OF ALL RESPONSES TO PHQ9 QUESTIONS 1 & 2: 0
2. FEELING DOWN, DEPRESSED OR HOPELESS: NOT AT ALL
SUM OF ALL RESPONSES TO PHQ9 QUESTIONS 1 & 2: 0
1. LITTLE INTEREST OR PLEASURE IN DOING THINGS: NOT AT ALL
2. FEELING DOWN, DEPRESSED OR HOPELESS: NOT AT ALL
1. LITTLE INTEREST OR PLEASURE IN DOING THINGS: NOT AT ALL

## 2024-04-09 ASSESSMENT — COLUMBIA-SUICIDE SEVERITY RATING SCALE - C-SSRS
2. HAVE YOU ACTUALLY HAD ANY THOUGHTS OF KILLING YOURSELF?: NO
1. IN THE PAST MONTH, HAVE YOU WISHED YOU WERE DEAD OR WISHED YOU COULD GO TO SLEEP AND NOT WAKE UP?: NO
6. HAVE YOU EVER DONE ANYTHING, STARTED TO DO ANYTHING, OR PREPARED TO DO ANYTHING TO END YOUR LIFE?: NO

## 2024-04-09 ASSESSMENT — PAIN SCALES - GENERAL: PAINLEVEL_OUTOF10: 0 - NO PAIN

## 2024-04-09 ASSESSMENT — PAIN SCALES - WONG BAKER: WONGBAKER_NUMERICALRESPONSE: NO HURT

## 2024-04-09 NOTE — PROGRESS NOTES
Emergency Medicine Transition of Care Note.    I assumed care of Elida Benson at signout.  Please see the previous ED provider note for all HPI, PE and MDM prior to my arrival. This is in addition to the primary record.    In brief Elida Benson is an 79 y.o. female presenting for   Chief Complaint   Patient presents with    multiple medical complaints     At the time of signout we were awaiting: CT imaging with plan to admit following    Under my care, patient had CT imaging of the chest that redemonstrated right upper lobe consolidation consistent with pneumonia.  Patient had received antibiotics prior to my arrival.  On reassessment, patient felt subjectively improved and now only requiring 2 L from her initial 6 L on arrival.  Patient was subsequent admitted to medicine for further management of her community-acquired pneumonia.          Procedure  Procedures    Elias Gamboa, DO

## 2024-04-09 NOTE — PROGRESS NOTES
Pharmacy Medication History Review    Elida Benson is a 79 y.o. female admitted for Pneumonia, unspecified organism. Pharmacy reviewed the patient's [id number] medications and allergies for accuracy.    The list below reflectives the updated PTA list. Please review each medication in order reconciliation for additional clarification and justification.  Prior to Admission Medications   Prescriptions Last Dose Informant     DULoxetine (Cymbalta) 30 mg DR capsule 2024      Sig: Take 1 capsule (30 mg) by mouth 2 times a day.   acetaminophen (Tylenol) 325 mg tablet Past Week      Sig: Give 2 tablet by mouth every 4 hours as needed for Pain   amiodarone (Pacerone) 200 mg tablet 2024      Sig: Give 1 tablet by mouth in the morning for ANTIARRHYTHMIC   apixaban (Eliquis) 5 mg tablet 2024      Sig: Give 1 tablet by mouth two times a day for blood thinner   b complex 0.4 mg tablet 2024      Sig: Take 1 tablet by mouth once daily.   biotin 1 mg tablet 2024      Si tab(s) orally once a day   cephalexin (Keflex) 500 mg capsule 2024      Sig: TAKE ONE CAPSULE BY MOUTH EVERY 12 HOURS   cholecalciferol (Vitamin D-3) 1,250 mcg (50,000 unit) capsule Past Week      Sig: Take 50,000 Units by mouth once each week.   docusate sodium (Colace) 100 mg capsule Past Month      Sig: Take 1 capsule (100 mg) by mouth 2 times a day as needed for constipation.   donepezil (Aricept) 5 mg tablet 2024      Si tab(s) orally once (at bedtime)   furosemide (Lasix) 20 mg tablet 2024      Sig: Take 1 tablet (20 mg) by mouth once daily.   gabapentin (Neurontin) 600 mg tablet 2024      Sig: TAKE ONE TABLET BY MOUTH THREE TIMES A DAY   melatonin 3 mg tablet 2024      Sig: Give 2 tablet by mouth as needed for insomnia administer at bedtime   metoprolol succinate XL (Toprol-XL) 25 mg 24 hr tablet 2024      Sig: Take 1 tablet (25 mg) by mouth once daily. Do not crush or chew. DO NOT GIVE IF SBP IS  UNDER 100mm/hg   ondansetron ODT (Zofran-ODT) 4 mg disintegrating tablet Past Week      Sig: Take 1 tablet (4 mg) by mouth every 8 hours if needed for nausea or vomiting.             topiramate (Topamax) 100 mg tablet 3/31/2024      Sig: TAKE ONE TABLET BY MOUTH TWO TIMES A DAY   traZODone (Desyrel) 50 mg tablet 4/7/2024      Sig: Take 1 tablet (50 mg) by mouth as needed at bedtime for sleep.      Facility-Administered Medications: None      Allergies  Reviewed by Shazia Contreras RN on 4/8/2024        Severity Reactions Comments    Shellfish Containing Products Not Specified Swelling             Below are additional concerns with the patient's PTA list.  Patient's daughter verified all medications and doses.    Nona Horowitz

## 2024-04-09 NOTE — CARE PLAN
The patient's goals for the shift include      The clinical goals for the shift include to breath easy    Over the shift, the patient did not make progress toward the following goals. Barriers to progression include SOB. Recommendations to address these barriers include breathing treatments.

## 2024-04-09 NOTE — PROGRESS NOTES
04/09/24 0750   ACS Disability Status   Are you deaf or do you have serious difficulty hearing? N   Are you blind or do you have serious difficulty seeing, even when wearing glasses? N   Because of a physical, mental, or emotional condition, do you have serious difficulty concentrating, remembering, or making decisions? (5 years old or older) Y   Do you have serious difficulty walking or climbing stairs? Y   Do you have serious difficulty dressing or bathing? N   Because of a physical, mental, or emotional condition, do you have serious difficulty doing errands alone such as visiting the doctor? Y

## 2024-04-09 NOTE — PROGRESS NOTES
Home alone with [facility]     04/09/24 4996   Current Planned Discharge Disposition   Current Planned Discharge Disposition Home Health

## 2024-04-09 NOTE — H&P
Elida Benson is a 79 y.o. female   HPI   Patient with a past medical history of hypertension dyslipidemia COPD atrial fibrillation on Eliquis dementia diabetes mellitus chronic diastolic congestive heart failure presents to the hospital with increasing cough and productive phlegm along with worsening shortness of breath  Workup in the emergency room was concerning for pneumonia  Started patient on IV antibiotics and admitted for further treatment    Past Medical History  Past Medical History:   Diagnosis Date    A-fib (CMS/[facility])     Managed on meds, Eliquis stoppage and cardiac clearance in [facility] from Dr. Ignacio    Cervical myelopathy (CMS/[facility])     Chronic pain syndrome     COPD (chronic obstructive pulmonary disease) (CMS/[facility])     denies, she is a former smoker but quit in 1984    Dementia (CMS/[facility])     Depression     Diabetic neuropathy (CMS/[facility])     Diverticulitis     DM (diabetes mellitus) (CMS/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)     Echo 8/22/23, following with Dr. 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    Other malaise 04/21/2022    Physical deconditioning    Palpitations     on occasion, has afib followed by cardiology    Post laminectomy syndrome     Rectovaginal fistula     Spinal stenosis of cervical region     UTI (urinary tract infection)        Surgical History  Past Surgical History:   Procedure Laterality Date    CARPAL TUNNEL RELEASE  08/27/2013    Neuroplasty Decompression Median Nerve At Carpal Tunnel    ILEOSTOMY      KNEE ARTHROPLASTY  08/27/2013    Knee Arthroplasty    LAMINECTOMY      LUMBAR FUSION  2023    OTHER SURGICAL HISTORY  08/16/2023    LAPAROSCPIC ANTERIOR RESECTION, DIVERTING LOOP ILEOSTOMY    SPINAL CORD STIMULATOR IMPLANT  2022    TOTAL HIP ARTHROPLASTY  08/27/2013    Total Hip Replacement        Social  History  She reports that she quit smoking about 40 years ago. Her smoking use included cigarettes. She smoked an average of .25 packs per day. She has never used smokeless tobacco. She reports current alcohol use. She reports that she does not use drugs.    Family History  Family History   Problem Relation Name Age of Onset    No Known Problems Mother      No Known Problems Father      No Known Problems Sister      No Known Problems Sister      No Known Problems Sister      No Known Problems Sister          Allergies  Shellfish containing products    Review of Systems     Patient has memory loss of the review of system was not accurate       Vitals:    04/09/24 1603   BP: 136/77   Pulse: 75   Resp: 18   Temp:    SpO2: 91%        Scheduled medications  amiodarone, 200 mg, oral, Daily  apixaban, 5 mg, oral, BID  azithromycin, 500 mg, intravenous, q24h  cefTRIAXone, 1 g, intravenous, q24h  docusate sodium, 100 mg, oral, BID  donepezil, 5 mg, oral, Nightly  DULoxetine, 30 mg, oral, BID  furosemide, 20 mg, oral, Daily  gabapentin, 600 mg, oral, TID  insulin lispro, 0-5 Units, subcutaneous, TID with meals  ipratropium-albuteroL, 3 mL, nebulization, TID  metoprolol succinate XL, 25 mg, oral, Daily  pantoprazole, 40 mg, oral, Daily before breakfast  polyethylene glycol, 17 g, oral, Daily  topiramate, 100 mg, oral, BID      Continuous medications     PRN medications  PRN medications: acetaminophen, benzonatate, dextrose, dextrose, glucagon, glucagon, ipratropium-albuteroL, melatonin, ondansetron, traMADol, traZODone    Results from last 7 days   Lab Units 04/09/24  1408 04/08/24  1617   WBC AUTO x10*3/uL 9.6 12.5*   HEMOGLOBIN g/dL 11.3* 12.3   HEMATOCRIT % 37.7 40.4   PLATELETS AUTO x10*3/uL 232 226     Results from last 7 days   Lab Units 04/09/24  1408 04/08/24  1617   SODIUM mmol/L 137 140   POTASSIUM mmol/L 5.6* 4.3   CHLORIDE mmol/L 107 108*   CO2 mmol/L 22 23   BUN mg/dL 19 15   CREATININE mg/dL 0.84 0.73   CALCIUM  mg/dL 8.2* 8.5*   PROTEIN TOTAL g/dL  --  6.3*   BILIRUBIN TOTAL mg/dL  --  0.6   ALK PHOS U/L  --  68   ALT U/L  --  15   AST U/L  --  15   GLUCOSE mg/dL 114* 159*     Results from last 7 days   Lab Units 04/09/24  1408 04/08/24  1953 04/08/24  1617   TROPHS ng/L 86* 69* 66*        Vascular US lower extremity venous duplex right         CT angio chest for pulmonary embolism   Final Result   1. No detectable pulmonary emboli.   2. Patchy infiltrates and/or edema right upper lobe.   3. Bilateral focal lower lung atelectasis left more than right,   adjacent to small pleural effusions.   4. No bowel obstruction or detectable bowel inflammation; moderate   stool burden; prior sigmoid resection and ileostomy reversal.   5. Previous surgery lower cervical spine and lower lumbar spine.   6. Remainder as above.   Signed by Flaquito López MD      CT head wo IV contrast   Final Result   1. No evidence of hemorrhage, CT apparent transcortical infarct, or   other emergent intracranial abnormality.   2. Moderate brain parenchymal volume loss with patchy and confluence   attenuation changes present in the white matter of bilateral cerebral   hemispheres, nonspecific findings favored to represent sequela of   microvascular disease.        MACRO:   None        Signed by: Tim Garland 4/8/2024 8:07 PM   Dictation workstation:   [id number]      CT abdomen pelvis w IV contrast   Final Result   1. No detectable pulmonary emboli.   2. Patchy infiltrates and/or edema right upper lobe.   3. Bilateral focal lower lung atelectasis left more than right,   adjacent to small pleural effusions.   4. No bowel obstruction or detectable bowel inflammation; moderate   stool burden; prior sigmoid resection and ileostomy reversal.   5. Previous surgery lower cervical spine and lower lumbar spine.   6. Remainder as above.   Signed by Flaquito López MD      XR chest 1 view   Final Result   Right upper lobe infiltrate with small pleural effusion.    Signed by Bakari Perdue MD          Physical Exam     Constitutional   General appearance: Alert and in no acute distress.   Eyes   Inspection of eyes: Sclera and conjunctiva were normal.    Pupil exam: Pupils were equal in size. Extraocular movements were intact.   Pulmonary   Respiratory assessment: No respiratory distress, normal respiratory rhythm and effort.    Auscultation of Lungs:   Cardiovascular   Auscultation of heart: Apical pulse normal, heart rate and rhythm normal, normal S1 and S2, no murmurs and no pericardial rub.    Exam for edema: No peripheral edema.   Abdomen   Abdominal Exam: No bruits, normal bowel sounds, soft, non-tender, no abdominal mass palpated.    Liver and Spleen exam: No hepato-splenomegaly.   Musculoskeletal   Examination of gait: Normal.    Inspection of digits and nails: No clubbing or cyanosis of the fingernails.    Inspection/palpation of joints, bones and muscles: No joint swelling. Normal movement of all extremities.   Skin   Skin inspection: Normal skin color and pigmentation, normal skin turgor and no visible rash.   Neurologic   Cranial nerves: Nerves 2-12 were intact, no focal neuro defects.   Psychiatric   Alert x 1-2      Assessment/Plan      #Community-acquired pneumonia  Start IV antibiotics  Antitussives/DuoNebs    #Elevated troponin which is flat trend  Likely demand ischemia from pneumonia    #Diverticulitis with recent ileostomy closure  Wound care    #Hypertension  Stable continue home medications    #Diabetes mellitus  /Insulin coverage    #COPD  DuoNebs    #Paroxysmal atrial fibrillation  Continue Eliquis/amiodarone    #Dementia  Monitor for worsening encephalopathy

## 2024-04-09 NOTE — PROGRESS NOTES
Elida Benson is a 79 y.o. female on day 1 of admission presenting with Pneumonia, unspecified organism.    Subjective     Seen in ED. Frequent cough productive of brown mucous. Remains on room air.     Objective     Physical Exam  Vitals reviewed.   Constitutional:       General: She is not in acute distress.     Appearance: Normal appearance. She is not ill-appearing, toxic-appearing or diaphoretic.   HENT:      Head: Normocephalic.      Nose: Nose normal.      Mouth/Throat:      Mouth: Mucous membranes are moist.      Pharynx: Oropharynx is clear.   Eyes:      General: Lids are normal. Gaze aligned appropriately.      Conjunctiva/sclera: Conjunctivae normal.   Cardiovascular:      Rate and Rhythm: Normal rate and regular rhythm.      Pulses: Normal pulses.      Heart sounds: Normal heart sounds. No murmur heard.  Pulmonary:      Effort: Pulmonary effort is normal.      Breath sounds: Normal air entry. Examination of the right-middle field reveals rales. Rales present.   Abdominal:      General: Abdomen is flat. Bowel sounds are normal.      Palpations: Abdomen is soft.      Tenderness: There is no abdominal tenderness.   Musculoskeletal:         General: Normal range of motion.      Cervical back: Full passive range of motion without pain and normal range of motion.      Right lower leg: Edema (nonpitting) present.      Left lower leg: No edema.   Skin:     General: Skin is warm and dry.      Capillary Refill: Capillary refill takes less than 2 seconds.   Neurological:      General: No focal deficit present.      Mental Status: She is alert and oriented to person, place, and time.      Motor: Motor function is intact.      Coordination: Coordination is intact.   Psychiatric:         Attention and Perception: Attention normal.         Mood and Affect: Mood normal.         Speech: Speech normal.         Behavior: Behavior normal. Behavior is cooperative.         Last Recorded Vitals  Blood pressure 136/77,  "pulse 75, temperature 37.4 °C (99.4 °F), temperature source Oral, resp. rate 18, height 1.422 m (4' 8\"), weight 60.3 kg (133 lb), SpO2 91 %.  Intake/Output last 3 Shifts:  No intake/output data recorded.    Relevant Results            Scheduled medications  amiodarone, 200 mg, oral, Daily  apixaban, 5 mg, oral, BID  azithromycin, 500 mg, intravenous, q24h  cefTRIAXone, 1 g, intravenous, q24h  docusate sodium, 100 mg, oral, BID  donepezil, 5 mg, oral, Nightly  DULoxetine, 30 mg, oral, BID  furosemide, 20 mg, oral, Daily  gabapentin, 600 mg, oral, TID  ipratropium-albuteroL, 3 mL, nebulization, TID  metoprolol succinate XL, 25 mg, oral, Daily  pantoprazole, 40 mg, oral, Daily before breakfast  polyethylene glycol, 17 g, oral, Daily  topiramate, 100 mg, oral, BID      Continuous medications     PRN medications  PRN medications: acetaminophen, benzonatate, ipratropium-albuteroL, melatonin, ondansetron, traMADol, traZODone     Results for orders placed or performed during the hospital encounter of 04/08/24 (from the past 24 hour(s))   Troponin I, High Sensitivity   Result Value Ref Range    Troponin I, High Sensitivity 69 (HH) 0 - 13 ng/L   CBC   Result Value Ref Range    WBC 9.6 4.4 - 11.3 x10*3/uL    nRBC 0.0 0.0 - 0.0 /100 WBCs    RBC 3.83 (L) 4.00 - 5.20 x10*6/uL    Hemoglobin 11.3 (L) 12.0 - 16.0 g/dL    Hematocrit 37.7 36.0 - 46.0 %    MCV 98 80 - 100 fL    MCH 29.5 26.0 - 34.0 pg    MCHC 30.0 (L) 32.0 - 36.0 g/dL    RDW 16.4 (H) 11.5 - 14.5 %    Platelets 232 150 - 450 x10*3/uL   Basic metabolic panel   Result Value Ref Range    Glucose 114 (H) 74 - 99 mg/dL    Sodium 137 136 - 145 mmol/L    Potassium 5.6 (H) 3.5 - 5.3 mmol/L    Chloride 107 98 - 107 mmol/L    Bicarbonate 22 21 - 32 mmol/L    Anion Gap 14 10 - 20 mmol/L    Urea Nitrogen 19 6 - 23 mg/dL    Creatinine 0.84 0.50 - 1.05 mg/dL    eGFR 72 >60 mL/min/1.73m*2    Calcium 8.2 (L) 8.6 - 10.3 mg/dL   Troponin I, High Sensitivity   Result Value Ref Range    " Troponin I, High Sensitivity 86 (HH) 0 - 13 ng/L     Vascular US lower extremity venous duplex right    Result Date: 4/9/2024  Preliminary Cardiology Report            [place] [id number]   Tel [phone] and Fax [phone]        Preliminary V[facility] Lab Report  [facility]  Patient Name:      TAMARA CAMPGUSON Reading Physician:  [id number] Pepe Jewell MD Study Date:        4/9/2024          Ordering Physician: [id number] KELL DELGADILLO MRN/PID:           [id number]          Technologist:       Mounika Gomez Accession#:        [id number]      Technologist 2:     Antonette Ramon RVT Date of Birth/Age: 1945         Encounter#:         [id number] Gender:            F Admission Status:  Emergency         Location Performed: [facility]  Diagnosis/ICD: Localized (leg) edema-R60.0 Procedure/CPT: 09783 Peripheral venous duplex scan for DVT Limited  PRELIMINARY CONCLUSIONS: Right Lower Venous: No evidence of acute deep vein thrombus visualized in the right lower extremity. Additional Findings; Lymph nodes is noted at the right groin measuring 0.43 cm x 1.42 cm x 1.84 cm. Left Lower Venous: Left common femoral vein is negative for deep vein thrombus.  Imaging & Doppler Findings:  Right                 Compressible Thrombus        Flow Distal External Iliac                None   Spontaneous/Phasic CFV                       Yes        None   Spontaneous/Phasic PFV                       Yes        None FV Proximal               Yes        None   Spontaneous/Phasic FV Mid                    Yes        None FV Distal                 Yes        None Popliteal                 Yes        None   Spontaneous/Phasic Peroneal                  Yes        None PTV                       Yes        None  Left Compress Thrombus        Flow CFV    Yes      None   Spontaneous/Phasic VASCULAR PRELIMINARY REPORT completed by Mounika Gomez on 4/9/2024 at  4:03:17 PM  ** Final **     ECG 12 lead    Result Date: 4/9/2024  Normal sinus rhythm Incomplete right bundle branch block Borderline ECG When compared with ECG of 24-JAN-2024 14:29, No significant change was found    CT angio chest for pulmonary embolism    Result Date: 4/8/2024  STUDY: CT Angiogram of the Chest, CT Abdomen and Pelvis with IV Contrast; 4/8/2024 at 7:50 PM INDICATION: Shortness of breath, hemoptysis and cough. Abdominal pain with nausea.  History:  D-tics, recent ileostomy reversal. COMPARISON: CT abdomen and pelvis 9/3/2023, CT abdomen and pelvis 8/22/2023, CT abdomen and pelvis 7/9/2023, CT abdomen and pelvis 6/19/2023. ACCESSION NUMBER(S): [id number], [id number] ORDERING CLINICIAN: CATHLEEN HUMMEL TECHNIQUE:  CTA of the chest was performed following rapid injection of intravenous contrast.  Images are reviewed and processed at a workstation according to the CT angiogram protocol with 3-D and/or MIP post processing imaging generated.  CT of the abdomen and pelvis was performed with intravenous contrast.  Omnipaque 350 75 mL was administered intravenously; positive oral contrast was given. Automated mA/kV exposure control was utilized and patient examination was performed in strict accordance with principles of ALARA. FINDINGS:  CHEST: Patchy airspace consolidation is seen in the right upper lobe. Focal atelectasis is seen in the lower lobes left more than right, adjacent to small layering pleural effusions. There is no pneumothorax. The heart is normal overall size with minimal LVH. There are minimal coronary artery calcifications. Thoracic aorta is tortuous without aneurysm or dissection. The pulmonary arteries are normal caliber. There are no detectable pulmonary emboli. Small lymph nodes are seen in the mediastinum. There is no bulky lymphadenopathy or mass. There is no pericardial effusion. ABDOMEN: There are scattered small cysts in the liver. Spleen is normal size. Pancreas is mildly  atrophic, without acute pancreatitis. Gallbladder is minimally distended without radiopaque calculi or biliary duct dilatation. Adrenal glands are normal. Scattered small cysts are seen in the kidneys. There is no hydronephrosis. There is no retroperitoneal lymphadenopathy or hematoma. Abdominal aorta is minimally tortuous without aneurysm. IVC is patent. The stomach is non-distended. There is no small bowel thickening or obstruction. Moderate stool distends the colon. There has been prior sigmoid resection and ileostomy reversal. There is no diverticulitis.  PELVIS: There is no detectable mass or fluid collection. Bladder is normally distended. Deep pelvic veins are patent. There are no inguinal hernias.  BONES: There has been extensive surgery in the lower cervical spine and lower lumbar spine. There is moderate anterolisthesis of L4 on L5. Degenerative arthritis is most pronounced in the lower thoracic and upper lumbar spine. A spinal stimulator is seen in the mid thoracic region. Left hip arthroplasty is in place.    1. No detectable pulmonary emboli. 2. Patchy infiltrates and/or edema right upper lobe. 3. Bilateral focal lower lung atelectasis left more than right, adjacent to small pleural effusions. 4. No bowel obstruction or detectable bowel inflammation; moderate stool burden; prior sigmoid resection and ileostomy reversal. 5. Previous surgery lower cervical spine and lower lumbar spine. 6. Remainder as above. Signed by Flaquito López MD    CT abdomen pelvis w IV contrast    Result Date: 4/8/2024  STUDY: CT Angiogram of the Chest, CT Abdomen and Pelvis with IV Contrast; 4/8/2024 at 7:50 PM INDICATION: Shortness of breath, hemoptysis and cough. Abdominal pain with nausea.  History:  D-tics, recent ileostomy reversal. COMPARISON: CT abdomen and pelvis 9/3/2023, CT abdomen and pelvis 8/22/2023, CT abdomen and pelvis 7/9/2023, CT abdomen and pelvis 6/19/2023. ACCESSION NUMBER(S): [id number], [id number]  ORDERING CLINICIAN: CATHLEEN HUMMEL TECHNIQUE:  CTA of the chest was performed following rapid injection of intravenous contrast.  Images are reviewed and processed at a workstation according to the CT angiogram protocol with 3-D and/or MIP post processing imaging generated.  CT of the abdomen and pelvis was performed with intravenous contrast.  Omnipaque 350 75 mL was administered intravenously; positive oral contrast was given. Automated mA/kV exposure control was utilized and patient examination was performed in strict accordance with principles of ALARA. FINDINGS:  CHEST: Patchy airspace consolidation is seen in the right upper lobe. Focal atelectasis is seen in the lower lobes left more than right, adjacent to small layering pleural effusions. There is no pneumothorax. The heart is normal overall size with minimal LVH. There are minimal coronary artery calcifications. Thoracic aorta is tortuous without aneurysm or dissection. The pulmonary arteries are normal caliber. There are no detectable pulmonary emboli. Small lymph nodes are seen in the mediastinum. There is no bulky lymphadenopathy or mass. There is no pericardial effusion. ABDOMEN: There are scattered small cysts in the liver. Spleen is normal size. Pancreas is mildly atrophic, without acute pancreatitis. Gallbladder is minimally distended without radiopaque calculi or biliary duct dilatation. Adrenal glands are normal. Scattered small cysts are seen in the kidneys. There is no hydronephrosis. There is no retroperitoneal lymphadenopathy or hematoma. Abdominal aorta is minimally tortuous without aneurysm. IVC is patent. The stomach is non-distended. There is no small bowel thickening or obstruction. Moderate stool distends the colon. There has been prior sigmoid resection and ileostomy reversal. There is no diverticulitis.  PELVIS: There is no detectable mass or fluid collection. Bladder is normally distended. Deep pelvic veins are patent. There are no  inguinal hernias.  BONES: There has been extensive surgery in the lower cervical spine and lower lumbar spine. There is moderate anterolisthesis of L4 on L5. Degenerative arthritis is most pronounced in the lower thoracic and upper lumbar spine. A spinal stimulator is seen in the mid thoracic region. Left hip arthroplasty is in place.    1. No detectable pulmonary emboli. 2. Patchy infiltrates and/or edema right upper lobe. 3. Bilateral focal lower lung atelectasis left more than right, adjacent to small pleural effusions. 4. No bowel obstruction or detectable bowel inflammation; moderate stool burden; prior sigmoid resection and ileostomy reversal. 5. Previous surgery lower cervical spine and lower lumbar spine. 6. Remainder as above. Signed by Flaquito López MD    CT head wo IV contrast    Result Date: 4/8/2024  Interpreted By:  Tim Garland, STUDY: CT HEAD WO IV CONTRAST;  4/8/2024 7:49 pm   INDICATION: Signs/Symptoms:HA on eliquis.   COMPARISON: MRI of the brain dated 03/03/2023   ACCESSION NUMBER(S): [id number]   ORDERING CLINICIAN: CATHLEEN HUMMEL   TECHNIQUE: Noncontrast axial CT scan of head was performed. Angled reformats in brain and bone windows were generated. The images were reviewed in bone, brain, blood and soft tissue windows.   FINDINGS: Exam is degraded by slight motion.   No hyperdense intracranial hemorrhage is present. There is no mass effect or midline shift.   Patchy and confluence areas of diminished attenuation are noted in the periventricular and subcortical white matter of bilateral cerebral hemispheres, nonspecific findings favored to represent sequela of microvascular disease. Gray-white differentiation is intact without evidence of CT apparent transcortical infarct.   Moderate brain parenchymal volume loss is present without abnormal ventricular dilatation. Basal cisterns are patent. No extra-axial fluid collections are identified.   Scalp soft tissues do not demonstrate any acute  abnormality. Calvarium is unremarkable in appearance. Mastoid air cells and middle ear cavities are well aerated without evidence of fluid fluid levels.   Visualized paranasal sinuses are unremarkable in appearance.       1. No evidence of hemorrhage, CT apparent transcortical infarct, or other emergent intracranial abnormality. 2. Moderate brain parenchymal volume loss with patchy and confluence attenuation changes present in the white matter of bilateral cerebral hemispheres, nonspecific findings favored to represent sequela of microvascular disease.   MACRO: None   Signed by: Tim Garland 4/8/2024 8:07 PM Dictation workstation:   [id number]    XR chest 1 view    Result Date: 4/8/2024  STUDY: Chest Radiograph;  04/08/2024 4:26 PM INDICATION: Shortness of breath. COMPARISON: XR chest 02/08/2024, 09/03/2023. ACCESSION NUMBER(S): [id number] ORDERING CLINICIAN: CATHLEEN HUMMEL TECHNIQUE:  Frontal chest was obtained at 16:26 hours. FINDINGS: CARDIOMEDIASTINAL SILHOUETTE: Cardiomediastinal silhouette is top limits of normal.  LUNGS: There is a right upper lobe infiltrate.  This is new from the prior exam of February 2024.  It is blunting of the right costophrenic angle most likely representing a small pleural effusion.  ABDOMEN: No remarkable upper abdominal findings.  BONES: No acute osseous changes.    Right upper lobe infiltrate with small pleural effusion. Signed by Bakari Perdue MD       Assessment/Plan     Elida Benson is a 80 yo female with PMH of dementia, HTN, diverticulitis s/p resection with ileostomy 8/2023 which was recently reversed 2/5/2024, paroxysmal atrial fibrillation on amiodarone and eliquis, COPD, OA, DM2, concern for infiltrative cardiomyopathy with preserved ejection fracture (being worked up as outpatient), who presented to [facility] [place] with productive cough.  She notes her cough was productive of mucous and progressed to the point where she was coughing up dark blood at times, which  prompted her to come to [facility].  In ED she was found to be hypoxic and placed on 6 L NC, improved to 2 L NC. A CTA chest was negative for PE but showed patchy infiltrates in the right upper lobe concerning for pneumonia.  Labs were notable for leukocytosis of 12.5, troponin elevations to 66/69/86, BNP elevation to 343.  She was treated with IV antibiotics for pneumonia and admitted for further treatment.      Community-Acquired Pneumonia   - continue IV azithromycin/ceftriaxone   - duonebs, mucolytics, cough suppressants   - legionella, strep pneumonia, sputum culture pending   - SLP consult, rule out aspiration   - no further hemoptysis noted since admission; consider pulmonology consultation     Troponin elevation: 66/69/86   - patient denies chest pain, ECG non-ischemic   - suspect demand ischemia secondary to infectious process/pneumonia   - trend troponin     Diverticulitis, recent ileostomy closure   - wound RN consult for wound care/dressing changes     Paroxysmal atrial fibrillation   - continue eliquis, amiodarone     HTN   - stable, continue home medications     COPD   - not in exacerbation   - continue duonebs     DM2  - sliding scale insulin, hypoglycemia protocol    OA   - tylenol as needed    Possible infiltrative cardiomyopathy   - euvolemic on exam   - noted slight RLE swelling; duplex ultrasound negative   - continue home lasix   - continue outpatient follow up with Dr. Ignacio     VTE/GI prophylaxis   - therapeutic AC on eliquis   - bowel regimen in place     Discharge Planning   - plan to discharge home when medically stable, resume [facility] services          I spent 50 minutes in the professional and overall care of this patient.      PARAG Shabazz-CNP

## 2024-04-09 NOTE — PROGRESS NOTES
Transitional Care Coordination Progress Note:  Plan per Medical/Surgical team: treatment of PN with IV ATB, duoneb, oxygen @ 3 liters NC  Status: Inpatient  Payor source: medicare A/B, [employer]  Discharge disposition: Home alone with [facility]  Potential Barriers: ?need home oxygen set up  ADOD: 4/12/2024  LARY Mejia RN, BSN Transitional Care Coordinator ED# [phone]      04/09/24 0750   Discharge Planning   Living Arrangements Alone   Support Systems Children   Assistance Needed ATB plan, ?home oxygen   Type of Residence Private residence   Number of Stairs to Enter Residence 3   Number of Stairs Within Residence 0   Do you have animals or pets at home? No   Home or Post Acute Services In home services   Type of Home Care Services Home nursing visits;Home OT;Home PT   Patient expects to be discharged to: Home alone with [facility]   Does the patient need discharge transport arranged? Yes   RoundTrip coordination needed? Yes   Has discharge transport been arranged? No   Financial Resource Strain   How hard is it for you to pay for the very basics like food, housing, medical care, and heating? Not hard   Housing Stability   In the last 12 months, was there a time when you were not able to pay the mortgage or rent on time? N   In the last 12 months, how many places have you lived? 1   In the last 12 months, was there a time when you did not have a steady place to sleep or slept in a shelter (including now)? N   Transportation Needs   In the past 12 months, has lack of transportation kept you from medical appointments or from getting medications? no   In the past 12 months, has lack of transportation kept you from meetings, work, or from getting things needed for daily living? No

## 2024-04-10 ENCOUNTER — APPOINTMENT (OUTPATIENT)
Dept: RADIOLOGY | Facility: HOSPITAL | Age: 79
DRG: 193 | End: 2024-04-10
Payer: MEDICARE

## 2024-04-10 VITALS — DIASTOLIC BLOOD PRESSURE: 60 MMHG | TEMPERATURE: 97.8 F | SYSTOLIC BLOOD PRESSURE: 116 MMHG

## 2024-04-10 LAB
ANION GAP SERPL CALC-SCNC: 14 MMOL/L (ref 10–20)
ATRIAL RATE: 89 BPM
BNP SERPL-MCNC: 750 PG/ML (ref 0–99)
BUN SERPL-MCNC: 17 MG/DL (ref 6–23)
CALCIUM SERPL-MCNC: 8.7 MG/DL (ref 8.6–10.3)
CARDIAC TROPONIN I PNL SERPL HS: 121 NG/L (ref 0–13)
CARDIAC TROPONIN I PNL SERPL HS: 127 NG/L (ref 0–13)
CHLORIDE SERPL-SCNC: 105 MMOL/L (ref 98–107)
CO2 SERPL-SCNC: 22 MMOL/L (ref 21–32)
CREAT SERPL-MCNC: 0.7 MG/DL (ref 0.5–1.05)
EGFRCR SERPLBLD CKD-EPI 2021: 89 ML/MIN/1.73M*2
ERYTHROCYTE [DISTWIDTH] IN BLOOD BY AUTOMATED COUNT: 15.6 % (ref 11.5–14.5)
GLUCOSE BLD MANUAL STRIP-MCNC: 172 MG/DL (ref 74–99)
GLUCOSE BLD MANUAL STRIP-MCNC: 250 MG/DL (ref 74–99)
GLUCOSE BLD MANUAL STRIP-MCNC: 280 MG/DL (ref 74–99)
GLUCOSE BLD MANUAL STRIP-MCNC: 288 MG/DL (ref 74–99)
GLUCOSE SERPL-MCNC: 130 MG/DL (ref 74–99)
HCT VFR BLD AUTO: 38.3 % (ref 36–46)
HGB BLD-MCNC: 11.4 G/DL (ref 12–16)
LEGIONELLA AG UR QL: NEGATIVE
MCH RBC QN AUTO: 29.2 PG (ref 26–34)
MCHC RBC AUTO-ENTMCNC: 29.8 G/DL (ref 32–36)
MCV RBC AUTO: 98 FL (ref 80–100)
NRBC BLD-RTO: 0 /100 WBCS (ref 0–0)
P AXIS: 68 DEGREES
P OFFSET: 150 MS
P ONSET: 111 MS
PLATELET # BLD AUTO: 241 X10*3/UL (ref 150–450)
POTASSIUM SERPL-SCNC: 3.7 MMOL/L (ref 3.5–5.3)
PR INTERVAL: 198 MS
Q ONSET: 210 MS
QRS COUNT: 15 BEATS
QRS DURATION: 92 MS
QT INTERVAL: 388 MS
QTC CALCULATION(BAZETT): 472 MS
QTC FREDERICIA: 442 MS
R AXIS: -23 DEGREES
RBC # BLD AUTO: 3.9 X10*6/UL (ref 4–5.2)
S PNEUM AG UR QL: NEGATIVE
SODIUM SERPL-SCNC: 137 MMOL/L (ref 136–145)
T AXIS: 86 DEGREES
T OFFSET: 404 MS
VENTRICULAR RATE: 89 BPM
WBC # BLD AUTO: 8.6 X10*3/UL (ref 4.4–11.3)

## 2024-04-10 PROCEDURE — 99233 SBSQ HOSP IP/OBS HIGH 50: CPT | Performed by: NURSE PRACTITIONER

## 2024-04-10 PROCEDURE — 84484 ASSAY OF TROPONIN QUANT: CPT | Performed by: NURSE PRACTITIONER

## 2024-04-10 PROCEDURE — 94640 AIRWAY INHALATION TREATMENT: CPT

## 2024-04-10 PROCEDURE — 71045 X-RAY EXAM CHEST 1 VIEW: CPT

## 2024-04-10 PROCEDURE — 36415 COLL VENOUS BLD VENIPUNCTURE: CPT | Performed by: INTERNAL MEDICINE

## 2024-04-10 PROCEDURE — 83880 ASSAY OF NATRIURETIC PEPTIDE: CPT | Performed by: INTERNAL MEDICINE

## 2024-04-10 PROCEDURE — 1090000002 HH PPS REVENUE DEBIT

## 2024-04-10 PROCEDURE — 99232 SBSQ HOSP IP/OBS MODERATE 35: CPT | Performed by: INTERNAL MEDICINE

## 2024-04-10 PROCEDURE — 1090000001 HH PPS REVENUE CREDIT

## 2024-04-10 PROCEDURE — 2500000002 HC RX 250 W HCPCS SELF ADMINISTERED DRUGS (ALT 637 FOR MEDICARE OP, ALT 636 FOR OP/ED): Mod: MUE | Performed by: NURSE PRACTITIONER

## 2024-04-10 PROCEDURE — 85027 COMPLETE CBC AUTOMATED: CPT | Performed by: NURSE PRACTITIONER

## 2024-04-10 PROCEDURE — 2500000004 HC RX 250 GENERAL PHARMACY W/ HCPCS (ALT 636 FOR OP/ED): Performed by: PHARMACIST

## 2024-04-10 PROCEDURE — 36415 COLL VENOUS BLD VENIPUNCTURE: CPT | Performed by: NURSE PRACTITIONER

## 2024-04-10 PROCEDURE — 99223 1ST HOSP IP/OBS HIGH 75: CPT | Performed by: STUDENT IN AN ORGANIZED HEALTH CARE EDUCATION/TRAINING PROGRAM

## 2024-04-10 PROCEDURE — 2500000004 HC RX 250 GENERAL PHARMACY W/ HCPCS (ALT 636 FOR OP/ED): Performed by: STUDENT IN AN ORGANIZED HEALTH CARE EDUCATION/TRAINING PROGRAM

## 2024-04-10 PROCEDURE — 71045 X-RAY EXAM CHEST 1 VIEW: CPT | Performed by: RADIOLOGY

## 2024-04-10 PROCEDURE — 2500000001 HC RX 250 WO HCPCS SELF ADMINISTERED DRUGS (ALT 637 FOR MEDICARE OP): Performed by: STUDENT IN AN ORGANIZED HEALTH CARE EDUCATION/TRAINING PROGRAM

## 2024-04-10 PROCEDURE — 87205 SMEAR GRAM STAIN: CPT | Mod: AHULAB | Performed by: INTERNAL MEDICINE

## 2024-04-10 PROCEDURE — 1200000002 HC GENERAL ROOM WITH TELEMETRY DAILY

## 2024-04-10 PROCEDURE — 2500000002 HC RX 250 W HCPCS SELF ADMINISTERED DRUGS (ALT 637 FOR MEDICARE OP, ALT 636 FOR OP/ED): Performed by: EMERGENCY MEDICINE

## 2024-04-10 PROCEDURE — 2500000001 HC RX 250 WO HCPCS SELF ADMINISTERED DRUGS (ALT 637 FOR MEDICARE OP): Performed by: NURSE PRACTITIONER

## 2024-04-10 PROCEDURE — 80048 BASIC METABOLIC PNL TOTAL CA: CPT | Performed by: NURSE PRACTITIONER

## 2024-04-10 PROCEDURE — 82947 ASSAY GLUCOSE BLOOD QUANT: CPT

## 2024-04-10 PROCEDURE — 92610 EVALUATE SWALLOWING FUNCTION: CPT | Mod: GN | Performed by: PHARMACIST

## 2024-04-10 PROCEDURE — 2500000004 HC RX 250 GENERAL PHARMACY W/ HCPCS (ALT 636 FOR OP/ED): Performed by: INTERNAL MEDICINE

## 2024-04-10 PROCEDURE — 2500000002 HC RX 250 W HCPCS SELF ADMINISTERED DRUGS (ALT 637 FOR MEDICARE OP, ALT 636 FOR OP/ED): Performed by: NURSE PRACTITIONER

## 2024-04-10 PROCEDURE — 2500000001 HC RX 250 WO HCPCS SELF ADMINISTERED DRUGS (ALT 637 FOR MEDICARE OP): Performed by: INTERNAL MEDICINE

## 2024-04-10 PROCEDURE — 2500000002 HC RX 250 W HCPCS SELF ADMINISTERED DRUGS (ALT 637 FOR MEDICARE OP, ALT 636 FOR OP/ED): Mod: MUE | Performed by: EMERGENCY MEDICINE

## 2024-04-10 RX ORDER — ATORVASTATIN CALCIUM 40 MG/1
40 TABLET, FILM COATED ORAL NIGHTLY
Status: DISCONTINUED | OUTPATIENT
Start: 2024-04-10 | End: 2024-04-17 | Stop reason: HOSPADM

## 2024-04-10 RX ORDER — FUROSEMIDE 10 MG/ML
40 INJECTION INTRAMUSCULAR; INTRAVENOUS EVERY 24 HOURS
Status: DISCONTINUED | OUTPATIENT
Start: 2024-04-10 | End: 2024-04-10

## 2024-04-10 RX ORDER — INSULIN LISPRO 100 [IU]/ML
0-10 INJECTION, SOLUTION INTRAVENOUS; SUBCUTANEOUS
Status: DISCONTINUED | OUTPATIENT
Start: 2024-04-11 | End: 2024-04-17 | Stop reason: HOSPADM

## 2024-04-10 RX ORDER — FUROSEMIDE 10 MG/ML
40 INJECTION INTRAMUSCULAR; INTRAVENOUS EVERY 12 HOURS
Status: DISCONTINUED | OUTPATIENT
Start: 2024-04-10 | End: 2024-04-12

## 2024-04-10 RX ADMIN — FUROSEMIDE 40 MG: 10 INJECTION, SOLUTION INTRAMUSCULAR; INTRAVENOUS at 21:10

## 2024-04-10 RX ADMIN — METOPROLOL SUCCINATE 25 MG: 25 TABLET, EXTENDED RELEASE ORAL at 10:37

## 2024-04-10 RX ADMIN — DULOXETINE HYDROCHLORIDE 30 MG: 30 CAPSULE, DELAYED RELEASE ORAL at 21:10

## 2024-04-10 RX ADMIN — IPRATROPIUM BROMIDE AND ALBUTEROL SULFATE 3 ML: 2.5; .5 SOLUTION RESPIRATORY (INHALATION) at 20:27

## 2024-04-10 RX ADMIN — INSULIN LISPRO 3 UNITS: 100 INJECTION, SOLUTION INTRAVENOUS; SUBCUTANEOUS at 16:28

## 2024-04-10 RX ADMIN — AMIODARONE HYDROCHLORIDE 200 MG: 200 TABLET ORAL at 10:37

## 2024-04-10 RX ADMIN — PANTOPRAZOLE SODIUM 40 MG: 40 TABLET, DELAYED RELEASE ORAL at 06:10

## 2024-04-10 RX ADMIN — METHYLPREDNISOLONE SODIUM SUCCINATE 40 MG: 40 INJECTION, POWDER, FOR SOLUTION INTRAMUSCULAR; INTRAVENOUS at 21:10

## 2024-04-10 RX ADMIN — IPRATROPIUM BROMIDE AND ALBUTEROL SULFATE 3 ML: 2.5; .5 SOLUTION RESPIRATORY (INHALATION) at 06:14

## 2024-04-10 RX ADMIN — INSULIN LISPRO 3 UNITS: 100 INJECTION, SOLUTION INTRAVENOUS; SUBCUTANEOUS at 13:33

## 2024-04-10 RX ADMIN — APIXABAN 5 MG: 5 TABLET, FILM COATED ORAL at 21:10

## 2024-04-10 RX ADMIN — FUROSEMIDE 40 MG: 10 INJECTION, SOLUTION INTRAMUSCULAR; INTRAVENOUS at 09:31

## 2024-04-10 RX ADMIN — AZITHROMYCIN MONOHYDRATE 500 MG: 500 INJECTION, POWDER, LYOPHILIZED, FOR SOLUTION INTRAVENOUS at 21:10

## 2024-04-10 RX ADMIN — GABAPENTIN 600 MG: 300 CAPSULE ORAL at 21:10

## 2024-04-10 RX ADMIN — IPRATROPIUM BROMIDE AND ALBUTEROL SULFATE 3 ML: 2.5; .5 SOLUTION RESPIRATORY (INHALATION) at 14:47

## 2024-04-10 RX ADMIN — ATORVASTATIN CALCIUM 40 MG: 40 TABLET, FILM COATED ORAL at 21:10

## 2024-04-10 RX ADMIN — DOCUSATE SODIUM 100 MG: 100 CAPSULE, LIQUID FILLED ORAL at 21:10

## 2024-04-10 RX ADMIN — DONEPEZIL HYDROCHLORIDE 5 MG: 5 TABLET ORAL at 21:10

## 2024-04-10 RX ADMIN — TOPIRAMATE 100 MG: 25 TABLET, FILM COATED ORAL at 21:10

## 2024-04-10 RX ADMIN — TOPIRAMATE 100 MG: 25 TABLET, FILM COATED ORAL at 10:37

## 2024-04-10 RX ADMIN — APIXABAN 5 MG: 5 TABLET, FILM COATED ORAL at 10:37

## 2024-04-10 RX ADMIN — ACETAMINOPHEN 650 MG: 325 TABLET ORAL at 06:09

## 2024-04-10 RX ADMIN — DULOXETINE HYDROCHLORIDE 30 MG: 30 CAPSULE, DELAYED RELEASE ORAL at 10:37

## 2024-04-10 RX ADMIN — GABAPENTIN 600 MG: 300 CAPSULE ORAL at 15:28

## 2024-04-10 RX ADMIN — METHYLPREDNISOLONE SODIUM SUCCINATE 40 MG: 40 INJECTION, POWDER, FOR SOLUTION INTRAMUSCULAR; INTRAVENOUS at 09:36

## 2024-04-10 RX ADMIN — GABAPENTIN 600 MG: 300 CAPSULE ORAL at 10:37

## 2024-04-10 RX ADMIN — CEFTRIAXONE SODIUM 1 G: 1 INJECTION, SOLUTION INTRAVENOUS at 21:10

## 2024-04-10 ASSESSMENT — PAIN SCALES - GENERAL
PAINLEVEL_OUTOF10: 0 - NO PAIN
PAINLEVEL_OUTOF10: 8
PAINLEVEL_OUTOF10: 0 - NO PAIN

## 2024-04-10 ASSESSMENT — COGNITIVE AND FUNCTIONAL STATUS - GENERAL
TURNING FROM BACK TO SIDE WHILE IN FLAT BAD: A LOT
DRESSING REGULAR LOWER BODY CLOTHING: A LOT
MOBILITY SCORE: 12
WALKING IN HOSPITAL ROOM: A LOT
DAILY ACTIVITIY SCORE: 13
DRESSING REGULAR UPPER BODY CLOTHING: A LOT
PERSONAL GROOMING: A LOT
MOVING FROM LYING ON BACK TO SITTING ON SIDE OF FLAT BED WITH BEDRAILS: A LOT
TOILETING: A LOT
CLIMB 3 TO 5 STEPS WITH RAILING: A LOT
STANDING UP FROM CHAIR USING ARMS: A LOT
MOVING TO AND FROM BED TO CHAIR: A LOT
EATING MEALS: A LITTLE
HELP NEEDED FOR BATHING: A LOT

## 2024-04-10 ASSESSMENT — PAIN - FUNCTIONAL ASSESSMENT
PAIN_FUNCTIONAL_ASSESSMENT: 0-10

## 2024-04-10 ASSESSMENT — PAIN DESCRIPTION - LOCATION: LOCATION: ABDOMEN

## 2024-04-10 ASSESSMENT — ENCOUNTER SYMPTOMS: DENIES PAIN: 1

## 2024-04-10 NOTE — CARE PLAN
The patient's goals for the shift include  wean oxygen to off.     The clinical goals for the shift include Patient will be free from falls this shift.  , and maintain o2 sat. 92% or greater.  Currently o2 3L/nc in progress. O2 sat 95%. [facility] at [facility]

## 2024-04-10 NOTE — PROGRESS NOTES
Elida Benson is a 79 y.o. female on day 2 of admission presenting with Pneumonia, unspecified organism.    Subjective     Noted wheezing and short of breath this morning.  O2 increased to 6 L.    Currently patient laying in bed, denies SOB.  Slight wheezing noted during conversation with deep breaths.      Objective     Physical Exam  Vitals reviewed.   Constitutional:       General: She is not in acute distress.     Appearance: Normal appearance. She is not ill-appearing, toxic-appearing or diaphoretic.   HENT:      Head: Normocephalic.      Nose: Nose normal.      Mouth/Throat:      Mouth: Mucous membranes are moist.      Pharynx: Oropharynx is clear.   Eyes:      General: Lids are normal. Gaze aligned appropriately.      Conjunctiva/sclera: Conjunctivae normal.   Cardiovascular:      Rate and Rhythm: Normal rate and regular rhythm.      Pulses: Normal pulses.      Heart sounds: Normal heart sounds. No murmur heard.  Pulmonary:      Effort: Pulmonary effort is normal.      Breath sounds: Normal air entry. Examination of the right-middle field reveals rales. Rales present.   Abdominal:      General: Abdomen is flat. Bowel sounds are normal.      Palpations: Abdomen is soft.      Tenderness: There is no abdominal tenderness.   Musculoskeletal:         General: Normal range of motion.      Cervical back: Full passive range of motion without pain and normal range of motion.      Right lower leg: Edema (nonpitting, trace) present.      Left lower leg: No edema.   Skin:     General: Skin is warm and dry.      Capillary Refill: Capillary refill takes less than 2 seconds.   Neurological:      General: No focal deficit present.      Mental Status: She is alert and oriented to person, place, and time.      Motor: Motor function is intact.      Coordination: Coordination is intact.   Psychiatric:         Attention and Perception: Attention normal.         Mood and Affect: Mood normal.         Speech: Speech normal.     "     Behavior: Behavior normal. Behavior is cooperative.         Last Recorded Vitals  Blood pressure 111/74, pulse 82, temperature 36.6 °C (97.8 °F), resp. rate 18, height 1.422 m (4' 8\"), weight 60.3 kg (133 lb), SpO2 (!) 82%.  Intake/Output last 3 Shifts:  I/O last 3 completed shifts:  In: - (0 mL/kg)   Out: 600 (9.9 mL/kg) [Urine:600 (0.3 mL/kg/hr)]  Weight: 60.3 kg     Relevant Results            Scheduled medications  amiodarone, 200 mg, oral, Daily  apixaban, 5 mg, oral, BID  atorvastatin, 40 mg, oral, Nightly  azithromycin, 500 mg, intravenous, q24h  cefTRIAXone, 1 g, intravenous, q24h  docusate sodium, 100 mg, oral, BID  donepezil, 5 mg, oral, Nightly  DULoxetine, 30 mg, oral, BID  furosemide, 40 mg, intravenous, q12h  [Held by provider] furosemide, 20 mg, oral, Daily  gabapentin, 600 mg, oral, TID  insulin lispro, 0-5 Units, subcutaneous, TID with meals  ipratropium-albuteroL, 3 mL, nebulization, TID  methylPREDNISolone sodium succinate (PF), 40 mg, intravenous, q12h  metoprolol succinate XL, 25 mg, oral, Daily  pantoprazole, 40 mg, oral, Daily before breakfast  polyethylene glycol, 17 g, oral, Daily  topiramate, 100 mg, oral, BID      Continuous medications  oxygen,       PRN medications  PRN medications: acetaminophen, benzonatate, dextrose, dextrose, glucagon, glucagon, ipratropium-albuteroL, melatonin, ondansetron, oxygen, traMADol, traZODone     Results for orders placed or performed during the hospital encounter of 04/08/24 (from the past 24 hour(s))   POCT GLUCOSE   Result Value Ref Range    POCT Glucose 109 (H) 74 - 99 mg/dL   CBC   Result Value Ref Range    WBC 8.6 4.4 - 11.3 x10*3/uL    nRBC 0.0 0.0 - 0.0 /100 WBCs    RBC 3.90 (L) 4.00 - 5.20 x10*6/uL    Hemoglobin 11.4 (L) 12.0 - 16.0 g/dL    Hematocrit 38.3 36.0 - 46.0 %    MCV 98 80 - 100 fL    MCH 29.2 26.0 - 34.0 pg    MCHC 29.8 (L) 32.0 - 36.0 g/dL    RDW 15.6 (H) 11.5 - 14.5 %    Platelets 241 150 - 450 x10*3/uL   Basic Metabolic Panel "   Result Value Ref Range    Glucose 130 (H) 74 - 99 mg/dL    Sodium 137 136 - 145 mmol/L    Potassium 3.7 3.5 - 5.3 mmol/L    Chloride 105 98 - 107 mmol/L    Bicarbonate 22 21 - 32 mmol/L    Anion Gap 14 10 - 20 mmol/L    Urea Nitrogen 17 6 - 23 mg/dL    Creatinine 0.70 0.50 - 1.05 mg/dL    eGFR 89 >60 mL/min/1.73m*2    Calcium 8.7 8.6 - 10.3 mg/dL   Troponin I, High Sensitivity   Result Value Ref Range    Troponin I, High Sensitivity 121 (HH) 0 - 13 ng/L   POCT GLUCOSE   Result Value Ref Range    POCT Glucose 172 (H) 74 - 99 mg/dL   Troponin I, High Sensitivity   Result Value Ref Range    Troponin I, High Sensitivity 127 (HH) 0 - 13 ng/L   B-type natriuretic peptide   Result Value Ref Range     (H) 0 - 99 pg/mL   POCT GLUCOSE   Result Value Ref Range    POCT Glucose 280 (H) 74 - 99 mg/dL   POCT GLUCOSE   Result Value Ref Range    POCT Glucose 288 (H) 74 - 99 mg/dL     Vascular US lower extremity venous duplex right    Result Date: 4/9/2024  Preliminary Cardiology Report            [place] [address]   Tel [phone] and Fax [phone]        Preliminary Vascular Lab Report  VASC US LOWER EXTREMITY VENOUS DUPLEX RIGHT  Patient Name:      TAMARA PALMER Reading Physician:  [id number] Pepe Jewell MD Study Date:        4/9/2024          Ordering Physician: [id number] KELL DELGADILLO MRN/PID:           [id number]          Technologist:       Mounika Gomze Accession#:        [id number]      Technologist 2:     Antonette Ramon RVT Date of Birth/Age: 1945         Encounter#:         [id number] Gender:            F Admission Status:  Emergency         Location Performed: [facility]  Diagnosis/ICD: Localized (leg) edema-R60.0 Procedure/CPT: 35649 Peripheral venous duplex scan for DVT Limited  PRELIMINARY CONCLUSIONS: Right Lower Venous: No evidence of acute deep vein thrombus visualized in the right lower extremity. Additional Findings; Lymph nodes is noted at the  right groin measuring 0.43 cm x 1.42 cm x 1.84 cm. Left Lower Venous: Left common femoral vein is negative for deep vein thrombus.  Imaging & Doppler Findings:  Right                 Compressible Thrombus        Flow Distal External Iliac                None   Spontaneous/Phasic CFV                       Yes        None   Spontaneous/Phasic PFV                       Yes        None FV Proximal               Yes        None   Spontaneous/Phasic FV Mid                    Yes        None FV Distal                 Yes        None Popliteal                 Yes        None   Spontaneous/Phasic Peroneal                  Yes        None PTV                       Yes        None  Left Compress Thrombus        Flow CFV    Yes      None   Spontaneous/Phasic VASCULAR PRELIMINARY REPORT completed by Mounika Gomez on 4/9/2024 at 4:03:17 PM  ** Final **     ECG 12 lead    Result Date: 4/9/2024  Normal sinus rhythm Incomplete right bundle branch block Borderline ECG When compared with ECG of 24-JAN-2024 14:29, No significant change was found    CT angio chest for pulmonary embolism    Result Date: 4/8/2024  STUDY: CT Angiogram of the Chest, CT Abdomen and Pelvis with IV Contrast; 4/8/2024 at 7:50 PM INDICATION: Shortness of breath, hemoptysis and cough. Abdominal pain with nausea.  History:  D-tics, recent ileostomy reversal. COMPARISON: CT abdomen and pelvis 9/3/2023, CT abdomen and pelvis 8/22/2023, CT abdomen and pelvis 7/9/2023, CT abdomen and pelvis 6/19/2023. ACCESSION NUMBER(S): [id number], [id number] ORDERING CLINICIAN: CATHLEEN HUMMEL TECHNIQUE:  CTA of the chest was performed following rapid injection of intravenous contrast.  Images are reviewed and processed at a workstation according to the CT angiogram protocol with 3-D and/or MIP post processing imaging generated.  CT of the abdomen and pelvis was performed with intravenous contrast.  Omnipaque 350 75 mL was administered intravenously; positive oral contrast was  given. Automated mA/kV exposure control was utilized and patient examination was performed in strict accordance with principles of ALARA. FINDINGS:  CHEST: Patchy airspace consolidation is seen in the right upper lobe. Focal atelectasis is seen in the lower lobes left more than right, adjacent to small layering pleural effusions. There is no pneumothorax. The heart is normal overall size with minimal LVH. There are minimal coronary artery calcifications. Thoracic aorta is tortuous without aneurysm or dissection. The pulmonary arteries are normal caliber. There are no detectable pulmonary emboli. Small lymph nodes are seen in the mediastinum. There is no bulky lymphadenopathy or mass. There is no pericardial effusion. ABDOMEN: There are scattered small cysts in the liver. Spleen is normal size. Pancreas is mildly atrophic, without acute pancreatitis. Gallbladder is minimally distended without radiopaque calculi or biliary duct dilatation. Adrenal glands are normal. Scattered small cysts are seen in the kidneys. There is no hydronephrosis. There is no retroperitoneal lymphadenopathy or hematoma. Abdominal aorta is minimally tortuous without aneurysm. IVC is patent. The stomach is non-distended. There is no small bowel thickening or obstruction. Moderate stool distends the colon. There has been prior sigmoid resection and ileostomy reversal. There is no diverticulitis.  PELVIS: There is no detectable mass or fluid collection. Bladder is normally distended. Deep pelvic veins are patent. There are no inguinal hernias.  BONES: There has been extensive surgery in the lower cervical spine and lower lumbar spine. There is moderate anterolisthesis of L4 on L5. Degenerative arthritis is most pronounced in the lower thoracic and upper lumbar spine. A spinal stimulator is seen in the mid thoracic region. Left hip arthroplasty is in place.    1. No detectable pulmonary emboli. 2. Patchy infiltrates and/or edema right upper lobe.  3. Bilateral focal lower lung atelectasis left more than right, adjacent to small pleural effusions. 4. No bowel obstruction or detectable bowel inflammation; moderate stool burden; prior sigmoid resection and ileostomy reversal. 5. Previous surgery lower cervical spine and lower lumbar spine. 6. Remainder as above. Signed by Flaquito López MD    CT abdomen pelvis w IV contrast    Result Date: 4/8/2024  STUDY: CT Angiogram of the Chest, CT Abdomen and Pelvis with IV Contrast; 4/8/2024 at 7:50 PM INDICATION: Shortness of breath, hemoptysis and cough. Abdominal pain with nausea.  History:  D-tics, recent ileostomy reversal. COMPARISON: CT abdomen and pelvis 9/3/2023, CT abdomen and pelvis 8/22/2023, CT abdomen and pelvis 7/9/2023, CT abdomen and pelvis 6/19/2023. ACCESSION NUMBER(S): [id number], [id number] ORDERING CLINICIAN: CATHLEEN HUMMEL TECHNIQUE:  CTA of the chest was performed following rapid injection of intravenous contrast.  Images are reviewed and processed at a workstation according to the CT angiogram protocol with 3-D and/or MIP post processing imaging generated.  CT of the abdomen and pelvis was performed with intravenous contrast.  Omnipaque 350 75 mL was administered intravenously; positive oral contrast was given. Automated mA/kV exposure control was utilized and patient examination was performed in strict accordance with principles of ALARA. FINDINGS:  CHEST: Patchy airspace consolidation is seen in the right upper lobe. Focal atelectasis is seen in the lower lobes left more than right, adjacent to small layering pleural effusions. There is no pneumothorax. The heart is normal overall size with minimal LVH. There are minimal coronary artery calcifications. Thoracic aorta is tortuous without aneurysm or dissection. The pulmonary arteries are normal caliber. There are no detectable pulmonary emboli. Small lymph nodes are seen in the mediastinum. There is no bulky lymphadenopathy or mass. There is no  pericardial effusion. ABDOMEN: There are scattered small cysts in the liver. Spleen is normal size. Pancreas is mildly atrophic, without acute pancreatitis. Gallbladder is minimally distended without radiopaque calculi or biliary duct dilatation. Adrenal glands are normal. Scattered small cysts are seen in the kidneys. There is no hydronephrosis. There is no retroperitoneal lymphadenopathy or hematoma. Abdominal aorta is minimally tortuous without aneurysm. IVC is patent. The stomach is non-distended. There is no small bowel thickening or obstruction. Moderate stool distends the colon. There has been prior sigmoid resection and ileostomy reversal. There is no diverticulitis.  PELVIS: There is no detectable mass or fluid collection. Bladder is normally distended. Deep pelvic veins are patent. There are no inguinal hernias.  BONES: There has been extensive surgery in the lower cervical spine and lower lumbar spine. There is moderate anterolisthesis of L4 on L5. Degenerative arthritis is most pronounced in the lower thoracic and upper lumbar spine. A spinal stimulator is seen in the mid thoracic region. Left hip arthroplasty is in place.    1. No detectable pulmonary emboli. 2. Patchy infiltrates and/or edema right upper lobe. 3. Bilateral focal lower lung atelectasis left more than right, adjacent to small pleural effusions. 4. No bowel obstruction or detectable bowel inflammation; moderate stool burden; prior sigmoid resection and ileostomy reversal. 5. Previous surgery lower cervical spine and lower lumbar spine. 6. Remainder as above. Signed by Flaquito López MD    CT head wo IV contrast    Result Date: 4/8/2024  Interpreted By:  Tim Garland, STUDY: CT HEAD WO IV CONTRAST;  4/8/2024 7:49 pm   INDICATION: Signs/Symptoms:HA on eliquis.   COMPARISON: MRI of the brain dated 03/03/2023   ACCESSION NUMBER(S): [id number]   ORDERING CLINICIAN: CATHLEEN HUMMEL   TECHNIQUE: Noncontrast axial CT scan of head was  performed. Angled reformats in brain and bone windows were generated. The images were reviewed in bone, brain, blood and soft tissue windows.   FINDINGS: Exam is degraded by slight motion.   No hyperdense intracranial hemorrhage is present. There is no mass effect or midline shift.   Patchy and confluence areas of diminished attenuation are noted in the periventricular and subcortical white matter of bilateral cerebral hemispheres, nonspecific findings favored to represent sequela of microvascular disease. Gray-white differentiation is intact without evidence of CT apparent transcortical infarct.   Moderate brain parenchymal volume loss is present without abnormal ventricular dilatation. Basal cisterns are patent. No extra-axial fluid collections are identified.   Scalp soft tissues do not demonstrate any acute abnormality. Calvarium is unremarkable in appearance. Mastoid air cells and middle ear cavities are well aerated without evidence of fluid fluid levels.   Visualized paranasal sinuses are unremarkable in appearance.       1. No evidence of hemorrhage, CT apparent transcortical infarct, or other emergent intracranial abnormality. 2. Moderate brain parenchymal volume loss with patchy and confluence attenuation changes present in the white matter of bilateral cerebral hemispheres, nonspecific findings favored to represent sequela of microvascular disease.   MACRO: None   Signed by: Tim Garland 4/8/2024 8:07 PM Dictation workstation:   [id number]    XR chest 1 view    Result Date: 4/8/2024  STUDY: Chest Radiograph;  04/08/2024 4:26 PM INDICATION: Shortness of breath. COMPARISON: XR chest 02/08/2024, 09/03/2023. ACCESSION NUMBER(S): [id number] ORDERING CLINICIAN: CATHLEEN HUMMEL TECHNIQUE:  Frontal chest was obtained at 16:26 hours. FINDINGS: CARDIOMEDIASTINAL SILHOUETTE: Cardiomediastinal silhouette is top limits of normal.  LUNGS: There is a right upper lobe infiltrate.  This is new from the prior exam  of February 2024.  It is blunting of the right costophrenic angle most likely representing a small pleural effusion.  ABDOMEN: No remarkable upper abdominal findings.  BONES: No acute osseous changes.    Right upper lobe infiltrate with small pleural effusion. Signed by Bakari Perdue MD       Assessment/Plan     Elida Benson is a 78 yo female with PMH of dementia, HTN, diverticulitis s/p resection with ileostomy 8/2023 which was recently reversed 2/5/2024, paroxysmal atrial fibrillation on amiodarone and eliquis, COPD, OA, DM2, concern for infiltrative cardiomyopathy with preserved ejection fracture (being worked up as outpatient), who [place] [facility] [place].  She notes her cough was productive of mucous and progressed to the point where she was coughing up dark blood at times, which prompted her to come [place] [facility]  In ED she was found to be hypoxic and placed on 6 L NC, improved to 2 L NC. A CTA chest was negative for PE but showed patchy infiltrates in the right upper lobe concerning for pneumonia.  Labs were notable for leukocytosis of 12.5, troponin elevations to 66/69/86, BNP elevation to 343.  She was treated with IV antibiotics for pneumonia and admitted for further treatment.      Acute hypoxic respiratory failure   Community-Acquired Pneumonia   COPD   - worsening hypoxia and wheezing this morning--> solumedrol started   - repeat CXR shows improvement of RUL PNA and progression of bilateral pleural effusions  - continue IV azithromycin/ceftriaxone   - duonebs, mucolytics, cough suppressants   - legionella, strep pneumonia negative   - sputum culture pending   - SLP following  - no further hemoptysis noted since admission, CTA Chest negative for PE; consider pulmonology consultation    Acute on chronic diastolic heart failure   Possible infiltrative cardiomyopathy   - CXR with progressing pleural effusions, BNP elevated to 750  - cardiology consulted   - diurese with IV lasix   - ongoing  workup for infiltrative cardiomyopathy per cardiology; plan for cardiac MRI once patient able to tolerate laying flat     Troponin elevation  - patient denies chest pain, ECG non-ischemic   - suspect demand ischemia secondary to infectious process/pneumonia   - cardiology recommendations reviewed     DM2  - hyperglycemia noted with initiation of steroid-->sliding scale dose increased. Continue to monitor   - sliding scale insulin, hypoglycemia protocol    Diverticulitis, recent ileostomy closure   - wound RN consult for wound care/dressing changes     Paroxysmal atrial fibrillation   - continue eliquis, amiodarone     HTN   - stable, continue home medications     OA   - tylenol as needed    VTE/GI prophylaxis   - therapeutic AC on eliquis   - bowel regimen in place     Discharge Planning   - plan to discharge home when medically stable, resume [facility] services     Discussed with Dr. Barboza.          I spent 50 minutes in the professional and overall care of this patient.      Yesica Slater, APRN-CNP

## 2024-04-10 NOTE — PROGRESS NOTES
Elida Benson is a 79 y.o. female     Nurse reported increasing shortness of breath on ambulation along with wheezing    Review of Systems     Constitutional: Feels tired  Cardiovascular: no chest pain   Respiratory: Shortness of breath and wheezing  Gastrointestinal: no abdominal pain, no constipation, no melena, no nausea, no diarrhea, no vomiting and no blood in stools.   Neurological: no headache,   All other systems have been reviewed and are negative for complaint.       Vitals:    04/10/24 0810   BP: 126/79   Pulse: 77   Resp: 16   Temp: 36.6 °C (97.8 °F)   SpO2: 97%        Scheduled medications  amiodarone, 200 mg, oral, Daily  apixaban, 5 mg, oral, BID  azithromycin, 500 mg, intravenous, q24h  cefTRIAXone, 1 g, intravenous, q24h  docusate sodium, 100 mg, oral, BID  donepezil, 5 mg, oral, Nightly  DULoxetine, 30 mg, oral, BID  furosemide, 40 mg, intravenous, q24h  [Held by provider] furosemide, 20 mg, oral, Daily  gabapentin, 600 mg, oral, TID  insulin lispro, 0-5 Units, subcutaneous, TID with meals  ipratropium-albuteroL, 3 mL, nebulization, TID  methylPREDNISolone sodium succinate (PF), 40 mg, intravenous, q12h  metoprolol succinate XL, 25 mg, oral, Daily  pantoprazole, 40 mg, oral, Daily before breakfast  polyethylene glycol, 17 g, oral, Daily  topiramate, 100 mg, oral, BID      Continuous medications     PRN medications  PRN medications: acetaminophen, benzonatate, dextrose, dextrose, glucagon, glucagon, ipratropium-albuteroL, melatonin, ondansetron, traMADol, traZODone    Lab Review   Results from last 7 days   Lab Units 04/10/24  0522 04/09/24  1408 04/08/24  1617   WBC AUTO x10*3/uL 8.6 9.6 12.5*   HEMOGLOBIN g/dL 11.4* 11.3* 12.3   HEMATOCRIT % 38.3 37.7 40.4   PLATELETS AUTO x10*3/uL 241 232 226     Results from last 7 days   Lab Units 04/10/24  0522 04/09/24  1408 04/08/24  1617   SODIUM mmol/L 137 137 140   POTASSIUM mmol/L 3.7 5.6* 4.3   CHLORIDE mmol/L 105 107 108*   CO2 mmol/L 22 22 23    BUN mg/dL 17 19 15   CREATININE mg/dL 0.70 0.84 0.73   CALCIUM mg/dL 8.7 8.2* 8.5*   PROTEIN TOTAL g/dL  --   --  6.3*   BILIRUBIN TOTAL mg/dL  --   --  0.6   ALK PHOS U/L  --   --  68   ALT U/L  --   --  15   AST U/L  --   --  15   GLUCOSE mg/dL 130* 114* 159*     Results from last 7 days   Lab Units 04/10/24  1121 04/10/24  0522 04/09/24  1408   TROPHS ng/L 127* 121* 86*        XR chest 1 view   Final Result   Cardiomegaly. Small bilateral pleural effusions with associated   atelectasis at the lung bases, slightly progressed.        Right upper lobe pneumonia is slightly improved.        MACRO:   None        Signed by: Nicolás Mistry 4/10/2024 8:08 AM   Dictation workstation:   [id number]      Vascular US lower extremity venous duplex right         CT angio chest for pulmonary embolism   Final Result   1. No detectable pulmonary emboli.   2. Patchy infiltrates and/or edema right upper lobe.   3. Bilateral focal lower lung atelectasis left more than right,   adjacent to small pleural effusions.   4. No bowel obstruction or detectable bowel inflammation; moderate   stool burden; prior sigmoid resection and ileostomy reversal.   5. Previous surgery lower cervical spine and lower lumbar spine.   6. Remainder as above.   Signed by Flaquito López MD      CT head wo IV contrast   Final Result   1. No evidence of hemorrhage, CT apparent transcortical infarct, or   other emergent intracranial abnormality.   2. Moderate brain parenchymal volume loss with patchy and confluence   attenuation changes present in the white matter of bilateral cerebral   hemispheres, nonspecific findings favored to represent sequela of   microvascular disease.        MACRO:   None        Signed by: Tim Garland 4/8/2024 8:07 PM   Dictation workstation:   [id number]      CT abdomen pelvis w IV contrast   Final Result   1. No detectable pulmonary emboli.   2. Patchy infiltrates and/or edema right upper lobe.   3. Bilateral focal lower  lung atelectasis left more than right,   adjacent to small pleural effusions.   4. No bowel obstruction or detectable bowel inflammation; moderate   stool burden; prior sigmoid resection and ileostomy reversal.   5. Previous surgery lower cervical spine and lower lumbar spine.   6. Remainder as above.   Signed by Flaquito López MD      XR chest 1 view   Final Result   Right upper lobe infiltrate with small pleural effusion.   Signed by Bakari Perdue MD            Physical Exam     Constitutional   General appearance: Alert and in no acute distress.     Pulmonary   Respiratory assessment: Rhonchi   Cardiovascular   Auscultation of heart: Apical pulse normal, heart rate and rhythm normal, normal S1 and S2, no murmurs and no pericardial rub.    Exam for edema: Plus edema  Abdomen   Abdominal Exam: No bruits, normal bowel sounds, soft, non-tender, no abdominal mass palpated.    Liver and Spleen exam: No hepato-splenomegaly.   Musculoskeletal   Examination of gait: ROM intact  Skin inspection: Normal skin color and pigmentation, normal skin turgor and no visible rash.   Neurologic   Cranial nerves: Nerves 2-12 were intact, no focal neuro defects.     Assessment/Plan      #Community-acquired pneumonia  Continue antibiotics and DuoNebs  Started Solu-Medrol     #Elevated troponin which is flat trend  Likely demand ischemia from pneumonia     #Acute on chronic diastolic congestive heart failure  Start Lasix 40 mg IV q. 24    #Hypertension  Stable continue home medications     #Diabetes mellitus  /Insulin coverage     #COPD  DuoNebs     #Paroxysmal atrial fibrillation  Continue Eliquis/amiodarone     #Dementia  Monitor for worsening encephalopathy

## 2024-04-10 NOTE — NURSING NOTE
Patient is 98% @ 3 L O2 via NC. HOB up. Breathing is less labored. Patient states that she feels better and more relaxed.

## 2024-04-10 NOTE — CONSULTS
Inpatient consult to Cardiology  Consult performed by: Cesar ORLANDO MD  Consult ordered by: PARAG Shabazz-CNP        History Of Present Illness:      Elida Benson is a 78 y.o. female with pertinent history of dementia, hypertension, diverticulitis status post laparoscopic anterior resection with ileostomy on 8/16/2023, paroxysmal atrial fibrillation on amiodarone and Eliquis, preserved ejection fraction with suspected myocardial cleft versus false tendon, moderate concentric hypertrophy, moderate left atrial enlargement on echo performed 8/19/2023, preserved ejection fraction with moderate septal thickness, abnormal strain imaging concerning for possible infiltrative heart disease on echo performed 8/22/2023, presented to hospital w SOB found to have RUL pneumonia. Cardiology c/s for CHF and troponins.     Patient is poor historian. She is not clearly able to tell me why she's in the hospital but reported initial cough and sputum production. She states her daughter at home gives her medications. She states her swelling has come down since receiving a dose of IV lasix.     She saw Dr. Ignacio on 3/8/24 during which time she was experiencing worsening lower extremity edema and she was started on Furosemide 20mg daily. She was also instructed to obtain a CMR.       Home cardiac medications:  -Amiodarone 200 daily  -Apixaban 5  -Lasix 20 PO daily  -MTXL 25 daily     Last Recorded Vitals:  Vitals:    04/10/24 0642 04/10/24 0645 04/10/24 0653 04/10/24 0810   BP:    126/79   BP Location:       Patient Position:       Pulse:    77   Resp:    16   Temp:    36.6 °C (97.8 °F)   TempSrc:       SpO2: (!) 86% 90% 98% 97%   Weight:       Height:           Last Labs:  CBC - 4/10/2024:  5:22 AM  8.6 11.4 241    38.3      CMP - 4/10/2024:  5:22 AM  8.7 6.3 15 --- 0.6   3.6 3.5 15 68      PTT - 4/8/2024:  4:17 PM  1.3   14.6 51     Troponin I, High Sensitivity   Date/Time Value Ref Range Status   04/10/2024 11:21 AM  127 (HH) 0 - 13 ng/L Final     Comment:     Previous result verified on 4/10/2024 0756 on specimen/case [id number] called with component TRPHS for procedure Troponin I, High Sensitivity with value 121 ng/L.   04/10/2024 05:22  (HH) 0 - 13 ng/L Final   04/09/2024 02:08 PM 86 (HH) 0 - 13 ng/L Final     Comment:     Previous result verified on 4/8/2024 1705 on specimen/case [id number] called with component TRPHS for procedure Troponin I, High Sensitivity with value 66 ng/L.     BNP   Date/Time Value Ref Range Status   04/10/2024 11:21  (H) 0 - 99 pg/mL Final   04/08/2024 04:17  (H) 0 - 99 pg/mL Final     POC HEMOGLOBIN A1c   Date/Time Value Ref Range Status   01/29/2024 11:53 AM 7.7 (A) 4.2 - 6.5 % Final     Hemoglobin A1C   Date/Time Value Ref Range Status   09/05/2023 04:14 AM 7.2 (A) % Final     Comment:          Diagnosis of Diabetes-Adults   Non-Diabetic: < or = 5.6%   Increased risk for developing diabetes: 5.7-6.4%   Diagnostic of diabetes: > or = 6.5%  .       Monitoring of Diabetes                Age (y)     Therapeutic Goal (%)   Adults:          >18           <7.0   Pediatrics:    13-18           <7.5                   7-12           <8.0                   0- 6            7.5-8.5   American Diabetes Association. Diabetes Care 33(S1), Jan 2010.     08/03/2018 06:33 AM 6.1 % Final     Comment:          Diagnosis of Diabetes-Adults   Non-Diabetic: < or = 5.6%   Increased risk for developing diabetes: 5.7-6.4%   Diagnostic of diabetes: > or = 6.5%  .       Monitoring of Diabetes                Age (y)     Therapeutic Goal (%)   Adults:          >18           <7.0   Pediatrics:    13-18           <7.5                   7-12           <8.0                   0- 6            7.5-8.5   American Diabetes Association. Diabetes Care 33(S1), Jan 2010.       VLDL   Date/Time Value Ref Range Status   05/26/2023 01:13 PM 20 0 - 40 mg/dL Final   10/27/2022 02:52 PM 17 0 - 40 mg/dL Final    04/21/2022 02:59 PM 21 0 - 40 mg/dL Final      Last I/O:  I/O last 3 completed shifts:  In: - (0 mL/kg)   Out: 600 (9.9 mL/kg) [Urine:600 (0.3 mL/kg/hr)]  Weight: 60.3 kg     Past Cardiology Tests (Last 3 Years):  EKG:  ECG 12 lead 04/08/2024  NSR 81bpm    ECG 12 Lead 01/24/2024    Echo:  8/2023:  1. Left ventricular systolic function is normal with a 55% estimated ejection fraction.  2. Moderately increased left ventricular septal thickness.  3. The left ventricular posterior wall thickness is moderately increased.  4. There is left ventricular concentric remodeling.  5. The longitudinal 2D Strain is mildly decreased with a pattern of apical preservation which is suggestive of infiltrative heart disease.  6. The patient is in atrial fibrillation which may influence the estimate of left ventricular function and transvalvular flows.      Cardiac Imaging:  CT Chest 4/2024  There are minimal coronary artery calcification  Bilateral focal lower lung atelectasis left more than right,  adjacent to small pleural effusions.      Past Medical History:  She has a past medical history of A-fib (CMS/HCC), Cervical myelopathy (CMS/HCC), Chronic pain syndrome, COPD (chronic obstructive pulmonary disease) (CMS/HCC), Dementia (CMS/HCC), Depression, Diabetic neuropathy (CMS/HCC), Diverticulitis, DM (diabetes mellitus) (CMS/HCC), LORENZ (dyspnea on exertion), HLD (hyperlipidemia), Hypertension, essential, Infiltrative cardiomyopathy (CMS/HCC), OA (ocular albinism) (CMS/HCC), Other malaise (04/21/2022), Palpitations, Post laminectomy syndrome, Rectovaginal fistula, Spinal stenosis of cervical region, and UTI (urinary tract infection).    Past Surgical History:  She has a past surgical history that includes Carpal tunnel release (08/27/2013); Knee Arthroplasty (08/27/2013); Total hip arthroplasty (08/27/2013); Other surgical history (08/16/2023); Ileostomy; Laminectomy; Lumbar fusion (2023); and Spinal cord stimulator implant (2022).       Social History:  She reports that she quit smoking about 40 years ago. Her smoking use included cigarettes. She has never used smokeless tobacco. She reports current alcohol use. She reports that she does not use drugs.    Family History:  Family History   Problem Relation Name Age of Onset    No Known Problems Mother      No Known Problems Father      No Known Problems Sister      No Known Problems Sister      No Known Problems Sister      No Known Problems Sister          Allergies:  Shellfish containing products    Inpatient Medications:  Scheduled medications   Medication Dose Route Frequency    amiodarone  200 mg oral Daily    apixaban  5 mg oral BID    azithromycin  500 mg intravenous q24h    cefTRIAXone  1 g intravenous q24h    docusate sodium  100 mg oral BID    donepezil  5 mg oral Nightly    DULoxetine  30 mg oral BID    furosemide  40 mg intravenous q24h    [Held by provider] furosemide  20 mg oral Daily    gabapentin  600 mg oral TID    insulin lispro  0-5 Units subcutaneous TID with meals    ipratropium-albuteroL  3 mL nebulization TID    methylPREDNISolone sodium succinate (PF)  40 mg intravenous q12h    metoprolol succinate XL  25 mg oral Daily    pantoprazole  40 mg oral Daily before breakfast    polyethylene glycol  17 g oral Daily    topiramate  100 mg oral BID     PRN medications   Medication    acetaminophen    benzonatate    dextrose    dextrose    glucagon    glucagon    ipratropium-albuteroL    melatonin    ondansetron    traMADol    traZODone     Continuous Medications   Medication Dose Last Rate     Outpatient Medications:  Current Outpatient Medications   Medication Instructions    acetaminophen (Tylenol) 325 mg tablet Give 2 tablet by mouth every 4 hours as needed for Pain    amiodarone (Pacerone) 200 mg tablet Give 1 tablet by mouth in the morning for ANTIARRHYTHMIC    apixaban (Eliquis) 5 mg tablet Give 1 tablet by mouth two times a day for blood thinner    b complex 0.4 mg tablet 1  tablet, oral, Daily    biotin 1 mg tablet 1 tab(s) orally once a day    cephalexin (Keflex) 500 mg capsule TAKE ONE CAPSULE BY MOUTH EVERY 12 HOURS    cholecalciferol (Vitamin D-3) 1,250 mcg (50,000 unit) capsule Take 50,000 Units by mouth once each week.    docusate sodium (Colace) 100 mg capsule 1 capsule, oral, 2 times daily PRN    donepezil (Aricept) 5 mg tablet 1 tab(s) orally once (at bedtime)    DULoxetine (CYMBALTA) 30 mg, oral, 2 times daily    furosemide (LASIX) 20 mg, oral, Daily    gabapentin (NEURONTIN) 600 mg, oral, 3 times daily    melatonin 3 mg tablet Give 2 tablet by mouth as needed for insomnia administer at bedtime    metoprolol succinate XL (TOPROL-XL) 25 mg, oral, Daily, Do not crush or chew. DO NOT GIVE IF SBP IS UNDER 100mm/hg    ondansetron ODT (ZOFRAN-ODT) 4 mg, oral, Every 8 hours PRN    oxyCODONE (Oxy-IR) 5 mg immediate release capsule oxyCODONE HCl - 5 MG Oral Capsule TAKE 1 TO 2 CAPSULES EVERY 6 HOURS AS NEEDED FOR PAIN Quantity: 60 Refills: 0 Kaykay Palafox MD Start : 16-Sep-2020 Active    topiramate (Topamax) 100 mg tablet TAKE ONE TABLET BY MOUTH TWO TIMES A DAY    traZODone (DESYREL) 50 mg, oral, Nightly PRN       Physical Exam:  General: well appearing, no acute distress  Cardiac: regular rate and rhythm, no murmurs, rubs or gallops  Pulmonary: Clear to auscultation bilaterally, normal respiratory effort  Peripheral pulses: intact, 2+  Extremities: 1+ bilateral LE edema        Assessment/Plan   Elida Benson is a 78 y.o. female with pertinent history of dementia, hypertension, diverticulitis status post laparoscopic anterior resection with ileostomy on 8/16/2023, paroxysmal atrial fibrillation on amiodarone and Eliquis, preserved ejection fraction with suspected myocardial cleft versus false tendon, moderate concentric hypertrophy, moderate left atrial enlargement on echo performed 8/19/2023, preserved ejection fraction with moderate septal thickness, abnormal strain imaging  concerning for possible infiltrative heart disease on echo performed 8/22/2023, presented to hospital w SOB found to have RUL pneumonia. Cardiology c/s for CHF and troponins.     RUL Pneumonia. On Atbx  CHF/HFpEf (, bilateral pleural effusions).  Hypertensive, female, elderly, pAF,   Troponin elevation (plateu 127). Likely demand type 2 ischemia in s/o underlying cardiomyopathy and pneumonia  pAF on amiodarone, apixaban, and MTXL 25mg daily   DM  HTN    Recommendations:  -40 IV BID of lasix for diuresis  -Will order urine and serum protein immunofixation and serum light chains  -Continue home amiodarone 200mg daily, MTXL 25mg dily, and apixaban 5mg bid for pAF  -Once more stable, less hypoxic and able to lie flat, will order cardiac MRI to work-up suspicion for infiltrative cardiomyopathy  -Can stop trending troponin  - Will start atorvastatin 40mg at bedtime    Code Status:  Full Code      Rudi Salmon MD     reviewed the fellow's documentation and discussed the patient with the fellow. I agree with the fellow's medical decision making as documented in the fellow's note and have edited it as necessary.  I personally reviewed the patient's recent labs, medications, orders, EKGs, and pertinent cardiac imaging/ echocardiography.     Thank you for allowing me to participate in their care.  Please feel free to call me with any further questions or concerns.        Cesar Ignacio MD, [facility], [facility]  Division of Cardiovascular Medicine  System Director, Nuclear Cardiology   Medical Director, [facility] [place], [facility]   Clinical , [employer]  [email or web address]   Office:  [phone]

## 2024-04-10 NOTE — HOME HEALTH
Pt not feeling well today since am hours. Dtr reports coughing blood this am and sob.  Difficulty getting pts pulse ox today due to acrylic nails.  Pt respirations 32, bp 116 over 60, temp within normal limits.  Pts dtr unable to get pt out of the home on her own.  Assisted dtr to get pt out of the home while awaiting for son to arrive.  During this time, pt did vomit.  Requires maximal assist x 2  to get out of home.  Unable to ambulate and was eventually carried to car by her grandson.  Team notified.

## 2024-04-10 NOTE — CONSULTS
Wound Care Consult     Visit Date: 4/10/2024      Patient Name: Elida Benson         MRN: [id number]           YOB: 1945     Reason for Consult: patient presents with a healing surgical wound.     Wound History: surgical site from stoma takedown surgery on 2/5/2024     Pertinent Labs:   Albumin   Date Value Ref Range Status   04/08/2024 3.5 3.4 - 5.0 g/dL Final       Wound Assessment:  Wound 02/05/24 Incision Abdomen Right;Upper (Active)   Wound Image   04/10/24 1222   Site Assessment Red 04/10/24 1222   Litzy-Wound Assessment Blanched 04/10/24 1222   Shape oval 04/10/24 1222   Wound Length (cm) 0.3 cm 04/10/24 1222   Wound Width (cm) 1 cm 04/10/24 1222   Wound Surface Area (cm^2) 0.3 cm^2 04/10/24 1222   Wound Depth (cm) 0.1 cm 04/10/24 1222   Wound Volume (cm^3) 0.03 cm^3 04/10/24 1222   Drainage Description None 04/10/24 1222   Drainage Amount None 04/10/24 1222   Dressing Foam 04/10/24 1222   Dressing Changed Changed 04/10/24 1222   Dressing Status Clean;Dry 04/10/24 1222       Wound 02/05/24 Abdomen Lower;Medial (Active)       Wound Team Summary Assessment: assessment complete and recommendations below.  3 sets of dressings at the bedside.      Left upper abdomen: stoma takedown site  Daily: Bedside RN/LPN to complete wound care.  Cleanse with normal saline and pat dry.  Apply no-sting barrier film to periwound skin.  Cover with Mepilex foam border dressing.    While in bed patient should only be on one fitted sheet, and one chux. Please, do not use brief while patient is resting in bed. Elevate heels off the bed surface at all times. Turn and reposition at least every 2 hours.     Wound Team Plan: Thank you for this consultation, while inpatient please contact with any questions or changes in condition.       Sonya Smith, RN, BSN, [facility]  Phone: [phone]  4/10/2024  2:39 PM

## 2024-04-10 NOTE — DISCHARGE INSTRUCTIONS
Wound care recommendations:  Left upper abdomen: stoma takedown site  Daily: Cleanse with normal saline or soap and water and pat dry.  Apply no-sting barrier film to periwound skin.  Cover with Mepilex foam border dressing.    Please follow with your PCP upon discharge as hospital follow up

## 2024-04-10 NOTE — CARE PLAN
The patient's goals for the shift include      The clinical goals for the shift include Patient will be free from falls this shift.      Problem: Fall/Injury  Goal: Not fall by end of shift  Outcome: Progressing  Goal: Be free from injury by end of the shift  Outcome: Progressing  Goal: Verbalize understanding of personal risk factors for fall in the hospital  Outcome: Progressing  Goal: Verbalize understanding of risk factor reduction measures to prevent injury from fall in the home  Outcome: Progressing  Goal: Use assistive devices by end of the shift  Outcome: Progressing  Goal: Pace activities to prevent fatigue by end of the shift  Outcome: Progressing

## 2024-04-10 NOTE — PROGRESS NOTES
Speech-Language Pathology Clinical Swallow Evaluation    Patient Name: Elida Benson  MRN: [id number]  : 1945  Today's Date: 04/10/24  Start time: Start Time: 1020  Stop time: Stop Time: 1048  Time calculation (min) : Time Calculation (min): 28 min      ASSESSMENT  Impressions:  WFL oral phase and no suspected pharyngeal phase dysphagia based on clinical swallow evaluation this date. Question possible esophageal dysphagia. SLP will follow up x1-2 to ensure no further needs.  Prognosis: Good    PLAN  Recommendations:  MBSS recommended: No; no pharyngeal dysphagia suspected., SLP will continue to monitor to determine if MBSS is clinically warranted.  Solid consistency: Regular (IDDSI level 7)  Liquid consistency: Thin (IDDSI 0)  Medication administration: Whole, in puree (okay to take in liquid per pt preference)  Compensatory swallow strategies: Upright positioning for all PO intake, Slow rate of intake, Small bites, and Alternate bites and sips, If noticing sensation of stasis, remain upright until sensation clears    Recommended frequency/duration:  Skilled SLP services recommended: Yes  Frequency: 2x/week  Duration: x1-2 follow-up visits  Discharge recommendation: Home with no further SLP  Treatment/Interventions: Assess diet tolerance  Strengths: Cognition and Baseline functional status  Barriers to participation in tx: N/A    Goals (start date 4/10/24. Anticipated time frame for goal attainment: 1 week):  Pt will consume prescribed diet (current diet is regular/thin) without overt s/sx aspiration/penetration in 95% of observed trials.   Status: Goal initiated this date   Progress this date: No overt s/sx aspiration this date   Pt/family will demonstrate understanding of education related to dysphagia independently.   Status: Goal initiated this date   Progress this date: Pt verbalized understanding of education this date       SUBJECTIVE    PMHx relevant to rehab: hypertension dyslipidemia COPD atrial  fibrillation on Eliquis dementia diabetes mellitus chronic diastolic congestive heart failure     Chief complaint: Pt was admitted on 4/8/24 due to cough productive of mucus and blood, worsening SOB. She was diagnosed with CAP.    Relevant imaging results:  CXR 4/8/24: Right upper lobe infiltrate with small pleural effusion.     CXR 4/10/24: Cardiomegaly. Small bilateral pleural effusions with associated  atelectasis at the lung bases, slightly progressed.  Right upper lobe pneumonia is slightly improved.    General Visit Information:  SLP Received On: 04/10/24  Patient Class: Inpatient  Living Environment: Home  Ordering Physician: MI Zhao  Reason for Referral: Rule out aspiration  Prior to Session Communication: Bedside nurse    RN cleared pt to participate in session and reported no overt signs of aspiration this date.    Pt reported new sensation of slow clearance of solids and pills at the level of the upper esophagus that began when she began to have PNA symptoms. She reported that she needs to chew well, eat slowly, and use liquid wash while eating. This is effective in clearing all sensation of retention.    Date of Onset: 04/08/24  Date of Order: 04/09/24  BaseLine Diet: Regular/thin  Current Diet : Regular/thin    Pain Assessment  Pain Assessment: 0-10  Pain Score:  (did not rate)  Pain Type: Acute pain  Pain Location: Leg  Pain Orientation:  (bilateral)  Pain Descriptors: Shooting  Pain Frequency:  (when sitting up straight)  Pain Interventions:  (LPN provided pain medication during session)    Pt was alert, pleasant, and cooperative for session.  Orientation: Oriented to self, Oriented to location, Oriented to situation, and Did not assess orientation to time  Ability to follow functional commands: WFL  Nutritional status: Appears well-nourished/no concerns    Respiratory status: Supplemental oxygen via NC  Baseline Vocal Quality: Normal  Volitional Cough: Strong  Volitional Swallow:  Within Functional Limits  Patient positioning: Upright in bed      OBJECTIVE  Clinical swallow evaluation completed and consisted of interview, oral motor assessment, and PO trials (thin liquids x2-3oz via straw, pudding x4oz (plain and with whole pills administered by LPN), cookie x3 bites).  ORAL PHASE: Natural dentition in fair condition, missing several molars. Oral mucosa were pink, moist, and free of obvious lesions. Lingual strength and ROM were mildly diminished bilaterally. Labial strength/ROM were WFL bilaterally. Labial seal was adequate. Mastication of regular solids was slow but adequate. A/P transit and oral clearance were WFL.  PHARYNGEAL PHASE: Laryngeal elevation was visualized or palpated with all trials, however adequacy of hyolaryngeal elevation/excursion cannot be determined at bedside. No immediate or delayed s/sx aspiration/penetration were observed with any consistencies.    Was 3oz challenge administered: Yes; pt unable to successfully complete due to taking breaks. No overt s/sx aspiration were observed.      Treatment/Education:  Results and recommendations were relayed to: Patient and mednurse  Education provided: Yes   Learner: Patient   Barriers to learning: Acuteness of illness barrier   Method of teaching: Verbal   Topic: role of ST, results of assessment, and recommended safe swallow strategies   Outcome of teaching: Pt verbalized understanding  Treatment provided: No  Next Treatment Priority: Reassess to ensure ongoing safety with current diet

## 2024-04-10 NOTE — NURSING NOTE
Notified Dr. Barboza via secure chat regarding audible wheezy and SOB even after her aerosol treatment at 0616. She has a moist cough but is unable to cough anything up except small amounts of saliva. Walked her to the bathroom with oxygen and she could barely make it back to bed. Rhonchi noted on auscultation. Unit manager in room with patient. Patient Pox 86% at rest patient bumped up to 6 L and remain on 86%. HOB elevated. Patient in bed at this time.

## 2024-04-11 ENCOUNTER — TELEPHONE (OUTPATIENT)
Dept: INFECTIOUS DISEASES | Facility: HOSPITAL | Age: 79
End: 2024-04-11
Payer: MEDICARE

## 2024-04-11 DIAGNOSIS — E11.9 TYPE 2 DIABETES MELLITUS WITHOUT COMPLICATION, WITH LONG-TERM CURRENT USE OF INSULIN (MULTI): ICD-10-CM

## 2024-04-11 DIAGNOSIS — T84.7XXA HARDWARE COMPLICATING WOUND INFECTION, INITIAL ENCOUNTER (CMS-HCC): ICD-10-CM

## 2024-04-11 DIAGNOSIS — A49.8 PROTEUS INFECTION: ICD-10-CM

## 2024-04-11 DIAGNOSIS — Z79.4 TYPE 2 DIABETES MELLITUS WITHOUT COMPLICATION, WITH LONG-TERM CURRENT USE OF INSULIN (MULTI): ICD-10-CM

## 2024-04-11 LAB
ANION GAP SERPL CALC-SCNC: 15 MMOL/L (ref 10–20)
BACTERIA SPEC RESP CULT: ABNORMAL
BUN SERPL-MCNC: 26 MG/DL (ref 6–23)
CALCIUM SERPL-MCNC: 8.7 MG/DL (ref 8.6–10.3)
CHLORIDE SERPL-SCNC: 105 MMOL/L (ref 98–107)
CO2 SERPL-SCNC: 24 MMOL/L (ref 21–32)
CREAT SERPL-MCNC: 0.86 MG/DL (ref 0.5–1.05)
EGFRCR SERPLBLD CKD-EPI 2021: 70 ML/MIN/1.73M*2
ERYTHROCYTE [DISTWIDTH] IN BLOOD BY AUTOMATED COUNT: 15.3 % (ref 11.5–14.5)
GLUCOSE BLD MANUAL STRIP-MCNC: 104 MG/DL (ref 74–99)
GLUCOSE BLD MANUAL STRIP-MCNC: 265 MG/DL (ref 74–99)
GLUCOSE BLD MANUAL STRIP-MCNC: 267 MG/DL (ref 74–99)
GLUCOSE BLD MANUAL STRIP-MCNC: 283 MG/DL (ref 74–99)
GLUCOSE BLD MANUAL STRIP-MCNC: 343 MG/DL (ref 74–99)
GLUCOSE SERPL-MCNC: 292 MG/DL (ref 74–99)
GRAM STN SPEC: ABNORMAL
HCT VFR BLD AUTO: 36 % (ref 36–46)
HGB BLD-MCNC: 10.9 G/DL (ref 12–16)
MCH RBC QN AUTO: 29.4 PG (ref 26–34)
MCHC RBC AUTO-ENTMCNC: 30.3 G/DL (ref 32–36)
MCV RBC AUTO: 97 FL (ref 80–100)
NRBC BLD-RTO: 0 /100 WBCS (ref 0–0)
PLATELET # BLD AUTO: 261 X10*3/UL (ref 150–450)
POTASSIUM SERPL-SCNC: 3.6 MMOL/L (ref 3.5–5.3)
PROT UR-ACNC: 25 MG/DL (ref 5–25)
RBC # BLD AUTO: 3.71 X10*6/UL (ref 4–5.2)
SODIUM SERPL-SCNC: 140 MMOL/L (ref 136–145)
WBC # BLD AUTO: 8.2 X10*3/UL (ref 4.4–11.3)

## 2024-04-11 PROCEDURE — 83521 IG LIGHT CHAINS FREE EACH: CPT | Mod: AHULAB | Performed by: STUDENT IN AN ORGANIZED HEALTH CARE EDUCATION/TRAINING PROGRAM

## 2024-04-11 PROCEDURE — 82947 ASSAY GLUCOSE BLOOD QUANT: CPT

## 2024-04-11 PROCEDURE — 92526 ORAL FUNCTION THERAPY: CPT | Mod: GN

## 2024-04-11 PROCEDURE — 1200000002 HC GENERAL ROOM WITH TELEMETRY DAILY

## 2024-04-11 PROCEDURE — 84166 PROTEIN E-PHORESIS/URINE/CSF: CPT | Performed by: STUDENT IN AN ORGANIZED HEALTH CARE EDUCATION/TRAINING PROGRAM

## 2024-04-11 PROCEDURE — 1090000001 HH PPS REVENUE CREDIT

## 2024-04-11 PROCEDURE — 85027 COMPLETE CBC AUTOMATED: CPT | Performed by: NURSE PRACTITIONER

## 2024-04-11 PROCEDURE — 82374 ASSAY BLOOD CARBON DIOXIDE: CPT | Performed by: NURSE PRACTITIONER

## 2024-04-11 PROCEDURE — 1090000002 HH PPS REVENUE DEBIT

## 2024-04-11 PROCEDURE — 2500000001 HC RX 250 WO HCPCS SELF ADMINISTERED DRUGS (ALT 637 FOR MEDICARE OP): Performed by: STUDENT IN AN ORGANIZED HEALTH CARE EDUCATION/TRAINING PROGRAM

## 2024-04-11 PROCEDURE — 2500000004 HC RX 250 GENERAL PHARMACY W/ HCPCS (ALT 636 FOR OP/ED): Performed by: INTERNAL MEDICINE

## 2024-04-11 PROCEDURE — 2500000001 HC RX 250 WO HCPCS SELF ADMINISTERED DRUGS (ALT 637 FOR MEDICARE OP): Performed by: NURSE PRACTITIONER

## 2024-04-11 PROCEDURE — 2500000002 HC RX 250 W HCPCS SELF ADMINISTERED DRUGS (ALT 637 FOR MEDICARE OP, ALT 636 FOR OP/ED): Performed by: EMERGENCY MEDICINE

## 2024-04-11 PROCEDURE — 2500000002 HC RX 250 W HCPCS SELF ADMINISTERED DRUGS (ALT 637 FOR MEDICARE OP, ALT 636 FOR OP/ED): Mod: MUE | Performed by: NURSE PRACTITIONER

## 2024-04-11 PROCEDURE — 2500000004 HC RX 250 GENERAL PHARMACY W/ HCPCS (ALT 636 FOR OP/ED): Performed by: STUDENT IN AN ORGANIZED HEALTH CARE EDUCATION/TRAINING PROGRAM

## 2024-04-11 PROCEDURE — 36415 COLL VENOUS BLD VENIPUNCTURE: CPT | Performed by: NURSE PRACTITIONER

## 2024-04-11 PROCEDURE — 99232 SBSQ HOSP IP/OBS MODERATE 35: CPT | Performed by: STUDENT IN AN ORGANIZED HEALTH CARE EDUCATION/TRAINING PROGRAM

## 2024-04-11 PROCEDURE — 84156 ASSAY OF PROTEIN URINE: CPT | Mod: AHULAB | Performed by: STUDENT IN AN ORGANIZED HEALTH CARE EDUCATION/TRAINING PROGRAM

## 2024-04-11 PROCEDURE — 86325 OTHER IMMUNOELECTROPHORESIS: CPT | Performed by: STUDENT IN AN ORGANIZED HEALTH CARE EDUCATION/TRAINING PROGRAM

## 2024-04-11 PROCEDURE — 2500000002 HC RX 250 W HCPCS SELF ADMINISTERED DRUGS (ALT 637 FOR MEDICARE OP, ALT 636 FOR OP/ED): Performed by: NURSE PRACTITIONER

## 2024-04-11 PROCEDURE — 2500000001 HC RX 250 WO HCPCS SELF ADMINISTERED DRUGS (ALT 637 FOR MEDICARE OP): Performed by: INTERNAL MEDICINE

## 2024-04-11 PROCEDURE — 99232 SBSQ HOSP IP/OBS MODERATE 35: CPT | Performed by: INTERNAL MEDICINE

## 2024-04-11 PROCEDURE — 84166 PROTEIN E-PHORESIS/URINE/CSF: CPT | Mod: AHULAB,91 | Performed by: STUDENT IN AN ORGANIZED HEALTH CARE EDUCATION/TRAINING PROGRAM

## 2024-04-11 PROCEDURE — 94640 AIRWAY INHALATION TREATMENT: CPT

## 2024-04-11 PROCEDURE — 2500000002 HC RX 250 W HCPCS SELF ADMINISTERED DRUGS (ALT 637 FOR MEDICARE OP, ALT 636 FOR OP/ED): Mod: MUE | Performed by: EMERGENCY MEDICINE

## 2024-04-11 RX ORDER — CEPHALEXIN 500 MG/1
500 CAPSULE ORAL 2 TIMES DAILY
Qty: 60 CAPSULE | Refills: 3 | Status: SHIPPED | OUTPATIENT
Start: 2024-04-11 | End: 2024-08-09

## 2024-04-11 RX ADMIN — ATORVASTATIN CALCIUM 40 MG: 40 TABLET, FILM COATED ORAL at 21:03

## 2024-04-11 RX ADMIN — GABAPENTIN 600 MG: 300 CAPSULE ORAL at 08:55

## 2024-04-11 RX ADMIN — IPRATROPIUM BROMIDE AND ALBUTEROL SULFATE 3 ML: 2.5; .5 SOLUTION RESPIRATORY (INHALATION) at 13:54

## 2024-04-11 RX ADMIN — GABAPENTIN 600 MG: 300 CAPSULE ORAL at 15:17

## 2024-04-11 RX ADMIN — APIXABAN 5 MG: 5 TABLET, FILM COATED ORAL at 21:02

## 2024-04-11 RX ADMIN — METHYLPREDNISOLONE SODIUM SUCCINATE 40 MG: 40 INJECTION, POWDER, FOR SOLUTION INTRAMUSCULAR; INTRAVENOUS at 06:28

## 2024-04-11 RX ADMIN — FUROSEMIDE 40 MG: 10 INJECTION, SOLUTION INTRAMUSCULAR; INTRAVENOUS at 21:32

## 2024-04-11 RX ADMIN — IPRATROPIUM BROMIDE AND ALBUTEROL SULFATE 3 ML: 2.5; .5 SOLUTION RESPIRATORY (INHALATION) at 19:01

## 2024-04-11 RX ADMIN — AMIODARONE HYDROCHLORIDE 200 MG: 200 TABLET ORAL at 08:55

## 2024-04-11 RX ADMIN — INSULIN LISPRO 6 UNITS: 100 INJECTION, SOLUTION INTRAVENOUS; SUBCUTANEOUS at 09:01

## 2024-04-11 RX ADMIN — INSULIN LISPRO 6 UNITS: 100 INJECTION, SOLUTION INTRAVENOUS; SUBCUTANEOUS at 12:06

## 2024-04-11 RX ADMIN — DULOXETINE HYDROCHLORIDE 30 MG: 30 CAPSULE, DELAYED RELEASE ORAL at 21:03

## 2024-04-11 RX ADMIN — TOPIRAMATE 100 MG: 25 TABLET, FILM COATED ORAL at 21:04

## 2024-04-11 RX ADMIN — METHYLPREDNISOLONE SODIUM SUCCINATE 40 MG: 40 INJECTION, POWDER, FOR SOLUTION INTRAMUSCULAR; INTRAVENOUS at 18:14

## 2024-04-11 RX ADMIN — METOPROLOL SUCCINATE 25 MG: 25 TABLET, EXTENDED RELEASE ORAL at 08:55

## 2024-04-11 RX ADMIN — TOPIRAMATE 100 MG: 25 TABLET, FILM COATED ORAL at 08:55

## 2024-04-11 RX ADMIN — GABAPENTIN 600 MG: 300 CAPSULE ORAL at 21:04

## 2024-04-11 RX ADMIN — IPRATROPIUM BROMIDE AND ALBUTEROL SULFATE 3 ML: 2.5; .5 SOLUTION RESPIRATORY (INHALATION) at 08:21

## 2024-04-11 RX ADMIN — DULOXETINE HYDROCHLORIDE 30 MG: 30 CAPSULE, DELAYED RELEASE ORAL at 08:55

## 2024-04-11 RX ADMIN — APIXABAN 5 MG: 5 TABLET, FILM COATED ORAL at 08:56

## 2024-04-11 RX ADMIN — PANTOPRAZOLE SODIUM 40 MG: 40 TABLET, DELAYED RELEASE ORAL at 06:28

## 2024-04-11 RX ADMIN — FUROSEMIDE 40 MG: 10 INJECTION, SOLUTION INTRAMUSCULAR; INTRAVENOUS at 10:25

## 2024-04-11 RX ADMIN — CEFTRIAXONE SODIUM 1 G: 1 INJECTION, SOLUTION INTRAVENOUS at 20:58

## 2024-04-11 RX ADMIN — DONEPEZIL HYDROCHLORIDE 5 MG: 5 TABLET ORAL at 21:03

## 2024-04-11 ASSESSMENT — COGNITIVE AND FUNCTIONAL STATUS - GENERAL
HELP NEEDED FOR BATHING: A LOT
DRESSING REGULAR UPPER BODY CLOTHING: A LITTLE
TOILETING: A LOT
PERSONAL GROOMING: A LITTLE
STANDING UP FROM CHAIR USING ARMS: A LOT
MOBILITY SCORE: 14
MOVING FROM LYING ON BACK TO SITTING ON SIDE OF FLAT BED WITH BEDRAILS: A LITTLE
WALKING IN HOSPITAL ROOM: A LOT
DRESSING REGULAR LOWER BODY CLOTHING: A LOT
MOVING TO AND FROM BED TO CHAIR: A LOT
DAILY ACTIVITIY SCORE: 16
CLIMB 3 TO 5 STEPS WITH RAILING: A LOT
TURNING FROM BACK TO SIDE WHILE IN FLAT BAD: A LITTLE

## 2024-04-11 ASSESSMENT — PAIN - FUNCTIONAL ASSESSMENT
PAIN_FUNCTIONAL_ASSESSMENT: 0-10

## 2024-04-11 ASSESSMENT — PAIN SCALES - GENERAL
PAINLEVEL_OUTOF10: 0 - NO PAIN

## 2024-04-11 NOTE — PROGRESS NOTES
Subjective Data:  States her shortness of breath is better with diuresis and antibiotics.     Overnight Events:    UOP 2.1L in the last 24 hours on 40 IV lasix BID.     Objective Data:  Last Recorded Vitals:  Vitals:    04/11/24 0238 04/11/24 0746 04/11/24 0821 04/11/24 1136   BP: 134/81 122/70  106/59   BP Location: Left arm      Patient Position: Lying      Pulse: 72 73  75   Resp: 18 18  18   Temp: 36.4 °C (97.5 °F)      TempSrc: Oral      SpO2: 96% 98% 97% 99%   Weight:       Height:           Last Labs:  CBC - 4/11/2024:  8:48 AM  8.2 10.9 261    36.0      CMP - 4/11/2024:  8:48 AM  8.7 6.3 15 --- 0.6   3.6 3.5 15 68      PTT - 4/8/2024:  4:17 PM  1.3   14.6 51     TROPHS   Date/Time Value Ref Range Status   04/10/2024 11:21  0 - 13 ng/L Final     Comment:     Previous result verified on 4/10/2024 0756 on specimen/case [id number] called with component TRPHS for procedure Troponin I, High Sensitivity with value 121 ng/L.   04/10/2024 05:22  0 - 13 ng/L Final   04/09/2024 02:08 PM 86 0 - 13 ng/L Final     Comment:     Previous result verified on 4/8/2024 1705 on specimen/case [id number] called with component TRPHS for procedure Troponin I, High Sensitivity with value 66 ng/L.     BNP   Date/Time Value Ref Range Status   04/10/2024 11:21  0 - 99 pg/mL Final   04/08/2024 04:17  0 - 99 pg/mL Final     HGBA1C   Date/Time Value Ref Range Status   01/29/2024 11:53 AM 7.7 4.2 - 6.5 % Final   09/05/2023 04:14 AM 7.2 % Final     Comment:          Diagnosis of Diabetes-Adults   Non-Diabetic: < or = 5.6%   Increased risk for developing diabetes: 5.7-6.4%   Diagnostic of diabetes: > or = 6.5%  .       Monitoring of Diabetes                Age (y)     Therapeutic Goal (%)   Adults:          >18           <7.0   Pediatrics:    13-18           <7.5                   7-12           <8.0                   0- 6            7.5-8.5   American Diabetes Association. Diabetes Care 33(S1), Jan 2010.      08/03/2018 06:33 AM 6.1 % Final     Comment:          Diagnosis of Diabetes-Adults   Non-Diabetic: < or = 5.6%   Increased risk for developing diabetes: 5.7-6.4%   Diagnostic of diabetes: > or = 6.5%  .       Monitoring of Diabetes                Age (y)     Therapeutic Goal (%)   Adults:          >18           <7.0   Pediatrics:    13-18           <7.5                   7-12           <8.0                   0- 6            7.5-8.5   American Diabetes Association. Diabetes Care 33(S1), Jan 2010.       VLDL   Date/Time Value Ref Range Status   05/26/2023 01:13 PM 20 0 - 40 mg/dL Final   10/27/2022 02:52 PM 17 0 - 40 mg/dL Final   04/21/2022 02:59 PM 21 0 - 40 mg/dL Final      Last I/O:  I/O last 3 completed shifts:  In: 540 (9 mL/kg) [P.O.:540]  Out: 2700 (44.8 mL/kg) [Urine:2700 (1.2 mL/kg/hr)]  Weight: 60.3 kg     Past Cardiology Tests (Last 3 Years):  EKG:  ECG 12 lead 04/08/2024  NSR 81bpm     ECG 12 Lead 01/24/2024     Echo:  8/2023:  1. Left ventricular systolic function is normal with a 55% estimated ejection fraction.  2. Moderately increased left ventricular septal thickness.  3. The left ventricular posterior wall thickness is moderately increased.  4. There is left ventricular concentric remodeling.  5. The longitudinal 2D Strain is mildly decreased with a pattern of apical preservation which is suggestive of infiltrative heart disease.  6. The patient is in atrial fibrillation which may influence the estimate of left ventricular function and transvalvular flows.        Cardiac Imaging:  CT Chest 4/2024  There are minimal coronary artery calcification  Bilateral focal lower lung atelectasis left more than right,  adjacent to small pleural effusions.       Inpatient Medications:  Scheduled medications   Medication Dose Route Frequency    amiodarone  200 mg oral Daily    apixaban  5 mg oral BID    atorvastatin  40 mg oral Nightly    cefTRIAXone  1 g intravenous q24h    docusate sodium  100 mg oral BID     donepezil  5 mg oral Nightly    DULoxetine  30 mg oral BID    furosemide  40 mg intravenous q12h    [Held by provider] furosemide  20 mg oral Daily    gabapentin  600 mg oral TID    insulin lispro  0-10 Units subcutaneous TID with meals    ipratropium-albuteroL  3 mL nebulization TID    methylPREDNISolone sodium succinate (PF)  40 mg intravenous q12h    metoprolol succinate XL  25 mg oral Daily    pantoprazole  40 mg oral Daily before breakfast    polyethylene glycol  17 g oral Daily    topiramate  100 mg oral BID     PRN medications   Medication    acetaminophen    benzonatate    dextrose    dextrose    glucagon    glucagon    ipratropium-albuteroL    melatonin    ondansetron    oxygen    traMADol    traZODone     Continuous Medications   Medication Dose Last Rate    oxygen   2 L/min (04/10/24 2027)       Physical Exam:  General: well appearing, no acute distress  Cardiac: regular rate and rhythm, no murmurs, rubs or gallops  Pulmonary: Clear to auscultation bilaterally, normal respiratory effort  Peripheral pulses: intact, 2+  Extremities: 1+ bilateral LE edema      Assessment/Plan   Elida Benson is a 78 y.o. female with pertinent history of dementia, hypertension, diverticulitis status post laparoscopic anterior resection with ileostomy on 8/16/2023, paroxysmal atrial fibrillation on amiodarone and Eliquis, preserved ejection fraction with suspected myocardial cleft versus false tendon, moderate concentric hypertrophy, moderate left atrial enlargement on echo performed 8/19/2023, preserved ejection fraction with moderate septal thickness, abnormal strain imaging concerning for possible infiltrative heart disease on echo performed 8/22/2023, presented to hospital w SOB found to have RUL pneumonia. Cardiology c/s for CHF and troponins.      RUL Pneumonia. On Atbx  CHF/HFpEf (, bilateral pleural effusions).  Hypertensive, female, elderly, pAF,   Troponin elevation (plateu 127). Likely demand type 2  ischemia in s/o underlying cardiomyopathy and pneumonia  pAF on amiodarone, apixaban, and MTXL 25mg daily   DM  HTN     Recommendations:  -40 IV BID of lasix for diuresis  -Follow up urine and serum protein immunofixation and serum light chains  -Continue home amiodarone 200mg daily, MTXL 25mg dily, and apixaban 5mg bid for pAF  -Will consider outpatient cardiac MRI to rule out infiltrative disease   -Can stop trending troponin  - Will start atorvastatin 40mg at bedtime     Code Status:  Full Code       Rudi Salmon MD    I reviewed the fellow's documentation and discussed the patient with the fellow. I agree with the fellow's medical decision making as documented in the fellow's note and have edited it as necessary.  I personally reviewed the patient's recent labs, medications, orders, EKGs, and pertinent cardiac imaging/ echocardiography.     Thank you for allowing me to participate in their care.  Please feel free to call me with any further questions or concerns.        Cesar Ignacio MD, FACC, TAIWO ABARCA  Division of Cardiovascular Medicine  System Director, Nuclear Cardiology   Medical Director, [facility] [place], [facility]   Clinical , [facility] [employer]  [email or web address]   Office:  [phone]

## 2024-04-11 NOTE — CARE PLAN
The patient's goals for the shift include pt will remain safe and comfortable throughout the shift    The clinical goals for the shift include pt will remain safe and stable on 3L NC throughout the shift.

## 2024-04-11 NOTE — PROGRESS NOTES
Speech-Language Pathology    Speech-Language Pathology Clinical Swallow Treatment    Patient Name: Elida Benson  MRN: [id number]  : 1945  Today's Date: 24  Start time: Start Time: 1408  Stop time: Stop Time: 1422  Time calculation (min) : Time Calculation (min): 14 min    ASSESSMENT  SLP TX Intervention Outcome: Making Progress Towards Goals  Treatment Tolerance: Patient tolerated treatment well    Impressions: Patient mildly SOB with cough; had just received breathing treatment    PLAN  Recommended solid consistency: Regular (IDDSI level 7)  Recommended liquid consistency: Thin (IDDSI 0)  Recommended medication administration: Whole  Safe swallow strategies: Upright positioning for all PO intake, Slow rate of intake, Small bites, Small sips, and Double swallow  Discharge recommendation: Home with no further SLP  Inpatient/Swing Bed or Outpatient: Inpatient  SLP TX Plan: Continue Plan of Care  SLP Plan: Skilled SLP  SLP Frequency: 2x per week  Duration: 2 weeks  Next Treatment Priority: 24        Next Treatment Priority: 24    SUBJECTIVE  Prior to Session Communication: Bedside nurse  RN cleared pt to participate in session   Positioning: Upright in bed  Pt was alert, pleasant, and cooperative for session.      Pain Assessment  Pain Assessment: 0-10  Pain Score: 0 - No pain  Cadet-Baker FACES Pain Rating: No hurt  Pain Type: Acute pain  Pain Location: Leg  Pain Orientation:  (bilateral)  Pain Descriptors: Shooting  Pain Frequency:  (when sitting up straight)  Pain Interventions:  (LPN provided pain medication during session)  Multiple Pain Sites: Two       Orientation: Ox4  Ability to follow functional commands: WFL    OBJECTIVE  Therapeutic Swallow  Therapeutic Swallow Intervention : PO Trials, Caregiver Education  Swallow Comments: No overt signs of penetration or aspiration    Treatment/Education:  Results and recommendations were relayed to: Patient  Education provided: Yes   Learner:  Patient   Barriers to learning: None   Method of teaching: Verbal and Demonstration   Topic: Role of ST, results of assessment, risk for aspiration, recommended diet modifications, recommendation for MBSS, recommended safe swallow strategies, and recommendation for dysphagia follow-up   Outcome of teaching: Pt/family demonstrated good understanding and Pt verbalized understanding    Goals:   Pt will consume prescribed diet (current diet is regular/thin) without overt s/sx aspiration/penetration in 95% of observed trials.              Status: Goal initiated this date              Progress this date: No overt s/sx aspiration this date  Progressing 4/11  Pt/family will demonstrate understanding of education related to dysphagia independently.              Status: Goal initiated this date              Progress this date: Progressing 4/11      Patient seen for dysphagia therapy. She declined a solid snack, did drink orange juice with no overt signs of penetration or aspiration. SLP reviewed safer swallow strategies and explained reasoning for them. Patient verbalized comprehension of the information. Patient requested a low salt diet. SLP messaged RN.   SLP recommends next dysphagia therapy tx be with a meal.

## 2024-04-11 NOTE — SIGNIFICANT EVENT
Pt found off oxygen. (2 L NC). Pt's oxygen saturation 82%.  Pt placed back on 2 L, and rt explained the importance of keeping her oxygen on. Oxygen saturation increased to 94%.  HR 79, rr 18.

## 2024-04-11 NOTE — PROGRESS NOTES
Elida Benson is a 79 y.o. female     More comfortable in bed  Breathing is improved    Review of Systems     Constitutional: Feels tired  Cardiovascular: no chest pain   Respiratory: Shortness of breath and wheezing  Gastrointestinal: no abdominal pain, no constipation, no melena, no nausea, no diarrhea, no vomiting and no blood in stools.   Neurological: no headache,   All other systems have been reviewed and are negative for complaint.       Vitals:    04/11/24 1431   BP: 132/77   Pulse: 88   Resp: 18   Temp:    SpO2: 98%        Scheduled medications  amiodarone, 200 mg, oral, Daily  apixaban, 5 mg, oral, BID  atorvastatin, 40 mg, oral, Nightly  cefTRIAXone, 1 g, intravenous, q24h  docusate sodium, 100 mg, oral, BID  donepezil, 5 mg, oral, Nightly  DULoxetine, 30 mg, oral, BID  furosemide, 40 mg, intravenous, q12h  [Held by provider] furosemide, 20 mg, oral, Daily  gabapentin, 600 mg, oral, TID  insulin lispro, 0-10 Units, subcutaneous, TID with meals  ipratropium-albuteroL, 3 mL, nebulization, TID  methylPREDNISolone sodium succinate (PF), 40 mg, intravenous, q12h  metoprolol succinate XL, 25 mg, oral, Daily  pantoprazole, 40 mg, oral, Daily before breakfast  polyethylene glycol, 17 g, oral, Daily  topiramate, 100 mg, oral, BID      Continuous medications  oxygen, , Last Rate: 2 L/min (04/10/24 2027)      PRN medications  PRN medications: acetaminophen, benzonatate, dextrose, dextrose, glucagon, glucagon, ipratropium-albuteroL, melatonin, ondansetron, oxygen, traMADol, traZODone    Lab Review   Results from last 7 days   Lab Units 04/11/24  0848 04/10/24  0522 04/09/24  1408   WBC AUTO x10*3/uL 8.2 8.6 9.6   HEMOGLOBIN g/dL 10.9* 11.4* 11.3*   HEMATOCRIT % 36.0 38.3 37.7   PLATELETS AUTO x10*3/uL 261 241 232     Results from last 7 days   Lab Units 04/11/24  0848 04/10/24  0522 04/09/24  1408 04/08/24  1617   SODIUM mmol/L 140 137 137 140   POTASSIUM mmol/L 3.6 3.7 5.6* 4.3   CHLORIDE mmol/L 105 105 107 108*    CO2 mmol/L 24 22 22 23   BUN mg/dL 26* 17 19 15   CREATININE mg/dL 0.86 0.70 0.84 0.73   CALCIUM mg/dL 8.7 8.7 8.2* 8.5*   PROTEIN TOTAL g/dL  --   --   --  6.3*   BILIRUBIN TOTAL mg/dL  --   --   --  0.6   ALK PHOS U/L  --   --   --  68   ALT U/L  --   --   --  15   AST U/L  --   --   --  15   GLUCOSE mg/dL 292* 130* 114* 159*     Results from last 7 days   Lab Units 04/10/24  1121 04/10/24  0522 04/09/24  1408   TROPHS ng/L 127* 121* 86*        XR chest 1 view   Final Result   Cardiomegaly. Small bilateral pleural effusions with associated   atelectasis at the lung bases, slightly progressed.        Right upper lobe pneumonia is slightly improved.        MACRO:   None        Signed by: Nicolás Mistry 4/10/2024 8:08 AM   Dictation workstation:   [id number]      Vascular US lower extremity venous duplex right         CT angio chest for pulmonary embolism   Final Result   1. No detectable pulmonary emboli.   2. Patchy infiltrates and/or edema right upper lobe.   3. Bilateral focal lower lung atelectasis left more than right,   adjacent to small pleural effusions.   4. No bowel obstruction or detectable bowel inflammation; moderate   stool burden; prior sigmoid resection and ileostomy reversal.   5. Previous surgery lower cervical spine and lower lumbar spine.   6. Remainder as above.   Signed by Flaquito López MD      CT head wo IV contrast   Final Result   1. No evidence of hemorrhage, CT apparent transcortical infarct, or   other emergent intracranial abnormality.   2. Moderate brain parenchymal volume loss with patchy and confluence   attenuation changes present in the white matter of bilateral cerebral   hemispheres, nonspecific findings favored to represent sequela of   microvascular disease.        MACRO:   None        Signed by: Tim Garland 4/8/2024 8:07 PM   Dictation workstation:   [id number]      CT abdomen pelvis w IV contrast   Final Result   1. No detectable pulmonary emboli.   2. Patchy  infiltrates and/or edema right upper lobe.   3. Bilateral focal lower lung atelectasis left more than right,   adjacent to small pleural effusions.   4. No bowel obstruction or detectable bowel inflammation; moderate   stool burden; prior sigmoid resection and ileostomy reversal.   5. Previous surgery lower cervical spine and lower lumbar spine.   6. Remainder as above.   Signed by Flaquito López MD      XR chest 1 view   Final Result   Right upper lobe infiltrate with small pleural effusion.   Signed by Bakari Perdue MD            Physical Exam     Constitutional   General appearance: Alert and in no acute distress.     Pulmonary   Respiratory assessment: Rhonchi   Cardiovascular   Auscultation of heart: Apical pulse normal, heart rate and rhythm normal, normal S1 and S2, no murmurs and no pericardial rub.    Exam for edema: Plus edema  Abdomen   Abdominal Exam: No bruits, normal bowel sounds, soft, non-tender, no abdominal mass palpated.    Liver and Spleen exam: No hepato-splenomegaly.   Musculoskeletal   Examination of gait: ROM intact  Skin inspection: Normal skin color and pigmentation, normal skin turgor and no visible rash.   Neurologic   Cranial nerves: Nerves 2-12 were intact, no focal neuro defects.     Assessment/Plan      #Community-acquired pneumonia  Continue antibiotics and DuoNebs  Continue steroids  Will start taper tomorrow       #Acute on chronic diastolic congestive heart failure  #Demand ischemia  Lasix increased to 40 IV twice daily by cardiology  Cardiology considering outpatient cardiac MRI to rule out any infiltrative processes    #Hypertension  Stable continue home medications     #Diabetes mellitus  /Insulin coverage     #COPD  DuoNebs     #Paroxysmal atrial fibrillation  Continue Eliquis/amiodarone     #Dementia  Monitor for worsening encephalopathy    #Dyslipidemia  Start statins with a target LDL of less than 70

## 2024-04-11 NOTE — TELEPHONE ENCOUNTER
The patient's daughter, Kaelyn Turk (phone: [phone]), called to report she needs refills on Keflex 500mg take 1 BID sent to [id number]. I reminded her she needs to make a follow up appointment. She reports the patient is currently admitted at [facility]. I told her if an ID doc sees her mother while admitted please advise them to contact Dr. Lo with an update. Patient was scheduled with office appointment July 5, 2024, 9:20 AM. Please review and sign prescription.

## 2024-04-11 NOTE — PROGRESS NOTES
Care Coordinator Note:    Plan: Patient in with PNA. Cough and SOB noted. 82% on RA today, on 2 liters. IV rocephin/zithromax, iv lasix, iv solumedrol.     Status: inpatient  Payor: [employer]  Disposition: Home alone with resume [facility]  Barrier:   ADOD: 1-2 days    Mirta Ragsdale [facility]      04/11/24 1423   Discharge Planning   Patient expects to be discharged to: Home alone with [facility]

## 2024-04-12 ENCOUNTER — APPOINTMENT (OUTPATIENT)
Dept: RADIOLOGY | Facility: HOSPITAL | Age: 79
DRG: 193 | End: 2024-04-12
Payer: MEDICARE

## 2024-04-12 LAB
ANION GAP SERPL CALC-SCNC: 13 MMOL/L (ref 10–20)
BUN SERPL-MCNC: 27 MG/DL (ref 6–23)
CALCIUM SERPL-MCNC: 8.7 MG/DL (ref 8.6–10.3)
CHLORIDE SERPL-SCNC: 102 MMOL/L (ref 98–107)
CO2 SERPL-SCNC: 28 MMOL/L (ref 21–32)
CREAT SERPL-MCNC: 0.83 MG/DL (ref 0.5–1.05)
EGFRCR SERPLBLD CKD-EPI 2021: 73 ML/MIN/1.73M*2
ERYTHROCYTE [DISTWIDTH] IN BLOOD BY AUTOMATED COUNT: 15.1 % (ref 11.5–14.5)
GLUCOSE BLD MANUAL STRIP-MCNC: 191 MG/DL (ref 74–99)
GLUCOSE BLD MANUAL STRIP-MCNC: 276 MG/DL (ref 74–99)
GLUCOSE BLD MANUAL STRIP-MCNC: 326 MG/DL (ref 74–99)
GLUCOSE BLD MANUAL STRIP-MCNC: 340 MG/DL (ref 74–99)
GLUCOSE BLD MANUAL STRIP-MCNC: 396 MG/DL (ref 74–99)
GLUCOSE SERPL-MCNC: 315 MG/DL (ref 74–99)
HCT VFR BLD AUTO: 37.6 % (ref 36–46)
HGB BLD-MCNC: 11.2 G/DL (ref 12–16)
KAPPA LC SERPL-MCNC: 2.63 MG/DL (ref 0.33–1.94)
KAPPA LC/LAMBDA SER: 1.44 {RATIO} (ref 0.26–1.65)
LAMBDA LC SERPL-MCNC: 1.83 MG/DL (ref 0.57–2.63)
MCH RBC QN AUTO: 29.2 PG (ref 26–34)
MCHC RBC AUTO-ENTMCNC: 29.8 G/DL (ref 32–36)
MCV RBC AUTO: 98 FL (ref 80–100)
NRBC BLD-RTO: 0 /100 WBCS (ref 0–0)
PLATELET # BLD AUTO: 302 X10*3/UL (ref 150–450)
POTASSIUM SERPL-SCNC: 3.7 MMOL/L (ref 3.5–5.3)
RBC # BLD AUTO: 3.84 X10*6/UL (ref 4–5.2)
SODIUM SERPL-SCNC: 139 MMOL/L (ref 136–145)
WBC # BLD AUTO: 7.5 X10*3/UL (ref 4.4–11.3)

## 2024-04-12 PROCEDURE — 82947 ASSAY GLUCOSE BLOOD QUANT: CPT

## 2024-04-12 PROCEDURE — 80048 BASIC METABOLIC PNL TOTAL CA: CPT | Performed by: NURSE PRACTITIONER

## 2024-04-12 PROCEDURE — 2500000002 HC RX 250 W HCPCS SELF ADMINISTERED DRUGS (ALT 637 FOR MEDICARE OP, ALT 636 FOR OP/ED): Mod: MUE | Performed by: EMERGENCY MEDICINE

## 2024-04-12 PROCEDURE — 2500000001 HC RX 250 WO HCPCS SELF ADMINISTERED DRUGS (ALT 637 FOR MEDICARE OP): Performed by: NURSE PRACTITIONER

## 2024-04-12 PROCEDURE — 85027 COMPLETE CBC AUTOMATED: CPT | Performed by: NURSE PRACTITIONER

## 2024-04-12 PROCEDURE — 2500000002 HC RX 250 W HCPCS SELF ADMINISTERED DRUGS (ALT 637 FOR MEDICARE OP, ALT 636 FOR OP/ED): Performed by: NURSE PRACTITIONER

## 2024-04-12 PROCEDURE — 2500000002 HC RX 250 W HCPCS SELF ADMINISTERED DRUGS (ALT 637 FOR MEDICARE OP, ALT 636 FOR OP/ED): Mod: MUE | Performed by: NURSE PRACTITIONER

## 2024-04-12 PROCEDURE — 36415 COLL VENOUS BLD VENIPUNCTURE: CPT | Performed by: NURSE PRACTITIONER

## 2024-04-12 PROCEDURE — 2500000002 HC RX 250 W HCPCS SELF ADMINISTERED DRUGS (ALT 637 FOR MEDICARE OP, ALT 636 FOR OP/ED): Performed by: EMERGENCY MEDICINE

## 2024-04-12 PROCEDURE — 1200000002 HC GENERAL ROOM WITH TELEMETRY DAILY

## 2024-04-12 PROCEDURE — 1090000001 HH PPS REVENUE CREDIT

## 2024-04-12 PROCEDURE — 2500000001 HC RX 250 WO HCPCS SELF ADMINISTERED DRUGS (ALT 637 FOR MEDICARE OP): Performed by: STUDENT IN AN ORGANIZED HEALTH CARE EDUCATION/TRAINING PROGRAM

## 2024-04-12 PROCEDURE — 71045 X-RAY EXAM CHEST 1 VIEW: CPT | Performed by: RADIOLOGY

## 2024-04-12 PROCEDURE — 94640 AIRWAY INHALATION TREATMENT: CPT

## 2024-04-12 PROCEDURE — 99232 SBSQ HOSP IP/OBS MODERATE 35: CPT | Performed by: INTERNAL MEDICINE

## 2024-04-12 PROCEDURE — 71045 X-RAY EXAM CHEST 1 VIEW: CPT

## 2024-04-12 PROCEDURE — 1090000002 HH PPS REVENUE DEBIT

## 2024-04-12 PROCEDURE — 2500000004 HC RX 250 GENERAL PHARMACY W/ HCPCS (ALT 636 FOR OP/ED): Performed by: STUDENT IN AN ORGANIZED HEALTH CARE EDUCATION/TRAINING PROGRAM

## 2024-04-12 PROCEDURE — 2500000001 HC RX 250 WO HCPCS SELF ADMINISTERED DRUGS (ALT 637 FOR MEDICARE OP): Performed by: INTERNAL MEDICINE

## 2024-04-12 PROCEDURE — 99233 SBSQ HOSP IP/OBS HIGH 50: CPT | Performed by: STUDENT IN AN ORGANIZED HEALTH CARE EDUCATION/TRAINING PROGRAM

## 2024-04-12 PROCEDURE — 2500000004 HC RX 250 GENERAL PHARMACY W/ HCPCS (ALT 636 FOR OP/ED): Performed by: INTERNAL MEDICINE

## 2024-04-12 RX ORDER — FUROSEMIDE 10 MG/ML
40 INJECTION INTRAMUSCULAR; INTRAVENOUS EVERY 24 HOURS
Status: DISCONTINUED | OUTPATIENT
Start: 2024-04-13 | End: 2024-04-17 | Stop reason: HOSPADM

## 2024-04-12 RX ORDER — AMOXICILLIN AND CLAVULANATE POTASSIUM 875; 125 MG/1; MG/1
1 TABLET, FILM COATED ORAL 2 TIMES DAILY
Qty: 10 TABLET | Refills: 0 | Status: CANCELLED | OUTPATIENT
Start: 2024-04-12 | End: 2024-04-17

## 2024-04-12 RX ORDER — PREDNISONE 20 MG/1
40 TABLET ORAL DAILY
Qty: 10 TABLET | Refills: 0 | Status: CANCELLED | OUTPATIENT
Start: 2024-04-12 | End: 2024-04-17

## 2024-04-12 RX ORDER — ATORVASTATIN CALCIUM 40 MG/1
40 TABLET, FILM COATED ORAL NIGHTLY
Qty: 30 TABLET | Refills: 0 | Status: CANCELLED | OUTPATIENT
Start: 2024-04-12 | End: 2024-05-12

## 2024-04-12 RX ADMIN — APIXABAN 5 MG: 5 TABLET, FILM COATED ORAL at 20:32

## 2024-04-12 RX ADMIN — INSULIN LISPRO 8 UNITS: 100 INJECTION, SOLUTION INTRAVENOUS; SUBCUTANEOUS at 12:50

## 2024-04-12 RX ADMIN — IPRATROPIUM BROMIDE AND ALBUTEROL SULFATE 3 ML: 2.5; .5 SOLUTION RESPIRATORY (INHALATION) at 20:05

## 2024-04-12 RX ADMIN — INSULIN LISPRO 6 UNITS: 100 INJECTION, SOLUTION INTRAVENOUS; SUBCUTANEOUS at 09:16

## 2024-04-12 RX ADMIN — GABAPENTIN 600 MG: 300 CAPSULE ORAL at 20:30

## 2024-04-12 RX ADMIN — GABAPENTIN 600 MG: 300 CAPSULE ORAL at 14:57

## 2024-04-12 RX ADMIN — IPRATROPIUM BROMIDE AND ALBUTEROL SULFATE 3 ML: 2.5; .5 SOLUTION RESPIRATORY (INHALATION) at 14:08

## 2024-04-12 RX ADMIN — INSULIN LISPRO 10 UNITS: 100 INJECTION, SOLUTION INTRAVENOUS; SUBCUTANEOUS at 17:16

## 2024-04-12 RX ADMIN — CEFTRIAXONE SODIUM 1 G: 1 INJECTION, SOLUTION INTRAVENOUS at 20:33

## 2024-04-12 RX ADMIN — TRAMADOL HYDROCHLORIDE 50 MG: 50 TABLET, COATED ORAL at 09:16

## 2024-04-12 RX ADMIN — IPRATROPIUM BROMIDE AND ALBUTEROL SULFATE 3 ML: 2.5; .5 SOLUTION RESPIRATORY (INHALATION) at 07:58

## 2024-04-12 RX ADMIN — APIXABAN 5 MG: 5 TABLET, FILM COATED ORAL at 09:15

## 2024-04-12 RX ADMIN — DONEPEZIL HYDROCHLORIDE 5 MG: 5 TABLET ORAL at 20:32

## 2024-04-12 RX ADMIN — METHYLPREDNISOLONE SODIUM SUCCINATE 40 MG: 40 INJECTION, POWDER, FOR SOLUTION INTRAMUSCULAR; INTRAVENOUS at 20:28

## 2024-04-12 RX ADMIN — ATORVASTATIN CALCIUM 40 MG: 40 TABLET, FILM COATED ORAL at 20:32

## 2024-04-12 RX ADMIN — FUROSEMIDE 40 MG: 10 INJECTION, SOLUTION INTRAMUSCULAR; INTRAVENOUS at 09:33

## 2024-04-12 RX ADMIN — PANTOPRAZOLE SODIUM 40 MG: 40 TABLET, DELAYED RELEASE ORAL at 06:46

## 2024-04-12 RX ADMIN — GABAPENTIN 600 MG: 300 CAPSULE ORAL at 09:14

## 2024-04-12 RX ADMIN — DULOXETINE HYDROCHLORIDE 30 MG: 30 CAPSULE, DELAYED RELEASE ORAL at 20:32

## 2024-04-12 RX ADMIN — TOPIRAMATE 100 MG: 25 TABLET, FILM COATED ORAL at 10:03

## 2024-04-12 RX ADMIN — DULOXETINE HYDROCHLORIDE 30 MG: 30 CAPSULE, DELAYED RELEASE ORAL at 09:15

## 2024-04-12 RX ADMIN — METOPROLOL SUCCINATE 25 MG: 25 TABLET, EXTENDED RELEASE ORAL at 09:14

## 2024-04-12 RX ADMIN — AMIODARONE HYDROCHLORIDE 200 MG: 200 TABLET ORAL at 09:14

## 2024-04-12 RX ADMIN — TRAMADOL HYDROCHLORIDE 50 MG: 50 TABLET, COATED ORAL at 16:58

## 2024-04-12 RX ADMIN — METHYLPREDNISOLONE SODIUM SUCCINATE 40 MG: 40 INJECTION, POWDER, FOR SOLUTION INTRAMUSCULAR; INTRAVENOUS at 06:46

## 2024-04-12 RX ADMIN — TOPIRAMATE 100 MG: 25 TABLET, FILM COATED ORAL at 20:31

## 2024-04-12 ASSESSMENT — COGNITIVE AND FUNCTIONAL STATUS - GENERAL
CLIMB 3 TO 5 STEPS WITH RAILING: A LOT
MOVING TO AND FROM BED TO CHAIR: A LOT
MOVING FROM LYING ON BACK TO SITTING ON SIDE OF FLAT BED WITH BEDRAILS: A LITTLE
DRESSING REGULAR UPPER BODY CLOTHING: A LITTLE
WALKING IN HOSPITAL ROOM: A LOT
TOILETING: A LOT
TURNING FROM BACK TO SIDE WHILE IN FLAT BAD: A LITTLE
STANDING UP FROM CHAIR USING ARMS: A LOT
DAILY ACTIVITIY SCORE: 16
MOBILITY SCORE: 14
HELP NEEDED FOR BATHING: A LOT
DRESSING REGULAR LOWER BODY CLOTHING: A LOT
PERSONAL GROOMING: A LITTLE

## 2024-04-12 ASSESSMENT — PAIN SCALES - GENERAL
PAINLEVEL_OUTOF10: 0 - NO PAIN
PAINLEVEL_OUTOF10: 8
PAINLEVEL_OUTOF10: 9
PAINLEVEL_OUTOF10: 0 - NO PAIN

## 2024-04-12 ASSESSMENT — PAIN DESCRIPTION - ORIENTATION
ORIENTATION: LOWER
ORIENTATION: LOWER

## 2024-04-12 ASSESSMENT — PAIN - FUNCTIONAL ASSESSMENT
PAIN_FUNCTIONAL_ASSESSMENT: 0-10

## 2024-04-12 ASSESSMENT — PAIN DESCRIPTION - LOCATION
LOCATION: BACK
LOCATION: BACK

## 2024-04-12 NOTE — PROGRESS NOTES
Elida Benson is a 79 y.o. female     Still with significant wheezing    Review of Systems     Constitutional: Feels tired  Cardiovascular: no chest pain   Respiratory: Shortness of breath and wheezing  Gastrointestinal: no abdominal pain, no constipation, no melena, no nausea, no diarrhea, no vomiting and no blood in stools.   Neurological: no headache,   All other systems have been reviewed and are negative for complaint.       Vitals:    04/12/24 1500   BP: 100/65   Pulse: 62   Resp: 18   Temp: 36.6 °C (97.8 °F)   SpO2: 94%        Scheduled medications  amiodarone, 200 mg, oral, Daily  apixaban, 5 mg, oral, BID  atorvastatin, 40 mg, oral, Nightly  cefTRIAXone, 1 g, intravenous, q24h  docusate sodium, 100 mg, oral, BID  donepezil, 5 mg, oral, Nightly  DULoxetine, 30 mg, oral, BID  [START ON 4/13/2024] furosemide, 40 mg, intravenous, q24h  gabapentin, 600 mg, oral, TID  insulin lispro, 0-10 Units, subcutaneous, TID with meals  ipratropium-albuteroL, 3 mL, nebulization, TID  methylPREDNISolone sodium succinate (PF), 40 mg, intravenous, q12h  metoprolol succinate XL, 25 mg, oral, Daily  pantoprazole, 40 mg, oral, Daily before breakfast  polyethylene glycol, 17 g, oral, Daily  topiramate, 100 mg, oral, BID      Continuous medications  oxygen, , Last Rate: 2 L/min (04/12/24 0800)      PRN medications  PRN medications: acetaminophen, benzonatate, dextrose, dextrose, glucagon, glucagon, ipratropium-albuteroL, melatonin, ondansetron, oxygen, traMADol, traZODone    Lab Review   Results from last 7 days   Lab Units 04/12/24  0741 04/11/24  0848 04/10/24  0522   WBC AUTO x10*3/uL 7.5 8.2 8.6   HEMOGLOBIN g/dL 11.2* 10.9* 11.4*   HEMATOCRIT % 37.6 36.0 38.3   PLATELETS AUTO x10*3/uL 302 261 241     Results from last 7 days   Lab Units 04/12/24  0741 04/11/24  0848 04/10/24  0522 04/09/24  1408 04/08/24  1617   SODIUM mmol/L 139 140 137   < > 140   POTASSIUM mmol/L 3.7 3.6 3.7   < > 4.3   CHLORIDE mmol/L 102 105 105   < >  108*   CO2 mmol/L 28 24 22   < > 23   BUN mg/dL 27* 26* 17   < > 15   CREATININE mg/dL 0.83 0.86 0.70   < > 0.73   CALCIUM mg/dL 8.7 8.7 8.7   < > 8.5*   PROTEIN TOTAL g/dL  --   --   --   --  6.3*   BILIRUBIN TOTAL mg/dL  --   --   --   --  0.6   ALK PHOS U/L  --   --   --   --  68   ALT U/L  --   --   --   --  15   AST U/L  --   --   --   --  15   GLUCOSE mg/dL 315* 292* 130*   < > 159*    < > = values in this interval not displayed.     Results from last 7 days   Lab Units 04/10/24  1121 04/10/24  0522 04/09/24  1408   TROPHS ng/L 127* 121* 86*        XR chest 1 view   Final Result   Enlarged cardiac silhouette. Parenchymal infiltration.        MACRO:   none        Signed by: Divina Ugalde 4/12/2024 1:34 PM   Dictation workstation:   [id number]      XR chest 1 view   Final Result   Cardiomegaly. Small bilateral pleural effusions with associated   atelectasis at the lung bases, slightly progressed.        Right upper lobe pneumonia is slightly improved.        MACRO:   None        Signed by: Nicolás Mistry 4/10/2024 8:08 AM   Dictation workstation:   [id number]      Vascular US lower extremity venous duplex right         CT angio chest for pulmonary embolism   Final Result   1. No detectable pulmonary emboli.   2. Patchy infiltrates and/or edema right upper lobe.   3. Bilateral focal lower lung atelectasis left more than right,   adjacent to small pleural effusions.   4. No bowel obstruction or detectable bowel inflammation; moderate   stool burden; prior sigmoid resection and ileostomy reversal.   5. Previous surgery lower cervical spine and lower lumbar spine.   6. Remainder as above.   Signed by Flaquito López MD      CT head wo IV contrast   Final Result   1. No evidence of hemorrhage, CT apparent transcortical infarct, or   other emergent intracranial abnormality.   2. Moderate brain parenchymal volume loss with patchy and confluence   attenuation changes present in the white matter of bilateral cerebral    hemispheres, nonspecific findings favored to represent sequela of   microvascular disease.        MACRO:   None        Signed by: Jhoanaravind Parvezflorenciajustin 4/8/2024 8:07 PM   Dictation workstation:   [id number]      CT abdomen pelvis w IV contrast   Final Result   1. No detectable pulmonary emboli.   2. Patchy infiltrates and/or edema right upper lobe.   3. Bilateral focal lower lung atelectasis left more than right,   adjacent to small pleural effusions.   4. No bowel obstruction or detectable bowel inflammation; moderate   stool burden; prior sigmoid resection and ileostomy reversal.   5. Previous surgery lower cervical spine and lower lumbar spine.   6. Remainder as above.   Signed by Flaquito López MD      XR chest 1 view   Final Result   Right upper lobe infiltrate with small pleural effusion.   Signed by Bakari Perdue MD      NM PYP Cardiac Amyloidosis with SPECT CT    (Results Pending)         Physical Exam     Constitutional   General appearance: Alert and in no acute distress.     Pulmonary   Respiratory assessment: Rhonchi   Cardiovascular   Auscultation of heart: Apical pulse normal, heart rate and rhythm normal, normal S1 and S2, no murmurs and no pericardial rub.    Exam for edema: Plus edema  Abdomen   Abdominal Exam: No bruits, normal bowel sounds, soft, non-tender, no abdominal mass palpated.    Liver and Spleen exam: No hepato-splenomegaly.   Musculoskeletal   Examination of gait: ROM intact  Skin inspection: Normal skin color and pigmentation, normal skin turgor and no visible rash.   Neurologic   Cranial nerves: Nerves 2-12 were intact, no focal neuro defects.     Assessment/Plan      #Community-acquired pneumonia  Continue antibiotics and DuoNebs  Wheezing appears to have worsened slightly  Continue Solu-Medrol for now       #Acute on chronic diastolic congestive heart failure  #Demand ischemia  Lasix adjusted again  Cardiology considering outpatient cardiac MRI to rule out any infiltrative  processes    #Hypertension  Stable continue home medications     #Diabetes mellitus  /Insulin coverage     #COPD  DuoNebs     #Paroxysmal atrial fibrillation  Continue Eliquis/amiodarone     #Dementia  Monitor for worsening encephalopathy    #Dyslipidemia  Start statins with a target LDL of less than 70

## 2024-04-12 NOTE — CARE PLAN
The patient's goals for the shift include      The clinical goals for the shift include Pt to remain safe    Over the shift, the patient did make progress toward the following goals.   Problem: Fall/Injury  Goal: Verbalize understanding of personal risk factors for fall in the hospital  Outcome: Progressing     Problem: Safety  Goal: Patient will be injury free during hospitalization  Outcome: Progressing     Problem: Psychosocial Needs  Goal: Demonstrates ability to cope with hospitalization/illness  Outcome: Progressing

## 2024-04-12 NOTE — PROGRESS NOTES
Subjective Data:  States her shortness of breath is better with diuresis and antibiotics.     Overnight Events:    UOP 500cc in the last 24 hours on 40 IV lasix BID.  [place] free light chain elevated at 2.63. Ratio of Kappa/Lamda normal at 1.44.     Objective Data:  Last Recorded Vitals:  Vitals:    04/11/24 2334 04/12/24 0514 04/12/24 0759 04/12/24 0800   BP: 138/90 135/88 138/85    BP Location: Right arm Right arm     Patient Position: Lying Lying     Pulse: 82 71 71    Resp:   18    Temp: 36.1 °C (97 °F) 36.1 °C (97 °F)     TempSrc:       SpO2: 99% 97% 99% 99%   Weight:       Height:           Last Labs:  CBC - 4/12/2024:  7:41 AM  7.5 11.2 302    37.6      CMP - 4/12/2024:  7:41 AM  8.7 6.3 15 --- 0.6   3.6 3.5 15 68      PTT - 4/8/2024:  4:17 PM  1.3   14.6 51     TROPHS   Date/Time Value Ref Range Status   04/10/2024 11:21  0 - 13 ng/L Final     Comment:     Previous result verified on 4/10/2024 0756 on specimen/case [id number] called with component TRPHS for procedure Troponin I, High Sensitivity with value 121 ng/L.   04/10/2024 05:22  0 - 13 ng/L Final   04/09/2024 02:08 PM 86 0 - 13 ng/L Final     Comment:     Previous result verified on 4/8/2024 1705 on specimen/case [id number] called with component TRPHS for procedure Troponin I, High Sensitivity with value 66 ng/L.     BNP   Date/Time Value Ref Range Status   04/10/2024 11:21  0 - 99 pg/mL Final   04/08/2024 04:17  0 - 99 pg/mL Final     HGBA1C   Date/Time Value Ref Range Status   01/29/2024 11:53 AM 7.7 4.2 - 6.5 % Final   09/05/2023 04:14 AM 7.2 % Final     Comment:          Diagnosis of Diabetes-Adults   Non-Diabetic: < or = 5.6%   Increased risk for developing diabetes: 5.7-6.4%   Diagnostic of diabetes: > or = 6.5%  .       Monitoring of Diabetes                Age (y)     Therapeutic Goal (%)   Adults:          >18           <7.0   Pediatrics:    13-18           <7.5                   7-12           <8.0                    0- 6            7.5-8.5   American Diabetes Association. Diabetes Care 33(S1), Jan 2010.     08/03/2018 06:33 AM 6.1 % Final     Comment:          Diagnosis of Diabetes-Adults   Non-Diabetic: < or = 5.6%   Increased risk for developing diabetes: 5.7-6.4%   Diagnostic of diabetes: > or = 6.5%  .       Monitoring of Diabetes                Age (y)     Therapeutic Goal (%)   Adults:          >18           <7.0   Pediatrics:    13-18           <7.5                   7-12           <8.0                   0- 6            7.5-8.5   American Diabetes Association. Diabetes Care 33(S1), Jan 2010.       VLDL   Date/Time Value Ref Range Status   05/26/2023 01:13 PM 20 0 - 40 mg/dL Final   10/27/2022 02:52 PM 17 0 - 40 mg/dL Final   04/21/2022 02:59 PM 21 0 - 40 mg/dL Final      Last I/O:  I/O last 3 completed shifts:  In: 300 (5 mL/kg) [P.O.:300]  Out: 800 (13.3 mL/kg) [Urine:800 (0.4 mL/kg/hr)]  Weight: 60.3 kg     Past Cardiology Tests (Last 3 Years):  EKG:  ECG 12 lead 04/08/2024  NSR 81bpm     ECG 12 Lead 01/24/2024     Echo:  8/2023:  1. Left ventricular systolic function is normal with a 55% estimated ejection fraction.  2. Moderately increased left ventricular septal thickness.  3. The left ventricular posterior wall thickness is moderately increased.  4. There is left ventricular concentric remodeling.  5. The longitudinal 2D Strain is mildly decreased with a pattern of apical preservation which is suggestive of infiltrative heart disease.  6. The patient is in atrial fibrillation which may influence the estimate of left ventricular function and transvalvular flows.        Cardiac Imaging:  CT Chest 4/2024  There are minimal coronary artery calcification  Bilateral focal lower lung atelectasis left more than right,  adjacent to small pleural effusions.       Inpatient Medications:  Scheduled medications   Medication Dose Route Frequency    amiodarone  200 mg oral Daily    apixaban  5 mg oral BID    atorvastatin   40 mg oral Nightly    cefTRIAXone  1 g intravenous q24h    docusate sodium  100 mg oral BID    donepezil  5 mg oral Nightly    DULoxetine  30 mg oral BID    furosemide  40 mg intravenous q12h    [Held by provider] furosemide  20 mg oral Daily    gabapentin  600 mg oral TID    insulin lispro  0-10 Units subcutaneous TID with meals    ipratropium-albuteroL  3 mL nebulization TID    methylPREDNISolone sodium succinate (PF)  40 mg intravenous q12h    metoprolol succinate XL  25 mg oral Daily    pantoprazole  40 mg oral Daily before breakfast    polyethylene glycol  17 g oral Daily    topiramate  100 mg oral BID     PRN medications   Medication    acetaminophen    benzonatate    dextrose    dextrose    glucagon    glucagon    ipratropium-albuteroL    melatonin    ondansetron    oxygen    traMADol    traZODone     Continuous Medications   Medication Dose Last Rate    oxygen   2 L/min (04/12/24 0800)       Physical Exam:  General: well appearing, no acute distress  Cardiac: regular rate and rhythm, no murmurs, rubs or gallops  Pulmonary: Clear to auscultation bilaterally, normal respiratory effort  Peripheral pulses: intact, 2+  Extremities: 1+ bilateral LE edema      Assessment/Plan   Elida Benson is a 78 y.o. female with pertinent history of dementia, hypertension, diverticulitis status post laparoscopic anterior resection with ileostomy on 8/16/2023, paroxysmal atrial fibrillation on amiodarone and Eliquis, preserved ejection fraction with suspected myocardial cleft versus false tendon, moderate concentric hypertrophy, moderate left atrial enlargement on echo performed 8/19/2023, preserved ejection fraction with moderate septal thickness, abnormal strain imaging concerning for possible infiltrative heart disease on echo performed 8/22/2023, presented to hospital w SOB found to have RUL pneumonia. Cardiology c/s for CHF and troponins.      RUL Pneumonia. On Atbx  CHF/HFpEf (, bilateral pleural effusions).   Hypertensive, female, elderly, pAF.  Troponin elevation (plateu 127). Likely demand type 2 ischemia in s/o underlying cardiomyopathy and pneumonia  pAF on amiodarone, apixaban, and MTXL 25mg daily   DM  HTN     Recommendations:  -Continue Rx with antbx and steroids for CAP as per primary team  -Reduce IV lasix to 40mg IV daily from bid given decreased UOP with diuresis  -Will get CXR to assess need for continuing diuresis  -Follow up urine and serum protein immunofixation and serum light chains  -Continue home amiodarone 200mg daily, MTXL 25mg dily, and apixaban 5mg bid for pAF  -Will order PYP scan to rule out ATTR amyloidosis  - Atorvastatin 40mg at bedtime     Code Status:  Full Code     I saw and evaluated the patient. I personally obtained the key and critical portions of the history and physical exam. I reviewed the fellow's documentation and discussed the patient with the fellow. I agree with the fellow's medical decision making as documented in the fellow's note and have edited it as necessary.  I personally reviewed the patient's recent labs, medications, orders, EKGs, and pertinent cardiac imaging/ echocardiography.     Thank you for allowing me to participate in their care.  Please feel free to call me with any further questions or concerns.        Cesar Ignacio MD, FACC, TAIWO ABARCA  Division of Cardiovascular Medicine  System Director, Nuclear Cardiology   Medical Director, [facility] [place], [facility]   Clinical , [employer]  [email or web address]   Office:  [phone]

## 2024-04-13 ENCOUNTER — APPOINTMENT (OUTPATIENT)
Dept: CARDIOLOGY | Facility: HOSPITAL | Age: 79
DRG: 193 | End: 2024-04-13
Payer: MEDICARE

## 2024-04-13 LAB
ANION GAP SERPL CALC-SCNC: 14 MMOL/L (ref 10–20)
BUN SERPL-MCNC: 35 MG/DL (ref 6–23)
CALCIUM SERPL-MCNC: 9 MG/DL (ref 8.6–10.3)
CHLORIDE SERPL-SCNC: 102 MMOL/L (ref 98–107)
CO2 SERPL-SCNC: 28 MMOL/L (ref 21–32)
CREAT SERPL-MCNC: 0.86 MG/DL (ref 0.5–1.05)
EGFRCR SERPLBLD CKD-EPI 2021: 70 ML/MIN/1.73M*2
ERYTHROCYTE [DISTWIDTH] IN BLOOD BY AUTOMATED COUNT: 15 % (ref 11.5–14.5)
GLUCOSE BLD MANUAL STRIP-MCNC: 139 MG/DL (ref 74–99)
GLUCOSE BLD MANUAL STRIP-MCNC: 308 MG/DL (ref 74–99)
GLUCOSE BLD MANUAL STRIP-MCNC: 382 MG/DL (ref 74–99)
GLUCOSE BLD MANUAL STRIP-MCNC: 473 MG/DL (ref 74–99)
GLUCOSE SERPL-MCNC: 399 MG/DL (ref 74–99)
HCT VFR BLD AUTO: 40.6 % (ref 36–46)
HGB BLD-MCNC: 11.7 G/DL (ref 12–16)
MCH RBC QN AUTO: 28.7 PG (ref 26–34)
MCHC RBC AUTO-ENTMCNC: 28.8 G/DL (ref 32–36)
MCV RBC AUTO: 100 FL (ref 80–100)
NRBC BLD-RTO: 0 /100 WBCS (ref 0–0)
PLATELET # BLD AUTO: 315 X10*3/UL (ref 150–450)
POTASSIUM SERPL-SCNC: 3.9 MMOL/L (ref 3.5–5.3)
RBC # BLD AUTO: 4.08 X10*6/UL (ref 4–5.2)
SODIUM SERPL-SCNC: 140 MMOL/L (ref 136–145)
WBC # BLD AUTO: 7.7 X10*3/UL (ref 4.4–11.3)

## 2024-04-13 PROCEDURE — 99232 SBSQ HOSP IP/OBS MODERATE 35: CPT | Performed by: NURSE PRACTITIONER

## 2024-04-13 PROCEDURE — 82947 ASSAY GLUCOSE BLOOD QUANT: CPT

## 2024-04-13 PROCEDURE — 1200000002 HC GENERAL ROOM WITH TELEMETRY DAILY

## 2024-04-13 PROCEDURE — 1090000001 HH PPS REVENUE CREDIT

## 2024-04-13 PROCEDURE — 2500000004 HC RX 250 GENERAL PHARMACY W/ HCPCS (ALT 636 FOR OP/ED): Performed by: STUDENT IN AN ORGANIZED HEALTH CARE EDUCATION/TRAINING PROGRAM

## 2024-04-13 PROCEDURE — 2500000002 HC RX 250 W HCPCS SELF ADMINISTERED DRUGS (ALT 637 FOR MEDICARE OP, ALT 636 FOR OP/ED): Mod: MUE | Performed by: EMERGENCY MEDICINE

## 2024-04-13 PROCEDURE — 94640 AIRWAY INHALATION TREATMENT: CPT

## 2024-04-13 PROCEDURE — 2500000001 HC RX 250 WO HCPCS SELF ADMINISTERED DRUGS (ALT 637 FOR MEDICARE OP): Performed by: INTERNAL MEDICINE

## 2024-04-13 PROCEDURE — 2500000001 HC RX 250 WO HCPCS SELF ADMINISTERED DRUGS (ALT 637 FOR MEDICARE OP): Performed by: STUDENT IN AN ORGANIZED HEALTH CARE EDUCATION/TRAINING PROGRAM

## 2024-04-13 PROCEDURE — 1090000002 HH PPS REVENUE DEBIT

## 2024-04-13 PROCEDURE — 36415 COLL VENOUS BLD VENIPUNCTURE: CPT | Performed by: NURSE PRACTITIONER

## 2024-04-13 PROCEDURE — 2500000002 HC RX 250 W HCPCS SELF ADMINISTERED DRUGS (ALT 637 FOR MEDICARE OP, ALT 636 FOR OP/ED): Performed by: INTERNAL MEDICINE

## 2024-04-13 PROCEDURE — 2500000002 HC RX 250 W HCPCS SELF ADMINISTERED DRUGS (ALT 637 FOR MEDICARE OP, ALT 636 FOR OP/ED): Performed by: EMERGENCY MEDICINE

## 2024-04-13 PROCEDURE — 2500000002 HC RX 250 W HCPCS SELF ADMINISTERED DRUGS (ALT 637 FOR MEDICARE OP, ALT 636 FOR OP/ED): Performed by: NURSE PRACTITIONER

## 2024-04-13 PROCEDURE — 99232 SBSQ HOSP IP/OBS MODERATE 35: CPT | Performed by: INTERNAL MEDICINE

## 2024-04-13 PROCEDURE — 85027 COMPLETE CBC AUTOMATED: CPT | Performed by: NURSE PRACTITIONER

## 2024-04-13 PROCEDURE — 80048 BASIC METABOLIC PNL TOTAL CA: CPT | Performed by: NURSE PRACTITIONER

## 2024-04-13 PROCEDURE — 2500000002 HC RX 250 W HCPCS SELF ADMINISTERED DRUGS (ALT 637 FOR MEDICARE OP, ALT 636 FOR OP/ED): Mod: MUE | Performed by: NURSE PRACTITIONER

## 2024-04-13 PROCEDURE — 2500000004 HC RX 250 GENERAL PHARMACY W/ HCPCS (ALT 636 FOR OP/ED): Performed by: INTERNAL MEDICINE

## 2024-04-13 PROCEDURE — 2500000001 HC RX 250 WO HCPCS SELF ADMINISTERED DRUGS (ALT 637 FOR MEDICARE OP): Performed by: NURSE PRACTITIONER

## 2024-04-13 PROCEDURE — 93005 ELECTROCARDIOGRAM TRACING: CPT

## 2024-04-13 RX ORDER — INSULIN LISPRO 100 [IU]/ML
5 INJECTION, SOLUTION INTRAVENOUS; SUBCUTANEOUS ONCE
Status: COMPLETED | OUTPATIENT
Start: 2024-04-13 | End: 2024-04-13

## 2024-04-13 RX ADMIN — GABAPENTIN 600 MG: 300 CAPSULE ORAL at 14:15

## 2024-04-13 RX ADMIN — INSULIN LISPRO 10 UNITS: 100 INJECTION, SOLUTION INTRAVENOUS; SUBCUTANEOUS at 13:06

## 2024-04-13 RX ADMIN — METOPROLOL SUCCINATE 25 MG: 25 TABLET, EXTENDED RELEASE ORAL at 09:50

## 2024-04-13 RX ADMIN — TOPIRAMATE 100 MG: 25 TABLET, FILM COATED ORAL at 22:21

## 2024-04-13 RX ADMIN — EMPAGLIFLOZIN 10 MG: 10 TABLET, FILM COATED ORAL at 11:36

## 2024-04-13 RX ADMIN — DONEPEZIL HYDROCHLORIDE 5 MG: 5 TABLET ORAL at 22:22

## 2024-04-13 RX ADMIN — POLYETHYLENE GLYCOL 3350 17 G: 17 POWDER, FOR SOLUTION ORAL at 09:49

## 2024-04-13 RX ADMIN — INSULIN LISPRO 8 UNITS: 100 INJECTION, SOLUTION INTRAVENOUS; SUBCUTANEOUS at 16:16

## 2024-04-13 RX ADMIN — TOPIRAMATE 100 MG: 25 TABLET, FILM COATED ORAL at 09:49

## 2024-04-13 RX ADMIN — APIXABAN 5 MG: 5 TABLET, FILM COATED ORAL at 09:50

## 2024-04-13 RX ADMIN — ATORVASTATIN CALCIUM 40 MG: 40 TABLET, FILM COATED ORAL at 22:21

## 2024-04-13 RX ADMIN — PANTOPRAZOLE SODIUM 40 MG: 40 TABLET, DELAYED RELEASE ORAL at 06:03

## 2024-04-13 RX ADMIN — DULOXETINE HYDROCHLORIDE 30 MG: 30 CAPSULE, DELAYED RELEASE ORAL at 09:49

## 2024-04-13 RX ADMIN — DOCUSATE SODIUM 100 MG: 100 CAPSULE, LIQUID FILLED ORAL at 09:50

## 2024-04-13 RX ADMIN — FUROSEMIDE 40 MG: 10 INJECTION, SOLUTION INTRAMUSCULAR; INTRAVENOUS at 09:37

## 2024-04-13 RX ADMIN — DULOXETINE HYDROCHLORIDE 30 MG: 30 CAPSULE, DELAYED RELEASE ORAL at 22:21

## 2024-04-13 RX ADMIN — IPRATROPIUM BROMIDE AND ALBUTEROL SULFATE 3 ML: 2.5; .5 SOLUTION RESPIRATORY (INHALATION) at 08:07

## 2024-04-13 RX ADMIN — INSULIN LISPRO 5 UNITS: 100 INJECTION, SOLUTION INTRAVENOUS; SUBCUTANEOUS at 13:08

## 2024-04-13 RX ADMIN — AMIODARONE HYDROCHLORIDE 200 MG: 200 TABLET ORAL at 09:50

## 2024-04-13 RX ADMIN — IPRATROPIUM BROMIDE AND ALBUTEROL SULFATE 3 ML: 2.5; .5 SOLUTION RESPIRATORY (INHALATION) at 20:11

## 2024-04-13 RX ADMIN — IPRATROPIUM BROMIDE AND ALBUTEROL SULFATE 3 ML: 2.5; .5 SOLUTION RESPIRATORY (INHALATION) at 14:42

## 2024-04-13 RX ADMIN — CEFTRIAXONE SODIUM 1 G: 1 INJECTION, SOLUTION INTRAVENOUS at 22:21

## 2024-04-13 RX ADMIN — INSULIN LISPRO 10 UNITS: 100 INJECTION, SOLUTION INTRAVENOUS; SUBCUTANEOUS at 09:37

## 2024-04-13 RX ADMIN — GABAPENTIN 600 MG: 300 CAPSULE ORAL at 09:50

## 2024-04-13 RX ADMIN — TRAMADOL HYDROCHLORIDE 50 MG: 50 TABLET, COATED ORAL at 09:37

## 2024-04-13 RX ADMIN — GABAPENTIN 600 MG: 300 CAPSULE ORAL at 22:21

## 2024-04-13 RX ADMIN — APIXABAN 5 MG: 5 TABLET, FILM COATED ORAL at 22:21

## 2024-04-13 RX ADMIN — METHYLPREDNISOLONE SODIUM SUCCINATE 40 MG: 40 INJECTION, POWDER, FOR SOLUTION INTRAMUSCULAR; INTRAVENOUS at 06:16

## 2024-04-13 ASSESSMENT — COGNITIVE AND FUNCTIONAL STATUS - GENERAL
DAILY ACTIVITIY SCORE: 16
HELP NEEDED FOR BATHING: A LOT
WALKING IN HOSPITAL ROOM: A LOT
PERSONAL GROOMING: A LITTLE
DRESSING REGULAR LOWER BODY CLOTHING: A LOT
MOVING TO AND FROM BED TO CHAIR: A LOT
MOVING FROM LYING ON BACK TO SITTING ON SIDE OF FLAT BED WITH BEDRAILS: A LITTLE
TURNING FROM BACK TO SIDE WHILE IN FLAT BAD: A LITTLE
CLIMB 3 TO 5 STEPS WITH RAILING: A LOT
TOILETING: A LOT
STANDING UP FROM CHAIR USING ARMS: A LOT
DRESSING REGULAR UPPER BODY CLOTHING: A LITTLE
MOBILITY SCORE: 14

## 2024-04-13 ASSESSMENT — PAIN - FUNCTIONAL ASSESSMENT
PAIN_FUNCTIONAL_ASSESSMENT: 0-10

## 2024-04-13 ASSESSMENT — PAIN SCALES - GENERAL
PAINLEVEL_OUTOF10: 8
PAINLEVEL_OUTOF10: 0 - NO PAIN

## 2024-04-13 NOTE — PROGRESS NOTES
Elida Benson is a 79 y.o. female     Slightly better  Sugars are high    Review of Systems     Constitutional: Feels tired  Cardiovascular: no chest pain   Respiratory: Shortness of breath and wheezing  Gastrointestinal: no abdominal pain, no constipation, no melena, no nausea, no diarrhea, no vomiting and no blood in stools.   Neurological: no headache,   All other systems have been reviewed and are negative for complaint.       Vitals:    04/13/24 1427   BP: 106/63   Pulse: 87   Resp: 16   Temp: 36.5 °C (97.7 °F)   SpO2: 96%        Scheduled medications  amiodarone, 200 mg, oral, Daily  apixaban, 5 mg, oral, BID  atorvastatin, 40 mg, oral, Nightly  cefTRIAXone, 1 g, intravenous, q24h  docusate sodium, 100 mg, oral, BID  donepezil, 5 mg, oral, Nightly  DULoxetine, 30 mg, oral, BID  empagliflozin, 10 mg, oral, Daily  furosemide, 40 mg, intravenous, q24h  gabapentin, 600 mg, oral, TID  insulin lispro, 0-10 Units, subcutaneous, TID with meals  ipratropium-albuteroL, 3 mL, nebulization, TID  methylPREDNISolone sodium succinate (PF), 40 mg, intravenous, q12h  metoprolol succinate XL, 25 mg, oral, Daily  pantoprazole, 40 mg, oral, Daily before breakfast  polyethylene glycol, 17 g, oral, Daily  topiramate, 100 mg, oral, BID      Continuous medications  oxygen, , Last Rate: 2 L/min (04/12/24 2005)      PRN medications  PRN medications: acetaminophen, benzonatate, dextrose, dextrose, glucagon, glucagon, ipratropium-albuteroL, melatonin, ondansetron, oxygen, traMADol, traZODone    Lab Review   Results from last 7 days   Lab Units 04/13/24  0744 04/12/24  0741 04/11/24  0848   WBC AUTO x10*3/uL 7.7 7.5 8.2   HEMOGLOBIN g/dL 11.7* 11.2* 10.9*   HEMATOCRIT % 40.6 37.6 36.0   PLATELETS AUTO x10*3/uL 315 302 261     Results from last 7 days   Lab Units 04/13/24  0744 04/12/24  0741 04/11/24  0848 04/09/24  1408 04/08/24  1617   SODIUM mmol/L 140 139 140   < > 140   POTASSIUM mmol/L 3.9 3.7 3.6   < > 4.3   CHLORIDE mmol/L 102  102 105   < > 108*   CO2 mmol/L 28 28 24   < > 23   BUN mg/dL 35* 27* 26*   < > 15   CREATININE mg/dL 0.86 0.83 0.86   < > 0.73   CALCIUM mg/dL 9.0 8.7 8.7   < > 8.5*   PROTEIN TOTAL g/dL  --   --   --   --  6.3*   BILIRUBIN TOTAL mg/dL  --   --   --   --  0.6   ALK PHOS U/L  --   --   --   --  68   ALT U/L  --   --   --   --  15   AST U/L  --   --   --   --  15   GLUCOSE mg/dL 399* 315* 292*   < > 159*    < > = values in this interval not displayed.     Results from last 7 days   Lab Units 04/10/24  1121 04/10/24  0522 04/09/24  1408   TROPHS ng/L 127* 121* 86*        XR chest 1 view   Final Result   Enlarged cardiac silhouette. Parenchymal infiltration.        MACRO:   none        Signed by: Divina Ugalde 4/12/2024 1:34 PM   Dictation workstation:   [id number]      XR chest 1 view   Final Result   Cardiomegaly. Small bilateral pleural effusions with associated   atelectasis at the lung bases, slightly progressed.        Right upper lobe pneumonia is slightly improved.        MACRO:   None        Signed by: Nicolás Mistry 4/10/2024 8:08 AM   Dictation workstation:   [id number]      Vascular US lower extremity venous duplex right   Final Result      CT angio chest for pulmonary embolism   Final Result   1. No detectable pulmonary emboli.   2. Patchy infiltrates and/or edema right upper lobe.   3. Bilateral focal lower lung atelectasis left more than right,   adjacent to small pleural effusions.   4. No bowel obstruction or detectable bowel inflammation; moderate   stool burden; prior sigmoid resection and ileostomy reversal.   5. Previous surgery lower cervical spine and lower lumbar spine.   6. Remainder as above.   Signed by Flaquito López MD      CT head wo IV contrast   Final Result   1. No evidence of hemorrhage, CT apparent transcortical infarct, or   other emergent intracranial abnormality.   2. Moderate brain parenchymal volume loss with patchy and confluence   attenuation changes present in the white matter  of bilateral cerebral   hemispheres, nonspecific findings favored to represent sequela of   microvascular disease.        MACRO:   None        Signed by: Allierabiaaravind Clementsjustin 4/8/2024 8:07 PM   Dictation workstation:   [id number]      CT abdomen pelvis w IV contrast   Final Result   1. No detectable pulmonary emboli.   2. Patchy infiltrates and/or edema right upper lobe.   3. Bilateral focal lower lung atelectasis left more than right,   adjacent to small pleural effusions.   4. No bowel obstruction or detectable bowel inflammation; moderate   stool burden; prior sigmoid resection and ileostomy reversal.   5. Previous surgery lower cervical spine and lower lumbar spine.   6. Remainder as above.   Signed by Flaquito López MD      XR chest 1 view   Final Result   Right upper lobe infiltrate with small pleural effusion.   Signed by Bakari Perdue MD      NM PYP Cardiac Amyloidosis with SPECT CT    (Results Pending)         Physical Exam     Constitutional   General appearance: Alert and in no acute distress.     Pulmonary   Respiratory assessment: Rhonchi   Cardiovascular   Auscultation of heart: Apical pulse normal, heart rate and rhythm normal, normal S1 and S2, no murmurs and no pericardial rub.    Exam for edema: Plus edema  Abdomen   Abdominal Exam: No bruits, normal bowel sounds, soft, non-tender, no abdominal mass palpated.    Liver and Spleen exam: No hepato-splenomegaly.   Musculoskeletal   Examination of gait: ROM intact  Skin inspection: Normal skin color and pigmentation, normal skin turgor and no visible rash.   Neurologic   Cranial nerves: Nerves 2-12 were intact, no focal neuro defects.     Assessment/Plan      #Community-acquired pneumonia  Continue antibiotics and DuoNebs  Wheezing appears to have worsened slightly  Cut back on Solu-Medrol       #Acute on chronic diastolic congestive heart failure  #Demand ischemia  Lasix adjusted again  Cardiology considering outpatient cardiac MRI to rule out any  infiltrative processes    #Hypertension  Stable continue home medications     #Diabetes mellitus  /Insulin coverage  Started Jardiance     #COPD  DuoNebs     #Paroxysmal atrial fibrillation  Continue Eliquis/amiodarone     #Dementia  Monitor for worsening encephalopathy    #Dyslipidemia  Start statins with a target LDL of less than 70

## 2024-04-13 NOTE — CARE PLAN
The patient's goals for the shift include manage pain    The clinical goals for the shift include patient will remain safe and pain will be managed    Over the shift, the patient did make progress toward the following goals.

## 2024-04-13 NOTE — NURSING NOTE
Pt right lower quad abd drsg change completed as per order site d/I and scabbed over and red in color. Pt tolerated it well with no pain and returned to sleep with no c/o. Pt remained dry with purewick working well to keep her dry and incontinent.

## 2024-04-13 NOTE — NURSING NOTE
While assuming patient care, this RN informed by night RN of patient telemetry showing A Flutter. While looking at EKG preformed on 4/8 patient noted to be NSR. EKG preformed to confirm current rhythm. EKG placed in patient binder. Dr. Barboza and Dr. Ignacio both made aware of EKG results.

## 2024-04-13 NOTE — PROGRESS NOTES
Subjective Data:  States her shortness of breath is improved, but still on oxygen at 2L via [place].  Diuresing well  Eating breakfast.     Overnight Events:    none     Objective Data:  Last Recorded Vitals:  Vitals:    04/13/24 0013 04/13/24 0213 04/13/24 0713 04/13/24 0808   BP: 116/71 125/85 132/74    BP Location: Left arm Left arm     Patient Position: Lying Lying     Pulse: 74 68 68    Resp: 18 18 17    Temp: 36.4 °C (97.5 °F) 36.3 °C (97.3 °F) 36 °C (96.8 °F)    TempSrc: Oral Oral     SpO2: 97% 99% 97% 97%   Weight:       Height:           Last Labs:  CBC - 4/13/2024:  7:44 AM  7.7 11.7 315    40.6      CMP - 4/13/2024:  7:44 AM  9.0 6.3 15 --- 0.6   3.6 3.5 15 68      PTT - 4/8/2024:  4:17 PM  1.3   14.6 51     TROPHS   Date/Time Value Ref Range Status   04/10/2024 11:21  0 - 13 ng/L Final     Comment:     Previous result verified on 4/10/2024 0756 on specimen/case [id number] called with component TRPHS for procedure Troponin I, High Sensitivity with value 121 ng/L.   04/10/2024 05:22  0 - 13 ng/L Final   04/09/2024 02:08 PM 86 0 - 13 ng/L Final     Comment:     Previous result verified on 4/8/2024 1705 on specimen/case [id number] called with component TRPHS for procedure Troponin I, High Sensitivity with value 66 ng/L.     BNP   Date/Time Value Ref Range Status   04/10/2024 11:21  0 - 99 pg/mL Final   04/08/2024 04:17  0 - 99 pg/mL Final     HGBA1C   Date/Time Value Ref Range Status   01/29/2024 11:53 AM 7.7 4.2 - 6.5 % Final   09/05/2023 04:14 AM 7.2 % Final     Comment:          Diagnosis of Diabetes-Adults   Non-Diabetic: < or = 5.6%   Increased risk for developing diabetes: 5.7-6.4%   Diagnostic of diabetes: > or = 6.5%  .       Monitoring of Diabetes                Age (y)     Therapeutic Goal (%)   Adults:          >18           <7.0   Pediatrics:    13-18           <7.5                   7-12           <8.0                   0- 6            7.5-8.5   American Diabetes  Association. Diabetes Care 33(S1), Jan 2010.     08/03/2018 06:33 AM 6.1 % Final     Comment:          Diagnosis of Diabetes-Adults   Non-Diabetic: < or = 5.6%   Increased risk for developing diabetes: 5.7-6.4%   Diagnostic of diabetes: > or = 6.5%  .       Monitoring of Diabetes                Age (y)     Therapeutic Goal (%)   Adults:          >18           <7.0   Pediatrics:    13-18           <7.5                   7-12           <8.0                   0- 6            7.5-8.5   American Diabetes Association. Diabetes Care 33(S1), Jan 2010.       VLDL   Date/Time Value Ref Range Status   05/26/2023 01:13 PM 20 0 - 40 mg/dL Final   10/27/2022 02:52 PM 17 0 - 40 mg/dL Final   04/21/2022 02:59 PM 21 0 - 40 mg/dL Final      Last I/O:  I/O last 3 completed shifts:  In: 240 (4 mL/kg) [P.O.:240]  Out: 1350 (22.4 mL/kg) [Urine:1350 (0.6 mL/kg/hr)]  Weight: 60.3 kg     Past Cardiology Tests (Last 3 Years):  EKG:  ECG 12 lead 04/08/2024  NSR 81bpm     ECG 12 Lead 01/24/2024     Echo:  8/2023:  1. Left ventricular systolic function is normal with a 55% estimated ejection fraction.  2. Moderately increased left ventricular septal thickness.  3. The left ventricular posterior wall thickness is moderately increased.  4. There is left ventricular concentric remodeling.  5. The longitudinal 2D Strain is mildly decreased with a pattern of apical preservation which is suggestive of infiltrative heart disease.  6. The patient is in atrial fibrillation which may influence the estimate of left ventricular function and transvalvular flows.        Cardiac Imaging:  CT Chest 4/2024  There are minimal coronary artery calcification  Bilateral focal lower lung atelectasis left more than right,  adjacent to small pleural effusions.       Inpatient Medications:  Scheduled medications   Medication Dose Route Frequency    amiodarone  200 mg oral Daily    apixaban  5 mg oral BID    atorvastatin  40 mg oral Nightly    cefTRIAXone  1 g  intravenous q24h    docusate sodium  100 mg oral BID    donepezil  5 mg oral Nightly    DULoxetine  30 mg oral BID    furosemide  40 mg intravenous q24h    gabapentin  600 mg oral TID    insulin lispro  0-10 Units subcutaneous TID with meals    ipratropium-albuteroL  3 mL nebulization TID    methylPREDNISolone sodium succinate (PF)  40 mg intravenous q12h    metoprolol succinate XL  25 mg oral Daily    pantoprazole  40 mg oral Daily before breakfast    polyethylene glycol  17 g oral Daily    topiramate  100 mg oral BID     PRN medications   Medication    acetaminophen    benzonatate    dextrose    dextrose    glucagon    glucagon    ipratropium-albuteroL    melatonin    ondansetron    oxygen    traMADol    traZODone     Continuous Medications   Medication Dose Last Rate    oxygen   2 L/min (04/12/24 2005)       Physical Exam:  General: well appearing, no acute distress  Cardiac: regular rate and rhythm, no murmurs, rubs or gallops  Pulmonary: Clear to auscultation bilaterally, normal respiratory effort  Peripheral pulses: intact, 2+  Extremities: 1+ bilateral LE edema      Assessment/Plan   Elida Benson is a 78 y.o. female with pertinent history of dementia, hypertension, diverticulitis status post laparoscopic anterior resection with ileostomy on 8/16/2023, paroxysmal atrial fibrillation on amiodarone and Eliquis, preserved ejection fraction with suspected myocardial cleft versus false tendon, moderate concentric hypertrophy, moderate left atrial enlargement on echo performed 8/19/2023, preserved ejection fraction with moderate septal thickness, abnormal strain imaging concerning for possible infiltrative heart disease on echo performed 8/22/2023, presented to hospital w SOB found to have RUL pneumonia. Cardiology c/s for CHF and troponins.      RUL Pneumonia. On Atbx  CHF/HFpEf (, bilateral pleural effusions).  Hypertensive, female, elderly, pAF.  Troponin elevation (plateu 127). Likely demand type 2  ischemia in s/o underlying cardiomyopathy and pneumonia  pAF on amiodarone, apixaban, and MTXL 25mg daily   DM  HTN    4/13 > still volume up on exam, continue to diurese.  She is in a fib/ flutter with controlled rate in 80's.    Recommendations:  -Continue Rx with antbx and steroids  - Continue to diurese with furosemide 40mg IV daily  -Follow up urine and serum protein immunofixation and serum light chains  -Continue home amiodarone 200mg daily, MTXL 25mg dily, and apixaban 5mg bid for known pAF  -Will order PYP scan to rule out ATTR amyloidosis  - Atorvastatin 40mg at bedtime     Code Status:  Full Code

## 2024-04-14 LAB
ANION GAP SERPL CALC-SCNC: 11 MMOL/L (ref 10–20)
BUN SERPL-MCNC: 35 MG/DL (ref 6–23)
CALCIUM SERPL-MCNC: 8.7 MG/DL (ref 8.6–10.3)
CHLORIDE SERPL-SCNC: 102 MMOL/L (ref 98–107)
CO2 SERPL-SCNC: 32 MMOL/L (ref 21–32)
CREAT SERPL-MCNC: 1.01 MG/DL (ref 0.5–1.05)
EGFRCR SERPLBLD CKD-EPI 2021: 57 ML/MIN/1.73M*2
ERYTHROCYTE [DISTWIDTH] IN BLOOD BY AUTOMATED COUNT: 15 % (ref 11.5–14.5)
GLUCOSE BLD MANUAL STRIP-MCNC: 193 MG/DL (ref 74–99)
GLUCOSE BLD MANUAL STRIP-MCNC: 195 MG/DL (ref 74–99)
GLUCOSE BLD MANUAL STRIP-MCNC: 238 MG/DL (ref 74–99)
GLUCOSE BLD MANUAL STRIP-MCNC: 261 MG/DL (ref 74–99)
GLUCOSE BLD MANUAL STRIP-MCNC: 356 MG/DL (ref 74–99)
GLUCOSE SERPL-MCNC: 223 MG/DL (ref 74–99)
HCT VFR BLD AUTO: 41.7 % (ref 36–46)
HGB BLD-MCNC: 12.2 G/DL (ref 12–16)
MCH RBC QN AUTO: 29.2 PG (ref 26–34)
MCHC RBC AUTO-ENTMCNC: 29.3 G/DL (ref 32–36)
MCV RBC AUTO: 100 FL (ref 80–100)
NRBC BLD-RTO: 0 /100 WBCS (ref 0–0)
PLATELET # BLD AUTO: 327 X10*3/UL (ref 150–450)
POTASSIUM SERPL-SCNC: 3.9 MMOL/L (ref 3.5–5.3)
RBC # BLD AUTO: 4.18 X10*6/UL (ref 4–5.2)
SODIUM SERPL-SCNC: 141 MMOL/L (ref 136–145)
WBC # BLD AUTO: 7.9 X10*3/UL (ref 4.4–11.3)

## 2024-04-14 PROCEDURE — 80048 BASIC METABOLIC PNL TOTAL CA: CPT | Performed by: NURSE PRACTITIONER

## 2024-04-14 PROCEDURE — 36415 COLL VENOUS BLD VENIPUNCTURE: CPT | Performed by: NURSE PRACTITIONER

## 2024-04-14 PROCEDURE — 2500000004 HC RX 250 GENERAL PHARMACY W/ HCPCS (ALT 636 FOR OP/ED): Performed by: INTERNAL MEDICINE

## 2024-04-14 PROCEDURE — 82947 ASSAY GLUCOSE BLOOD QUANT: CPT

## 2024-04-14 PROCEDURE — 1200000002 HC GENERAL ROOM WITH TELEMETRY DAILY

## 2024-04-14 PROCEDURE — 2500000004 HC RX 250 GENERAL PHARMACY W/ HCPCS (ALT 636 FOR OP/ED): Performed by: STUDENT IN AN ORGANIZED HEALTH CARE EDUCATION/TRAINING PROGRAM

## 2024-04-14 PROCEDURE — 99232 SBSQ HOSP IP/OBS MODERATE 35: CPT | Performed by: NURSE PRACTITIONER

## 2024-04-14 PROCEDURE — 1090000001 HH PPS REVENUE CREDIT

## 2024-04-14 PROCEDURE — 94640 AIRWAY INHALATION TREATMENT: CPT

## 2024-04-14 PROCEDURE — 2500000002 HC RX 250 W HCPCS SELF ADMINISTERED DRUGS (ALT 637 FOR MEDICARE OP, ALT 636 FOR OP/ED): Performed by: NURSE PRACTITIONER

## 2024-04-14 PROCEDURE — 2500000002 HC RX 250 W HCPCS SELF ADMINISTERED DRUGS (ALT 637 FOR MEDICARE OP, ALT 636 FOR OP/ED): Performed by: EMERGENCY MEDICINE

## 2024-04-14 PROCEDURE — 1090000002 HH PPS REVENUE DEBIT

## 2024-04-14 PROCEDURE — 2500000001 HC RX 250 WO HCPCS SELF ADMINISTERED DRUGS (ALT 637 FOR MEDICARE OP): Performed by: NURSE PRACTITIONER

## 2024-04-14 PROCEDURE — 99232 SBSQ HOSP IP/OBS MODERATE 35: CPT | Performed by: INTERNAL MEDICINE

## 2024-04-14 PROCEDURE — 85027 COMPLETE CBC AUTOMATED: CPT | Performed by: NURSE PRACTITIONER

## 2024-04-14 PROCEDURE — 2500000002 HC RX 250 W HCPCS SELF ADMINISTERED DRUGS (ALT 637 FOR MEDICARE OP, ALT 636 FOR OP/ED): Mod: MUE | Performed by: NURSE PRACTITIONER

## 2024-04-14 PROCEDURE — 2500000002 HC RX 250 W HCPCS SELF ADMINISTERED DRUGS (ALT 637 FOR MEDICARE OP, ALT 636 FOR OP/ED): Mod: MUE | Performed by: EMERGENCY MEDICINE

## 2024-04-14 PROCEDURE — 2500000001 HC RX 250 WO HCPCS SELF ADMINISTERED DRUGS (ALT 637 FOR MEDICARE OP): Performed by: STUDENT IN AN ORGANIZED HEALTH CARE EDUCATION/TRAINING PROGRAM

## 2024-04-14 PROCEDURE — 2500000001 HC RX 250 WO HCPCS SELF ADMINISTERED DRUGS (ALT 637 FOR MEDICARE OP): Performed by: INTERNAL MEDICINE

## 2024-04-14 RX ADMIN — DULOXETINE HYDROCHLORIDE 30 MG: 30 CAPSULE, DELAYED RELEASE ORAL at 08:46

## 2024-04-14 RX ADMIN — METOPROLOL SUCCINATE 25 MG: 25 TABLET, EXTENDED RELEASE ORAL at 08:46

## 2024-04-14 RX ADMIN — APIXABAN 5 MG: 5 TABLET, FILM COATED ORAL at 21:19

## 2024-04-14 RX ADMIN — DULOXETINE HYDROCHLORIDE 30 MG: 30 CAPSULE, DELAYED RELEASE ORAL at 21:19

## 2024-04-14 RX ADMIN — PANTOPRAZOLE SODIUM 40 MG: 40 TABLET, DELAYED RELEASE ORAL at 06:08

## 2024-04-14 RX ADMIN — GABAPENTIN 600 MG: 300 CAPSULE ORAL at 21:18

## 2024-04-14 RX ADMIN — Medication 6 MG: at 21:18

## 2024-04-14 RX ADMIN — INSULIN LISPRO 4 UNITS: 100 INJECTION, SOLUTION INTRAVENOUS; SUBCUTANEOUS at 08:46

## 2024-04-14 RX ADMIN — ATORVASTATIN CALCIUM 40 MG: 40 TABLET, FILM COATED ORAL at 21:19

## 2024-04-14 RX ADMIN — GABAPENTIN 600 MG: 300 CAPSULE ORAL at 08:46

## 2024-04-14 RX ADMIN — APIXABAN 5 MG: 5 TABLET, FILM COATED ORAL at 08:46

## 2024-04-14 RX ADMIN — TOPIRAMATE 100 MG: 25 TABLET, FILM COATED ORAL at 08:46

## 2024-04-14 RX ADMIN — IPRATROPIUM BROMIDE AND ALBUTEROL SULFATE 3 ML: 2.5; .5 SOLUTION RESPIRATORY (INHALATION) at 14:47

## 2024-04-14 RX ADMIN — IPRATROPIUM BROMIDE AND ALBUTEROL SULFATE 3 ML: 2.5; .5 SOLUTION RESPIRATORY (INHALATION) at 20:08

## 2024-04-14 RX ADMIN — DOCUSATE SODIUM 100 MG: 100 CAPSULE, LIQUID FILLED ORAL at 08:46

## 2024-04-14 RX ADMIN — IPRATROPIUM BROMIDE AND ALBUTEROL SULFATE 3 ML: 2.5; .5 SOLUTION RESPIRATORY (INHALATION) at 07:47

## 2024-04-14 RX ADMIN — FUROSEMIDE 40 MG: 10 INJECTION, SOLUTION INTRAMUSCULAR; INTRAVENOUS at 08:46

## 2024-04-14 RX ADMIN — INSULIN LISPRO 6 UNITS: 100 INJECTION, SOLUTION INTRAVENOUS; SUBCUTANEOUS at 12:51

## 2024-04-14 RX ADMIN — DONEPEZIL HYDROCHLORIDE 5 MG: 5 TABLET ORAL at 21:19

## 2024-04-14 RX ADMIN — EMPAGLIFLOZIN 10 MG: 10 TABLET, FILM COATED ORAL at 08:46

## 2024-04-14 RX ADMIN — TOPIRAMATE 100 MG: 25 TABLET, FILM COATED ORAL at 21:18

## 2024-04-14 RX ADMIN — POLYETHYLENE GLYCOL 3350 17 G: 17 POWDER, FOR SOLUTION ORAL at 08:46

## 2024-04-14 RX ADMIN — GABAPENTIN 600 MG: 300 CAPSULE ORAL at 14:46

## 2024-04-14 RX ADMIN — CEFTRIAXONE SODIUM 1 G: 1 INJECTION, SOLUTION INTRAVENOUS at 21:21

## 2024-04-14 RX ADMIN — INSULIN LISPRO 10 UNITS: 100 INJECTION, SOLUTION INTRAVENOUS; SUBCUTANEOUS at 17:13

## 2024-04-14 RX ADMIN — DOCUSATE SODIUM 100 MG: 100 CAPSULE, LIQUID FILLED ORAL at 21:19

## 2024-04-14 RX ADMIN — AMIODARONE HYDROCHLORIDE 200 MG: 200 TABLET ORAL at 08:46

## 2024-04-14 RX ADMIN — METHYLPREDNISOLONE SODIUM SUCCINATE 40 MG: 40 INJECTION, POWDER, LYOPHILIZED, FOR SOLUTION INTRAMUSCULAR; INTRAVENOUS at 07:15

## 2024-04-14 ASSESSMENT — PAIN - FUNCTIONAL ASSESSMENT: PAIN_FUNCTIONAL_ASSESSMENT: 0-10

## 2024-04-14 ASSESSMENT — COGNITIVE AND FUNCTIONAL STATUS - GENERAL
DRESSING REGULAR UPPER BODY CLOTHING: A LITTLE
DRESSING REGULAR LOWER BODY CLOTHING: A LOT
PERSONAL GROOMING: A LITTLE
MOVING TO AND FROM BED TO CHAIR: A LOT
TOILETING: A LITTLE
HELP NEEDED FOR BATHING: A LITTLE
TURNING FROM BACK TO SIDE WHILE IN FLAT BAD: A LITTLE
DAILY ACTIVITIY SCORE: 18
MOBILITY SCORE: 14
STANDING UP FROM CHAIR USING ARMS: A LOT
WALKING IN HOSPITAL ROOM: A LOT
MOVING FROM LYING ON BACK TO SITTING ON SIDE OF FLAT BED WITH BEDRAILS: A LITTLE
CLIMB 3 TO 5 STEPS WITH RAILING: A LOT

## 2024-04-14 ASSESSMENT — PAIN SCALES - GENERAL: PAINLEVEL_OUTOF10: 0 - NO PAIN

## 2024-04-14 NOTE — PROGRESS NOTES
Elida Benson is a 79 y.o. female     Remains on oxygen at 2 L  A little better today    Review of Systems     Constitutional: Feels tired  Cardiovascular: no chest pain   Respiratory: Shortness of breath and wheezing  Gastrointestinal: no abdominal pain, no constipation, no melena, no nausea, no diarrhea, no vomiting and no blood in stools.   Neurological: no headache,   All other systems have been reviewed and are negative for complaint.       Vitals:    04/14/24 1116   BP: 114/77   Pulse: 93   Resp: 18   Temp: 36.5 °C (97.7 °F)   SpO2: 98%        Scheduled medications  amiodarone, 200 mg, oral, Daily  apixaban, 5 mg, oral, BID  atorvastatin, 40 mg, oral, Nightly  cefTRIAXone, 1 g, intravenous, q24h  docusate sodium, 100 mg, oral, BID  donepezil, 5 mg, oral, Nightly  DULoxetine, 30 mg, oral, BID  empagliflozin, 10 mg, oral, Daily  furosemide, 40 mg, intravenous, q24h  gabapentin, 600 mg, oral, TID  insulin lispro, 0-10 Units, subcutaneous, TID with meals  ipratropium-albuteroL, 3 mL, nebulization, TID  methylPREDNISolone sodium succinate (PF), 40 mg, intravenous, q24h  metoprolol succinate XL, 25 mg, oral, Daily  pantoprazole, 40 mg, oral, Daily before breakfast  polyethylene glycol, 17 g, oral, Daily  topiramate, 100 mg, oral, BID      Continuous medications  oxygen, , Last Rate: 2 L/min (04/14/24 0747)      PRN medications  PRN medications: acetaminophen, benzonatate, dextrose, dextrose, glucagon, glucagon, ipratropium-albuteroL, melatonin, ondansetron, oxygen, traMADol, traZODone    Lab Review   Results from last 7 days   Lab Units 04/14/24  0713 04/13/24  0744 04/12/24  0741   WBC AUTO x10*3/uL 7.9 7.7 7.5   HEMOGLOBIN g/dL 12.2 11.7* 11.2*   HEMATOCRIT % 41.7 40.6 37.6   PLATELETS AUTO x10*3/uL 327 315 302     Results from last 7 days   Lab Units 04/14/24  0713 04/13/24  0744 04/12/24  0741 04/09/24  1408 04/08/24  1617   SODIUM mmol/L 141 140 139   < > 140   POTASSIUM mmol/L 3.9 3.9 3.7   < > 4.3    CHLORIDE mmol/L 102 102 102   < > 108*   CO2 mmol/L 32 28 28   < > 23   BUN mg/dL 35* 35* 27*   < > 15   CREATININE mg/dL 1.01 0.86 0.83   < > 0.73   CALCIUM mg/dL 8.7 9.0 8.7   < > 8.5*   PROTEIN TOTAL g/dL  --   --   --   --  6.3*   BILIRUBIN TOTAL mg/dL  --   --   --   --  0.6   ALK PHOS U/L  --   --   --   --  68   ALT U/L  --   --   --   --  15   AST U/L  --   --   --   --  15   GLUCOSE mg/dL 223* 399* 315*   < > 159*    < > = values in this interval not displayed.     Results from last 7 days   Lab Units 04/10/24  1121 04/10/24  0522 04/09/24  1408   TROPHS ng/L 127* 121* 86*        XR chest 1 view   Final Result   Enlarged cardiac silhouette. Parenchymal infiltration.        MACRO:   none        Signed by: Divina Ugalde 4/12/2024 1:34 PM   Dictation workstation:   [id number]      XR chest 1 view   Final Result   Cardiomegaly. Small bilateral pleural effusions with associated   atelectasis at the lung bases, slightly progressed.        Right upper lobe pneumonia is slightly improved.        MACRO:   None        Signed by: Nicolás Mistry 4/10/2024 8:08 AM   Dictation workstation:   [id number]      Vascular US lower extremity venous duplex right   Final Result      CT angio chest for pulmonary embolism   Final Result   1. No detectable pulmonary emboli.   2. Patchy infiltrates and/or edema right upper lobe.   3. Bilateral focal lower lung atelectasis left more than right,   adjacent to small pleural effusions.   4. No bowel obstruction or detectable bowel inflammation; moderate   stool burden; prior sigmoid resection and ileostomy reversal.   5. Previous surgery lower cervical spine and lower lumbar spine.   6. Remainder as above.   Signed by Flaquito López MD      CT head wo IV contrast   Final Result   1. No evidence of hemorrhage, CT apparent transcortical infarct, or   other emergent intracranial abnormality.   2. Moderate brain parenchymal volume loss with patchy and confluence   attenuation changes present  in the white matter of bilateral cerebral   hemispheres, nonspecific findings favored to represent sequela of   microvascular disease.        MACRO:   None        Signed by: Allierabiaaravind Clementsjustin 4/8/2024 8:07 PM   Dictation workstation:   [id number]      CT abdomen pelvis w IV contrast   Final Result   1. No detectable pulmonary emboli.   2. Patchy infiltrates and/or edema right upper lobe.   3. Bilateral focal lower lung atelectasis left more than right,   adjacent to small pleural effusions.   4. No bowel obstruction or detectable bowel inflammation; moderate   stool burden; prior sigmoid resection and ileostomy reversal.   5. Previous surgery lower cervical spine and lower lumbar spine.   6. Remainder as above.   Signed by Flaquito López MD      XR chest 1 view   Final Result   Right upper lobe infiltrate with small pleural effusion.   Signed by Bakari Perdue MD      NM PYP Cardiac Amyloidosis with SPECT CT    (Results Pending)         Physical Exam     Constitutional   General appearance: Alert and in no acute distress.     Pulmonary   Respiratory assessment: Rhonchi   Cardiovascular   Auscultation of heart: Apical pulse normal, heart rate and rhythm normal, normal S1 and S2, no murmurs and no pericardial rub.    Exam for edema: Plus edema  Abdomen   Abdominal Exam: No bruits, normal bowel sounds, soft, non-tender, no abdominal mass palpated.    Liver and Spleen exam: No hepato-splenomegaly.   Musculoskeletal   Examination of gait: ROM intact  Skin inspection: Normal skin color and pigmentation, normal skin turgor and no visible rash.   Neurologic   Cranial nerves: Nerves 2-12 were intact, no focal neuro defects.     Assessment/Plan      #Community-acquired pneumonia  Continue antibiotics and DuoNebs  Continue low-dose of Solu-Medrol  Will transition to steroid taper tomorrow if continues to improve       #Acute on chronic diastolic congestive heart failure  #Demand ischemia  Lasix adjusted again  Cardiology  considering outpatient cardiac MRI to rule out any infiltrative processes    #Hypertension  Stable continue home medications     #Diabetes mellitus  /Insulin coverage  Continue Jardiance     #COPD  DuoNebs     #Paroxysmal atrial fibrillation  Continue Eliquis/amiodarone     #Dementia  Monitor for worsening encephalopathy    #Dyslipidemia  Continue statins   target LDL of less than 70

## 2024-04-14 NOTE — CARE PLAN
The patient's goals for the shift include      The clinical goals for the shift include patient will remain safe and pain will be managed    Problem: Fall/Injury  Goal: Verbalize understanding of personal risk factors for fall in the hospital  Outcome: Progressing  Goal: Verbalize understanding of risk factor reduction measures to prevent injury from fall in the home  Outcome: Progressing  Goal: Use assistive devices by end of the shift  Outcome: Progressing  Goal: Pace activities to prevent fatigue by end of the shift  Outcome: Progressing     Problem: Pain  Goal: My pain/discomfort is manageable  Outcome: Progressing     Problem: Safety  Goal: Patient will be injury free during hospitalization  Outcome: Progressing  Goal: I will remain free of falls  Outcome: Progressing     Problem: Daily Care  Goal: Daily care needs are met  Outcome: Progressing     Problem: Psychosocial Needs  Goal: Demonstrates ability to cope with hospitalization/illness  Outcome: Progressing  Goal: Collaborate with me, my family, and caregiver to identify my specific goals  Outcome: Progressing     Problem: Discharge Barriers  Goal: My discharge needs are met  Outcome: Progressing

## 2024-04-14 NOTE — PROGRESS NOTES
Subjective Data:    States her shortness of breath is a little better  Still on oxygen at 2L via [place].  Diuresing well  Eating breakfast.     Tele:  a fib rate 81    Overnight Events:    none     Objective Data:  Last Recorded Vitals:  Vitals:    04/13/24 2018 04/14/24 0027 04/14/24 0434 04/14/24 0728   BP: 113/68 108/64 117/86 120/83   BP Location: Left arm Left arm Left arm Left arm   Patient Position: Lying Lying Lying Lying   Pulse: 91 74 81 75   Resp: 18 18 18 18   Temp: 36.4 °C (97.5 °F) 36.5 °C (97.7 °F) 36.7 °C (98.1 °F) 36.8 °C (98.2 °F)   TempSrc: Oral Oral Oral Oral   SpO2: 100% 97% 95% 99%   Weight:       Height:           Last Labs:  CBC - 4/14/2024:  7:13 AM  7.9 12.2 327    41.7      CMP - 4/14/2024:  7:13 AM  8.7 6.3 15 --- 0.6   3.6 3.5 15 68      PTT - 4/8/2024:  4:17 PM  1.3   14.6 51     TROPHS   Date/Time Value Ref Range Status   04/10/2024 11:21  0 - 13 ng/L Final     Comment:     Previous result verified on 4/10/2024 0756 on specimen/case [id number] called with component TRPHS for procedure Troponin I, High Sensitivity with value 121 ng/L.   04/10/2024 05:22  0 - 13 ng/L Final   04/09/2024 02:08 PM 86 0 - 13 ng/L Final     Comment:     Previous result verified on 4/8/2024 1705 on specimen/case [id number] called with component TRPHS for procedure Troponin I, High Sensitivity with value 66 ng/L.     BNP   Date/Time Value Ref Range Status   04/10/2024 11:21  0 - 99 pg/mL Final   04/08/2024 04:17  0 - 99 pg/mL Final     HGBA1C   Date/Time Value Ref Range Status   01/29/2024 11:53 AM 7.7 4.2 - 6.5 % Final   09/05/2023 04:14 AM 7.2 % Final     Comment:          Diagnosis of Diabetes-Adults   Non-Diabetic: < or = 5.6%   Increased risk for developing diabetes: 5.7-6.4%   Diagnostic of diabetes: > or = 6.5%  .       Monitoring of Diabetes                Age (y)     Therapeutic Goal (%)   Adults:          >18           <7.0   Pediatrics:    13-18           <7.5                    7-12           <8.0                   0- 6            7.5-8.5   American Diabetes Association. Diabetes Care 33(S1), Jan 2010.     08/03/2018 06:33 AM 6.1 % Final     Comment:          Diagnosis of Diabetes-Adults   Non-Diabetic: < or = 5.6%   Increased risk for developing diabetes: 5.7-6.4%   Diagnostic of diabetes: > or = 6.5%  .       Monitoring of Diabetes                Age (y)     Therapeutic Goal (%)   Adults:          >18           <7.0   Pediatrics:    13-18           <7.5                   7-12           <8.0                   0- 6            7.5-8.5   American Diabetes Association. Diabetes Care 33(S1), Jan 2010.       VLDL   Date/Time Value Ref Range Status   05/26/2023 01:13 PM 20 0 - 40 mg/dL Final   10/27/2022 02:52 PM 17 0 - 40 mg/dL Final   04/21/2022 02:59 PM 21 0 - 40 mg/dL Final      Last I/O:  I/O last 3 completed shifts:  In: - (0 mL/kg)   Out: 1750 (29 mL/kg) [Urine:1750 (0.8 mL/kg/hr)]  Weight: 60.3 kg     Past Cardiology Tests (Last 3 Years):  EKG:  ECG 12 lead 04/08/2024  NSR 81bpm     ECG 12 Lead 01/24/2024     Echo:  8/2023:  1. Left ventricular systolic function is normal with a 55% estimated ejection fraction.  2. Moderately increased left ventricular septal thickness.  3. The left ventricular posterior wall thickness is moderately increased.  4. There is left ventricular concentric remodeling.  5. The longitudinal 2D Strain is mildly decreased with a pattern of apical preservation which is suggestive of infiltrative heart disease.  6. The patient is in atrial fibrillation which may influence the estimate of left ventricular function and transvalvular flows.        Cardiac Imaging:  CT Chest 4/2024  There are minimal coronary artery calcification  Bilateral focal lower lung atelectasis left more than right,  adjacent to small pleural effusions.       Inpatient Medications:  Scheduled medications   Medication Dose Route Frequency    amiodarone  200 mg oral Daily    apixaban  5 mg  oral BID    atorvastatin  40 mg oral Nightly    cefTRIAXone  1 g intravenous q24h    docusate sodium  100 mg oral BID    donepezil  5 mg oral Nightly    DULoxetine  30 mg oral BID    empagliflozin  10 mg oral Daily    furosemide  40 mg intravenous q24h    gabapentin  600 mg oral TID    insulin lispro  0-10 Units subcutaneous TID with meals    ipratropium-albuteroL  3 mL nebulization TID    methylPREDNISolone sodium succinate (PF)  40 mg intravenous q24h    metoprolol succinate XL  25 mg oral Daily    pantoprazole  40 mg oral Daily before breakfast    polyethylene glycol  17 g oral Daily    topiramate  100 mg oral BID     PRN medications   Medication    acetaminophen    benzonatate    dextrose    dextrose    glucagon    glucagon    ipratropium-albuteroL    melatonin    ondansetron    oxygen    traMADol    traZODone     Continuous Medications   Medication Dose Last Rate    oxygen   2 L/min (04/14/24 2977)       Physical Exam:  General: well appearing, no acute distress  Cardiac: irregular rate and rhythm, no murmurs, rubs or gallops  Pulmonary: Clear to auscultation bilaterally, normal respiratory effort  Peripheral pulses: intact, 2+  Extremities: trace bilateral LE edema      Assessment/Plan   Elida Benson is a 78 y.o. female with pertinent history of dementia, hypertension, diverticulitis status post laparoscopic anterior resection with ileostomy on 8/16/2023, paroxysmal atrial fibrillation on amiodarone and Eliquis, preserved ejection fraction with suspected myocardial cleft versus false tendon, moderate concentric hypertrophy, moderate left atrial enlargement on echo performed 8/19/2023, preserved ejection fraction with moderate septal thickness, abnormal strain imaging concerning for possible infiltrative heart disease on echo performed 8/22/2023, presented to hospital w SOB found to have RUL pneumonia. Cardiology c/s for CHF and troponins.      RUL Pneumonia. On Atbx  CHF/HFpEf (, bilateral pleural  effusions).  Hypertensive, female, elderly, pAF.  Troponin elevation (plateu 127). Likely demand type 2 ischemia in s/o underlying cardiomyopathy and pneumonia  pAF on amiodarone, apixaban, and MTXL 25mg daily   DM  HTN    4/13 > still volume up on exam, continue to diurese.  She is in a fib/ flutter with controlled rate in 80's.    Recommendations:  -Continue Rx with antbx and steroids  - Continue to diurese with furosemide 40mg IV daily  -Follow up urine and serum protein immunofixation and serum light chains  -Continue home amiodarone 200mg daily, MTXL 25mg dily, and apixaban 5mg bid for known pAF  -Will order PYP scan to rule out ATTR amyloidosis  - Atorvastatin 40mg at bedtime     Code Status:  Full Code

## 2024-04-15 ENCOUNTER — APPOINTMENT (OUTPATIENT)
Dept: RADIOLOGY | Facility: HOSPITAL | Age: 79
DRG: 193 | End: 2024-04-15
Payer: MEDICARE

## 2024-04-15 LAB
ANION GAP SERPL CALC-SCNC: 11 MMOL/L (ref 10–20)
BUN SERPL-MCNC: 39 MG/DL (ref 6–23)
CALCIUM SERPL-MCNC: 8.9 MG/DL (ref 8.6–10.3)
CHLORIDE SERPL-SCNC: 102 MMOL/L (ref 98–107)
CO2 SERPL-SCNC: 32 MMOL/L (ref 21–32)
CREAT SERPL-MCNC: 0.88 MG/DL (ref 0.5–1.05)
EGFRCR SERPLBLD CKD-EPI 2021: 68 ML/MIN/1.73M*2
ERYTHROCYTE [DISTWIDTH] IN BLOOD BY AUTOMATED COUNT: 15 % (ref 11.5–14.5)
EST. AVERAGE GLUCOSE BLD GHB EST-MCNC: 160 MG/DL
GLUCOSE BLD MANUAL STRIP-MCNC: 201 MG/DL (ref 74–99)
GLUCOSE BLD MANUAL STRIP-MCNC: 314 MG/DL (ref 74–99)
GLUCOSE BLD MANUAL STRIP-MCNC: 329 MG/DL (ref 74–99)
GLUCOSE SERPL-MCNC: 220 MG/DL (ref 74–99)
HBA1C MFR BLD: 7.2 %
HCT VFR BLD AUTO: 42 % (ref 36–46)
HGB BLD-MCNC: 12.4 G/DL (ref 12–16)
MCH RBC QN AUTO: 28.9 PG (ref 26–34)
MCHC RBC AUTO-ENTMCNC: 29.5 G/DL (ref 32–36)
MCV RBC AUTO: 98 FL (ref 80–100)
NRBC BLD-RTO: 0 /100 WBCS (ref 0–0)
PLATELET # BLD AUTO: 348 X10*3/UL (ref 150–450)
POTASSIUM SERPL-SCNC: 3.8 MMOL/L (ref 3.5–5.3)
RBC # BLD AUTO: 4.29 X10*6/UL (ref 4–5.2)
SODIUM SERPL-SCNC: 141 MMOL/L (ref 136–145)
WBC # BLD AUTO: 8.9 X10*3/UL (ref 4.4–11.3)

## 2024-04-15 PROCEDURE — 83036 HEMOGLOBIN GLYCOSYLATED A1C: CPT | Mod: AHULAB | Performed by: NURSE PRACTITIONER

## 2024-04-15 PROCEDURE — 82947 ASSAY GLUCOSE BLOOD QUANT: CPT

## 2024-04-15 PROCEDURE — 1090000001 HH PPS REVENUE CREDIT

## 2024-04-15 PROCEDURE — 2500000002 HC RX 250 W HCPCS SELF ADMINISTERED DRUGS (ALT 637 FOR MEDICARE OP, ALT 636 FOR OP/ED): Performed by: EMERGENCY MEDICINE

## 2024-04-15 PROCEDURE — 2500000001 HC RX 250 WO HCPCS SELF ADMINISTERED DRUGS (ALT 637 FOR MEDICARE OP): Performed by: NURSE PRACTITIONER

## 2024-04-15 PROCEDURE — 1200000002 HC GENERAL ROOM WITH TELEMETRY DAILY

## 2024-04-15 PROCEDURE — 2500000004 HC RX 250 GENERAL PHARMACY W/ HCPCS (ALT 636 FOR OP/ED): Performed by: STUDENT IN AN ORGANIZED HEALTH CARE EDUCATION/TRAINING PROGRAM

## 2024-04-15 PROCEDURE — 2500000004 HC RX 250 GENERAL PHARMACY W/ HCPCS (ALT 636 FOR OP/ED): Performed by: NURSE PRACTITIONER

## 2024-04-15 PROCEDURE — 2500000002 HC RX 250 W HCPCS SELF ADMINISTERED DRUGS (ALT 637 FOR MEDICARE OP, ALT 636 FOR OP/ED): Mod: MUE | Performed by: NURSE PRACTITIONER

## 2024-04-15 PROCEDURE — 97165 OT EVAL LOW COMPLEX 30 MIN: CPT | Mod: GO

## 2024-04-15 PROCEDURE — 2500000002 HC RX 250 W HCPCS SELF ADMINISTERED DRUGS (ALT 637 FOR MEDICARE OP, ALT 636 FOR OP/ED): Performed by: NURSE PRACTITIONER

## 2024-04-15 PROCEDURE — 78830 RP LOCLZJ TUM SPECT W/CT 1: CPT | Performed by: STUDENT IN AN ORGANIZED HEALTH CARE EDUCATION/TRAINING PROGRAM

## 2024-04-15 PROCEDURE — 2500000002 HC RX 250 W HCPCS SELF ADMINISTERED DRUGS (ALT 637 FOR MEDICARE OP, ALT 636 FOR OP/ED): Mod: MUE | Performed by: EMERGENCY MEDICINE

## 2024-04-15 PROCEDURE — 3430000001 HC RX 343 DIAGNOSTIC RADIOPHARMACEUTICALS: Performed by: INTERNAL MEDICINE

## 2024-04-15 PROCEDURE — A9538 TC99M PYROPHOSPHATE: HCPCS | Performed by: INTERNAL MEDICINE

## 2024-04-15 PROCEDURE — 2500000001 HC RX 250 WO HCPCS SELF ADMINISTERED DRUGS (ALT 637 FOR MEDICARE OP)

## 2024-04-15 PROCEDURE — 97162 PT EVAL MOD COMPLEX 30 MIN: CPT | Mod: GP

## 2024-04-15 PROCEDURE — 85027 COMPLETE CBC AUTOMATED: CPT | Performed by: NURSE PRACTITIONER

## 2024-04-15 PROCEDURE — 94640 AIRWAY INHALATION TREATMENT: CPT

## 2024-04-15 PROCEDURE — 36415 COLL VENOUS BLD VENIPUNCTURE: CPT | Performed by: NURSE PRACTITIONER

## 2024-04-15 PROCEDURE — 1090000002 HH PPS REVENUE DEBIT

## 2024-04-15 PROCEDURE — 2500000004 HC RX 250 GENERAL PHARMACY W/ HCPCS (ALT 636 FOR OP/ED): Performed by: INTERNAL MEDICINE

## 2024-04-15 PROCEDURE — 78830 RP LOCLZJ TUM SPECT W/CT 1: CPT

## 2024-04-15 PROCEDURE — 2500000001 HC RX 250 WO HCPCS SELF ADMINISTERED DRUGS (ALT 637 FOR MEDICARE OP): Performed by: STUDENT IN AN ORGANIZED HEALTH CARE EDUCATION/TRAINING PROGRAM

## 2024-04-15 PROCEDURE — 99233 SBSQ HOSP IP/OBS HIGH 50: CPT | Performed by: STUDENT IN AN ORGANIZED HEALTH CARE EDUCATION/TRAINING PROGRAM

## 2024-04-15 PROCEDURE — 2500000001 HC RX 250 WO HCPCS SELF ADMINISTERED DRUGS (ALT 637 FOR MEDICARE OP): Performed by: INTERNAL MEDICINE

## 2024-04-15 PROCEDURE — 99232 SBSQ HOSP IP/OBS MODERATE 35: CPT | Performed by: INTERNAL MEDICINE

## 2024-04-15 PROCEDURE — 80048 BASIC METABOLIC PNL TOTAL CA: CPT | Performed by: NURSE PRACTITIONER

## 2024-04-15 RX ORDER — PREDNISONE 20 MG/1
20 TABLET ORAL DAILY
Status: DISCONTINUED | OUTPATIENT
Start: 2024-04-21 | End: 2024-04-17 | Stop reason: HOSPADM

## 2024-04-15 RX ORDER — PREDNISONE 20 MG/1
40 TABLET ORAL DAILY
Status: COMPLETED | OUTPATIENT
Start: 2024-04-15 | End: 2024-04-17

## 2024-04-15 RX ORDER — PREDNISONE 10 MG/1
10 TABLET ORAL DAILY
Status: DISCONTINUED | OUTPATIENT
Start: 2024-04-24 | End: 2024-04-17 | Stop reason: HOSPADM

## 2024-04-15 RX ORDER — PREDNISONE 10 MG/1
TABLET ORAL DAILY
Qty: 26 TABLET | Refills: 0 | Status: CANCELLED | OUTPATIENT
Start: 2024-04-16 | End: 2024-04-27

## 2024-04-15 RX ADMIN — INSULIN LISPRO 8 UNITS: 100 INJECTION, SOLUTION INTRAVENOUS; SUBCUTANEOUS at 16:28

## 2024-04-15 RX ADMIN — GABAPENTIN 600 MG: 300 CAPSULE ORAL at 21:49

## 2024-04-15 RX ADMIN — EMPAGLIFLOZIN 10 MG: 10 TABLET, FILM COATED ORAL at 09:18

## 2024-04-15 RX ADMIN — FUROSEMIDE 40 MG: 10 INJECTION, SOLUTION INTRAMUSCULAR; INTRAVENOUS at 09:45

## 2024-04-15 RX ADMIN — IPRATROPIUM BROMIDE AND ALBUTEROL SULFATE 3 ML: 2.5; .5 SOLUTION RESPIRATORY (INHALATION) at 19:50

## 2024-04-15 RX ADMIN — TOPIRAMATE 100 MG: 25 TABLET, FILM COATED ORAL at 09:11

## 2024-04-15 RX ADMIN — IPRATROPIUM BROMIDE AND ALBUTEROL SULFATE 3 ML: 2.5; .5 SOLUTION RESPIRATORY (INHALATION) at 15:11

## 2024-04-15 RX ADMIN — DOCUSATE SODIUM 100 MG: 100 CAPSULE, LIQUID FILLED ORAL at 09:13

## 2024-04-15 RX ADMIN — TOPIRAMATE 100 MG: 25 TABLET, FILM COATED ORAL at 21:49

## 2024-04-15 RX ADMIN — PANTOPRAZOLE SODIUM 40 MG: 40 TABLET, DELAYED RELEASE ORAL at 06:20

## 2024-04-15 RX ADMIN — AMIODARONE HYDROCHLORIDE 200 MG: 200 TABLET ORAL at 09:13

## 2024-04-15 RX ADMIN — DOCUSATE SODIUM 100 MG: 100 CAPSULE, LIQUID FILLED ORAL at 21:48

## 2024-04-15 RX ADMIN — DONEPEZIL HYDROCHLORIDE 5 MG: 5 TABLET ORAL at 21:49

## 2024-04-15 RX ADMIN — DULOXETINE HYDROCHLORIDE 30 MG: 30 CAPSULE, DELAYED RELEASE ORAL at 09:11

## 2024-04-15 RX ADMIN — INSULIN LISPRO 8 UNITS: 100 INJECTION, SOLUTION INTRAVENOUS; SUBCUTANEOUS at 13:26

## 2024-04-15 RX ADMIN — GABAPENTIN 600 MG: 300 CAPSULE ORAL at 09:12

## 2024-04-15 RX ADMIN — POLYETHYLENE GLYCOL 3350 17 G: 17 POWDER, FOR SOLUTION ORAL at 09:10

## 2024-04-15 RX ADMIN — CEFTRIAXONE SODIUM 1 G: 1 INJECTION, SOLUTION INTRAVENOUS at 21:46

## 2024-04-15 RX ADMIN — IPRATROPIUM BROMIDE AND ALBUTEROL SULFATE 3 ML: 2.5; .5 SOLUTION RESPIRATORY (INHALATION) at 07:27

## 2024-04-15 RX ADMIN — METOPROLOL SUCCINATE 25 MG: 25 TABLET, EXTENDED RELEASE ORAL at 09:12

## 2024-04-15 RX ADMIN — INSULIN LISPRO 4 UNITS: 100 INJECTION, SOLUTION INTRAVENOUS; SUBCUTANEOUS at 09:09

## 2024-04-15 RX ADMIN — APIXABAN 5 MG: 5 TABLET, FILM COATED ORAL at 21:48

## 2024-04-15 RX ADMIN — ATORVASTATIN CALCIUM 40 MG: 40 TABLET, FILM COATED ORAL at 21:49

## 2024-04-15 RX ADMIN — PREDNISONE 40 MG: 20 TABLET ORAL at 12:48

## 2024-04-15 RX ADMIN — METHYLPREDNISOLONE SODIUM SUCCINATE 40 MG: 40 INJECTION, POWDER, LYOPHILIZED, FOR SOLUTION INTRAMUSCULAR; INTRAVENOUS at 06:20

## 2024-04-15 RX ADMIN — GABAPENTIN 600 MG: 300 CAPSULE ORAL at 15:29

## 2024-04-15 RX ADMIN — APIXABAN 5 MG: 5 TABLET, FILM COATED ORAL at 09:13

## 2024-04-15 RX ADMIN — TECHNETIUM TC 99M PYROPHOSPHATE 22 MILLICURIE: 11.9; 3.2 INJECTION, POWDER, LYOPHILIZED, FOR SOLUTION INTRAVENOUS at 10:36

## 2024-04-15 RX ADMIN — DULOXETINE HYDROCHLORIDE 30 MG: 30 CAPSULE, DELAYED RELEASE ORAL at 21:48

## 2024-04-15 ASSESSMENT — COGNITIVE AND FUNCTIONAL STATUS - GENERAL
TOILETING: A LITTLE
HELP NEEDED FOR BATHING: A LOT
HELP NEEDED FOR BATHING: A LITTLE
WALKING IN HOSPITAL ROOM: A LITTLE
STANDING UP FROM CHAIR USING ARMS: A LOT
TURNING FROM BACK TO SIDE WHILE IN FLAT BAD: A LITTLE
PERSONAL GROOMING: A LOT
DRESSING REGULAR UPPER BODY CLOTHING: A LITTLE
CLIMB 3 TO 5 STEPS WITH RAILING: TOTAL
DRESSING REGULAR LOWER BODY CLOTHING: A LOT
MOVING TO AND FROM BED TO CHAIR: A LITTLE
WALKING IN HOSPITAL ROOM: TOTAL
MOVING FROM LYING ON BACK TO SITTING ON SIDE OF FLAT BED WITH BEDRAILS: A LITTLE
MOVING TO AND FROM BED TO CHAIR: A LITTLE
TURNING FROM BACK TO SIDE WHILE IN FLAT BAD: A LITTLE
MOVING FROM LYING ON BACK TO SITTING ON SIDE OF FLAT BED WITH BEDRAILS: A LITTLE
TOILETING: A LITTLE
DAILY ACTIVITIY SCORE: 10
PERSONAL GROOMING: A LITTLE
DAILY ACTIVITIY SCORE: 18
CLIMB 3 TO 5 STEPS WITH RAILING: A LOT
MOBILITY SCORE: 11
CLIMB 3 TO 5 STEPS WITH RAILING: A LOT
MOBILITY SCORE: 16
TURNING FROM BACK TO SIDE WHILE IN FLAT BAD: A LOT
DRESSING REGULAR UPPER BODY CLOTHING: A LITTLE
MOBILITY SCORE: 16
TOILETING: TOTAL
STANDING UP FROM CHAIR USING ARMS: A LOT
DAILY ACTIVITIY SCORE: 18
DRESSING REGULAR LOWER BODY CLOTHING: A LOT
WALKING IN HOSPITAL ROOM: A LITTLE
EATING MEALS: A LITTLE
PERSONAL GROOMING: A LITTLE
MOVING TO AND FROM BED TO CHAIR: A LOT
HELP NEEDED FOR BATHING: A LITTLE
STANDING UP FROM CHAIR USING ARMS: A LOT
DRESSING REGULAR UPPER BODY CLOTHING: TOTAL
DRESSING REGULAR LOWER BODY CLOTHING: TOTAL
MOVING FROM LYING ON BACK TO SITTING ON SIDE OF FLAT BED WITH BEDRAILS: A LITTLE

## 2024-04-15 ASSESSMENT — ACTIVITIES OF DAILY LIVING (ADL)
ADL_ASSISTANCE: NEEDS ASSISTANCE
ADL_ASSISTANCE: NEEDS ASSISTANCE

## 2024-04-15 ASSESSMENT — PAIN SCALES - GENERAL
PAINLEVEL_OUTOF10: 0 - NO PAIN
PAINLEVEL_OUTOF10: 4
PAINLEVEL_OUTOF10: 4
PAINLEVEL_OUTOF10: 0 - NO PAIN

## 2024-04-15 ASSESSMENT — PAIN - FUNCTIONAL ASSESSMENT
PAIN_FUNCTIONAL_ASSESSMENT: 0-10
PAIN_FUNCTIONAL_ASSESSMENT: 0-10

## 2024-04-15 NOTE — CARE PLAN
The patient's goals for the shift include      The clinical goals for the shift include pt  will remain hemodynamically stable this shift    Problem: Fall/Injury  Goal: Verbalize understanding of personal risk factors for fall in the hospital  Outcome: Progressing  Goal: Verbalize understanding of risk factor reduction measures to prevent injury from fall in the home  Outcome: Progressing  Goal: Use assistive devices by end of the shift  Outcome: Progressing  Goal: Pace activities to prevent fatigue by end of the shift  Outcome: Progressing     Problem: Pain  Goal: My pain/discomfort is manageable  Outcome: Progressing     Problem: Safety  Goal: Patient will be injury free during hospitalization  Outcome: Progressing  Goal: I will remain free of falls  Outcome: Progressing     Problem: Daily Care  Goal: Daily care needs are met  Outcome: Progressing     Problem: Psychosocial Needs  Goal: Demonstrates ability to cope with hospitalization/illness  Outcome: Progressing  Goal: Collaborate with me, my family, and caregiver to identify my specific goals  Outcome: Progressing     Problem: Discharge Barriers  Goal: My discharge needs are met  Outcome: Progressing

## 2024-04-15 NOTE — PROGRESS NOTES
Occupational Therapy    Evaluation    Patient Name: Elida Benson  MRN: [id number]  Today's Date: 4/15/2024  Time Calculation  Start Time: 1536  Stop Time: 1556  Time Calculation (min): 20 min        Assessment:  OT Assessment: Pt presents with decrease strength, endurance, balance and arousal/alertness (increase fatigue/lethargy) impeding ADL performance and functional mobility. Pt would benefit from skilled OT services to address these deficits and facilitate highest level of function.  Prognosis: Fair  Evaluation/Treatment Tolerance: Patient limited by fatigue  Medical Staff Made Aware: Yes  End of Session Communication: Bedside nurse  End of Session Patient Position: Bed, 3 rail up, Alarm on  OT Assessment Results: Decreased ADL status, Decreased upper extremity range of motion, Decreased upper extremity strength, Decreased cognition, Decreased endurance, Decreased functional mobility, Decreased IADLs, Decreased trunk control for functional activities  Prognosis: Fair  Evaluation/Treatment Tolerance: Patient limited by fatigue  Medical Staff Made Aware: Yes  Strengths: Support of extended family/friends, Housing layout  Barriers to Participation: Comorbidities  Plan:  Treatment Interventions: ADL retraining, Functional transfer training, UE strengthening/ROM, Endurance training, Equipment evaluation/education, Fine motor coordination activities, Compensatory technique education  OT Frequency: 3 times per week  OT Discharge Recommendations: Moderate intensity level of continued care  OT Recommended Transfer Status: Assist of 2  OT - OK to Discharge: Yes (Per POC)  Treatment Interventions: ADL retraining, Functional transfer training, UE strengthening/ROM, Endurance training, Equipment evaluation/education, Fine motor coordination activities, Compensatory technique education    Subjective        General:  General  Reason for Referral: 79 y.o. female presenting with pneumonia and decline of ADL performance and  "functional mobility.  Referred By: Charito Balbuena, APRN-CNP  Past Medical History Relevant to Rehab:   Past Medical History:   Diagnosis Date    A-fib (Multi)     Managed on meds, Eliquis stoppage and cardiac clearance in [facility] from Dr. Ignacio    Cervical myelopathy (Multi)     Chronic pain syndrome     COPD (chronic obstructive pulmonary disease) (Multi)     denies, she is a former smoker but quit in 1984    Dementia (Multi)     Depression     Diabetic neuropathy (Multi)     Diverticulitis     DM (diabetes mellitus) (Multi)     denies but A1C= 7.2% on 9/5/23    LORENZ (dyspnea on exertion)     Stable per cards note    HLD (hyperlipidemia)     Hypertension, essential     Infiltrative cardiomyopathy (Multi)     Echo 8/22/23, following with Dr. Ignacio    OA (ocular albinism) (Multi)     multiple sites    Other malaise 04/21/2022    Physical deconditioning    Palpitations     on occasion, has afib followed by cardiology    Post laminectomy syndrome     Rectovaginal fistula     Spinal stenosis of cervical region     UTI (urinary tract infection)        Family/Caregiver Present: No  Prior to Session Communication: Bedside nurse  Patient Position Received: Bed, 3 rail up, Alarm on  General Comment: Pt supine in bed upon arrival, presenting with increasing lethargy and fatigue, limiting OT/PT tx. Pt agreeable to OT/PT eval.  Precautions:  Medical Precautions: Fall precautions  Vital Signs:  Heart Rate: 65  BP: 105/71  BP Method: Automatic  Patient Position: Lying  Pain:  Pain Assessment  Pain Assessment: 0-10  Pain Score: 4 (Pt reporting pain \"everywhere\")  Pain Interventions: Repositioned, Ambulation/increased activity    Objective   Cognition:  Orientation Level: Oriented X4  Following Commands: Follows one step commands with repetition  Cognition Comments: Decrease arosual, requiring increase verbal/tactile cues for alertness.  Attention: Exceptions to WFL  Problem Solving: Exceptions to WFL  Insight: Mild           Home " Living:  Type of Home: House  Lives With: Alone  Home Adaptive Equipment: Walker rolling or standard  Home Layout: One level  Home Access: Stairs to enter with rails  Entrance Stairs-Rails: Right  Entrance Stairs-Number of Steps: 3  Bathroom Shower/Tub: Tub/shower unit  Bathroom Toilet: Standard  Bathroom Equipment: Grab bars in shower, Shower chair with back, Grab bars around toilet, Bedside commode  Prior Function:  Level of [place]: Needs assistance with ADLs, Needs assistance with homemaking  Receives Help From: Family  ADL Assistance: Needs assistance  Homemaking Assistance: Needs assistance  Ambulatory Assistance: Needs assistance  Prior Function Comments: Pt reports dtr lives nearby and assists daily with ADLs and iADLs.     ADL:  UE Dressing Assistance: Total (Don/doffed [facility] gown bed level via rolling)  UE Dressing Deficit: Setup, Steadying, Verbal cueing, Supervision/safety, Increased time to complete, Thread RUE, Thread LUE, Pull around back, Fasteners  Toileting Assistance with Device: Total  Toileting Deficit:  (Purewick)  Activity Tolerance:  Endurance: Tolerates less than 10 min exercise with changes in vital signs  Bed Mobility/Transfers: Bed Mobility  Bed Mobility: Yes  Bed Mobility 1  Bed Mobility 1: Supine to sitting  Level of Assistance 1: Minimum assistance, Moderate verbal cues, Moderate tactile cues (x2)  Bed Mobility Comments 1: HOB signficantly elevated. Assist with trunk, minimal assist for LE off EOB. Attempt with increased time for BLE off EOB. Cues for hand placement and sequencing.  Bed Mobility 2  Bed Mobility  2: Sitting to supine  Level of Assistance 2: Maximum assistance (x2)  Bed Mobility Comments 2: Assist x2 with BLE and trunk.  Bed Mobility 3  Bed Mobility 3: Rolling right, Rolling left  Level of Assistance 3: Maximum assistance  Bed Mobility Comments 3: Cues for sequencing; rolling during UE dressing.    Transfers  Transfer: No (Pt reporting increase dizziness)      Sitting Balance:  Static Sitting Balance  Static Sitting-Balance Support: Feet supported  Static Sitting-Level of Assistance: Contact guard, Minimum assistance  Static Sitting-Comment/Number of Minutes: Varying level of CGA-MIN A, pt reporting increase dizziness and demo fatigue & lethargy.     Strength:  Strength Comments: Limited shoulder flexion upon observation      Hand Function:  Gross Grasp: Functional  Extremities: RUE   RUE : Exceptions to WFL (~3/5 distally during grossly observation) and LUE   LUE: Exceptions to WFL (~3/5 distally grossly during observation)    Outcome Measures:[facility] Daily Activity  Putting on and taking off regular lower body clothing: Total  Bathing (including washing, rinsing, drying): A lot  Putting on and taking off regular upper body clothing: Total  Toileting, which includes using toilet, bedpan or urinal: Total  Taking care of personal grooming such as brushing teeth: A lot  Eating Meals: A little  Daily Activity - Total Score: 10        Education Documentation  Body Mechanics, taught by Bebe Soria OT at 4/15/2024  4:26 PM.  Learner: Patient  Readiness: Acceptance  Method: Explanation, Demonstration  Response: Needs Reinforcement    ADL Training, taught by Bebe Soria OT at 4/15/2024  4:26 PM.  Learner: Patient  Readiness: Acceptance  Method: Explanation, Demonstration  Response: Needs Reinforcement    Education Comments  No comments found.        OP EDUCATION:       Goals:  Encounter Problems       Encounter Problems (Active)       ADLs       Patient will perform UB and LB bathing with minimal assist  level of assistance and grab bars, shower chair, and long-handled sponge.       Start:  04/15/24    Expected End:  04/29/24            Patient with complete upper body dressing with moderate assist level of assistance donning and doffing all UE clothes with PRN adaptive equipment while edge of bed and supported sitting.       Start:  04/15/24    Expected End:  04/29/24             Patient with complete lower body dressing with moderate assist level of assistance donning and doffing all LE clothes  with PRN adaptive equipment while edge of bed        Start:  04/15/24    Expected End:  04/29/24            Patient will complete daily grooming tasks with stand by assist and contact guard assist level of assistance and PRN adaptive equipment while supported sitting and edge of bed .       Start:  04/15/24    Expected End:  04/29/24            Patient will complete toileting including hygiene clothing management/hygiene with moderate assist level of assistance and raised toilet seat and grab bars.       Start:  04/15/24    Expected End:  04/29/24               BALANCE       Pt will maintain dynamic sitting balance during ADL task with stand by assist level of assistance in order to demonstrate decreased risk of falling and improved postural control.       Start:  04/15/24    Expected End:  04/29/24            Patient will tolerate standing for >=3 minutes to contact guard assist level of assistance with least restrictive device in order to improve functional activity tolerance for ADL tasks.       Start:  04/15/24    Expected End:  04/29/24               MOBILITY       Patient will perform Functional mobility x Household distances/Community Distances with minimal assist  level of assistance and least restrictive device in order to improve safety and functional mobility.       Start:  04/15/24    Expected End:  04/29/24               TRANSFERS       Patient will perform bed mobility minimal assist  level of assistance and bed rails in order to improve safety and independence with mobility       Start:  04/15/24    Expected End:  04/29/24            Patient will complete sit to stand transfer with minimal assist  level of assistance and least restrictive device in order to improve safety and prepare for out of bed mobility.       Start:  04/15/24    Expected End:  04/29/24

## 2024-04-15 NOTE — PROGRESS NOTES
Elida Benson is a 79 y.o. female     Breathing continues to improve    Review of Systems     Constitutional: Feels tired  Cardiovascular: no chest pain   Respiratory: Shortness of breath and wheezing  Gastrointestinal: no abdominal pain, no constipation, no melena, no nausea, no diarrhea, no vomiting and no blood in stools.   Neurological: no headache,   All other systems have been reviewed and are negative for complaint.       Vitals:    04/15/24 0746   BP: 118/77   Pulse: 68   Resp: 18   Temp: 36.3 °C (97.4 °F)   SpO2: 97%        Scheduled medications  amiodarone, 200 mg, oral, Daily  apixaban, 5 mg, oral, BID  atorvastatin, 40 mg, oral, Nightly  cefTRIAXone, 1 g, intravenous, q24h  docusate sodium, 100 mg, oral, BID  donepezil, 5 mg, oral, Nightly  DULoxetine, 30 mg, oral, BID  empagliflozin, 10 mg, oral, Daily  furosemide, 40 mg, intravenous, q24h  gabapentin, 600 mg, oral, TID  insulin lispro, 0-10 Units, subcutaneous, TID with meals  ipratropium-albuteroL, 3 mL, nebulization, TID  metoprolol succinate XL, 25 mg, oral, Daily  pantoprazole, 40 mg, oral, Daily before breakfast  polyethylene glycol, 17 g, oral, Daily  predniSONE, 40 mg, oral, Daily   Followed by  [START ON 4/18/2024] predniSONE, 30 mg, oral, Daily   Followed by  [START ON 4/21/2024] predniSONE, 20 mg, oral, Daily   Followed by  [START ON 4/24/2024] predniSONE, 10 mg, oral, Daily  topiramate, 100 mg, oral, BID      Continuous medications  oxygen, , Last Rate: 2 L/min (04/14/24 2008)      PRN medications  PRN medications: acetaminophen, benzonatate, dextrose, dextrose, glucagon, glucagon, ipratropium-albuteroL, melatonin, ondansetron, oxygen, traMADol, traZODone    Lab Review   Results from last 7 days   Lab Units 04/15/24  0634 04/14/24  0713 04/13/24  0744   WBC AUTO x10*3/uL 8.9 7.9 7.7   HEMOGLOBIN g/dL 12.4 12.2 11.7*   HEMATOCRIT % 42.0 41.7 40.6   PLATELETS AUTO x10*3/uL 348 327 315     Results from last 7 days   Lab Units 04/15/24  0656  04/14/24  0713 04/13/24  0744 04/09/24  1408 04/08/24  1617   SODIUM mmol/L 141 141 140   < > 140   POTASSIUM mmol/L 3.8 3.9 3.9   < > 4.3   CHLORIDE mmol/L 102 102 102   < > 108*   CO2 mmol/L 32 32 28   < > 23   BUN mg/dL 39* 35* 35*   < > 15   CREATININE mg/dL 0.88 1.01 0.86   < > 0.73   CALCIUM mg/dL 8.9 8.7 9.0   < > 8.5*   PROTEIN TOTAL g/dL  --   --   --   --  6.3*   BILIRUBIN TOTAL mg/dL  --   --   --   --  0.6   ALK PHOS U/L  --   --   --   --  68   ALT U/L  --   --   --   --  15   AST U/L  --   --   --   --  15   GLUCOSE mg/dL 220* 223* 399*   < > 159*    < > = values in this interval not displayed.     Results from last 7 days   Lab Units 04/10/24  1121 04/10/24  0522 04/09/24  1408   TROPHS ng/L 127* 121* 86*        NM PYP Cardiac Amyloidosis with SPECT CT   Final Result   1. Amyloid SPECT heart study demonstrates radiotracer deposition   within myocardium of left ventricle, findings suggestive of TTR   amyloidosis.   2. Representative images were saved into the PACS system.        Note: A negative amyloid SPECT heart study does not rule out other   forms of possible cardiac amyloid deposition such as amyloid light   chain deposition.        I personally reviewed the images/study and I agree with the findings   as stated by Nuclear Medicine fellow Laura Mendez MD. This study   was interpreted at [place], [place].        MACRO:   None        Signed by: Frida Jewell 4/15/2024 1:42 PM   Dictation workstation:   [id number]      XR chest 1 view   Final Result   Enlarged cardiac silhouette. Parenchymal infiltration.        MACRO:   none        Signed by: Divina Ugalde 4/12/2024 1:34 PM   Dictation workstation:   [id number]      XR chest 1 view   Final Result   Cardiomegaly. Small bilateral pleural effusions with associated   atelectasis at the lung bases, slightly progressed.        Right upper lobe pneumonia is slightly improved.        MACRO:   None         Signed by: Nicolás Mistry 4/10/2024 8:08 AM   Dictation workstation:   [id number]      Vascular US lower extremity venous duplex right   Final Result      CT angio chest for pulmonary embolism   Final Result   1. No detectable pulmonary emboli.   2. Patchy infiltrates and/or edema right upper lobe.   3. Bilateral focal lower lung atelectasis left more than right,   adjacent to small pleural effusions.   4. No bowel obstruction or detectable bowel inflammation; moderate   stool burden; prior sigmoid resection and ileostomy reversal.   5. Previous surgery lower cervical spine and lower lumbar spine.   6. Remainder as above.   Signed by Flaquito López MD      CT head wo IV contrast   Final Result   1. No evidence of hemorrhage, CT apparent transcortical infarct, or   other emergent intracranial abnormality.   2. Moderate brain parenchymal volume loss with patchy and confluence   attenuation changes present in the white matter of bilateral cerebral   hemispheres, nonspecific findings favored to represent sequela of   microvascular disease.        MACRO:   None        Signed by: Tim Garland 4/8/2024 8:07 PM   Dictation workstation:   [id number]      CT abdomen pelvis w IV contrast   Final Result   1. No detectable pulmonary emboli.   2. Patchy infiltrates and/or edema right upper lobe.   3. Bilateral focal lower lung atelectasis left more than right,   adjacent to small pleural effusions.   4. No bowel obstruction or detectable bowel inflammation; moderate   stool burden; prior sigmoid resection and ileostomy reversal.   5. Previous surgery lower cervical spine and lower lumbar spine.   6. Remainder as above.   Signed by Flaquito López MD      XR chest 1 view   Final Result   Right upper lobe infiltrate with small pleural effusion.   Signed by Bakari Perdue MD            Physical Exam     Constitutional   General appearance: Alert and in no acute distress.     Pulmonary   Respiratory assessment: Rhonchi    Cardiovascular   Auscultation of heart: Apical pulse normal, heart rate and rhythm normal, normal S1 and S2, no murmurs and no pericardial rub.    Exam for edema: Plus edema  Abdomen   Abdominal Exam: No bruits, normal bowel sounds, soft, non-tender, no abdominal mass palpated.    Liver and Spleen exam: No hepato-splenomegaly.   Musculoskeletal   Examination of gait: ROM intact  Skin inspection: Normal skin color and pigmentation, normal skin turgor and no visible rash.   Neurologic   Cranial nerves: Nerves 2-12 were intact, no focal neuro defects.     Assessment/Plan      #Community-acquired pneumonia  Continue antibiotics and DuoNebs  Continue low-dose of Solu-Medrol  C Solu-Medrol and start steroid taper       #Acute on chronic diastolic congestive heart failure  #Demand ischemia  Continue Lasix  Cardiology considering outpatient cardiac MRI to rule out any infiltrative processes    #Hypertension  Stable continue home medications     #Diabetes mellitus  /Insulin coverage  Continue Jardiance     #COPD  DuoNebs     #Paroxysmal atrial fibrillation  Continue Eliquis/amiodarone     #Dementia  Monitor for worsening encephalopathy    #Dyslipidemia  Continue statins   target LDL of less than 70

## 2024-04-15 NOTE — PROGRESS NOTES
04/15/24 1152   Discharge Planning   Patient expects to be discharged to: [facility]     Plan: IV atbs. IV lasix  Disposition: resume [facility]  Barrier: cards  ADOD: 2 days    Pt still on IV lasix. Will start steroid taper tomorrow. Will resume care with [facility] when dc.

## 2024-04-15 NOTE — CARE PLAN
The patient's goals for the shift include      The clinical goals for the shift include pt will maintain safety and comfort this shift

## 2024-04-15 NOTE — PROGRESS NOTES
Physical Therapy    Physical Therapy Evaluation    Patient Name: Elida Benson  MRN: [id number]  Today's Date: 4/15/2024   Time Calculation  Start Time: 1537  Stop Time: 1556  Time Calculation (min): 19 min    Assessment/Plan   PT Assessment  PT Assessment Results: Decreased strength, Decreased endurance, Impaired balance, Decreased mobility  Rehab Prognosis: Good  Evaluation/Treatment Tolerance: Patient limited by fatigue, Other (Comment) (lethargy and dizziness in sitting)  Medical Staff Made Aware: Yes  Strengths: Support of extended family/friends, Housing layout  Barriers to Participation: Comorbidities  End of Session Communication: Bedside nurse  Assessment Comment: Pt demonstrating deficits in generalized strength, endurance and balance as well as increased pain resulting in impaired functional mobility, gait and self care. Due to the impairments listed above, pt would benefit from skilled physical therapy services in acute care setting as well as additional therapy recommendation at [place] listed below  End of Session Patient Position: Bed, 3 rail up, Alarm on  IP OR SWING BED PT PLAN  Inpatient or Swing Bed: Inpatient  PT Plan  Treatment/Interventions: Bed mobility, Transfer training, Gait training, Stair training, Balance training, Neuromuscular re-education, Strengthening, Endurance training, Therapeutic exercise, Therapeutic activity, Home exercise program  PT Plan: Skilled PT  PT Frequency: 3 times per week  PT Discharge Recommendations: Moderate intensity level of continued care  PT Recommended Transfer Status: Assist x1  PT - OK to Discharge: Yes (PT POC initiated this date)      Subjective   General Visit Information:  General  Reason for Referral: 79 y.o. female presenting with pneumonia  and acute on chronic CHF  Referred By: Charito Balbuena, PARAG-CNP  Past Medical History Relevant to Rehab:   Past Medical History:   Diagnosis Date    A-fib (Multi)     Managed on meds, Eliquis stoppage and cardiac  clearance in [facility] from Dr. Ignacio    Cervical myelopathy (Multi)     Chronic pain syndrome     COPD (chronic obstructive pulmonary disease) (Multi)     denies, she is a former smoker but quit in 1984    Dementia (Multi)     Depression     Diabetic neuropathy (Multi)     Diverticulitis     DM (diabetes mellitus) (Multi)     denies but A1C= 7.2% on 9/5/23    LORENZ (dyspnea on exertion)     Stable per cards note    HLD (hyperlipidemia)     Hypertension, essential     Infiltrative cardiomyopathy (Multi)     Echo 8/22/23, following with Dr. Ignacio    OA (ocular albinism) (Multi)     multiple sites    Other malaise 04/21/2022    Physical deconditioning    Palpitations     on occasion, has afib followed by cardiology    Post laminectomy syndrome     Rectovaginal fistula     Spinal stenosis of cervical region     UTI (urinary tract infection)        Family/Caregiver Present: No  Co-Treatment: OT  Co-Treatment Reason: for maximal pt safety and participation  Prior to Session Communication: Bedside nurse  Patient Position Received: Bed, 3 rail up, Alarm on  General Comment: Pt supine in bed upon arrival, presenting with increasing lethargy and fatigue, limiting OT/PT tx. Pt agreeable to OT/PT eval. Transfers not attempted due to pt lethargy and dizziness in seated position  Home Living:  Home Living  Type of Home: House  Lives With: Alone  Home Adaptive Equipment: Walker rolling or standard  Home Layout: One level  Home Access: Stairs to enter with rails  Entrance Stairs-Rails: Right  Entrance Stairs-Number of Steps: 3  Bathroom Shower/Tub: Tub/shower unit  Bathroom Toilet: Standard  Bathroom Equipment: Grab bars in shower, Shower chair with back, Grab bars around toilet, Bedside commode  Prior Level of Function:  Prior Function Per Pt/Caregiver Report  Level of [place]: Needs assistance with ADLs, Needs assistance with homemaking  Receives Help From: Family  ADL Assistance: Needs assistance  Homemaking Assistance: Needs  "assistance  Ambulatory Assistance: Needs assistance  Prior Function Comments: Pt reports dtr lives nearby and assists daily with ADLs and iADLs.  Precautions:  Precautions  Medical Precautions: Fall precautions  Vital Signs:  Vital Signs  Heart Rate: 65  BP: 105/71  BP Method: Automatic  Patient Position: Lying (after seated activity)    Objective   Pain:  Pain Assessment  Pain Assessment: 0-10  Pain Score: 4 (Pt reporting pain \"everywhere\")  Pain Interventions: Repositioned, Ambulation/increased activity  Cognition:  Cognition  Overall Cognitive Status: Within Functional Limits  Orientation Level: Oriented X4  Following Commands: Follows one step commands with repetition  Cognition Comments: Decrease arosual, requiring increase verbal/tactile cues for alertness.  Attention: Exceptions to WFL  Problem Solving: Exceptions to WFL  Insight: Mild    General Assessments:  Activity Tolerance  Endurance: Tolerates less than 10 min exercise with changes in vital signs    Coordination  Movements are Fluid and Coordinated: Yes    Static Sitting Balance  Static Sitting-Balance Support: No upper extremity supported, Feet supported  Static Sitting-Level of Assistance: Minimum assistance, Contact guard (Occasional min A due to pt lethargy and report of dizziness in seated position. R lateral lean)  Static Sitting-Comment/Number of Minutes: 2 min       Functional Assessments:  Bed Mobility  Bed Mobility: Yes  Bed Mobility 1  Bed Mobility 1: Supine to sitting  Level of Assistance 1: Minimum assistance, Moderate verbal cues, Moderate tactile cues (x2)  Bed Mobility Comments 1: HOB signficantly elevated. Assist with trunk, minimal assist for LE off EOB. Attempt with increased time for BLE off EOB. Cues for hand placement and sequencing.  Bed Mobility 2  Bed Mobility  2: Sitting to supine  Level of Assistance 2: Maximum assistance (x2)  Bed Mobility Comments 2: Assist x2 with BLE and trunk.  Bed Mobility 3  Bed Mobility 3: Rolling " right, Rolling left  Level of Assistance 3: Maximum assistance  Bed Mobility Comments 3: Cues for sequencing; rolling during UE dressing.    Transfers  Transfer: No (Pt reporting increase dizziness and eyes closing with decreased trunk balance - returned to supine for safety)       Extremity/Trunk Assessments:  RLE   RLE : Exceptions to WFL (grossly 3/5)  LLE   LLE : Exceptions to WFL (grossly 3/5)  Outcome Measures:  [facility] Basic Mobility  Turning from your back to your side while in a flat bed without using bedrails: A little  Moving from lying on your back to sitting on the side of a flat bed without using bedrails: A lot  Moving to and from bed to chair (including a wheelchair): A lot  Standing up from a chair using your arms (e.g. wheelchair or bedside chair): A lot  To walk in hospital room: Total  Climbing 3-5 steps with railing: Total  Basic Mobility - Total Score: 11    Encounter Problems       Encounter Problems (Active)       Balance       STG - Maintains static standing balance with upper extremity support       Start:  04/15/24    Expected End:  04/29/24       INTERVENTIONS:  1. Practice standing with CGA using FWW >3 min  2. Educate patient about standing tolerance.  3. Educate patient about independence with gait, transfers, and ADL's.  4. Educate patient about use of assistive device.  5. Educate patient about self-directed care.            Mobility       LTG - Patient will navigate 3 steps with rails/device       Start:  04/15/24    Expected End:  04/29/24       R HR CGA         STG - Patient will ambulate       Start:  04/15/24    Expected End:  04/29/24       CGA using FWW >30ft            PT Transfers       STG - Patient will perform bed mobility       Start:  04/15/24    Expected End:  04/29/24       SUP         STG - Patient will transfer sit to and from stand       Start:  04/15/24    Expected End:  04/29/24       SBA                Education Documentation  Precautions, taught by Serafin Conner  Clayton, PT at 4/15/2024  4:44 PM.  Learner: Patient  Readiness: Acceptance  Method: Explanation  Response: Verbalizes Understanding    Body Mechanics, taught by Serafin Arnold, PT at 4/15/2024  4:44 PM.  Learner: Patient  Readiness: Acceptance  Method: Explanation  Response: Verbalizes Understanding    Mobility Training, taught by Serafin Arnold, PT at 4/15/2024  4:44 PM.  Learner: Patient  Readiness: Acceptance  Method: Explanation  Response: Verbalizes Understanding    Education Comments  No comments found.

## 2024-04-15 NOTE — PROGRESS NOTES
Subjective Data:    Still a little SOB. Denies palpitations.     Overnight Events:    UOP 1.3 L     Objective Data:  Last Recorded Vitals:  Vitals:    04/15/24 0022 04/15/24 0517 04/15/24 0728 04/15/24 0746   BP: 96/68 108/64  118/77   BP Location: Left arm Left arm     Patient Position: Lying Lying  Lying   Pulse: 80 66  68   Resp: 16 16  18   Temp: 36.3 °C (97.3 °F) 36.3 °C (97.3 °F)  36.3 °C (97.4 °F)   TempSrc: Oral Oral     SpO2: 97% 95% 96% 97%   Weight:       Height:           Last Labs:  CBC - 4/15/2024:  6:34 AM  8.9 12.4 348    42.0      CMP - 4/15/2024:  6:34 AM  8.9 6.3 15 --- 0.6   3.6 3.5 15 68      PTT - 4/8/2024:  4:17 PM  1.3   14.6 51     TROPHS   Date/Time Value Ref Range Status   04/10/2024 11:21  0 - 13 ng/L Final     Comment:     Previous result verified on 4/10/2024 0756 on specimen/case [id number] called with component TRPHS for procedure Troponin I, High Sensitivity with value 121 ng/L.   04/10/2024 05:22  0 - 13 ng/L Final   04/09/2024 02:08 PM 86 0 - 13 ng/L Final     Comment:     Previous result verified on 4/8/2024 1705 on specimen/case [id number] called with component TRPHS for procedure Troponin I, High Sensitivity with value 66 ng/L.     BNP   Date/Time Value Ref Range Status   04/10/2024 11:21  0 - 99 pg/mL Final   04/08/2024 04:17  0 - 99 pg/mL Final     HGBA1C   Date/Time Value Ref Range Status   01/29/2024 11:53 AM 7.7 4.2 - 6.5 % Final   09/05/2023 04:14 AM 7.2 % Final     Comment:          Diagnosis of Diabetes-Adults   Non-Diabetic: < or = 5.6%   Increased risk for developing diabetes: 5.7-6.4%   Diagnostic of diabetes: > or = 6.5%  .       Monitoring of Diabetes                Age (y)     Therapeutic Goal (%)   Adults:          >18           <7.0   Pediatrics:    13-18           <7.5                   7-12           <8.0                   0- 6            7.5-8.5   American Diabetes Association. Diabetes Care 33(S1), Jan 2010.     08/03/2018  06:33 AM 6.1 % Final     Comment:          Diagnosis of Diabetes-Adults   Non-Diabetic: < or = 5.6%   Increased risk for developing diabetes: 5.7-6.4%   Diagnostic of diabetes: > or = 6.5%  .       Monitoring of Diabetes                Age (y)     Therapeutic Goal (%)   Adults:          >18           <7.0   Pediatrics:    13-18           <7.5                   7-12           <8.0                   0- 6            7.5-8.5   American Diabetes Association. Diabetes Care 33(S1), Jan 2010.       VLDL   Date/Time Value Ref Range Status   05/26/2023 01:13 PM 20 0 - 40 mg/dL Final   10/27/2022 02:52 PM 17 0 - 40 mg/dL Final   04/21/2022 02:59 PM 21 0 - 40 mg/dL Final      Last I/O:  I/O last 3 completed shifts:  In: 360 (6 mL/kg) [P.O.:360]  Out: 1300 (21.5 mL/kg) [Urine:1300 (0.6 mL/kg/hr)]  Weight: 60.3 kg     Past Cardiology Tests (Last 3 Years):  EKG:  ECG 12 lead 04/08/2024  NSR 81bpm     ECG 12 Lead 01/24/2024     Echo:  8/2023:  1. Left ventricular systolic function is normal with a 55% estimated ejection fraction.  2. Moderately increased left ventricular septal thickness.  3. The left ventricular posterior wall thickness is moderately increased.  4. There is left ventricular concentric remodeling.  5. The longitudinal 2D Strain is mildly decreased with a pattern of apical preservation which is suggestive of infiltrative heart disease.  6. The patient is in atrial fibrillation which may influence the estimate of left ventricular function and transvalvular flows.        Cardiac Imaging:  CT Chest 4/2024  There are minimal coronary artery calcification  Bilateral focal lower lung atelectasis left more than right,  adjacent to small pleural effusions.       Inpatient Medications:  Scheduled medications   Medication Dose Route Frequency    amiodarone  200 mg oral Daily    apixaban  5 mg oral BID    atorvastatin  40 mg oral Nightly    cefTRIAXone  1 g intravenous q24h    docusate sodium  100 mg oral BID    donepezil  5  mg oral Nightly    DULoxetine  30 mg oral BID    empagliflozin  10 mg oral Daily    furosemide  40 mg intravenous q24h    gabapentin  600 mg oral TID    insulin lispro  0-10 Units subcutaneous TID with meals    ipratropium-albuteroL  3 mL nebulization TID    metoprolol succinate XL  25 mg oral Daily    pantoprazole  40 mg oral Daily before breakfast    polyethylene glycol  17 g oral Daily    predniSONE  40 mg oral Daily    Followed by    [START ON 4/18/2024] predniSONE  30 mg oral Daily    Followed by    [START ON 4/21/2024] predniSONE  20 mg oral Daily    Followed by    [START ON 4/24/2024] predniSONE  10 mg oral Daily    topiramate  100 mg oral BID     PRN medications   Medication    acetaminophen    benzonatate    dextrose    dextrose    glucagon    glucagon    ipratropium-albuteroL    melatonin    ondansetron    oxygen    traMADol    traZODone     Continuous Medications   Medication Dose Last Rate    oxygen   2 L/min (04/14/24 2008)       Physical Exam:  General: well appearing, no acute distress  Cardiac: irregular rate and rhythm, no murmurs, rubs or gallops  Pulmonary: Clear to auscultation bilaterally, normal respiratory effort  Peripheral pulses: intact, 2+  Extremities: trace bilateral LE edema      Assessment/Plan   Elida Benson is a 78 y.o. female with pertinent history of dementia, hypertension, diverticulitis status post laparoscopic anterior resection with ileostomy on 8/16/2023, paroxysmal atrial fibrillation on amiodarone and Eliquis, preserved ejection fraction with suspected myocardial cleft versus false tendon, moderate concentric hypertrophy, moderate left atrial enlargement on echo performed 8/19/2023, preserved ejection fraction with moderate septal thickness, abnormal strain imaging concerning for possible infiltrative heart disease on echo performed 8/22/2023, presented to hospital w SOB found to have RUL pneumonia. Cardiology c/s for CHF and troponins.      RUL Pneumonia. On  Atbx  CHF/HFpEf (, bilateral pleural effusions).  Hypertensive, female, elderly, pAF.  Troponin elevation (plateu 127). Likely demand type 2 ischemia in s/o underlying cardiomyopathy and pneumonia  pAF on amiodarone, apixaban, and MTXL 25mg daily   DM  HTN      Recommendations:  -Continue Rx with antbx and steroids  - Continue to diurese with furosemide 40mg IV daily  -Follow up urine and serum protein immunofixation and serum light chains  -Continue home amiodarone 200mg daily, MTXL 25mg dily, and apixaban 5mg bid for known pAF  -Will order PYP scan to rule out ATTR amyloidosis  - Atorvastatin 40mg at bedtime     Code Status:  Full Code   I saw and evaluated the patient. I personally obtained the key and critical portions of the history and physical exam. I reviewed the fellow's documentation and discussed the patient with the fellow. I agree with the fellow's medical decision making as documented in the fellow's note and have edited it as necessary.  I personally reviewed the patient's recent labs, medications, orders, EKGs, and pertinent cardiac imaging/ echocardiography.     Thank you for allowing me to participate in their care.  Please feel free to call me with any further questions or concerns.        Cesar Ignacio MD, FACC, TAIWO ABARCA  Division of Cardiovascular Medicine  System Director, Nuclear Cardiology   Medical Director, [facility] [place], [facility]   Clinical , [facility] [employer]  [email or web address]   Office:  [phone]

## 2024-04-16 ENCOUNTER — APPOINTMENT (OUTPATIENT)
Dept: RADIOLOGY | Facility: HOSPITAL | Age: 79
DRG: 193 | End: 2024-04-16
Payer: MEDICARE

## 2024-04-16 LAB
ALBUMIN MFR UR ELPH: 29.6 %
ALPHA1 GLOB MFR UR ELPH: 14.7 %
ALPHA2 GLOB MFR UR ELPH: 19.6 %
ANION GAP SERPL CALC-SCNC: 14 MMOL/L (ref 10–20)
B-GLOBULIN MFR UR ELPH: 17.2 %
BUN SERPL-MCNC: 41 MG/DL (ref 6–23)
CALCIUM SERPL-MCNC: 8.9 MG/DL (ref 8.6–10.3)
CHLORIDE SERPL-SCNC: 100 MMOL/L (ref 98–107)
CO2 SERPL-SCNC: 33 MMOL/L (ref 21–32)
CREAT SERPL-MCNC: 1 MG/DL (ref 0.5–1.05)
EGFRCR SERPLBLD CKD-EPI 2021: 58 ML/MIN/1.73M*2
ERYTHROCYTE [DISTWIDTH] IN BLOOD BY AUTOMATED COUNT: 14.8 % (ref 11.5–14.5)
GAMMA GLOB MFR UR ELPH: 18.9 %
GLUCOSE BLD MANUAL STRIP-MCNC: 207 MG/DL (ref 74–99)
GLUCOSE BLD MANUAL STRIP-MCNC: 250 MG/DL (ref 74–99)
GLUCOSE BLD MANUAL STRIP-MCNC: 291 MG/DL (ref 74–99)
GLUCOSE BLD MANUAL STRIP-MCNC: 328 MG/DL (ref 74–99)
GLUCOSE BLD MANUAL STRIP-MCNC: 361 MG/DL (ref 74–99)
GLUCOSE SERPL-MCNC: 252 MG/DL (ref 74–99)
HCT VFR BLD AUTO: 41.2 % (ref 36–46)
HGB BLD-MCNC: 12.5 G/DL (ref 12–16)
IMMUNOFIXATION COMMENT: NORMAL
MAGNESIUM SERPL-MCNC: 2.1 MG/DL (ref 1.6–2.4)
MCH RBC QN AUTO: 30 PG (ref 26–34)
MCHC RBC AUTO-ENTMCNC: 30.3 G/DL (ref 32–36)
MCV RBC AUTO: 99 FL (ref 80–100)
NRBC BLD-RTO: 0 /100 WBCS (ref 0–0)
PATH REVIEW - URINE IMMUNOFIXATION: NORMAL
PATH REVIEW-URINE PROTEIN ELECTROPHORESIS: NORMAL
PLATELET # BLD AUTO: 344 X10*3/UL (ref 150–450)
POTASSIUM SERPL-SCNC: 4.2 MMOL/L (ref 3.5–5.3)
RBC # BLD AUTO: 4.16 X10*6/UL (ref 4–5.2)
SODIUM SERPL-SCNC: 143 MMOL/L (ref 136–145)
URINE ELECTROPHORESIS COMMENT: NORMAL
WBC # BLD AUTO: 9.8 X10*3/UL (ref 4.4–11.3)

## 2024-04-16 PROCEDURE — 2500000001 HC RX 250 WO HCPCS SELF ADMINISTERED DRUGS (ALT 637 FOR MEDICARE OP): Performed by: INTERNAL MEDICINE

## 2024-04-16 PROCEDURE — 2500000002 HC RX 250 W HCPCS SELF ADMINISTERED DRUGS (ALT 637 FOR MEDICARE OP, ALT 636 FOR OP/ED): Performed by: NURSE PRACTITIONER

## 2024-04-16 PROCEDURE — 99233 SBSQ HOSP IP/OBS HIGH 50: CPT | Performed by: STUDENT IN AN ORGANIZED HEALTH CARE EDUCATION/TRAINING PROGRAM

## 2024-04-16 PROCEDURE — 94640 AIRWAY INHALATION TREATMENT: CPT

## 2024-04-16 PROCEDURE — 2500000004 HC RX 250 GENERAL PHARMACY W/ HCPCS (ALT 636 FOR OP/ED): Performed by: NURSE PRACTITIONER

## 2024-04-16 PROCEDURE — 85027 COMPLETE CBC AUTOMATED: CPT | Performed by: NURSE PRACTITIONER

## 2024-04-16 PROCEDURE — 2500000001 HC RX 250 WO HCPCS SELF ADMINISTERED DRUGS (ALT 637 FOR MEDICARE OP): Performed by: NURSE PRACTITIONER

## 2024-04-16 PROCEDURE — 2500000002 HC RX 250 W HCPCS SELF ADMINISTERED DRUGS (ALT 637 FOR MEDICARE OP, ALT 636 FOR OP/ED): Performed by: EMERGENCY MEDICINE

## 2024-04-16 PROCEDURE — 2500000004 HC RX 250 GENERAL PHARMACY W/ HCPCS (ALT 636 FOR OP/ED): Performed by: STUDENT IN AN ORGANIZED HEALTH CARE EDUCATION/TRAINING PROGRAM

## 2024-04-16 PROCEDURE — 1090000001 HH PPS REVENUE CREDIT

## 2024-04-16 PROCEDURE — 82947 ASSAY GLUCOSE BLOOD QUANT: CPT

## 2024-04-16 PROCEDURE — 1200000002 HC GENERAL ROOM WITH TELEMETRY DAILY

## 2024-04-16 PROCEDURE — 2500000005 HC RX 250 GENERAL PHARMACY W/O HCPCS: Performed by: NURSE PRACTITIONER

## 2024-04-16 PROCEDURE — 71045 X-RAY EXAM CHEST 1 VIEW: CPT

## 2024-04-16 PROCEDURE — 1090000002 HH PPS REVENUE DEBIT

## 2024-04-16 PROCEDURE — 71045 X-RAY EXAM CHEST 1 VIEW: CPT | Performed by: RADIOLOGY

## 2024-04-16 PROCEDURE — 99232 SBSQ HOSP IP/OBS MODERATE 35: CPT | Performed by: NURSE PRACTITIONER

## 2024-04-16 PROCEDURE — 2500000001 HC RX 250 WO HCPCS SELF ADMINISTERED DRUGS (ALT 637 FOR MEDICARE OP): Performed by: STUDENT IN AN ORGANIZED HEALTH CARE EDUCATION/TRAINING PROGRAM

## 2024-04-16 PROCEDURE — 36415 COLL VENOUS BLD VENIPUNCTURE: CPT | Performed by: NURSE PRACTITIONER

## 2024-04-16 PROCEDURE — 80048 BASIC METABOLIC PNL TOTAL CA: CPT | Performed by: NURSE PRACTITIONER

## 2024-04-16 PROCEDURE — 99232 SBSQ HOSP IP/OBS MODERATE 35: CPT | Performed by: INTERNAL MEDICINE

## 2024-04-16 PROCEDURE — 83735 ASSAY OF MAGNESIUM: CPT | Performed by: NURSE PRACTITIONER

## 2024-04-16 PROCEDURE — 2500000004 HC RX 250 GENERAL PHARMACY W/ HCPCS (ALT 636 FOR OP/ED): Performed by: INTERNAL MEDICINE

## 2024-04-16 RX ORDER — MORPHINE SULFATE 20 MG/ML
20 SOLUTION ORAL EVERY 4 HOURS PRN
Status: CANCELLED | OUTPATIENT
Start: 2024-04-16

## 2024-04-16 RX ORDER — INSULIN GLARGINE 100 [IU]/ML
8 INJECTION, SOLUTION SUBCUTANEOUS NIGHTLY
Status: DISCONTINUED | OUTPATIENT
Start: 2024-04-16 | End: 2024-04-17 | Stop reason: HOSPADM

## 2024-04-16 RX ADMIN — IPRATROPIUM BROMIDE AND ALBUTEROL SULFATE 3 ML: 2.5; .5 SOLUTION RESPIRATORY (INHALATION) at 14:19

## 2024-04-16 RX ADMIN — GABAPENTIN 600 MG: 300 CAPSULE ORAL at 20:26

## 2024-04-16 RX ADMIN — FUROSEMIDE 40 MG: 10 INJECTION, SOLUTION INTRAMUSCULAR; INTRAVENOUS at 09:13

## 2024-04-16 RX ADMIN — ATORVASTATIN CALCIUM 40 MG: 40 TABLET, FILM COATED ORAL at 20:26

## 2024-04-16 RX ADMIN — TOPIRAMATE 100 MG: 25 TABLET, FILM COATED ORAL at 09:28

## 2024-04-16 RX ADMIN — PREDNISONE 40 MG: 20 TABLET ORAL at 09:27

## 2024-04-16 RX ADMIN — APIXABAN 5 MG: 5 TABLET, FILM COATED ORAL at 20:26

## 2024-04-16 RX ADMIN — POLYETHYLENE GLYCOL 3350 17 G: 17 POWDER, FOR SOLUTION ORAL at 09:28

## 2024-04-16 RX ADMIN — PANTOPRAZOLE SODIUM 40 MG: 40 TABLET, DELAYED RELEASE ORAL at 06:07

## 2024-04-16 RX ADMIN — DULOXETINE HYDROCHLORIDE 30 MG: 30 CAPSULE, DELAYED RELEASE ORAL at 09:27

## 2024-04-16 RX ADMIN — INSULIN LISPRO 4 UNITS: 100 INJECTION, SOLUTION INTRAVENOUS; SUBCUTANEOUS at 09:30

## 2024-04-16 RX ADMIN — AMIODARONE HYDROCHLORIDE 200 MG: 200 TABLET ORAL at 09:27

## 2024-04-16 RX ADMIN — GABAPENTIN 600 MG: 300 CAPSULE ORAL at 15:17

## 2024-04-16 RX ADMIN — INSULIN GLARGINE 8 UNITS: 100 INJECTION, SOLUTION SUBCUTANEOUS at 20:27

## 2024-04-16 RX ADMIN — TOPIRAMATE 100 MG: 25 TABLET, FILM COATED ORAL at 20:33

## 2024-04-16 RX ADMIN — IPRATROPIUM BROMIDE AND ALBUTEROL SULFATE 3 ML: 2.5; .5 SOLUTION RESPIRATORY (INHALATION) at 20:28

## 2024-04-16 RX ADMIN — GABAPENTIN 600 MG: 300 CAPSULE ORAL at 09:27

## 2024-04-16 RX ADMIN — INSULIN LISPRO 8 UNITS: 100 INJECTION, SOLUTION INTRAVENOUS; SUBCUTANEOUS at 16:25

## 2024-04-16 RX ADMIN — METOPROLOL SUCCINATE 25 MG: 25 TABLET, EXTENDED RELEASE ORAL at 09:27

## 2024-04-16 RX ADMIN — DONEPEZIL HYDROCHLORIDE 5 MG: 5 TABLET ORAL at 20:26

## 2024-04-16 RX ADMIN — INSULIN LISPRO 6 UNITS: 100 INJECTION, SOLUTION INTRAVENOUS; SUBCUTANEOUS at 12:59

## 2024-04-16 RX ADMIN — DOCUSATE SODIUM 100 MG: 100 CAPSULE, LIQUID FILLED ORAL at 09:28

## 2024-04-16 RX ADMIN — APIXABAN 5 MG: 5 TABLET, FILM COATED ORAL at 09:28

## 2024-04-16 RX ADMIN — DULOXETINE HYDROCHLORIDE 30 MG: 30 CAPSULE, DELAYED RELEASE ORAL at 20:26

## 2024-04-16 RX ADMIN — Medication: at 15:30

## 2024-04-16 RX ADMIN — IPRATROPIUM BROMIDE AND ALBUTEROL SULFATE 3 ML: 2.5; .5 SOLUTION RESPIRATORY (INHALATION) at 07:46

## 2024-04-16 RX ADMIN — DOCUSATE SODIUM 100 MG: 100 CAPSULE, LIQUID FILLED ORAL at 20:26

## 2024-04-16 RX ADMIN — EMPAGLIFLOZIN 10 MG: 10 TABLET, FILM COATED ORAL at 09:27

## 2024-04-16 ASSESSMENT — PAIN SCALES - PAIN ASSESSMENT IN ADVANCED DEMENTIA (PAINAD)
BODYLANGUAGE: RELAXED
CONSOLABILITY: NO NEED TO CONSOLE
TOTALSCORE: 0
FACIALEXPRESSION: SMILING OR INEXPRESSIVE
BREATHING: NORMAL

## 2024-04-16 ASSESSMENT — PAIN SCALES - GENERAL
PAINLEVEL_OUTOF10: 0 - NO PAIN

## 2024-04-16 ASSESSMENT — COGNITIVE AND FUNCTIONAL STATUS - GENERAL
DRESSING REGULAR LOWER BODY CLOTHING: A LITTLE
CLIMB 3 TO 5 STEPS WITH RAILING: A LOT
STANDING UP FROM CHAIR USING ARMS: A LITTLE
MOBILITY SCORE: 18
HELP NEEDED FOR BATHING: A LITTLE
WALKING IN HOSPITAL ROOM: A LOT
DRESSING REGULAR UPPER BODY CLOTHING: A LITTLE
DAILY ACTIVITIY SCORE: 20
TOILETING: A LITTLE
MOVING TO AND FROM BED TO CHAIR: A LITTLE

## 2024-04-16 ASSESSMENT — PAIN SCALES - WONG BAKER
WONGBAKER_NUMERICALRESPONSE: NO HURT

## 2024-04-16 ASSESSMENT — PAIN - FUNCTIONAL ASSESSMENT: PAIN_FUNCTIONAL_ASSESSMENT: 0-10

## 2024-04-16 NOTE — PROGRESS NOTES
4/16/2024 4 :17 Pm Daughter requested referral to [facility]. She wants a private room. She knows we started auth for [facility] but she would prefer [facility] if they have a private room. [facility] [place]

## 2024-04-16 NOTE — PROGRESS NOTES
Subjective Data:    Still a little SOB. Denies palpitations.     Overnight Events:    UOP 1.3 L  PYP scan suggestive of ATTR amyloidosis     Objective Data:  Last Recorded Vitals:  Vitals:    04/16/24 0041 04/16/24 0602 04/16/24 0734 04/16/24 0746   BP: 110/72 113/74 125/76    BP Location: Right arm Right arm Right arm    Patient Position: Lying Lying Lying    Pulse: 65 62 60    Resp: 18 17 17    Temp: 36.3 °C (97.4 °F) 36.7 °C (98.1 °F) 36.6 °C (97.8 °F)    TempSrc:   Temporal    SpO2: 100% 100% 100% 98%   Weight:       Height:           Last Labs:  CBC - 4/16/2024:  5:53 AM  9.8 12.5 344        41.2      CMP - 4/16/2024:  5:53 AM  8.9 6.3 15  0.6   3.6 3.5 15 68      PTT - 4/8/2024:  4:17 PM  1.3   14.6 51     TROPHS   Date/Time Value Ref Range Status   04/10/2024 11:21  0 - 13 ng/L Final     Comment:     Previous result verified on 4/10/2024 0756 on specimen/case [id number] called with component TRPHS for procedure Troponin I, High Sensitivity with value 121 ng/L.   04/10/2024 05:22  0 - 13 ng/L Final   04/09/2024 02:08 PM 86 0 - 13 ng/L Final     Comment:     Previous result verified on 4/8/2024 1705 on specimen/case [id number] called with component TRPHS for procedure Troponin I, High Sensitivity with value 66 ng/L.     BNP   Date/Time Value Ref Range Status   04/10/2024 11:21  0 - 99 pg/mL Final   04/08/2024 04:17  0 - 99 pg/mL Final     HGBA1C   Date/Time Value Ref Range Status   04/15/2024 06:34 AM 7.2 see below % Final   01/29/2024 11:53 AM 7.7 4.2 - 6.5 % Final   09/05/2023 04:14 AM 7.2 % Final     Comment:          Diagnosis of Diabetes-Adults   Non-Diabetic: < or = 5.6%   Increased risk for developing diabetes: 5.7-6.4%   Diagnostic of diabetes: > or = 6.5%  .       Monitoring of Diabetes                Age (y)     Therapeutic Goal (%)   Adults:          >18           <7.0   Pediatrics:    13-18           <7.5                   7-12           <8.0                   0- 6             7.5-8.5   American Diabetes Association. Diabetes Care 33(S1), Jan 2010.     08/03/2018 06:33 AM 6.1 % Final     Comment:          Diagnosis of Diabetes-Adults   Non-Diabetic: < or = 5.6%   Increased risk for developing diabetes: 5.7-6.4%   Diagnostic of diabetes: > or = 6.5%  .       Monitoring of Diabetes                Age (y)     Therapeutic Goal (%)   Adults:          >18           <7.0   Pediatrics:    13-18           <7.5                   7-12           <8.0                   0- 6            7.5-8.5   American Diabetes Association. Diabetes Care 33(S1), Jan 2010.       VLDL   Date/Time Value Ref Range Status   05/26/2023 01:13 PM 20 0 - 40 mg/dL Final   10/27/2022 02:52 PM 17 0 - 40 mg/dL Final   04/21/2022 02:59 PM 21 0 - 40 mg/dL Final      Last I/O:  I/O last 3 completed shifts:  In: - (0 mL/kg)   Out: 1302 (21.6 mL/kg) [Urine:1300 (0.6 mL/kg/hr); Stool:2]  Weight: 60.3 kg     Past Cardiology Tests (Last 3 Years):  EKG:  ECG 12 lead 04/08/2024  NSR 81bpm     ECG 12 Lead 01/24/2024     Echo:  8/2023:  1. Left ventricular systolic function is normal with a 55% estimated ejection fraction.  2. Moderately increased left ventricular septal thickness.  3. The left ventricular posterior wall thickness is moderately increased.  4. There is left ventricular concentric remodeling.  5. The longitudinal 2D Strain is mildly decreased with a pattern of apical preservation which is suggestive of infiltrative heart disease.  6. The patient is in atrial fibrillation which may influence the estimate of left ventricular function and transvalvular flows.        Cardiac Imaging:  CT Chest 4/2024  There are minimal coronary artery calcification  Bilateral focal lower lung atelectasis left more than right,  adjacent to small pleural effusions.       Inpatient Medications:  Scheduled medications   Medication Dose Route Frequency    amiodarone  200 mg oral Daily    apixaban  5 mg oral BID    atorvastatin  40 mg oral  Nightly    cefTRIAXone  1 g intravenous q24h    docusate sodium  100 mg oral BID    donepezil  5 mg oral Nightly    DULoxetine  30 mg oral BID    empagliflozin  10 mg oral Daily    furosemide  40 mg intravenous q24h    gabapentin  600 mg oral TID    insulin lispro  0-10 Units subcutaneous TID with meals    ipratropium-albuteroL  3 mL nebulization TID    metoprolol succinate XL  25 mg oral Daily    pantoprazole  40 mg oral Daily before breakfast    polyethylene glycol  17 g oral Daily    predniSONE  40 mg oral Daily    Followed by    [START ON 4/18/2024] predniSONE  30 mg oral Daily    Followed by    [START ON 4/21/2024] predniSONE  20 mg oral Daily    Followed by    [START ON 4/24/2024] predniSONE  10 mg oral Daily    topiramate  100 mg oral BID     PRN medications   Medication    acetaminophen    benzonatate    dextrose    dextrose    glucagon    glucagon    ipratropium-albuteroL    melatonin    ondansetron    oxygen    traMADol    traZODone     Continuous Medications   Medication Dose Last Rate    oxygen   2 L/min (04/15/24 1950)       Physical Exam:  General: well appearing, no acute distress  Cardiac: irregular rate and rhythm, no murmurs, rubs or gallops  Pulmonary: Clear to auscultation bilaterally, normal respiratory effort  Peripheral pulses: intact, 2+  Extremities: trace bilateral LE edema      Assessment/Plan   Elida Benson is a 78 y.o. female with pertinent history of dementia, hypertension, diverticulitis status post laparoscopic anterior resection with ileostomy on 8/16/2023, paroxysmal atrial fibrillation on amiodarone and Eliquis, preserved ejection fraction with suspected myocardial cleft versus false tendon, moderate concentric hypertrophy, moderate left atrial enlargement on echo performed 8/19/2023, preserved ejection fraction with moderate septal thickness, abnormal strain imaging concerning for possible infiltrative heart disease on echo performed 8/22/2023, presented to hospitals  found to have RUL pneumonia. Cardiology c/s for CHF and troponins.      RUL Pneumonia. On Atbx  CHF/HFpEf (, bilateral pleural effusions).  Hypertensive, female, elderly, pAF.  Troponin elevation (plateu 127). Likely demand type 2 ischemia in s/o underlying cardiomyopathy and pneumonia  pAF on amiodarone, apixaban, and MTXL 25mg daily   DM  HTN      Recommendations:  -Continue Rx with antbx and steroids  - Continue to diurese with furosemide 40mg IV daily  -Follow up urine and serum protein immunofixation and serum light chains  -Continue home amiodarone 200mg daily, MTXL 25mg dily, and apixaban 5mg bid for known pAF  -PYP suggestive of ATTR amyloid. Will refer to Dr. Lori Dueñas outpatient HF follow up  -Chest XR for volume status  - Atorvastatin 40mg at bedtime     Code Status:  Full Code     I saw and evaluated the patient. I personally obtained the key and critical portions of the history and physical exam. I reviewed the fellow's documentation and discussed the patient with the fellow. I agree with the fellow's medical decision making as documented in the fellow's note and have edited it as necessary.  I personally reviewed the patient's recent labs, medications, orders, EKGs, and pertinent cardiac imaging/ echocardiography.     Thank you for allowing me to participate in their care.  Please feel free to call me with any further questions or concerns.        Cesar Ignacio MD, [facility], TAIWO ABARCA  Division of Cardiovascular Medicine  System Director, Nuclear Cardiology   Medical Director, [facility] [place], [facility]   Clinical , [facility] [employer]  [email or web address]   Office:  [phone]

## 2024-04-16 NOTE — PROGRESS NOTES
4/16/2024 10:06 AM I spoke with patient's daughter. She said family does stay with patient at times. I discussed PT recommendations. I discussed home care does not provide daily rehab services. Daughter agrees to short term SNF. Daughter agrees to referral to [facility]

## 2024-04-16 NOTE — PROGRESS NOTES
"        Internal Medicine Daily Progress Note    Subjective   Patient is a 79 y.o. female admitted on 4/8/2024  3:48 PM  with chief complaint of SOB.     \"I don't feel good\"  but declines to elaborate or answer ROS questions      Objective   Constitutional: elderly female, A&Ox3 poor historian  Eyes: PERRL, EOMI, no icterus   ENMT: mucous membranes moist, no apparent injury, no lesions seen  Head/Neck: Neck supple, no apparent injury  Respiratory/Thorax: Lungs CTA bilaterally, non-labored breathing, no cough, on 2 L NC  Cardiovascular: Regular, rate and rhythm, no murmurs, normal S1 and S2  Gastrointestinal: Nondistended, soft, non-tender, BS present x 4  Musculoskeletal: ROM intact, no joint swelling, normal strength  Extremities: normal extremities, no edema, contusions or wounds  Neurological: alert and oriented x 3, speech clear, follows commands appropriately, cr. n. II-XII intact, sensation grossly intact, motor 5/5 throughout  Skin: Warm and dry, no lesions, no rashes        Vitals:  Most Recent:  Vitals:    04/16/24 1107   BP: 115/74   Pulse:    Resp: 17   Temp: 36.3 °C (97.4 °F)   SpO2: 100%       24hr Min/Max:  Temp  Min: 36.3 °C (97.4 °F)  Max: 36.7 °C (98.1 °F)  Pulse  Min: 60  Max: 68  BP  Min: 100/63  Max: 125/76  Resp  Min: 17  Max: 18  SpO2  Min: 98 %  Max: 100 %    Scheduled medications  amiodarone, 200 mg, oral, Daily  apixaban, 5 mg, oral, BID  atorvastatin, 40 mg, oral, Nightly  cefTRIAXone, 1 g, intravenous, q24h  docusate sodium, 100 mg, oral, BID  donepezil, 5 mg, oral, Nightly  DULoxetine, 30 mg, oral, BID  empagliflozin, 10 mg, oral, Daily  furosemide, 40 mg, intravenous, q24h  gabapentin, 600 mg, oral, TID  insulin lispro, 0-10 Units, subcutaneous, TID with meals  ipratropium-albuteroL, 3 mL, nebulization, TID  metoprolol succinate XL, 25 mg, oral, Daily  pantoprazole, 40 mg, oral, Daily before breakfast  polyethylene glycol, 17 g, oral, Daily  predniSONE, 40 mg, oral, Daily   Followed " by  [START ON 4/18/2024] predniSONE, 30 mg, oral, Daily   Followed by  [START ON 4/21/2024] predniSONE, 20 mg, oral, Daily   Followed by  [START ON 4/24/2024] predniSONE, 10 mg, oral, Daily  topiramate, 100 mg, oral, BID      Continuous medications  oxygen, , Last Rate: 2 L/min (04/15/24 1950)      PRN medications  PRN medications: acetaminophen, benzonatate, dextrose, dextrose, glucagon, glucagon, ipratropium-albuteroL, melatonin, ondansetron, oxygen, traMADol, traZODone    Lab Review   Results from last 7 days   Lab Units 04/16/24  0553 04/15/24  0634 04/14/24  0713   WBC AUTO x10*3/uL 9.8 8.9 7.9   HEMOGLOBIN g/dL 12.5 12.4 12.2   HEMATOCRIT % 41.2 42.0 41.7   PLATELETS AUTO x10*3/uL 344 348 327     Results from last 7 days   Lab Units 04/16/24  0553 04/15/24  0634 04/14/24  0713   SODIUM mmol/L 143 141 141   POTASSIUM mmol/L 4.2 3.8 3.9   CHLORIDE mmol/L 100 102 102   CO2 mmol/L 33* 32 32   BUN mg/dL 41* 39* 35*   CREATININE mg/dL 1.00 0.88 1.01   CALCIUM mg/dL 8.9 8.9 8.7   GLUCOSE mg/dL 252* 220* 223*     Results from last 7 days   Lab Units 04/10/24  1121 04/10/24  0522   TROPHS ng/L 127* 121*        Assessment/Plan    Elida Benson is a 79 year old female with history of history of hypertension dyslipidemia COPD atrial fibrillation on Eliquis dementia diabetes mellitus chronic diastolic congestive heart failure presents to the hospital with increasing cough and productive phlegm along with worsening shortness of breath.  Admitted for AHRF 2/2 PNA.    Neuro: hx of Dementia, unknown baseline but lives alone normally  - A&Ox3 poor historian and not interested in participating   - continue home Aricept 5 mg daily  - continue home Duloxetine 30 mg BID, Gabapentin 600 mg TID  - PRN melatonin for sleep    Pulm: Acute hyoxic respiratory failure, PNA, Hx of COPD  - weaned to 2 L NC  - continue Ceftriaxone (day 7), Prednisone taper & DuoNebs  - IS and Acapella while awake  - SLP following     Cardiac: history of  hypertension, dyslipidemia,  atrial fibrillation on Eliquis,acute on chronic diastolic congestive heart failure, Troponemia  - HDS  - cardiology following, appreciate recs:    -Continue Rx with antbx and steroids  - Continue to diurese with furosemide 40mg IV daily  -Follow up urine and serum protein immunofixation and serum light chains  -Continue home amiodarone 200mg daily, MTXL 25mg dily, and apixaban 5mg bid for known pAF  -PYP suggestive of ATTR amyloid. Will refer to Dr. Lori Dueñas outpatient HF follow up  -Chest XR for volume status  - Atorvastatin 40mg at bedtime    FEN/GI: Diverticulitis with recent ileostomy closure  - BM x1  - Diet: regular  - Bowel Regimen: colace BID, Miralax  - PPI    Renal: No acute issues    Heme: No acute issues  - DVT prophylaxis: on Apixiban    Endo: diabetes mellitus  - A1C 7.2  - blood sugars 200-300 rquiring 24 units/24 hours  - SSI Lispro  - add at bedtime Lantus    ID: PNA  - Tmax: 36.6 C  - Micro: 4/10 Sputum cx contaminated.  Step pneumoniae Ag Neg, Legionalla Ag neg  - Antimicrobials:     MSK/SKIN:  No acute issues    Lines: PIV    Code Status: Full  NOK:   Kaelyn Turk (Daughter)  [phone] (Mobile       Dispo:  continue inpatient care for acute on chronic HF, PNA, AHRF    Discharge planning: Daughter said FOC is [facility]     Pt discussed with Dr. Barboza, seen and examined. All labs, VS and previous plan of care reviewed.    I spent 35 minutes in the professional and overall care of this patient.      Sintia Cisneros, [facility]  Pulm/Critical Care/Internal Medicine

## 2024-04-16 NOTE — CARE PLAN
The patient's goals for the shift include      The clinical goals for the shift include patient will remain hds throughout shift

## 2024-04-16 NOTE — CARE PLAN
Problem: Fall/Injury  Goal: Verbalize understanding of personal risk factors for fall in the hospital  Outcome: Not Progressing  Goal: Verbalize understanding of risk factor reduction measures to prevent injury from fall in the home  Outcome: Not Progressing  Goal: Use assistive devices by end of the shift  Outcome: Not Progressing  Goal: Pace activities to prevent fatigue by end of the shift  Outcome: Not Progressing   The patient's goals for the shift include      The clinical goals for the shift include Pt will remain HDS throughout shift        Problem: Fall/Injury  Goal: Verbalize understanding of personal risk factors for fall in the hospital  Outcome: Not Progressing  Goal: Verbalize understanding of risk factor reduction measures to prevent injury from fall in the home  Outcome: Not Progressing  Goal: Use assistive devices by end of the shift  Outcome: Not Progressing  Goal: Pace activities to prevent fatigue by end of the shift  Outcome: Not Progressing

## 2024-04-16 NOTE — PROGRESS NOTES
Elida Benson is a 79 y.o. 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   Neurological: no headache,   All other systems have been reviewed and are negative for complaint.       Vitals:    04/16/24 1107   BP: 115/74   Pulse:    Resp: 17   Temp: 36.3 °C (97.4 °F)   SpO2: 100%        Scheduled medications  amiodarone, 200 mg, oral, Daily  apixaban, 5 mg, oral, BID  atorvastatin, 40 mg, oral, Nightly  cefTRIAXone, 1 g, intravenous, q24h  docusate sodium, 100 mg, oral, BID  donepezil, 5 mg, oral, Nightly  DULoxetine, 30 mg, oral, BID  empagliflozin, 10 mg, oral, Daily  furosemide, 40 mg, intravenous, q24h  gabapentin, 600 mg, oral, TID  insulin lispro, 0-10 Units, subcutaneous, TID with meals  ipratropium-albuteroL, 3 mL, nebulization, TID  metoprolol succinate XL, 25 mg, oral, Daily  pantoprazole, 40 mg, oral, Daily before breakfast  polyethylene glycol, 17 g, oral, Daily  predniSONE, 40 mg, oral, Daily   Followed by  [START ON 4/18/2024] predniSONE, 30 mg, oral, Daily   Followed by  [START ON 4/21/2024] predniSONE, 20 mg, oral, Daily   Followed by  [START ON 4/24/2024] predniSONE, 10 mg, oral, Daily  topiramate, 100 mg, oral, BID      Continuous medications  oxygen, , Last Rate: 2 L/min (04/15/24 1950)      PRN medications  PRN medications: acetaminophen, benzonatate, dextrose, dextrose, glucagon, glucagon, ipratropium-albuteroL, melatonin, ondansetron, oxygen, traMADol, traZODone    Lab Review   Results from last 7 days   Lab Units 04/16/24  0553 04/15/24  0634 04/14/24  0713   WBC AUTO x10*3/uL 9.8 8.9 7.9    HEMOGLOBIN g/dL 12.5 12.4 12.2   HEMATOCRIT % 41.2 42.0 41.7   PLATELETS AUTO x10*3/uL 344 348 327     Results from last 7 days   Lab Units 04/16/24  0553 04/15/24  0634 04/14/24  0713   SODIUM mmol/L 143 141 141   POTASSIUM mmol/L 4.2 3.8 3.9   CHLORIDE mmol/L 100 102 102   CO2 mmol/L 33* 32 32   BUN mg/dL 41* 39* 35*   CREATININE mg/dL 1.00 0.88 1.01   CALCIUM mg/dL 8.9 8.9 8.7   GLUCOSE mg/dL 252* 220* 223*     Results from last 7 days   Lab Units 04/10/24  1121 04/10/24  0522   TROPHS ng/L 127* 121*        XR chest 1 view   Final Result   Cardiomegaly. Resolution of previous findings of CHF.        Small left pleural effusion with scant atelectasis at the left lung   base.        Moderate to advanced DJD in both glenohumeral joints. Stable surgical   changes in the cervical spine and proximal lumbar spine.        MACRO:   None        Signed by: Nicolás Mistry 4/16/2024 1:49 PM   Dictation workstation:   [id number]      NM PYP Cardiac Amyloidosis with SPECT CT   Final Result   1. Amyloid SPECT heart study demonstrates radiotracer deposition   within myocardium of left ventricle, findings suggestive of TTR   amyloidosis.   2. Representative images were saved into the PACS system.        Note: A negative amyloid SPECT heart study does not rule out other   forms of possible cardiac amyloid deposition such as amyloid light   chain deposition.        I personally reviewed the images/study and I agree with the findings   as stated by Nuclear Medicine fellow Laura Mendez MD. This study   was interpreted at [place], [place].        MACRO:   None        Signed by: Frida Jewell 4/15/2024 1:42 PM   Dictation workstation:   [id number]      XR chest 1 view   Final Result   Enlarged cardiac silhouette. Parenchymal infiltration.        MACRO:   none        Signed by: Divina Ugalde 4/12/2024 1:34 PM   Dictation workstation:   [id number]      XR chest 1 view   Final Result    Cardiomegaly. Small bilateral pleural effusions with associated   atelectasis at the lung bases, slightly progressed.        Right upper lobe pneumonia is slightly improved.        MACRO:   None        Signed by: Nicolás Mistry 4/10/2024 8:08 AM   Dictation workstation:   [id number]      Vascular US lower extremity venous duplex right   Final Result      CT angio chest for pulmonary embolism   Final Result   1. No detectable pulmonary emboli.   2. Patchy infiltrates and/or edema right upper lobe.   3. Bilateral focal lower lung atelectasis left more than right,   adjacent to small pleural effusions.   4. No bowel obstruction or detectable bowel inflammation; moderate   stool burden; prior sigmoid resection and ileostomy reversal.   5. Previous surgery lower cervical spine and lower lumbar spine.   6. Remainder as above.   Signed by Flaquito López MD      CT head wo IV contrast   Final Result   1. No evidence of hemorrhage, CT apparent transcortical infarct, or   other emergent intracranial abnormality.   2. Moderate brain parenchymal volume loss with patchy and confluence   attenuation changes present in the white matter of bilateral cerebral   hemispheres, nonspecific findings favored to represent sequela of   microvascular disease.        MACRO:   None        Signed by: Tim Garland 4/8/2024 8:07 PM   Dictation workstation:   [id number]      CT abdomen pelvis w IV contrast   Final Result   1. No detectable pulmonary emboli.   2. Patchy infiltrates and/or edema right upper lobe.   3. Bilateral focal lower lung atelectasis left more than right,   adjacent to small pleural effusions.   4. No bowel obstruction or detectable bowel inflammation; moderate   stool burden; prior sigmoid resection and ileostomy reversal.   5. Previous surgery lower cervical spine and lower lumbar spine.   6. Remainder as above.   Signed by Flaquito López MD      XR chest 1 view   Final Result   Right upper lobe infiltrate with small  pleural effusion.   Signed by Bakari Perdue MD            Physical Exam     Constitutional   General appearance: Alert and in no acute distress.     Pulmonary   Respiratory assessment: Rhonchi   Cardiovascular   Auscultation of heart: Apical pulse normal, heart rate and rhythm normal, normal S1 and S2, no murmurs and no pericardial rub.    Exam for edema: Plus edema  Abdomen   Abdominal Exam: No bruits, normal bowel sounds, soft, non-tender, no abdominal mass palpated.    Liver and Spleen exam: No hepato-splenomegaly.   Musculoskeletal   Examination of gait: ROM intact  Skin inspection: Normal skin color and pigmentation, normal skin turgor and no visible rash.   Neurologic   Cranial nerves: Nerves 2-12 were intact, no focal neuro defects.     Assessment/Plan      #Community-acquired pneumonia  Continue antibiotics and DuoNebs  Improving  Steroid taper       #Acute on chronic diastolic congestive heart failure  #Demand ischemia  Continue Lasix  Studies do suggest amyloidosis  Outpatient workup with her cardiologist    #Hypertension  Stable continue home medications     #Diabetes mellitus  /Insulin coverage  Continue Jardiance     #COPD  DuoNebs     #Paroxysmal atrial fibrillation  Continue Eliquis/amiodarone     #Dementia  Monitor for worsening encephalopathy    #Dyslipidemia  Continue statins   target LDL of less than 70    #Deconditioning  Will need rehab upon discharge

## 2024-04-16 NOTE — CARE PLAN
The patient's goals for the shift include      The clinical goals for the shift include Patient will remain safe throughout the shift

## 2024-04-16 NOTE — PROGRESS NOTES
"Speech-Language Pathology                 Therapy Communication Note    Patient Name: Elida Benson  MRN: [id number]  Today's Date: 4/16/2024     Discipline: Speech Language Pathology    Missed Visit Reason: Missed Time Reason: Patient unwilling to participate (Pt reports she is \"not feeling well\" and declining any PO intake or discussion at this time.)    Missed Time: Attempt    Comment:           "

## 2024-04-17 ENCOUNTER — TELEPHONE (OUTPATIENT)
Dept: INFECTIOUS DISEASES | Facility: HOSPITAL | Age: 79
End: 2024-04-17

## 2024-04-17 VITALS
SYSTOLIC BLOOD PRESSURE: 102 MMHG | OXYGEN SATURATION: 99 % | WEIGHT: 133 LBS | DIASTOLIC BLOOD PRESSURE: 56 MMHG | HEART RATE: 65 BPM | HEIGHT: 56 IN | TEMPERATURE: 97.5 F | RESPIRATION RATE: 17 BRPM | BODY MASS INDEX: 29.92 KG/M2

## 2024-04-17 LAB
ANION GAP SERPL CALC-SCNC: 12 MMOL/L (ref 10–20)
BUN SERPL-MCNC: 38 MG/DL (ref 6–23)
CALCIUM SERPL-MCNC: 9 MG/DL (ref 8.6–10.3)
CHLORIDE SERPL-SCNC: 100 MMOL/L (ref 98–107)
CO2 SERPL-SCNC: 32 MMOL/L (ref 21–32)
CREAT SERPL-MCNC: 0.84 MG/DL (ref 0.5–1.05)
EGFRCR SERPLBLD CKD-EPI 2021: 72 ML/MIN/1.73M*2
ERYTHROCYTE [DISTWIDTH] IN BLOOD BY AUTOMATED COUNT: 14.8 % (ref 11.5–14.5)
GLUCOSE BLD MANUAL STRIP-MCNC: 191 MG/DL (ref 74–99)
GLUCOSE BLD MANUAL STRIP-MCNC: 363 MG/DL (ref 74–99)
GLUCOSE BLD MANUAL STRIP-MCNC: 367 MG/DL (ref 74–99)
GLUCOSE SERPL-MCNC: 206 MG/DL (ref 74–99)
HCT VFR BLD AUTO: 41.3 % (ref 36–46)
HGB BLD-MCNC: 13 G/DL (ref 12–16)
MAGNESIUM SERPL-MCNC: 2.2 MG/DL (ref 1.6–2.4)
MCH RBC QN AUTO: 31 PG (ref 26–34)
MCHC RBC AUTO-ENTMCNC: 31.5 G/DL (ref 32–36)
MCV RBC AUTO: 98 FL (ref 80–100)
NRBC BLD-RTO: 0 /100 WBCS (ref 0–0)
PLATELET # BLD AUTO: 372 X10*3/UL (ref 150–450)
POTASSIUM SERPL-SCNC: 4.1 MMOL/L (ref 3.5–5.3)
RBC # BLD AUTO: 4.2 X10*6/UL (ref 4–5.2)
SODIUM SERPL-SCNC: 140 MMOL/L (ref 136–145)
WBC # BLD AUTO: 9.4 X10*3/UL (ref 4.4–11.3)

## 2024-04-17 PROCEDURE — 83735 ASSAY OF MAGNESIUM: CPT | Performed by: NURSE PRACTITIONER

## 2024-04-17 PROCEDURE — 2500000006 HC RX 250 W HCPCS SELF ADMINISTERED DRUGS (ALT 637 FOR ALL PAYERS): Mod: MUE | Performed by: NURSE PRACTITIONER

## 2024-04-17 PROCEDURE — 36415 COLL VENOUS BLD VENIPUNCTURE: CPT | Performed by: NURSE PRACTITIONER

## 2024-04-17 PROCEDURE — 2500000004 HC RX 250 GENERAL PHARMACY W/ HCPCS (ALT 636 FOR OP/ED): Performed by: STUDENT IN AN ORGANIZED HEALTH CARE EDUCATION/TRAINING PROGRAM

## 2024-04-17 PROCEDURE — 2500000002 HC RX 250 W HCPCS SELF ADMINISTERED DRUGS (ALT 637 FOR MEDICARE OP, ALT 636 FOR OP/ED): Performed by: NURSE PRACTITIONER

## 2024-04-17 PROCEDURE — 99233 SBSQ HOSP IP/OBS HIGH 50: CPT | Performed by: STUDENT IN AN ORGANIZED HEALTH CARE EDUCATION/TRAINING PROGRAM

## 2024-04-17 PROCEDURE — 80048 BASIC METABOLIC PNL TOTAL CA: CPT | Performed by: NURSE PRACTITIONER

## 2024-04-17 PROCEDURE — 2500000004 HC RX 250 GENERAL PHARMACY W/ HCPCS (ALT 636 FOR OP/ED): Performed by: NURSE PRACTITIONER

## 2024-04-17 PROCEDURE — 1090000002 HH PPS REVENUE DEBIT

## 2024-04-17 PROCEDURE — 99239 HOSP IP/OBS DSCHRG MGMT >30: CPT | Performed by: INTERNAL MEDICINE

## 2024-04-17 PROCEDURE — 2500000001 HC RX 250 WO HCPCS SELF ADMINISTERED DRUGS (ALT 637 FOR MEDICARE OP): Performed by: INTERNAL MEDICINE

## 2024-04-17 PROCEDURE — 2500000001 HC RX 250 WO HCPCS SELF ADMINISTERED DRUGS (ALT 637 FOR MEDICARE OP): Performed by: NURSE PRACTITIONER

## 2024-04-17 PROCEDURE — 94640 AIRWAY INHALATION TREATMENT: CPT

## 2024-04-17 PROCEDURE — 2500000002 HC RX 250 W HCPCS SELF ADMINISTERED DRUGS (ALT 637 FOR MEDICARE OP, ALT 636 FOR OP/ED): Mod: MUE | Performed by: EMERGENCY MEDICINE

## 2024-04-17 PROCEDURE — 1090000001 HH PPS REVENUE CREDIT

## 2024-04-17 PROCEDURE — 82947 ASSAY GLUCOSE BLOOD QUANT: CPT

## 2024-04-17 PROCEDURE — 85027 COMPLETE CBC AUTOMATED: CPT | Performed by: NURSE PRACTITIONER

## 2024-04-17 RX ORDER — FUROSEMIDE 20 MG/1
40 TABLET ORAL DAILY
Start: 2024-04-17 | End: 2024-05-06

## 2024-04-17 RX ORDER — ATORVASTATIN CALCIUM 40 MG/1
40 TABLET, FILM COATED ORAL NIGHTLY
Start: 2024-04-17 | End: 2024-05-24 | Stop reason: SDUPTHER

## 2024-04-17 RX ORDER — INSULIN GLARGINE 100 [IU]/ML
8 INJECTION, SOLUTION SUBCUTANEOUS NIGHTLY
Start: 2024-04-17 | End: 2024-05-24 | Stop reason: ALTCHOICE

## 2024-04-17 RX ORDER — PREDNISONE 10 MG/1
TABLET ORAL DAILY
Start: 2024-04-17 | End: 2024-04-26

## 2024-04-17 RX ORDER — BENZONATATE 200 MG/1
200 CAPSULE ORAL 3 TIMES DAILY PRN
Start: 2024-04-17 | End: 2024-04-22

## 2024-04-17 RX ADMIN — AMIODARONE HYDROCHLORIDE 200 MG: 200 TABLET ORAL at 08:35

## 2024-04-17 RX ADMIN — INSULIN LISPRO 10 UNITS: 100 INJECTION, SOLUTION INTRAVENOUS; SUBCUTANEOUS at 12:15

## 2024-04-17 RX ADMIN — IPRATROPIUM BROMIDE AND ALBUTEROL SULFATE 3 ML: 2.5; .5 SOLUTION RESPIRATORY (INHALATION) at 14:00

## 2024-04-17 RX ADMIN — GABAPENTIN 600 MG: 300 CAPSULE ORAL at 08:34

## 2024-04-17 RX ADMIN — PANTOPRAZOLE SODIUM 40 MG: 40 TABLET, DELAYED RELEASE ORAL at 06:13

## 2024-04-17 RX ADMIN — EMPAGLIFLOZIN 10 MG: 10 TABLET, FILM COATED ORAL at 08:34

## 2024-04-17 RX ADMIN — IPRATROPIUM BROMIDE AND ALBUTEROL SULFATE 3 ML: 2.5; .5 SOLUTION RESPIRATORY (INHALATION) at 19:27

## 2024-04-17 RX ADMIN — APIXABAN 5 MG: 5 TABLET, FILM COATED ORAL at 08:34

## 2024-04-17 RX ADMIN — DULOXETINE HYDROCHLORIDE 30 MG: 30 CAPSULE, DELAYED RELEASE ORAL at 08:35

## 2024-04-17 RX ADMIN — PREDNISONE 40 MG: 20 TABLET ORAL at 08:34

## 2024-04-17 RX ADMIN — METOPROLOL SUCCINATE 25 MG: 25 TABLET, EXTENDED RELEASE ORAL at 08:37

## 2024-04-17 RX ADMIN — GABAPENTIN 600 MG: 300 CAPSULE ORAL at 17:25

## 2024-04-17 RX ADMIN — FUROSEMIDE 40 MG: 10 INJECTION, SOLUTION INTRAMUSCULAR; INTRAVENOUS at 09:57

## 2024-04-17 RX ADMIN — TOPIRAMATE 100 MG: 25 TABLET, FILM COATED ORAL at 08:34

## 2024-04-17 RX ADMIN — INSULIN LISPRO 10 UNITS: 100 INJECTION, SOLUTION INTRAVENOUS; SUBCUTANEOUS at 17:25

## 2024-04-17 RX ADMIN — INSULIN LISPRO 2 UNITS: 100 INJECTION, SOLUTION INTRAVENOUS; SUBCUTANEOUS at 08:16

## 2024-04-17 ASSESSMENT — PAIN - FUNCTIONAL ASSESSMENT
PAIN_FUNCTIONAL_ASSESSMENT: 0-10
PAIN_FUNCTIONAL_ASSESSMENT: 0-10

## 2024-04-17 ASSESSMENT — PAIN SCALES - GENERAL
PAINLEVEL_OUTOF10: 0 - NO PAIN
PAINLEVEL_OUTOF10: 0 - NO PAIN

## 2024-04-17 NOTE — PROGRESS NOTES
04/17/24 1028   Discharge Planning   [facility] to be [facility] to: [facility]     Daughter refused pt to dc to [facility]. [facility]. [facility] has accepted statting they have a private room which pt dtr requested. Auth has been changed to [facility] and is pending.

## 2024-04-17 NOTE — NURSING NOTE
Report called to [facility]. Spoke with unit nurse to give report. Patient to be picked up at 4:30pm

## 2024-04-17 NOTE — PROGRESS NOTES
Subjective Data:  Improved SOB.      Overnight Events:     L  CXR much improved with minimal edema/infiltrate compared to 4/12. Residual small L pleural effusion.     PYP scan suggestive of ATTR amyloidosis     Objective Data:  Last Recorded Vitals:  Vitals:    04/17/24 0010 04/17/24 0509 04/17/24 0757 04/17/24 0835   BP: 120/65 134/90 132/72    BP Location:  Right arm Right arm    Patient Position: Lying Lying Lying    Pulse: 63 59 57 65   Resp:   17    Temp: 36.2 °C (97.2 °F) 36.6 °C (97.8 °F) 36.4 °C (97.5 °F)    TempSrc:   Temporal    SpO2: 98% 99% 100%    Weight:       Height:           Last Labs:  CBC - 4/17/2024:  5:54 AM  9.4 13.0 372        41.3      CMP - 4/17/2024:  5:54 AM  9.0 6.3 15  0.6   3.6 3.5 15 68      PTT - 4/8/2024:  4:17 PM  1.3   14.6 51     TROPHS   Date/Time Value Ref Range Status   04/10/2024 11:21  0 - 13 ng/L Final     Comment:     Previous result verified on 4/10/2024 0756 on specimen/case [id number] called with component TRPHS for procedure Troponin I, High Sensitivity with value 121 ng/L.   04/10/2024 05:22  0 - 13 ng/L Final   04/09/2024 02:08 PM 86 0 - 13 ng/L Final     Comment:     Previous result verified on 4/8/2024 1705 on specimen/case [id number] called with component TRPHS for procedure Troponin I, High Sensitivity with value 66 ng/L.     BNP   Date/Time Value Ref Range Status   04/10/2024 11:21  0 - 99 pg/mL Final   04/08/2024 04:17  0 - 99 pg/mL Final     HGBA1C   Date/Time Value Ref Range Status   04/15/2024 06:34 AM 7.2 see below % Final   01/29/2024 11:53 AM 7.7 4.2 - 6.5 % Final   09/05/2023 04:14 AM 7.2 % Final     Comment:          Diagnosis of Diabetes-Adults   Non-Diabetic: < or = 5.6%   Increased risk for developing diabetes: 5.7-6.4%   Diagnostic of diabetes: > or = 6.5%  .       Monitoring of Diabetes                Age (y)     Therapeutic Goal (%)   Adults:          >18           <7.0   Pediatrics:    13-18            <7.5                   7-12           <8.0                   0- 6            7.5-8.5   American Diabetes Association. Diabetes Care 33(S1), Jan 2010.     08/03/2018 06:33 AM 6.1 % Final     Comment:          Diagnosis of Diabetes-Adults   Non-Diabetic: < or = 5.6%   Increased risk for developing diabetes: 5.7-6.4%   Diagnostic of diabetes: > or = 6.5%  .       Monitoring of Diabetes                Age (y)     Therapeutic Goal (%)   Adults:          >18           <7.0   Pediatrics:    13-18           <7.5                   7-12           <8.0                   0- 6            7.5-8.5   American Diabetes Association. Diabetes Care 33(S1), Jan 2010.       VLDL   Date/Time Value Ref Range Status   05/26/2023 01:13 PM 20 0 - 40 mg/dL Final   10/27/2022 02:52 PM 17 0 - 40 mg/dL Final   04/21/2022 02:59 PM 21 0 - 40 mg/dL Final      Last I/O:  I/O last 3 completed shifts:  In: - (0 mL/kg)   Out: 2002 (33.2 mL/kg) [Urine:2000 (0.9 mL/kg/hr); Stool:2]  Weight: 60.3 kg     Past Cardiology Tests (Last 3 Years):  EKG:  ECG 12 lead 04/08/2024  NSR 81bpm     ECG 12 Lead 01/24/2024     Echo:  8/2023:  1. Left ventricular systolic function is normal with a 55% estimated ejection fraction.  2. Moderately increased left ventricular septal thickness.  3. The left ventricular posterior wall thickness is moderately increased.  4. There is left ventricular concentric remodeling.  5. The longitudinal 2D Strain is mildly decreased with a pattern of apical preservation which is suggestive of infiltrative heart disease.  6. The patient is in atrial fibrillation which may influence the estimate of left ventricular function and transvalvular flows.        Cardiac Imaging:  CT Chest 4/2024  There are minimal coronary artery calcification  Bilateral focal lower lung atelectasis left more than right,  adjacent to small pleural effusions.       Inpatient Medications:  Scheduled medications   Medication Dose Route Frequency    amiodarone  200 mg  oral Daily    apixaban  5 mg oral BID    atorvastatin  40 mg oral Nightly    docusate sodium  100 mg oral BID    donepezil  5 mg oral Nightly    DULoxetine  30 mg oral BID    empagliflozin  10 mg oral Daily    furosemide  40 mg intravenous q24h    gabapentin  600 mg oral TID    insulin glargine  8 Units subcutaneous Nightly    insulin lispro  0-10 Units subcutaneous TID with meals    ipratropium-albuteroL  3 mL nebulization TID    metoprolol succinate XL  25 mg oral Daily    pantoprazole  40 mg oral Daily before breakfast    polyethylene glycol  17 g oral Daily    [START ON 4/18/2024] predniSONE  30 mg oral Daily    Followed by    [START ON 4/21/2024] predniSONE  20 mg oral Daily    Followed by    [START ON 4/24/2024] predniSONE  10 mg oral Daily    topiramate  100 mg oral BID     PRN medications   Medication    acetaminophen    benzonatate    dextrose    dextrose    glucagon    glucagon    ipratropium-albuteroL    melatonin    ondansetron    traMADol    traZODone     Continuous Medications   Medication Dose Last Rate    oxygen           Physical Exam:  General: well appearing, no acute distress  Cardiac: irregular rate and rhythm, no murmurs, rubs or gallops  Pulmonary: Clear to auscultation bilaterally, normal respiratory effort  Peripheral pulses: intact, 2+  Extremities: trace bilateral LE edema      Assessment/Plan   Elida Benson is a 78 y.o. female with pertinent history of dementia, hypertension, diverticulitis status post laparoscopic anterior resection with ileostomy on 8/16/2023, paroxysmal atrial fibrillation on amiodarone and Eliquis, preserved ejection fraction with suspected myocardial cleft versus false tendon, moderate concentric hypertrophy, moderate left atrial enlargement on echo performed 8/19/2023, preserved ejection fraction with moderate septal thickness, abnormal strain imaging concerning for possible infiltrative heart disease on echo performed 8/22/2023, presented to [facility]  found to have RUL pneumonia. Cardiology c/s for CHF and troponins.      RUL Pneumonia. On Atbx  CHF/HFpEf (, bilateral pleural effusions).  Hypertensive, female, elderly, pAF.  Troponin elevation (plateu 127). Likely demand type 2 ischemia in s/o underlying cardiomyopathy and pneumonia  pAF on amiodarone, apixaban, and MTXL 25mg daily   DM  HTN      Recommendations:  - Pneumonia management per primary team  - On discharge with 40 PO daily oral lasix  -Follow up urine and serum protein immunofixation and serum light chains  -Continue home amiodarone 200mg daily, MTXL 25mg dily, and apixaban 5mg bid for known pAF  -PYP suggestive of ATTR amyloid. Will refer to Dr. Lori Dueñas outpatient HF follow up  -Please arrange for BMP in 2 weeks  - Atorvastatin 40mg at bedtime  -Jardiance 10mg daily      Code Status:  Full Code     I saw and evaluated the patient. I personally obtained the key and critical portions of the history and physical exam. I reviewed the fellow's documentation and discussed the patient with the fellow. I agree with the fellow's medical decision making as documented in the fellow's note and have edited it as necessary.  I personally reviewed the patient's recent labs, medications, orders, EKGs, and pertinent cardiac imaging/ echocardiography.     Thank you for allowing me to participate in their care.  Please feel free to call me with any further questions or concerns.        Cesar Ignacio MD, [facility], [facility]  Division of Cardiovascular Medicine  System Director, Nuclear Cardiology   Medical Director, [facility] [place], [facility]   Clinical , [employer]  [email or web address]   Office:  [phone]

## 2024-04-17 NOTE — TELEPHONE ENCOUNTER
Patients Daughter Kaelyn Turk [phone] called stating her mother is getting discharged today from [facility]. Kaelyn states they are discharging her without her keflex and the daughter is concerned since she is suppose to still be on this medication.

## 2024-04-17 NOTE — DISCHARGE SUMMARY
Discharge Diagnosis  Pneumonia, unspecified organism    Issues Requiring Follow-Up  With PCP    Test Results Pending At Discharge  Pending Labs       Order Current Status    Respiratory Culture/Smear Collected (04/10/24 4441)            Hospital Course  Patient with a past medical history of hypertension dyslipidemia COPD atrial fibrillation on Eliquis dementia diabetes mellitus chronic diastolic congestive heart failure presents to the hospital with increasing cough and productive phlegm along with worsening shortness of breath  Workup in the emergency room was concerning for pneumonia  Started on antibiotics in the emergency room  On our exam while on the floor she was also noted to be in acute on chronic diastolic congestive heart failure  Started IV diuresis  Cardiology was also concerned about possibility of amyloidosis  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     Pulmonary   Respiratory assessment: No respiratory distress, normal respiratory rhythm and effort.    Auscultation of Lungs: Clear bilateral breath sounds.   Cardiovascular   Auscultation of heart: Apical pulse normal, heart rate and rhythm normal, normal S1 and S2, no murmurs and no pericardial rub.    Exam for edema: No peripheral edema.   Abdomen   Abdominal Exam: No bruits, normal bowel sounds, soft, non-tender, no abdominal mass palpated.    Liver and Spleen exam: No hepato-splenomegaly.   Musculoskeletal      Inspection of digits and nails: No clubbing  or cyanosis of the fingernails.    Inspection/palpation of joints, bones and muscles: No joint swelling. Normal movement of all extremities.   Skin   Skin inspection: Normal skin color and pigmentation, normal skin turgor and no visible rash.   Neurologic   Cranial nerves: Nerves 2-12 were intact, no focal neuro defects.       Home Medications     Medication List      START taking these medications     atorvastatin 40 mg tablet; Commonly known as: Lipitor; Take 1 tablet (40   mg) by mouth once daily at bedtime.   benzonatate 200 mg capsule; Commonly known as: Tessalon; Take 1 capsule   (200 mg) by mouth 3 times a day as needed for cough for up to 5 days. Do   not crush or chew.   empagliflozin 10 mg; Commonly known as: Jardiance; Take 1 tablet (10 mg)   by mouth once daily.   insulin glargine 100 unit/mL injection; Commonly known as: Lantus;   Inject 8 Units under the skin once daily at bedtime. Take as directed per   insulin instructions. Monitor closely for dose adjustment once off steroid   predniSONE 10 mg tablet; Commonly known as: Deltasone; Take 3 tablets   (30 mg) by mouth once daily for 3 days, THEN 2 tablets (20 mg) once daily   for 3 days, THEN 1 tablet (10 mg) once daily for 3 days.; Start taking on:   April 17, 2024     CHANGE how you take these medications     furosemide 20 mg tablet; Commonly known as: Lasix; Take 2 tablets (40   mg) by mouth once daily. Please note increased dose; What changed: how   much to take, additional instructions     CONTINUE taking these medications     acetaminophen 325 mg tablet; Commonly known as: Tylenol   amiodarone 200 mg tablet; Commonly known as: Pacerone; Give 1 tablet by   mouth in the morning for ANTIARRHYTHMIC   apixaban 5 mg tablet; Commonly known as: Eliquis; Give 1 tablet by mouth   two times a day for blood thinner   b complex 0.4 mg tablet   biotin 1 mg tablet   cholecalciferol 50,000 unit capsule; Commonly known as: Vitamin D-3   docusate sodium 100 mg  capsule; Commonly known as: Colace   donepezil 5 mg tablet; Commonly known as: Aricept; 1 tab(s) orally once   (at bedtime)   DULoxetine 30 mg DR capsule; Commonly known as: Cymbalta; Take 1 capsule   (30 mg) by mouth 2 times a day.   gabapentin 600 mg tablet; Commonly known as: Neurontin; TAKE ONE TABLET   BY MOUTH THREE TIMES A DAY   melatonin 3 mg tablet   metoprolol succinate XL 25 mg 24 hr tablet; Commonly known as:   Toprol-XL; Take 1 tablet (25 mg) by mouth once daily. Do not crush or   chew. DO NOT GIVE IF SBP IS UNDER 100mm/hg   ondansetron ODT 4 mg disintegrating tablet; Commonly known as:   Zofran-ODT; Take 1 tablet (4 mg) by mouth every 8 hours if needed for   nausea or vomiting.   topiramate 100 mg tablet; Commonly known as: Topamax   traZODone 50 mg tablet; Commonly known as: Desyrel; Take 1 tablet (50   mg) by mouth as needed at bedtime for sleep.       Outpatient Follow-Up  Future Appointments   Date Time Provider Department [place]   5/6/2024  1:40 PM Lori Dueñas MD PhD [id number] None   6/13/2024 11:00 AM [facility] MRI 1 AHUMRI [facility] Rad   6/28/2024 10:45 AM Cesar ORLANDO MD [id number] None   6/28/2024  1:30 PM David Swift MD [id number] [facility]   7/5/2024  9:20 AM Sebastian Lo MD [id number] [facility]   7/8/2024  1:45 PM Randa Phillips PA-C [id number] [facility]   Patient seen at bedside. Events from the last visit reviewed. Discussed with staff. Results of tests and investigations from last visit reviewed and discussed with patient/Family. Electronic chart on [facility] reviewed. Input / Recommendations  from consultants  appreciated and reviewed and agreed with.     discharge summary and profile completed. medications reviewed and discussed with patient and family.  scripts completed and signed.     total discharge time in excess of 30 minutes.      Julián Barboza MD

## 2024-04-17 NOTE — CARE PLAN
Problem: Fall/Injury  Goal: Verbalize understanding of personal risk factors for fall in the hospital  Outcome: Progressing  Goal: Verbalize understanding of risk factor reduction measures to prevent injury from fall in the home  Outcome: Progressing  Goal: Use assistive devices by end of the shift  Outcome: Progressing  Goal: Pace activities to prevent fatigue by end of the shift  Outcome: Progressing     Problem: Pain  Goal: My pain/discomfort is manageable  Outcome: Progressing     Problem: Safety  Goal: Patient will be injury free during hospitalization  Outcome: Progressing  Goal: I will remain free of falls  Outcome: Progressing     Problem: Daily Care  Goal: Daily care needs are met  Outcome: Progressing     Problem: Psychosocial Needs  Goal: Demonstrates ability to cope with hospitalization/illness  Outcome: Progressing  Goal: Collaborate with me, my family, and caregiver to identify my specific goals  Outcome: Progressing     Problem: Discharge Barriers  Goal: My discharge needs are met  Outcome: Progressing     Problem: Skin  Goal: Participates in plan/prevention/treatment measures  Outcome: Progressing  Goal: Prevent/manage excess moisture  Outcome: Progressing  Goal: Promote/optimize nutrition  Outcome: Progressing   The patient's goals for the shift include      The clinical goals for the shift include patient will remain hds throughout shift    Over the shift, the patient did not make progress toward the following goals. Barriers to progression include . Recommendations to address these barriers include .

## 2024-04-18 PROCEDURE — 1090000002 HH PPS REVENUE DEBIT

## 2024-04-18 PROCEDURE — 1090000001 HH PPS REVENUE CREDIT

## 2024-04-19 ENCOUNTER — APPOINTMENT (OUTPATIENT)
Dept: CARDIOLOGY | Facility: CLINIC | Age: 79
End: 2024-04-19
Payer: MEDICARE

## 2024-04-19 PROCEDURE — 1090000001 HH PPS REVENUE CREDIT

## 2024-04-19 PROCEDURE — 1090000002 HH PPS REVENUE DEBIT

## 2024-04-20 ENCOUNTER — NURSING HOME VISIT (OUTPATIENT)
Dept: POST ACUTE CARE | Facility: EXTERNAL LOCATION | Age: 79
End: 2024-04-20
Payer: MEDICARE

## 2024-04-20 DIAGNOSIS — M50.00 CERVICAL DISC DISORDER WITH MYELOPATHY: ICD-10-CM

## 2024-04-20 DIAGNOSIS — F02.A0 MILD ALZHEIMER'S DEMENTIA, UNSPECIFIED TIMING OF DEMENTIA ONSET, UNSPECIFIED WHETHER BEHAVIORAL, PSYCHOTIC, OR MOOD DISTURBANCE OR ANXIETY (MULTI): ICD-10-CM

## 2024-04-20 DIAGNOSIS — F32.A DEPRESSION, UNSPECIFIED DEPRESSION TYPE: ICD-10-CM

## 2024-04-20 DIAGNOSIS — E11.9 TYPE 2 DIABETES MELLITUS WITHOUT COMPLICATION, WITH LONG-TERM CURRENT USE OF INSULIN (MULTI): ICD-10-CM

## 2024-04-20 DIAGNOSIS — J18.9 PNEUMONIA OF LEFT UPPER LOBE DUE TO INFECTIOUS ORGANISM: Primary | ICD-10-CM

## 2024-04-20 DIAGNOSIS — Z79.4 TYPE 2 DIABETES MELLITUS WITHOUT COMPLICATION, WITH LONG-TERM CURRENT USE OF INSULIN (MULTI): ICD-10-CM

## 2024-04-20 DIAGNOSIS — G30.9 MILD ALZHEIMER'S DEMENTIA, UNSPECIFIED TIMING OF DEMENTIA ONSET, UNSPECIFIED WHETHER BEHAVIORAL, PSYCHOTIC, OR MOOD DISTURBANCE OR ANXIETY (MULTI): ICD-10-CM

## 2024-04-20 DIAGNOSIS — I10 HYPERTENSION, ESSENTIAL: ICD-10-CM

## 2024-04-20 DIAGNOSIS — I48.11 LONGSTANDING PERSISTENT ATRIAL FIBRILLATION (MULTI): ICD-10-CM

## 2024-04-20 DIAGNOSIS — J44.9 COPD WITHOUT EXACERBATION (MULTI): ICD-10-CM

## 2024-04-20 DIAGNOSIS — R53.1 WEAKNESS: ICD-10-CM

## 2024-04-20 PROCEDURE — 99305 1ST NF CARE MODERATE MDM 35: CPT | Performed by: INTERNAL MEDICINE

## 2024-04-20 PROCEDURE — 1090000001 HH PPS REVENUE CREDIT

## 2024-04-20 PROCEDURE — 1090000002 HH PPS REVENUE DEBIT

## 2024-04-20 NOTE — PROGRESS NOTES
Subjective   Patient ID: Elida Benson is a 79 y.o. female who is acute skilled care and presents for initial visit for skilled nursing.    HPI   I came to admit Mrs. Estefania Benson to [facility] skilled nursing facility today on April 20, 2024.  She presented to the emergency room with history of increasing cough productive of phlegm and shortness of breath.  She was diagnosed and treated for pneumonia and COPD.  Her past medical history remarkable for history of atrial fibrillation on Eliquis, cervical myelopathy with laminectomy.  Chronic pain syndrome.  COPD.  Long memory loss questionable dementia, depression, diabetes mellitus even though patient said she had it in the past not anymore?.  Dyspnea on exertion, hyperlipidemia with hypertension, cardiomyopathy, spinal stenosis of the cervical region with UTI.  Surgically she had a carpal tunnel done.  Ileostomy, knee arthroplasty, laminectomy, lumbar fusion, diverting ileostomy.,  Spinal cord stimulator implant in 2022.  And total hip replacement 2013.  Lives [place], 2 daughters and 1 son who lives in [facility].  PCP is Dr. David Swift from [facility].  Allergic to shellfish  She used to be a hairstylist.  She admitted to having memory issues specifically long memory and old memory could not remember things.  Never on oxygen in the past.  Uses a walker at times at home otherwise she was by herself ambulating.  Today she was oriented to place to time and to year and date.  Pre-Admission Details:  Admitted from: [facility] hospital  Admitted on:   4/17/2024  Primary caregiver: patient and daughter    :    Sheila Nascimento       Advance Directive: Full code    Review of Systems  A comprehensive review of systems was negative.    Fall History:  History of falls no  Frequency in last year 0  Circumstances:NA  Injuries :NA    Assistive Devices: none    Functional History - Prior To Admission:  IADL  (PTA)      Telephone: Independent   Transport: Independent   Shopping: Independent   Meal prep: Independent   Housework: Independent   Meds:  Independent   Finances: Partial assistance     ADL Baseline  Ambulation: Partial assistance  Bathing: Partial assistance  Dressing: Partial assistance  Toileting: Partial assistance  Transfer: Total assistance  Continence: Partial assistance  Feeding: Independent    Functional History - At Time Of Consult:  needs assistance with all ADLs    Review of Systems  Slight cough, on O 2 nasal canula, would like it to be discontinued  Wandering why this happened to her, she was fine and well at home prior to hospital.   Denies chest pains, SOB coughing slightly, no GI OR  SYMPTOMS  Sitting in wheel chair , oxygen on , poor old or long term memory  QUIT smoking 40 years ago, 1/4 pack a day average, some alcohol, rare, social.  No drugs  Objective   There were no vitals taken for this visit.  Pulse ox on RA  99 %.  NORMAL VITALS  Physical Exam  Alert oriented in no distress..  Sitting in the wheelchair with oxygen nasal cannula on 2 L/min.  Nonicteric sclera or jaundice.  Face symmetrical cranial nerves intact.  Neck supple no masses no lymph node thyromegaly or jugular venous distention.  Lungs diminished but clear no wheezing or rales.  Heart normal S1 and S2 regular rhythm.  Abdomen benign nontender no masses no organomegaly.  Neurological exam intact.  Assessment/Plan   Diagnoses and all orders for this visit:  Pneumonia of left upper lobe due to infectious organism  COPD without exacerbation (Multi)  Hypertension, essential  Mild Alzheimer's dementia, unspecified timing of dementia onset, unspecified whether behavioral, psychotic, or mood disturbance or anxiety (Multi)  Depression, unspecified depression type  Type 2 diabetes mellitus without complication, with long-term current use of insulin (Multi)  Weakness  Longstanding persistent atrial fibrillation (Multi)  Cervical disc  disorder with myelopathy       Goals    None

## 2024-04-20 NOTE — PROGRESS NOTES
Subjective   Patient ID: Elida Benson is a 79 y.o. female who presents for No chief complaint on file..    HPI     Review of Systems    Objective   There were no vitals taken for this visit.    Physical Exam    Assessment/Plan

## 2024-04-20 NOTE — LETTER
Patient: Elida Benson  : 1945    Encounter Date: 2024    Subjective  Patient ID: Elida Benson is a 79 y.o. female who presents for No chief complaint on file..    HPI     Review of Systems    Objective  There were no vitals taken for this visit.    Physical Exam    Assessment/Plan           Subjective  Patient ID: Elida Benson is a 79 y.o. female who is acute skilled care and presents for initial visit for skilled nursing.    HPI   I came to admit Mrs. Estefania Benson to [facility] skilled nursing facility today on 2024.  She presented to the emergency room with history of increasing cough productive of phlegm and shortness of breath.  She was diagnosed and treated for pneumonia and COPD.  Her past medical history remarkable for history of atrial fibrillation on Eliquis, cervical myelopathy with laminectomy.  Chronic pain syndrome.  COPD.  Long memory loss questionable dementia, depression, diabetes mellitus even though patient said she had it in the past not anymore?.  Dyspnea on exertion, hyperlipidemia with hypertension, cardiomyopathy, spinal stenosis of the cervical region with UTI.  Surgically she had a carpal tunnel done.  Ileostomy, knee arthroplasty, laminectomy, lumbar fusion, diverting ileostomy.,  Spinal cord stimulator implant in .  And total hip replacement .  Lives [place], 2 daughters and 1 son who lives in [facility].  PCP is Dr. David Swift from [facility].  Allergic to shellfish  She used to be a hairstylist.  She admitted to having memory issues specifically long memory and old memory could not remember things.  Never on oxygen in the past.  Uses a walker at times at home otherwise she was by herself ambulating.  Today she was oriented to place to time and to year and date.  Pre-Admission Details:  Admitted from: [facility]  Admitted on:   2024  Primary caregiver: patient and daughter    :     Sheila Nascimento       Advance Directive: Full code    Review of Systems  A comprehensive review of systems was negative.    Fall History:  History of falls no  Frequency in last year 0  Circumstances:NA  Injuries :NA    Assistive Devices: none    Functional History - Prior To Admission:  IADL (PTA)      Telephone: Independent   Transport: Independent   Shopping: Independent   Meal prep: Independent   Housework: Independent   Meds:  Independent   Finances: Partial assistance     ADL Baseline  Ambulation: Partial assistance  Bathing: Partial assistance  Dressing: Partial assistance  Toileting: Partial assistance  Transfer: Total assistance  Continence: Partial assistance  Feeding: Independent    Functional History - At Time Of Consult:  needs assistance with all ADLs    Review of Systems  Slight cough, on O 2 nasal canula, would like it to be discontinued  Wandering why this happened to her, she was fine and well at home prior to hospital.   Denies chest pains, SOB coughing slightly, no GI OR  SYMPTOMS  Sitting in wheel chair , oxygen on , poor old or long term memory  QUIT smoking 40 years ago, 1/4 pack a day average, some alcohol, rare, social.  No drugs  Objective  There were no vitals taken for this visit.  Pulse ox on RA  99 %.  NORMAL VITALS  Physical Exam  Alert oriented in no distress..  Sitting in the wheelchair with oxygen nasal cannula on 2 L/min.  Nonicteric sclera or jaundice.  Face symmetrical cranial nerves intact.  Neck supple no masses no lymph node thyromegaly or jugular venous distention.  Lungs diminished but clear no wheezing or rales.  Heart normal S1 and S2 regular rhythm.  Abdomen benign nontender no masses no organomegaly.  Neurological exam intact.  Assessment/Plan  Diagnoses and all orders for this visit:  Pneumonia of left upper lobe due to infectious organism  COPD without exacerbation (Multi)  Hypertension, essential  Mild Alzheimer's dementia, unspecified timing of  dementia onset, unspecified whether behavioral, psychotic, or mood disturbance or anxiety (Multi)  Depression, unspecified depression type  Type 2 diabetes mellitus without complication, with long-term current use of insulin (Multi)  Weakness  Longstanding persistent atrial fibrillation (Multi)  Cervical disc disorder with myelopathy       Goals    None           Electronically Signed By: Josh Reinoso MD   4/20/24  2:28 PM

## 2024-04-21 LAB
ATRIAL RATE: 340 BPM
Q ONSET: 212 MS
QRS COUNT: 11 BEATS
QRS DURATION: 100 MS
QT INTERVAL: 424 MS
QTC CALCULATION(BAZETT): 448 MS
QTC FREDERICIA: 440 MS
R AXIS: -10 DEGREES
T AXIS: 43 DEGREES
T OFFSET: 424 MS
VENTRICULAR RATE: 67 BPM

## 2024-04-21 PROCEDURE — 1090000002 HH PPS REVENUE DEBIT

## 2024-04-21 PROCEDURE — 1090000001 HH PPS REVENUE CREDIT

## 2024-04-22 PROCEDURE — 1090000002 HH PPS REVENUE DEBIT

## 2024-04-22 PROCEDURE — 1090000001 HH PPS REVENUE CREDIT

## 2024-04-23 PROCEDURE — 1090000001 HH PPS REVENUE CREDIT

## 2024-04-23 PROCEDURE — 1090000002 HH PPS REVENUE DEBIT

## 2024-04-24 ENCOUNTER — NURSING HOME VISIT (OUTPATIENT)
Dept: POST ACUTE CARE | Facility: EXTERNAL LOCATION | Age: 79
End: 2024-04-24
Payer: MEDICARE

## 2024-04-24 DIAGNOSIS — E11.9 TYPE 2 DIABETES MELLITUS WITHOUT COMPLICATION, WITH LONG-TERM CURRENT USE OF INSULIN (MULTI): ICD-10-CM

## 2024-04-24 DIAGNOSIS — I10 HYPERTENSION, ESSENTIAL: ICD-10-CM

## 2024-04-24 DIAGNOSIS — F02.A0 MILD ALZHEIMER'S DEMENTIA, UNSPECIFIED TIMING OF DEMENTIA ONSET, UNSPECIFIED WHETHER BEHAVIORAL, PSYCHOTIC, OR MOOD DISTURBANCE OR ANXIETY (MULTI): ICD-10-CM

## 2024-04-24 DIAGNOSIS — J18.9 PNEUMONIA OF LEFT UPPER LOBE DUE TO INFECTIOUS ORGANISM: Primary | ICD-10-CM

## 2024-04-24 DIAGNOSIS — Z79.4 TYPE 2 DIABETES MELLITUS WITHOUT COMPLICATION, WITH LONG-TERM CURRENT USE OF INSULIN (MULTI): ICD-10-CM

## 2024-04-24 DIAGNOSIS — G30.9 MILD ALZHEIMER'S DEMENTIA, UNSPECIFIED TIMING OF DEMENTIA ONSET, UNSPECIFIED WHETHER BEHAVIORAL, PSYCHOTIC, OR MOOD DISTURBANCE OR ANXIETY (MULTI): ICD-10-CM

## 2024-04-24 DIAGNOSIS — J44.9 COPD WITHOUT EXACERBATION (MULTI): ICD-10-CM

## 2024-04-24 PROCEDURE — 1090000001 HH PPS REVENUE CREDIT

## 2024-04-24 PROCEDURE — 1090000002 HH PPS REVENUE DEBIT

## 2024-04-24 PROCEDURE — 99308 SBSQ NF CARE LOW MDM 20: CPT | Performed by: INTERNAL MEDICINE

## 2024-04-24 NOTE — PROGRESS NOTES
Name: Elida Benson  Medical Record Number: [id number]  YOB: 1945    Elida Benson is a 79 y.o. female who presented on April 20, 2024 with history of pneumonia and COPD on oxygen nasal cannula.  We were able to wean her off oxygen and she did very well on room air.  She has a past medical history of hypertension with the hyperlipidemia COPD with atrial fibrillation on Eliquis history of memory loss specifically past memory.  She had also diabetes mellitus with chronic diastolic CHF was admitted and treated for pneumonia.  He did well and now is she is in the rehab and doing well also.  He has no specific complaint or symptoms.   doing well in physical therapy..  She is using spirometer at bedside.       Advance Directive: Full code.    Review of Systems  12 system review 12 system are negative.  She ate breakfast this morning and she was having a nap when I came in to see her.  She had some time to adjust and to wake up.    There were no vitals taken for this visit.  Wt Readings from Last 3 Encounters:   04/08/24 60.3 kg (133 lb)   03/11/24 65.3 kg (144 lb)   03/08/24 66.2 kg (146 lb)     BP Readings from Last 3 Encounters:   04/17/24 102/56   04/08/24 116/60   04/02/24 112/70       Physical Exam   Alert oriented no distress off oxygen.  Neck supple no masses no lymph node no thyromegaly or jugular venous distention.  Nonicteric sclera no jaundice.  Face symmetrical cranial nerves intact.  Lungs clear no rales wheezing or crackles.  Heart normal S1 and S2 regular rhythm.  Abdomen benign neurologically intact  Assessment/Plan     PNEUMONIA   COPD   DM2   FORGETFUL   HTN  DEPRESSION  CERVICAL SPINE STENOSIS

## 2024-04-24 NOTE — LETTER
Patient: Elida Benson  : 1945    Encounter Date: 2024    Name: Elida Benson  Medical Record Number: [id number]  YOB: 1945    Elida Benson is a 79 y.o. female who presented on 2024 with history of pneumonia and COPD on oxygen nasal cannula.  We were able to wean her off oxygen and she did very well on room air.  She has a past medical history of hypertension with the hyperlipidemia COPD with atrial fibrillation on Eliquis history of memory loss specifically past memory.  She had also diabetes mellitus with chronic diastolic CHF was admitted and treated for pneumonia.  He did well and now is she is in the rehab and doing well also.  He has no specific complaint or symptoms.   doing well in physical therapy..  She is using spirometer at bedside.       Advance Directive: Full code.    Review of Systems  12 system review 12 system are negative.  She ate breakfast this morning and she was having a nap when I came in to see her.  She had some time to adjust and to wake up.    There were no vitals taken for this visit.  Wt Readings from Last 3 Encounters:   24 60.3 kg (133 lb)   24 65.3 kg (144 lb)   24 66.2 kg (146 lb)     BP Readings from Last 3 Encounters:   24 102/56   24 116/60   24 112/70       Physical Exam   Alert oriented no distress off oxygen.  Neck supple no masses no lymph node no thyromegaly or jugular venous distention.  Nonicteric sclera no jaundice.  Face symmetrical cranial nerves intact.  Lungs clear no rales wheezing or crackles.  Heart normal S1 and S2 regular rhythm.  Abdomen benign neurologically intact  Assessment/Plan    PNEUMONIA   COPD   DM2   FORGETFUL   HTN  DEPRESSION  CERVICAL SPINE STENOSIS      Electronically Signed By: Josh Reinoso MD   24  4:08 PM

## 2024-04-25 PROCEDURE — 1090000002 HH PPS REVENUE DEBIT

## 2024-04-25 PROCEDURE — 1090000001 HH PPS REVENUE CREDIT

## 2024-04-26 PROCEDURE — 1090000002 HH PPS REVENUE DEBIT

## 2024-04-26 PROCEDURE — 1090000001 HH PPS REVENUE CREDIT

## 2024-04-27 PROCEDURE — 1090000002 HH PPS REVENUE DEBIT

## 2024-04-27 PROCEDURE — 1090000001 HH PPS REVENUE CREDIT

## 2024-04-28 PROCEDURE — 1090000002 HH PPS REVENUE DEBIT

## 2024-04-28 PROCEDURE — 1090000001 HH PPS REVENUE CREDIT

## 2024-04-29 PROCEDURE — 1090000002 HH PPS REVENUE DEBIT

## 2024-04-29 PROCEDURE — 1090000001 HH PPS REVENUE CREDIT

## 2024-04-30 PROCEDURE — 1090000002 HH PPS REVENUE DEBIT

## 2024-04-30 PROCEDURE — 1090000001 HH PPS REVENUE CREDIT

## 2024-05-01 ENCOUNTER — NURSING HOME VISIT (OUTPATIENT)
Dept: POST ACUTE CARE | Facility: EXTERNAL LOCATION | Age: 79
End: 2024-05-01
Payer: MEDICARE

## 2024-05-01 DIAGNOSIS — Z79.4 TYPE 2 DIABETES MELLITUS WITHOUT COMPLICATION, WITH LONG-TERM CURRENT USE OF INSULIN (MULTI): ICD-10-CM

## 2024-05-01 DIAGNOSIS — R53.1 WEAKNESS: ICD-10-CM

## 2024-05-01 DIAGNOSIS — J44.9 COPD WITHOUT EXACERBATION (MULTI): ICD-10-CM

## 2024-05-01 DIAGNOSIS — F02.A0 MILD ALZHEIMER'S DEMENTIA, UNSPECIFIED TIMING OF DEMENTIA ONSET, UNSPECIFIED WHETHER BEHAVIORAL, PSYCHOTIC, OR MOOD DISTURBANCE OR ANXIETY (MULTI): ICD-10-CM

## 2024-05-01 DIAGNOSIS — I10 HYPERTENSION, ESSENTIAL: ICD-10-CM

## 2024-05-01 DIAGNOSIS — E11.9 TYPE 2 DIABETES MELLITUS WITHOUT COMPLICATION, WITH LONG-TERM CURRENT USE OF INSULIN (MULTI): ICD-10-CM

## 2024-05-01 DIAGNOSIS — F32.A DEPRESSION, UNSPECIFIED DEPRESSION TYPE: ICD-10-CM

## 2024-05-01 DIAGNOSIS — J18.9 PNEUMONIA OF LEFT UPPER LOBE DUE TO INFECTIOUS ORGANISM: Primary | ICD-10-CM

## 2024-05-01 DIAGNOSIS — I48.11 LONGSTANDING PERSISTENT ATRIAL FIBRILLATION (MULTI): ICD-10-CM

## 2024-05-01 DIAGNOSIS — G30.9 MILD ALZHEIMER'S DEMENTIA, UNSPECIFIED TIMING OF DEMENTIA ONSET, UNSPECIFIED WHETHER BEHAVIORAL, PSYCHOTIC, OR MOOD DISTURBANCE OR ANXIETY (MULTI): ICD-10-CM

## 2024-05-01 PROCEDURE — 1090000001 HH PPS REVENUE CREDIT

## 2024-05-01 PROCEDURE — 1090000002 HH PPS REVENUE DEBIT

## 2024-05-01 PROCEDURE — 99308 SBSQ NF CARE LOW MDM 20: CPT | Performed by: INTERNAL MEDICINE

## 2024-05-01 NOTE — LETTER
Patient: Elida Benson  : 1945    Encounter Date: 2024    Name: Elida Benson  Medical Record Number: [id number]  YOB: 1945    lEida Benson is a 79 y.o. female who presented with the concern for pneumonia, chronic diastolic congestive heart failure who presented to the hospital originally for increasing cough and productive cough with phlegm with shortness of breath.  She has a history of diabetes mellitus type 2 COPD atrial fibrillation hypertension hyperlipidemia.  I came to see her today on May 1, 2024 she was sitting up at the bed edge eating her breakfast.  She felt good and had no specific complaint or symptoms.  Since her arrival to the nursing home she been off oxygen.  She is doing very well on room air.  She is making progress in physical therapy.       Advance Directive: Full code to be confirmed    Review of Systems  12 system review 12 system are negative.    There were no vitals taken for this visit.  Wt Readings from Last 3 Encounters:   24 60.3 kg (133 lb)   24 65.3 kg (144 lb)   24 66.2 kg (146 lb)     BP Readings from Last 3 Encounters:   24 102/56   24 116/60   24 112/70     Normal vital signs.  Physical Exam   Alert oriented in no distress pleasant cooperative.  Nonicteric sclera or jaundice.  Face symmetrical cranial nerves intact.  Neck supple no masses no lymph node no thyromegaly or jugular venous distention.  Lungs clear no rales no wheezing or crackles.  Heart normal S1 and S2 regular rhythm.  Abdomen benign neurologically intact.  Making good progress in physical therapy.  Assessment/Plan   Diagnoses and all orders for this visit:  Pneumonia of left upper lobe due to infectious organism  COPD without exacerbation (Multi)  Hypertension, essential  Mild Alzheimer's dementia, unspecified timing of dementia onset, unspecified whether behavioral, psychotic, or mood disturbance or anxiety (Multi)  Type 2 diabetes mellitus  without complication, with long-term current use of insulin (Multi)  Depression, unspecified depression type  Weakness  Longstanding persistent atrial fibrillation (Multi)      Electronically Signed By: Josh Reinoso MD   5/1/24  3:23 PM

## 2024-05-01 NOTE — PROGRESS NOTES
Name: Elida Benson  Medical Record Number: [id number]  YOB: 1945    Elida Benson is a 79 y.o. female who presented with the concern for pneumonia, chronic diastolic congestive heart failure who presented to the hospital originally for increasing cough and productive cough with phlegm with shortness of breath.  She has a history of diabetes mellitus type 2 COPD atrial fibrillation hypertension hyperlipidemia.  I came to see her today on May 1, 2024 she was sitting up at the bed edge eating her breakfast.  She felt good and had no specific complaint or symptoms.  Since her arrival to the nursing home she been off oxygen.  She is doing very well on room air.  She is making progress in physical therapy.       Advance Directive: Full code to be confirmed    Review of Systems  12 system review 12 system are negative.    There were no vitals taken for this visit.  Wt Readings from Last 3 Encounters:   04/08/24 60.3 kg (133 lb)   03/11/24 65.3 kg (144 lb)   03/08/24 66.2 kg (146 lb)     BP Readings from Last 3 Encounters:   04/17/24 102/56   04/08/24 116/60   04/02/24 112/70     Normal vital signs.  Physical Exam   Alert oriented in no distress pleasant cooperative.  Nonicteric sclera or jaundice.  Face symmetrical cranial nerves intact.  Neck supple no masses no lymph node no thyromegaly or jugular venous distention.  Lungs clear no rales no wheezing or crackles.  Heart normal S1 and S2 regular rhythm.  Abdomen benign neurologically intact.  Making good progress in physical therapy.  Assessment/Plan    Diagnoses and all orders for this visit:  Pneumonia of left upper lobe due to infectious organism  COPD without exacerbation (Multi)  Hypertension, essential  Mild Alzheimer's dementia, unspecified timing of dementia onset, unspecified whether behavioral, psychotic, or mood disturbance or anxiety (Multi)  Type 2 diabetes mellitus without complication, with long-term current use of insulin  (Multi)  Depression, unspecified depression type  Weakness  Longstanding persistent atrial fibrillation (Multi)

## 2024-05-02 PROCEDURE — 1090000002 HH PPS REVENUE DEBIT

## 2024-05-02 PROCEDURE — 1090000001 HH PPS REVENUE CREDIT

## 2024-05-03 PROCEDURE — 1090000002 HH PPS REVENUE DEBIT

## 2024-05-03 PROCEDURE — 1090000001 HH PPS REVENUE CREDIT

## 2024-05-04 ENCOUNTER — NURSING HOME VISIT (OUTPATIENT)
Dept: POST ACUTE CARE | Facility: EXTERNAL LOCATION | Age: 79
End: 2024-05-04
Payer: MEDICARE

## 2024-05-04 DIAGNOSIS — E11.9 TYPE 2 DIABETES MELLITUS WITHOUT COMPLICATION, WITH LONG-TERM CURRENT USE OF INSULIN (MULTI): ICD-10-CM

## 2024-05-04 DIAGNOSIS — F32.A DEPRESSION, UNSPECIFIED DEPRESSION TYPE: ICD-10-CM

## 2024-05-04 DIAGNOSIS — I48.11 LONGSTANDING PERSISTENT ATRIAL FIBRILLATION (MULTI): ICD-10-CM

## 2024-05-04 DIAGNOSIS — Z79.4 TYPE 2 DIABETES MELLITUS WITHOUT COMPLICATION, WITH LONG-TERM CURRENT USE OF INSULIN (MULTI): ICD-10-CM

## 2024-05-04 DIAGNOSIS — R53.1 WEAKNESS: ICD-10-CM

## 2024-05-04 DIAGNOSIS — J44.9 COPD WITHOUT EXACERBATION (MULTI): ICD-10-CM

## 2024-05-04 DIAGNOSIS — G30.9 MILD ALZHEIMER'S DEMENTIA, UNSPECIFIED TIMING OF DEMENTIA ONSET, UNSPECIFIED WHETHER BEHAVIORAL, PSYCHOTIC, OR MOOD DISTURBANCE OR ANXIETY (MULTI): Primary | ICD-10-CM

## 2024-05-04 DIAGNOSIS — F02.A0 MILD ALZHEIMER'S DEMENTIA, UNSPECIFIED TIMING OF DEMENTIA ONSET, UNSPECIFIED WHETHER BEHAVIORAL, PSYCHOTIC, OR MOOD DISTURBANCE OR ANXIETY (MULTI): Primary | ICD-10-CM

## 2024-05-04 PROCEDURE — 99308 SBSQ NF CARE LOW MDM 20: CPT | Performed by: INTERNAL MEDICINE

## 2024-05-04 PROCEDURE — 1090000002 HH PPS REVENUE DEBIT

## 2024-05-04 PROCEDURE — 1090000001 HH PPS REVENUE CREDIT

## 2024-05-04 NOTE — PROGRESS NOTES
Name: Elida Benson  Medical Record Number: [id number]  YOB: 1945    Elida Benson is a 79 y.o. female who presents for hypertension, hyperlipidemia, COPD, atrial fibrillation on Eliquis.  Dementia chronic diastolic congestive heart failure.  She was hospitalized for an increasing cough productive of phlegm with shortness of breath.  She was given IV antibiotic and now she is in the nursing home recovering nicely off the oxygen she came on with.  She had multiple medical problems including atrial fibrillation, cervical myelopathy, chronic pain syndrome, COPD, dementia, depression, diverticulitis, diabetes mellitus, hyperlipidemia with hypertension, infiltrative cardiomyopathy, post laminectomy syndrome.  I saw her today on May 4, 2024 at [facility] lying down in bed resting after she ate breakfast.  She stated that she feels well she had no specific complaint or symptoms.  She stated that she is doing well in physical therapy.       Advance Directive to be confirmed.    Review of Systems  12 system reviewed and 12 system are negative.  She denies any cough shortness of breath dyspnea on exertion.  She is doing well off the oxygen and has no specific complaint or symptoms.    There were no vitals taken for this visit.  Wt Readings from Last 3 Encounters:   04/08/24 60.3 kg (133 lb)   03/11/24 65.3 kg (144 lb)   03/08/24 66.2 kg (146 lb)     BP Readings from Last 3 Encounters:   04/17/24 102/56   04/08/24 116/60   04/02/24 112/70       Physical Exam   Alert and mildly oriented to person and possibly to place she could not tell me the place his name or where she is.  Nonicteric sclera or jaundice.  Face symmetrical cranial nerves intact.  Neck supple no masses no lymph nodes no thyromegaly.  She could tell me that she has not smoked in her life and now she is not smoking anymore after being on the cigarettes for 40 years he smoked on the average half a pack to 1/4 pack/day.  Heart normal S1 and S2  regular rhythm.  Abdomen benign neurologically no deficit visible.  Assessment/Plan    Diagnoses and all orders for this visit:  Mild Alzheimer's dementia, unspecified timing of dementia onset, unspecified whether behavioral, psychotic, or mood disturbance or anxiety (Multi)  COPD without exacerbation (Multi)  Type 2 diabetes mellitus without complication, with long-term current use of insulin (Multi)  Weakness  Longstanding persistent atrial fibrillation (Multi)  Depression, unspecified depression type

## 2024-05-04 NOTE — LETTER
Patient: Elida Benson  : 1945    Encounter Date: 2024    Name: Elida Benson  Medical Record Number: [id number]  YOB: 1945    Elida Benson is a 79 y.o. female who presents for hypertension, hyperlipidemia, COPD, atrial fibrillation on Eliquis.  Dementia chronic diastolic congestive heart failure.  She was hospitalized for an increasing cough productive of phlegm with shortness of breath.  She was given IV antibiotic and now she is in the nursing home recovering nicely off the oxygen she came on with.  She had multiple medical problems including atrial fibrillation, cervical myelopathy, chronic pain syndrome, COPD, dementia, depression, diverticulitis, diabetes mellitus, hyperlipidemia with hypertension, infiltrative cardiomyopathy, post laminectomy syndrome.  I saw her today on May 4, 2024 at [facility] lying down in bed resting after she ate breakfast.  She stated that she feels well she had no specific complaint or symptoms.  She stated that she is doing well in physical therapy.       Advance Directive to be confirmed.    Review of Systems  12 system reviewed and 12 system are negative.  She denies any cough shortness of breath dyspnea on exertion.  She is doing well off the oxygen and has no specific complaint or symptoms.    There were no vitals taken for this visit.  Wt Readings from Last 3 Encounters:   24 60.3 kg (133 lb)   24 65.3 kg (144 lb)   24 66.2 kg (146 lb)     BP Readings from Last 3 Encounters:   24 102/56   24 116/60   24 112/70       Physical Exam   Alert and mildly oriented to person and possibly to place she could not tell me the place his name or where she is.  Nonicteric sclera or jaundice.  Face symmetrical cranial nerves intact.  Neck supple no masses no lymph nodes no thyromegaly.  She could tell me that she has not smoked in her life and now she is not smoking anymore after being on the cigarettes for 40 years he  smoked on the average half a pack to 1/4 pack/day.  Heart normal S1 and S2 regular rhythm.  Abdomen benign neurologically no deficit visible.  Assessment/Plan   Diagnoses and all orders for this visit:  Mild Alzheimer's dementia, unspecified timing of dementia onset, unspecified whether behavioral, psychotic, or mood disturbance or anxiety (Multi)  COPD without exacerbation (Multi)  Type 2 diabetes mellitus without complication, with long-term current use of insulin (Multi)  Weakness  Longstanding persistent atrial fibrillation (Multi)  Depression, unspecified depression type      Electronically Signed By: Josh Reinoso MD   5/4/24  4:14 PM

## 2024-05-05 PROCEDURE — 1090000002 HH PPS REVENUE DEBIT

## 2024-05-05 PROCEDURE — 1090000001 HH PPS REVENUE CREDIT

## 2024-05-06 ENCOUNTER — OFFICE VISIT (OUTPATIENT)
Dept: CARDIOLOGY | Facility: CLINIC | Age: 79
End: 2024-05-06
Payer: MEDICARE

## 2024-05-06 VITALS
HEART RATE: 66 BPM | RESPIRATION RATE: 18 BRPM | HEIGHT: 58 IN | BODY MASS INDEX: 28.76 KG/M2 | SYSTOLIC BLOOD PRESSURE: 114 MMHG | WEIGHT: 137 LBS | OXYGEN SATURATION: 99 % | DIASTOLIC BLOOD PRESSURE: 60 MMHG

## 2024-05-06 DIAGNOSIS — R42 DIZZINESS: ICD-10-CM

## 2024-05-06 DIAGNOSIS — M21.379 DROP FOOT GAIT: ICD-10-CM

## 2024-05-06 DIAGNOSIS — I50.33 ACUTE ON CHRONIC DIASTOLIC HEART FAILURE (MULTI): ICD-10-CM

## 2024-05-06 DIAGNOSIS — I48.0 PAROXYSMAL ATRIAL FIBRILLATION (MULTI): Primary | ICD-10-CM

## 2024-05-06 PROCEDURE — 1160F RVW MEDS BY RX/DR IN RCRD: CPT | Performed by: STUDENT IN AN ORGANIZED HEALTH CARE EDUCATION/TRAINING PROGRAM

## 2024-05-06 PROCEDURE — 1090000002 HH PPS REVENUE DEBIT

## 2024-05-06 PROCEDURE — 1036F TOBACCO NON-USER: CPT | Performed by: STUDENT IN AN ORGANIZED HEALTH CARE EDUCATION/TRAINING PROGRAM

## 2024-05-06 PROCEDURE — 1090000001 HH PPS REVENUE CREDIT

## 2024-05-06 PROCEDURE — 99205 OFFICE O/P NEW HI 60 MIN: CPT | Performed by: STUDENT IN AN ORGANIZED HEALTH CARE EDUCATION/TRAINING PROGRAM

## 2024-05-06 PROCEDURE — 93005 ELECTROCARDIOGRAM TRACING: CPT | Performed by: STUDENT IN AN ORGANIZED HEALTH CARE EDUCATION/TRAINING PROGRAM

## 2024-05-06 PROCEDURE — 1159F MED LIST DOCD IN RCRD: CPT | Performed by: STUDENT IN AN ORGANIZED HEALTH CARE EDUCATION/TRAINING PROGRAM

## 2024-05-06 PROCEDURE — 3078F DIAST BP <80 MM HG: CPT | Performed by: STUDENT IN AN ORGANIZED HEALTH CARE EDUCATION/TRAINING PROGRAM

## 2024-05-06 PROCEDURE — 1111F DSCHRG MED/CURRENT MED MERGE: CPT | Performed by: STUDENT IN AN ORGANIZED HEALTH CARE EDUCATION/TRAINING PROGRAM

## 2024-05-06 PROCEDURE — 3074F SYST BP LT 130 MM HG: CPT | Performed by: STUDENT IN AN ORGANIZED HEALTH CARE EDUCATION/TRAINING PROGRAM

## 2024-05-06 PROCEDURE — 93010 ELECTROCARDIOGRAM REPORT: CPT | Performed by: STUDENT IN AN ORGANIZED HEALTH CARE EDUCATION/TRAINING PROGRAM

## 2024-05-06 RX ORDER — SPIRONOLACTONE 25 MG/1
25 TABLET ORAL DAILY
Qty: 90 TABLET | Refills: 3 | Status: SHIPPED | OUTPATIENT
Start: 2024-05-06 | End: 2025-05-06

## 2024-05-06 RX ORDER — FUROSEMIDE 20 MG/1
20 TABLET ORAL DAILY
Qty: 90 TABLET | Refills: 3 | Status: SHIPPED | OUTPATIENT
Start: 2024-05-06 | End: 2025-05-01

## 2024-05-06 ASSESSMENT — ENCOUNTER SYMPTOMS
PND: 0
HEMATOCHEZIA: 0
PALPITATIONS: 0
DEPRESSION: 0
LOSS OF SENSATION IN FEET: 1
NEAR-SYNCOPE: 0
WEIGHT LOSS: 0
OCCASIONAL FEELINGS OF UNSTEADINESS: 1
SHORTNESS OF BREATH: 0
SYNCOPE: 0
ALTERED MENTAL STATUS: 0
BRUISES/BLEEDS EASILY: 1
MEMORY LOSS: 1
DECREASED APPETITE: 0
DIZZINESS: 1
DYSPNEA ON EXERTION: 0
WEIGHT GAIN: 0
ORTHOPNEA: 0
HEMATURIA: 0

## 2024-05-06 ASSESSMENT — LIFESTYLE VARIABLES: SUBSTANCE_ABUSE: 0

## 2024-05-06 NOTE — OP NOTE
PREOPERATIVE DIAGNOSIS:  Cervical stenosis C4-5, C5-6 secondary to large disk osteophyte  complexes, and kyphotic deformity.     POSTOPERATIVE DIAGNOSIS:  Cervical stenosis C4-5, C5-6 secondary to large disk osteophyte  complexes, and kyphotic deformity.     OPERATION/PROCEDURE:  1. Anterior cervical diskectomy decompression, C4-5, C5-6 with  decompression of spinal cord and foraminotomies.   2. C5 corpectomy with partial corpectomy of C4 and C6 to completely  decompress the interspaces.   3. Anterior cervical fusion C4-C6 with use of interbody cage device,  and anterior plate fixation.     SURGEON:  Irvin Robb MD.    ASSISTANT(S):    ANESTHESIA:    FIRST ASSISTANT:  Ricco Vargas PA-C.    There was no qualified resident available for assistance during the  surgery.   Maeethan was the 1st assistant during the operation.     Mr. Vargas assisted with the exposure, diskectomies, corpectomies,  anterior fusion, instrumentation, portions of this procedure.     Second assistant:  Roberto Tolentino, second year resident.    ESTIMATED BLOOD LOSS:  150 mL.    URINE OUTPUT:  200 mL.    IV FLUIDS:  1.8 L lactated Ringer's solution.    FINDINGS:  Excellent decompression of spinal cord with appropriate placement of  the graft instrumentation from C4 to C6.     COMPLICATION:  None.    INDICATION FOR SURGERY:  A 77-year-old woman with severe unrelenting neck pain with radicular  symptoms into the upper extremities, and evidence of  myeloradiculopathy with loss of coordination in the upper and lower  extremities.     She was found to have cervical stenosis from C4 to C6.  There were  large disk osteophyte complexes causing cord compression as well as  compression behind the C5 vertebral body and between C4 and C6.     The patient also had evidence of cervical instability and kyphotic  deformity.     She failed to improve despite nonoperative care.  She was  appropriately consented and brought to the operation room  for above  procedures on 01/17/2023.  This is part 1 of a 2 stage procedure,  part 2 being the posterior fixation as the patient's bone quality was  not great.     DESCRIPTION OF PROCEDURE:  The patient was brought into the operating room and laid supine on  the bed.  General endotracheal anesthesia was induced.  ERVIN hose and  SCDs were placed.  Tejada catheter was placed.  I then placed the  patient into the supine position on Burke frame.     Arms were padded and tucked.  Spinal cord monitoring leads were  placed and baselines were obtained.  Interscapular roll was used to  keep the neck in gentle hyperextension.  We rechecked signals, which  were found to be unchanged.  Anterior cervical region was then  prepped and draped out in the usual sterile fashion.     A transverse incision was made about the left side of the patient's  neck about the C5 level.  Knife was carried through skin,  subcutaneous tissue, and we dissected down to the platysma.  The  platysma was then split in line with the incision.  We bluntly  dissected on the medial border of sternocleidomastoid muscle, found  the plane between the carotid sheath laterally and the medial  structures medially.     We bluntly dissected all the way to and through the prevertebral  fascia.  We identified the longus colli muscle on each side and  placed self-retaining retractors.  A bent spinal needle was used to  identify levels and a lateral x-ray was taken to confirm the  operative levels.  Once it was confirmed, we then performed our  diskectomies.  A 15 blade was used to perform annulotomy, pituitary  rongeur to perform annulectomy and nuclectomy.  Pituitary rongeur,  Kerrison rongeur, curettes were all used to remove all the  cartilaginous material from the disk space all the way to and through  the posterior annulus.     We dissected all the way to the posterior anulus and through it.  We  identified the posterior longitudinal ligament.  This was  partly  ossified, but we were still able to remove the posterior longitudinal  ligament while preserving the cord and not incurring a durotomy.  We  dissected all the way out laterally to the uncovertebral joints on  each side, clipping all the cartilaginous material off the  uncovertebral joints and following each nerve root out the foramina  with the Kerrison rongeur thereby performing foraminotomies.     Next, I bit down the C5 vertebral body, and used power bur to drill  down to a thin rim of C5 vertebra.  We then removed the shallow bone  overlying the cord between C4 and C6, thereby completing the C5  corpectomy.  Of note, we had to do the same in the lower portion of  C4 and the upper portion of C6 performing partial corpectomies to  completely decompress the spinal cord along the entire extent of the  compression that was noted.     After doing so, cord was completely decompressed centrally as well as  out the foramen when checked with a blunt nerve hook.     I placed the interbody cage device that was filled with cellular  graft into the interspace and then distracted the cage such that we  had excellent purchase between the remnants of C4 and C6.  I placed  anterior plate fixation between C4 and C6 with locking plate  obtaining reasonable purchase with screws.  The purchase was not  great, and this was only cancellous purchase as would be expected, so  we decided at the end of the day that additional posterior fixation  would be appropriate.     The wounds were further washed and dried.  The decompression fusion  and instrumentation was now complete.     Deep drain was placed.  Platysma was then repaired using 3-0 Vicryl  figure-of-eight sutures.  Deep dermal layers repaired using 3-0  Vicryl sutures, and the skin was repaired using 4-0 buried  subcuticular stitch.     Mastisol, Steri-Strips were placed.  Dressing was applied.    The patient was then brought to recovery room, extubated in stable  condition  after being placed in a soft collar.     Part 2 of the surgery will be the posterior fusion which would be  performed at a later date.     There were no complications encountered during the entirety of the  case.  I was present and scrubbed during the entirety of the case.  All spinal cord monitoring signals were stable throughout the  entirety of the case during the decompression and fusion.       Irvin Robb MD    DD:  01/24/2023 16:51:35 EST  DT:  01/25/2023 09:29:06 EST  DICTATION NUMBER:  [id number]  INTERNAL JOB NUMBER:  [id number]    CC:  Irvin Robb MD, Fax: [phone]        Electronic Signatures:  Irvin Robb) (Signed on 26-Jan-2023 15:51)   Authored  Unsigned, Draft (SYS GENERATED) (Entered on 25-Jan-2023 09:29)   Entered    Last Updated: 26-Jan-2023 15:51 by Irvin Robb)

## 2024-05-06 NOTE — OP NOTE
PREOPERATIVE DIAGNOSIS:  Cervical stenosis status post C5 corpectomy and anterior fusion of C4  to C6.     POSTOPERATIVE DIAGNOSIS:  Cervical stenosis status post C5 corpectomy and anterior fusion of C4  to C6, with evidence of instability at the C3-4 level.     OPERATION/PROCEDURE:  Posterior cervical fusion C3 to C6 with use of lateral mass, screw  instrumentation, cellular graft, local graft.  Application and  removal of Jordan tongs.     SURGEON:  Irvin Robb MD.    ASSISTANT(S):  First Assistant:  Ricco Vargas PA-C.    Mr. Vargas was the 1st assistant during the surgery.  No qualified  resident was available for assistance during the surgery.  Dr. Vargas assisted with the exposure, instrumentation, fusion, and  placement of tongs portions of this procedure.     Second assistant:  Roberto Tolentino, second year resident.    ANESTHESIA:    ESTIMATED BLOOD LOSS:  100 mL.    IV FLUIDS:  1.2 L of Lactated Ringer's solution.    FINDINGS:  Appropriate placement of instrumentation graft from C3 to C6.    COMPLICATIONS:  None.    INDICATION FOR SURGERY:  The patient is a 77-year-old woman who underwent anterior  decompression fusion C4 to C6 with C5 corpectomy on 01/17/2023.     She had cord compression with cervical instability and OPLL.    This is part 2 of the 2-stage procedure to perform posterior fixation.    SURGERY NOTE:  The patient was brought into the operating room and laid supine on  the bed.  General endotracheal anesthesia was induced.  ERVIN hose and  SCDs were placed.  Tejada catheter was placed.  I then placed Jordan  tongs by prepping the pinna of the ears on each side and placed  sterile pins into the patient's cranium to the appropriate tension.     The patient was then placed in prone position on the Burke frame  and the tongs were hooked up to the Levo halo bob.     Neck was kept in appropriate position.  Posterior cervical region was  then shaved with clippers and prepped and  draped out in the usual  sterile fashion.     After injection of lidocaine and epinephrine, a knife was then  carried through skin and subcutaneous tissue about the patient's  posterior cervical region.     Bovie was then used to dissect down through the subcutaneous tissue  to the fascia.  The fascia was split on both sides of the spinous  processes with the Bovie.  Reyes elevators were then stripped sharply  posterolaterally.  Packing sponges were used to aid in dissection and  also to aid in hemostasis.     Deep retractors placed.  All loose soft tissues were removed off the  posterior elements.  A Penfield 4 elevator was used to identify  levels.  A lateral x-ray was taken to confirm, progress levels  confirmed, I first placed lateral mass screw instrumentation.  We  created a  hole using bur to inferior and medial to the midpoint  of lateral mass.  We then drilled superior and laterally in line with  the lateral mass.  Ball-tipped probe was used to feel bony wall on  all 4 sides and bottom.  I then placed a 3.5 x 12 mm lateral mass  screws bilaterally from C3 to C6.     I had excellent fixation with each of the screws.  Screw placement  was confirmed with orthogonal fluoroscopic imaging.   We fused up to C3, because of instability at the C3-4 level, although  we did not need to decompress this segment as the amount of cord  compression was not significant across the segment.     With the screws in place, we hooked the screws and rods on each side,  placed outer caps and final tightened.  I decorticated the posterior  elements and placed cellular graft and local graft from C3 to C6 to  complete the instrumented fusion.     The wounds were further washed and dried.  A deep drain was placed.    The fascia was repaired using #1 figure-of-eight sutures.  Deep  dermal layers repaired with 2-0 Vicryl sutures, skin was repaired  with 3-0 Vicryl sutures, and then we placed Prineo sealant to repair  the skin.     Dry  sterile dressing was applied.    The tongs were removed and the patient was placed in the supine  position back on the bed.  A soft collar was placed at this point.     The patient was brought to recovery room, extubated in stable  condition.     There were no complications encountered during the entirety of this  case.  All spinal cord monitoring signals were stable throughout the  entirety of this case during the instrumentation and fusion.       Irvin Robb MD    DD:  01/24/2023 16:57:02 EST  DT:  01/25/2023 10:15:31 EST  DICTATION NUMBER:  [id number]  INTERNAL JOB NUMBER:  [id number]    CC:  Irvin Robb MD, Fax: [phone]        Electronic Signatures:  Irvin Robb) (Signed on 26-Jan-2023 15:51)   Authored  Unsigned, Draft (SYS GENERATED) (Entered on 25-Jan-2023 10:15)   Entered    Last Updated: 26-Jan-2023 15:51 by Irvin Robb)

## 2024-05-06 NOTE — PROGRESS NOTES
"Referring Clinician: Dr.J Ignacio  Accompanied by: daughter Kaelyn   Living in 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  My final recommendations will be communicated back to the requesting clinician  by way of shared Medical record.   Review of the electronic medical record shows a past medical history significant for hypertension, diabetes mellitus, dementia, pAF maintained on amiodarone and DOAC, right drop foot ? cause) since ~  2020, HFpEF.  Ms. Benson was hospitalized 4/2024 with dyspnea and was treated for pneumonia, and acutely decompensated heart failure.  On her most recent TTE 8/2023 LVEF 55%, left ventricular thickening.  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  She is due to have cardiac MRI 6/2024.  She had normal kappa: Lambda ratio, with normal urine protein electrophoresis.  SPEP with immunofixation is pending.   (4/2024)    Symptomatically, there is ** chest pain, SOB at rest, SOBOE, PND, orthopnoea, bendopnoea, fatigue, ankle swelling, palpitations, syncope, or pre syncope.  She gives a history of feeling \"dizzy\" for the morning on waking, while lying in bed.  She does not have vertigo.  Do not    Functionally, exercise capacity is described as limited by imbalance, and right foot drop.     She is fully adherent with prescribed medications.     Her last HF hospitalisation was 4/2024.    Surgical Hx:  -Ileostomy 2023    Past Obstetric Hx:  - G 3  - No cardiac complications of pregnancy    Social Hx:  - Smoking- quit > 50 years   - ETOH - 1 drink on special occasions.  Very rare use.  - Illicit drugs-never  - Lives alone normally, relatives visit often, several times a day    Family Hx:  Specifically, there is no family history of  CAD, heart failure, ICD, PPM, LVAD, OHT, arrhythmias, CVA, or sudden cardiac " death.    Medication reconciliation completed, see below.     Medication Documentation Review Audit       Reviewed by Josh Reinoso MD (Physician) on 04/20/24 at 1404      Medication Order Taking? Sig Documenting Provider Last Dose Status   acetaminophen (Tylenol) 325 mg tablet [id number] No Give 2 tablet by mouth every 4 hours as needed for Pain Sterling Mcnamara MD Past Week Active   amiodarone (Pacerone) 200 mg tablet [id number] No Give 1 tablet by mouth in the morning for ANTIARRHYTHMIC Cesar ORLANDO MD 4/7/2024 Active   apixaban (Eliquis) 5 mg tablet [id number] No Give 1 tablet by mouth two times a day for blood thinner Cesar ORLANDO MD 4/7/2024 Active   atorvastatin (Lipitor) 40 mg tablet [id number]  Take 1 tablet (40 mg) by mouth once daily at bedtime. MI Shabazz  Active   b complex 0.4 mg tablet [id number] No Take 1 tablet by mouth once daily. Sterling Mcnamara MD 4/7/2024 Active   benzonatate (Tessalon) 200 mg capsule [id number]  Take 1 capsule (200 mg) by mouth 3 times a day as needed for cough for up to 5 days. Do not crush or chew. MI Shabazz  Active   biotin 1 mg tablet [id number] No 1 tab(s) orally once a day Sterling Mcnamara MD 4/7/2024 Active   cephalexin (Keflex) 500 mg capsule [id number]  Take 1 capsule (500 mg) by mouth 2 times a day. Sebastian Lo MD  Active   cholecalciferol (Vitamin D-3) 1,250 mcg (50,000 unit) capsule [id number] No Take 50,000 Units by mouth once each week. Sterling Provider, MD Past Week Active   docusate sodium (Colace) 100 mg capsule [id number] No Take 1 capsule (100 mg) by mouth 2 times a day as needed for constipation. Sterling Provider, MD Past Month Active   donepezil (Aricept) 5 mg tablet [id number] No 1 tab(s) orally once (at bedtime) David Swift MD 4/7/2024 Active   DULoxetine (Cymbalta) 30 mg DR capsule [id number] No Take 1 capsule (30 mg) by mouth 2 times a day. Mimi Bell, APRN-CNP 4/7/2024 Active   empagliflozin  (Jardiance) 10 mg [id number]  Take 1 tablet (10 mg) by mouth once daily. MI Shabazz  Active   Discontinued 04/17/24 1217   furosemide (Lasix) 20 mg tablet [id number]  Take 2 tablets (40 mg) by mouth once daily. Please note increased dose MI Shabazz  Active   gabapentin (Neurontin) 600 mg tablet [id number] No TAKE ONE TABLET BY MOUTH THREE TIMES A DAY Kaykay Palafox MD 4/7/2024 Active   insulin glargine (Lantus) 100 unit/mL injection [id number]  Inject 8 Units under the skin once daily at bedtime. Take as directed per insulin instructions. Monitor closely for dose adjustment once off steroid MI Shabazz  Active   melatonin 3 mg tablet [id number] No Give 2 tablet by mouth as needed for insomnia administer at bedtime Historical Provider, MD 4/7/2024 Active   metoprolol succinate XL (Toprol-XL) 25 mg 24 hr tablet [id number] No Take 1 tablet (25 mg) by mouth once daily. Do not crush or chew. DO NOT GIVE IF SBP IS UNDER 100mm/hg Cesar ORLANDO MD 4/7/2024 Active   ondansetron ODT (Zofran-ODT) 4 mg disintegrating tablet [id number] No Take 1 tablet (4 mg) by mouth every 8 hours if needed for nausea or vomiting. Terrell Dixon PA-C Past Week Active   Discontinued 04/17/24 2039   predniSONE (Deltasone) 10 mg tablet [id number]  Take 3 tablets (30 mg) by mouth once daily for 3 days, THEN 2 tablets (20 mg) once daily for 3 days, THEN 1 tablet (10 mg) once daily for 3 days. MI Shabazz  Active   topiramate (Topamax) 100 mg tablet [id number] No TAKE ONE TABLET BY MOUTH TWO TIMES A DAY Historical Provider, MD 3/31/2024 Active   traZODone (Desyrel) 50 mg tablet [id number] No Take 1 tablet (50 mg) by mouth as needed at bedtime for sleep. David Swift MD 4/7/2024 Active                         Allergies   Allergen Reactions    Shellfish Containing Products Swelling      Review of Systems   Constitutional: Negative for decreased appetite, weight gain and weight loss.   HENT:   "Positive for hearing loss (mild).    Eyes:  Negative for visual disturbance.   Cardiovascular:  Negative for chest pain, dyspnea on exertion, leg swelling, near-syncope, orthopnea, palpitations, paroxysmal nocturnal dyspnea and syncope.   Respiratory:  Negative for shortness of breath.    Hematologic/Lymphatic: Bruises/bleeds easily (on DOAc).   Skin:  Negative for rash.   Gastrointestinal:  Negative for hematochezia.   Genitourinary:  Negative for hematuria.   Neurological:  Positive for dizziness.   Psychiatric/Behavioral:  Positive for memory loss. Negative for altered mental status and substance abuse.         Investigations:    The electronic medical record has been reviewed by me for salient history. All cardiovascular imaging and testing available in the electronic medical record, and [facility] has been reviewed. The most recent ECG (5/6/2024) has been reviewed independently by me.     5/6/2024 ECG: NSR, QRS ~ 90 ms    Vitals:    05/06/24 1339   BP: 114/60   Patient Position: Sitting   Pulse: 66   Resp: 18   SpO2: 99%   Weight: 62.1 kg (137 lb)   Height: 1.473 m (4' 10\")      On examination:    Very pleasant elderly AA woman in no apparent CP or painful distress  Well  groomed   Neck: No JVD or HJR  CVS: HS 1,2.  No added sounds  Resp: CTA bilaterally. Percussion note resonant   Abdomen: Mildly obese, SNT, BS wnl  Extremities: No pedal oedema. Wearing right lower limb brace   Skin: warm and dry  CNS: AO x 4, no gross deficits    Lab Results   Component Value Date    WBC 9.4 04/17/2024    HGB 13.0 04/17/2024    HCT 41.3 04/17/2024    MCV 98 04/17/2024     04/17/2024       Chemistry    Lab Results   Component Value Date/Time     04/17/2024 0554    K 4.1 04/17/2024 0554     04/17/2024 0554    CO2 32 04/17/2024 0554    BUN 38 (H) 04/17/2024 0554    CREATININE 0.84 04/17/2024 0554    Lab Results   Component Value Date/Time    CALCIUM 9.0 04/17/2024 0554    ALKPHOS 68 04/08/2024 1617    AST 15 " 04/08/2024 1617    ALT 15 04/08/2024 1617    BILITOT 0.6 04/08/2024 1617      BNP 4/2024: 750    IMPRESSION:    79 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 She hasa past medical history significant for hypertension, diabetes mellitus, dementia, pAF maintained on amiodarone and DOAC, right drop foot ? cause) since ~  2020, HFpEF.  Ms. Benson was hospitalized 4/2024 with dyspnea and was treated for pneumonia, and acutely decompensated heart failure.  On her most recent TTE 8/2023 LVEF 55%, left ventricular thickening.  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  She is due to have cardiac MRI 6/2024.  She had normal kappa: Lambda ratio, with normal urine protein electrophoresis.  SPEP with immunofixation is pending.      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 There may be grammatical, punctuation, or verbiage errors that can occur with voice recognition programs.    oLri Dueñas MD PhD

## 2024-05-08 PROCEDURE — G0180 MD CERTIFICATION HHA PATIENT: HCPCS | Performed by: NURSE PRACTITIONER

## 2024-05-09 LAB
ATRIAL RATE: 62 BPM
P AXIS: 46 DEGREES
P OFFSET: 181 MS
P ONSET: 111 MS
PR INTERVAL: 198 MS
Q ONSET: 210 MS
QRS COUNT: 10 BEATS
QRS DURATION: 90 MS
QT INTERVAL: 446 MS
QTC CALCULATION(BAZETT): 452 MS
QTC FREDERICIA: 451 MS
R AXIS: -37 DEGREES
T AXIS: 72 DEGREES
T OFFSET: 433 MS
VENTRICULAR RATE: 62 BPM

## 2024-05-10 ENCOUNTER — DOCUMENTATION (OUTPATIENT)
Dept: HOME HEALTH SERVICES | Facility: HOME HEALTH | Age: 79
End: 2024-05-10
Payer: MEDICARE

## 2024-05-10 ENCOUNTER — HOME HEALTH ADMISSION (OUTPATIENT)
Dept: HOME HEALTH SERVICES | Facility: HOME HEALTH | Age: 79
End: 2024-05-10
Payer: MEDICARE

## 2024-05-10 NOTE — HH CARE COORDINATION
Home Care received a Referral for Nursing, Physical Therapy, Occupational Therapy, and Home Health Aide. We have processed the referral for a Start of Care on 5/12-5/13.     If you have any questions or concerns, please feel free to contact us at [phone]. Follow the prompts, enter your five digit zip code, and you will be directed to your care team on [facility] 2.

## 2024-05-13 ENCOUNTER — HOME CARE VISIT (OUTPATIENT)
Dept: HOME HEALTH SERVICES | Facility: HOME HEALTH | Age: 79
End: 2024-05-13
Payer: MEDICARE

## 2024-05-13 ENCOUNTER — LAB (OUTPATIENT)
Dept: LAB | Facility: LAB | Age: 79
End: 2024-05-13
Payer: MEDICARE

## 2024-05-13 DIAGNOSIS — I50.33 ACUTE ON CHRONIC DIASTOLIC HEART FAILURE (MULTI): ICD-10-CM

## 2024-05-13 LAB
ALBUMIN SERPL BCP-MCNC: 3.6 G/DL (ref 3.4–5)
ANION GAP SERPL CALC-SCNC: 14 MMOL/L (ref 10–20)
BUN SERPL-MCNC: 25 MG/DL (ref 6–23)
CALCIUM SERPL-MCNC: 9.1 MG/DL (ref 8.6–10.6)
CHLORIDE SERPL-SCNC: 108 MMOL/L (ref 98–107)
CO2 SERPL-SCNC: 27 MMOL/L (ref 21–32)
CREAT SERPL-MCNC: 1.02 MG/DL (ref 0.5–1.05)
EGFRCR SERPLBLD CKD-EPI 2021: 57 ML/MIN/1.73M*2
GLUCOSE SERPL-MCNC: 180 MG/DL (ref 74–99)
PHOSPHATE SERPL-MCNC: 4.5 MG/DL (ref 2.5–4.9)
POTASSIUM SERPL-SCNC: 4.5 MMOL/L (ref 3.5–5.3)
PROT SERPL-MCNC: 6.3 G/DL (ref 6.4–8.2)
SODIUM SERPL-SCNC: 144 MMOL/L (ref 136–145)

## 2024-05-13 PROCEDURE — 0023 HH SOC

## 2024-05-13 PROCEDURE — 86320 SERUM IMMUNOELECTROPHORESIS: CPT | Performed by: STUDENT IN AN ORGANIZED HEALTH CARE EDUCATION/TRAINING PROGRAM

## 2024-05-13 PROCEDURE — 36415 COLL VENOUS BLD VENIPUNCTURE: CPT

## 2024-05-13 PROCEDURE — 80069 RENAL FUNCTION PANEL: CPT

## 2024-05-13 PROCEDURE — 84165 PROTEIN E-PHORESIS SERUM: CPT

## 2024-05-13 PROCEDURE — G0299 HHS/HOSPICE OF RN EA 15 MIN: HCPCS | Mod: HHH

## 2024-05-13 PROCEDURE — 84155 ASSAY OF PROTEIN SERUM: CPT

## 2024-05-13 PROCEDURE — 169592 NO-PAY CLAIM PROCEDURE

## 2024-05-13 PROCEDURE — 86334 IMMUNOFIX E-PHORESIS SERUM: CPT

## 2024-05-13 PROCEDURE — 84165 PROTEIN E-PHORESIS SERUM: CPT | Performed by: STUDENT IN AN ORGANIZED HEALTH CARE EDUCATION/TRAINING PROGRAM

## 2024-05-13 PROCEDURE — 1090000001 HH PPS REVENUE CREDIT

## 2024-05-13 PROCEDURE — 1090000002 HH PPS REVENUE DEBIT

## 2024-05-13 SDOH — ECONOMIC STABILITY: FOOD INSECURITY: MEALS PER DAY: 2

## 2024-05-13 ASSESSMENT — ENCOUNTER SYMPTOMS
BOWEL INCONTINENCE: 1
LAST BOWEL MOVEMENT: 66963
PAIN: 1
CHANGE IN APPETITE: UNCHANGED
PAIN LOCATION: GENERALIZED
STOOL FREQUENCY: LESS THAN DAILY
APPETITE LEVEL: FAIR
MUSCLE WEAKNESS: 1
PERSON REPORTING PAIN: PATIENT

## 2024-05-13 ASSESSMENT — ACTIVITIES OF DAILY LIVING (ADL)
CURRENT_FUNCTION: ONE PERSON
OASIS_M1830: 06
ENTERING_EXITING_HOME: MAXIMUM ASSIST
AMBULATION ASSISTANCE: ONE PERSON

## 2024-05-13 ASSESSMENT — LIFESTYLE VARIABLES: SMOKING_STATUS: 0

## 2024-05-14 ENCOUNTER — PATIENT OUTREACH (OUTPATIENT)
Dept: PRIMARY CARE | Facility: CLINIC | Age: 79
End: 2024-05-14
Payer: MEDICARE

## 2024-05-14 ENCOUNTER — HOME CARE VISIT (OUTPATIENT)
Dept: HOME HEALTH SERVICES | Facility: HOME HEALTH | Age: 79
End: 2024-05-14
Payer: MEDICARE

## 2024-05-14 PROCEDURE — 1090000002 HH PPS REVENUE DEBIT

## 2024-05-14 PROCEDURE — 1090000001 HH PPS REVENUE CREDIT

## 2024-05-14 PROCEDURE — G0152 HHCP-SERV OF OT,EA 15 MIN: HCPCS | Mod: HHH

## 2024-05-14 NOTE — PROGRESS NOTES
Discharge Facility:[facility]  Discharge Diagnosis:Pneumonia  Admission Date:4/9/24  Discharge Date: 4/17/24-SNF    PCP Appointment Date:Tasked to the office  Specialist Appointment Date: Cardiology  Hospital Encounter and Summary: Linked      Two attempts were made to reach patient within two business days after discharge. Voicemail left with contact information for patient to call back with any non-emergent questions or concerns.      Anna Benson LPN

## 2024-05-15 ENCOUNTER — HOME CARE VISIT (OUTPATIENT)
Dept: HOME HEALTH SERVICES | Facility: HOME HEALTH | Age: 79
End: 2024-05-15
Payer: MEDICARE

## 2024-05-15 VITALS
SYSTOLIC BLOOD PRESSURE: 100 MMHG | OXYGEN SATURATION: 99 % | TEMPERATURE: 97 F | DIASTOLIC BLOOD PRESSURE: 70 MMHG | RESPIRATION RATE: 20 BRPM | HEART RATE: 87 BPM

## 2024-05-15 VITALS — HEART RATE: 51 BPM | OXYGEN SATURATION: 92 %

## 2024-05-15 LAB
ALBUMIN: 3.5 G/DL (ref 3.4–5)
ALPHA 1 GLOBULIN: 0.3 G/DL (ref 0.2–0.6)
ALPHA 2 GLOBULIN: 0.8 G/DL (ref 0.4–1.1)
BETA GLOBULIN: 0.9 G/DL (ref 0.5–1.2)
GAMMA GLOBULIN: 0.8 G/DL (ref 0.5–1.4)
IMMUNOFIXATION COMMENT: NORMAL
PATH REVIEW - SERUM IMMUNOFIXATION: NORMAL
PATH REVIEW-SERUM PROTEIN ELECTROPHORESIS: NORMAL
PROTEIN ELECTROPHORESIS COMMENT: NORMAL

## 2024-05-15 PROCEDURE — 1090000001 HH PPS REVENUE CREDIT

## 2024-05-15 PROCEDURE — 1090000002 HH PPS REVENUE DEBIT

## 2024-05-15 PROCEDURE — G0151 HHCP-SERV OF PT,EA 15 MIN: HCPCS | Mod: HHH

## 2024-05-15 ASSESSMENT — ENCOUNTER SYMPTOMS
PERSON REPORTING PAIN: PATIENT
PAIN LOCATION: ABDOMEN
LOWEST PAIN SEVERITY IN PAST 24 HOURS: 1/10
HIGHEST PAIN SEVERITY IN PAST 24 HOURS: 2/10
PAIN: 1
PAIN SEVERITY GOAL: 0/10
PAIN LOCATION - PAIN QUALITY: ACHY
PAIN LOCATION - PAIN FREQUENCY: INTERMITTENT
PAIN LOCATION: LEFT ARM
LOWEST PAIN SEVERITY IN PAST 24 HOURS: 0/10
HIGHEST PAIN SEVERITY IN PAST 24 HOURS: 8/10
SUBJECTIVE PAIN PROGRESSION: UNCHANGED
PAIN LOCATION - PAIN SEVERITY: 3/10

## 2024-05-15 ASSESSMENT — ACTIVITIES OF DAILY LIVING (ADL)
BATHING ASSESSED: 1
DRESSING_UB_CURRENT_FUNCTION: STAND BY ASSIST
GROOMING ASSESSED: 1
FEEDING: STAND BY ASSIST
FEEDING ASSESSED: 1
TOILETING: CONTACT GUARD ASSIST
DRESSING_LB_CURRENT_FUNCTION: MINIMUM ASSIST
TOILETING: 1
BATHING_CURRENT_FUNCTION: MODERATE ASSIST
DRESSING_LB_CURRENT_FUNCTION: MODERATE ASSIST
GROOMING_CURRENT_FUNCTION: STAND BY ASSIST

## 2024-05-16 ENCOUNTER — HOME CARE VISIT (OUTPATIENT)
Dept: HOME HEALTH SERVICES | Facility: HOME HEALTH | Age: 79
End: 2024-05-16
Payer: MEDICARE

## 2024-05-16 PROCEDURE — 1090000002 HH PPS REVENUE DEBIT

## 2024-05-16 PROCEDURE — 1090000001 HH PPS REVENUE CREDIT

## 2024-05-16 PROCEDURE — G0156 HHCP-SVS OF AIDE,EA 15 MIN: HCPCS | Mod: HHH

## 2024-05-17 VITALS
DIASTOLIC BLOOD PRESSURE: 60 MMHG | HEART RATE: 78 BPM | TEMPERATURE: 97.9 F | OXYGEN SATURATION: 96 % | SYSTOLIC BLOOD PRESSURE: 108 MMHG

## 2024-05-17 PROCEDURE — 1090000001 HH PPS REVENUE CREDIT

## 2024-05-17 PROCEDURE — 1090000002 HH PPS REVENUE DEBIT

## 2024-05-17 ASSESSMENT — PAIN SCALES - PAIN ASSESSMENT IN ADVANCED DEMENTIA (PAINAD)
FACIALEXPRESSION: 0
NEGVOCALIZATION: 0 - NONE.
TOTALSCORE: 0
CONSOLABILITY: 0
BODYLANGUAGE: 0
NEGVOCALIZATION: 0
BREATHING: 0
BODYLANGUAGE: 0 - RELAXED.
FACIALEXPRESSION: 0 - SMILING OR INEXPRESSIVE.
CONSOLABILITY: 0 - NO NEED TO CONSOLE.

## 2024-05-17 ASSESSMENT — ACTIVITIES OF DAILY LIVING (ADL)
AMBULATION ASSISTANCE: ONE PERSON
CURRENT_FUNCTION: ONE PERSON
AMBULATION ASSISTANCE ON FLAT SURFACES: 1

## 2024-05-17 ASSESSMENT — ENCOUNTER SYMPTOMS
SUBJECTIVE PAIN PROGRESSION: GRADUALLY IMPROVING
HIGHEST PAIN SEVERITY IN PAST 24 HOURS: 6/10
MUSCLE WEAKNESS: 1
PERSON REPORTING PAIN: PATIENT
LOWEST PAIN SEVERITY IN PAST 24 HOURS: 5/10
PAIN: 1

## 2024-05-17 NOTE — HOME HEALTH
Pt visited for PT initial evaluation today. Recommend continued care to return patient  to cga to supervised level which is pts baseline level of function.

## 2024-05-18 PROCEDURE — 1090000002 HH PPS REVENUE DEBIT

## 2024-05-18 PROCEDURE — 1090000001 HH PPS REVENUE CREDIT

## 2024-05-19 PROCEDURE — 1090000002 HH PPS REVENUE DEBIT

## 2024-05-19 PROCEDURE — 1090000001 HH PPS REVENUE CREDIT

## 2024-05-20 ENCOUNTER — HOME CARE VISIT (OUTPATIENT)
Dept: HOME HEALTH SERVICES | Facility: HOME HEALTH | Age: 79
End: 2024-05-20
Payer: MEDICARE

## 2024-05-20 VITALS
RESPIRATION RATE: 20 BRPM | OXYGEN SATURATION: 99 % | SYSTOLIC BLOOD PRESSURE: 100 MMHG | DIASTOLIC BLOOD PRESSURE: 60 MMHG | TEMPERATURE: 97.8 F | HEART RATE: 67 BPM

## 2024-05-20 PROCEDURE — 1090000001 HH PPS REVENUE CREDIT

## 2024-05-20 PROCEDURE — 1090000002 HH PPS REVENUE DEBIT

## 2024-05-20 PROCEDURE — G0299 HHS/HOSPICE OF RN EA 15 MIN: HCPCS | Mod: HHH

## 2024-05-21 ENCOUNTER — HOME CARE VISIT (OUTPATIENT)
Dept: HOME HEALTH SERVICES | Facility: HOME HEALTH | Age: 79
End: 2024-05-21
Payer: MEDICARE

## 2024-05-21 PROCEDURE — 1090000002 HH PPS REVENUE DEBIT

## 2024-05-21 PROCEDURE — G0157 HHC PT ASSISTANT EA 15: HCPCS | Mod: CQ,HHH

## 2024-05-21 PROCEDURE — 1090000001 HH PPS REVENUE CREDIT

## 2024-05-22 ENCOUNTER — HOME CARE VISIT (OUTPATIENT)
Dept: HOME HEALTH SERVICES | Facility: HOME HEALTH | Age: 79
End: 2024-05-22
Payer: MEDICARE

## 2024-05-22 ENCOUNTER — TELEPHONE (OUTPATIENT)
Dept: PRIMARY CARE | Facility: CLINIC | Age: 79
End: 2024-05-22
Payer: MEDICARE

## 2024-05-22 VITALS
HEART RATE: 73 BPM | SYSTOLIC BLOOD PRESSURE: 96 MMHG | DIASTOLIC BLOOD PRESSURE: 73 MMHG | TEMPERATURE: 98.1 F | RESPIRATION RATE: 17 BRPM

## 2024-05-22 PROCEDURE — 1090000001 HH PPS REVENUE CREDIT

## 2024-05-22 PROCEDURE — G0180 MD CERTIFICATION HHA PATIENT: HCPCS | Performed by: FAMILY MEDICINE

## 2024-05-22 PROCEDURE — 1090000002 HH PPS REVENUE DEBIT

## 2024-05-22 PROCEDURE — G0158 HHC OT ASSISTANT EA 15: HCPCS | Mod: CO,HHH

## 2024-05-22 SDOH — ECONOMIC STABILITY: GENERAL

## 2024-05-22 SDOH — ECONOMIC STABILITY: FOOD INSECURITY: MEALS PER DAY: 2

## 2024-05-22 ASSESSMENT — ENCOUNTER SYMPTOMS
PAIN LOCATION: GENERALIZED
PERSON REPORTING PAIN: PATIENT
LAST BOWEL MOVEMENT: 66979
PAIN LOCATION - PAIN SEVERITY: 3/10
PAIN LOCATION - PAIN QUALITY: ACHY
SUBJECTIVE PAIN PROGRESSION: UNCHANGED
PAIN SEVERITY GOAL: 1/10
BOWEL INCONTINENCE: 1
STOOL FREQUENCY: LESS THAN DAILY
LOWEST PAIN SEVERITY IN PAST 24 HOURS: 1/10
PAIN: 1
CHANGE IN APPETITE: UNCHANGED
PAIN LOCATION - PAIN FREQUENCY: INTERMITTENT
HIGHEST PAIN SEVERITY IN PAST 24 HOURS: 1/10
APPETITE LEVEL: FAIR
MUSCLE WEAKNESS: 1

## 2024-05-22 ASSESSMENT — ACTIVITIES OF DAILY LIVING (ADL)
EFFECT OF PAIN ON DAILY ACTIVITIES: LIMITED FUNCTIONAL ACTIVITY
MONEY MANAGEMENT (EXPENSES/BILLS): NEEDS ASSISTANCE
CURRENT_FUNCTION: ONE PERSON
AMBULATION ASSISTANCE: ONE PERSON

## 2024-05-22 ASSESSMENT — PAIN DESCRIPTION - PAIN TYPE: TYPE: ACUTE PAIN

## 2024-05-23 ENCOUNTER — HOME CARE VISIT (OUTPATIENT)
Dept: HOME HEALTH SERVICES | Facility: HOME HEALTH | Age: 79
End: 2024-05-23
Payer: MEDICARE

## 2024-05-23 VITALS — SYSTOLIC BLOOD PRESSURE: 124 MMHG | DIASTOLIC BLOOD PRESSURE: 78 MMHG | HEART RATE: 64 BPM

## 2024-05-23 PROCEDURE — G0157 HHC PT ASSISTANT EA 15: HCPCS | Mod: CQ,HHH

## 2024-05-23 PROCEDURE — 1090000002 HH PPS REVENUE DEBIT

## 2024-05-23 PROCEDURE — 1090000001 HH PPS REVENUE CREDIT

## 2024-05-24 ENCOUNTER — OFFICE VISIT (OUTPATIENT)
Dept: PRIMARY CARE | Facility: CLINIC | Age: 79
End: 2024-05-24
Payer: MEDICARE

## 2024-05-24 ENCOUNTER — HOME CARE VISIT (OUTPATIENT)
Dept: HOME HEALTH SERVICES | Facility: HOME HEALTH | Age: 79
End: 2024-05-24
Payer: MEDICARE

## 2024-05-24 VITALS
BODY MASS INDEX: 28.97 KG/M2 | HEIGHT: 58 IN | HEART RATE: 69 BPM | WEIGHT: 138 LBS | DIASTOLIC BLOOD PRESSURE: 73 MMHG | SYSTOLIC BLOOD PRESSURE: 123 MMHG

## 2024-05-24 VITALS
SYSTOLIC BLOOD PRESSURE: 128 MMHG | HEART RATE: 82 BPM | TEMPERATURE: 98.6 F | DIASTOLIC BLOOD PRESSURE: 84 MMHG | RESPIRATION RATE: 17 BRPM

## 2024-05-24 DIAGNOSIS — I10 HYPERTENSION, ESSENTIAL: ICD-10-CM

## 2024-05-24 DIAGNOSIS — I48.11 LONGSTANDING PERSISTENT ATRIAL FIBRILLATION (MULTI): ICD-10-CM

## 2024-05-24 DIAGNOSIS — J44.9 COPD WITHOUT EXACERBATION (MULTI): ICD-10-CM

## 2024-05-24 DIAGNOSIS — M51.16 LUMBAR DISC HERNIATION WITH RADICULOPATHY: ICD-10-CM

## 2024-05-24 DIAGNOSIS — G99.2 MYELOPATHY CONCURRENT WITH AND DUE TO SPINAL STENOSIS OF CERVICAL REGION (MULTI): ICD-10-CM

## 2024-05-24 DIAGNOSIS — N18.32 STAGE 3B CHRONIC KIDNEY DISEASE (MULTI): Primary | ICD-10-CM

## 2024-05-24 DIAGNOSIS — G89.4 CHRONIC PAIN SYNDROME: ICD-10-CM

## 2024-05-24 DIAGNOSIS — J18.9 PNEUMONIA OF RIGHT UPPER LOBE DUE TO INFECTIOUS ORGANISM: ICD-10-CM

## 2024-05-24 DIAGNOSIS — F03.B3 MODERATE DEMENTIA WITH MOOD DISTURBANCE, UNSPECIFIED DEMENTIA TYPE (MULTI): ICD-10-CM

## 2024-05-24 DIAGNOSIS — M50.00 CERVICAL DISC DISORDER WITH MYELOPATHY: ICD-10-CM

## 2024-05-24 DIAGNOSIS — F32.A DEPRESSION, UNSPECIFIED DEPRESSION TYPE: ICD-10-CM

## 2024-05-24 DIAGNOSIS — E11.9 TYPE 2 DIABETES MELLITUS WITHOUT COMPLICATION, WITH LONG-TERM CURRENT USE OF INSULIN (MULTI): ICD-10-CM

## 2024-05-24 DIAGNOSIS — M48.02 MYELOPATHY CONCURRENT WITH AND DUE TO SPINAL STENOSIS OF CERVICAL REGION (MULTI): ICD-10-CM

## 2024-05-24 DIAGNOSIS — Z79.4 TYPE 2 DIABETES MELLITUS WITHOUT COMPLICATION, WITH LONG-TERM CURRENT USE OF INSULIN (MULTI): ICD-10-CM

## 2024-05-24 DIAGNOSIS — R53.1 WEAKNESS: ICD-10-CM

## 2024-05-24 DIAGNOSIS — H90.6 MIXED CONDUCTIVE AND SENSORINEURAL HEARING LOSS OF BOTH EARS: ICD-10-CM

## 2024-05-24 DIAGNOSIS — I42.8 INFILTRATIVE CARDIOMYOPATHY (MULTI): ICD-10-CM

## 2024-05-24 PROCEDURE — 1036F TOBACCO NON-USER: CPT | Performed by: FAMILY MEDICINE

## 2024-05-24 PROCEDURE — 99214 OFFICE O/P EST MOD 30 MIN: CPT | Performed by: FAMILY MEDICINE

## 2024-05-24 PROCEDURE — 1160F RVW MEDS BY RX/DR IN RCRD: CPT | Performed by: FAMILY MEDICINE

## 2024-05-24 PROCEDURE — 1090000001 HH PPS REVENUE CREDIT

## 2024-05-24 PROCEDURE — G0158 HHC OT ASSISTANT EA 15: HCPCS | Mod: CO,HHH

## 2024-05-24 PROCEDURE — 1124F ACP DISCUSS-NO DSCNMKR DOCD: CPT | Performed by: FAMILY MEDICINE

## 2024-05-24 PROCEDURE — 3074F SYST BP LT 130 MM HG: CPT | Performed by: FAMILY MEDICINE

## 2024-05-24 PROCEDURE — 3078F DIAST BP <80 MM HG: CPT | Performed by: FAMILY MEDICINE

## 2024-05-24 PROCEDURE — 1159F MED LIST DOCD IN RCRD: CPT | Performed by: FAMILY MEDICINE

## 2024-05-24 PROCEDURE — 1090000002 HH PPS REVENUE DEBIT

## 2024-05-24 RX ORDER — TRAMADOL HYDROCHLORIDE 50 MG/1
TABLET ORAL
COMMUNITY

## 2024-05-24 ASSESSMENT — ENCOUNTER SYMPTOMS
DEPRESSION: 0
OCCASIONAL FEELINGS OF UNSTEADINESS: 1
LOSS OF SENSATION IN FEET: 0

## 2024-05-24 NOTE — PROGRESS NOTES
Pt comes in for fu from the hospital. Pt was at [facility] for pneumonia. Today- pt wanted to discuss her hearing. Pt also states the heart doctor thinks she is in heart failure.

## 2024-05-25 PROBLEM — F03.B3 MODERATE DEMENTIA WITH MOOD DISTURBANCE, UNSPECIFIED DEMENTIA TYPE (MULTI): Status: ACTIVE | Noted: 2023-03-12

## 2024-05-25 PROBLEM — N18.32 STAGE 3B CHRONIC KIDNEY DISEASE (MULTI): Status: ACTIVE | Noted: 2024-05-25

## 2024-05-25 PROCEDURE — 1090000001 HH PPS REVENUE CREDIT

## 2024-05-25 PROCEDURE — 1090000002 HH PPS REVENUE DEBIT

## 2024-05-25 RX ORDER — ATORVASTATIN CALCIUM 40 MG/1
40 TABLET, FILM COATED ORAL NIGHTLY
Qty: 90 TABLET | Refills: 1 | Status: SHIPPED | OUTPATIENT
Start: 2024-05-25 | End: 2024-11-21

## 2024-05-25 ASSESSMENT — ENCOUNTER SYMPTOMS
GASTROINTESTINAL NEGATIVE: 1
CARDIOVASCULAR NEGATIVE: 1
CONSTITUTIONAL NEGATIVE: 1
ARTHRALGIAS: 1
NECK STIFFNESS: 1
DECREASED CONCENTRATION: 1
WEAKNESS: 1
RESPIRATORY NEGATIVE: 1
BACK PAIN: 1
NECK PAIN: 1
MYALGIAS: 1

## 2024-05-25 NOTE — PROGRESS NOTES
Subjective   Patient ID: Elida Benson is a 79 y.o. female who presents for No chief complaint on file..    HPI pt dm , htn, afibb, amylodosis in heart w resultant heart failure, cervicle meylopathy , depression , insomnia, lower extremitiey weakness due to meylopathy    Review of Systems   Constitutional: Negative.    HENT: Negative.     Respiratory: Negative.          Lung function improving since pneumonia though still w copd   Cardiovascular: Negative.    Gastrointestinal: Negative.    Musculoskeletal:  Positive for arthralgias, back pain, myalgias, neck pain and neck stiffness.   Neurological:  Positive for weakness.   Psychiatric/Behavioral:  Positive for decreased concentration.    OARRS:  No data recorded  I have personally reviewed the OARRS report for Elida Benson. I have considered the risks of abuse, dependence, addiction and diversion and I believe that it is clinically appropriate for Elida Benson to be prescribed this medication    Is the patient prescribed a combination of a benzodiazepine and opioid?  No    Last Urine Drug Screen / ordered today: No  Recent Results (from the past 8760 hour(s))   Drug Screen, Urine With Reflex to Confirmation    Collection Time: 12/22/23  2:09 PM   Result Value Ref Range    Amphetamine Screen, Urine Presumptive Negative Presumptive Negative    Barbiturate Screen, Urine Presumptive Negative Presumptive Negative    Benzodiazepines Screen, Urine Presumptive Negative Presumptive Negative    Cannabinoid Screen, Urine Presumptive Negative Presumptive Negative    Cocaine Metabolite Screen, Urine Presumptive Negative Presumptive Negative    Fentanyl Screen, Urine Presumptive Negative Presumptive Negative    Opiate Screen, Urine Presumptive Negative Presumptive Negative    Oxycodone Screen, Urine Presumptive Negative Presumptive Negative    PCP Screen, Urine Presumptive Negative Presumptive Negative     Results are as expected.         Controlled Substance  "Agreement:  Date of the Last Agreement: 12/22/23  Reviewed Controlled Substance Agreement including but not limited to the benefits, risks, and alternatives to treatment with a Controlled Substance medication(s).    Opioids:  What is the patient's goal of therapy? Pain modification  Is this being achieved with current treatment? yes    I have calculated the patient's Morphine Dose Equivalent (MED):   I have considered referral to Pain Management and/or a specialist, and do not feel it is necessary at this time.    I feel that it is clinically indicated to continue this current medication regimen after consideration of alternative therapies, and other non-opioid treatment.    Opioid Risk Screening:  Total Score Risk Category  TOTAL SCORE CATEGORY: Low Risk (0-3) (12/22/2023  1:00 PM)        Pain Assessment:  Analgesia  What was your pain level on average during the past week?: 5  What was your pain level at its worst during the past week?: 7  What percentage of your pain has been relieved during the past week?: 40 %  Is the amount of pain relief you are now obtaining from your current pain relievers enough to make a real difference in your life?: Y  Query to Clinician: Is the patient's pain relief clinically significant?: Yes    Activities of Daily Living  Physical Functioning: Better  Family Relationships: Better  Social Relationships: Better  Mood: Better  Sleep Patterns: Better  Overall Functioning: Better    Adverse Events  Is patient experiencing any side effects from current pain relievers?: N  Patient's Overall Severity of Side Effects: None      Assessment  Is your overall impression that this patient is benefiting from opioid therapy?: Yes  Specific Analgesic Plan: Continue present regimen        Objective   /73   Pulse 69   Ht 1.473 m (4' 10\")   Wt 62.6 kg (138 lb)   BMI 28.84 kg/m²     Physical Exam  Vitals reviewed.   Constitutional:       Appearance: Normal appearance. She is normal weight. "   Eyes:      Extraocular Movements: Extraocular movements intact.      Conjunctiva/sclera: Conjunctivae normal.      Pupils: Pupils are equal, round, and reactive to light.   Cardiovascular:      Rate and Rhythm: Normal rate and regular rhythm.      Pulses: Normal pulses.      Heart sounds: Normal heart sounds.   Pulmonary:      Effort: Pulmonary effort is normal.      Breath sounds: Normal breath sounds.      Comments: Scattered ronchi  Abdominal:      General: Bowel sounds are normal.      Palpations: Abdomen is soft.   Musculoskeletal:         General: Normal range of motion.   Skin:     General: Skin is warm and dry.   Neurological:      General: No focal deficit present.      Mental Status: She is alert and oriented to person, place, and time. Mental status is at baseline.      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    Moderate dementia with mood disturbance, unspecified dementia type (Multi) F03.B3    Myelopathy concurrent with and due to spinal stenosis of cervical region (Multi) M48.02, G99.2    Pneumonia, unspecified organism J18.9    Stage 3b chronic kidney disease (Multi) - Primary N18.32    Type 2 diabetes mellitus without complication, with long-term current use of insulin (Multi) E11.9, Z79.4    Relevant Medications    empagliflozin (Jardiance) 10 mg    Weakness R53.1     Other Visit Diagnoses         Codes    Mixed conductive and sensorineural hearing loss of both ears     H90.6     Relevant Orders    Referral to Audiology

## 2024-05-26 PROCEDURE — 1090000002 HH PPS REVENUE DEBIT

## 2024-05-26 PROCEDURE — 1090000001 HH PPS REVENUE CREDIT

## 2024-05-27 ENCOUNTER — HOME CARE VISIT (OUTPATIENT)
Dept: HOME HEALTH SERVICES | Facility: HOME HEALTH | Age: 79
End: 2024-05-27
Payer: MEDICARE

## 2024-05-27 PROCEDURE — 1090000001 HH PPS REVENUE CREDIT

## 2024-05-27 PROCEDURE — 1090000002 HH PPS REVENUE DEBIT

## 2024-05-28 ENCOUNTER — HOME CARE VISIT (OUTPATIENT)
Dept: HOME HEALTH SERVICES | Facility: HOME HEALTH | Age: 79
End: 2024-05-28
Payer: MEDICARE

## 2024-05-28 PROCEDURE — 1090000001 HH PPS REVENUE CREDIT

## 2024-05-28 PROCEDURE — 1090000002 HH PPS REVENUE DEBIT

## 2024-05-29 ENCOUNTER — HOME CARE VISIT (OUTPATIENT)
Dept: HOME HEALTH SERVICES | Facility: HOME HEALTH | Age: 79
End: 2024-05-29
Payer: MEDICARE

## 2024-05-29 VITALS
DIASTOLIC BLOOD PRESSURE: 75 MMHG | RESPIRATION RATE: 17 BRPM | HEART RATE: 72 BPM | TEMPERATURE: 97.4 F | SYSTOLIC BLOOD PRESSURE: 118 MMHG

## 2024-05-29 PROCEDURE — G0158 HHC OT ASSISTANT EA 15: HCPCS | Mod: CO,HHH

## 2024-05-29 PROCEDURE — 1090000001 HH PPS REVENUE CREDIT

## 2024-05-29 PROCEDURE — G0157 HHC PT ASSISTANT EA 15: HCPCS | Mod: CQ,HHH

## 2024-05-29 PROCEDURE — 1090000002 HH PPS REVENUE DEBIT

## 2024-05-30 ENCOUNTER — HOME CARE VISIT (OUTPATIENT)
Dept: HOME HEALTH SERVICES | Facility: HOME HEALTH | Age: 79
End: 2024-05-30
Payer: MEDICARE

## 2024-05-30 ENCOUNTER — PATIENT OUTREACH (OUTPATIENT)
Dept: PRIMARY CARE | Facility: CLINIC | Age: 79
End: 2024-05-30
Payer: MEDICARE

## 2024-05-30 PROCEDURE — 1090000001 HH PPS REVENUE CREDIT

## 2024-05-30 PROCEDURE — G0156 HHCP-SVS OF AIDE,EA 15 MIN: HCPCS | Mod: HHH

## 2024-05-30 PROCEDURE — 1090000002 HH PPS REVENUE DEBIT

## 2024-05-30 PROCEDURE — G0158 HHC OT ASSISTANT EA 15: HCPCS | Mod: CO,HHH

## 2024-05-30 NOTE — PROGRESS NOTES
Call regarding appt. with PCP on  5/24/24 after hospitalization.  At time of outreach call the patient feels as if their condition has improved  since last visit.  Reviewed with patient the PCP appointment and any questions or concerns regarding the appt.

## 2024-05-31 ENCOUNTER — HOME CARE VISIT (OUTPATIENT)
Dept: HOME HEALTH SERVICES | Facility: HOME HEALTH | Age: 79
End: 2024-05-31
Payer: MEDICARE

## 2024-05-31 PROCEDURE — G0157 HHC PT ASSISTANT EA 15: HCPCS | Mod: CQ,HHH

## 2024-05-31 PROCEDURE — 1090000002 HH PPS REVENUE DEBIT

## 2024-05-31 PROCEDURE — 1090000001 HH PPS REVENUE CREDIT

## 2024-06-01 PROCEDURE — 1090000001 HH PPS REVENUE CREDIT

## 2024-06-01 PROCEDURE — 1090000002 HH PPS REVENUE DEBIT

## 2024-06-02 PROCEDURE — 1090000002 HH PPS REVENUE DEBIT

## 2024-06-02 PROCEDURE — 1090000001 HH PPS REVENUE CREDIT

## 2024-06-03 ENCOUNTER — HOME CARE VISIT (OUTPATIENT)
Dept: HOME HEALTH SERVICES | Facility: HOME HEALTH | Age: 79
End: 2024-06-03
Payer: MEDICARE

## 2024-06-03 PROCEDURE — 1090000001 HH PPS REVENUE CREDIT

## 2024-06-03 PROCEDURE — G0157 HHC PT ASSISTANT EA 15: HCPCS | Mod: CQ,HHH

## 2024-06-03 PROCEDURE — 1090000002 HH PPS REVENUE DEBIT

## 2024-06-04 ENCOUNTER — HOME CARE VISIT (OUTPATIENT)
Dept: HOME HEALTH SERVICES | Facility: HOME HEALTH | Age: 79
End: 2024-06-04
Payer: MEDICARE

## 2024-06-04 VITALS
HEART RATE: 77 BPM | DIASTOLIC BLOOD PRESSURE: 72 MMHG | RESPIRATION RATE: 16 BRPM | SYSTOLIC BLOOD PRESSURE: 125 MMHG | TEMPERATURE: 98.1 F

## 2024-06-04 PROCEDURE — 1090000001 HH PPS REVENUE CREDIT

## 2024-06-04 PROCEDURE — G0158 HHC OT ASSISTANT EA 15: HCPCS | Mod: CO,HHH

## 2024-06-04 PROCEDURE — 1090000002 HH PPS REVENUE DEBIT

## 2024-06-04 ASSESSMENT — PAIN DESCRIPTION - PAIN TYPE: TYPE: CHRONIC PAIN

## 2024-06-04 ASSESSMENT — ACTIVITIES OF DAILY LIVING (ADL): EFFECT OF PAIN ON DAILY ACTIVITIES: LIMITED FUNCTIONAL ACTIVITY

## 2024-06-05 ENCOUNTER — HOME CARE VISIT (OUTPATIENT)
Dept: HOME HEALTH SERVICES | Facility: HOME HEALTH | Age: 79
End: 2024-06-05
Payer: MEDICARE

## 2024-06-05 PROCEDURE — 1090000002 HH PPS REVENUE DEBIT

## 2024-06-05 PROCEDURE — 1090000001 HH PPS REVENUE CREDIT

## 2024-06-06 ENCOUNTER — APPOINTMENT (OUTPATIENT)
Dept: RADIOLOGY | Facility: HOSPITAL | Age: 79
End: 2024-06-06
Payer: MEDICARE

## 2024-06-06 ENCOUNTER — HOME CARE VISIT (OUTPATIENT)
Dept: HOME HEALTH SERVICES | Facility: HOME HEALTH | Age: 79
End: 2024-06-06
Payer: MEDICARE

## 2024-06-06 PROCEDURE — G0156 HHCP-SVS OF AIDE,EA 15 MIN: HCPCS | Mod: HHH

## 2024-06-06 PROCEDURE — 1090000002 HH PPS REVENUE DEBIT

## 2024-06-06 PROCEDURE — 1090000001 HH PPS REVENUE CREDIT

## 2024-06-07 ENCOUNTER — HOME CARE VISIT (OUTPATIENT)
Dept: HOME HEALTH SERVICES | Facility: HOME HEALTH | Age: 79
End: 2024-06-07
Payer: MEDICARE

## 2024-06-07 PROCEDURE — 1090000002 HH PPS REVENUE DEBIT

## 2024-06-07 PROCEDURE — 1090000001 HH PPS REVENUE CREDIT

## 2024-06-08 PROCEDURE — 1090000002 HH PPS REVENUE DEBIT

## 2024-06-08 PROCEDURE — 1090000001 HH PPS REVENUE CREDIT

## 2024-06-09 PROCEDURE — 1090000002 HH PPS REVENUE DEBIT

## 2024-06-09 PROCEDURE — 1090000001 HH PPS REVENUE CREDIT

## 2024-06-10 ENCOUNTER — HOME CARE VISIT (OUTPATIENT)
Dept: HOME HEALTH SERVICES | Facility: HOME HEALTH | Age: 79
End: 2024-06-10
Payer: MEDICARE

## 2024-06-10 VITALS
RESPIRATION RATE: 20 BRPM | DIASTOLIC BLOOD PRESSURE: 60 MMHG | SYSTOLIC BLOOD PRESSURE: 117 MMHG | OXYGEN SATURATION: 100 % | TEMPERATURE: 97.8 F | HEART RATE: 75 BPM

## 2024-06-10 PROCEDURE — 1090000001 HH PPS REVENUE CREDIT

## 2024-06-10 PROCEDURE — G0156 HHCP-SVS OF AIDE,EA 15 MIN: HCPCS | Mod: HHH

## 2024-06-10 PROCEDURE — 1090000002 HH PPS REVENUE DEBIT

## 2024-06-10 PROCEDURE — G0157 HHC PT ASSISTANT EA 15: HCPCS | Mod: CQ,HHH

## 2024-06-10 PROCEDURE — G0299 HHS/HOSPICE OF RN EA 15 MIN: HCPCS | Mod: HHH

## 2024-06-11 ENCOUNTER — PATIENT OUTREACH (OUTPATIENT)
Dept: PRIMARY CARE | Facility: CLINIC | Age: 79
End: 2024-06-11
Payer: MEDICARE

## 2024-06-13 ENCOUNTER — HOSPITAL ENCOUNTER (OUTPATIENT)
Dept: RADIOLOGY | Facility: HOSPITAL | Age: 79
End: 2024-06-13
Payer: MEDICARE

## 2024-06-13 DIAGNOSIS — G30.9 MILD ALZHEIMER'S DEMENTIA, UNSPECIFIED TIMING OF DEMENTIA ONSET, UNSPECIFIED WHETHER BEHAVIORAL, PSYCHOTIC, OR MOOD DISTURBANCE OR ANXIETY (MULTI): ICD-10-CM

## 2024-06-13 DIAGNOSIS — F02.A0 MILD ALZHEIMER'S DEMENTIA, UNSPECIFIED TIMING OF DEMENTIA ONSET, UNSPECIFIED WHETHER BEHAVIORAL, PSYCHOTIC, OR MOOD DISTURBANCE OR ANXIETY (MULTI): ICD-10-CM

## 2024-06-13 DIAGNOSIS — G89.4 CHRONIC PAIN SYNDROME: ICD-10-CM

## 2024-06-13 SDOH — ECONOMIC STABILITY: GENERAL

## 2024-06-13 SDOH — ECONOMIC STABILITY: FOOD INSECURITY: MEALS PER DAY: 2

## 2024-06-13 ASSESSMENT — ENCOUNTER SYMPTOMS
HIGHEST PAIN SEVERITY IN PAST 24 HOURS: 1/10
LOWEST PAIN SEVERITY IN PAST 24 HOURS: 0/10
PAIN SEVERITY GOAL: 0/10
PAIN LOCATION - PAIN QUALITY: ACHY
PAIN: 1
SUBJECTIVE PAIN PROGRESSION: UNCHANGED
BOWEL INCONTINENCE: 1
CHANGE IN APPETITE: UNCHANGED
PAIN LOCATION - PAIN FREQUENCY: INTERMITTENT
LAST BOWEL MOVEMENT: 67001
PAIN LOCATION: GENERALIZED
STOOL FREQUENCY: DAILY
PERSON REPORTING PAIN: PATIENT
PAIN LOCATION - PAIN SEVERITY: 1/10
APPETITE LEVEL: FAIR

## 2024-06-13 ASSESSMENT — ACTIVITIES OF DAILY LIVING (ADL)
MONEY MANAGEMENT (EXPENSES/BILLS): NEEDS ASSISTANCE
AMBULATION ASSISTANCE: ONE PERSON
CURRENT_FUNCTION: ONE PERSON

## 2024-06-14 DIAGNOSIS — G89.4 CHRONIC PAIN SYNDROME: ICD-10-CM

## 2024-06-14 RX ORDER — DONEPEZIL HYDROCHLORIDE 5 MG/1
TABLET, FILM COATED ORAL
Qty: 90 TABLET | Refills: 0 | OUTPATIENT
Start: 2024-06-14

## 2024-06-14 RX ORDER — DULOXETIN HYDROCHLORIDE 30 MG/1
30 CAPSULE, DELAYED RELEASE ORAL 2 TIMES DAILY
Qty: 180 CAPSULE | Refills: 0 | Status: SHIPPED | OUTPATIENT
Start: 2024-06-14

## 2024-06-14 RX ORDER — DULOXETIN HYDROCHLORIDE 30 MG/1
30 CAPSULE, DELAYED RELEASE ORAL 2 TIMES DAILY
Qty: 180 CAPSULE | Refills: 0 | OUTPATIENT
Start: 2024-06-14

## 2024-06-14 RX ORDER — GABAPENTIN 600 MG/1
600 TABLET ORAL 3 TIMES DAILY
Qty: 90 TABLET | Refills: 0 | OUTPATIENT
Start: 2024-06-14

## 2024-06-14 RX ORDER — GABAPENTIN 600 MG/1
600 TABLET ORAL 3 TIMES DAILY
Qty: 90 TABLET | Refills: 0 | Status: SHIPPED | OUTPATIENT
Start: 2024-06-14

## 2024-06-14 ASSESSMENT — ACTIVITIES OF DAILY LIVING (ADL)
HOME_HEALTH_OASIS: 01
OASIS_M1830: 03

## 2024-06-14 ASSESSMENT — PATIENT HEALTH QUESTIONNAIRE - PHQ9: PATIENT UNABLE TO COMPLETE PHQ: NON VISIT DC

## 2024-06-17 ENCOUNTER — APPOINTMENT (OUTPATIENT)
Dept: CARDIOLOGY | Facility: CLINIC | Age: 79
End: 2024-06-17
Payer: MEDICARE

## 2024-06-27 ENCOUNTER — APPOINTMENT (OUTPATIENT)
Dept: CARDIOLOGY | Facility: CLINIC | Age: 79
End: 2024-06-27
Payer: MEDICARE

## 2024-06-28 ENCOUNTER — APPOINTMENT (OUTPATIENT)
Dept: PRIMARY CARE | Facility: CLINIC | Age: 79
End: 2024-06-28
Payer: MEDICARE

## 2024-06-28 ENCOUNTER — APPOINTMENT (OUTPATIENT)
Dept: CARDIOLOGY | Facility: CLINIC | Age: 79
End: 2024-06-28
Payer: MEDICARE

## 2024-07-01 ENCOUNTER — APPOINTMENT (OUTPATIENT)
Dept: DERMATOLOGY | Facility: CLINIC | Age: 79
End: 2024-07-01
Payer: MEDICARE

## 2024-07-01 DIAGNOSIS — I49.9 CARDIAC ARRHYTHMIA, UNSPECIFIED CARDIAC ARRHYTHMIA TYPE: ICD-10-CM

## 2024-07-01 RX ORDER — AMIODARONE HYDROCHLORIDE 200 MG/1
TABLET ORAL
Qty: 60 TABLET | Refills: 11 | Status: SHIPPED | OUTPATIENT
Start: 2024-07-01

## 2024-07-05 ENCOUNTER — APPOINTMENT (OUTPATIENT)
Dept: INFECTIOUS DISEASES | Facility: CLINIC | Age: 79
End: 2024-07-05
Payer: MEDICARE

## 2024-07-07 DIAGNOSIS — M48.062 LUMBAR STENOSIS WITH NEUROGENIC CLAUDICATION: ICD-10-CM

## 2024-07-08 ENCOUNTER — APPOINTMENT (OUTPATIENT)
Dept: DERMATOLOGY | Facility: CLINIC | Age: 79
End: 2024-07-08
Payer: MEDICARE

## 2024-07-08 RX ORDER — TOPIRAMATE 100 MG/1
100 TABLET, FILM COATED ORAL 2 TIMES DAILY
Qty: 60 TABLET | Refills: 0 | Status: SHIPPED | OUTPATIENT
Start: 2024-07-08

## 2024-07-22 DIAGNOSIS — G89.4 CHRONIC PAIN SYNDROME: ICD-10-CM

## 2024-07-22 RX ORDER — GABAPENTIN 600 MG/1
600 TABLET ORAL 3 TIMES DAILY
Qty: 90 TABLET | Refills: 0 | Status: SHIPPED | OUTPATIENT
Start: 2024-07-22

## 2024-07-26 ENCOUNTER — CLINICAL SUPPORT (OUTPATIENT)
Dept: AUDIOLOGY | Facility: CLINIC | Age: 79
End: 2024-07-26
Payer: MEDICARE

## 2024-07-26 DIAGNOSIS — H90.3 SENSORINEURAL HEARING LOSS (SNHL) OF BOTH EARS: Primary | ICD-10-CM

## 2024-07-26 PROCEDURE — 92557 COMPREHENSIVE HEARING TEST: CPT

## 2024-07-26 PROCEDURE — 92567 TYMPANOMETRY: CPT

## 2024-07-26 ASSESSMENT — PAIN - FUNCTIONAL ASSESSMENT: PAIN_FUNCTIONAL_ASSESSMENT: 0-10

## 2024-07-26 ASSESSMENT — PAIN SCALES - GENERAL: PAINLEVEL_OUTOF10: 0 - NO PAIN

## 2024-07-26 NOTE — PROGRESS NOTES
AUDIOLOGIC EVALUATION      Name:  Elida Benson  :  1945  Age:  79 y.o.  Date of Evaluation:  2024    Time: 9513-6410    HISTORY:  Elida Benson, 79 y.o., was seen for an audiologic assessment at the request of her primary care physician. She reported a a gradual decline in hearing sensitivity, bilaterally. She has occasional otalgia in both ears. She noted frequent dizziness that stared several months ago. It is primarily provoked upon standing. She denied otorrhea, tinnitus,  aural pressure/fullness, history of noise exposure, history of otologic surgeries, and recent falls.       RESULTS:  Otoscopic Evaluation:  Right Ear: Unremarkable  Left Ear: Unremarkable    Immittance Measures:  Right Ear: Type As -- indicating normal volume, pressure, and reduced tympanic membrane compliance  Left Ear: Type As -- indicating normal volume, pressure, and reduced tympanic membrane compliance    Acoustic Reflexes:  Right Ear: (Ipsilateral) Responses present at expected levels for 500-2000 Hz, absent 4000 Hz    Left Ear: (Ipsilateral) Responses present at expected levels for 500-2000 Hz, absent 4000 Hz    Pure Tone Audiometry:    Right Ear: Hearing within normal limits 125-250 Hz sloping to a moderately-severe sensorineural hearing loss at 8000 Hz    Left Ear: Hearing within normal limits 125-250 Hz sloping to a severe sensorineural hearing loss at 8000 Hz    Asymmetry: No    Reliability:   Good    Speech Audiometry:   Right: Speech Reception Threshold (SRT) was obtained at 35 dBHL.   SRT/PTA in Good agreement with pure tone average.    Left: Speech Reception Threshold (SRT) was obtained at 30 dBHL.   SRT/PTA in Good agreement with pure tone average.    Word Recognition Scores (WRS):  Right Ear: excellent (92%) in quiet when words were presented at 75 dBHL.   Left Ear: excellent (96%) in quiet when words were presented at 75 dBHL.     Asymmetry: No      IMPRESSIONS:  Today's testing revealed normal ear canal  volume, middle ear pressure, and reduced tympanic membrane compliance in both ears. She has hearing within normal limits sloping to a moderately-severe sensorineural hearing loss in the right ear and severe sensorineural hearing loss in the left ear. She has excellent word recognition in both ears. She is a bilateral hearing aid candidate. The results were discussed with the patient and her daughter. She should consider a hearing aid consultation.      RECOMMENDATIONS:  1.) Return for audiologic evaluation annually to monitor hearing sensitivity and assess middle ear status or sooner should concerns arise. The audiology department can be reached at [phone] to schedule an appointment.   2.) Consider amplification. Check insurance for hearing aid benefits and in-network providers. To schedule a hearing aid consultation with [facility] audiology department, call [phone]. Patient was provided with a copy of today's audiogram.      Que Whitt, CCC-A  Clinical Audiologist      KEY  SNHL Sensorineural Hearing Loss   CHL Conductive Hearing Loss   MHL Mixed Hearing Loss   SSNHL Sudden Sensorineural Hearing Loss   WNL Within Normal Limits   PTA Pure Tone Average   TM Tympanic Membrane   ECV Ear Canal Volume   SRT Speech Reception Threshold   WRS Word Recognition Score

## 2024-07-29 ENCOUNTER — APPOINTMENT (OUTPATIENT)
Dept: CARDIOLOGY | Facility: CLINIC | Age: 79
End: 2024-07-29
Payer: MEDICARE

## 2024-08-07 ENCOUNTER — APPOINTMENT (OUTPATIENT)
Dept: CARDIOLOGY | Facility: CLINIC | Age: 79
End: 2024-08-07
Payer: MEDICARE

## 2024-08-14 ENCOUNTER — APPOINTMENT (OUTPATIENT)
Dept: CARDIOLOGY | Facility: HOSPITAL | Age: 79
End: 2024-08-14
Payer: MEDICARE

## 2024-08-14 ENCOUNTER — OFFICE VISIT (OUTPATIENT)
Dept: ORTHOPEDIC SURGERY | Facility: CLINIC | Age: 79
End: 2024-08-14
Payer: MEDICARE

## 2024-08-14 DIAGNOSIS — M25.512 LEFT SHOULDER PAIN, UNSPECIFIED CHRONICITY: ICD-10-CM

## 2024-08-14 DIAGNOSIS — M19.012 ARTHRITIS OF LEFT SHOULDER REGION: Primary | ICD-10-CM

## 2024-08-14 PROCEDURE — 2500000004 HC RX 250 GENERAL PHARMACY W/ HCPCS (ALT 636 FOR OP/ED): Performed by: STUDENT IN AN ORGANIZED HEALTH CARE EDUCATION/TRAINING PROGRAM

## 2024-08-14 PROCEDURE — 2500000005 HC RX 250 GENERAL PHARMACY W/O HCPCS: Performed by: STUDENT IN AN ORGANIZED HEALTH CARE EDUCATION/TRAINING PROGRAM

## 2024-08-14 PROCEDURE — 20610 DRAIN/INJ JOINT/BURSA W/O US: CPT | Mod: LT | Performed by: STUDENT IN AN ORGANIZED HEALTH CARE EDUCATION/TRAINING PROGRAM

## 2024-08-14 PROCEDURE — 99214 OFFICE O/P EST MOD 30 MIN: CPT | Performed by: STUDENT IN AN ORGANIZED HEALTH CARE EDUCATION/TRAINING PROGRAM

## 2024-08-14 RX ORDER — LIDOCAINE HYDROCHLORIDE 10 MG/ML
5 INJECTION INFILTRATION; PERINEURAL
Status: COMPLETED | OUTPATIENT
Start: 2024-08-14 | End: 2024-08-14

## 2024-08-14 RX ORDER — TRIAMCINOLONE ACETONIDE 40 MG/ML
80 INJECTION, SUSPENSION INTRA-ARTICULAR; INTRAMUSCULAR
Status: COMPLETED | OUTPATIENT
Start: 2024-08-14 | End: 2024-08-14

## 2024-08-14 NOTE — PROGRESS NOTES
CHIEF COMPLAINT: No chief complaint on file.    History: 79 y.o. female presents to the office today for evaluation of her left shoulder.  She has known left shoulder end-stage arthritis.  We have given her 3 cortisone injections previously, the most recent of which was 7 months ago.  She had relief from this up until a few months ago.  Is not interested in surgery at this time.  Difficulty with using the left shoulder.  Significant pain is anterior and deep.  Requesting repeat injection today.    Past medical history, past surgical history, medications, allergies, family history, social history, and review of systems were reviewed today.    A 12 point review of systems was negative other than as stated in the HPI.    Past Medical History:   Diagnosis Date    A-fib (Multi)     Managed on meds, Eliquis stoppage and cardiac clearance in [facility] from Dr. Ignacio    Cervical myelopathy (Multi)     Chronic pain syndrome     COPD (chronic obstructive pulmonary disease) (Multi)     denies, she is a former smoker but quit in 1984    Dementia (Multi)     Depression     Diabetic neuropathy (Multi)     Diverticulitis     DM (diabetes mellitus) (Multi)     denies but A1C= 7.2% on 9/5/23    LORENZ (dyspnea on exertion)     Stable per cards note    HLD (hyperlipidemia)     Hypertension, essential     Infiltrative cardiomyopathy (Multi)     Echo 8/22/23, following with Dr. Ignacio    OA (ocular albinism) (Multi)     multiple sites    Other malaise 04/21/2022    Physical deconditioning    Palpitations     on occasion, has afib followed by cardiology    Post laminectomy syndrome     Rectovaginal fistula     Spinal stenosis of cervical region     UTI (urinary tract infection)         Allergies   Allergen Reactions    Shellfish Containing Products Swelling        Past Surgical History:   Procedure Laterality Date    CARPAL TUNNEL RELEASE  08/27/2013    Neuroplasty Decompression Median Nerve At Carpal Tunnel    ILEOSTOMY      KNEE  ARTHROPLASTY  2013    Knee Arthroplasty    LAMINECTOMY      LUMBAR FUSION      OTHER SURGICAL HISTORY  2023    LAPAROSCPIC ANTERIOR RESECTION, DIVERTING LOOP ILEOSTOMY    SPINAL CORD STIMULATOR IMPLANT      TOTAL HIP ARTHROPLASTY  2013    Total Hip Replacement        Family History   Problem Relation Name Age of Onset    No Known Problems Mother      No Known Problems Father      No Known Problems Sister      No Known Problems Sister      No Known Problems Sister      No Known Problems Sister          Social History     Socioeconomic History    Marital status:      Spouse name: Not on file    Number of children: Not on file    Years of education: Not on file    Highest education level: Not on file   Occupational History    Not on file   Tobacco Use    Smoking status: Former     Current packs/day: 0.00     Types: Cigarettes     Quit date:      Years since quittin.6    Smokeless tobacco: Never   Vaping Use    Vaping status: Never Used   Substance and Sexual Activity    Alcohol use: Yes     Comment: drinks beer or liquor a few times a year    Drug use: Never    Sexual activity: Defer   Other Topics Concern    Not on file   Social History Narrative    Not on file     Social Determinants of Health     Financial Resource Strain: Medium Risk (2024)    Overall Financial Resource Strain (CARDIA)     Difficulty of Paying Living Expenses: Somewhat hard   Food Insecurity: No Food Insecurity (2024)    Hunger Vital Sign     Worried About Running Out of Food in the Last Year: Never true     Ran Out of Food in the Last Year: Never true   Transportation Needs: No Transportation Needs (6/10/2024)    OASIS : Transportation     Lack of Transportation (Medical): No     Lack of Transportation (Non-Medical): No     Patient Unable or Declines to Respond: No   Physical Activity: Inactive (2024)    Exercise Vital Sign     Days of Exercise per Week: 0 days     Minutes of Exercise per  Session: 0 min   Stress: No Stress Concern Present (4/9/2024)    [employer] [place] [employer] - Occupational Stress Questionnaire     Feeling of Stress : Not at all   Social Connections: Feeling Socially Integrated (6/10/2024)    OASIS : Social Isolation     Frequency of experiencing loneliness or isolation: Never   Recent Concern: Social Connections - Moderately Isolated (4/9/2024)    Social Connection and Isolation Panel [NHANES]     Frequency of Communication with Friends and Family: More than three times a week     Frequency of Social Gatherings with Friends and Family: More than three times a week     Attends Faith Services: 1 to 4 times per year     Active Member of Clubs or Organizations: No     Attends Club or Organization Meetings: Never     Marital Status:    Intimate Partner Violence: Not At Risk (4/9/2024)    Humiliation, Afraid, Rape, and Kick questionnaire     Fear of Current or Ex-Partner: No     Emotionally Abused: No     Physically Abused: No     Sexually Abused: No   Housing Stability: Low Risk  (4/9/2024)    Housing Stability Vital Sign     Unable to Pay for Housing in the Last Year: No     Number of Places Lived in the Last Year: 1     Unstable Housing in the Last Year: No        CURRENT MEDICATIONS:   Current Outpatient Medications   Medication Sig Dispense Refill    acetaminophen (Tylenol) 325 mg tablet Give 2 tablet by mouth every 4 hours as needed for Pain      amiodarone (Pacerone) 200 mg tablet Give 1 tablet by mouth in the morning for ANTIARRHYTHMIC 60 tablet 11    apixaban (Eliquis) 5 mg tablet Give 1 tablet by mouth two times a day for blood thinner 180 tablet 11    atorvastatin (Lipitor) 40 mg tablet Take 1 tablet (40 mg) by mouth once daily at bedtime. 90 tablet 1    b complex 0.4 mg tablet Take 1 tablet by mouth once daily.      biotin 1 mg tablet 1 tab(s) orally once a day      cholecalciferol (Vitamin D-3) 1,250 mcg (50,000 unit) capsule Take 50,000 Units  "by mouth once each week.      docusate sodium (Colace) 100 mg capsule Take 1 capsule (100 mg) by mouth 2 times a day as needed for constipation.      DULoxetine (Cymbalta) 30 mg DR capsule Take 1 capsule (30 mg) by mouth 2 times a day. 180 capsule 0    empagliflozin (Jardiance) 10 mg Take 1 tablet (10 mg) by mouth once daily. 90 tablet 1    furosemide (Lasix) 20 mg tablet Take 1 tablet (20 mg) by mouth once daily. 90 tablet 3    gabapentin (Neurontin) 600 mg tablet Take 1 tablet (600 mg) by mouth 3 times a day. 90 tablet 0    melatonin 3 mg tablet Give 2 tablet by mouth as needed for insomnia administer at bedtime      ondansetron ODT (Zofran-ODT) 4 mg disintegrating tablet Take 1 tablet (4 mg) by mouth every 8 hours if needed for nausea or vomiting. 20 tablet 0    spironolactone (Aldactone) 25 mg tablet Take 1 tablet (25 mg) by mouth once daily. 90 tablet 3    topiramate (Topamax) 100 mg tablet Take 1 tablet (100 mg) by mouth 2 times a day. NEED FOLLOWUP FOR NEXT REFILL 60 tablet 0    traMADol (Ultram) 50 mg tablet Give 1 tablet by mouth two times a day for chronic back pain      traZODone (Desyrel) 50 mg tablet Take 1 tablet (50 mg) by mouth as needed at bedtime for sleep. 30 tablet 5     No current facility-administered medications for this visit.       Physical Examination:      5/22/2024     3:10 PM 5/23/2024     1:35 PM 5/24/2024     1:54 PM 5/24/2024     4:18 PM 5/29/2024     3:13 PM 6/4/2024     2:39 PM 6/10/2024    11:26 AM   Vitals   Systolic 96 124 123 128 118 125 117   Diastolic 73 78 73 84 75 72 60   Heart Rate 73 64 69 82 72 77 75   Temp 36.7 °C (98.1 °F)   37 °C (98.6 °F) 36.3 °C (97.4 °F) 36.7 °C (98.1 °F) 36.6 °C (97.8 °F)   Resp 17   17 17 16 20   Height (in)   1.473 m (4' 10\")       Weight (lb)   138       BMI   28.84 kg/m2       BSA (m2)   1.6 m2       Visit Report   Report Report         There is no height or weight on file to calculate BMI.    Well-appearing, appears stated age, pleasant and " cooperative, appropriate mood and behavior. Height and weight reviewed. Alert and oriented x3.  Auditory function intact.  No acute distress.  Intact ocular function, ARIC, EOMI. Breathing is unlabored .  There is no evidence of jugular venous distension. Skin appearance is normal without evidence of rash or other lesions. 2+ radial pulses bilaterally, fingers pink and wwp, good capillary refill, no pitting edema. No appreciable lymphadenopathy in bilateral upper extremities. SILT throughout both upper extremities, median/radial/ulnar/musculocutaneous/axillary nerve motor and sensory intact (except for abnormalities noted in focused musculoskeletal exam section below).     On exam of bilateral upper extremities, very limited range of motion of the left shoulder.  Active forward flexion to 40, external rotation to neutral.  On the right she has active forward flexion to 100, external rotation to 30.    Imaging: Previous radiographs of the left shoulder show end-stage arthritis of the left shoulder    Assessment: Arthritis of the left shoulder    Plan: Patient with end-stage arthritis of the left shoulder.  She has responded well to cortisone injections previously and is not interested in surgery at this time.  She has multiple medical morbidities.  We performed a repeat injection today.  She will follow-up with us as needed going forward.    Injection was performed, please see separate procedure note, patient tolerated well.   Patient ID: Elida Benson is a 79 y.o. female.    L Inj/Asp: L subacromial bursa on 8/14/2024 3:31 PM  Indications: pain  Details: 22 G needle, posterior approach  Medications: 80 mg triamcinolone acetonide 40 mg/mL; 5 mL lidocaine 10 mg/mL (1 %)  Outcome: tolerated well, no immediate complications  Procedure, treatment alternatives, risks and benefits explained, specific risks discussed. Consent was given by the patient. Immediately prior to procedure a time out was called to verify the  correct patient, procedure, equipment, support staff and site/side marked as required. Patient was prepped and draped in the usual sterile fashion.           Dragon software was used to dictate this note, please be aware that minor errors in transcription may be present.    Telly Chu MD    Shoulder/Elbow Surgery  [facility]

## 2024-08-16 ENCOUNTER — TELEPHONE (OUTPATIENT)
Dept: PRIMARY CARE | Facility: CLINIC | Age: 79
End: 2024-08-16
Payer: MEDICARE

## 2024-08-19 ENCOUNTER — APPOINTMENT (OUTPATIENT)
Dept: PRIMARY CARE | Facility: CLINIC | Age: 79
End: 2024-08-19
Payer: MEDICARE

## 2024-08-19 VITALS
WEIGHT: 130 LBS | HEART RATE: 66 BPM | SYSTOLIC BLOOD PRESSURE: 125 MMHG | DIASTOLIC BLOOD PRESSURE: 85 MMHG | HEIGHT: 58 IN | BODY MASS INDEX: 27.29 KG/M2

## 2024-08-19 DIAGNOSIS — I42.8 INFILTRATIVE CARDIOMYOPATHY (MULTI): ICD-10-CM

## 2024-08-19 DIAGNOSIS — I27.20 PULMONARY HYPERTENSION, UNSPECIFIED (MULTI): ICD-10-CM

## 2024-08-19 DIAGNOSIS — I10 HYPERTENSION, ESSENTIAL: ICD-10-CM

## 2024-08-19 DIAGNOSIS — M96.1 POSTLAMINECTOMY SYNDROME, LUMBAR: ICD-10-CM

## 2024-08-19 DIAGNOSIS — G89.4 CHRONIC PAIN SYNDROME: ICD-10-CM

## 2024-08-19 DIAGNOSIS — F32.A DEPRESSION, UNSPECIFIED DEPRESSION TYPE: ICD-10-CM

## 2024-08-19 DIAGNOSIS — N18.32 STAGE 3B CHRONIC KIDNEY DISEASE (MULTI): ICD-10-CM

## 2024-08-19 DIAGNOSIS — J44.9 COPD WITHOUT EXACERBATION (MULTI): ICD-10-CM

## 2024-08-19 DIAGNOSIS — Z79.4 TYPE 2 DIABETES MELLITUS WITHOUT COMPLICATION, WITH LONG-TERM CURRENT USE OF INSULIN (MULTI): ICD-10-CM

## 2024-08-19 DIAGNOSIS — E11.9 TYPE 2 DIABETES MELLITUS WITHOUT COMPLICATION, WITH LONG-TERM CURRENT USE OF INSULIN (MULTI): ICD-10-CM

## 2024-08-19 DIAGNOSIS — I48.11 LONGSTANDING PERSISTENT ATRIAL FIBRILLATION (MULTI): Primary | ICD-10-CM

## 2024-08-19 LAB — POC HEMOGLOBIN A1C: 8.7 % (ref 4.2–6.5)

## 2024-08-19 PROCEDURE — 1036F TOBACCO NON-USER: CPT | Performed by: FAMILY MEDICINE

## 2024-08-19 PROCEDURE — 3079F DIAST BP 80-89 MM HG: CPT | Performed by: FAMILY MEDICINE

## 2024-08-19 PROCEDURE — 83036 HEMOGLOBIN GLYCOSYLATED A1C: CPT | Performed by: FAMILY MEDICINE

## 2024-08-19 PROCEDURE — 1159F MED LIST DOCD IN RCRD: CPT | Performed by: FAMILY MEDICINE

## 2024-08-19 PROCEDURE — G2211 COMPLEX E/M VISIT ADD ON: HCPCS | Performed by: FAMILY MEDICINE

## 2024-08-19 PROCEDURE — 1170F FXNL STATUS ASSESSED: CPT | Performed by: FAMILY MEDICINE

## 2024-08-19 PROCEDURE — 3074F SYST BP LT 130 MM HG: CPT | Performed by: FAMILY MEDICINE

## 2024-08-19 PROCEDURE — 99214 OFFICE O/P EST MOD 30 MIN: CPT | Performed by: FAMILY MEDICINE

## 2024-08-19 PROCEDURE — 80053 COMPREHEN METABOLIC PANEL: CPT

## 2024-08-19 RX ORDER — GABAPENTIN 600 MG/1
600 TABLET ORAL 3 TIMES DAILY
Qty: 90 TABLET | Refills: 0 | Status: SHIPPED | OUTPATIENT
Start: 2024-08-19

## 2024-08-19 ASSESSMENT — ACTIVITIES OF DAILY LIVING (ADL)
TAKING_MEDICATION: NEEDS ASSISTANCE
DRESSING: NEEDS ASSISTANCE
DOING_HOUSEWORK: NEEDS ASSISTANCE
GROCERY_SHOPPING: TOTAL CARE
BATHING: NEEDS ASSISTANCE
MANAGING_FINANCES: NEEDS ASSISTANCE

## 2024-08-19 ASSESSMENT — ENCOUNTER SYMPTOMS
FATIGUE: 1
LOSS OF SENSATION IN FEET: 0
CARDIOVASCULAR NEGATIVE: 1
RESPIRATORY NEGATIVE: 1
OCCASIONAL FEELINGS OF UNSTEADINESS: 1
ARTHRALGIAS: 1
WEAKNESS: 1
GASTROINTESTINAL NEGATIVE: 1
DEPRESSION: 0
BACK PAIN: 1

## 2024-08-19 NOTE — PROGRESS NOTES
Subjective   Reason for Visit: Elida Benson is an 79 y.o. female here for a Medicare Wellness visit.      Pt comes in for wellness exam. Pt states her right foot is darker in color than her left foot.          HPI    Patient Care Team:  David Swift MD as PCP - General (Family Medicine)  Ervin Alvarez MD as PCP - [employer] PCP  Anna Benson LPN as Care Manager (Case Management)     Review of Systems    Objective   Vitals:  There were no vitals taken for this visit.      Physical Exam    Assessment/Plan   Problem List Items Addressed This Visit             ICD-10-CM    Type 2 diabetes mellitus without complication, with long-term current use of insulin (Multi) E11.9, Z79.4    Relevant Orders    POCT glycosylated hemoglobin (Hb A1C) manually resulted

## 2024-08-19 NOTE — PROGRESS NOTES
"Subjective   Patient ID: Elida Benson is a 79 y.o. female who presents for Medicare Annual Wellness Visit Subsequent.    HPI dm pt not optimally cont admits to poor diet, darkness in r foot w circulation imporves with elevation but worse when foot down, no pain or swelling reported, renal isnuff ,afibb    Review of Systems   Constitutional:  Positive for fatigue.   HENT: Negative.     Respiratory: Negative.     Cardiovascular: Negative.         Dark color of foot when not elevated but color normalizes w elevation   Gastrointestinal: Negative.    Musculoskeletal:  Positive for arthralgias, back pain and gait problem.   Neurological:  Positive for weakness.       Objective   /85   Pulse 66   Ht 1.473 m (4' 10\")   Wt 59 kg (130 lb)   BMI 27.17 kg/m²     Physical Exam  Vitals reviewed.   Constitutional:       Appearance: Normal appearance. She is normal weight.   Eyes:      Extraocular Movements: Extraocular movements intact.      Conjunctiva/sclera: Conjunctivae normal.      Pupils: Pupils are equal, round, and reactive to light.   Cardiovascular:      Rate and Rhythm: Normal rate. Rhythm irregular.      Pulses: Normal pulses.      Heart sounds: Normal heart sounds.   Pulmonary:      Effort: Pulmonary effort is normal.      Breath sounds: Normal breath sounds.   Abdominal:      General: Bowel sounds are normal.      Palpations: Abdomen is soft.   Musculoskeletal:         General: Normal range of motion.   Skin:     General: Skin is warm and dry.      Comments: Dark discoloration r fooot when foot dependent but resoves w elevation , no pain , no swelling , intact pedal pulses bilateraly    Neurological:      General: No focal deficit present.      Mental Status: She is alert and oriented to person, place, and time. Mental status is at baseline.      Gait: Gait abnormal.      Comments: Unable to ambulate, w sig weakness sp stroke and lumbar stenosis and secondary physical deconditioning, will start aquatic " therapy         Assessment/Plan   Problem List Items Addressed This Visit             ICD-10-CM    Atrial fibrillation (Multi) - Primary I48.91    Chronic pain syndrome G89.4    Relevant Medications    gabapentin (Neurontin) 600 mg tablet    COPD without exacerbation (Multi) J44.9    Depression F32.A    Hypertension, essential I10    Relevant Orders    Comprehensive Metabolic Panel    Infiltrative cardiomyopathy (Multi) I42.8    Relevant Medications    empagliflozin (Jardiance) 25 mg    Postlaminectomy syndrome, lumbar M96.1    Pulmonary hypertension, unspecified (Multi) I27.20    Stage 3b chronic kidney disease (Multi) N18.32    Type 2 diabetes mellitus without complication, with long-term current use of insulin (Multi) E11.9, Z79.4    Relevant Medications    empagliflozin (Jardiance) 25 mg    Other Relevant Orders    POCT glycosylated hemoglobin (Hb A1C) manually resulted (Completed)

## 2024-08-20 DIAGNOSIS — Z79.4 TYPE 2 DIABETES MELLITUS WITHOUT COMPLICATION, WITH LONG-TERM CURRENT USE OF INSULIN (MULTI): Primary | ICD-10-CM

## 2024-08-20 DIAGNOSIS — E11.9 TYPE 2 DIABETES MELLITUS WITHOUT COMPLICATION, WITH LONG-TERM CURRENT USE OF INSULIN (MULTI): Primary | ICD-10-CM

## 2024-08-20 LAB
ALBUMIN SERPL BCP-MCNC: 4.2 G/DL (ref 3.4–5)
ALP SERPL-CCNC: 109 U/L (ref 33–136)
ALT SERPL W P-5'-P-CCNC: 23 U/L (ref 7–45)
ANION GAP SERPL CALC-SCNC: 16 MMOL/L (ref 10–20)
AST SERPL W P-5'-P-CCNC: 28 U/L (ref 9–39)
BILIRUB SERPL-MCNC: 0.3 MG/DL (ref 0–1.2)
BUN SERPL-MCNC: 33 MG/DL (ref 6–23)
CALCIUM SERPL-MCNC: 9.4 MG/DL (ref 8.6–10.6)
CHLORIDE SERPL-SCNC: 98 MMOL/L (ref 98–107)
CO2 SERPL-SCNC: 26 MMOL/L (ref 21–32)
CREAT SERPL-MCNC: 0.99 MG/DL (ref 0.5–1.05)
EGFRCR SERPLBLD CKD-EPI 2021: 59 ML/MIN/1.73M*2
GLUCOSE SERPL-MCNC: 349 MG/DL (ref 74–99)
POTASSIUM SERPL-SCNC: 4.2 MMOL/L (ref 3.5–5.3)
PROT SERPL-MCNC: 7.5 G/DL (ref 6.4–8.2)
SODIUM SERPL-SCNC: 136 MMOL/L (ref 136–145)

## 2024-08-28 ENCOUNTER — TELEPHONE (OUTPATIENT)
Dept: PRIMARY CARE | Facility: CLINIC | Age: 79
End: 2024-08-28
Payer: MEDICARE

## 2024-08-28 DIAGNOSIS — Z79.4 TYPE 2 DIABETES MELLITUS WITHOUT COMPLICATION, WITH LONG-TERM CURRENT USE OF INSULIN (MULTI): Primary | ICD-10-CM

## 2024-08-28 DIAGNOSIS — E11.9 TYPE 2 DIABETES MELLITUS WITHOUT COMPLICATION, WITH LONG-TERM CURRENT USE OF INSULIN (MULTI): Primary | ICD-10-CM

## 2024-08-28 RX ORDER — GLIPIZIDE 5 MG/1
5 TABLET, FILM COATED, EXTENDED RELEASE ORAL DAILY
Qty: 90 TABLET | Refills: 1 | Status: SHIPPED | OUTPATIENT
Start: 2024-08-28 | End: 2025-02-24

## 2024-08-28 NOTE — TELEPHONE ENCOUNTER
Pt daughter called and you had increased her jardiance and put her on januvia. States they are very expensive and wanted to see if there is something else she can take. She is working on pt assistance program with the meds, but is waiting for approval on them? Pt feet are having sharp tingling pains.

## 2024-08-30 ENCOUNTER — TELEPHONE (OUTPATIENT)
Dept: PRIMARY CARE | Facility: CLINIC | Age: 79
End: 2024-08-30
Payer: MEDICARE

## 2024-08-30 DIAGNOSIS — M48.062 LUMBAR STENOSIS WITH NEUROGENIC CLAUDICATION: ICD-10-CM

## 2024-08-30 RX ORDER — TOPIRAMATE 100 MG/1
TABLET, FILM COATED ORAL
Qty: 60 TABLET | Refills: 0 | Status: SHIPPED | OUTPATIENT
Start: 2024-08-30

## 2024-09-03 NOTE — TELEPHONE ENCOUNTER
Pt's daughter came in the office requesting Jardiance sample, states you told her to come pick It up. Advised I would send this msg to you.

## 2024-09-04 ENCOUNTER — APPOINTMENT (OUTPATIENT)
Dept: NEUROLOGY | Facility: CLINIC | Age: 79
End: 2024-09-04
Payer: MEDICARE

## 2024-09-04 VITALS — RESPIRATION RATE: 16 BRPM | DIASTOLIC BLOOD PRESSURE: 69 MMHG | HEART RATE: 71 BPM | SYSTOLIC BLOOD PRESSURE: 95 MMHG

## 2024-09-04 DIAGNOSIS — M21.379 DROP FOOT GAIT: ICD-10-CM

## 2024-09-04 DIAGNOSIS — R41.3 MEMORY LOSS: Primary | ICD-10-CM

## 2024-09-04 DIAGNOSIS — R42 DIZZINESS: ICD-10-CM

## 2024-09-04 PROCEDURE — 3074F SYST BP LT 130 MM HG: CPT | Performed by: STUDENT IN AN ORGANIZED HEALTH CARE EDUCATION/TRAINING PROGRAM

## 2024-09-04 PROCEDURE — 3078F DIAST BP <80 MM HG: CPT | Performed by: STUDENT IN AN ORGANIZED HEALTH CARE EDUCATION/TRAINING PROGRAM

## 2024-09-04 PROCEDURE — 1159F MED LIST DOCD IN RCRD: CPT | Performed by: STUDENT IN AN ORGANIZED HEALTH CARE EDUCATION/TRAINING PROGRAM

## 2024-09-04 PROCEDURE — 99205 OFFICE O/P NEW HI 60 MIN: CPT | Performed by: STUDENT IN AN ORGANIZED HEALTH CARE EDUCATION/TRAINING PROGRAM

## 2024-09-04 ASSESSMENT — ENCOUNTER SYMPTOMS
LOSS OF SENSATION IN FEET: 0
OCCASIONAL FEELINGS OF UNSTEADINESS: 1
DEPRESSION: 0

## 2024-09-04 ASSESSMENT — MONTREAL COGNITIVE ASSESSMENT (MOCA)
WHAT LEVEL OF EDUCATION WAS ATTAINED: 0
8. SERIAL SUBTRACTION OF 7S: 2
5. MEMORY TRIALS: 0
4. NAME EACH OF THE THREE ANIMALS SHOWN: 2
6. READ LIST OF DIGITS [FORWARD/BACKWARD]: 0
7. [VIGILENCE] TAP WHEN HEARING DESIGNATED LETTER: 1
13. ORIENTATION SUBSCORE: 6
VISUOSPATIAL/EXECUTIVE SUBSCORE: 3
12. MEMORY INDEX SCORE: 0
9. REPEAT EACH SENTENCE: 1
10. [FLUENCY] NAME WORDS STARTING WITH DESIGNATED LETTER: 1
11. FOR EACH PAIR OF WORDS, WHAT CATEGORY DO THEY BELONG TO (OUT OF 2): 1
WHAT IS THE TOTAL SCORE (OUT OF 30): 17

## 2024-09-04 ASSESSMENT — PATIENT HEALTH QUESTIONNAIRE - PHQ9
1. LITTLE INTEREST OR PLEASURE IN DOING THINGS: NOT AT ALL
SUM OF ALL RESPONSES TO PHQ9 QUESTIONS 1 AND 2: 0
2. FEELING DOWN, DEPRESSED OR HOPELESS: NOT AT ALL

## 2024-09-04 NOTE — PROGRESS NOTES
Subjective     Elida Benson is a right handed  79 y.o. year old female who presents evaluation of memory deficits Patient is accompanied by: child Kaelyn    Visit type: new patient visit     Patient had 12 years of formal education and is a retired . Her medical history is significant for hypertension, diabetes mellitus, pAF maintained on amiodarone and DOAC, right drop foot since 2020, HFpEF, cervical and lumbar disc disease s/p laminectomy/fusions and diverticulitis s/p bowel resection. Per daughter, for the past 3 years, she has been having difficulties remembering things. Mostly short term memory issues.  3 years ago, patient was ambulating without any assistant and was completely independent.  She has had gradually worsening chronic medical issues that have rendered her dependent on others.  The decline in her functionality has caused her to have depressed mood although she denies being clinically depressed.  This decline appears to coincide with onset of memory deficits.    Personality  No visual hallucinations, No fluctuating cognition, tremors mostly on right side when she is trying to walk, No REM sleep behavior disorder, No falls.     Functional changes: Had multiple back, knee, shoulder and hip surgeries that have left her with limited mobility mainly in lower extremities. Uses a walker in the house and a wheelchair outside. Still in therapy.  Born and raised in [place]  Sleep: Not good, she is up all night and takes mini naps during the day.Able to fall asleep but wakes up frequently  Current living situation - Lives a lone in a house  Driving- No  Finances - Daughter taking care of finances for past 2.5 years because she was forgetting to pay bills and also had hospital and rehab stays  Cooking- Stopped because she cannot walk and cannot stand for long  ADLS- Has an aid that comes and helps with shower and dressing. She can feed herself and brush her teeth but cannot reach over  head  Medications- Daughter helps with medication in pill box but have to call her to remind her to take it   Weapons at home: No  Knows 911: Yes, can operate phone and call daughter for help    Neuropsychiatric symptoms:   Depression -Mood is down about situation but not denies being depression. Appetite is fair. Sleeping poor . No Worthlessness/helplessness. Occasional suicidal ideation but denies intent or plans.   Anxiety - No  Psychosis - No  [place] -  No  Suicidality - No    Prior Work-up:   No TSH on record, Vit B12 864 in 2019.   Neuropsychological testing completed: None    Past Neuropsychiatric History:  Handedness: right.   History of traumatic brain injury: No  History of seizures: No  History of stroke: No  Past psychiatric history: None    PMH/PSH:  As above, in addition    Meds: reviewed as listed    Allergies: reviewed as listed    Family history:   Psychiatric: None  Dementia:  No  Parkinsons disease: No  Huntingtons disease: No  ALS: No    Social History:   Former smoker, quit over 30 years ago, Occasionally alcohol, Denies recreational drugs  Work: Retire      Patient Active Problem List   Diagnosis    Cervical disc disorder with myelopathy    COPD without exacerbation (Multi)    Chronic pain syndrome    Moderate dementia with mood disturbance, unspecified dementia type (Multi)    Depression    Hypertension, essential    Weakness    Insomnia    Arthritis of left shoulder region    Astigmatism    Hyperopia    Myopia with presbyopia    Combined form of age-related cataract, both eyes    Contracture of joint of finger    Lumbar disc herniation with radiculopathy    Lumbar radiculopathy    Lumbar stenosis with neurogenic claudication    Acquired spondylolisthesis    Lumbar scoliosis    Myelopathy concurrent with and due to spinal stenosis of cervical region (Multi)    Osteoarthritis, multiple sites    Pain disorder associated with psychological and physical factors    Postlaminectomy  syndrome, lumbar    Presence of retained hardware    Venous insufficiency    Vocal cord anomaly    Type 2 diabetes mellitus without complication, with long-term current use of insulin (Multi)    Colovaginal fistula    Weight loss    Hyperkalemia    Abdominal pain    Anemia    Ankle pain, right    Arrhythmia    Atrial fibrillation (Multi)    Diverticulitis, colon    Hypovolemia associated with hypermetabolic state    Hypovolemic shock (Multi)    Infection of intervertebral disc (pyogenic), thoracolumbar region (Multi)    Lactic acidosis    Muscle wasting and atrophy, not elsewhere classified, unspecified site    Nonhealing nonsurgical wound    NSTEMI, initial episode of care (Multi)    Oliguria    Overactive bladder    Papular rash    Pleural effusion, not elsewhere classified    Primary localized osteoarthrosis, lower leg    Primary osteoarthritis, left shoulder    Status post laparoscopic colectomy    Trigger ring finger of right hand    Undernutrition    Pulmonary hypertension, unspecified (Multi)    Ileostomy present (Multi)    Acidosis, unspecified    Acute posthemorrhagic anemia    Infiltrative cardiomyopathy (Multi)    Psychogenic pain    UTI (urinary tract infection)    Edema    Pneumonia, unspecified organism    Stage 3b chronic kidney disease (Multi)      Past Medical History:   Diagnosis Date    A-fib (Multi)     Managed on meds, Eliquis stoppage and cardiac clearance in [facility] from Dr. Ignacio    Cervical myelopathy (Multi)     Chronic pain syndrome     COPD (chronic obstructive pulmonary disease) (Multi)     denies, she is a former smoker but quit in 1984    Dementia (Multi)     Depression     Diabetic neuropathy (Multi)     Diverticulitis     DM (diabetes mellitus) (Multi)     denies but A1C= 7.2% on 9/5/23    LORENZ (dyspnea on exertion)     Stable per cards note    HLD (hyperlipidemia)     Hypertension, essential     Infiltrative cardiomyopathy (Multi)     Echo 8/22/23, following with Dr. Ignacio    OA  (ocular albinism) (Multi)     multiple sites    Other malaise 2022    Physical deconditioning    Palpitations     on occasion, has afib followed by cardiology    Post laminectomy syndrome     Rectovaginal fistula     Spinal stenosis of cervical region     UTI (urinary tract infection)       Past Surgical History:   Procedure Laterality Date    CARPAL TUNNEL RELEASE  2013    Neuroplasty Decompression Median Nerve At Carpal Tunnel    ILEOSTOMY      KNEE ARTHROPLASTY  2013    Knee Arthroplasty    LAMINECTOMY      LUMBAR FUSION      OTHER SURGICAL HISTORY  2023    LAPAROSCPIC ANTERIOR RESECTION, DIVERTING LOOP ILEOSTOMY    SPINAL CORD STIMULATOR IMPLANT      TOTAL HIP ARTHROPLASTY  2013    Total Hip Replacement      Social History     Socioeconomic History    Marital status:      Spouse name: Not on file    Number of children: Not on file    Years of education: Not on file    Highest education level: Not on file   Occupational History    Not on file   Tobacco Use    Smoking status: Former     Current packs/day: 0.00     Types: Cigarettes     Quit date:      Years since quittin.7    Smokeless tobacco: Never   Vaping Use    Vaping status: Never Used   Substance and Sexual Activity    Alcohol use: Yes     Comment: drinks beer or liquor a few times a year    Drug use: Never    Sexual activity: Defer   Other Topics Concern    Not on file   Social History Narrative    Not on file     Social Determinants of Health     Financial Resource Strain: Medium Risk (2024)    Overall Financial Resource Strain (CARDIA)     Difficulty of Paying Living Expenses: Somewhat hard   Food Insecurity: No Food Insecurity (2024)    Hunger Vital Sign     Worried About Running Out of Food in the Last Year: Never true     Ran Out of Food in the Last Year: Never true   Transportation Needs: No Transportation Needs (6/10/2024)    OASIS : Transportation     Lack of Transportation  (Medical): No     Lack of Transportation (Non-Medical): No     Patient Unable or Declines to Respond: No   Physical Activity: Inactive (4/9/2024)    Exercise Vital Sign     Days of Exercise per Week: 0 days     Minutes of Exercise per Session: 0 min   Stress: No Stress Concern Present (4/9/2024)    [employer] [place] [employer] - Occupational Stress Questionnaire     Feeling of Stress : Not at all   Social Connections: Feeling Socially Integrated (6/10/2024)    OASIS : Social Isolation     Frequency of experiencing loneliness or isolation: Never   Recent Concern: Social Connections - Moderately Isolated (4/9/2024)    Social Connection and Isolation Panel [NHANES]     Frequency of Communication with Friends and Family: More than three times a week     Frequency of Social Gatherings with Friends and Family: More than three times a week     Attends [employer] Services: 1 to 4 times per year     Active Member of Clubs or Organizations: No     Attends Club or Organization Meetings: Never     Marital Status:    Intimate Partner Violence: Not At Risk (4/9/2024)    Humiliation, Afraid, Rape, and Kick questionnaire     Fear of Current or Ex-Partner: No     Emotionally Abused: No     Physically Abused: No     Sexually Abused: No   Housing Stability: Low Risk  (4/9/2024)    Housing Stability Vital Sign     Unable to Pay for Housing in the Last Year: No     Number of Places Lived in the Last Year: 1     Unstable Housing in the Last Year: No      Family History   Problem Relation Name Age of Onset    No Known Problems Mother      No Known Problems Father      No Known Problems Sister      No Known Problems Sister      No Known Problems Sister      No Known Problems Sister        Patient Health Questionnaire-2 Score: 0          Review of Systems  All other system have been reviewed and are negative for complaint.    Vitals:    09/04/24 1411   BP: 95/69   BP Location: Right arm   Patient Position: Sitting    BP Cuff Size: Adult   Pulse: 71   Resp: 16     Objective         9/4/2024     1627 Last Filed Value   MoCA      Visuospatial/Executive 3 3   Naming 2 2   Memory (Score '0' as this is an Unscored Section) 0 0   Attention: Read List of Digits 0 0   Attention: Read List of Letters 1 1   Attention: Serial Sevens 2 2   Language: Repeat 1 1   Language: Fluency 1 1   Abstraction 1 1   Delayed Recall 0 0   Orientation 6 6   Add 1 Point if </=12 yr Education 0 0   MOCA Total Score 17      Neurological Exam       POC HEMOGLOBIN A1c   Date Value Ref Range Status   08/19/2024 8.7 (A) 4.2 - 6.5 % Final     Estimated Average Glucose   Date Value Ref Range Status   04/15/2024 160 Not Established mg/dL Final       Assessment/Plan     Elida Benson is a 79 y.o. RH female here for evaluation of memory deficits that have been gradually worsening for the past 3 years.  Patient with multiple chronic medical conditions including lumbar and cervical stenosis that have significantly impaired her ability to ambulate.  Patient with depressed mood with regards to chronic medical conditions and also poor sleep, both of which likely confounding picture.  Patient MRI however has a high burden small vessel disease with marked diffuse atrophy, possibly temporal parietal predominant.  [place] today 17/30.  Plan as follows:  -Blood test for reversible causes of dementia  -Referral to sleep medicine   -Referral to neuropsych pathology  -Referral to cognitive neurology

## 2024-09-05 ENCOUNTER — APPOINTMENT (OUTPATIENT)
Dept: CARDIOLOGY | Facility: CLINIC | Age: 79
End: 2024-09-05
Payer: MEDICARE

## 2024-09-06 ENCOUNTER — APPOINTMENT (OUTPATIENT)
Dept: PRIMARY CARE | Facility: CLINIC | Age: 79
End: 2024-09-06
Payer: MEDICARE

## 2024-09-13 ENCOUNTER — HOSPITAL ENCOUNTER (OUTPATIENT)
Dept: RADIOLOGY | Facility: HOSPITAL | Age: 79
Discharge: HOME | End: 2024-09-13
Payer: MEDICARE

## 2024-09-13 DIAGNOSIS — I48.0 PAROXYSMAL ATRIAL FIBRILLATION (MULTI): ICD-10-CM

## 2024-09-13 DIAGNOSIS — R93.1 ABNORMAL ECHOCARDIOGRAM: ICD-10-CM

## 2024-09-13 DIAGNOSIS — I42.8 INFILTRATIVE CARDIOMYOPATHY (MULTI): ICD-10-CM

## 2024-09-13 PROCEDURE — 75561 CARDIAC MRI FOR MORPH W/DYE: CPT

## 2024-09-13 PROCEDURE — A9575 INJ GADOTERATE MEGLUMI 0.1ML: HCPCS | Performed by: STUDENT IN AN ORGANIZED HEALTH CARE EDUCATION/TRAINING PROGRAM

## 2024-09-13 PROCEDURE — 2550000001 HC RX 255 CONTRASTS: Performed by: STUDENT IN AN ORGANIZED HEALTH CARE EDUCATION/TRAINING PROGRAM

## 2024-09-13 RX ORDER — GADOTERATE MEGLUMINE 376.9 MG/ML
24 INJECTION INTRAVENOUS
Status: COMPLETED | OUTPATIENT
Start: 2024-09-13 | End: 2024-09-13

## 2024-09-19 DIAGNOSIS — G89.4 CHRONIC PAIN SYNDROME: ICD-10-CM

## 2024-09-19 RX ORDER — DULOXETIN HYDROCHLORIDE 30 MG/1
30 CAPSULE, DELAYED RELEASE ORAL 2 TIMES DAILY
Qty: 180 CAPSULE | Refills: 0 | Status: SHIPPED | OUTPATIENT
Start: 2024-09-19

## 2024-09-20 ENCOUNTER — APPOINTMENT (OUTPATIENT)
Dept: INFECTIOUS DISEASES | Facility: CLINIC | Age: 79
End: 2024-09-20
Payer: MEDICARE

## 2024-09-20 ENCOUNTER — APPOINTMENT (OUTPATIENT)
Dept: CARDIOLOGY | Facility: CLINIC | Age: 79
End: 2024-09-20
Payer: MEDICARE

## 2024-09-23 ENCOUNTER — OFFICE VISIT (OUTPATIENT)
Dept: CARDIOLOGY | Facility: CLINIC | Age: 79
End: 2024-09-23
Payer: MEDICARE

## 2024-09-23 VITALS
HEIGHT: 58 IN | SYSTOLIC BLOOD PRESSURE: 104 MMHG | WEIGHT: 129 LBS | HEART RATE: 74 BPM | BODY MASS INDEX: 27.08 KG/M2 | OXYGEN SATURATION: 95 % | DIASTOLIC BLOOD PRESSURE: 58 MMHG

## 2024-09-23 DIAGNOSIS — I27.20 PULMONARY HYPERTENSION, UNSPECIFIED (MULTI): ICD-10-CM

## 2024-09-23 DIAGNOSIS — R42 DIZZINESS: ICD-10-CM

## 2024-09-23 DIAGNOSIS — R93.1 ABNORMAL ECHOCARDIOGRAM: ICD-10-CM

## 2024-09-23 DIAGNOSIS — I42.8 INFILTRATIVE CARDIOMYOPATHY (MULTI): ICD-10-CM

## 2024-09-23 DIAGNOSIS — F51.01 PRIMARY INSOMNIA: ICD-10-CM

## 2024-09-23 DIAGNOSIS — J44.9 COPD WITHOUT EXACERBATION (MULTI): ICD-10-CM

## 2024-09-23 DIAGNOSIS — I48.0 PAROXYSMAL ATRIAL FIBRILLATION (MULTI): ICD-10-CM

## 2024-09-23 DIAGNOSIS — I48.11 LONGSTANDING PERSISTENT ATRIAL FIBRILLATION (MULTI): ICD-10-CM

## 2024-09-23 DIAGNOSIS — I49.9 CARDIAC ARRHYTHMIA, UNSPECIFIED CARDIAC ARRHYTHMIA TYPE: ICD-10-CM

## 2024-09-23 DIAGNOSIS — I50.33 ACUTE ON CHRONIC DIASTOLIC HEART FAILURE: Primary | ICD-10-CM

## 2024-09-23 PROCEDURE — 1036F TOBACCO NON-USER: CPT | Performed by: STUDENT IN AN ORGANIZED HEALTH CARE EDUCATION/TRAINING PROGRAM

## 2024-09-23 PROCEDURE — 1159F MED LIST DOCD IN RCRD: CPT | Performed by: STUDENT IN AN ORGANIZED HEALTH CARE EDUCATION/TRAINING PROGRAM

## 2024-09-23 PROCEDURE — 99215 OFFICE O/P EST HI 40 MIN: CPT | Performed by: STUDENT IN AN ORGANIZED HEALTH CARE EDUCATION/TRAINING PROGRAM

## 2024-09-23 PROCEDURE — 3078F DIAST BP <80 MM HG: CPT | Performed by: STUDENT IN AN ORGANIZED HEALTH CARE EDUCATION/TRAINING PROGRAM

## 2024-09-23 PROCEDURE — 3074F SYST BP LT 130 MM HG: CPT | Performed by: STUDENT IN AN ORGANIZED HEALTH CARE EDUCATION/TRAINING PROGRAM

## 2024-09-23 ASSESSMENT — ENCOUNTER SYMPTOMS: OCCASIONAL FEELINGS OF UNSTEADINESS: 1

## 2024-09-23 NOTE — PATIENT INSTRUCTIONS
Please continue remaining cardiac medications including amiodarone 200 mg once daily, atorvastatin 40 mg daily, Eliquis 5 mg twice daily, Jardiance 25 mg once daily, furosemide Lasix 20 mg once daily.      I placed referral to our  pharmacy to assist with affordability of medications like Jardiance and Eliquis.    Please follow-up with Dr. Dueñas of advanced heart failure as scheduled.    Please followup with me in Cardiology clinic within the next 4 months.    Please return to clinic sooner or seek emergent care if your symptoms reoccur or worsen.

## 2024-09-23 NOTE — PROGRESS NOTES
Follow-up visit    HPI:    Elida Benson is a 79 y.o. female with pertinent history of dementia, hypertension, diverticulitis status post laparoscopic anterior resection with ileostomy on 8/16/2023, paroxysmal atrial fibrillation on amiodarone and Eliquis, preserved ejection fraction with suspected myocardial cleft versus false tendon, moderate concentric hypertrophy, moderate left atrial enlargement on echo performed 8/19/2023, preserved ejection fraction with moderate septal thickness, abnormal strain imaging concerning for possible infiltrative heart disease on echo performed 8/22/2023, amyloid SPECT heart study demonstrates radiotracer deposition within myocardium of left ventricle suggestive of TTR amyloidosis on nuclear imaging performed 4/15/2024, preserved LVEF of 62% with constellation of findings consistent with amyloidosis including diffuse subendocardial delayed enhancement and abnormal myocardial involving time as well as increased T1 mapping times, asymmetric mid to basal septal hypertrophy 1.8 cm on cardiac MRI performed 9/13/2024 presents to cardiology clinic for follow-up.    She is accompanied by her daughter provides history and has questions.  She is doing relatively well.  SLower extremity edema is relatively stable. Dyspnea on exertion stable.  We reviewed and discussed her recent cardiac MRI as well as her prior PYP scan.  No exacerbating or relieving factors.  Patient denies chest pain and angina.  Pt denies orthopnea, and paroxysmal nocturnal dyspnea.  Pt denies syncope.  No recent falls.  No fever or chills.  No cough.  No change in bowel or bladder habits.  No sick contacts.  No recent travel.    12 point review of systems including (Constitutional, Eyes, ENMT, Respiratory, Cardiac, Gastrointestinal, Neurological, Psychiatric, and Hematologic) was performed and is otherwise negative.    Past medical history reviewed:   has a past medical history of A-fib (Multi), Cervical myelopathy  (Multi), Chronic pain syndrome, COPD (chronic obstructive pulmonary disease) (Multi), Dementia (Multi), Depression, Diabetic neuropathy (Multi), Diverticulitis, DM (diabetes mellitus) (Multi), LORENZ (dyspnea on exertion), HLD (hyperlipidemia), Hypertension, essential, Infiltrative cardiomyopathy (Multi), OA (ocular albinism) (Multi), Other malaise (04/21/2022), Palpitations, Post laminectomy syndrome, Rectovaginal fistula, Spinal stenosis of cervical region, and UTI (urinary tract infection).    Past surgical history reviewed:   has a past surgical history that includes Carpal tunnel release (08/27/2013); Knee Arthroplasty (08/27/2013); Total hip arthroplasty (08/27/2013); Other surgical history (08/16/2023); Ileostomy; Laminectomy; Lumbar fusion (2023); and Spinal cord stimulator implant (2022).    Social history reviewed:   reports that she quit smoking about 40 years ago. Her smoking use included cigarettes. She has never used smokeless tobacco. She reports current alcohol use. She reports that she does not use drugs.     Family history reviewed:    Family History   Problem Relation Name Age of Onset    No Known Problems Mother      No Known Problems Father      No Known Problems Sister      No Known Problems Sister      No Known Problems Sister      No Known Problems Sister         Allergies reviewed: Shellfish containing products     Medications reviewed:   Current Outpatient Medications   Medication Instructions    acetaminophen (Tylenol) 325 mg tablet Give 2 tablet by mouth every 4 hours as needed for Pain    amiodarone (Pacerone) 200 mg tablet Give 1 tablet by mouth in the morning for ANTIARRHYTHMIC    apixaban (Eliquis) 5 mg tablet Give 1 tablet by mouth two times a day for blood thinner    atorvastatin (LIPITOR) 40 mg, oral, Nightly    b complex 0.4 mg tablet 1 tablet, oral, Daily    biotin 1 mg tablet 1 tab(s) orally once a day    cholecalciferol (Vitamin D-3) 1,250 mcg (50,000 unit) capsule Take 50,000  Units by mouth once each week.    docusate sodium (Colace) 100 mg capsule 1 capsule, oral, 2 times daily PRN    DULoxetine (CYMBALTA) 30 mg, oral, 2 times daily    empagliflozin (JARDIANCE) 25 mg, oral, Daily    furosemide (LASIX) 20 mg, oral, Daily    gabapentin (NEURONTIN) 600 mg, oral, 3 times daily    glipiZIDE XL (GLUCOTROL XL) 5 mg, oral, Daily, Always take with food in the morning    melatonin 3 mg tablet Give 2 tablet by mouth as needed for insomnia administer at bedtime    ondansetron ODT (ZOFRAN-ODT) 4 mg, oral, Every 8 hours PRN    SITagliptin phosphate (JANUVIA) 50 mg, oral, Daily    spironolactone (ALDACTONE) 25 mg, oral, Daily    topiramate (Topamax) 100 mg tablet TAKE ONE TABLET BY MOUTH TWO TIMES A DAY. NEED FOLLOW UP FOR NEXT REFILL    traMADol (Ultram) 50 mg tablet Give 1 tablet by mouth two times a day for chronic back pain    traZODone (DESYREL) 50 mg, oral, Nightly PRN        Vitals reviewed: Visit Vitals  /58   Pulse 74         Physical Exam:   General:  Patient is awake, alert, and oriented.  Patient is in no acute distress.  HEENT:  Pupils equal and reactive.  Normocephalic.  Moist mucosa.    Neck:  No thyromegaly.  11 cm jugular Venous Pressure.  Cardiovascular:  Regular rate and rhythm.  Normal S1 and S2.  1/6 EDER.  Pulmonary:  Clear to auscultation bilaterally.  Abdomen:  Soft. Non-tender.   Non-distended.  Positive bowel sounds.  Lower Extremities:  2+ pedal pulses.  1+ LE edema.  Neurologic:  Cranial nerves intact.  No focal deficit.   Skin: Skin warm and dry, normal skin turgor.   Psychiatric: Normal affect.    Last Labs:  CBC -      Lab Results   Component Value Date    WBC 9.4 04/17/2024    HGB 13.0 04/17/2024    HCT 41.3 04/17/2024     04/17/2024        CMP-  Lab Results   Component Value Date    GLUCOSE 349 (H) 08/19/2024     08/19/2024    K 4.2 08/19/2024    CL 98 08/19/2024    CO2 26 08/19/2024    ANIONGAP 16 08/19/2024    BUN 33 (H) 08/19/2024    CREATININE  0.99 08/19/2024    EGFR 59 (L) 08/19/2024    CALCIUM 9.4 08/19/2024    PHOS 4.5 05/13/2024    PROT 7.5 08/19/2024    ALBUMIN 4.2 08/19/2024    AST 28 08/19/2024    ALT 23 08/19/2024    ALKPHOS 109 08/19/2024    BILITOT 0.3 08/19/2024        LIPIDS-  Lab Results   Component Value Date    CHOL 182 05/26/2023    TRIG 102 05/26/2023    HDL 77.2 05/26/2023    CHHDL 2.4 05/26/2023    VLDL 20 05/26/2023        OTHERS-  Lab Results   Component Value Date    HGBA1C 8.7 (A) 08/19/2024     (H) 04/10/2024        I personally reviewed the patient's recent vitals, labs, medications, orders, EKGs, pertinent cardiac imaging/ echocardiography.    Assessment and Plan:    Elida Benson is a 79 y.o. female with pertinent history of dementia, hypertension, diverticulitis status post laparoscopic anterior resection with ileostomy on 8/16/2023, paroxysmal atrial fibrillation on amiodarone and Eliquis, preserved ejection fraction with suspected myocardial cleft versus false tendon, moderate concentric hypertrophy, moderate left atrial enlargement on echo performed 8/19/2023, preserved ejection fraction with moderate septal thickness, abnormal strain imaging concerning for possible infiltrative heart disease on echo performed 8/22/2023, amyloid SPECT heart study demonstrates radiotracer deposition within myocardium of left ventricle suggestive of TTR amyloidosis on nuclear imaging performed 4/15/2024, preserved LVEF of 62% with constellation of findings consistent with amyloidosis including diffuse subendocardial delayed enhancement and abnormal myocardial involving time as well as increased T1 mapping times, asymmetric mid to basal septal hypertrophy 1.8 cm on cardiac MRI performed 9/13/2024 presents to cardiology clinic for follow-up.  She is accompanied by her daughter provides history and has questions.  She is doing relatively well.  SLower extremity edema is relatively stable. Dyspnea on exertion stable.  We reviewed and  discussed her recent cardiac MRI as well as her prior PYP scan.      Please continue remaining cardiac medications including amiodarone 200 mg once daily, atorvastatin 40 mg daily, Eliquis 5 mg twice daily, Jardiance 25 mg once daily, furosemide Lasix 20 mg once daily.      I placed referral to our  pharmacy to assist with affordability of medications like Jardiance and Eliquis.    Please follow-up with Dr. Dueñas of advanced heart failure as scheduled.    Please followup with me in Cardiology clinic within the next 4 months.    Please return to clinic sooner or seek emergent care if your symptoms reoccur or worsen.    Thank you for allowing me to participate in their care.  Please feel free to call me with any further questions or concerns.      Cesar Ignacio MD, FACC, TAIWO ABARCA  Division of Cardiovascular Medicine  System Director, Nuclear Cardiology   Medical Director, [facility] [place], [facility]   Clinical , [facility] [employer] [employer]  [email or web address]   Office:  [phone]

## 2024-09-24 DIAGNOSIS — I42.8 INFILTRATIVE CARDIOMYOPATHY (MULTI): ICD-10-CM

## 2024-09-24 DIAGNOSIS — F51.01 PRIMARY INSOMNIA: ICD-10-CM

## 2024-09-24 RX ORDER — ATORVASTATIN CALCIUM 40 MG/1
40 TABLET, FILM COATED ORAL NIGHTLY
Qty: 90 TABLET | Refills: 1 | Status: SHIPPED | OUTPATIENT
Start: 2024-09-24 | End: 2025-03-23

## 2024-09-24 RX ORDER — TRAZODONE HYDROCHLORIDE 50 MG/1
50 TABLET ORAL NIGHTLY PRN
Qty: 30 TABLET | Refills: 5 | OUTPATIENT
Start: 2024-09-24 | End: 2025-09-24

## 2024-09-24 RX ORDER — TRAZODONE HYDROCHLORIDE 50 MG/1
50 TABLET ORAL NIGHTLY PRN
Qty: 90 TABLET | Refills: 1 | Status: SHIPPED | OUTPATIENT
Start: 2024-09-24 | End: 2025-09-24

## 2024-09-24 RX ORDER — ATORVASTATIN CALCIUM 40 MG/1
40 TABLET, FILM COATED ORAL NIGHTLY
Qty: 90 TABLET | Refills: 1 | OUTPATIENT
Start: 2024-09-24 | End: 2025-03-23

## 2024-09-25 ENCOUNTER — TELEPHONE (OUTPATIENT)
Dept: PRIMARY CARE | Facility: CLINIC | Age: 79
End: 2024-09-25
Payer: MEDICARE

## 2024-09-25 ENCOUNTER — TELEPHONE (OUTPATIENT)
Dept: CARDIOLOGY | Facility: CLINIC | Age: 79
End: 2024-09-25
Payer: MEDICARE

## 2024-09-25 DIAGNOSIS — I48.11 LONGSTANDING PERSISTENT ATRIAL FIBRILLATION (MULTI): ICD-10-CM

## 2024-09-25 NOTE — TELEPHONE ENCOUNTER
Spoke to pt's daughter, Kaelyn, and informed her that Dr Ignacio sent the refill for Eliquis to [facility] pharmacy again this afternoon. [facility] pharmacist insisted that they have not received the refill again. Dr Ignacio was notified and gave permission for verbal order to be given to the pharmacy. Informed Kaelyn that this nurse called the verbal order for the Eliquis into the pharmacy and encouraged her to call the pharmacy in an hour or so to confirm that it is ready for pickup. Kaelyn verbalized understanding.

## 2024-09-30 ENCOUNTER — APPOINTMENT (OUTPATIENT)
Dept: DERMATOLOGY | Facility: CLINIC | Age: 79
End: 2024-09-30
Payer: MEDICARE

## 2024-09-30 ENCOUNTER — APPOINTMENT (OUTPATIENT)
Dept: CARDIOLOGY | Facility: CLINIC | Age: 79
End: 2024-09-30
Payer: MEDICARE

## 2024-10-04 ENCOUNTER — APPOINTMENT (OUTPATIENT)
Dept: INFECTIOUS DISEASES | Facility: CLINIC | Age: 79
End: 2024-10-04
Payer: MEDICARE

## 2024-10-04 VITALS
DIASTOLIC BLOOD PRESSURE: 65 MMHG | SYSTOLIC BLOOD PRESSURE: 97 MMHG | WEIGHT: 130 LBS | TEMPERATURE: 97.3 F | BODY MASS INDEX: 27.29 KG/M2 | HEART RATE: 81 BPM | HEIGHT: 58 IN

## 2024-10-04 DIAGNOSIS — T84.7XXA HARDWARE COMPLICATING WOUND INFECTION, INITIAL ENCOUNTER (CMS-HCC): Primary | ICD-10-CM

## 2024-10-04 DIAGNOSIS — A49.8 PROTEUS INFECTION: ICD-10-CM

## 2024-10-04 PROCEDURE — 1159F MED LIST DOCD IN RCRD: CPT | Performed by: INTERNAL MEDICINE

## 2024-10-04 PROCEDURE — 99214 OFFICE O/P EST MOD 30 MIN: CPT | Performed by: INTERNAL MEDICINE

## 2024-10-04 PROCEDURE — 3078F DIAST BP <80 MM HG: CPT | Performed by: INTERNAL MEDICINE

## 2024-10-04 PROCEDURE — 1036F TOBACCO NON-USER: CPT | Performed by: INTERNAL MEDICINE

## 2024-10-04 PROCEDURE — 1126F AMNT PAIN NOTED NONE PRSNT: CPT | Performed by: INTERNAL MEDICINE

## 2024-10-04 PROCEDURE — 3074F SYST BP LT 130 MM HG: CPT | Performed by: INTERNAL MEDICINE

## 2024-10-04 RX ORDER — CEPHALEXIN 500 MG/1
500 CAPSULE ORAL 2 TIMES DAILY
Qty: 60 CAPSULE | Refills: 6 | Status: SHIPPED | OUTPATIENT
Start: 2024-10-04

## 2024-10-04 RX ORDER — LOSARTAN POTASSIUM 50 MG/1
50 TABLET ORAL DAILY
COMMUNITY

## 2024-10-04 RX ORDER — CEPHALEXIN 500 MG/1
500 CAPSULE ORAL 2 TIMES DAILY
COMMUNITY
End: 2024-10-04 | Stop reason: SDUPTHER

## 2024-10-04 ASSESSMENT — PAIN SCALES - GENERAL: PAINLEVEL: 0-NO PAIN

## 2024-10-04 NOTE — PROGRESS NOTES
Infectious Diseases Clinic Follow-up:    Reason for Visit:        Requested ID clinic follow-up    History of Current Issue    Patient last evaluated 4/7/2023 and note excerpt follows:  Patient followed for chronic antibiotic suppression of  previous spinal hardware associated infection.  Primary pathogen in the past had included Proteus  Post lumbar laminectomy pain syndrome            Now has spinal stimulator.  Currently turned off.  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       Prior ID note summary excerpt follows:     After initial infection IN 2018, patient completed approximately 6 weeks of IV cefazolin and then I changed to oral cephalexin 500 mg by mouth 3 times a day. Then reduced cephalexin to 500 mg by mouth twice a day.         Additional REMOTE prior and interval history:       On 8/23/2021 patient had epidural steroid injection       On 9/16/2021 patient had spinal stimulator placed       On 9/7/2021 patient had MRI done and is pending review with her pain management team. I reviewed the report and noted comments about surgical bed fluid which might represent postprocedural fluid.        On 10/11/2021 patient Bilateral S1 lumbar transforaminal  epidural steroid injection (attempted left L5 transforaminal converted to S1)       07/10/2018: spondylolisthesis s/p laminectomy and decompression L2/3-L5/S1, excision cystic mass L2/3,  Malloy/Radical decompression L4/5, PSF with instrumentation L2-5       08/01/2018: Post operative Proteus wound infection with hardware in situ. Patient underwent I&D but pedicle screws and hardware were retained. Patient also has some bone graft.    *8/19/202: Pain appears to be controlled (follows with pain management) but continues to have some unsteady gait. Patient reports recent MRI in June 2022 was stable compared to September 2021. Importantly patient has had no signs of systemic or recurrent lumbar infection. Also there has been no swelling, redness or drainage around the healed lumbar incision    Today patient accompanied by her daughter Kaelyn.  Patient has right foot drop brace in place.  Alert and oriented no acute distress.  Patient denies fever, chills, shortness of breath, chest pain, orthopnea, or additional back pain.  Also no additional redness, swelling, or drainage from old lumbar incision site which has remained well-healed for several years.  Patient also has had no signs of diarrhea, vaginal yeast infection, UTI, or rash.    8/19/2024 laboratory studies show Creatinine 0.99 with GFR 59  4/17/2024 WBC count 9.4.  Appropriate leukocytosis when admitted for prior episodes of COVID-19, pneumonia but white blood cell count has been normal for several months.    Overall, doing ok.  Still lives alone and has home medical aide for assistance with some ADLs.  Family very supportive.         PAST MEDICAL HISTORY:  Past Medical History:   Diagnosis Date    A-fib (Multi)     Managed on meds, Eliquis stoppage and cardiac clearance in [facility] from Dr. Ignacio    Cervical myelopathy     Chronic pain syndrome     COPD (chronic obstructive pulmonary disease) (Multi)     denies, she is a former smoker but quit in 1984    Dementia     Depression     Diabetic neuropathy (Multi)     Diverticulitis     DM (diabetes mellitus) (Multi)     denies but A1C= 7.2% on 9/5/23    LORENZ (dyspnea on exertion)     Stable per cards  note    HLD (hyperlipidemia)     Hypertension, essential     Infiltrative cardiomyopathy (Multi)     Echo 8/22/23, following with Dr. Ignacio    OA (ocular albinism) (Multi)     multiple sites    Other malaise 04/21/2022    Physical deconditioning    Palpitations     on occasion, has afib followed by cardiology    Post laminectomy syndrome     Rectovaginal fistula     Spinal stenosis of cervical region     UTI (urinary tract infection)        PAST SURGICAL HISTORY:  Past Surgical History:   Procedure Laterality Date    CARPAL TUNNEL RELEASE  08/27/2013    Neuroplasty Decompression Median Nerve At Carpal Tunnel    ILEOSTOMY      KNEE ARTHROPLASTY  08/27/2013    Knee Arthroplasty    LAMINECTOMY      LUMBAR FUSION  2023    OTHER SURGICAL HISTORY  08/16/2023    LAPAROSCPIC ANTERIOR RESECTION, DIVERTING LOOP ILEOSTOMY    SPINAL CORD STIMULATOR IMPLANT  2022    TOTAL HIP ARTHROPLASTY  08/27/2013    Total Hip Replacement       ALLERGIES:    Allergies   Allergen Reactions    Shellfish Containing Products Swelling       MEDICATIONS:      Current Outpatient Medications:     acetaminophen (Tylenol) 325 mg tablet, Give 2 tablet by mouth every 4 hours as needed for Pain, Disp: , Rfl:     amiodarone (Pacerone) 200 mg tablet, Give 1 tablet by mouth in the morning for ANTIARRHYTHMIC, Disp: 60 tablet, Rfl: 11    apixaban (Eliquis) 5 mg tablet, Give 1 tablet by mouth two times a day for blood thinner, Disp: 180 tablet, Rfl: 11    atorvastatin (Lipitor) 40 mg tablet, Take 1 tablet (40 mg) by mouth once daily at bedtime., Disp: 90 tablet, Rfl: 1    b complex 0.4 mg tablet, Take 1 tablet by mouth once daily., Disp: , Rfl:     biotin 1 mg tablet, 1 tab(s) orally once a day, Disp: , Rfl:     cephalexin (Keflex) 500 mg capsule, Take 1 capsule (500 mg) by mouth 2 times a day., Disp: , Rfl:     cholecalciferol (Vitamin D-3) 1,250 mcg (50,000 unit) capsule, Take 50,000 Units by mouth once each week., Disp: , Rfl:     docusate sodium (Colace)  "100 mg capsule, Take 1 capsule (100 mg) by mouth 2 times a day as needed for constipation., Disp: , Rfl:     DULoxetine (Cymbalta) 30 mg DR capsule, TAKE 1 CAPSULE BY MOUTH 2 TIMES A DAY, Disp: 180 capsule, Rfl: 0    empagliflozin (Jardiance) 25 mg, Take 1 tablet (25 mg) by mouth once daily., Disp: 90 tablet, Rfl: 1    furosemide (Lasix) 20 mg tablet, Take 1 tablet (20 mg) by mouth once daily., Disp: 90 tablet, Rfl: 3    gabapentin (Neurontin) 600 mg tablet, Take 1 tablet (600 mg) by mouth 3 times a day., Disp: 90 tablet, Rfl: 0    glipiZIDE XL (Glucotrol XL) 5 mg 24 hr tablet, Take 1 tablet (5 mg) by mouth once daily. Always take with food in the morning, Disp: 90 tablet, Rfl: 1    losartan (Cozaar) 50 mg tablet, Take 1 tablet (50 mg) by mouth once daily., Disp: , Rfl:     melatonin 3 mg tablet, Give 2 tablet by mouth as needed for insomnia administer at bedtime, Disp: , Rfl:     ondansetron ODT (Zofran-ODT) 4 mg disintegrating tablet, Take 1 tablet (4 mg) by mouth every 8 hours if needed for nausea or vomiting., Disp: 20 tablet, Rfl: 0    SITagliptin phosphate (Januvia) 50 mg tablet, Take 1 tablet (50 mg) by mouth once daily., Disp: 90 tablet, Rfl: 1    spironolactone (Aldactone) 25 mg tablet, Take 1 tablet (25 mg) by mouth once daily., Disp: 90 tablet, Rfl: 3    topiramate (Topamax) 100 mg tablet, TAKE ONE TABLET BY MOUTH TWO TIMES A DAY. NEED FOLLOW UP FOR NEXT REFILL, Disp: 60 tablet, Rfl: 0    traMADol (Ultram) 50 mg tablet, Give 1 tablet by mouth two times a day for chronic back pain, Disp: , Rfl:     traZODone (Desyrel) 50 mg tablet, Take 1 tablet (50 mg) by mouth as needed at bedtime for sleep., Disp: 90 tablet, Rfl: 1        PHYSICAL EXAMINATION:    Visit Vitals  BP 97/65   Pulse 81   Temp 36.3 °C (97.3 °F)   Ht 1.473 m (4' 10\")   Wt 59 kg (130 lb)   BMI 27.17 kg/m²   OB Status Menopausal   Smoking Status Former   BSA 1.55 m²        EXAM: Patient accompanied by her daughter who remained present for entire " evaluation  Patient seated in wheelchair and in no acute distress  Alert and oriented  Pleasant and interactive  Extraocular muscles are intact, lungs clear, S1, S2, I did not hear murmur  No CVA tenderness  BACK:     Prior lumbar incision well-healed without signs of redness, swelling, drainage or tenderness to moderate palpation and percussion.  Has right lower paralumbar device palpable (spinal stimulator) but no associated signs of redness, swelling, or tenderness.  Device secure but mobile underneath the skin.  Soft nontender abdomen  Well-healed abdominal scars  Right lower extremity with foot brace  Nontender muscles  Trace pitting peripheral edema (even though patient petite)            ASSESSMENT / RECOMMENDATIONS:  07/10/2018: spondylolisthesis s/p laminectomy and decompression L2/3-L5/S1, excision cystic mass L2/3, Malloy/Radical decompression L4/5, PSF with instrumentation L2-5  08/01/2018: Post operative Proteus wound infection with hardware in situ. Patient underwent I&D but pedicle screws and hardware were retained. Patient also has some bone graft.   Patient followed for chronic antibiotic suppression for previous spinal hardware associated infection.   Primary pathogen in the past had included Proteus    Post lumbar laminectomy pain syndrome       Stimulator placed 9/16/2021 11/30/2022 COVID-19 infection    Overall patient has been doing well from an infectious disease standpoint.  Has had no signs of recrudescent Proteus infection of the previous lumbar spine surgical site.  Patient continues on chronic suppressive therapy.  Previously discussed duration of therapy which is not well-defined but had previously recommended lifelong secondary suppression.  Previously consider reducing to once daily cephalexin though suppressive dosing regimens are unclear.  At this time discussed with patient and her daughter and together decided to continue cephalexin indefinitely at this time.    Will need follow-up  periodically every 6 to 12 months to renew prescription and to review ongoing medical treatments new prescription sent.    PLAN:  1. Continue oral cephalexin 500 mg p.o. twice daily, duration indefinite.  New prescription sent electronically.  2. Continue to co-monitor laboratory studies  3.  Schedule for 6-month phone follow-up evaluation  4.  Will call monitor laboratory studies  5.  Follow-up with PCP, cardiology, heart failure, and pain management  6.  Patient can call ID office with additional questions as needed.    Sebastian Lo MD

## 2024-10-04 NOTE — LETTER
10/04/24    David Swift MD  [address] [place] [address]  [address]  [facility] [address]      Dear Dr. David Swift MD,    I am writing to confirm that your patient, Elida Benson, received care in my office on 10/04/24. I have enclosed a summary of the care provided to Elida for your reference.    Please contact me with any questions you may have regarding the visit.    Sincerely,         Sebastian Lo MD  [address] [facility] [place] [address]  [facility] [id number]    CC: No Recipients

## 2024-10-07 ENCOUNTER — APPOINTMENT (OUTPATIENT)
Dept: DERMATOLOGY | Facility: CLINIC | Age: 79
End: 2024-10-07
Payer: MEDICARE

## 2024-10-08 DIAGNOSIS — F51.01 PRIMARY INSOMNIA: ICD-10-CM

## 2024-10-08 DIAGNOSIS — F03.A0 MILD DEMENTIA, UNSPECIFIED DEMENTIA TYPE, UNSPECIFIED WHETHER BEHAVIORAL, PSYCHOTIC, OR MOOD DISTURBANCE OR ANXIETY: Primary | ICD-10-CM

## 2024-10-08 RX ORDER — DONEPEZIL HYDROCHLORIDE 5 MG/1
5 TABLET, FILM COATED ORAL NIGHTLY
Qty: 90 TABLET | Refills: 0 | Status: SHIPPED | OUTPATIENT
Start: 2024-10-08

## 2024-10-08 RX ORDER — TRAZODONE HYDROCHLORIDE 50 MG/1
50 TABLET ORAL NIGHTLY PRN
Qty: 30 TABLET | Refills: 0 | Status: SHIPPED | OUTPATIENT
Start: 2024-10-08

## 2024-10-11 ENCOUNTER — APPOINTMENT (OUTPATIENT)
Dept: NEUROLOGY | Facility: CLINIC | Age: 79
End: 2024-10-11
Payer: MEDICARE

## 2024-10-11 VITALS
HEART RATE: 64 BPM | SYSTOLIC BLOOD PRESSURE: 126 MMHG | BODY MASS INDEX: 27.17 KG/M2 | HEIGHT: 58 IN | DIASTOLIC BLOOD PRESSURE: 84 MMHG

## 2024-10-11 DIAGNOSIS — H81.8X9 VESTIBULAR PAROXYSMIA: Primary | ICD-10-CM

## 2024-10-11 DIAGNOSIS — Z00.00 ROUTINE ADULT HEALTH MAINTENANCE: ICD-10-CM

## 2024-10-11 DIAGNOSIS — M21.371 RIGHT FOOT DROP: ICD-10-CM

## 2024-10-11 PROCEDURE — 99215 OFFICE O/P EST HI 40 MIN: CPT | Performed by: PSYCHIATRY & NEUROLOGY

## 2024-10-11 PROCEDURE — 3079F DIAST BP 80-89 MM HG: CPT | Performed by: PSYCHIATRY & NEUROLOGY

## 2024-10-11 PROCEDURE — 1159F MED LIST DOCD IN RCRD: CPT | Performed by: PSYCHIATRY & NEUROLOGY

## 2024-10-11 PROCEDURE — 1160F RVW MEDS BY RX/DR IN RCRD: CPT | Performed by: PSYCHIATRY & NEUROLOGY

## 2024-10-11 PROCEDURE — 3074F SYST BP LT 130 MM HG: CPT | Performed by: PSYCHIATRY & NEUROLOGY

## 2024-10-11 RX ORDER — NALOXONE HYDROCHLORIDE 4 MG/.1ML
1 SPRAY NASAL AS NEEDED
Qty: 2 EACH | Refills: 0 | Status: SHIPPED | OUTPATIENT
Start: 2024-10-11

## 2024-10-11 RX ORDER — BACLOFEN 5 MG/1
5 TABLET ORAL 2 TIMES DAILY
Qty: 60 TABLET | Refills: 1 | Status: SHIPPED | OUTPATIENT
Start: 2024-10-11

## 2024-10-11 NOTE — PROGRESS NOTES
"Subjective     Elida Benson is a 79 y.o. year old right handed female presenting as a new patient for vertigo, right foot drop.    HPI    She has past medical history significant for hypertension, diabetes, paroxysmal atrial fibrillation maintained on anticoagulation and amiodarone, COPD, multiple orthopedic surgeries including hip, shoulder and several lumbar spine surgeries (and currently with a spinal cord stimulator), diverticulosis status post surgery, former cigarette smoking.    She is evaluated in the office today as a new patient, accompanied by her daughter.    Of note she was recently seen by neurology on 9/4 for a cognitive evaluation after which she was referred to cognitive subspecialty neurology and neuropsychological testing, neither of which evaluations has taken place yet.    She presents today for evaluation of dizziness and for a chronic right foot drop.    Her dizziness began at some point over a year ago but she cannot put a precise point of onset on it.  She endorses spinning vertigo which causes her to lose her balance.  She does not endorse presyncopal sensations nor does she lose consciousness.  The dizziness makes her feel like she will fall but not like she will actually faint.    The individual spells of vertigo last \"a couple minutes\" without a more precise duration being placed on them.    Of note, the vertigo can occur without any precipitating head movement, i.e. when she is sitting still.  She also does endorse at times noting vertigo when turning over in bed, but does not endorse top shelf vertigo.    She denies associated subjective hearing loss/hearing change, tinnitus, nausea or vomiting.    She occasionally notes headaches, typically in the midline frontal region, not associated with photophobia or phonophobia and not specifically temporally associated with vertigo.    Possibly she notes intermittent diplopia.  She is somewhat vague about this.    She has not to her " knowledge taken medication for vertigo.  It does not sound as if she has been through vestibular therapy.    Her right foot drop dates to 2020.  She as noted above has history of multiple lumbar spine surgeries and gives a very long history of low back pain and right sciatica.  She wears a right AFO, indicating that she was falling before she started doing so.  She normally uses a walker but presents today without the walker, brought in by her daughter in a manual wheelchair.    She does not perceive numbness of the right foot or foreleg.  She does mention intermittent swelling in the context of a history of venous insufficiency for which compression stockings have been advised.    As far as I could determine she has not yet undergone EMG of the right lower extremity for further evaluation.  I do not find an EMG report on Epic.    I reviewed a noncontrast head CT from 4/8/2024 which shows extensive subcortical and periventricular white matter hypodensity.  In addition, patchy hypodensity in the bilateral thalamus.  Advanced generalized volume loss without obvious stigmata of hydrocephalus.    I reviewed an abdominal CT from the same day with respect to the lumbar spine, which shows grade 2 anterolisthesis of L4 on L5.  Moderate loss of the L3 vertebral body.  Fusion hardware at L2-L5 levels.  Implanted spinal stimulator.  Extensive degenerative change of the lower thoracic spine.    Review of Systems    Review of Systems:  Neurologic:  As per the history of present illness.  Constitutional:  Negative for fevers, chills, or weight loss.  Musculoskeletal: Positive for back pain. Cardiovascular:  Negative for chest pain or palpitations.  Respiratory:  Negative for dyspnea.  Eyes:  Negative for vision loss or diplopia.  ENT: Positive for hearing loss, negative for tinnitus.  GI:  Negative for bowel incontinence.  :  Negative for bladder incontinence.  Dermatologic:  Negative for rash.        Patient Active Problem  List   Diagnosis    Cervical disc disorder with myelopathy    COPD without exacerbation (Multi)    Chronic pain syndrome    Moderate dementia with mood disturbance, unspecified dementia type (Multi)    Depression    Hypertension, essential    Weakness    Insomnia    Arthritis of left shoulder region    Astigmatism    Hyperopia    Myopia with presbyopia    Combined form of age-related cataract, both eyes    Contracture of joint of finger    Lumbar disc herniation with radiculopathy    Lumbar radiculopathy    Lumbar stenosis with neurogenic claudication    Acquired spondylolisthesis    Lumbar scoliosis    Myelopathy concurrent with and due to spinal stenosis of cervical region    Osteoarthritis, multiple sites    Pain disorder associated with psychological and physical factors    Postlaminectomy syndrome, lumbar    Presence of retained hardware    Venous insufficiency    Vocal cord anomaly    Type 2 diabetes mellitus without complication, with long-term current use of insulin (Multi)    Colovaginal fistula    Weight loss    Hyperkalemia    Abdominal pain    Anemia    Ankle pain, right    Arrhythmia    Atrial fibrillation (Multi)    Diverticulitis, colon    Hypovolemia associated with hypermetabolic state    Hypovolemic shock (Multi)    Infection of intervertebral disc (pyogenic), thoracolumbar region    Lactic acidosis    Muscle wasting and atrophy, not elsewhere classified, unspecified site    Nonhealing nonsurgical wound    NSTEMI, initial episode of care (Multi)    Oliguria    Overactive bladder    Papular rash    Pleural effusion, not elsewhere classified    Primary localized osteoarthrosis, lower leg    Primary osteoarthritis, left shoulder    Status post laparoscopic colectomy    Trigger ring finger of right hand    Undernutrition    Pulmonary hypertension, unspecified (Multi)    Ileostomy present (Multi)    Acidosis, unspecified    Acute posthemorrhagic anemia    Infiltrative cardiomyopathy (Multi)     Psychogenic pain    UTI (urinary tract infection)    Edema    Pneumonia, unspecified organism    Stage 3b chronic kidney disease (Multi)     Past Medical History:   Diagnosis Date    A-fib (Multi)     Managed on meds, Eliquis stoppage and cardiac clearance in [facility] from Dr. Ignacio    Cervical myelopathy     Chronic pain syndrome     COPD (chronic obstructive pulmonary disease) (Multi)     denies, she is a former smoker but quit in     Dementia     Depression     Diabetic neuropathy (Multi)     Diverticulitis     DM (diabetes mellitus) (Multi)     denies but A1C= 7.2% on 23    LORENZ (dyspnea on exertion)     Stable per cards note    HLD (hyperlipidemia)     Hypertension, essential     Infiltrative cardiomyopathy (Multi)     Echo 23, following with Dr. Ignacio    OA (ocular albinism) (Multi)     multiple sites    Other malaise 2022    Physical deconditioning    Palpitations     on occasion, has afib followed by cardiology    Post laminectomy syndrome     Rectovaginal fistula     Spinal stenosis of cervical region     UTI (urinary tract infection)      Past Surgical History:   Procedure Laterality Date    CARPAL TUNNEL RELEASE  2013    Neuroplasty Decompression Median Nerve At Carpal Tunnel    ILEOSTOMY      KNEE ARTHROPLASTY  2013    Knee Arthroplasty    LAMINECTOMY      LUMBAR FUSION      OTHER SURGICAL HISTORY  2023    LAPAROSCPIC ANTERIOR RESECTION, DIVERTING LOOP ILEOSTOMY    SPINAL CORD STIMULATOR IMPLANT      TOTAL HIP ARTHROPLASTY  2013    Total Hip Replacement     Social History     Tobacco Use    Smoking status: Former     Current packs/day: 0.00     Types: Cigarettes     Quit date:      Years since quittin.8    Smokeless tobacco: Never   Substance Use Topics    Alcohol use: Yes     Comment: drinks beer or liquor a few times a year     family history includes No Known Problems in her father, mother, sister, sister, sister, and sister.    Current  Outpatient Medications:     acetaminophen (Tylenol) 325 mg tablet, Give 2 tablet by mouth every 4 hours as needed for Pain, Disp: , Rfl:     amiodarone (Pacerone) 200 mg tablet, Give 1 tablet by mouth in the morning for ANTIARRHYTHMIC, Disp: 60 tablet, Rfl: 11    apixaban (Eliquis) 5 mg tablet, Give 1 tablet by mouth two times a day for blood thinner, Disp: 180 tablet, Rfl: 11    atorvastatin (Lipitor) 40 mg tablet, Take 1 tablet (40 mg) by mouth once daily at bedtime., Disp: 90 tablet, Rfl: 1    b complex 0.4 mg tablet, Take 1 tablet by mouth once daily., Disp: , Rfl:     biotin 1 mg tablet, 1 tab(s) orally once a day, Disp: , Rfl:     cephalexin (Keflex) 500 mg capsule, Take 1 capsule (500 mg) by mouth 2 times a day., Disp: 60 capsule, Rfl: 6    cholecalciferol (Vitamin D-3) 1,250 mcg (50,000 unit) capsule, Take 50,000 Units by mouth once each week., Disp: , Rfl:     docusate sodium (Colace) 100 mg capsule, Take 1 capsule (100 mg) by mouth 2 times a day as needed for constipation., Disp: , Rfl:     donepezil (Aricept) 5 mg tablet, TAKE ONE TABLET BY MOUTH AT BEDTIME, Disp: 90 tablet, Rfl: 0    DULoxetine (Cymbalta) 30 mg DR capsule, TAKE 1 CAPSULE BY MOUTH 2 TIMES A DAY, Disp: 180 capsule, Rfl: 0    empagliflozin (Jardiance) 25 mg, Take 1 tablet (25 mg) by mouth once daily., Disp: 90 tablet, Rfl: 1    furosemide (Lasix) 20 mg tablet, Take 1 tablet (20 mg) by mouth once daily., Disp: 90 tablet, Rfl: 3    gabapentin (Neurontin) 600 mg tablet, Take 1 tablet (600 mg) by mouth 3 times a day., Disp: 90 tablet, Rfl: 0    glipiZIDE XL (Glucotrol XL) 5 mg 24 hr tablet, Take 1 tablet (5 mg) by mouth once daily. Always take with food in the morning, Disp: 90 tablet, Rfl: 1    losartan (Cozaar) 50 mg tablet, Take 1 tablet (50 mg) by mouth once daily., Disp: , Rfl:     melatonin 3 mg tablet, Give 2 tablet by mouth as needed for insomnia administer at bedtime, Disp: , Rfl:     ondansetron ODT (Zofran-ODT) 4 mg disintegrating  tablet, Take 1 tablet (4 mg) by mouth every 8 hours if needed for nausea or vomiting., Disp: 20 tablet, Rfl: 0    SITagliptin phosphate (Januvia) 50 mg tablet, Take 1 tablet (50 mg) by mouth once daily., Disp: 90 tablet, Rfl: 1    spironolactone (Aldactone) 25 mg tablet, Take 1 tablet (25 mg) by mouth once daily., Disp: 90 tablet, Rfl: 3    topiramate (Topamax) 100 mg tablet, TAKE ONE TABLET BY MOUTH TWO TIMES A DAY. NEED FOLLOW UP FOR NEXT REFILL, Disp: 60 tablet, Rfl: 0    traMADol (Ultram) 50 mg tablet, Give 1 tablet by mouth two times a day for chronic back pain, Disp: , Rfl:     traZODone (Desyrel) 50 mg tablet, TAKE ONE TABLET BY MOUTH AT BEDTIME AS NEEDED for sleep, Disp: 30 tablet, Rfl: 0  Allergies   Allergen Reactions    Shellfish Containing Products Swelling       Objective   Neurological Exam  Physical Exam    Physical Examination:    General: Alert woman who presented in a manual wheelchair, wearing a right AFO.  Walker was not brought with her.    Cardiovascular: Palpable dorsalis pedis pulses.    Mental Status: Clear sensorium without fluctuation.  Appropriate and oriented in conversation.  Recent and remote recall fair for details of medical history.  Attention and concentration intact during interview.  Fund of knowledge fair for medical information.  Language intact and fluent without paraphasic errors.    Cranial Nerves:  Funduscopic exam was not well visualized bilaterally on nondilated exam.  Pupils were equal, round and reactive to light with no relative afferent pupillary defect.  Extraocular movements were intact and conjugate without nystagmus.  No ptosis.  Visual fields were full to confrontation tested binocularly.  Facial sensation was symmetric to pin.  Facial motor function was symmetrically intact.  Hearing was grossly intact for purposes of conversation.  No dysarthria.  Shoulder shrug was symmetric.  Tongue protrusion was midline.    Motor: Muscle tone was normal throughout.  There  was moderate atrophy of the right peroneal compartment with an associated sharp tibial crest, and normal bulk of the left peroneal compartment.  Both upper extremities were quite arthritic and there was poor active range of motion at the left shoulder, consequently she was unable to perform drift testing.  Middle deltoid was 4+ right and could not be assessed left due to poor range of motion at the shoulder, biceps 5, triceps 4+ right and 4 left, finger extensors 4+ right and 4 left.  She was unable to spread the fingers of the right hand while left ulnar intrinsics were 4-4+.  Hip flexion was 4+ right and 4 left, quadriceps 5 bilaterally, hamstrings 4+ bilaterally.  Ankle dorsiflexion was less than 3 right (minimal antigravity movement) with question of a component of heel cord shortening, and there was very little inversion or eversion at the right ankle.  Right plantarflexion was 4.  Left ankle dorsi flexion was 4+, inversion/eversion 4-4+, plantarflexion 4+.    Coordination: Finger to finger was limited in range on the left by shoulder pathology.  There was an irregular kinetic tremor on the right without erika intention tremor.  No rest tremor or myoclonus.    Tendon Reflexes: Symmetrically absent biceps, brachioradialis, triceps, patellar and ankles, neutral plantars.    Sensation: Pin was sharp bilaterally over the dorsal feet, slightly sharper over the left lateral foreleg compared to the right.  Vibration perception was absent at the great toes, present bilaterally at the medial malleolus.    Gait/Station: Could not be safely assessed in the absence of walker.        Assessment/Plan     With regard to vertigo, the history suggests vestibular paroxysmia.  There could also be a component of BPPV here, but she clearly endorses episodes of vertigo occurring without any precipitating head movement which favors the former.  As such I suggested a trial of baclofen initially 5 mg twice daily, after ascertaining  that she has no past medical history of seizures/epilepsy.  I reviewed that baclofen may be mildly sedating.  I advised that she contact the office if she has any difficulty with it, and I advised her not to take tramadol, which she takes on an as-needed basis) while she is taking baclofen.    With regard to her right distal lower extremity weakness including a dense foot drop, probably it is entirely or predominantly related to L5 radiculopathy.  She has a very prominent lumbar spine history.  However, it is hard to exclude a superimposed component of peroneal neuropathy.  I suggested she return for EMG to evaluate further.  This will work around her anticoagulation.  I will anticipate seeing her back shortly for the EMG and if indicated, I may refer her next for neuromuscular ultrasound.    She will continue wearing the AFO for stabilization of the right ankle.    She will continue using a walker to reduce fall risk.    I advised her to contact Dr. Toribio's office regarding the referrals to cognitive neurology and neuropsychological testing.    Incidentally on prescription of baclofen it was advised by Epic that I provide an order for Narcan nasal spray which I did order, and I discussed this with the patient and her daughter.

## 2024-10-11 NOTE — PATIENT INSTRUCTIONS
As we discussed, I recommend further evaluation of your right foot drop/lower extremity weakness with an EMG (electrical nerve and muscle test).  I will anticipate seeing you back shortly for the EMG.    Your vertigo sounds like a condition called vestibular paroxysmia.  For this I recommend a medication called baclofen which is a muscle relaxant.  I am prescribing a very low dose of 5 mg twice daily.  If it causes drowsiness, contact my office.    Do NOT take tramadol while you are taking baclofen.    It has been recommended by the pharmacy that you have a prescription on hand for Narcan nasal spray, because of the number of sedating medications that you are taking.  This is to be administered by a family member if you are found to be hard to awaken/arouse.    Please contact Dr. Toribio's office about the referral to a cognitive neurologist (for memory issues) and the referral to neuropsychology.  These were to have been provided by his office.

## 2024-10-18 ENCOUNTER — APPOINTMENT (OUTPATIENT)
Dept: PRIMARY CARE | Facility: CLINIC | Age: 79
End: 2024-10-18
Payer: MEDICARE

## 2024-10-23 ENCOUNTER — HOSPITAL ENCOUNTER (OUTPATIENT)
Dept: NEUROLOGY | Facility: CLINIC | Age: 79
Discharge: HOME | End: 2024-10-23
Payer: MEDICARE

## 2024-10-23 DIAGNOSIS — M21.371 RIGHT FOOT DROP: ICD-10-CM

## 2024-10-23 PROCEDURE — 95911 NRV CNDJ TEST 9-10 STUDIES: CPT | Performed by: PSYCHIATRY & NEUROLOGY

## 2024-10-23 PROCEDURE — 95886 MUSC TEST DONE W/N TEST COMP: CPT | Performed by: PSYCHIATRY & NEUROLOGY

## 2024-10-23 NOTE — PROCEDURES
EMG & nerve conduction    Date/Time: 10/23/2024 3:41 PM    Performed by: Jf Perdue MD  Authorized by: Jf Perdue MD    Consent:     Consent obtained:  Written    Consent given by:  Patient  Universal protocol:     Procedure explained and questions answered to patient or proxy's satisfaction: yes      Patient identity confirmed:  Verbally with patient and provided demographic data  Indications:     Indications:  Chronic right distal lower extremity weakness, history of multiple lumbar spine surgeries, evaluate for lumbar radiculopathy and peroneal neuropathy.  Sedation:     Sedation type:  None  Anesthesia:     Anesthesia method:  None  Procedure specific details:      This is an abnormal study.    There is electrophysiologic evidence that appears most consistent with a severe, chronic right lumbosacral polyradiculopathy (L3-S2) without active denervation.    A superimposed right peroneal neuropathy cannot be entirely excluded but note is made that the superficial peroneal sensory responses are absent bilaterally, which may be an artifact of age, and clinical correlation is recommended.    Jf Perdue MD    Post-procedure details:     Procedure completion:  Tolerated well, no immediate complications

## 2024-10-28 ENCOUNTER — APPOINTMENT (OUTPATIENT)
Dept: PRIMARY CARE | Facility: CLINIC | Age: 79
End: 2024-10-28
Payer: MEDICARE

## 2024-10-30 DIAGNOSIS — M48.062 LUMBAR STENOSIS WITH NEUROGENIC CLAUDICATION: ICD-10-CM

## 2024-10-31 RX ORDER — TOPIRAMATE 100 MG/1
TABLET, FILM COATED ORAL
Qty: 60 TABLET | Refills: 0 | Status: SHIPPED | OUTPATIENT
Start: 2024-10-31

## 2024-11-06 ENCOUNTER — APPOINTMENT (OUTPATIENT)
Dept: PHARMACY | Facility: HOSPITAL | Age: 79
End: 2024-11-06
Payer: MEDICARE

## 2024-11-06 DIAGNOSIS — R93.1 ABNORMAL ECHOCARDIOGRAM: ICD-10-CM

## 2024-11-06 DIAGNOSIS — I48.0 PAROXYSMAL ATRIAL FIBRILLATION (MULTI): ICD-10-CM

## 2024-11-06 DIAGNOSIS — I48.11 LONGSTANDING PERSISTENT ATRIAL FIBRILLATION (MULTI): ICD-10-CM

## 2024-11-06 DIAGNOSIS — R42 DIZZINESS: ICD-10-CM

## 2024-11-06 DIAGNOSIS — I50.33 ACUTE ON CHRONIC DIASTOLIC HEART FAILURE: ICD-10-CM

## 2024-11-06 DIAGNOSIS — I27.20 PULMONARY HYPERTENSION, UNSPECIFIED (MULTI): ICD-10-CM

## 2024-11-06 DIAGNOSIS — J44.9 COPD WITHOUT EXACERBATION (MULTI): ICD-10-CM

## 2024-11-06 DIAGNOSIS — I49.9 CARDIAC ARRHYTHMIA, UNSPECIFIED CARDIAC ARRHYTHMIA TYPE: ICD-10-CM

## 2024-11-06 RX ORDER — METOPROLOL SUCCINATE 25 MG/1
25 TABLET, EXTENDED RELEASE ORAL DAILY
COMMUNITY
End: 2024-11-06 | Stop reason: ENTERED-IN-ERROR

## 2024-11-06 NOTE — ASSESSMENT & PLAN NOTE
Patient currently on 3 of 4 GDMT medications. Renal function and potassium level appropriate to continue current regimen.     Labs (08/19/2024): Scr-0.99 [id number] K-4.2

## 2024-11-06 NOTE — PROGRESS NOTES
Pharmacist Clinic: Cardiology Management    Elida Benson is a 79 y.o. female was referred to Clinical Pharmacy Team for anticoagulation and heart failure management.     Referring Provider: Cesar Ignacio MD    THIS IS A NEW PATIENT APPOINTMENT. PATIENT WILL BE ESTABLISHING CARE WITH CLINICAL PHARMACY.    Appointment was completed by Kaelyn ([email or web address]) who was reached at primary number.    Allergies Reviewed? Yes    Allergies   Allergen Reactions    Shellfish Containing Products Swelling       Past Medical History:   Diagnosis Date    A-fib (Multi)     Managed on meds, Eliquis stoppage and cardiac clearance in [facility] from Dr. Ignacio    Cervical myelopathy     Chronic pain syndrome     COPD (chronic obstructive pulmonary disease) (Multi)     denies, she is a former smoker but quit in 1984    Dementia     Depression     Diabetic neuropathy (Multi)     Diverticulitis     DM (diabetes mellitus) (Multi)     denies but A1C= 7.2% on 9/5/23    LORENZ (dyspnea on exertion)     Stable per cards note    HLD (hyperlipidemia)     Hypertension, essential     Infiltrative cardiomyopathy (Multi)     Echo 8/22/23, following with Dr. Ignacio    OA (ocular albinism) (Multi)     multiple sites    Other malaise 04/21/2022    Physical deconditioning    Palpitations     on occasion, has afib followed by cardiology    Post laminectomy syndrome     Rectovaginal fistula     Spinal stenosis of cervical region     UTI (urinary tract infection)        Current Outpatient Medications on File Prior to Visit   Medication Sig Dispense Refill    acetaminophen (Tylenol) 325 mg tablet Give 2 tablet by mouth every 4 hours as needed for Pain      amiodarone (Pacerone) 200 mg tablet Give 1 tablet by mouth in the morning for ANTIARRHYTHMIC 60 tablet 11    apixaban (Eliquis) 5 mg tablet Give 1 tablet by mouth two times a day for blood thinner 180 tablet 11    atorvastatin (Lipitor) 40 mg tablet Take 1 tablet (40 mg) by mouth once daily at bedtime. 90 tablet  1    b complex 0.4 mg tablet Take 1 tablet by mouth once daily.      baclofen (Lioresal) 5 mg tablet Take 1 tablet (5 mg) by mouth 2 times a day. 60 tablet 1    biotin 1 mg tablet 1 tab(s) orally once a day      cephalexin (Keflex) 500 mg capsule Take 1 capsule (500 mg) by mouth 2 times a day. 60 capsule 6    cholecalciferol (Vitamin D-3) 1,250 mcg (50,000 unit) capsule Take 50,000 Units by mouth once each week.      docusate sodium (Colace) 100 mg capsule Take 1 capsule (100 mg) by mouth 2 times a day as needed for constipation.      donepezil (Aricept) 5 mg tablet TAKE ONE TABLET BY MOUTH AT BEDTIME 90 tablet 0    DULoxetine (Cymbalta) 30 mg DR capsule TAKE 1 CAPSULE BY MOUTH 2 TIMES A  capsule 0    empagliflozin (Jardiance) 25 mg Take 1 tablet (25 mg) by mouth once daily. 90 tablet 1    furosemide (Lasix) 20 mg tablet Take 1 tablet (20 mg) by mouth once daily. 90 tablet 3    gabapentin (Neurontin) 600 mg tablet Take 1 tablet (600 mg) by mouth 3 times a day. 90 tablet 0    glipiZIDE XL (Glucotrol XL) 5 mg 24 hr tablet Take 1 tablet (5 mg) by mouth once daily. Always take with food in the morning 90 tablet 1    losartan (Cozaar) 50 mg tablet Take 1 tablet (50 mg) by mouth once daily.      melatonin 3 mg tablet Give 2 tablet by mouth as needed for insomnia administer at bedtime      ondansetron ODT (Zofran-ODT) 4 mg disintegrating tablet Take 1 tablet (4 mg) by mouth every 8 hours if needed for nausea or vomiting. 20 tablet 0    spironolactone (Aldactone) 25 mg tablet Take 1 tablet (25 mg) by mouth once daily. 90 tablet 3    topiramate (Topamax) 100 mg tablet TAKE ONE TABLET BY MOUTH TWO TIMES A DAY.  NEED FOLLOW UP FOR NEXT REFILL. 60 tablet 0    traMADol (Ultram) 50 mg tablet As needed      traZODone (Desyrel) 50 mg tablet TAKE ONE TABLET BY MOUTH AT BEDTIME AS NEEDED for sleep 30 tablet 0    [DISCONTINUED] metoprolol succinate XL (Toprol-XL) 25 mg 24 hr tablet Take 1 tablet (25 mg) by mouth once daily. Do  "not crush or chew.      naloxone (Narcan) 4 mg/0.1 mL nasal spray Administer 1 spray (4 mg) into affected nostril(s) if needed for opioid reversal. May repeat every 2-3 minutes if needed, alternating nostrils, until medical assistance becomes available. (Patient not taking: Reported on 11/6/2024) 2 each 0    SITagliptin phosphate (Januvia) 50 mg tablet Take 1 tablet (50 mg) by mouth once daily. (Patient not taking: Reported on 11/6/2024) 90 tablet 1    [DISCONTINUED] topiramate (Topamax) 100 mg tablet TAKE ONE TABLET BY MOUTH TWO TIMES A DAY. NEED FOLLOW UP FOR NEXT REFILL 60 tablet 0     No current facility-administered medications on file prior to visit.         RELEVANT LAB RESULTS:  Lab Results   Component Value Date    BILITOT 0.3 08/19/2024    CALCIUM 9.4 08/19/2024    CO2 26 08/19/2024    CL 98 08/19/2024    CREATININE 0.99 08/19/2024    GLUCOSE 349 (H) 08/19/2024    ALKPHOS 109 08/19/2024    K 4.2 08/19/2024    PROT 7.5 08/19/2024     08/19/2024    AST 28 08/19/2024    ALT 23 08/19/2024    BUN 33 (H) 08/19/2024    ANIONGAP 16 08/19/2024    MG 2.20 04/17/2024    PHOS 4.5 05/13/2024    ALBUMIN 4.2 08/19/2024    LIPASE 30 09/03/2023    GFRF CANCELED 09/07/2023    GFRMALE CANCELED 09/07/2023     Lab Results   Component Value Date    TRIG 102 05/26/2023    CHOL 182 05/26/2023    HDL 77.2 05/26/2023     No results found for: \"BMCBC\", \"CBCDIF\"     PHARMACEUTICAL ASSESSMENT:    MEDICATION RECONCILIATION    Home Pharmacy Reviewed? Yes, describe: [facility]; [place], [place]    Drug Interactions? Yes, describe: Amiodarone, Donepezil- increased risk of prolonging Qtc, continue to monitor    Medication Documentation Review Audit       Reviewed by Ledy Couch, PharmD (Pharmacist) on 11/06/24 at 1015      Medication Order Taking? Sig Documenting Provider Last Dose Status   acetaminophen (Tylenol) 325 mg tablet [id number] Yes Give 2 tablet by mouth every 4 hours as needed for Pain Historical Provider, MD  " Active   amiodarone (Pacerone) 200 mg tablet [id number] Yes Give 1 tablet by mouth in the morning for ANTIARRHYTHMIC Cesar ORLANDO MD  Active   apixaban (Eliquis) 5 mg tablet [id number] Yes Give 1 tablet by mouth two times a day for blood thinner Cesar ORLANDO MD  Active   atorvastatin (Lipitor) 40 mg tablet [id number] Yes Take 1 tablet (40 mg) by mouth once daily at bedtime. David Swift MD  Active   b complex 0.4 mg tablet [id number] Yes Take 1 tablet by mouth once daily. Historical Provider, MD  Active   baclofen (Lioresal) 5 mg tablet [id number] Yes Take 1 tablet (5 mg) by mouth 2 times a day. Jf Perdue MD  Active   biotin 1 mg tablet [id number] Yes 1 tab(s) orally once a day Historical Provider, MD  Active   cephalexin (Keflex) 500 mg capsule [id number] Yes Take 1 capsule (500 mg) by mouth 2 times a day. Sebastian Lo MD  Active   cholecalciferol (Vitamin D-3) 1,250 mcg (50,000 unit) capsule [id number] Yes Take 50,000 Units by mouth once each week. Historical Provider, MD  Active   docusate sodium (Colace) 100 mg capsule [id number] Yes Take 1 capsule (100 mg) by mouth 2 times a day as needed for constipation. Historical Provider, MD  Active   donepezil (Aricept) 5 mg tablet [id number] Yes TAKE ONE TABLET BY MOUTH AT BEDTIME David Swift MD  Active   DULoxetine (Cymbalta) 30 mg DR capsule [id number] Yes TAKE 1 CAPSULE BY MOUTH 2 TIMES A DAY David Siwft MD  Active   empagliflozin (Jardiance) 25 mg [id number] Yes Take 1 tablet (25 mg) by mouth once daily. David Swift MD  Active   furosemide (Lasix) 20 mg tablet [id number] Yes Take 1 tablet (20 mg) by mouth once daily. Lori Dueñas MD PhD  Active   gabapentin (Neurontin) 600 mg tablet [id number] Yes Take 1 tablet (600 mg) by mouth 3 times a day. David Swift MD  Active   glipiZIDE XL (Glucotrol XL) 5 mg 24 hr tablet [id number] Yes Take 1 tablet (5 mg) by mouth once daily. Always take with food in the morning David Swift MD  Active    losartan (Cozaar) 50 mg tablet [id number] Yes Take 1 tablet (50 mg) by mouth once daily. Historical Provider, MD  Active   melatonin 3 mg tablet [id number] Yes Give 2 tablet by mouth as needed for insomnia administer at bedtime Historical Provider, MD  Active    Discontinued 11/06/24 1015   naloxone (Narcan) 4 mg/0.1 mL nasal spray [id number]  Administer 1 spray (4 mg) into affected nostril(s) if needed for opioid reversal. May repeat every 2-3 minutes if needed, alternating nostrils, until medical assistance becomes available.   Patient not taking: Reported on 11/6/2024    Jf Perdue MD  Active   ondansetron ODT (Zofran-ODT) 4 mg disintegrating tablet [id number] Yes Take 1 tablet (4 mg) by mouth every 8 hours if needed for nausea or vomiting. Terrell Dixon PA-C  Active   SITagliptin phosphate (Januvia) 50 mg tablet [id number]  Take 1 tablet (50 mg) by mouth once daily.   Patient not taking: Reported on 11/6/2024    David Swift MD  Active   spironolactone (Aldactone) 25 mg tablet [id number] Yes Take 1 tablet (25 mg) by mouth once daily. Lori Dueñas MD PhD  Active     Discontinued 10/31/24 1104   topiramate (Topamax) 100 mg tablet [id number] Yes TAKE ONE TABLET BY MOUTH TWO TIMES A DAY.  NEED FOLLOW UP FOR NEXT REFILL. Kaykay Palafox MD  Active   traMADol (Ultram) 50 mg tablet [id number] Yes As needed Historical Provider, MD  Active   traZODone (Desyrel) 50 mg tablet [id number] Yes TAKE ONE TABLET BY MOUTH AT BEDTIME AS NEEDED for sleep David Swift MD  Active                    DISEASE MANAGEMENT ASSESSMENT:     ANTICOAGULATION ASSESSMENT    The ASCVD Risk score (Carlo WEBB, et al., 2019) failed to calculate for the following reasons:    Risk score cannot be calculated because patient has a medical history suggesting prior/existing ASCVD    DIAGNOSIS: prevention of nonvalvular atrial fibrilliation stroke and systemic embolism  - Patient is projected to be on anticoagulation long term  -  [id number] Score: [7] (only included if diagnosis is atrial fibrillation)   Age: [<65 (0)] [65-74 (+1)] [> 75 (+2)]: 2  Sex: [Male/Female (+1)]: 1  CHF history: [No/Yes(+1)]: 1  Hypertension history: [No/Yes(+1)]: 1  Stroke/TIA/thromboembolism history: [No/Yes(+2)]: 0  Vascular disease history (prior MI, peripheral artery disease, aortic plaque): [No/Yes(+1)]: 1  Diabetes history: [No/Yes(+1)]: 1    CURRENT PHARMACOTHERAPY:    Eliquis 5mg twice daily  80yo  59kg  Scr 0.99 (08/19/2024)    RELEVANT PAST MEDICAL HISTORY:   Afib, HTN, NSTEMI, T2DM    Affordability/Accessibility:  PAP screen  Adherence/Organization: reports adherence, patient daughter uses pillbox  Adverse Reactions: none reported  Recent Hospitalizations: none  Recent Falls/Trauma: none reported   Changes in Tobacco or Alcohol Intake:   Tobacco: does not use  Alcohol: socially     EDUCATION/COUNSELING:   - Counseled patient on MOA, expectations, duration of therapy, contraindications, administration, and monitoring parameters  - Counseled patient of side effects that are indicative of bleeding such as dark tarry stool, unexplainable bruising, or vomiting up a coffee ground like substance     CHF ASSESSMENT     Symptom/Staging:  -Most recent ejection fraction: 62%    Results for orders placed in visit on 06/29/18    Echocardiogram    Narrative  [facility], [place], [address] [place] [address]  Tel [phone] and Fax [phone]    TRANSTHORACIC ECHOCARDIOGRAM REPORT      Patient Name:     TAMARA Nicole Physician:  [id number] Sherlyn Vu MD  Study Date:       7/18/2018       Referring           Irvin Robb  Physician:  MRN/PID:          [id number]        PCP:  Accession/Order#: [id number]       Department          [place] [address]  Location:  YOB: 1945       Fellow:             Sebastian Pena MD  Gender:           F               Nurse:  Admit Date:       7/10/2018       Sonographer:        Anna  Deann  [facility] [[email or web address]]  Admission Status: Inpatient -     Additional Staff:  STAT  Height:           147.32 cm       CC Report to:       Ashish T6 [facility]  Weight:           68.04 kg        Study Type:         Echocardiogram  BSA:              1.61 m2  Blood Pressure: 130 /78 mmHg    Diagnosis/ICD: R07.89 Other chest pain  Indication:    Chest Pain  Procedure/CPT: Echo Complete w/Full Doppler (42621)    Patient History:  Pertinent History: DM, fatigue, Htn.    Study Detail: The following Echo studies were performed: 2D, M-Mode, Doppler and  color flow. Technically challenging study due to prominent lung  artifact.      PHYSICIAN INTERPRETATION:  Left Ventricle: The left ventricular systolic function is hyperdynamic, with an estimated ejection fraction of 65-70%. The left ventricular cavity size is normal. There is mild concentric left ventricular hypertrophy. Spectral Doppler shows an impaired relaxation pattern of left ventricular diastolic filling. Hyperdynamic LV function with color acceleration beginning midcavity with peak dynamic gradient at rest of 18mmHg which increased to 22mmHg with valsalva.  Left Atrium: The left atrium is mildly dilated.  Right Ventricle: The right ventricle is normal in size. There is normal right ventricular global systolic function.  Right Atrium: The right atrium is normal in size. There is a possible device noted in the right atrium.  Aortic Valve: The aortic valve is trileaflet. There is no evidence of aortic valve regurgitation. The peak instantaneous gradient of the aortic valve is 18.1 mmHg.  Mitral Valve: The mitral valve is mildly thickened. There is trace mitral valve regurgitation.  Tricuspid Valve: The tricuspid valve is structurally normal. There is trace tricuspid regurgitation. The doppler estimated RVSP is mildly elevated at 41.2 mmHg.  Pulmonic Valve: The pulmonic valve is not well visualized. The pulmonic valve regurgitation was not well visualized.  Pericardium:  There is a trivial pericardial effusion.  Aorta: The aortic root is normal.  Systemic Veins: The inferior vena cava appears to be of normal size. There is IVC inspiratory collapse greater than 50%.  In comparison to the previous echocardiogram(s): There are no prior studies on this patient for comparison purposes.      CONCLUSIONS:  1. The left ventricular systolic function is hyperdynamic with a 65-70% estimated ejection fraction.  2. Hyperdynamic LV function with color acceleration beginning midcavity with peak dynamic gradient at rest of 18mmHg which increased to 22mmHg with valsalva.  3. Spectral Doppler shows an impaired relaxation pattern of left ventricular diastolic filling.  4. Mildly elevated RVSP.  5. No sinigifcant valvular pahtology.    QUANTITATIVE DATA SUMMARY:  2D MEASUREMENTS:  Normal Ranges:  IVSd:          1.40 cm   (0.6-1.1cm)  LVPWd:         1.28 cm   (0.6-1.1cm)  LVIDd:         3.33 cm   (3.9-5.9cm)  LVIDs:         2.22 cm  LV Mass Index: 93.6 g/m2  LV % FS        33.3 %    LA VOLUME:  Normal Ranges:  LA Vol A4C:        49.4 ml    (22+/-6mL/m2)  LA Vol A2C:        60.1 ml  LA Vol BP:         57.0 ml  LA Vol Index A4C:  30.6ml/m2  LA Vol Index A2C:  37.3 ml/m2  LA Vol Index BP:   35.4 ml/m2  LA Area A4C:       18.0 cm2  LA Area A2C:       20.8 cm2  LA Major Axis A4C: 5.6 cm  LA Major Axis A2C: 6.1 cm  LA Volume Index:   35.2 ml/m2    RA VOLUME BY A/L METHOD:  Normal Ranges:  RA Area A4C: 9.9 cm2    M-MODE MEASUREMENTS:  Normal Ranges:  Ao Root: 2.80 cm (2.0-3.7cm)  LAs:     3.41 cm (2.7-4.0cm)    AORTA MEASUREMENTS:  Normal Ranges:  Asc Ao, d: 2.34 cm (2.1-3.4cm)    LV SYSTOLIC FUNCTION BY 2D PLANIMETRY (MOD):  Normal Ranges:  EF-A4C View: 74.5 % (>55%)  EF-A2C View: 75.8 %  EF-Biplane:  76.0 %    LV DIASTOLIC FUNCTION:  Normal Ranges:  MV Peak E:        1.04 m/s    (0.7-1.2 m/s)  MV Peak A:        1.09 m/s    (0.42-0.7 m/s)  E/A Ratio:        0.95        (1.0-2.2)  MV e'             0.05 m/s     (>8.0)  MV A Dur:         106.00 msec  E/e' Ratio:       20.80       (<8.0)  MV DT:            230 msec    (150-240 msec)  PulmV Sys Jorge:    69.20 cm/s  PulmV [place] Jorge:   45.80 cm/s  PulmV S/D Jorge:    1.50  PulmV A Revs Jorge: 29.40 cm/s  PulmV A Revs Dur: 79.00 msec    AORTIC VALVE:  Normal Ranges:  AoV Vmax:      2.13 m/s  (<1.7m/s)  AoV Peak P.1 mmHg (<20mmHg)  LVOT Max Jorge:  1.57 m/s  (<1.1m/s)  LVOT VTI:      27.80 cm  LVOT Diameter: 1.70 cm   (1.8-2.4cm)  AoV Area,Vmax: 1.67 cm2  (2.5-4.5cm2)    RIGHT VENTRICLE:  RV 1   3.02 cm  RV 2   1.88 cm  RV 3   5.88 cm  TAPSE: 29.6 mm  RV s'  0.23 m/s    TRICUSPID VALVE/RVSP:  Normal Ranges:  Peak TR Velocity: 3.09 m/s  RV Syst Pressure: 41.2 mmHg (< 30mmHg)  IVC Diam:         1.25 cm    PULMONIC VALVE:  Normal Ranges:  PV Max Jorge: 1.0 m/s  (0.6-0.9m/s)  PV Max PG:  3.8 mmHg    Pulmonary Veins:  PulmV A Revs Dur: 79.00 msec  PulmV A Revs Jorge: 29.40 cm/s  PulmV [place] Jorge:   45.80 cm/s  PulmV S/D Jorge:    1.50  PulmV Sys Jorge:    69.20 cm/s      [id number] Sherlyn Vu MD  Electronically signed on 2018 at 4:50:05 PM         Final       Guideline-Directed Medical Therapy:  -ARNI: No   -If no, then ACEi/ARB?: Yes, describe: Losartan 50mg daily  -Beta Blocker: No  -MRA: Yes, describe: Spironolactone 25mg daily  -SGLT2i: Yes, describe: Jardiance 25mg daily    Secondary Prevention:  -The ASCVD Risk score (Carlo DK, et al., 2019) failed to calculate for the following reasons:    Risk score cannot be calculated because patient has a medical history suggesting prior/existing ASCVD   -Aspirin 81mg? no  -Statin?: Yes, describe: Atorvastatin 40mg daily  -HTN?: Yes, describe: controlled at last OV    CURRENT PHARMACOTHERAPY:   Losartan 50mg daily  Spironolactone 25mg daily  Jardiance 25mg daily  Furosemide 20mg daily    Affordability:  PAP screen  Adherence/Compliance: reports adherence  Adverse Effects: Kaelyn dtr states Elida complains of being dizzy, patient is currently  "seeing neurologist    Monitoring Weights at Home: Yes; once a month  Home Weight Recordings: unable to assess    Past In Office Weight Readings:   Wt Readings from Last 6 Encounters:   10/04/24 59 kg (130 lb)   09/23/24 58.5 kg (129 lb)   08/19/24 59 kg (130 lb)   05/24/24 62.6 kg (138 lb)   05/06/24 62.1 kg (137 lb)   04/08/24 60.3 kg (133 lb)       Monitoring Blood Pressure at Home: Yes; \"sporadically\"  Home BP Recordings: unable to assess    Past In Office BP Readings:   BP Readings from Last 6 Encounters:   10/11/24 126/84   10/04/24 97/65   09/23/24 104/58   09/04/24 95/69   08/19/24 125/85   06/10/24 117/60       HEALTH MANAGEMENT    Maintaining fluid restriction (<2 L/day): N/A  Edema/swelling: No; patient wears compression socks  Shortness of breath: No  Trouble sleeping/lying down: No  Dry/hacking cough: No  Recent Hospitalizations: No    EDUCATION/COUNSELING:   - Counseled patient on MOA, expectations, duration of therapy, contraindications, administration, and monitoring parameters  - Counseled patient on lifestyle modifications that can decrease your risk of having complications (smoking cessation, losing weight, daily weights, vaccines)  - Counseled patient on fluid intake and weight management. Recommended to not consume more than 2 liters of fliuids per day. If they have gained more than 2-3 pounds within a 24 hours period (or 5 pounds in a week), contact their cardiologist  - Answered all patient questions and concerns     DISCUSSION/NOTES:   Today's initial visit was conducted with Elida Art's daughter. Patient medications and allergies were reviewed and updated. Kaelyn states she is Elida's caretaker and manages her medications.  Kaelyn states Elida is doing well on anticoagulation therapy. Patient reports adherence, no adverse effects, and denies s/s of bleeding.   Kaelyn reports Elida is tolerating her heart failure regimen well. Kaelyn states Elida will complain of dizziness, she is " currently following with a neurologist for this matter. Kaelyn reports she does not take her weight or blood pressure often, maybe once per month. Kaelyn denies s/s of worsening heart failure.  Patient screened for  PAP, she states Jardiance and Eliquis cost about ~$1100 . Patient has received a free 30 day supply of both medications. Kaelyn reports having about 2 weeks left of each medication.    ASSESSMENT:     Patient Assistance Program (PAP)    Application for program to be submitted for the following medications: Eliquis, Jardiance    [facility] Permanent Address: [place]   Prescription Insurance:   Yes   Members of Household: 1   Files Taxes: No- social security and pension       Patient will be email financial information to pharmacist directly at [email or web address].    Patient verbally reports monthly or yearly income which is less than 400% federal poverty level    Patient aware this process may take up to 6 weeks.     If approved medication must be filled through [facility] and MEDICATION WILL BE MAILED TO PATIENT.         Assessment/Plan   Problem List Items Addressed This Visit       COPD without exacerbation (Multi)    Arrhythmia    Relevant Orders    Referral to Clinical Pharmacy    Atrial fibrillation (Multi)     Patient age, weight and renal function appropriate to continue Eliquis 5mg twice daily.         Relevant Orders    Referral to Clinical Pharmacy    Pulmonary hypertension, unspecified (Multi)    Acute on chronic diastolic heart failure     Patient currently on 3 of 4 GDMT medications. Renal function and potassium level appropriate to continue current regimen.     Labs (08/19/2024): Scr-0.99 eGFR-59 K-4.2         Relevant Orders    Referral to Clinical Pharmacy     Other Visit Diagnoses       Dizziness        Abnormal echocardiogram              RECOMMENDATIONS/PLAN:    CONTINUE  Eliquis 5mg twice daily  Losartan 50mg daily  Spironolactone 25mg daily  Jardiance 25mg  daily  Furosemide 20mg daily    Last Appnt with Referring Provider: 09/23/2024  Next Appnt with Referring Provider: 01/27/2025  Clinical Pharmacist follow up: 1 month  VAF/Application Expiration: pending  Type of Encounter: Linnea Couch, PharmD    Verbal consent to manage patient's drug therapy was obtained from an individual authorized to act on behalf of a patient. They were informed they may decline to participate or withdraw from participation in pharmacy services at any time.    Continue all meds under the continuation of care with the referring provider and clinical pharmacy team.

## 2024-11-06 NOTE — Clinical Note
Rafa Ignacio,  Today I spoke with Kaelyn (dtr) about Elida's Eliquis, heart medications and cost assistance program. Kaelyn states Elida is doing well on anticoagulation therapy and her heart failure regimen. Kaelyn reports adherence and denies s/s of bleeding/worsening heart failure. Kaelyn states Elida complains of dizziness, she is currently following with a neurologist for this matter. Patient was screened for  PAP, plan to submit application once income documentation is received. Plan to continue current regimen and follow up in 1 month. Thank you!

## 2024-11-07 ENCOUNTER — APPOINTMENT (OUTPATIENT)
Dept: CARDIOLOGY | Facility: CLINIC | Age: 79
End: 2024-11-07
Payer: MEDICARE

## 2024-11-07 VITALS
OXYGEN SATURATION: 100 % | HEIGHT: 58 IN | WEIGHT: 138 LBS | HEART RATE: 66 BPM | BODY MASS INDEX: 28.97 KG/M2 | DIASTOLIC BLOOD PRESSURE: 58 MMHG | SYSTOLIC BLOOD PRESSURE: 100 MMHG

## 2024-11-07 DIAGNOSIS — I43 CARDIAC AMYLOIDOSIS: Primary | ICD-10-CM

## 2024-11-07 DIAGNOSIS — E85.4 CARDIAC AMYLOIDOSIS: Primary | ICD-10-CM

## 2024-11-07 DIAGNOSIS — I42.8 INFILTRATIVE CARDIOMYOPATHY (MULTI): ICD-10-CM

## 2024-11-07 PROCEDURE — 99215 OFFICE O/P EST HI 40 MIN: CPT | Performed by: STUDENT IN AN ORGANIZED HEALTH CARE EDUCATION/TRAINING PROGRAM

## 2024-11-07 PROCEDURE — 3074F SYST BP LT 130 MM HG: CPT | Performed by: STUDENT IN AN ORGANIZED HEALTH CARE EDUCATION/TRAINING PROGRAM

## 2024-11-07 PROCEDURE — 3078F DIAST BP <80 MM HG: CPT | Performed by: STUDENT IN AN ORGANIZED HEALTH CARE EDUCATION/TRAINING PROGRAM

## 2024-11-07 PROCEDURE — 1159F MED LIST DOCD IN RCRD: CPT | Performed by: STUDENT IN AN ORGANIZED HEALTH CARE EDUCATION/TRAINING PROGRAM

## 2024-11-07 RX ORDER — LOSARTAN POTASSIUM 25 MG/1
25 TABLET ORAL DAILY
Qty: 30 TABLET | Refills: 11 | Status: SHIPPED | OUTPATIENT
Start: 2024-11-07

## 2024-11-07 ASSESSMENT — ENCOUNTER SYMPTOMS
PND: 0
OCCASIONAL FEELINGS OF UNSTEADINESS: 1
ALTERED MENTAL STATUS: 0
DIZZINESS: 1
WEIGHT LOSS: 0
HEMATURIA: 0
WEIGHT GAIN: 0
BRUISES/BLEEDS EASILY: 1
DECREASED APPETITE: 0
DYSPNEA ON EXERTION: 0
NEAR-SYNCOPE: 0
PALPITATIONS: 0
ORTHOPNEA: 0
MEMORY LOSS: 1
SHORTNESS OF BREATH: 0
SYNCOPE: 0
HEMATOCHEZIA: 0

## 2024-11-07 ASSESSMENT — LIFESTYLE VARIABLES: SUBSTANCE_ABUSE: 0

## 2024-11-07 NOTE — PATIENT INSTRUCTIONS
Thank you for coming in today. If you have any questions or concerns, you may call the Heart Failure Office at [phone] option [phone], or [phone].  You may also contact our heart failure nursing team via email on [email or web address].    For quicker results set-up your  [employer] account to receive results and other correspondence directly to your phone.    Please bring all your pills/medications to your Cardiology appointments.    **  -We will begin treatment for CARDIAC AMYLOIDOSIS.  Our specialist nurse, Marylin, will contact you to arrange for genetic testing as we discussed.  Marylin will also be able to help you afford the new medication, Vyndamax    - Please make the following medication changes:  1.  START Vyndamax 61 mg once daily    2. DECREASE Losartan to 25 mg once daily    - Please have the following tests done:  1.Blood tests just before your next visit (comprehensive panel, BNP, CBC)    - Please make an appointment to be seen in 6 months

## 2024-11-07 NOTE — PROGRESS NOTES
"Referring Clinician: Dr.J Ignacio  Accompanied by: daughter Kaelyn     HPI:     79 y.o. retired  [employer] who presents for advanced heart failure care.  She has a past medical history significant for hypertension, diabetes mellitus, dementia, pAF maintained on amiodarone and DOAC, right drop foot ? cause) since ~  2020, HFpEF.  Ms. Benson was hospitalized 4/2024 with dyspnea and was treated for pneumonia, and acutely decompensated heart failure.  On her most recent TTE 8/2023 LVEF 55%, left ventricular thickening.  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  She is due to have cardiac MRI 6/2024.  She had normal kappa: Lambda ratio, with normal urine protein electrophoresis.   (4/2024)  Cardiac MRI 10/2024 showed changes consistent with amyloidosis. SPEP was normal  Technetium pyrophosphate scan 4/2024 was grade 3.  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  At last visit her beta-blocker was discontinued and her hypertensive control has been vry low, she tends to run with SBP 80-90 mmHg    Symptomatically, there is no chest pain, SOB at rest, SOBOE, PND, orthopnoea, bendopnoea, fatigue, ankle swelling, palpitations, syncope, or pre syncope.  She gives a history of feeling \"dizzy\"  every day, and home SBP 80-90 mHg    Functionally, exercise capacity is described as limited by imbalance, and right foot drop.     She is fully adherent with prescribed medications.     Her last HF hospitalisation was 4/2024.    Surgical Hx:  -Ileostomy 2023    Past Obstetric Hx:  - G 3  - No cardiac complications of pregnancy    Social Hx:  - Smoking- quit > 50 years   - ETOH - 1 drink on special occasions.  Very rare use.  - Illicit drugs-never  - Lives alone normally, relatives visit often, several times a day    Family Hx:  Specifically, there is no family history of  CAD, heart failure, ICD, PPM, LVAD, OHT, arrhythmias, " CVA, or sudden cardiac death.    Medication reconciliation completed, see below.     Medication Documentation Review Audit       Reviewed by Eli Vera RN (Registered Nurse) on 11/07/24 at 1516      Medication Order Taking? Sig Documenting Provider Last Dose Status   acetaminophen (Tylenol) 325 mg tablet [id number] Yes Give 2 tablet by mouth every 4 hours as needed for Pain Historical MD Anders Taking Active   amiodarone (Pacerone) 200 mg tablet [id number] Yes Give 1 tablet by mouth in the morning for ANTIARRHYTHMIC Cesar ORLANDO MD Taking Active   apixaban (Eliquis) 5 mg tablet [id number] Yes Give 1 tablet by mouth two times a day for blood thinner Cesar ORLANDO MD Taking Active   atorvastatin (Lipitor) 40 mg tablet [id number] Yes Take 1 tablet (40 mg) by mouth once daily at bedtime. David Swift MD Taking Active   b complex 0.4 mg tablet [id number] Yes Take 1 tablet by mouth once daily. Historical Provider, MD Taking Active   baclofen (Lioresal) 5 mg tablet [id number] Yes Take 1 tablet (5 mg) by mouth 2 times a day. Jf Perdue MD  Active   biotin 1 mg tablet [id number] Yes 1 tab(s) orally once a day Historical Provider, MD Taking Active   cephalexin (Keflex) 500 mg capsule [id number] Yes Take 1 capsule (500 mg) by mouth 2 times a day. Sebastian Lo MD  Active   cholecalciferol (Vitamin D-3) 1,250 mcg (50,000 unit) capsule [id number] Yes Take 50,000 Units by mouth once each week. Historical Provider, MD Taking Active   docusate sodium (Colace) 100 mg capsule [id number] Yes Take 1 capsule (100 mg) by mouth 2 times a day as needed for constipation. Historical Provider, MD Taking Active   donepezil (Aricept) 5 mg tablet [id number] Yes TAKE ONE TABLET BY MOUTH AT BEDTIME David Swift MD  Active   DULoxetine (Cymbalta) 30 mg DR capsule [id number] Yes TAKE 1 CAPSULE BY MOUTH 2 TIMES A DAY David Swift MD Taking Active   empagliflozin (Jardiance) 25 mg [id number] Yes Take 1 tablet (25 mg) by mouth once  daily. David Swift MD Taking Active   furosemide (Lasix) 20 mg tablet [id number] Yes Take 1 tablet (20 mg) by mouth once daily. Lori Dueñas MD PhD Taking Active   gabapentin (Neurontin) 600 mg tablet [id number] Yes Take 1 tablet (600 mg) by mouth 3 times a day. David Swift MD Taking Active   glipiZIDE XL (Glucotrol XL) 5 mg 24 hr tablet [id number] No Take 1 tablet (5 mg) by mouth once daily. Always take with food in the morning   Patient not taking: Reported on 11/7/2024    David Swift MD Taking Active   losartan (Cozaar) 50 mg tablet [id number] Yes Take 1 tablet (50 mg) by mouth once daily. Historical Provider, MD Taking Active   melatonin 3 mg tablet [id number] Yes Give 2 tablet by mouth as needed for insomnia administer at bedtime Historical Provider, MD Taking Active     Discontinued 11/06/24 1015   naloxone (Narcan) 4 mg/0.1 mL nasal spray [id number] Yes Administer 1 spray (4 mg) into affected nostril(s) if needed for opioid reversal. May repeat every 2-3 minutes if needed, alternating nostrils, until medical assistance becomes available. Jf Perdue MD  Active   ondansetron ODT (Zofran-ODT) 4 mg disintegrating tablet [id number] Yes Take 1 tablet (4 mg) by mouth every 8 hours if needed for nausea or vomiting. Terrell Dixon PA-C Taking Active   SITagliptin phosphate (Januvia) 50 mg tablet [id number] No Take 1 tablet (50 mg) by mouth once daily.   Patient not taking: Reported on 11/7/2024    David Swift MD Taking Active   spironolactone (Aldactone) 25 mg tablet [id number] Yes Take 1 tablet (25 mg) by mouth once daily. Lori Dueñas MD PhD Taking Active   topiramate (Topamax) 100 mg tablet [id number] Yes TAKE ONE TABLET BY MOUTH TWO TIMES A DAY.  NEED FOLLOW UP FOR NEXT REFILL. Kaykay Palafox MD  Active   traMADol (Ultram) 50 mg tablet [id number] Yes As needed Historical Provider, MD Taking Active   traZODone (Desyrel) 50 mg tablet [id number] Yes TAKE ONE TABLET BY MOUTH AT BEDTIME  "AS NEEDED for sleep David Swift MD  Active                   Allergies   Allergen Reactions    Shellfish Containing Products Swelling       Review of Systems   Constitutional: Negative for decreased appetite, weight gain and weight loss.   HENT:  Positive for hearing loss (mild).    Eyes:  Negative for visual disturbance.   Cardiovascular:  Negative for chest pain, dyspnea on exertion, leg swelling, near-syncope, orthopnea, palpitations, paroxysmal nocturnal dyspnea and syncope.   Respiratory:  Negative for shortness of breath.    Hematologic/Lymphatic: Bruises/bleeds easily (on DOAc).   Skin:  Negative for rash.   Gastrointestinal:  Negative for hematochezia.   Genitourinary:  Negative for hematuria.   Neurological:  Positive for dizziness.   Psychiatric/Behavioral:  Positive for memory loss. Negative for altered mental status and substance abuse.       Investigations:    The electronic medical record has been reviewed by me for salient history. All cardiovascular imaging and testing available in the electronic medical record, and [facility] has been reviewed.     Visit Vitals  /58   Pulse 66   Ht 1.473 m (4' 10\")   Wt 62.6 kg (138 lb)   SpO2 100%   BMI 28.84 kg/m²   OB Status Menopausal   Smoking Status Former   BSA 1.6 m²         On examination:    Very pleasant elderly AA woman in no apparent CP or painful distress  Well  groomed   Neck: No JVD or HJR  CVS: HS 1,2.  No added sounds  Resp: CTA bilaterally. Percussion note resonant   Abdomen: Mildly obese, SNT, BS wnl  Extremities: No pedal oedema. Wearing right lower limb brace   Skin: warm and dry  CNS: AO x 4, no gross deficits  Lab Results   Component Value Date    WBC 9.4 04/17/2024    HGB 13.0 04/17/2024    HCT 41.3 04/17/2024    MCV 98 04/17/2024     04/17/2024       Chemistry    Lab Results   Component Value Date/Time     08/19/2024 1722    K 4.2 08/19/2024 1722    CL 98 08/19/2024 1722    CO2 26 08/19/2024 1722    BUN 33 (H) 08/19/2024 " 1722    CREATININE 0.99 08/19/2024 1722    Lab Results   Component Value Date/Time    CALCIUM 9.4 08/19/2024 1722    ALKPHOS 109 08/19/2024 1722    AST 28 08/19/2024 1722    ALT 23 08/19/2024 1722    BILITOT 0.3 08/19/2024 1722          IMPRESSION:    79 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  She has a past medical history significant for hypertension, diabetes mellitus, dementia, pAF maintained on amiodarone and DOAC, right drop foot ? cause) since ~  2020, HFpEF.  Ms. Benson was hospitalized 4/2024 with dyspnea and was treated for pneumonia, and acutely decompensated heart failure.  On her most recent TTE 8/2023 LVEF 55%, left ventricular thickening.  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  She is due to have cardiac MRI 6/2024.  She had normal kappa: Lambda ratio, with normal urine protein electrophoresis.   (4/2024)  Cardiac MRI 10/2024 showed changes consistent with amyloidosis. SPEP was normal  Technetium pyrophosphate scan 4/2024 was grade 3.  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  At last visit her beta-blocker was discontinued and her hypertensive control has been vry low, she tends to run with SBP 80-90 mmHg    NYHA Functional Class:   ACC/AHA Stage B heart failure  Volume status: Euvolaemic  Perfusion status: warm to touch  Aetiology: TTR  amyloidosis    PLAN:  #HFpEF secondary to ATTR cardiac amyloidosis  - Continue to hold beta-blocker today  - Halve losartan to 25 mg qd  - Cont  Spironolactone 25 mg every day   -  cont halved Furosemide 20 mg every day, eventually DC pending clinical response     # Imbalance and dizziness at rest, foot drop  - referred to Neurology prev    This note was transcribed using the Dragon Dictation system. There may be grammatical, punctuation, or verbiage errors that can occur with voice recognition programs.    Lori MALONE  MD Spenser PhD

## 2024-11-11 ENCOUNTER — SPECIALTY PHARMACY (OUTPATIENT)
Dept: PHARMACY | Facility: CLINIC | Age: 79
End: 2024-11-11

## 2024-11-11 PROCEDURE — RXMED WILLOW AMBULATORY MEDICATION CHARGE

## 2024-11-12 ENCOUNTER — SPECIALTY PHARMACY (OUTPATIENT)
Dept: PHARMACY | Facility: CLINIC | Age: 79
End: 2024-11-12

## 2024-11-12 PROCEDURE — RXMED WILLOW AMBULATORY MEDICATION CHARGE

## 2024-11-13 ENCOUNTER — APPOINTMENT (OUTPATIENT)
Dept: ORTHOPEDIC SURGERY | Facility: CLINIC | Age: 79
End: 2024-11-13
Payer: MEDICARE

## 2024-11-14 ENCOUNTER — PHARMACY VISIT (OUTPATIENT)
Dept: PHARMACY | Facility: CLINIC | Age: 79
End: 2024-11-14
Payer: MEDICARE

## 2024-11-14 DIAGNOSIS — I50.33 ACUTE ON CHRONIC DIASTOLIC HEART FAILURE: ICD-10-CM

## 2024-11-14 DIAGNOSIS — I48.0 PAROXYSMAL ATRIAL FIBRILLATION (MULTI): Primary | ICD-10-CM

## 2024-11-14 PROCEDURE — RXMED WILLOW AMBULATORY MEDICATION CHARGE

## 2024-11-15 ENCOUNTER — SPECIALTY PHARMACY (OUTPATIENT)
Dept: PHARMACY | Facility: CLINIC | Age: 79
End: 2024-11-15

## 2024-11-15 ENCOUNTER — APPOINTMENT (OUTPATIENT)
Dept: OPHTHALMOLOGY | Facility: CLINIC | Age: 79
End: 2024-11-15
Payer: MEDICARE

## 2024-11-15 ENCOUNTER — TELEPHONE (OUTPATIENT)
Dept: PHARMACY | Facility: HOSPITAL | Age: 79
End: 2024-11-15

## 2024-11-15 ENCOUNTER — TELEMEDICINE CLINICAL SUPPORT (OUTPATIENT)
Dept: PHARMACY | Facility: HOSPITAL | Age: 79
End: 2024-11-15

## 2024-11-15 DIAGNOSIS — H04.123 DRY EYE SYNDROME OF BILATERAL LACRIMAL GLANDS: ICD-10-CM

## 2024-11-15 DIAGNOSIS — H52.4 REGULAR ASTIGMATISM OF BOTH EYES WITH PRESBYOPIA: Primary | ICD-10-CM

## 2024-11-15 DIAGNOSIS — H52.223 REGULAR ASTIGMATISM OF BOTH EYES WITH PRESBYOPIA: Primary | ICD-10-CM

## 2024-11-15 DIAGNOSIS — H25.813 COMBINED FORM OF AGE-RELATED CATARACT, BOTH EYES: ICD-10-CM

## 2024-11-15 DIAGNOSIS — I43 CARDIAC AMYLOIDOSIS: ICD-10-CM

## 2024-11-15 DIAGNOSIS — E85.4 CARDIAC AMYLOIDOSIS: ICD-10-CM

## 2024-11-15 DIAGNOSIS — E11.9 TYPE 2 DIABETES MELLITUS WITHOUT RETINOPATHY (MULTI): ICD-10-CM

## 2024-11-15 PROBLEM — H52.209 ASTIGMATISM: Status: RESOLVED | Noted: 2023-05-26 | Resolved: 2024-11-15

## 2024-11-15 PROBLEM — H52.10 MYOPIA WITH PRESBYOPIA: Status: RESOLVED | Noted: 2023-05-26 | Resolved: 2024-11-15

## 2024-11-15 PROBLEM — H52.00 HYPEROPIA: Status: RESOLVED | Noted: 2023-05-26 | Resolved: 2024-11-15

## 2024-11-15 PROCEDURE — 92004 COMPRE OPH EXAM NEW PT 1/>: CPT | Performed by: STUDENT IN AN ORGANIZED HEALTH CARE EDUCATION/TRAINING PROGRAM

## 2024-11-15 PROCEDURE — 92015 DETERMINE REFRACTIVE STATE: CPT | Performed by: STUDENT IN AN ORGANIZED HEALTH CARE EDUCATION/TRAINING PROGRAM

## 2024-11-15 RX ORDER — FLUOROMETHOLONE 1 MG/ML
1 SUSPENSION/ DROPS OPHTHALMIC 2 TIMES DAILY
Qty: 5 ML | Refills: 0 | Status: SHIPPED | OUTPATIENT
Start: 2024-11-15 | End: 2024-12-13

## 2024-11-15 RX ORDER — LIFITEGRAST 50 MG/ML
1 SOLUTION/ DROPS OPHTHALMIC 2 TIMES DAILY
Qty: 60 EACH | Refills: 3 | Status: SHIPPED | OUTPATIENT
Start: 2024-11-15 | End: 2024-12-13

## 2024-11-15 ASSESSMENT — ENCOUNTER SYMPTOMS
ENDOCRINE NEGATIVE: 0
MUSCULOSKELETAL NEGATIVE: 0
CONSTITUTIONAL NEGATIVE: 0
RESPIRATORY NEGATIVE: 0
NEUROLOGICAL NEGATIVE: 0
ALLERGIC/IMMUNOLOGIC NEGATIVE: 0
CARDIOVASCULAR NEGATIVE: 0
HEMATOLOGIC/LYMPHATIC NEGATIVE: 0
GASTROINTESTINAL NEGATIVE: 0
EYES NEGATIVE: 1
PSYCHIATRIC NEGATIVE: 0

## 2024-11-15 ASSESSMENT — REFRACTION_WEARINGRX
OS_CYLINDER: -1.50
OD_AXIS: 115
OS_ADD: +2.25
OD_CYLINDER: -0.75
OD_ADD: +2.25
OS_AXIS: 088
OS_SPHERE: +1.50
OD_SPHERE: +0.75

## 2024-11-15 ASSESSMENT — VISUAL ACUITY
METHOD: SNELLEN - LINEAR
OS_SC: 20/30
OS_SC+: -2
OD_SC: 20/80
OD_PH_SC: 20/70

## 2024-11-15 ASSESSMENT — REFRACTION_MANIFEST
OD_ADD: +2.50
OS_ADD: +2.50
OD_CYLINDER: -2.00
OD_SPHERE: -0.75
OS_AXIS: 088
OD_AXIS: 115
OS_CYLINDER: -1.75
OS_SPHERE: +0.25

## 2024-11-15 ASSESSMENT — CUP TO DISC RATIO
OD_RATIO: .4
OS_RATIO: .4

## 2024-11-15 ASSESSMENT — CONF VISUAL FIELD
OD_SUPERIOR_TEMPORAL_RESTRICTION: 0
OS_SUPERIOR_TEMPORAL_RESTRICTION: 0
OD_INFERIOR_TEMPORAL_RESTRICTION: 0
OD_NORMAL: 1
OS_SUPERIOR_NASAL_RESTRICTION: 0
OS_NORMAL: 1
OS_INFERIOR_TEMPORAL_RESTRICTION: 0
OS_INFERIOR_NASAL_RESTRICTION: 0
OD_INFERIOR_NASAL_RESTRICTION: 0
OD_SUPERIOR_NASAL_RESTRICTION: 0
METHOD: COUNTING FINGERS

## 2024-11-15 ASSESSMENT — TONOMETRY
OS_IOP_MMHG: 17
IOP_METHOD: TONOPEN
OD_IOP_MMHG: 16

## 2024-11-15 ASSESSMENT — REFRACTION
OS_SPHERE: PLANO
OS_ADD: +2.50
OD_SPHERE: PLANO
OD_AXIS: 115
OS_AXIS: 095
OD_CYLINDER: -2.75
OD_ADD: +2.50
OS_CYLINDER: -1.00

## 2024-11-15 ASSESSMENT — SLIT LAMP EXAM - LIDS
COMMENTS: GOOD POSITION, MILD MGD
COMMENTS: GOOD POSITION, MILD MGD

## 2024-11-15 ASSESSMENT — EXTERNAL EXAM - LEFT EYE: OS_EXAM: NORMAL

## 2024-11-15 ASSESSMENT — EXTERNAL EXAM - RIGHT EYE: OD_EXAM: NORMAL

## 2024-11-15 NOTE — PROGRESS NOTES
Assessment/Plan   Diagnoses and all orders for this visit:  Regular astigmatism of both eyes with presbyopia  Dry eye syndrome of bilateral lacrimal glands  Combined form of age-related cataract, both eyes  Patient with visually significant cataracts on exam today with BCVA ~20/40 OD+OS  -patient reports a gradual painless decrease in vision bilateral  -discussed etiology-will have patient RTC for a cataract sx consult  -optimizing ocular surface with dry eye treatment  -Patient currently using OTC Lipid and Aqeuous based Artificial tears and Failing with continued signs and symptoms. Failure with OTC AT's as a monotherapy  Tear break up time (TBUT), a measure of tear stability (0-5 = severe, 6-10 = moderate, 11-15 = mild)  OD: 6  seconds  OS: 6  seconds  Corneal punctate epithelial erosions (PEE) staining with sodium fluorescein score (5 zones, each PEE level 0-3, maximum score = 15)  OD: 6  OS: 3  Meibomian gland disease (Efron scale 0-4, 0: no abnormality, 4: thick creamy yellow expression at all gland orifices, expression continuous, conjunctival redness):  OD: 1  OS: 1    TXT Plan: Xiidra BID, Refresh or Systane BID, FML BID for 28 days    Xiidra RX'd as medically necessary for failure with AT's and reduced TBUT as above    Type 2 diabetes mellitus without retinopathy (Multi)  -no retinopathy observed on exam today od/os, pt ed to continue good BGlc, blood pressure and lipid control, rtc with any changes in vision, otherwise monitor 1 year    RTC for cataract sx consult

## 2024-11-15 NOTE — PROGRESS NOTES
[facility] Clinical Note  Initial Patient Education     Introduction  Elida Benson is a 79 y.o. female who is on the specialty pharmacy service for management of: Cardiology Core.    Elida Benson is initiating the following therapy: Vyndamax 61mg by mouth once daily     Medication receipt date: 11/15/24  Duration of therapy: Maintenance    The most recent encounter visit with the referring prescriber Lori Dueñas MD PhD on 11/7/24 was reviewed.  Pharmacy will continue to collaborate in the care of this patient with the referring prescriber.    Clinical Background  An initial assessment was conducted prior to first fill of the medication to determine the appropriateness of therapy given the patient's diagnosis, medication list, comorbidities, allergies, medical history, patient's ability to self administer medication, and therapeutic goals based on possible outcomes of therapy. Refer to initial assessment task completed on 11/13/24.    Labs for clinical appropriateness that were reviewed include:   Cardiology - No Labs Needed: There are no routine laboratory monitoring parameters for this medication    Education/Discussion  Elida's daughter was contacted on 11/15/2024 at 12:01 PM for a pharmacy visit with encounter number [id number] from:   [facility]  [address] [facility] [address]  [facility] [address]  Dept: [phone]  Dept Fax: [phone]  Loc: [phone]  Her daughter, Kaelyn, consented to a Telephone visit, which was performed.    Medication Start Date (planned or actual): TBD  Education was conducted prior to start of therapy? Yes    Education discussed includes the following:  Patient Education  Counseled the Patient on the Following : Theraputic rationale and expected outcomes, Expected duration of therapy, Doses and administration, Adherence and missed doses, Possible side effects and management, Safe  handling, storage, and disposal  Learner: Caregiver  Education Method: Explanation  Education Response: Verbalizes understanding  Additional details of the medication specific counseling are found within the linked patient education flowsheet.     The follow up timeline was discussed. Every person responds to and reacts to therapy differently. Patient should be assessed for efficacy and tolerability in approximately: 6 months    Provided education on goals and possible outcomes of therapy:  Adherence with therapy  Timely completion of appropriate labs  Timely and appropriate follow up with provider  Identify and address medication interactions with presciption medications, OTC medications and supplements  Optimize or maintain quality of life  Cardiology: Prolong life  Slow progression of disease    The importance of adherence was discussed and they were advised to take the medication as prescribed by their provider.     Impression/Plan  Review and Assessment   Reviewed During This Encounter: Allergies, Medications, Problem list  Medications Assessed for Appropriate Use, Dose, Route, Frequency, and Duration: Yes  Medication Reconciliation Completed: No (Comment)  Drug Interactions Evaluated: Yes  Clinically Relevant Drug Interactions Identified: No    This patient has been identified as high risk due to Geriatric (over 65 years of age).  The following action was taken: Patient/caregiver encouraged to participate in patient management program.    QOL/Patient Satisfaction  Rate your quality of life on scale of 1-10:  (unable to assess)  Rate your satisfaction with  Specialty Pharmacy on scale of 1-10:  (unable to assess)    Provided [facility] information ([phone]) [facility] and reviewed dispensing process, refill timeline and patient management follow up. Advised to contact the pharmacy if there are any adverse effects and/or changes to medication list, including prescriptions,  OTC medications, herbal products, or supplements. Confirmed understanding of education conducted during assessment. All questions and concerns were addressed and patient was encouraged to reach out for additional questions or concerns.    Kala Peck, SanketD

## 2024-11-15 NOTE — TELEPHONE ENCOUNTER
Patient Assistance Program Approval:     We are pleased to inform you that your application for assistance has been approved.     This approval is valid through 11/15/2025 as long as the following criteria continue to be satisfied:     Your medication (Eliquis, Jardiance) remains covered under your current insurance plan.   Your prescriber does not discontinue therapy.   You do not seek reimbursement from any other private or government-funded programs for the  medication.    Under this program, the pharmacy will first bill your insurance plan for your indemnified specified medication. The [employer] Fund will then offset your copay balance, so that your out-of pocket expense for your specialty medication will be $0.00.    Ledy Couch, SanketD

## 2024-11-19 ENCOUNTER — PHARMACY VISIT (OUTPATIENT)
Dept: PHARMACY | Facility: CLINIC | Age: 79
End: 2024-11-19
Payer: MEDICARE

## 2024-11-19 DIAGNOSIS — G89.4 CHRONIC PAIN SYNDROME: ICD-10-CM

## 2024-11-20 RX ORDER — GABAPENTIN 600 MG/1
600 TABLET ORAL 3 TIMES DAILY
Qty: 90 TABLET | Refills: 0 | Status: SHIPPED | OUTPATIENT
Start: 2024-11-20

## 2024-11-22 ENCOUNTER — APPOINTMENT (OUTPATIENT)
Dept: AUDIOLOGY | Facility: CLINIC | Age: 79
End: 2024-11-22
Payer: MEDICARE

## 2024-11-27 ENCOUNTER — CLINICAL SUPPORT (OUTPATIENT)
Dept: AUDIOLOGY | Facility: CLINIC | Age: 79
End: 2024-11-27

## 2024-11-27 DIAGNOSIS — H90.3 SENSORINEURAL HEARING LOSS (SNHL) OF BOTH EARS: Primary | ICD-10-CM

## 2024-11-27 ASSESSMENT — PAIN - FUNCTIONAL ASSESSMENT: PAIN_FUNCTIONAL_ASSESSMENT: 0-10

## 2024-11-27 ASSESSMENT — PAIN SCALES - GENERAL: PAINLEVEL_OUTOF10: 0 - NO PAIN

## 2024-11-27 NOTE — PROGRESS NOTES
AUDIOLOGY HEARING AID EVALUATION      Name:  Elida Benson  :  1945  Age:  79 y.o.  Date of Encounter:  2024     Time: 9986-6595      HISTORY:  Ms. Benson and her daughter were seen today for a hearing aid consultation. A previous hearing evaluation on 24 indicated hearing within normal limits sloping to a moderately-severe sensorineural hearing loss in the right ear and sloping severe sensorineural hearing loss in the left ear. She has excellent word recognition in both ears.       IMPRESSIONS:   Elida has a hearing aid benefit through her [employer] insurance. However, it is administered through [employer]. It will cover $1,250 per ear every year. [facility] is not in-network with [employer]. Her daughter noted that she would like to discontinue today's appointment and follow-up with an in-network provider. She noted her mother won't be able to afford them out of pocket.     I would recommend she follow-up with [facility] [place] to obtain hearing aids. She was provided with their contact information.       RECOMMENDATIONS:  1.) Follow-up with [facility] and [facility] [place] to obtain hearing aids through [employer].       ELIZABET Galeas, CCC-A   Clinical Audiologist

## 2024-12-02 NOTE — ASSESSMENT & PLAN NOTE
Continue working in therapy  Able to walk short distances    Patient is scheduled for her MRI on 12/30. Per last office note, she is to follow up in office for results. Please contact patient in order to scheduled with Paolo Herrera PA-C.   Thank you

## 2024-12-04 ENCOUNTER — OFFICE VISIT (OUTPATIENT)
Dept: ORTHOPEDIC SURGERY | Facility: CLINIC | Age: 79
End: 2024-12-04
Payer: MEDICARE

## 2024-12-04 DIAGNOSIS — M25.512 LEFT SHOULDER PAIN, UNSPECIFIED CHRONICITY: ICD-10-CM

## 2024-12-04 DIAGNOSIS — M19.012 ARTHRITIS OF LEFT SHOULDER REGION: Primary | ICD-10-CM

## 2024-12-04 PROCEDURE — 2500000004 HC RX 250 GENERAL PHARMACY W/ HCPCS (ALT 636 FOR OP/ED): Performed by: STUDENT IN AN ORGANIZED HEALTH CARE EDUCATION/TRAINING PROGRAM

## 2024-12-04 PROCEDURE — 99214 OFFICE O/P EST MOD 30 MIN: CPT | Performed by: STUDENT IN AN ORGANIZED HEALTH CARE EDUCATION/TRAINING PROGRAM

## 2024-12-04 PROCEDURE — 20610 DRAIN/INJ JOINT/BURSA W/O US: CPT | Mod: LT | Performed by: STUDENT IN AN ORGANIZED HEALTH CARE EDUCATION/TRAINING PROGRAM

## 2024-12-04 RX ORDER — LIDOCAINE HYDROCHLORIDE 10 MG/ML
5 INJECTION, SOLUTION INFILTRATION; PERINEURAL
Status: COMPLETED | OUTPATIENT
Start: 2024-12-04 | End: 2024-12-04

## 2024-12-04 RX ORDER — TRIAMCINOLONE ACETONIDE 40 MG/ML
80 INJECTION, SUSPENSION INTRA-ARTICULAR; INTRAMUSCULAR
Status: COMPLETED | OUTPATIENT
Start: 2024-12-04 | End: 2024-12-04

## 2024-12-04 NOTE — PROGRESS NOTES
CHIEF COMPLAINT: No chief complaint on file.    History: 79 y.o. female presents to the office today for evaluation of her left shoulder.  She has known left shoulder end-stage arthritis.  We have given her 4 cortisone injections previously, the most recent of which was 3 months ago.  She had relief from this up until a few months ago.  Is not interested in surgery at this time.  Difficulty with using the left shoulder.  Significant pain is anterior and deep.  Requesting repeat injection today.    Past medical history, past surgical history, medications, allergies, family history, social history, and review of systems were reviewed today.    A 12 point review of systems was negative other than as stated in the HPI.    Past Medical History:   Diagnosis Date    A-fib (Multi)     Managed on meds, Eliquis stoppage and cardiac clearance in [facility] from Dr. Ignacio    Cervical myelopathy     Chronic pain syndrome     COPD (chronic obstructive pulmonary disease) (Multi)     denies, she is a former smoker but quit in 1984    Dementia     Depression     Diabetic neuropathy (Multi)     Diverticulitis     DM (diabetes mellitus) (Multi)     denies but A1C= 7.2% on 9/5/23    LORENZ (dyspnea on exertion)     Stable per cards note    HLD (hyperlipidemia)     Hypertension, essential     Infiltrative cardiomyopathy (Multi)     Echo 8/22/23, following with Dr. Ignacio    OA (ocular albinism) (Multi)     multiple sites    Other malaise 04/21/2022    Physical deconditioning    Palpitations     on occasion, has afib followed by cardiology    Post laminectomy syndrome     Rectovaginal fistula     Spinal stenosis of cervical region     UTI (urinary tract infection)         Allergies   Allergen Reactions    Shellfish Containing Products Swelling        Past Surgical History:   Procedure Laterality Date    CARPAL TUNNEL RELEASE  08/27/2013    Neuroplasty Decompression Median Nerve At Carpal Tunnel    ILEOSTOMY      KNEE ARTHROPLASTY  08/27/2013     Knee Arthroplasty    LAMINECTOMY      LUMBAR FUSION      OTHER SURGICAL HISTORY  2023    LAPAROSCPIC ANTERIOR RESECTION, DIVERTING LOOP ILEOSTOMY    SPINAL CORD STIMULATOR IMPLANT      TOTAL HIP ARTHROPLASTY  2013    Total Hip Replacement        Family History   Problem Relation Name Age of Onset    No Known Problems Mother      No Known Problems Father      No Known Problems Sister      No Known Problems Sister      No Known Problems Sister      No Known Problems Sister          Social History     Socioeconomic History    Marital status:      Spouse name: Not on file    Number of children: Not on file    Years of education: Not on file    Highest education level: Not on file   Occupational History    Not on file   Tobacco Use    Smoking status: Former     Current packs/day: 0.00     Types: Cigarettes     Quit date:      Years since quittin.9    Smokeless tobacco: Never   Vaping Use    Vaping status: Never Used   Substance and Sexual Activity    Alcohol use: Yes     Comment: drinks beer or liquor a few times a year    Drug use: Never    Sexual activity: Defer   Other Topics Concern    Not on file   Social History Narrative    Not on file     Social Drivers of Health     Financial Resource Strain: Medium Risk (2024)    Overall Financial Resource Strain (CARDIA)     Difficulty of Paying Living Expenses: Somewhat hard   Food Insecurity: No Food Insecurity (2024)    Hunger Vital Sign     Worried About Running Out of Food in the Last Year: Never true     Ran Out of Food in the Last Year: Never true   Transportation Needs: No Transportation Needs (6/10/2024)    OASIS : Transportation     Lack of Transportation (Medical): No     Lack of Transportation (Non-Medical): No     Patient Unable or Declines to Respond: No   Physical Activity: Inactive (2024)    Exercise Vital Sign     Days of Exercise per Week: 0 days     Minutes of Exercise per Session: 0 min   Stress: No  Stress Concern Present (4/9/2024)    [employer] - Occupational Stress Questionnaire     Feeling of Stress : Not at all   Social Connections: Feeling Socially Integrated (6/10/2024)    OASIS : Social Isolation     Frequency of experiencing loneliness or isolation: Never   Recent Concern: Social Connections - Moderately Isolated (4/9/2024)    Social Connection and Isolation Panel [NHANES]     Frequency of Communication with Friends and Family: More than three times a week     Frequency of Social Gatherings with Friends and Family: More than three times a week     Attends Confucianist Services: 1 to 4 times per year     Active Member of Clubs or Organizations: No     Attends Club or Organization Meetings: Never     Marital Status:    Intimate Partner Violence: Not At Risk (4/9/2024)    Humiliation, Afraid, Rape, and Kick questionnaire     Fear of Current or Ex-Partner: No     Emotionally Abused: No     Physically Abused: No     Sexually Abused: No   Housing Stability: Low Risk  (4/9/2024)    Housing Stability Vital Sign     Unable to Pay for Housing in the Last Year: No     Number of Places Lived in the Last Year: 1     Unstable Housing in the Last Year: No        CURRENT MEDICATIONS:   Current Outpatient Medications   Medication Sig Dispense Refill    acetaminophen (Tylenol) 325 mg tablet Give 2 tablet by mouth every 4 hours as needed for Pain      amiodarone (Pacerone) 200 mg tablet Give 1 tablet by mouth in the morning for ANTIARRHYTHMIC 60 tablet 11    apixaban (Eliquis) 5 mg tablet Take 1 tablet (5 mg) by mouth 2 times a day. 180 tablet 3    atorvastatin (Lipitor) 40 mg tablet Take 1 tablet (40 mg) by mouth once daily at bedtime. 90 tablet 1    b complex 0.4 mg tablet Take 1 tablet by mouth once daily.      baclofen (Lioresal) 5 mg tablet Take 1 tablet (5 mg) by mouth 2 times a day. 60 tablet 1    biotin 1 mg tablet 1 tab(s) orally once a day      cephalexin (Keflex) 500 mg  capsule Take 1 capsule (500 mg) by mouth 2 times a day. 60 capsule 6    cholecalciferol (Vitamin D-3) 1,250 mcg (50,000 unit) capsule Take 50,000 Units by mouth once each week.      docusate sodium (Colace) 100 mg capsule Take 1 capsule (100 mg) by mouth 2 times a day as needed for constipation.      donepezil (Aricept) 5 mg tablet TAKE ONE TABLET BY MOUTH AT BEDTIME 90 tablet 0    DULoxetine (Cymbalta) 30 mg DR capsule TAKE 1 CAPSULE BY MOUTH 2 TIMES A  capsule 0    empagliflozin (Jardiance) 25 mg Take 1 tablet (25 mg) by mouth once daily. 90 tablet 3    fluorometholone (FML) 0.1 % ophthalmic suspension Administer 1 drop into both eyes 2 times a day for 28 days. 5 mL 0    furosemide (Lasix) 20 mg tablet Take 1 tablet (20 mg) by mouth once daily. 90 tablet 3    gabapentin (Neurontin) 600 mg tablet TAKE ONE TABLET BY MOUTH THREE TIMES A DAY 90 tablet 0    glipiZIDE XL (Glucotrol XL) 5 mg 24 hr tablet Take 1 tablet (5 mg) by mouth once daily. Always take with food in the morning (Patient not taking: Reported on 11/7/2024) 90 tablet 1    losartan (Cozaar) 25 mg tablet Take 1 tablet (25 mg) by mouth once daily. 30 tablet 11    melatonin 3 mg tablet Give 2 tablet by mouth as needed for insomnia administer at bedtime      naloxone (Narcan) 4 mg/0.1 mL nasal spray Administer 1 spray (4 mg) into affected nostril(s) if needed for opioid reversal. May repeat every 2-3 minutes if needed, alternating nostrils, until medical assistance becomes available. 2 each 0    ondansetron ODT (Zofran-ODT) 4 mg disintegrating tablet Take 1 tablet (4 mg) by mouth every 8 hours if needed for nausea or vomiting. 20 tablet 0    SITagliptin phosphate (Januvia) 50 mg tablet Take 1 tablet (50 mg) by mouth once daily. (Patient not taking: Reported on 11/7/2024) 90 tablet 1    spironolactone (Aldactone) 25 mg tablet Take 1 tablet (25 mg) by mouth once daily. 90 tablet 3    tafamidis (Vyndamax) 61 mg capsule Take 1 capsule (61 mg) by mouth  "once daily. 30 capsule 10    topiramate (Topamax) 100 mg tablet TAKE ONE TABLET BY MOUTH TWO TIMES A DAY.  NEED FOLLOW UP FOR NEXT REFILL. 60 tablet 0    traMADol (Ultram) 50 mg tablet As needed      traZODone (Desyrel) 50 mg tablet TAKE ONE TABLET BY MOUTH AT BEDTIME AS NEEDED for sleep 30 tablet 0    Xiidra 5 % dropperette Administer 1 drop into affected eye(s) 2 times a day for 28 days. 60 each 3     No current facility-administered medications for this visit.       Physical Examination:      8/19/2024     2:26 PM 9/4/2024     2:11 PM 9/13/2024    11:39 AM 9/23/2024    10:36 AM 10/4/2024    11:46 AM 10/11/2024    10:19 AM 11/7/2024     3:12 PM   Vitals   Systolic 125 95  104 97 126 100   Diastolic 85 69  58 65 84 58   BP Location  Right arm    Left arm    Heart Rate 66 71  74 81 64 66   Temp     36.3 °C (97.3 °F)     Resp  16        Height 1.473 m (4' 10\")  1.473 m (4' 10\") 1.473 m (4' 10\") 1.473 m (4' 10\") 1.473 m (4' 10\") 1.473 m (4' 10\")   Weight (lb) 130 -- 132.28 129 130  138   BMI 27.17 kg/m2  27.65 kg/m2 26.96 kg/m2 27.17 kg/m2 27.17 kg/m2 28.84 kg/m2   BSA (m2) 1.55 m2  1.57 m2 1.55 m2 1.55 m2 1.55 m2 1.6 m2   Visit Report Report Report  Report Report Report Report      There is no height or weight on file to calculate BMI.    Well-appearing, appears stated age, pleasant and cooperative, appropriate mood and behavior. Height and weight reviewed. Alert and oriented x3.  Auditory function intact.  No acute distress.  Intact ocular function, ARIC, EOMI. Breathing is unlabored .  There is no evidence of jugular venous distension. Skin appearance is normal without evidence of rash or other lesions. 2+ radial pulses bilaterally, fingers pink and wwp, good capillary refill, no pitting edema. No appreciable lymphadenopathy in bilateral upper extremities. SILT throughout both upper extremities, median/radial/ulnar/musculocutaneous/axillary nerve motor and sensory intact (except for abnormalities noted in focused " musculoskeletal exam section below).     On exam of bilateral upper extremities, very limited range of motion of the left shoulder.  Active forward flexion to 40, external rotation to neutral.  On the right she has active forward flexion to 100, external rotation to 30.    Imaging: Previous radiographs of the left shoulder show end-stage arthritis of the left shoulder    Assessment: Arthritis of the left shoulder    Plan: Patient with end-stage arthritis of the left shoulder.  She has responded well to cortisone injections previously and is not interested in surgery at this time.  She has multiple medical morbidities.  We performed a repeat injection today.  She will follow-up with us as needed going forward.    Injection was performed, please see separate procedure note, patient tolerated well.   Patient ID: Elida Benson is a 79 y.o. female.    L Inj/Asp: L subacromial bursa on 12/4/2024 1:27 PM  Indications: pain  Details: 22 G needle, posterior approach  Medications: 80 mg triamcinolone acetonide 40 mg/mL; 5 mL lidocaine 10 mg/mL (1 %)  Outcome: tolerated well, no immediate complications  Procedure, treatment alternatives, risks and benefits explained, specific risks discussed. Consent was given by the patient. Immediately prior to procedure a time out was called to verify the correct patient, procedure, equipment, support staff and site/side marked as required. Patient was prepped and draped in the usual sterile fashion.           Dragon software was used to dictate this note, please be aware that minor errors in transcription may be present.    Telly Chu MD    Shoulder/Elbow Surgery  [facility]

## 2024-12-09 ENCOUNTER — APPOINTMENT (OUTPATIENT)
Dept: PRIMARY CARE | Facility: CLINIC | Age: 79
End: 2024-12-09
Payer: MEDICARE

## 2024-12-09 ENCOUNTER — SPECIALTY PHARMACY (OUTPATIENT)
Dept: PHARMACY | Facility: CLINIC | Age: 79
End: 2024-12-09

## 2024-12-09 VITALS
BODY MASS INDEX: 29.39 KG/M2 | DIASTOLIC BLOOD PRESSURE: 86 MMHG | HEIGHT: 58 IN | WEIGHT: 140 LBS | HEART RATE: 76 BPM | SYSTOLIC BLOOD PRESSURE: 125 MMHG

## 2024-12-09 DIAGNOSIS — I42.5 OTHER RESTRICTIVE CARDIOMYOPATHY: ICD-10-CM

## 2024-12-09 DIAGNOSIS — E11.9 TYPE 2 DIABETES MELLITUS WITHOUT RETINOPATHY (MULTI): ICD-10-CM

## 2024-12-09 DIAGNOSIS — M46.35: ICD-10-CM

## 2024-12-09 DIAGNOSIS — I50.814 RIGHT-SIDED CONGESTIVE HEART FAILURE SECONDARY TO LEFT-SIDED CONGESTIVE HEART FAILURE: Primary | ICD-10-CM

## 2024-12-09 DIAGNOSIS — I50.33 ACUTE ON CHRONIC DIASTOLIC HEART FAILURE: ICD-10-CM

## 2024-12-09 DIAGNOSIS — Z79.4 TYPE 2 DIABETES MELLITUS WITHOUT COMPLICATION, WITH LONG-TERM CURRENT USE OF INSULIN (MULTI): ICD-10-CM

## 2024-12-09 DIAGNOSIS — E11.9 TYPE 2 DIABETES MELLITUS WITHOUT COMPLICATION, WITH LONG-TERM CURRENT USE OF INSULIN (MULTI): ICD-10-CM

## 2024-12-09 DIAGNOSIS — N18.32 STAGE 3B CHRONIC KIDNEY DISEASE (MULTI): ICD-10-CM

## 2024-12-09 DIAGNOSIS — M96.1 POSTLAMINECTOMY SYNDROME, LUMBAR: ICD-10-CM

## 2024-12-09 DIAGNOSIS — I42.8 INFILTRATIVE CARDIOMYOPATHY (MULTI): ICD-10-CM

## 2024-12-09 DIAGNOSIS — J34.89 SINUS DRAINAGE: ICD-10-CM

## 2024-12-09 DIAGNOSIS — I48.11 LONGSTANDING PERSISTENT ATRIAL FIBRILLATION (MULTI): ICD-10-CM

## 2024-12-09 DIAGNOSIS — M50.00 CERVICAL DISC DISORDER WITH MYELOPATHY: ICD-10-CM

## 2024-12-09 DIAGNOSIS — Z23 NEED FOR INFLUENZA VACCINATION: ICD-10-CM

## 2024-12-09 DIAGNOSIS — J44.9 COPD WITHOUT EXACERBATION (MULTI): ICD-10-CM

## 2024-12-09 DIAGNOSIS — E03.2 HYPOTHYROIDISM DUE TO MEDICATION: ICD-10-CM

## 2024-12-09 DIAGNOSIS — R53.1 WEAKNESS: ICD-10-CM

## 2024-12-09 DIAGNOSIS — I10 HYPERTENSION, ESSENTIAL: ICD-10-CM

## 2024-12-09 LAB
ALBUMIN SERPL BCP-MCNC: 4.1 G/DL (ref 3.4–5)
ALP SERPL-CCNC: 89 U/L (ref 33–136)
ALT SERPL W P-5'-P-CCNC: 24 U/L (ref 7–45)
ANION GAP SERPL CALC-SCNC: 15 MMOL/L (ref 10–20)
AST SERPL W P-5'-P-CCNC: 20 U/L (ref 9–39)
BILIRUB SERPL-MCNC: 0.3 MG/DL (ref 0–1.2)
BUN SERPL-MCNC: 33 MG/DL (ref 6–23)
CALCIUM SERPL-MCNC: 9.3 MG/DL (ref 8.6–10.6)
CHLORIDE SERPL-SCNC: 104 MMOL/L (ref 98–107)
CO2 SERPL-SCNC: 26 MMOL/L (ref 21–32)
CREAT SERPL-MCNC: 1.1 MG/DL (ref 0.5–1.05)
EGFRCR SERPLBLD CKD-EPI 2021: 52 ML/MIN/1.73M*2
GLUCOSE SERPL-MCNC: 116 MG/DL (ref 74–99)
POC HEMOGLOBIN A1C: 6.6 % (ref 4.2–6.5)
POTASSIUM SERPL-SCNC: 4.6 MMOL/L (ref 3.5–5.3)
PROT SERPL-MCNC: 6.9 G/DL (ref 6.4–8.2)
SODIUM SERPL-SCNC: 140 MMOL/L (ref 136–145)

## 2024-12-09 PROCEDURE — RXMED WILLOW AMBULATORY MEDICATION CHARGE

## 2024-12-09 PROCEDURE — 3074F SYST BP LT 130 MM HG: CPT | Performed by: FAMILY MEDICINE

## 2024-12-09 PROCEDURE — 83880 ASSAY OF NATRIURETIC PEPTIDE: CPT

## 2024-12-09 PROCEDURE — 1036F TOBACCO NON-USER: CPT | Performed by: FAMILY MEDICINE

## 2024-12-09 PROCEDURE — G2211 COMPLEX E/M VISIT ADD ON: HCPCS | Performed by: FAMILY MEDICINE

## 2024-12-09 PROCEDURE — 1159F MED LIST DOCD IN RCRD: CPT | Performed by: FAMILY MEDICINE

## 2024-12-09 PROCEDURE — 90656 IIV3 VACC NO PRSV 0.5 ML IM: CPT | Performed by: FAMILY MEDICINE

## 2024-12-09 PROCEDURE — 84443 ASSAY THYROID STIM HORMONE: CPT

## 2024-12-09 PROCEDURE — 83036 HEMOGLOBIN GLYCOSYLATED A1C: CPT | Performed by: FAMILY MEDICINE

## 2024-12-09 PROCEDURE — 3079F DIAST BP 80-89 MM HG: CPT | Performed by: FAMILY MEDICINE

## 2024-12-09 PROCEDURE — 99214 OFFICE O/P EST MOD 30 MIN: CPT | Performed by: FAMILY MEDICINE

## 2024-12-09 PROCEDURE — 80053 COMPREHEN METABOLIC PANEL: CPT

## 2024-12-09 PROCEDURE — G0008 ADMIN INFLUENZA VIRUS VAC: HCPCS | Performed by: FAMILY MEDICINE

## 2024-12-09 RX ORDER — FLUTICASONE PROPIONATE 50 MCG
2 SPRAY, SUSPENSION (ML) NASAL DAILY
Qty: 16 G | Refills: 3 | Status: SHIPPED | OUTPATIENT
Start: 2024-12-09 | End: 2025-01-08

## 2024-12-09 ASSESSMENT — ENCOUNTER SYMPTOMS
FREQUENCY: 1
MUSCULOSKELETAL NEGATIVE: 1
DEPRESSION: 0
FATIGUE: 1
NUMBNESS: 1
OCCASIONAL FEELINGS OF UNSTEADINESS: 1
WEAKNESS: 1
LOSS OF SENSATION IN FEET: 0
COUGH: 1
CARDIOVASCULAR NEGATIVE: 1
GASTROINTESTINAL NEGATIVE: 1
RHINORRHEA: 1

## 2024-12-09 NOTE — PROGRESS NOTES
Pt comes in for fu. Pt states she has been having the shakes a lot and a cough that is lingering.

## 2024-12-09 NOTE — PROGRESS NOTES
"Subjective   Patient ID: Elida Benson is a 79 y.o. female who presents for No chief complaint on file..    HPI pt w sinus draiange and coug, pt w hx of chf due for recheck and having sig cough when supine, severe cervicle and radicular myelopathy , afibb, copd, depression , chronic pain , ckd 3b, dm htn    Review of Systems   Constitutional:  Positive for fatigue.   HENT:  Positive for postnasal drip and rhinorrhea.    Respiratory:  Positive for cough.         Worse when supine   Cardiovascular: Negative.    Gastrointestinal: Negative.    Genitourinary:  Positive for frequency and urgency.   Musculoskeletal: Negative.         Severe spinal dis cervicle and lumbar as well as hips and knees   Neurological:  Positive for weakness and numbness.        Chronic radicular pian lumbar spine , unable to reliably utilize her spinal implant stiumcolator would like to see her pain mgmt spc       Objective   /86   Pulse 76   Ht 1.473 m (4' 10\")   Wt 63.5 kg (140 lb)   BMI 29.26 kg/m²     Physical Exam  Vitals reviewed.   Constitutional:       Appearance: Normal appearance. She is normal weight.   HENT:      Nose: Congestion and rhinorrhea present.      Mouth/Throat:      Mouth: Mucous membranes are moist.      Pharynx: Oropharynx is clear.   Eyes:      Extraocular Movements: Extraocular movements intact.      Conjunctiva/sclera: Conjunctivae normal.      Pupils: Pupils are equal, round, and reactive to light.   Cardiovascular:      Rate and Rhythm: Normal rate. Rhythm irregular.      Pulses: Normal pulses.      Heart sounds: Normal heart sounds.   Pulmonary:      Effort: Pulmonary effort is normal.      Breath sounds: Normal breath sounds.      Comments: Baisalar rales  Abdominal:      General: Bowel sounds are normal.      Palpations: Abdomen is soft.   Musculoskeletal:         General: Normal range of motion.      Cervical back: Rigidity present.      Comments: Severe limited rom due to longstanding cervicle and " lumbar stenosis and comp   Skin:     General: Skin is warm and dry.   Neurological:      General: No focal deficit present.      Mental Status: She is alert and oriented to person, place, and time. Mental status is at baseline.      Motor: Weakness present.      Coordination: Coordination abnormal.      Gait: Gait abnormal.      Deep Tendon Reflexes: Reflexes abnormal.      Comments: In wheelchair due to highly unstable gait         Assessment/Plan   Problem List Items Addressed This Visit             ICD-10-CM    Acute on chronic diastolic heart failure I50.33    Atrial fibrillation (Multi) I48.91    Cervical disc disorder with myelopathy M50.00    COPD without exacerbation (Multi) J44.9    Hypertension, essential I10    Relevant Orders    Comprehensive Metabolic Panel    Infection of intervertebral disc (pyogenic), thoracolumbar region M46.35    Infiltrative cardiomyopathy (Multi) I42.8    Postlaminectomy syndrome, lumbar M96.1    Stage 3b chronic kidney disease (Multi) N18.32    Type 2 diabetes mellitus without retinopathy (Multi) E11.9    Weakness R53.1     Other Visit Diagnoses         Codes    Right-sided congestive heart failure secondary to left-sided congestive heart failure    -  Primary I50.814    Relevant Orders    B-type natriuretic peptide    Type 2 diabetes mellitus without complication, with long-term current use of insulin (Multi)     E11.9, Z79.4    Relevant Orders    POCT glycosylated hemoglobin (Hb A1C) manually resulted (Completed)    Need for influenza vaccination     Z23    Relevant Orders    Flu vaccine, trivalent, preservative free, age 6 months and greater (Fluraix/Fluzone/Flulaval) (Completed)    Hypothyroidism due to medication     E03.2    Relevant Orders    TSH with reflex to Free T4 if abnormal    Sinus drainage     J34.89    Relevant Medications    fluticasone (Flonase) 50 mcg/actuation nasal spray    Other restrictive cardiomyopathy     I42.5    Relevant Orders    B-type natriuretic  peptide        Dm well cont today will cont on current med  Htn well cont on exam today   Acute on chronic chf issues, due to cough that is positional will start flonase but check bnp to determie if progressing w fluid retention due to wt gained  Severe cervicle and lumbar dis w stable infected hardware pt seeing pain management, meche and id, will see pain mgmt about fact that sshe cannot self use her spinal cord stimulator impalant

## 2024-12-10 ENCOUNTER — PHARMACY VISIT (OUTPATIENT)
Dept: PHARMACY | Facility: CLINIC | Age: 79
End: 2024-12-10
Payer: MEDICARE

## 2024-12-10 ENCOUNTER — TELEPHONE (OUTPATIENT)
Dept: PRIMARY CARE | Facility: CLINIC | Age: 79
End: 2024-12-10
Payer: MEDICARE

## 2024-12-10 LAB
BNP SERPL-MCNC: 144 PG/ML (ref 0–99)
TSH SERPL-ACNC: 2.01 MIU/L (ref 0.44–3.98)

## 2024-12-10 NOTE — TELEPHONE ENCOUNTER
----- Message from Chente CURRY sent at 12/10/2024  8:00 AM EST -----      ----- Message -----  From: David Swift MD  Sent: 12/10/2024   7:41 AM EST  To: Jay Jay Bridges; Chente Her MA    All results are stable  no change

## 2024-12-11 ENCOUNTER — APPOINTMENT (OUTPATIENT)
Dept: PHARMACY | Facility: HOSPITAL | Age: 79
End: 2024-12-11
Payer: MEDICARE

## 2024-12-16 ENCOUNTER — TELEPHONE (OUTPATIENT)
Dept: PRIMARY CARE | Facility: CLINIC | Age: 79
End: 2024-12-16
Payer: MEDICARE

## 2024-12-16 DIAGNOSIS — R05.2 SUBACUTE COUGH: Primary | ICD-10-CM

## 2024-12-16 RX ORDER — BENZONATATE 100 MG/1
100 CAPSULE ORAL 3 TIMES DAILY PRN
Qty: 42 CAPSULE | Refills: 0 | Status: SHIPPED | OUTPATIENT
Start: 2024-12-16 | End: 2025-01-15

## 2024-12-19 DIAGNOSIS — G89.4 CHRONIC PAIN SYNDROME: ICD-10-CM

## 2024-12-19 DIAGNOSIS — I49.9 CARDIAC ARRHYTHMIA, UNSPECIFIED CARDIAC ARRHYTHMIA TYPE: ICD-10-CM

## 2024-12-19 RX ORDER — AMIODARONE HYDROCHLORIDE 200 MG/1
TABLET ORAL
Qty: 60 TABLET | Refills: 0 | Status: SHIPPED | OUTPATIENT
Start: 2024-12-19

## 2024-12-20 RX ORDER — GABAPENTIN 600 MG/1
600 TABLET ORAL 3 TIMES DAILY
Qty: 90 TABLET | Refills: 0 | Status: SHIPPED | OUTPATIENT
Start: 2024-12-20

## 2024-12-20 RX ORDER — DULOXETIN HYDROCHLORIDE 30 MG/1
30 CAPSULE, DELAYED RELEASE ORAL 2 TIMES DAILY
Qty: 180 CAPSULE | Refills: 0 | Status: SHIPPED | OUTPATIENT
Start: 2024-12-20

## 2024-12-24 ENCOUNTER — APPOINTMENT (OUTPATIENT)
Dept: OPHTHALMOLOGY | Facility: CLINIC | Age: 79
End: 2024-12-24
Payer: COMMERCIAL

## 2024-12-24 DIAGNOSIS — E11.9 TYPE 2 DIABETES MELLITUS WITHOUT RETINOPATHY (MULTI): ICD-10-CM

## 2024-12-24 DIAGNOSIS — H25.812 COMBINED FORMS OF AGE-RELATED CATARACT OF LEFT EYE: ICD-10-CM

## 2024-12-24 DIAGNOSIS — H04.123 DRY EYE SYNDROME OF BILATERAL LACRIMAL GLANDS: ICD-10-CM

## 2024-12-24 DIAGNOSIS — H25.811 COMBINED FORMS OF AGE-RELATED CATARACT OF RIGHT EYE: Primary | ICD-10-CM

## 2024-12-24 RX ORDER — CYCLOPENTOLATE HYDROCHLORIDE 10 MG/ML
1 SOLUTION/ DROPS OPHTHALMIC
OUTPATIENT
Start: 2024-12-24 | End: 2024-12-24

## 2024-12-24 RX ORDER — PHENYLEPHRINE HYDROCHLORIDE 25 MG/ML
1 SOLUTION/ DROPS OPHTHALMIC
OUTPATIENT
Start: 2024-12-24 | End: 2024-12-24

## 2024-12-24 RX ORDER — TETRACAINE HYDROCHLORIDE 5 MG/ML
1 SOLUTION OPHTHALMIC ONCE
OUTPATIENT
Start: 2024-12-24 | End: 2024-12-24

## 2024-12-24 RX ORDER — MOXIFLOXACIN 5 MG/ML
1 SOLUTION/ DROPS OPHTHALMIC
OUTPATIENT
Start: 2024-12-24 | End: 2024-12-24

## 2024-12-24 RX ORDER — TROPICAMIDE 10 MG/ML
1 SOLUTION/ DROPS OPHTHALMIC
OUTPATIENT
Start: 2024-12-24 | End: 2024-12-24

## 2024-12-24 ASSESSMENT — REFRACTION_MANIFEST
OS_SPHERE: PLANO
OD_AXIS: 115
OD_ADD: +2.50
OS_ADD: +2.50
OD_CYLINDER: -2.75
OD_SPHERE: PLANO
OS_CYLINDER: -1.00
OS_AXIS: 095

## 2024-12-24 ASSESSMENT — TONOMETRY
IOP_METHOD: GOLDMANN APPLANATION
OS_IOP_MMHG: 15
OD_IOP_MMHG: 15

## 2024-12-24 ASSESSMENT — SLIT LAMP EXAM - LIDS
COMMENTS: GOOD POSITION, MILD MGD
COMMENTS: GOOD POSITION, MILD MGD

## 2024-12-24 ASSESSMENT — VISUAL ACUITY
OD_BAT_MED: 20/200
METHOD: SNELLEN - LINEAR
OS_BAT_MED: 20/200

## 2024-12-24 ASSESSMENT — REFRACTION_WEARINGRX
OS_AXIS: 095
OS_ADD: +2.50
OS_SPHERE: PLANO
OS_CYLINDER: -1.00
OD_ADD: +2.50
OD_SPHERE: PLANO
OD_CYLINDER: -2.75
OD_AXIS: 115

## 2024-12-24 ASSESSMENT — EXTERNAL EXAM - LEFT EYE: OS_EXAM: NORMAL

## 2024-12-24 ASSESSMENT — ENCOUNTER SYMPTOMS: EYES NEGATIVE: 1

## 2024-12-24 ASSESSMENT — CUP TO DISC RATIO
OD_RATIO: .4
OS_RATIO: .4

## 2024-12-24 ASSESSMENT — EXTERNAL EXAM - RIGHT EYE: OD_EXAM: NORMAL

## 2024-12-24 NOTE — PROGRESS NOTES
Assessment/Plan   Diagnoses and all orders for this visit:  Combined form of age-related cataract, right eye  Combined forms of age-related cataract of right eyeH25.811  Visually significant. Pt would like to proceed with surgery.    Visually significant cataract OD. BCVA: 20/40. Glare: 20/200. Symptoms: blurry vision, glare. A change in glasses prescription will not result in significant visual improvement at this time.  Indication for cataract surgery: Input To potentially improve visual acuity and improve quality of life/reduce symptoms.   Based on a comprehensive eye exam performed today, a visually significant cataract appears to be the source of decreased vision, diminished quality of life, and impairment of activities of daily living. Discussed option of cataract surgery vs observation. Patient can no longer function adequately with current best corrected visual acuity and wishes to have cataract surgery at this time. Discussed surgical procedure with patient. As a result of cataract extraction, it is believed that the patient will experience improved vision. Discussed potential risks, benefits, and complications of cataract surgery including but not limited to pain, bleeding, infection, inflammation, edema, increased eye pressure, retinal tear/detachment, lens dislocation, ptosis, iris damage, need for additional surgery, need for glasses after surgery, loss of vision/loss of eye. Patient understands and wishes to proceed. All questions were answered. Will schedule cataract surgery OD. Lenstar done today.  Discussed IOL options (standard monofocal, monofocal with monovision, toric, multifocal). Lens chosen: standard monofocal. Defer/decline toric/multifocal lens at this time. Had thorough discussion with patient re: aim. Discussed that may potentially need glasses for best vision both at distance and at near.    Schedule cataract surgery OD  I personally reviewed the lenstar measurements and will choose the  lens accordingly.        Combined form of age-related cataract, left eye  Combined form of age-related cataract, left eyeH25.812  Visually significant. Pt would like to proceed with surgery.    Visually significant cataract OS. BCVA: 20/30. Glare: 20/200. Symptoms: blurry vision, glare. A change in glasses prescription will not result in significant visual improvement at this time.  Indication for cataract surgery: Input To potentially improve visual acuity and improve quality of life/reduce symptoms.   Based on a comprehensive eye exam performed today, a visually significant cataract appears to be the source of decreased vision, diminished quality of life, and impairment of activities of daily living. Discussed option of cataract surgery vs observation. Patient can no longer function adequately with current best corrected visual acuity and wishes to have cataract surgery at this time. Discussed surgical procedure with patient. As a result of cataract extraction, it is believed that the patient will experience improved vision. Discussed potential risks, benefits, and complications of cataract surgery including but not limited to pain, bleeding, infection, inflammation, edema, increased eye pressure, retinal tear/detachment, lens dislocation, ptosis, iris damage, need for additional surgery, need for glasses after surgery, loss of vision/loss of eye. Patient understands and wishes to proceed. All questions were answered. Will schedule cataract surgery OS. Lenstar done today.   Discussed IOL options (standard monofocal, monofocal with monovision, toric, multifocal). Lens chosen: standard monofocal. Defer/decline toric/multifocal lens at this time. Had thorough discussion with patient re: aim. Discussed that may potentially need glasses for best vision both at distance and at near.     Schedule cataract surgery OS  I personally reviewed the lenstar measurements and will choose the lens accordingly.     Regular  astigmatism of both eyes with presbyopia  Dry eye syndrome of bilateral lacrimal glands  Continue management per Dr. Chase    Type 2 diabetes mellitus without retinopathy (Multi)  Last A1C 6.6 12/2024   No diabetic retinopathy (DR) noted on exam today

## 2024-12-27 ENCOUNTER — APPOINTMENT (OUTPATIENT)
Dept: PHARMACY | Facility: HOSPITAL | Age: 79
End: 2024-12-27
Payer: MEDICARE

## 2024-12-27 DIAGNOSIS — I48.0 PAROXYSMAL ATRIAL FIBRILLATION (MULTI): ICD-10-CM

## 2024-12-27 DIAGNOSIS — I50.33 ACUTE ON CHRONIC DIASTOLIC HEART FAILURE: ICD-10-CM

## 2024-12-27 NOTE — PROGRESS NOTES
Pharmacist Clinic: Cardiology Management    Elida Benson is a 79 y.o. female was referred to Clinical Pharmacy Team for anticoagulation and heart failure management.     Referring Provider: Cesar Ignacio MD    THIS IS A FOLLOW UP PATIENT APPOINTMENT. AT LAST VISIT ON 11/06/2024 WITH PHARMACIST (Ledy Couch).    REVIEW OF LAST APPT  Today's initial visit was conducted with Elida Art's daughter. Patient medications and allergies were reviewed and updated. Kaelyn states she is Elida's caretaker and manages her medications.  Kaelyn states Elida is doing well on anticoagulation therapy. Patient reports adherence, no adverse effects, and denies s/s of bleeding.   Kaelyn reports Elida is tolerating her heart failure regimen well. Kaelyn states Elida will complain of dizziness, she is currently following with a neurologist for this matter. Kaelyn reports she does not take her weight or blood pressure often, maybe once per month. Kaelyn denies s/s of worsening heart failure.  Patient screened for  PAP, she states Jardiance and Eliquis cost about ~$1100 . Patient has received a free 30 day supply of both medications. Kaelyn reports having about 2 weeks left of each medication.  Patient was enrolled in  PAP    Appointment was completed by Kaelyn (dtr) who was reached at primary number.    Allergies Reviewed? No    Allergies   Allergen Reactions    Shellfish Containing Products Swelling       Past Medical History:   Diagnosis Date    A-fib (Multi)     Managed on meds, Eliquis stoppage and cardiac clearance in [facility] from Dr. Ignacio    Cervical myelopathy     Chronic pain syndrome     COPD (chronic obstructive pulmonary disease) (Multi)     denies, she is a former smoker but quit in 1984    Dementia     Depression     Diabetic neuropathy (Multi)     Diverticulitis     DM (diabetes mellitus) (Multi)     denies but A1C= 7.2% on 9/5/23    LORENZ (dyspnea on exertion)     Stable per cards note    HLD  (hyperlipidemia)     Hypertension, essential     Infiltrative cardiomyopathy (Multi)     Echo 8/22/23, following with Dr. Ignacio    OA (ocular albinism) (Multi)     multiple sites    Other malaise 04/21/2022    Physical deconditioning    Palpitations     on occasion, has afib followed by cardiology    Post laminectomy syndrome     Rectovaginal fistula     Spinal stenosis of cervical region     UTI (urinary tract infection)        Current Outpatient Medications on File Prior to Visit   Medication Sig Dispense Refill    acetaminophen (Tylenol) 325 mg tablet Give 2 tablet by mouth every 4 hours as needed for Pain      amiodarone (Pacerone) 200 mg tablet TAKE ONE TABLET BY MOUTH IN THE MORNING FOR ANTIARRHYTHMIC 60 tablet 0    apixaban (Eliquis) 5 mg tablet Take 1 tablet (5 mg) by mouth 2 times a day. 180 tablet 3    atorvastatin (Lipitor) 40 mg tablet Take 1 tablet (40 mg) by mouth once daily at bedtime. 90 tablet 1    b complex 0.4 mg tablet Take 1 tablet by mouth once daily.      baclofen (Lioresal) 5 mg tablet Take 1 tablet (5 mg) by mouth 2 times a day. 60 tablet 1    benzonatate (Tessalon) 100 mg capsule Take 1 capsule (100 mg) by mouth 3 times a day as needed for cough. Do not crush or chew. 42 capsule 0    biotin 1 mg tablet 1 tab(s) orally once a day      cephalexin (Keflex) 500 mg capsule Take 1 capsule (500 mg) by mouth 2 times a day. 60 capsule 6    cholecalciferol (Vitamin D-3) 1,250 mcg (50,000 unit) capsule Take 50,000 Units by mouth once each week.      docusate sodium (Colace) 100 mg capsule Take 1 capsule (100 mg) by mouth 2 times a day as needed for constipation.      donepezil (Aricept) 5 mg tablet TAKE ONE TABLET BY MOUTH AT BEDTIME 90 tablet 0    DULoxetine (Cymbalta) 30 mg DR capsule TAKE ONE CAPSULE BY MOUTH TWO TIMES A  capsule 0    empagliflozin (Jardiance) 25 mg Take 1 tablet (25 mg) by mouth once daily. 90 tablet 3    fluticasone (Flonase) 50 mcg/actuation nasal spray Administer 2  sprays into each nostril once daily. Shake gently. Before first use, prime pump. After use, clean tip and replace cap. 16 g 3    furosemide (Lasix) 20 mg tablet Take 1 tablet (20 mg) by mouth once daily. 90 tablet 3    gabapentin (Neurontin) 600 mg tablet TAKE ONE TABLET BY MOUTH THREE TIMES A DAY 90 tablet 0    glipiZIDE XL (Glucotrol XL) 5 mg 24 hr tablet Take 1 tablet (5 mg) by mouth once daily. Always take with food in the morning 90 tablet 1    losartan (Cozaar) 25 mg tablet Take 1 tablet (25 mg) by mouth once daily. 30 tablet 11    melatonin 3 mg tablet Give 2 tablet by mouth as needed for insomnia administer at bedtime      naloxone (Narcan) 4 mg/0.1 mL nasal spray Administer 1 spray (4 mg) into affected nostril(s) if needed for opioid reversal. May repeat every 2-3 minutes if needed, alternating nostrils, until medical assistance becomes available. 2 each 0    ondansetron ODT (Zofran-ODT) 4 mg disintegrating tablet Take 1 tablet (4 mg) by mouth every 8 hours if needed for nausea or vomiting. 20 tablet 0    SITagliptin phosphate (Januvia) 50 mg tablet Take 1 tablet (50 mg) by mouth once daily. 90 tablet 1    spironolactone (Aldactone) 25 mg tablet Take 1 tablet (25 mg) by mouth once daily. 90 tablet 3    tafamidis (Vyndamax) 61 mg capsule Take 1 capsule (61 mg) by mouth once daily. 30 capsule 10    topiramate (Topamax) 100 mg tablet TAKE ONE TABLET BY MOUTH TWO TIMES A DAY.  NEED FOLLOW UP FOR NEXT REFILL. 60 tablet 0    traZODone (Desyrel) 50 mg tablet TAKE ONE TABLET BY MOUTH AT BEDTIME AS NEEDED for sleep 30 tablet 0     No current facility-administered medications on file prior to visit.         RELEVANT LAB RESULTS:  Lab Results   Component Value Date    BILITOT 0.3 12/09/2024    CALCIUM 9.3 12/09/2024    CO2 26 12/09/2024     12/09/2024    CREATININE 1.10 (H) 12/09/2024    GLUCOSE 116 (H) 12/09/2024    ALKPHOS 89 12/09/2024    K 4.6 12/09/2024    PROT 6.9 12/09/2024     12/09/2024    AST 20  "12/09/2024    ALT 24 12/09/2024    BUN 33 (H) 12/09/2024    ANIONGAP 15 12/09/2024    MG 2.20 04/17/2024    PHOS 4.5 05/13/2024    ALBUMIN 4.1 12/09/2024    LIPASE 30 09/03/2023    GFRF CANCELED 09/07/2023    GFRMALE CANCELED 09/07/2023     Lab Results   Component Value Date    TRIG 102 05/26/2023    CHOL 182 05/26/2023    HDL 77.2 05/26/2023     No results found for: \"BMCBC\", \"CBCDIF\"     PHARMACEUTICAL ASSESSMENT:    MEDICATION RECONCILIATION    Drug Interactions? Yes, describe: Amiodarone, Donepezil- increased risk of prolonging Qtc, continue to monitor    Medication Documentation Review Audit       Reviewed by Elvira Abebe (Technician) on 12/24/24 at 1423      Medication Order Taking? Sig Documenting Provider Last Dose Status   acetaminophen (Tylenol) 325 mg tablet [id number] No Give 2 tablet by mouth every 4 hours as needed for Pain Historical Provider, MD Taking Active   Discontinued 12/19/24 1703   amiodarone (Pacerone) 200 mg tablet [id number]  TAKE ONE TABLET BY MOUTH IN THE MORNING FOR ANTIARRHYTHMIC Cesar ORLANDO MD  Active   apixaban (Eliquis) 5 mg tablet [id number]  Take 1 tablet (5 mg) by mouth 2 times a day. Cesar ORLANDO MD  Active   atorvastatin (Lipitor) 40 mg tablet [id number] No Take 1 tablet (40 mg) by mouth once daily at bedtime. David Swift MD Taking Active   b complex 0.4 mg tablet [id number] No Take 1 tablet by mouth once daily. Historical Provider, MD Taking Active   baclofen (Lioresal) 5 mg tablet [id number]  Take 1 tablet (5 mg) by mouth 2 times a day. Jf Perdue MD  Active   benzonatate (Tessalon) 100 mg capsule [id number]  Take 1 capsule (100 mg) by mouth 3 times a day as needed for cough. Do not crush or chew. David Swift MD  Active   biotin 1 mg tablet [id number] No 1 tab(s) orally once a day Historical Provider, MD Taking Active   cephalexin (Keflex) 500 mg capsule [id number]  Take 1 capsule (500 mg) by mouth 2 times a day. Sebastian Lo MD  Active   cholecalciferol " (Vitamin D-3) 1,250 mcg (50,000 unit) capsule [id number] No Take 50,000 Units by mouth once each week. Historical Provider, MD Taking Active   docusate sodium (Colace) 100 mg capsule [id number] No Take 1 capsule (100 mg) by mouth 2 times a day as needed for constipation. Historical Provider, MD Taking Active   donepezil (Aricept) 5 mg tablet [id number]  TAKE ONE TABLET BY MOUTH AT BEDTIME David Swift MD  Active   Discontinued 12/20/24 0756   DULoxetine (Cymbalta) 30 mg DR capsule [id number]  TAKE ONE CAPSULE BY MOUTH TWO TIMES A DAY David Swift MD  Active   empagliflozin (Jardiance) 25 mg [id number]  Take 1 tablet (25 mg) by mouth once daily. Cesar ORLANDO MD  Active   fluticasone (Flonase) 50 mcg/actuation nasal spray [id number]  Administer 2 sprays into each nostril once daily. Shake gently. Before first use, prime pump. After use, clean tip and replace cap. David Swift MD  Active   furosemide (Lasix) 20 mg tablet [id number] No Take 1 tablet (20 mg) by mouth once daily. Lori Dueñas MD PhD Taking Active     Discontinued 12/20/24 0756   gabapentin (Neurontin) 600 mg tablet [id number]  TAKE ONE TABLET BY MOUTH THREE TIMES A DAY David Swift MD  Active   glipiZIDE XL (Glucotrol XL) 5 mg 24 hr tablet [id number] No Take 1 tablet (5 mg) by mouth once daily. Always take with food in the morning David Swift MD Taking Active   losartan (Cozaar) 25 mg tablet [id number]  Take 1 tablet (25 mg) by mouth once daily. Lori Dueñas MD PhD  Active   melatonin 3 mg tablet [id number] No Give 2 tablet by mouth as needed for insomnia administer at bedtime Historical Provider, MD Taking Active   naloxone (Narcan) 4 mg/0.1 mL nasal spray [id number]  Administer 1 spray (4 mg) into affected nostril(s) if needed for opioid reversal. May repeat every 2-3 minutes if needed, alternating nostrils, until medical assistance becomes available. Jf Perdue MD  Active   ondansetron ODT (Zofran-ODT) 4 mg disintegrating  tablet [id number] No Take 1 tablet (4 mg) by mouth every 8 hours if needed for nausea or vomiting. Terrell Dixon PA-C Taking Active   SITagliptin phosphate (Januvia) 50 mg tablet [id number] No Take 1 tablet (50 mg) by mouth once daily. David Swift MD Taking Active   spironolactone (Aldactone) 25 mg tablet [id number] No Take 1 tablet (25 mg) by mouth once daily. Lori Dueñas MD PhD Taking Active   tafamidis (Vyndamax) 61 mg capsule [id number]  Take 1 capsule (61 mg) by mouth once daily. Lori Dueñas MD PhD  Active   topiramate (Topamax) 100 mg tablet [id number]  TAKE ONE TABLET BY MOUTH TWO TIMES A DAY.  NEED FOLLOW UP FOR NEXT REFILL. Kaykay Palafox MD  Active   traZODone (Desyrel) 50 mg tablet [id number]  TAKE ONE TABLET BY MOUTH AT BEDTIME AS NEEDED for sleep David Swift MD  Active                    DISEASE MANAGEMENT ASSESSMENT:     ANTICOAGULATION ASSESSMENT    The ASCVD Risk score (Carlo WEBB, et al., 2019) failed to calculate for the following reasons:    Risk score cannot be calculated because patient has a medical history suggesting prior/existing ASCVD    DIAGNOSIS: prevention of nonvalvular atrial fibrilliation stroke and systemic embolism  - Patient is projected to be on anticoagulation long term  - [id number] Score: [7] (only included if diagnosis is atrial fibrillation)   Age: [<65 (0)] [65-74 (+1)] [> 75 (+2)]: 2  Sex: [Male/Female (+1)]: 1  CHF history: [No/Yes(+1)]: 1  Hypertension history: [No/Yes(+1)]: 1  Stroke/TIA/thromboembolism history: [No/Yes(+2)]: 0  Vascular disease history (prior MI, peripheral artery disease, aortic plaque): [No/Yes(+1)]: 1  Diabetes history: [No/Yes(+1)]: 1    CURRENT PHARMACOTHERAPY:    Eliquis 5mg twice daily  78yo  63.5kg  Scr 1.10 (12/9/2024)    RELEVANT PAST MEDICAL HISTORY:   Afib, HTN, NSTEMI, T2DM    Affordability/Accessibility:  PAP  Adherence/Organization: reports adherence, patient daughter uses pillbox  Adverse Reactions: none  reported  Recent Hospitalizations: none  Recent Falls/Trauma: none reported   Changes in Tobacco or Alcohol Intake:   Tobacco: does not use  Alcohol: socially     EDUCATION/COUNSELING:   - Counseled patient on MOA, expectations, duration of therapy, contraindications, administration, and monitoring parameters  - Counseled patient of side effects that are indicative of bleeding such as dark tarry stool, unexplainable bruising, or vomiting up a coffee ground like substance     CHF ASSESSMENT     Symptom/Staging:  -Most recent ejection fraction: 62%  -ACC/AHA Stage B    Results for orders placed in visit on 06/29/18    Echocardiogram    Narrative  [facility], [address] [place] [address]  Tel [phone] and Fax [phone]    TRANSTHORACIC ECHOCARDIOGRAM REPORT      Patient Name:     TAMARA Nicole Physician:  [id number] Sherlyn Vu MD  Study Date:       7/18/2018       Referring           Irvin Robb  Physician:  MRN/PID:          [id number]        PCP:  Accession/Order#: [id number]       Department          [place] [address]  Location:  YOB: 1945       Fellow:             Sebastian Pena MD  Gender:           F               Nurse:  Admit Date:       7/10/2018       Sonographer:        nAna Zacarias  [facility]  Admission Status: Inpatient -     Additional Staff:  STAT  Height:           147.32 cm       CC Report to:       [address]  Weight:           68.04 kg        Study Type:         Echocardiogram  BSA:              1.61 m2  Blood Pressure: 130 /78 mmHg    Diagnosis/ICD: R07.89 Other chest pain  Indication:    Chest Pain  Procedure/CPT: Echo Complete w/Full Doppler (08352)    Patient History:  Pertinent History: DM, fatigue, Htn.    Study Detail: The following Echo studies were performed: 2D, M-Mode, Doppler and  color flow. Technically challenging study due to prominent lung  artifact.      PHYSICIAN INTERPRETATION:  Left Ventricle: The left  ventricular systolic function is hyperdynamic, with an estimated ejection fraction of 65-70%. The left ventricular cavity size is normal. There is mild concentric left ventricular hypertrophy. Spectral Doppler shows an impaired relaxation pattern of left ventricular diastolic filling. Hyperdynamic LV function with color acceleration beginning midcavity with peak dynamic gradient at rest of 18mmHg which increased to 22mmHg with valsalva.  Left Atrium: The left atrium is mildly dilated.  Right Ventricle: The right ventricle is normal in size. There is normal right ventricular global systolic function.  Right Atrium: The right atrium is normal in size. There is a possible device noted in the right atrium.  Aortic Valve: The aortic valve is trileaflet. There is no evidence of aortic valve regurgitation. The peak instantaneous gradient of the aortic valve is 18.1 mmHg.  Mitral Valve: The mitral valve is mildly thickened. There is trace mitral valve regurgitation.  Tricuspid Valve: The tricuspid valve is structurally normal. There is trace tricuspid regurgitation. The doppler estimated RVSP is mildly elevated at 41.2 mmHg.  Pulmonic Valve: The pulmonic valve is not well visualized. The pulmonic valve regurgitation was not well visualized.  Pericardium: There is a trivial pericardial effusion.  Aorta: The aortic root is normal.  Systemic Veins: The inferior vena cava appears to be of normal size. There is IVC inspiratory collapse greater than 50%.  In comparison to the previous echocardiogram(s): There are no prior studies on this patient for comparison purposes.      CONCLUSIONS:  1. The left ventricular systolic function is hyperdynamic with a 65-70% estimated ejection fraction.  2. Hyperdynamic LV function with color acceleration beginning midcavity with peak dynamic gradient at rest of 18mmHg which increased to 22mmHg with valsalva.  3. Spectral Doppler shows an impaired relaxation pattern of left ventricular diastolic  filling.  4. Mildly elevated RVSP.  5. No sinigifcant valvular pahtology.    QUANTITATIVE DATA SUMMARY:  2D MEASUREMENTS:  Normal Ranges:  IVSd:          1.40 cm   (0.6-1.1cm)  LVPWd:         1.28 cm   (0.6-1.1cm)  LVIDd:         3.33 cm   (3.9-5.9cm)  LVIDs:         2.22 cm  LV Mass Index: 93.6 g/m2  LV % FS        33.3 %    LA VOLUME:  Normal Ranges:  LA Vol A4C:        49.4 ml    (22+/-6mL/m2)  LA Vol A2C:        60.1 ml  LA Vol BP:         57.0 ml  LA Vol Index A4C:  30.6ml/m2  LA Vol Index A2C:  37.3 ml/m2  LA Vol Index BP:   35.4 ml/m2  LA Area A4C:       18.0 cm2  LA Area A2C:       20.8 cm2  LA Major Axis A4C: 5.6 cm  LA Major Axis A2C: 6.1 cm  LA Volume Index:   35.2 ml/m2    RA VOLUME BY A/L METHOD:  Normal Ranges:  RA Area A4C: 9.9 cm2    M-MODE MEASUREMENTS:  Normal Ranges:  Ao Root: 2.80 cm (2.0-3.7cm)  LAs:     3.41 cm (2.7-4.0cm)    AORTA MEASUREMENTS:  Normal Ranges:  Asc Ao, d: 2.34 cm (2.1-3.4cm)    LV SYSTOLIC FUNCTION BY 2D PLANIMETRY (MOD):  Normal Ranges:  EF-A4C View: 74.5 % (>55%)  EF-A2C View: 75.8 %  EF-Biplane:  76.0 %    LV DIASTOLIC FUNCTION:  Normal Ranges:  MV Peak E:        1.04 m/s    (0.7-1.2 m/s)  MV Peak A:        1.09 m/s    (0.42-0.7 m/s)  E/A Ratio:        0.95        (1.0-2.2)  MV e'             0.05 m/s    (>8.0)  MV A Dur:         106.00 msec  E/e' Ratio:       20.80       (<8.0)  MV DT:            230 msec    (150-240 msec)  PulmV Sys Jorge:    69.20 cm/s  PulmV [place] Jorge:   45.80 cm/s  PulmV S/D Jorge:    1.50  PulmV A Revs Jorge: 29.40 cm/s  PulmV A Revs Dur: 79.00 msec    AORTIC VALVE:  Normal Ranges:  AoV Vmax:      2.13 m/s  (<1.7m/s)  AoV Peak P.1 mmHg (<20mmHg)  LVOT Max Jorge:  1.57 m/s  (<1.1m/s)  LVOT VTI:      27.80 cm  LVOT Diameter: 1.70 cm   (1.8-2.4cm)  AoV Area,Vmax: 1.67 cm2  (2.5-4.5cm2)    RIGHT VENTRICLE:  RV 1   3.02 cm  RV 2   1.88 cm  RV 3   5.88 cm  TAPSE: 29.6 mm  RV s'  0.23 m/s    TRICUSPID VALVE/RVSP:  Normal Ranges:  Peak TR Velocity: 3.09 m/s  RV  "Syst Pressure: 41.2 mmHg (< 30mmHg)  IVC Diam:         1.25 cm    PULMONIC VALVE:  Normal Ranges:  PV Max Jorge: 1.0 m/s  (0.6-0.9m/s)  PV Max PG:  3.8 mmHg    Pulmonary Veins:  PulmV A Revs Dur: 79.00 msec  PulmV A Revs Jorge: 29.40 cm/s  PulmV [place] Jorge:   45.80 cm/s  PulmV S/D Jorge:    1.50  PulmV Sys Jorge:    69.20 cm/s      [id number] Sherlyn Vu MD  Electronically signed on 7/18/2018 at 4:50:05 PM         Final       Guideline-Directed Medical Therapy:  -ARNI: No   -If no, then ACEi/ARB?: Yes, describe: Losartan 25mg daily  -Beta Blocker: No  -MRA: Yes, describe: Spironolactone 25mg daily  -SGLT2i: Yes, describe: Jardiance 25mg daily    Secondary Prevention:  -The ASCVD Risk score (Carlo WEBB, et al., 2019) failed to calculate for the following reasons:    Risk score cannot be calculated because patient has a medical history suggesting prior/existing ASCVD   -Aspirin 81mg? no  -Statin?: Yes, describe: Atorvastatin 40mg daily  -HTN?: Yes, describe: controlled at last OV    CURRENT PHARMACOTHERAPY:   Losartan 25mg daily  Spironolactone 25mg daily  Jardiance 25mg daily  Furosemide 20mg daily    Affordability: [facility]  Adherence/Compliance: reports adherence  Adverse Effects: Kaelyn dtr states Elida continues to complain of being dizzy but has lessened since decreasing losartan    Monitoring Weights at Home: Yes; once a month  Home Weight Recordings: unable to assess    Past In Office Weight Readings:   Wt Readings from Last 6 Encounters:   12/09/24 63.5 kg (140 lb)   11/07/24 62.6 kg (138 lb)   10/04/24 59 kg (130 lb)   09/23/24 58.5 kg (129 lb)   08/19/24 59 kg (130 lb)   05/24/24 62.6 kg (138 lb)       Monitoring Blood Pressure at Home: Yes; \"sporadically\"  Home BP Recordings: average SBP low 100s    Past In Office BP Readings:   BP Readings from Last 6 Encounters:   12/09/24 125/86   11/07/24 100/58   10/11/24 126/84   10/04/24 97/65   09/23/24 104/58   09/04/24 95/69       HEALTH MANAGEMENT    Maintaining fluid " restriction (<2 L/day): N/A  Edema/swelling: No; patient wears compression socks  Shortness of breath: No  Trouble sleeping/lying down: No  Dry/hacking cough: No  Recent Hospitalizations: No    EDUCATION/COUNSELING:   - Counseled patient on MOA, expectations, duration of therapy, contraindications, administration, and monitoring parameters  - Counseled patient on lifestyle modifications that can decrease your risk of having complications (smoking cessation, losing weight, daily weights, vaccines)  - Counseled patient on fluid intake and weight management. Recommended to not consume more than 2 liters of fliuids per day. If they have gained more than 2-3 pounds within a 24 hours period (or 5 pounds in a week), contact their cardiologist  - Answered all patient questions and concerns     DISCUSSION/NOTES:   Kaelyn states Elida is doing well on anticoagulation therapy. Patient reports adherence, no adverse effects, and denies s/s of bleeding.   Kaelyn reports Elida is tolerating her heart failure regimen well. Last visit, Kaelyn states Elida was complaining of dizziness, her losartan has since been decreased to 25mg daily. Kaelyn reports she will occasionally have dizziness but this has improved since the dose decrease. Kaelyn reports her SBP has been around low 100s. Kaelyn denies Elida has s/s of worsening heart failure.  Discussed with Kaelyn the refill and auto refill process through [facility] pharmacy, she verbalized understanding.    ASSESSMENT:    Assessment/Plan   Problem List Items Addressed This Visit       Atrial fibrillation (Multi)     Patient age, weight and renal function appropriate to continue Eliquis 5mg twice daily.          Relevant Orders    Referral to Clinical Pharmacy    Acute on chronic diastolic heart failure     Patient currently on 3 of 4 GDMT medications. Renal function and potassium level appropriate to continue current regimen.      Labs (12/9/2024): Scr-1.10 eGFR-52 K-4.6          Relevant Orders    Referral to Clinical Pharmacy       RECOMMENDATIONS/PLAN:    CONTINUE  Eliquis 5mg twice daily  Losartan 25mg daily  Spironolactone 25mg daily  Jardiance 25mg daily  Furosemide 20mg daily    Last Appnt with Referring Provider: 09/23/2024  Next Appnt with Referring Provider: 01/27/2025  Clinical Pharmacist follow up: 3 months  VAF/Application Expiration: 11/15/2025  Type of Encounter: Sanket MayoD    Verbal consent to manage patient's drug therapy was obtained from an individual authorized to act on behalf of a patient. They were informed they may decline to participate or withdraw from participation in pharmacy services at any time.    Continue all meds under the continuation of care with the referring provider and clinical pharmacy team.

## 2024-12-27 NOTE — ASSESSMENT & PLAN NOTE
Patient currently on 3 of 4 GDMT medications. Renal function and potassium level appropriate to continue current regimen.      Labs (12/9/2024): Scr-1.10 eGFR-52 K-4.6

## 2024-12-27 NOTE — Clinical Note
Rafa Ignacio,  Today I spoke with Kaelyn (dtr) about Elida's Eliquis and heart medications. Elida is doing well on anticoagulation therapy. Patient reports adherence, no adverse effects, and denies s/s of bleeding/ worsening heart failure. Elida is tolerating her heart failure regimen well. Last visit, Kaelyn states Elida was complaining of dizziness, her losartan has since been decreased to 25mg daily. Kaelyn reports she will occasionally have dizziness but this has improved since the dose decrease. Kaelyn reports her SBP has been around low 100s. Plan to continue current regimen and follow up in 3 months. Thank you!

## 2024-12-28 DIAGNOSIS — M48.062 LUMBAR STENOSIS WITH NEUROGENIC CLAUDICATION: ICD-10-CM

## 2024-12-30 RX ORDER — TOPIRAMATE 100 MG/1
TABLET, FILM COATED ORAL
Qty: 60 TABLET | Refills: 0 | Status: SHIPPED | OUTPATIENT
Start: 2024-12-30

## 2025-01-02 PROCEDURE — RXMED WILLOW AMBULATORY MEDICATION CHARGE

## 2025-01-07 ENCOUNTER — TELEMEDICINE (OUTPATIENT)
Dept: PAIN MEDICINE | Facility: HOSPITAL | Age: 80
End: 2025-01-07
Payer: MEDICARE

## 2025-01-07 DIAGNOSIS — M96.1 LUMBAR POSTLAMINECTOMY SYNDROME: ICD-10-CM

## 2025-01-07 DIAGNOSIS — M50.90 CERVICAL DISC DISORDER: ICD-10-CM

## 2025-01-07 PROCEDURE — 1159F MED LIST DOCD IN RCRD: CPT | Performed by: ANESTHESIOLOGY

## 2025-01-07 PROCEDURE — 99214 OFFICE O/P EST MOD 30 MIN: CPT | Performed by: ANESTHESIOLOGY

## 2025-01-07 NOTE — PROGRESS NOTES
Chief complaint: Back pain and neck pain    HPI   Ms. Benson is a 79-year-old female with a history of low back and neck pain.  The patient has had prior lumbar and cervical spine surgery.  In the past she has had advanced imaging since her surgeries and in the neck she has known adjacent level disease which is mostly below her prior surgery at C6-7 and in the lumbar spine she does not have any high-grade central narrowing but its based on imaging from several years ago.  The patient has had an increasingly difficult time with ambulation, utilization of the upper and lower extremities and even underwent EMG in October 2024 with neurology, Dr. Jf Perdue which showed a severe and chronic right lumbosacral polyradiculopathy from L3-S2 without active denervation.  A right sided peroneal neuropathy could not be excluded but was thought to be possibly an artifact of age.  She did see neurology as she has had significant right lower extremity weakness and a more dense foot drop based on their examination.  She does use an AFO to stabilize the right ankle.    The patient has also had worked up for vertigo and has had struggles with upper extremity weakness as well as her ongoing lower extremity weakness.  Symptoms have overall progressed and they are following up to see what other measures could be considered.  She does not have any advanced imaging repeated since the the progressive symptoms in her upper and lower extremities have been present.  She has some instability in addition to the weakness.    She has done formal physical therapy but has not improved.  Patient has tried baclofen, gabapentin, topiramate, duloxetine.  In the past she has been on anti-inflammatories and Tylenol.  Right now she is taking Eliquis.    ROS: 13 point review of systems is complete and is negative listed above in HPI    Past Medical History:   Diagnosis Date    A-fib (Multi)     Managed on meds, Eliquis stoppage and cardiac clearance  in epic from Dr. Ignacio    Cervical myelopathy     Chronic pain syndrome     COPD (chronic obstructive pulmonary disease) (Multi)     denies, she is a former smoker but quit in     Dementia     Depression     Diabetic neuropathy (Multi)     Diverticulitis     DM (diabetes mellitus) (Multi)     denies but A1C= 7.2% on 23    LORENZ (dyspnea on exertion)     Stable per cards note    HLD (hyperlipidemia)     Hypertension, essential     Infiltrative cardiomyopathy (Multi)     Echo 23, following with Dr. Ignacio    OA (ocular albinism) (Multi)     multiple sites    Other malaise 2022    Physical deconditioning    Palpitations     on occasion, has afib followed by cardiology    Post laminectomy syndrome     Rectovaginal fistula     Spinal stenosis of cervical region     UTI (urinary tract infection)        Past Surgical History:   Procedure Laterality Date    CARPAL TUNNEL RELEASE  2013    Neuroplasty Decompression Median Nerve At Carpal Tunnel    ILEOSTOMY      KNEE ARTHROPLASTY  2013    Knee Arthroplasty    LAMINECTOMY      LUMBAR FUSION      OTHER SURGICAL HISTORY  2023    LAPAROSCPIC ANTERIOR RESECTION, DIVERTING LOOP ILEOSTOMY    SPINAL CORD STIMULATOR IMPLANT      TOTAL HIP ARTHROPLASTY  2013    Total Hip Replacement       Family History   Problem Relation Name Age of Onset    No Known Problems Mother      No Known Problems Father      No Known Problems Sister      No Known Problems Sister      No Known Problems Sister      No Known Problems Sister         Social History     Tobacco Use    Smoking status: Former     Current packs/day: 0.00     Types: Cigarettes     Quit date:      Years since quittin.0    Smokeless tobacco: Never   Vaping Use    Vaping status: Never Used   Substance Use Topics    Alcohol use: Yes     Comment: drinks beer or liquor a few times a year    Drug use: Never       Current Outpatient Medications on File Prior to Visit   Medication Sig  Dispense Refill    acetaminophen (Tylenol) 325 mg tablet Give 2 tablet by mouth every 4 hours as needed for Pain      amiodarone (Pacerone) 200 mg tablet TAKE ONE TABLET BY MOUTH IN THE MORNING FOR ANTIARRHYTHMIC 60 tablet 0    apixaban (Eliquis) 5 mg tablet Take 1 tablet (5 mg) by mouth 2 times a day. 180 tablet 3    atorvastatin (Lipitor) 40 mg tablet Take 1 tablet (40 mg) by mouth once daily at bedtime. 90 tablet 1    b complex 0.4 mg tablet Take 1 tablet by mouth once daily.      baclofen (Lioresal) 5 mg tablet Take 1 tablet (5 mg) by mouth 2 times a day. 60 tablet 1    benzonatate (Tessalon) 100 mg capsule Take 1 capsule (100 mg) by mouth 3 times a day as needed for cough. Do not crush or chew. 42 capsule 0    biotin 1 mg tablet 1 tab(s) orally once a day      cephalexin (Keflex) 500 mg capsule Take 1 capsule (500 mg) by mouth 2 times a day. 60 capsule 6    cholecalciferol (Vitamin D-3) 1,250 mcg (50,000 unit) capsule Take 50,000 Units by mouth once each week.      docusate sodium (Colace) 100 mg capsule Take 1 capsule (100 mg) by mouth 2 times a day as needed for constipation.      donepezil (Aricept) 5 mg tablet TAKE ONE TABLET BY MOUTH AT BEDTIME 90 tablet 0    DULoxetine (Cymbalta) 30 mg DR capsule TAKE ONE CAPSULE BY MOUTH TWO TIMES A  capsule 0    empagliflozin (Jardiance) 25 mg Take 1 tablet (25 mg) by mouth once daily. 90 tablet 3    fluticasone (Flonase) 50 mcg/actuation nasal spray Administer 2 sprays into each nostril once daily. Shake gently. Before first use, prime pump. After use, clean tip and replace cap. 16 g 3    furosemide (Lasix) 20 mg tablet Take 1 tablet (20 mg) by mouth once daily. 90 tablet 3    gabapentin (Neurontin) 600 mg tablet TAKE ONE TABLET BY MOUTH THREE TIMES A DAY 90 tablet 0    glipiZIDE XL (Glucotrol XL) 5 mg 24 hr tablet Take 1 tablet (5 mg) by mouth once daily. Always take with food in the morning 90 tablet 1    losartan (Cozaar) 25 mg tablet Take 1 tablet (25 mg) by  mouth once daily. 30 tablet 11    melatonin 3 mg tablet Give 2 tablet by mouth as needed for insomnia administer at bedtime      naloxone (Narcan) 4 mg/0.1 mL nasal spray Administer 1 spray (4 mg) into affected nostril(s) if needed for opioid reversal. May repeat every 2-3 minutes if needed, alternating nostrils, until medical assistance becomes available. 2 each 0    ondansetron ODT (Zofran-ODT) 4 mg disintegrating tablet Take 1 tablet (4 mg) by mouth every 8 hours if needed for nausea or vomiting. 20 tablet 0    SITagliptin phosphate (Januvia) 50 mg tablet Take 1 tablet (50 mg) by mouth once daily. 90 tablet 1    spironolactone (Aldactone) 25 mg tablet Take 1 tablet (25 mg) by mouth once daily. 90 tablet 3    tafamidis (Vyndamax) 61 mg capsule Take 1 capsule (61 mg) by mouth once daily. 30 capsule 10    topiramate (Topamax) 100 mg tablet TAKE ONE TABLET BY MOUTH TWO TIMES A DAY. NEED FOLLOW UP APPOINTMENT FOR NEXT REFILL 60 tablet 0    traZODone (Desyrel) 50 mg tablet TAKE ONE TABLET BY MOUTH AT BEDTIME AS NEEDED for sleep 30 tablet 0     No current facility-administered medications on file prior to visit.        Allergies   Allergen Reactions    Shellfish Containing Products Swelling          Imaging:  Narrative   Interpreted By:  JAYSON EPPERSON MD and JOSE A FINE MD  MRN: [id number]  Patient Name: TAMARA PALMER     STUDY:  MRI BRAIN WO; MRI CERVICAL WO;  3/3/2023 8:16 am     INDICATION:  arm weakness, Lie Flat: Yes, Pre Med: No .     COMPARISON:  CT C-spine 09/22/2022  Cervical spine radiograph 03/02/2023  Cervical spine MRI 08/22/2022     ACCESSION NUMBER(S):  [id number]; [id number]     ORDERING CLINICIAN:  JAIME AMAYA     TECHNIQUE:  Axial T2, FLAIR, DWI, gradient echo T2 and sagittal and coronal T1  weighted images of brain were acquired.     Sagittal T1, T2, STIR, axial T1 and axial T2 weighted images were  acquired through the cervical spine.     FINDINGS:  Brain:  CSF Spaces: There is global  parenchymal volume loss leading to  prominence of the ventricles and sulci. The basal cisterns are  patent. No abnormal extra-axial fluid collection.     Parenchyma: There is no diffusion restriction abnormality to suggest  acute infarct.  There are punctate and confluent subcortical  periventricular white matter FLAIR/T2 hyperintensities without  corresponding diffusion restriction which are nonspecific but may  reflect sequelae of chronic microvascular disease given the patient's  age. Within the bilateral thalami and left basal ganglia there is  heterogenous T2 signal with focal T2 and T1 dark areas that  demonstrates susceptibility artifact on gradient echo imaging. These  may reflect areas of hemosiderin deposition or calcification. There  is no mass effect or midline shift.     Paranasal Sinuses and Mastoids: Visualized paranasal sinuses and  mastoid air cells are unremarkable.     Cervical spine:     Motion artifact and patient positioning somewhat limits assessment.     Since the prior MRI, there has been interval anterior instrumented  fusion from C4 through C6 and posterior instrumented fusion from C3  through C6 with a C5 corpectomy. Susceptibility artifact from the  patient's hardware limits evaluation, particularly of the upper  cervical spine.     Alignment: Straightening of the normal cervical lordosis. There  appears to be 6 mm grade 1 anterolisthesis of C7 on T1, which is new  when compared to the prior study..     Vertebrae/Intervertebral Discs: The vertebral bodies demonstrate  expected height. The marrow signal is within normal limits.  Multilevel disc desiccation.     Cord: Normal in caliber and signal, flattening of the ventral cord  has resolved.     C1-C2: The cervicomedullary junction appears unremarkable. There is  no spinal canal stenosis.     C2-C3: Infolding of ligamentum flavum mildly narrows the spinal  canal. Facet and uncovertebral hypertrophy mildly narrow the left  neural foramen;  right neural foramen is patent.     C3-C4: Facet and uncovertebral hypertrophy mildly narrow the left  neural foramen. Spinal canal and right neural foramina are patent.     C4-C5: Osseous ridging and infolding of ligamentum flavum mildly  narrows the spinal canal. Right neural foramen is patent. Left neural  foramen is not well visualized secondary to artifact.     C5-C6: Spinal canal is patent. Limited evaluation of neural foramina  secondary to artifact.     C6-C7: There is a central disc protrusion of the central C6-7  intervertebral disc which appears to contact and slightly flattens  the ventral aspect of the cord and results in moderate canal  stenosis. Limited evaluation of neural foramina.     C7-T1: There is no posterior disc contour abnormality. There is no  significant central spinal or neural foraminal stenosis.     Multilevel disc protrusions in the thoracic spine resulting in at  least mild canal stenosis, partially visualized.     The prevertebral and posterior paraspinous soft tissues are within  normal limits.      Impression   Brain:  1. No evidence of acute infarct, intracranial mass effect or midline  shift.  2. Scattered foci of hemosiderin within the bilateral thalami and  left basal ganglia are favored to represent chronic microhemorrhages  likely due to chronic hypertensive infarcts.     Limited evaluation of the cervical spine cervical spine:  1. Focal central disc protrusion at C6-7 with mild flattening of the  anterior spinal cord without associated edema or myelomalacia; there  is moderate canal stenosis at this level. Additional degenerative  changes as described, slightly limited evaluation secondary to  hardware.  2. Interval anterior instrumented fusion of C4 through C6 with C5  corpectomy and posterior instrumented fusion of C3 through C6.     Narrative & Impression   MRN: [id number]  Patient Name: TAMARA PALMER     STUDY:  MRI L-SPINE WO;  8/22/2022 3:30 pm     INDICATION:  Pain,  leg pain     COMPARISON:  Lumbar spine MRI, 06/29/2022     ACCESSION NUMBER(S):  [id number]     ORDERING CLINICIAN:  NEW ROMERO     TECHNIQUE:  Sagittal T1, T2, STIR, axial T1 and T2 weighted images of the lumbar  spine were acquired.     FINDINGS:  5 lumbar-type vertebral bodies are again noted, the last well-formed  disc space is labeled L5-S1.     Changes of L3-S1 laminectomy, L5-S1 discectomy, L2-5 posterior  fusion, and spinal cord stimulator placement with associated  susceptibility artifact are similar to previous. A nonspecific T2  hyperintense collection in the laminectomy bed at L3-4 (sagittal stir  image 10) is unchanged. Unchanged grade 2 anterolisthesis at L4-5,  the alignment is otherwise preserved. Unchanged vertebral body fusion  at T10-11 and T12-L1.     Vertebral body height is preserved. Multilevel degenerate endplate  changes, no edema in the visualized marrow. Multilevel loss of normal  disc T2 signal and disc height. On a level by level basis:     T11-12: Partially visualized disc bulge and facet hypertrophy with  mild spinal canal narrowing and moderate left neural foramen  narrowing, similar to previous. The right neural foramen is obscured  by susceptibility artifact.     T12-L1: Disc osteophyte complex and facet hypertrophy with mild  narrowing of the right subarticular recess and moderate bilateral  neural foramen narrowing, similar to previous.     L1-2: Disc bulge, facet hypertrophy, and ligamentum flavum thickening  with mild narrowing of both subarticular recesses and mild left and  moderate right neural foramen narrowing, similar to previous.     L2-3: Disc bulge, facet hypertrophy, and ligamentum flavum thickening  with mild narrowing of both subarticular recesses, similar to  previous. The foramina are obscured by artifact.     L3-4: Postoperative changes with a disc bulge and facet hypertrophy.  There is no residual spinal canal narrowing, the foramina are  obscured by artifact.      L4-5: Postoperative changes with a disc bulge and unroofing as well  as facet hypertrophy, severe bilateral neural foramen narrowing is  unchanged.     L5-S1: Postoperative changes with a disc bulge and facet hypertrophy,  severe bilateral neural foramen narrowing is unchanged.     The lower thoracic cord appears unremarkable. The conus terminates at  L2. The visualized cauda equina is unremarkable. Postoperative  changes in the paraspinal soft tissues with associated granulation  tissue and paraspinal muscular atrophy, similar to previous.  Incidental T2 hyperintense renal cysts. Partially visualized colonic  diverticulosis.     IMPRESSION:  Stable postoperative and degenerative changes.     Physical Exam:  Gen.: No acute distress    Psychiatric:  Patient is alert and oriented x3    Impression/Plan:  79-year-old female who has had prior neck and lumbar surgeries.  In the cervical area she has no adjacent level disease below her prior surgery.  The patient has had a foot drop that has developed and she had workup for that in October of last year including an EMG which showed multilevel radiculopathy.  She also has progressive symptoms and imbalance as well as upper extremity weakness.  Last MRI showed at least moderate narrowing below her prior surgery in the cervical area.  Discussed that with the onset of a foot drop and the progressive upper extremity symptoms as well as imbalance that we would get a cervical and lumbar MRI updated.  Information for scheduling was given to the patient explained as well as her daughter.  Once we have the imaging we can discuss how to best plan her next steps in care.  Patient does take a blood thinner and we would have to get clearance to discontinue this/hold it for any intervention.  We may need to send her back to a surgeon to discuss surgical measures pending imaging.  She used to follow with Dr. Robb who has now left the  system.

## 2025-01-08 ENCOUNTER — PHARMACY VISIT (OUTPATIENT)
Dept: PHARMACY | Facility: CLINIC | Age: 80
End: 2025-01-08
Payer: MEDICARE

## 2025-01-12 DIAGNOSIS — F03.A0 MILD DEMENTIA, UNSPECIFIED DEMENTIA TYPE, UNSPECIFIED WHETHER BEHAVIORAL, PSYCHOTIC, OR MOOD DISTURBANCE OR ANXIETY: ICD-10-CM

## 2025-01-12 DIAGNOSIS — H81.8X9 VESTIBULAR PAROXYSMIA: ICD-10-CM

## 2025-01-13 RX ORDER — BACLOFEN 5 MG/1
5 TABLET ORAL 2 TIMES DAILY
Qty: 60 TABLET | Refills: 2 | Status: SHIPPED | OUTPATIENT
Start: 2025-01-13

## 2025-01-16 RX ORDER — DONEPEZIL HYDROCHLORIDE 5 MG/1
5 TABLET, FILM COATED ORAL NIGHTLY
Qty: 90 TABLET | Refills: 0 | Status: SHIPPED | OUTPATIENT
Start: 2025-01-16

## 2025-01-21 DIAGNOSIS — F03.A0 MILD DEMENTIA, UNSPECIFIED DEMENTIA TYPE, UNSPECIFIED WHETHER BEHAVIORAL, PSYCHOTIC, OR MOOD DISTURBANCE OR ANXIETY: ICD-10-CM

## 2025-01-21 DIAGNOSIS — G89.4 CHRONIC PAIN SYNDROME: ICD-10-CM

## 2025-01-21 DIAGNOSIS — R05.2 SUBACUTE COUGH: ICD-10-CM

## 2025-01-22 RX ORDER — DONEPEZIL HYDROCHLORIDE 5 MG/1
5 TABLET, FILM COATED ORAL NIGHTLY
Qty: 90 TABLET | Refills: 0 | Status: SHIPPED | OUTPATIENT
Start: 2025-01-22

## 2025-01-22 RX ORDER — GABAPENTIN 600 MG/1
600 TABLET ORAL 3 TIMES DAILY
Qty: 90 TABLET | Refills: 0 | Status: SHIPPED | OUTPATIENT
Start: 2025-01-22

## 2025-01-22 RX ORDER — BENZONATATE 100 MG/1
CAPSULE ORAL
Qty: 42 CAPSULE | Refills: 0 | Status: SHIPPED | OUTPATIENT
Start: 2025-01-22

## 2025-01-27 ENCOUNTER — OFFICE VISIT (OUTPATIENT)
Dept: CARDIOLOGY | Facility: CLINIC | Age: 80
End: 2025-01-27
Payer: MEDICARE

## 2025-01-27 VITALS
WEIGHT: 142 LBS | HEART RATE: 72 BPM | HEIGHT: 57 IN | BODY MASS INDEX: 30.63 KG/M2 | OXYGEN SATURATION: 98 % | DIASTOLIC BLOOD PRESSURE: 77 MMHG | SYSTOLIC BLOOD PRESSURE: 116 MMHG

## 2025-01-27 DIAGNOSIS — I27.20 PULMONARY HYPERTENSION, UNSPECIFIED (MULTI): ICD-10-CM

## 2025-01-27 DIAGNOSIS — I49.9 CARDIAC ARRHYTHMIA, UNSPECIFIED CARDIAC ARRHYTHMIA TYPE: ICD-10-CM

## 2025-01-27 DIAGNOSIS — R42 DIZZINESS: ICD-10-CM

## 2025-01-27 DIAGNOSIS — R93.1 ABNORMAL ECHOCARDIOGRAM: ICD-10-CM

## 2025-01-27 DIAGNOSIS — I42.8 INFILTRATIVE CARDIOMYOPATHY (MULTI): ICD-10-CM

## 2025-01-27 DIAGNOSIS — J44.9 COPD WITHOUT EXACERBATION (MULTI): ICD-10-CM

## 2025-01-27 DIAGNOSIS — E85.4 CARDIAC AMYLOIDOSIS: Primary | ICD-10-CM

## 2025-01-27 DIAGNOSIS — F03.B3 MODERATE DEMENTIA WITH MOOD DISTURBANCE, UNSPECIFIED DEMENTIA TYPE (MULTI): ICD-10-CM

## 2025-01-27 DIAGNOSIS — I48.0 PAROXYSMAL ATRIAL FIBRILLATION (MULTI): ICD-10-CM

## 2025-01-27 DIAGNOSIS — I48.11 LONGSTANDING PERSISTENT ATRIAL FIBRILLATION (MULTI): ICD-10-CM

## 2025-01-27 DIAGNOSIS — I43 CARDIAC AMYLOIDOSIS: Primary | ICD-10-CM

## 2025-01-27 DIAGNOSIS — I50.33 ACUTE ON CHRONIC DIASTOLIC HEART FAILURE: ICD-10-CM

## 2025-01-27 PROCEDURE — 99215 OFFICE O/P EST HI 40 MIN: CPT | Performed by: STUDENT IN AN ORGANIZED HEALTH CARE EDUCATION/TRAINING PROGRAM

## 2025-01-27 PROCEDURE — G2211 COMPLEX E/M VISIT ADD ON: HCPCS | Performed by: STUDENT IN AN ORGANIZED HEALTH CARE EDUCATION/TRAINING PROGRAM

## 2025-01-27 PROCEDURE — 3078F DIAST BP <80 MM HG: CPT | Performed by: STUDENT IN AN ORGANIZED HEALTH CARE EDUCATION/TRAINING PROGRAM

## 2025-01-27 PROCEDURE — 3074F SYST BP LT 130 MM HG: CPT | Performed by: STUDENT IN AN ORGANIZED HEALTH CARE EDUCATION/TRAINING PROGRAM

## 2025-01-27 PROCEDURE — 1036F TOBACCO NON-USER: CPT | Performed by: STUDENT IN AN ORGANIZED HEALTH CARE EDUCATION/TRAINING PROGRAM

## 2025-01-27 PROCEDURE — 1159F MED LIST DOCD IN RCRD: CPT | Performed by: STUDENT IN AN ORGANIZED HEALTH CARE EDUCATION/TRAINING PROGRAM

## 2025-01-27 NOTE — PATIENT INSTRUCTIONS
Please continue your current cardiac medications including amiodarone 200 mg once daily, atorvastatin 40 mg daily, Eliquis 5 mg twice daily, Jardiance 25 mg once daily, furosemide Lasix 20 mg once daily, Vyndamax (tafamidis) 61 mg daily.      Please follow-up with Dr. Dueñas of advanced heart failure as scheduled May 5, 2025.    Please followup with me in Cardiology clinic within the next 6 months.    Please return to clinic sooner or seek emergent care if your symptoms reoccur or worsen.

## 2025-01-27 NOTE — PROGRESS NOTES
Follow-up visit    HPI:    Elida Benson is a 79 y.o. female with pertinent history of dementia, hypertension, diverticulitis status post laparoscopic anterior resection with ileostomy on 8/16/2023, paroxysmal atrial fibrillation on amiodarone and Eliquis, preserved ejection fraction with suspected myocardial cleft versus false tendon, moderate concentric hypertrophy, moderate left atrial enlargement on echo performed 8/19/2023, preserved ejection fraction with moderate septal thickness, abnormal strain imaging concerning for possible infiltrative heart disease on echo performed 8/22/2023, amyloid SPECT heart study demonstrates radiotracer deposition within myocardium of left ventricle suggestive of TTR amyloidosis on nuclear imaging performed 4/15/2024, preserved LVEF of 62% with constellation of findings consistent with amyloidosis including diffuse subendocardial delayed enhancement and abnormal myocardial involving time as well as increased T1 mapping times, asymmetric mid to basal septal hypertrophy 1.8 cm on cardiac MRI performed 9/13/2024 presents to cardiology clinic for follow-up.    She is accompanied by her daughter provides history and has questions.  She is doing relatively well.  He is concerned about a relatively chronic cough that has not improved for the last few months.  Of note, her BNP level has come down from the 900s to the 100s range.  Lower extremity edema is relatively stable. Dyspnea on exertion stable.  We reviewed and discussed her prior cardiac MRI as well as her prior PYP scan.  No exacerbating or relieving factors.  Patient denies chest pain and angina.  Pt denies orthopnea, and paroxysmal nocturnal dyspnea.  Pt denies syncope.  No recent falls.  No fever or chills.  No cough.  No change in bowel or bladder habits.  No sick contacts.  No recent travel.    12 point review of systems including (Constitutional, Eyes, ENMT, Respiratory, Cardiac, Gastrointestinal, Neurological,  Psychiatric, and Hematologic) was performed and is otherwise negative.    Past medical history reviewed:   has a past medical history of A-fib (Multi), Cervical myelopathy, Chronic pain syndrome, COPD (chronic obstructive pulmonary disease) (Multi), Dementia, Depression, Diabetic neuropathy (Multi), Diverticulitis, DM (diabetes mellitus) (Multi), LORENZ (dyspnea on exertion), HLD (hyperlipidemia), Hypertension, essential, Infiltrative cardiomyopathy (Multi), OA (ocular albinism) (Multi), Other malaise (04/21/2022), Palpitations, Post laminectomy syndrome, Rectovaginal fistula, Spinal stenosis of cervical region, and UTI (urinary tract infection).    Past surgical history reviewed:   has a past surgical history that includes Carpal tunnel release (08/27/2013); Knee Arthroplasty (08/27/2013); Total hip arthroplasty (08/27/2013); Other surgical history (08/16/2023); Ileostomy; Laminectomy; Lumbar fusion (2023); and Spinal cord stimulator implant (2022).    Social history reviewed:   reports that she quit smoking about 41 years ago. Her smoking use included cigarettes. She has never used smokeless tobacco. She reports current alcohol use. She reports that she does not use drugs.     Family history reviewed:    Family History   Problem Relation Name Age of Onset    No Known Problems Mother      No Known Problems Father      No Known Problems Sister      No Known Problems Sister      No Known Problems Sister      No Known Problems Sister         Allergies reviewed: Shellfish containing products     Medications reviewed:   Current Outpatient Medications   Medication Instructions    acetaminophen (Tylenol) 325 mg tablet Give 2 tablet by mouth every 4 hours as needed for Pain    amiodarone (Pacerone) 200 mg tablet TAKE ONE TABLET BY MOUTH IN THE MORNING FOR ANTIARRHYTHMIC    atorvastatin (LIPITOR) 40 mg, oral, Nightly    b complex 0.4 mg tablet 1 tablet, Daily    baclofen (LIORESAL) 5 mg, oral, 2 times daily    benzonatate  (Tessalon) 100 mg capsule TAKE 1 CAPSULE BY MOUTH 3 TIMES A DAY AS NEEDED FOR COUGH. DO NOT CRUSH OR CHEW    biotin 1 mg tablet 1 tab(s) orally once a day    cephalexin (KEFLEX) 500 mg, oral, 2 times daily    cholecalciferol (Vitamin D-3) 1,250 mcg (50,000 unit) capsule Take 50,000 Units by mouth once each week.    docusate sodium (Colace) 100 mg capsule 1 capsule, 2 times daily PRN    donepezil (ARICEPT) 5 mg, oral, Nightly    DULoxetine (CYMBALTA) 30 mg, oral, 2 times daily    Eliquis 5 mg, oral, 2 times daily    fluticasone (Flonase) 50 mcg/actuation nasal spray 2 sprays, Each Nostril, Daily, Shake gently. Before first use, prime pump. After use, clean tip and replace cap.    furosemide (LASIX) 20 mg, oral, Daily    gabapentin (NEURONTIN) 600 mg, oral, 3 times daily    glipiZIDE XL (GLUCOTROL XL) 5 mg, oral, Daily, Always take with food in the morning    Jardiance 25 mg, oral, Daily    losartan (COZAAR) 25 mg, oral, Daily    melatonin 3 mg tablet Give 2 tablet by mouth as needed for insomnia administer at bedtime    naloxone (NARCAN) 4 mg, nasal, As needed, May repeat every 2-3 minutes if needed, alternating nostrils, until medical assistance becomes available.    ondansetron ODT (ZOFRAN-ODT) 4 mg, oral, Every 8 hours PRN    SITagliptin phosphate (JANUVIA) 50 mg, oral, Daily    spironolactone (ALDACTONE) 25 mg, oral, Daily    topiramate (Topamax) 100 mg tablet TAKE ONE TABLET BY MOUTH TWO TIMES A DAY. NEED FOLLOW UP APPOINTMENT FOR NEXT REFILL    traZODone (DESYREL) 50 mg, oral, Nightly PRN    Vyndamax 61 mg, oral, Daily        Vitals reviewed: Visit Vitals  /77   Pulse 72         Physical Exam:   General:  Patient is awake, alert, and oriented.  Patient is in no acute distress.  HEENT:  Pupils equal and reactive.  Normocephalic.  Moist mucosa.    Neck:  No thyromegaly.  11 cm jugular Venous Pressure.  Cardiovascular:  Regular rate and rhythm.  Normal S1 and S2.  1/6 EDER.  Pulmonary:  Clear to auscultation  bilaterally.  Abdomen:  Soft. Non-tender.   Non-distended.  Positive bowel sounds.  Lower Extremities:  2+ pedal pulses.  1+ LE edema.  Neurologic:  Cranial nerves intact.  No focal deficit.   Skin: Skin warm and dry, normal skin turgor.   Psychiatric: Normal affect.    Last Labs:  CBC -      Lab Results   Component Value Date    WBC 9.4 04/17/2024    HGB 13.0 04/17/2024    HCT 41.3 04/17/2024     04/17/2024        CMP-  Lab Results   Component Value Date    GLUCOSE 116 (H) 12/09/2024     12/09/2024    K 4.6 12/09/2024     12/09/2024    CO2 26 12/09/2024    ANIONGAP 15 12/09/2024    BUN 33 (H) 12/09/2024    CREATININE 1.10 (H) 12/09/2024    EGFR 52 (L) 12/09/2024    CALCIUM 9.3 12/09/2024    PHOS 4.5 05/13/2024    PROT 6.9 12/09/2024    ALBUMIN 4.1 12/09/2024    AST 20 12/09/2024    ALT 24 12/09/2024    ALKPHOS 89 12/09/2024    BILITOT 0.3 12/09/2024        LIPIDS-  Lab Results   Component Value Date    CHOL 182 05/26/2023    TRIG 102 05/26/2023    HDL 77.2 05/26/2023    CHHDL 2.4 05/26/2023    VLDL 20 05/26/2023        OTHERS-  Lab Results   Component Value Date    HGBA1C 6.6 (A) 12/09/2024     (H) 12/09/2024        I personally reviewed the patient's recent vitals, labs, medications, orders, EKGs, pertinent cardiac imaging/ echocardiography.    Assessment and Plan:    Elida Benson is a 79 y.o. female with pertinent history of dementia, hypertension, diverticulitis status post laparoscopic anterior resection with ileostomy on 8/16/2023, paroxysmal atrial fibrillation on amiodarone and Eliquis, preserved ejection fraction with suspected myocardial cleft versus false tendon, moderate concentric hypertrophy, moderate left atrial enlargement on echo performed 8/19/2023, preserved ejection fraction with moderate septal thickness, abnormal strain imaging concerning for possible infiltrative heart disease on echo performed 8/22/2023, amyloid SPECT heart study demonstrates radiotracer  deposition within myocardium of left ventricle suggestive of TTR amyloidosis on nuclear imaging performed 4/15/2024, preserved LVEF of 62% with constellation of findings consistent with amyloidosis including diffuse subendocardial delayed enhancement and abnormal myocardial involving time as well as increased T1 mapping times, asymmetric mid to basal septal hypertrophy 1.8 cm on cardiac MRI performed 9/13/2024 presents to cardiology clinic for follow-up.  She is accompanied by her daughter provides history and has questions.  She is doing relatively well.  He is concerned about a relatively chronic cough that has not improved for the last few months.  Of note, her BNP level has come down from the 900s to the 100s range.  Lower extremity edema is relatively stable. Dyspnea on exertion stable.  We reviewed and discussed her prior cardiac MRI as well as her prior PYP scan.  She appears clinically euvolemic.    Please continue your current cardiac medications including amiodarone 200 mg once daily, atorvastatin 40 mg daily, Eliquis 5 mg twice daily, Jardiance 25 mg once daily, furosemide Lasix 20 mg once daily, Vyndamax (tafamidis) 61 mg daily.      Please follow-up with Dr. Dueñas of advanced heart failure as scheduled May 5, 2025.    Please followup with me in Cardiology clinic within the next 6 months.    Please return to clinic sooner or seek emergent care if your symptoms reoccur or worsen.    Thank you for allowing me to participate in their care.  Please feel free to call me with any further questions or concerns.      Cesar Ignacio MD, FAC, TAIWO ABARCA  Division of Cardiovascular Medicine  System Director, Nuclear Cardiology   Medical Director, [facility] [place], [facility]   Clinical , [facility] [employer] [employer]  [email or web address]   Office:  [phone]

## 2025-01-29 ENCOUNTER — HOSPITAL ENCOUNTER (OUTPATIENT)
Dept: RADIOLOGY | Facility: HOSPITAL | Age: 80
Discharge: HOME | End: 2025-01-29
Payer: MEDICARE

## 2025-01-29 DIAGNOSIS — M50.90 CERVICAL DISC DISORDER: ICD-10-CM

## 2025-01-29 DIAGNOSIS — M96.1 LUMBAR POSTLAMINECTOMY SYNDROME: ICD-10-CM

## 2025-01-31 ENCOUNTER — SPECIALTY PHARMACY (OUTPATIENT)
Dept: PHARMACY | Facility: CLINIC | Age: 80
End: 2025-01-31

## 2025-01-31 PROCEDURE — RXMED WILLOW AMBULATORY MEDICATION CHARGE

## 2025-01-31 NOTE — DISCHARGE SUMMARY
Send Summary:   Discharge Summary Providers:  Provider Role Provider Name   · Attending Irvin Robb   · Referring Irvin Robb   · Primary David Swift       Note Recipients: Irvin Robb MD Mark, Matthew, MD       Discharge:    Summary:   Admission Date: .17-Jan-2023 06:30:00   Discharge Date: 24-Jan-2023   Admission Reason: Cervical stenosis   Final Discharge Diagnoses: Cervical stenosis   Procedures: Date: 17-Jan-2023 14:55:00  Procedure Name: 1. C4-6 ACDF  2. Partial corpectomy of C4 and C6  3. C5 corpectomy  4.   5.    Date: 20-Jan-2023 10:58:00  Procedure Name: 1. C3-6 posterior fusion with lateral mass screws  2.   3.   4.   5.   Hospital Course:    77 year-old F who presented with cervical stenosis with radiculopathy. Patient is now s/p C5 corpectomy, C4-6 ACDF  1/17/23 and C4-C6 posterior perc fixation 1/20/23 by Dr. Robb. On the days of surgery, patient was identified in the pre-operative holding area and agreeable to proceed with surgery. Written consent was obtained.  Please see operative note for further  details of this procedure. The surgery went without complications. Patient received 24 hours of jaylen-operative antibiotics. Patient recovered in the PACU before transfer to a regular nursing floor. The patient was monitored as an inpatient postoperatively  for drain output, therapy, and pain control. The postoperative was pulled when output was appropriately low. The patient had good pain control, was voiding, and was neurovascularly stable at the time of discharge. Patient will follow-up with Dr. Robb in  3-4 weeks for post-operative visit.    Prescription for oxycodone 5mg or percocet 5mg-325mg q4-6 hr #42 provided due to exceedingly painful nature of surgical procedure. Patients undergoing these operations typically use 1-2 pills q4-6 hours for the first 1-2 weeks. The use will be monitored  postoperatively by Dr. Robb, and weaned appropriately during the recovery period.    Immunizations:    [facility]    2025    Te Garay (:  1965) is a 59 y.o. male here for cardiovascular follow up for CAD, ischemic cardiomyopathy and heart failure.     Referring Provider: Reid Lei MD    HISTORY: Te Garay has a significant history of coronary artery disease s/p CABG in  as well as multiple stents, ischemic cardiomyopathy, combined systolic and diastolic heart failure, Htn, Hld, carotid artery stenosis s/p Rt carotid artery stenting , PVD. Additional history includes type 1 diabetes mellitus, ESRD on peritoneal dialysis also has been on the kidney/pancreas transplant list, CVA.    He was referred to  transplant team for consideration of kidney-pancreas transplant, however, his evaluation has been placed on hold given his LVEF of 35% and ongoing cardiac issues.     He has had multiple admissions this year for AMS, chest pain. He had a right and left heart cath in  with my partner, Dr Parish where it was felt to be too high risk for intervention and decided on aggressive medical management.     DCB with Tania     Admitted in [place]     Today he is here for follow up, his wife is with him. He feels his legs are tight and swollen again, last night he had pain while laying down that is similar when he is in fluid overload. Today during his EKG he felt ok. He is needing to stop for breaks when he walks 20-30 yards. This is a newer symptoms for him. He and wife agree the symptoms of chest discomfort are most days lately. He reports he felt great after his DCB stent back in July, but since stent in [place], he has not felt as good.     Has dry gangrene on his pinky toe of his right foot and will likely need amputation.       REVIEW OF SYSTEMS:  A complete review of systems was reviewed and is negative except as noted in the history of present illness.    Prior to Visit Medications    Medication Sig Taking? Authorizing Provider   torsemide (DEMADEX) 100 MG tablet Take 1     Immunizations:  01-Mar-2021   SARS-CoV-2 (COVID-19): Immunizations, 01-Mar-2021  29-Mar-2021   SARS-CoV-2 (COVID-19): Immunizations, 29-Mar-2021  14-Nov-2021   SARS-CoV-2 (COVID-19): Immunizations, 14-Nov-2021      Discharge Information:    and Continuing Care:   Lab Results - Pending:    None  Radiology Results - Pending: Xray Fluoro Spine at  17-Jan-2023 14:21:00   Discharge Instructions:    Activity:           activity with assistance   PROGRESSIVE MOBILIZATION W/WALKER OR CANE.          May shower..  WHEN NO DRAINAGE FROM INCISIONAL AREA          May not drive until follow-up visit.  or while taking narcotic medication          No pushing, pulling, or lifting objects greater than 10 pounds.            Weight-bearing Instructions: full weight bearing.            NO HEAVY HOUSE/YARD WORK    Nutrition/Diet:           resume normal diet          Diet Consistency/Texture:   You may need to start off eating softer foods and advance to more regular consistency foods as tolerated.    Wound Care:           Wound Site:   NECK (Anterior Cervical Spine)          Wound Type:   surgical incision          Change Dressing:   daily   UNTIL NO LONGER DRAINING, THEN YOU CAN LEAVE THE DRSG. OFF          Cleanse With:   soap and water,  ONCE THE INCISION IS NO LONGER DRAINING AND YOU ARE ABLE TO SHOWER.          Cover With:   4x4 gauze          Tape With:   paper tape          Instructions:   no lotions, creams, or tub soaks          Other Instructions:   Please do not remove steri strips, they will lift away from the skin on their own.          Wound Site:   NECK (Posterior Cervical Spine)          Wound Type:   surgical incision          Change Dressing:   daily          Cleanse With:   soap and water,  ONCE THE INCISION IS NO LONGER DRAINING AND YOU ARE ABLE TO SHOWER.          Cover With:   abdominal dressing          Tape With:   paper tape          Instructions:   no lotions, creams, or tub soaks          Other  Instructions:   There is a prineo dressing (Strip) over your incision, do not remove it with your dressing changes.  The dressing will slowly lift away from the skin over time.    Additional Orders:           Special Equipment:   cervical collar          Cervical Collar Instructions:   WEAR BRACE @ ALL TIMES, MAYBE OFF FOR HYGIENE/SHOWERING          Additional Instructions:   MAY USE HEAT OR ICE TO POSTERIOR NECK & SHOULDERS AS NEEDED FOR COMFORT     NO NSAIDS (ADVIL, IBUPROFEN, ALEVE... ETC.) FOR 3  MONTHS THEY HAVE A BAD EFFECT ON HEALING OF FUSION.     Rehab Services:           Occupational Therapy Orders:   [facility] ([facility])   daily   ADL's          Physical Therapy Orders:   [facility] ([facility])   daily   PROGRESSIVE MOBILIZATION W/WALKER OR CANE,NO BACK OR AGGRESSIVE LEG EXERCISES.DO NOT SET UP FOR OUTPT. P.T. AT D/C.    Care Recommendation:           I recommend that INPATIENT care is required at::   Skilled          Estimated Stay:   Convalescent stay < 30 days    Follow Up Appointments:    Follow-Up Appointment 01:           Physician/Dept/Service:   Dr. Irvin Robb/ Orthopaedic Surgery/ Spine          Reason for Referral:   Postoperative Follow Up Appointment          Scheduled Date/Time:   02-Mar-2023 10:00          [facility]          Phone Number:   Office: (April, Sec./[place]) - [phone]    Follow-Up Appointment 02:           Physician/Dept/Service:   SIMEON Marshall/ Orthopaedic Surgery/ Spine          Reason for Referral:   Postoperative Wound Check          Scheduled Date/Time:   09-Feb-2023 09:30          [facility]   [facility] [address] [facility] [address]          Phone Number:   [phone]    Discharge Medications: Home Medication   amLODIPine 2.5 mg oral tablet - 1 tab(s) oral once a day  DULoxetine 30 mg oral delayed release capsule - 1 cap(s) oral 2 times  a day  gabapentin 600 mg oral tablet - 1 tab(s) oral 3 times a day  losartan 50 mg oral tablet - 1 tab(s) oral once a day  topiramate 100 mg oral tablet - 1 tab(s) oral 2 times a day  acetaminophen 325 mg oral tablet - 2 tab(s) orally every 6 hours   Colace 100 mg oral capsule - 1 cap(s) orally 2 times a day while taking narcotic pain medication   MiraLax oral powder for reconstitution - 17 gram(s) orally 2 times a day for 14 days to prevent constipation   methocarbamol 500 mg oral tablet - 2 tab(s) orally every 8 hours to reduce muscle spasms     PRN Medication   oxyCODONE 5 mg oral tablet - Take 1 tab orally every 4 to 6 hours as needed for pain x 7 days     DNR Status:   ·  Code Status Code Status order at time of discharge: Full Code     Attestation:   Note Completion:  I am a:  Resident/Fellow   Attending Attestation I saw and evaluated the patient.  I personally obtained the key and critical portions of the history and physical exam or was physically present for key and  critical portions performed by the resident/fellow. I reviewed the resident/fellow?s documentation and discussed the patient with the resident/fellow.  I agree with the resident/fellow?s medical decision making as documented in the note.     I personally evaluated the patient on 23-Jan-2023         Electronic Signatures:  Irvin Robb)  (Signed 26-Jan-2023 15:50)   Authored: Note Completion   Co-Signer: Send Summary, Summary Content, Immunizations, Ongoing Care, DNR Status, Note Completion  Roberto Tolentino (Resident))  (Signed 24-Jan-2023 15:13)   Authored: Send Summary, Summary Content, Immunizations,  Ongoing Care, DNR Status, Note Completion      Last Updated: 26-Jan-2023 15:50 by Irvin Robb)

## 2025-02-04 DIAGNOSIS — R05.2 SUBACUTE COUGH: ICD-10-CM

## 2025-02-04 DIAGNOSIS — G89.4 CHRONIC PAIN SYNDROME: ICD-10-CM

## 2025-02-04 DIAGNOSIS — M48.062 LUMBAR STENOSIS WITH NEUROGENIC CLAUDICATION: ICD-10-CM

## 2025-02-04 RX ORDER — BENZONATATE 100 MG/1
CAPSULE ORAL
Qty: 42 CAPSULE | Refills: 0 | Status: SHIPPED | OUTPATIENT
Start: 2025-02-04

## 2025-02-04 RX ORDER — TOPIRAMATE 100 MG/1
100 TABLET, FILM COATED ORAL 2 TIMES DAILY
Qty: 60 TABLET | Refills: 10 | Status: SHIPPED | OUTPATIENT
Start: 2025-02-04 | End: 2026-02-04

## 2025-02-04 RX ORDER — GABAPENTIN 600 MG/1
600 TABLET ORAL 3 TIMES DAILY
Qty: 90 TABLET | Refills: 0 | Status: SHIPPED | OUTPATIENT
Start: 2025-02-04

## 2025-02-05 ENCOUNTER — HOSPITAL ENCOUNTER (OUTPATIENT)
Dept: RADIOLOGY | Facility: HOSPITAL | Age: 80
Discharge: HOME | End: 2025-02-05
Payer: MEDICARE

## 2025-02-05 PROCEDURE — 72148 MRI LUMBAR SPINE W/O DYE: CPT

## 2025-02-05 PROCEDURE — 72148 MRI LUMBAR SPINE W/O DYE: CPT | Performed by: RADIOLOGY

## 2025-02-05 PROCEDURE — 72141 MRI NECK SPINE W/O DYE: CPT | Performed by: RADIOLOGY

## 2025-02-05 PROCEDURE — 72141 MRI NECK SPINE W/O DYE: CPT

## 2025-02-07 ENCOUNTER — APPOINTMENT (OUTPATIENT)
Dept: NEUROLOGY | Facility: HOSPITAL | Age: 80
End: 2025-02-07
Payer: MEDICARE

## 2025-02-10 ENCOUNTER — APPOINTMENT (OUTPATIENT)
Dept: RADIOLOGY | Facility: HOSPITAL | Age: 80
DRG: 193 | End: 2025-02-10
Payer: MEDICARE

## 2025-02-10 ENCOUNTER — APPOINTMENT (OUTPATIENT)
Dept: CARDIOLOGY | Facility: HOSPITAL | Age: 80
DRG: 193 | End: 2025-02-10
Payer: MEDICARE

## 2025-02-10 ENCOUNTER — HOSPITAL ENCOUNTER (INPATIENT)
Facility: HOSPITAL | Age: 80
Discharge: NURSING, REHAB OR LONG-TERM CARE | DRG: 193 | End: 2025-02-10
Attending: EMERGENCY MEDICINE | Admitting: INTERNAL MEDICINE
Payer: MEDICARE

## 2025-02-10 DIAGNOSIS — I71.10: ICD-10-CM

## 2025-02-10 DIAGNOSIS — I73.9 PERIPHERAL VASCULAR DISEASE, UNSPECIFIED (CMS-HCC): ICD-10-CM

## 2025-02-10 DIAGNOSIS — I50.23 ACUTE ON CHRONIC SYSTOLIC HEART FAILURE: ICD-10-CM

## 2025-02-10 DIAGNOSIS — I50.33 ACUTE ON CHRONIC DIASTOLIC HEART FAILURE: ICD-10-CM

## 2025-02-10 DIAGNOSIS — J10.1 INFLUENZA A: ICD-10-CM

## 2025-02-10 DIAGNOSIS — J18.9 PNEUMONIA DUE TO INFECTIOUS ORGANISM, UNSPECIFIED LATERALITY, UNSPECIFIED PART OF LUNG: ICD-10-CM

## 2025-02-10 DIAGNOSIS — J18.9 COMMUNITY ACQUIRED PNEUMONIA OF RIGHT UPPER LOBE OF LUNG: Primary | ICD-10-CM

## 2025-02-10 DIAGNOSIS — I24.89 DEMAND ISCHEMIA OF MYOCARDIUM (MULTI): ICD-10-CM

## 2025-02-10 DIAGNOSIS — I87.2 VENOUS INSUFFICIENCY: ICD-10-CM

## 2025-02-10 LAB
ALBUMIN SERPL BCP-MCNC: 3.5 G/DL (ref 3.4–5)
ALP SERPL-CCNC: 63 U/L (ref 33–136)
ALT SERPL W P-5'-P-CCNC: 46 U/L (ref 7–45)
ANION GAP BLDV CALCULATED.4IONS-SCNC: 8 MMOL/L (ref 10–25)
ANION GAP SERPL CALC-SCNC: 12 MMOL/L (ref 10–20)
APPEARANCE UR: CLEAR
AST SERPL W P-5'-P-CCNC: 59 U/L (ref 9–39)
BASE EXCESS BLDV CALC-SCNC: 0 MMOL/L (ref -2–3)
BASOPHILS # BLD AUTO: 0.01 X10*3/UL (ref 0–0.1)
BASOPHILS NFR BLD AUTO: 0.1 %
BILIRUB DIRECT SERPL-MCNC: 0.1 MG/DL (ref 0–0.3)
BILIRUB SERPL-MCNC: 0.5 MG/DL (ref 0–1.2)
BILIRUB UR STRIP.AUTO-MCNC: NEGATIVE MG/DL
BNP SERPL-MCNC: 370 PG/ML (ref 0–99)
BODY TEMPERATURE: 37 DEGREES CELSIUS
BUN SERPL-MCNC: 27 MG/DL (ref 6–23)
CA-I BLDV-SCNC: 1.19 MMOL/L (ref 1.1–1.33)
CALCIUM SERPL-MCNC: 8.6 MG/DL (ref 8.6–10.3)
CARDIAC TROPONIN I PNL SERPL HS: 524 NG/L (ref 0–13)
CARDIAC TROPONIN I PNL SERPL HS: 526 NG/L (ref 0–13)
CHLORIDE BLDV-SCNC: 102 MMOL/L (ref 98–107)
CHLORIDE SERPL-SCNC: 102 MMOL/L (ref 98–107)
CO2 SERPL-SCNC: 25 MMOL/L (ref 21–32)
COLOR UR: ABNORMAL
CREAT SERPL-MCNC: 1.07 MG/DL (ref 0.5–1.05)
EGFRCR SERPLBLD CKD-EPI 2021: 54 ML/MIN/1.73M*2
EOSINOPHIL # BLD AUTO: 0 X10*3/UL (ref 0–0.4)
EOSINOPHIL NFR BLD AUTO: 0 %
ERYTHROCYTE [DISTWIDTH] IN BLOOD BY AUTOMATED COUNT: 14.8 % (ref 11.5–14.5)
FLUAV RNA RESP QL NAA+PROBE: DETECTED
FLUBV RNA RESP QL NAA+PROBE: NOT DETECTED
GLUCOSE BLD MANUAL STRIP-MCNC: 174 MG/DL (ref 74–99)
GLUCOSE BLDV-MCNC: 226 MG/DL (ref 74–99)
GLUCOSE SERPL-MCNC: 214 MG/DL (ref 74–99)
GLUCOSE UR STRIP.AUTO-MCNC: ABNORMAL MG/DL
HCO3 BLDV-SCNC: 25.4 MMOL/L (ref 22–26)
HCT VFR BLD AUTO: 46.6 % (ref 36–46)
HCT VFR BLD EST: 44 % (ref 36–46)
HGB BLD-MCNC: 14.6 G/DL (ref 12–16)
HGB BLDV-MCNC: 14.5 G/DL (ref 12–16)
IMM GRANULOCYTES # BLD AUTO: 0.03 X10*3/UL (ref 0–0.5)
IMM GRANULOCYTES NFR BLD AUTO: 0.4 % (ref 0–0.9)
INHALED O2 CONCENTRATION: 21 %
KETONES UR STRIP.AUTO-MCNC: NEGATIVE MG/DL
LACTATE BLDV-SCNC: 1.5 MMOL/L (ref 0.4–2)
LACTATE SERPL-SCNC: 1.6 MMOL/L (ref 0.4–2)
LEUKOCYTE ESTERASE UR QL STRIP.AUTO: NEGATIVE
LIPASE SERPL-CCNC: 24 U/L (ref 9–82)
LYMPHOCYTES # BLD AUTO: 0.9 X10*3/UL (ref 0.8–3)
LYMPHOCYTES NFR BLD AUTO: 12.1 %
MCH RBC QN AUTO: 30.1 PG (ref 26–34)
MCHC RBC AUTO-ENTMCNC: 31.3 G/DL (ref 32–36)
MCV RBC AUTO: 96 FL (ref 80–100)
MONOCYTES # BLD AUTO: 0.68 X10*3/UL (ref 0.05–0.8)
MONOCYTES NFR BLD AUTO: 9.2 %
NEUTROPHILS # BLD AUTO: 5.81 X10*3/UL (ref 1.6–5.5)
NEUTROPHILS NFR BLD AUTO: 78.2 %
NITRITE UR QL STRIP.AUTO: NEGATIVE
NRBC BLD-RTO: 0 /100 WBCS (ref 0–0)
OXYHGB MFR BLDV: 28 % (ref 45–75)
PCO2 BLDV: 43 MM HG (ref 41–51)
PH BLDV: 7.38 PH (ref 7.33–7.43)
PH UR STRIP.AUTO: 6 [PH]
PLATELET # BLD AUTO: 193 X10*3/UL (ref 150–450)
PO2 BLDV: 24 MM HG (ref 35–45)
POTASSIUM BLDV-SCNC: 3.8 MMOL/L (ref 3.5–5.3)
POTASSIUM SERPL-SCNC: 3.9 MMOL/L (ref 3.5–5.3)
PROT SERPL-MCNC: 6.9 G/DL (ref 6.4–8.2)
PROT UR STRIP.AUTO-MCNC: ABNORMAL MG/DL
RBC # BLD AUTO: 4.85 X10*6/UL (ref 4–5.2)
RBC # UR STRIP.AUTO: ABNORMAL MG/DL
RBC #/AREA URNS AUTO: NORMAL /HPF
RSV RNA RESP QL NAA+PROBE: NOT DETECTED
SAO2 % BLDV: 28 % (ref 45–75)
SARS-COV-2 RNA RESP QL NAA+PROBE: NOT DETECTED
SODIUM BLDV-SCNC: 132 MMOL/L (ref 136–145)
SODIUM SERPL-SCNC: 135 MMOL/L (ref 136–145)
SP GR UR STRIP.AUTO: 1.02
UROBILINOGEN UR STRIP.AUTO-MCNC: NORMAL MG/DL
WBC # BLD AUTO: 7.4 X10*3/UL (ref 4.4–11.3)
WBC #/AREA URNS AUTO: NORMAL /HPF

## 2025-02-10 PROCEDURE — 81001 URINALYSIS AUTO W/SCOPE: CPT | Performed by: EMERGENCY MEDICINE

## 2025-02-10 PROCEDURE — 87637 SARSCOV2&INF A&B&RSV AMP PRB: CPT | Performed by: EMERGENCY MEDICINE

## 2025-02-10 PROCEDURE — 84484 ASSAY OF TROPONIN QUANT: CPT | Performed by: EMERGENCY MEDICINE

## 2025-02-10 PROCEDURE — 2500000001 HC RX 250 WO HCPCS SELF ADMINISTERED DRUGS (ALT 637 FOR MEDICARE OP): Performed by: INTERNAL MEDICINE

## 2025-02-10 PROCEDURE — 83605 ASSAY OF LACTIC ACID: CPT | Performed by: EMERGENCY MEDICINE

## 2025-02-10 PROCEDURE — 36415 COLL VENOUS BLD VENIPUNCTURE: CPT | Performed by: EMERGENCY MEDICINE

## 2025-02-10 PROCEDURE — 82248 BILIRUBIN DIRECT: CPT | Performed by: EMERGENCY MEDICINE

## 2025-02-10 PROCEDURE — 82947 ASSAY GLUCOSE BLOOD QUANT: CPT

## 2025-02-10 PROCEDURE — 82947 ASSAY GLUCOSE BLOOD QUANT: CPT | Performed by: EMERGENCY MEDICINE

## 2025-02-10 PROCEDURE — 1200000002 HC GENERAL ROOM WITH TELEMETRY DAILY

## 2025-02-10 PROCEDURE — 96375 TX/PRO/DX INJ NEW DRUG ADDON: CPT

## 2025-02-10 PROCEDURE — 2500000001 HC RX 250 WO HCPCS SELF ADMINISTERED DRUGS (ALT 637 FOR MEDICARE OP): Performed by: EMERGENCY MEDICINE

## 2025-02-10 PROCEDURE — 83880 ASSAY OF NATRIURETIC PEPTIDE: CPT | Performed by: EMERGENCY MEDICINE

## 2025-02-10 PROCEDURE — 2500000002 HC RX 250 W HCPCS SELF ADMINISTERED DRUGS (ALT 637 FOR MEDICARE OP, ALT 636 FOR OP/ED): Performed by: INTERNAL MEDICINE

## 2025-02-10 PROCEDURE — 71045 X-RAY EXAM CHEST 1 VIEW: CPT | Mod: FOREIGN READ | Performed by: RADIOLOGY

## 2025-02-10 PROCEDURE — 71045 X-RAY EXAM CHEST 1 VIEW: CPT

## 2025-02-10 PROCEDURE — 83690 ASSAY OF LIPASE: CPT | Performed by: EMERGENCY MEDICINE

## 2025-02-10 PROCEDURE — 85018 HEMOGLOBIN: CPT | Performed by: EMERGENCY MEDICINE

## 2025-02-10 PROCEDURE — 2500000004 HC RX 250 GENERAL PHARMACY W/ HCPCS (ALT 636 FOR OP/ED): Performed by: EMERGENCY MEDICINE

## 2025-02-10 PROCEDURE — 99222 1ST HOSP IP/OBS MODERATE 55: CPT | Performed by: INTERNAL MEDICINE

## 2025-02-10 PROCEDURE — 87040 BLOOD CULTURE FOR BACTERIA: CPT | Mod: AHULAB | Performed by: EMERGENCY MEDICINE

## 2025-02-10 PROCEDURE — 96365 THER/PROPH/DIAG IV INF INIT: CPT

## 2025-02-10 PROCEDURE — 85025 COMPLETE CBC W/AUTO DIFF WBC: CPT | Performed by: EMERGENCY MEDICINE

## 2025-02-10 PROCEDURE — 99285 EMERGENCY DEPT VISIT HI MDM: CPT | Mod: 25 | Performed by: EMERGENCY MEDICINE

## 2025-02-10 PROCEDURE — 93005 ELECTROCARDIOGRAM TRACING: CPT

## 2025-02-10 RX ORDER — DULOXETIN HYDROCHLORIDE 30 MG/1
30 CAPSULE, DELAYED RELEASE ORAL 2 TIMES DAILY
Status: DISCONTINUED | OUTPATIENT
Start: 2025-02-10 | End: 2025-02-17 | Stop reason: HOSPADM

## 2025-02-10 RX ORDER — OSELTAMIVIR PHOSPHATE 30 MG/1
30 CAPSULE ORAL 2 TIMES DAILY
Status: COMPLETED | OUTPATIENT
Start: 2025-02-10 | End: 2025-02-15

## 2025-02-10 RX ORDER — POLYETHYLENE GLYCOL 3350 17 G/17G
17 POWDER, FOR SOLUTION ORAL DAILY PRN
Status: DISCONTINUED | OUTPATIENT
Start: 2025-02-10 | End: 2025-02-17 | Stop reason: HOSPADM

## 2025-02-10 RX ORDER — TALC
3 POWDER (GRAM) TOPICAL NIGHTLY PRN
Status: DISCONTINUED | OUTPATIENT
Start: 2025-02-10 | End: 2025-02-17 | Stop reason: HOSPADM

## 2025-02-10 RX ORDER — ACETAMINOPHEN 650 MG/1
650 SUPPOSITORY RECTAL EVERY 4 HOURS PRN
Status: DISCONTINUED | OUTPATIENT
Start: 2025-02-10 | End: 2025-02-17 | Stop reason: HOSPADM

## 2025-02-10 RX ORDER — AMIODARONE HYDROCHLORIDE 200 MG/1
200 TABLET ORAL DAILY
Status: DISCONTINUED | OUTPATIENT
Start: 2025-02-11 | End: 2025-02-17 | Stop reason: HOSPADM

## 2025-02-10 RX ORDER — INSULIN LISPRO 100 [IU]/ML
0-5 INJECTION, SOLUTION INTRAVENOUS; SUBCUTANEOUS
Status: DISCONTINUED | OUTPATIENT
Start: 2025-02-10 | End: 2025-02-13

## 2025-02-10 RX ORDER — GABAPENTIN 300 MG/1
600 CAPSULE ORAL 3 TIMES DAILY
Status: DISCONTINUED | OUTPATIENT
Start: 2025-02-10 | End: 2025-02-17 | Stop reason: HOSPADM

## 2025-02-10 RX ORDER — ATORVASTATIN CALCIUM 40 MG/1
40 TABLET, FILM COATED ORAL NIGHTLY
Status: DISCONTINUED | OUTPATIENT
Start: 2025-02-10 | End: 2025-02-17 | Stop reason: HOSPADM

## 2025-02-10 RX ORDER — OSELTAMIVIR PHOSPHATE 30 MG/1
30 CAPSULE ORAL ONCE
Status: DISCONTINUED | OUTPATIENT
Start: 2025-02-10 | End: 2025-02-10 | Stop reason: SDUPTHER

## 2025-02-10 RX ORDER — ONDANSETRON HYDROCHLORIDE 2 MG/ML
4 INJECTION, SOLUTION INTRAVENOUS EVERY 8 HOURS PRN
Status: DISCONTINUED | OUTPATIENT
Start: 2025-02-10 | End: 2025-02-10 | Stop reason: ALTCHOICE

## 2025-02-10 RX ORDER — DONEPEZIL HYDROCHLORIDE 5 MG/1
5 TABLET, FILM COATED ORAL NIGHTLY
Status: DISCONTINUED | OUTPATIENT
Start: 2025-02-10 | End: 2025-02-17 | Stop reason: HOSPADM

## 2025-02-10 RX ORDER — CEFTRIAXONE 1 G/50ML
1 INJECTION, SOLUTION INTRAVENOUS DAILY
Status: DISCONTINUED | OUTPATIENT
Start: 2025-02-11 | End: 2025-02-17 | Stop reason: HOSPADM

## 2025-02-10 RX ORDER — TRAZODONE HYDROCHLORIDE 50 MG/1
50 TABLET ORAL NIGHTLY PRN
Status: DISCONTINUED | OUTPATIENT
Start: 2025-02-10 | End: 2025-02-17 | Stop reason: HOSPADM

## 2025-02-10 RX ORDER — BACLOFEN 5 MG/1
5 TABLET ORAL 2 TIMES DAILY
Status: DISCONTINUED | OUTPATIENT
Start: 2025-02-10 | End: 2025-02-17 | Stop reason: HOSPADM

## 2025-02-10 RX ORDER — GLIPIZIDE 2.5 MG/1
5 TABLET, EXTENDED RELEASE ORAL DAILY
Status: DISCONTINUED | OUTPATIENT
Start: 2025-02-11 | End: 2025-02-17 | Stop reason: HOSPADM

## 2025-02-10 RX ORDER — ACETAMINOPHEN 325 MG/1
975 TABLET ORAL ONCE
Status: COMPLETED | OUTPATIENT
Start: 2025-02-10 | End: 2025-02-10

## 2025-02-10 RX ORDER — ACETAMINOPHEN 160 MG/5ML
650 SOLUTION ORAL EVERY 4 HOURS PRN
Status: DISCONTINUED | OUTPATIENT
Start: 2025-02-10 | End: 2025-02-17 | Stop reason: HOSPADM

## 2025-02-10 RX ORDER — PROCHLORPERAZINE EDISYLATE 5 MG/ML
10 INJECTION INTRAMUSCULAR; INTRAVENOUS EVERY 6 HOURS PRN
Status: DISCONTINUED | OUTPATIENT
Start: 2025-02-10 | End: 2025-02-17 | Stop reason: HOSPADM

## 2025-02-10 RX ORDER — PROCHLORPERAZINE MALEATE 10 MG
10 TABLET ORAL EVERY 6 HOURS PRN
Status: DISCONTINUED | OUTPATIENT
Start: 2025-02-10 | End: 2025-02-17 | Stop reason: HOSPADM

## 2025-02-10 RX ORDER — ONDANSETRON 4 MG/1
4 TABLET, FILM COATED ORAL EVERY 8 HOURS PRN
Status: DISCONTINUED | OUTPATIENT
Start: 2025-02-10 | End: 2025-02-10 | Stop reason: ALTCHOICE

## 2025-02-10 RX ORDER — ACETAMINOPHEN 325 MG/1
650 TABLET ORAL EVERY 4 HOURS PRN
Status: DISCONTINUED | OUTPATIENT
Start: 2025-02-10 | End: 2025-02-17 | Stop reason: HOSPADM

## 2025-02-10 RX ORDER — PANTOPRAZOLE SODIUM 40 MG/10ML
40 INJECTION, POWDER, LYOPHILIZED, FOR SOLUTION INTRAVENOUS
Status: DISCONTINUED | OUTPATIENT
Start: 2025-02-11 | End: 2025-02-17 | Stop reason: HOSPADM

## 2025-02-10 RX ORDER — PANTOPRAZOLE SODIUM 40 MG/1
40 TABLET, DELAYED RELEASE ORAL
Status: DISCONTINUED | OUTPATIENT
Start: 2025-02-11 | End: 2025-02-17 | Stop reason: HOSPADM

## 2025-02-10 RX ORDER — SPIRONOLACTONE 25 MG/1
25 TABLET ORAL DAILY
Status: DISCONTINUED | OUTPATIENT
Start: 2025-02-11 | End: 2025-02-17 | Stop reason: HOSPADM

## 2025-02-10 RX ORDER — BENZONATATE 100 MG/1
100 CAPSULE ORAL 3 TIMES DAILY PRN
Status: DISCONTINUED | OUTPATIENT
Start: 2025-02-10 | End: 2025-02-17 | Stop reason: HOSPADM

## 2025-02-10 RX ORDER — GUAIFENESIN 600 MG/1
600 TABLET, EXTENDED RELEASE ORAL EVERY 12 HOURS PRN
Status: DISCONTINUED | OUTPATIENT
Start: 2025-02-10 | End: 2025-02-17 | Stop reason: HOSPADM

## 2025-02-10 RX ORDER — TOPIRAMATE 100 MG/1
100 TABLET, FILM COATED ORAL 2 TIMES DAILY
Status: DISCONTINUED | OUTPATIENT
Start: 2025-02-10 | End: 2025-02-17 | Stop reason: HOSPADM

## 2025-02-10 RX ADMIN — DULOXETINE HYDROCHLORIDE 30 MG: 30 CAPSULE, DELAYED RELEASE ORAL at 20:51

## 2025-02-10 RX ADMIN — GABAPENTIN 600 MG: 300 CAPSULE ORAL at 20:50

## 2025-02-10 RX ADMIN — CEFTRIAXONE 2 G: 2 INJECTION, POWDER, FOR SOLUTION INTRAMUSCULAR; INTRAVENOUS at 17:14

## 2025-02-10 RX ADMIN — AZITHROMYCIN MONOHYDRATE 500 MG: 500 INJECTION, POWDER, LYOPHILIZED, FOR SOLUTION INTRAVENOUS at 17:56

## 2025-02-10 RX ADMIN — APIXABAN 5 MG: 5 TABLET, FILM COATED ORAL at 20:50

## 2025-02-10 RX ADMIN — BACLOFEN 5 MG: 10 TABLET ORAL at 20:50

## 2025-02-10 RX ADMIN — DONEPEZIL HYDROCHLORIDE 5 MG: 5 TABLET ORAL at 20:50

## 2025-02-10 RX ADMIN — INSULIN LISPRO 1 UNITS: 100 INJECTION, SOLUTION INTRAVENOUS; SUBCUTANEOUS at 20:49

## 2025-02-10 RX ADMIN — ATORVASTATIN CALCIUM 40 MG: 40 TABLET, FILM COATED ORAL at 20:50

## 2025-02-10 RX ADMIN — OSELTAMIVIR PHOSPHATE 30 MG: 30 CAPSULE ORAL at 20:51

## 2025-02-10 RX ADMIN — TOPIRAMATE 100 MG: 100 TABLET, FILM COATED ORAL at 20:50

## 2025-02-10 RX ADMIN — ACETAMINOPHEN 975 MG: 325 TABLET, FILM COATED ORAL at 19:44

## 2025-02-10 RX ADMIN — EMPAGLIFLOZIN 25 MG: 25 TABLET, FILM COATED ORAL at 19:44

## 2025-02-10 RX ADMIN — SODIUM CHLORIDE 1000 ML: 9 INJECTION, SOLUTION INTRAVENOUS at 17:13

## 2025-02-10 ASSESSMENT — COLUMBIA-SUICIDE SEVERITY RATING SCALE - C-SSRS
2. HAVE YOU ACTUALLY HAD ANY THOUGHTS OF KILLING YOURSELF?: NO
6. HAVE YOU EVER DONE ANYTHING, STARTED TO DO ANYTHING, OR PREPARED TO DO ANYTHING TO END YOUR LIFE?: NO
1. IN THE PAST MONTH, HAVE YOU WISHED YOU WERE DEAD OR WISHED YOU COULD GO TO SLEEP AND NOT WAKE UP?: NO

## 2025-02-10 ASSESSMENT — PAIN SCALES - GENERAL
PAINLEVEL_OUTOF10: 0 - NO PAIN
PAINLEVEL_OUTOF10: 0 - NO PAIN

## 2025-02-10 ASSESSMENT — PAIN - FUNCTIONAL ASSESSMENT: PAIN_FUNCTIONAL_ASSESSMENT: 0-10

## 2025-02-10 NOTE — PROGRESS NOTES
Pharmacy Medication History     Source of Information: Per family bedside     Additional concerns with the patient's PTA list.   N/a  The following updates were made to the Prior to Admission medication list:     Medications ADDED:   N/a  Medications CHANGED:  Flonase is prn   Medications REMOVED:   N/a  Medications NOT TAKING:   Januvia     Allergy reviewed : Yes    Meds 2 Beds : Yes    Outpatient pharmacy confirmed and updated in chart : Yes    Pharmacy name: [facility]     The list below reflectives the updated PTA list. Please review each medication in order reconciliation for additional clarification and justification.    Prior to Admission Medications   Prescriptions Last Dose Informant   DULoxetine (Cymbalta) 30 mg DR capsule 2/10/2025 Morning    Sig: TAKE ONE CAPSULE BY MOUTH TWO TIMES A DAY           acetaminophen (Tylenol) 325 mg tablet     Sig: Give 2 tablet by mouth every 4 hours as needed for Pain   amiodarone (Pacerone) 200 mg tablet 2/10/2025 Morning    Sig: TAKE ONE TABLET BY MOUTH IN THE MORNING FOR ANTIARRHYTHMIC   Patient taking differently: Take 1 tablet (200 mg) by mouth once daily. TAKE ONE TABLET BY MOUTH IN THE MORNING FOR ANTIARRHYTHMIC   apixaban (Eliquis) 5 mg tablet 2/10/2025 Morning    Sig: Take 1 tablet (5 mg) by mouth 2 times a day.   atorvastatin (Lipitor) 40 mg tablet 2025    Sig: Take 1 tablet (40 mg) by mouth once daily at bedtime.   b complex 0.4 mg tablet 2/10/2025 Morning    Sig: Take 1 tablet by mouth once daily.   baclofen (Lioresal) 5 mg tablet 2/10/2025 Morning    Sig: TAKE ONE TABLET BY MOUTH TWO TIMES A DAY   benzonatate (Tessalon) 100 mg capsule 2/10/2025    Sig: TAKE 1 CAPSULE BY MOUTH 3 TIMES A DAY AS NEEDED FOR COUGH. DO NOT CRUSH OR CHEW.   Patient taking differently: Take 1 capsule (100 mg) by mouth 3 times a day as needed for cough.   biotin 1 mg tablet 2/10/2025    Si tab(s) orally once a day   cephalexin (Keflex) 500 mg capsule 2/10/2025 Morning     Sig: Take 1 capsule (500 mg) by mouth 2 times a day.   cholecalciferol (Vitamin D-3) 1,250 mcg (50,000 unit) capsule 2/9/2025    Sig: Take 1 capsule (50,000 Units) by mouth 1 (one) time per week. Sunday   docusate sodium (Colace) 100 mg capsule     Sig: Take 1 capsule (100 mg) by mouth 2 times a day as needed for constipation.   donepezil (Aricept) 5 mg tablet 2/9/2025 Evening    Sig: TAKE ONE TABLET BY MOUTH AT BEDTIME   empagliflozin (Jardiance) 25 mg     Sig: Take 1 tablet (25 mg) by mouth once daily.   fluticasone (Flonase) 50 mcg/actuation nasal spray     Sig: Administer 2 sprays into each nostril once daily. Shake gently. Before first use, prime pump. After use, clean tip and replace cap.   Patient taking differently: Administer 2 sprays into each nostril once daily as needed for allergies. Shake gently. Before first use, prime pump. After use, clean tip and replace cap.   furosemide (Lasix) 20 mg tablet 2/10/2025 Morning    Sig: Take 1 tablet (20 mg) by mouth once daily.   gabapentin (Neurontin) 600 mg tablet 2/10/2025 Morning    Sig: TAKE 1 TABLET BY MOUTH 3 TIMES DAILY.   glipiZIDE XL (Glucotrol XL) 5 mg 24 hr tablet 2/10/2025 Morning    Sig: Take 1 tablet (5 mg) by mouth once daily. Always take with food in the morning   losartan (Cozaar) 25 mg tablet 2/10/2025    Sig: Take 1 tablet (25 mg) by mouth once daily.   melatonin 3 mg tablet     Sig: Take 2 tablets (6 mg) by mouth as needed at bedtime for sleep.   naloxone (Narcan) 4 mg/0.1 mL nasal spray     Sig: Administer 1 spray (4 mg) into affected nostril(s) if needed for opioid reversal. May repeat every 2-3 minutes if needed, alternating nostrils, until medical assistance becomes available.   ondansetron ODT (Zofran-ODT) 4 mg disintegrating tablet     Sig: Take 1 tablet (4 mg) by mouth every 8 hours if needed for nausea or vomiting.   spironolactone (Aldactone) 25 mg tablet 2/10/2025    Sig: Take 1 tablet (25 mg) by mouth once daily.   tafamidis  (Vyndamax) 61 mg capsule 2/10/2025    Sig: Take 1 capsule (61 mg) by mouth once daily.   topiramate (Topamax) 100 mg tablet 2/10/2025 Morning    Sig: Take 1 tablet (100 mg) by mouth 2 times a day.   traZODone (Desyrel) 50 mg tablet     Sig: TAKE ONE TABLET BY MOUTH AT BEDTIME AS NEEDED for sleep      Facility-Administered Medications: None       The list below reflectives the updated allergy list. Please review each documented allergy for additional clarification and justification.    Allergies   Allergen Reactions    Shellfish Containing Products Swelling          02/10/25 at 6:01 PM - Marcia Canseco

## 2025-02-10 NOTE — H&P
Elida Benson is a 79 y.o. female   Weakness, Gen       Patient with a past medical history of hypertension dyslipidemia COPD atrial fibrillation on Eliquis dementia diabetes mellitus chronic diastolic congestive heart failure, amyloid heart disease postlaminectomy syndrome, chronic kidney disease stage IIIb presents to the hospital with increasing cough and productive phlegm along with worsening shortness of breath     Past Medical History  Past Medical History:   Diagnosis Date    A-fib (Multi)     Managed on meds, Eliquis stoppage and cardiac clearance in [facility] from Dr. Ignacio    Cervical myelopathy     Chronic pain syndrome     COPD (chronic obstructive pulmonary disease) (Multi)     denies, she is a former smoker but quit in 1984    Dementia     Depression     Diabetic neuropathy (Multi)     Diverticulitis     DM (diabetes mellitus) (Multi)     denies but A1C= 7.2% on 9/5/23    LORENZ (dyspnea on exertion)     Stable per cards note    HLD (hyperlipidemia)     Hypertension, essential     Infiltrative cardiomyopathy (Multi)     Echo 8/22/23, following with Dr. Ignacio    OA (ocular albinism) (Multi)     multiple sites    Other malaise 04/21/2022    Physical deconditioning    Palpitations     on occasion, has afib followed by cardiology    Post laminectomy syndrome     Rectovaginal fistula     Spinal stenosis of cervical region     UTI (urinary tract infection)        Surgical History  Past Surgical History:   Procedure Laterality Date    CARPAL TUNNEL RELEASE  08/27/2013    Neuroplasty Decompression Median Nerve At Carpal Tunnel    ILEOSTOMY      KNEE ARTHROPLASTY  08/27/2013    Knee Arthroplasty    LAMINECTOMY      LUMBAR FUSION  2023    OTHER SURGICAL HISTORY  08/16/2023    LAPAROSCPIC ANTERIOR RESECTION, DIVERTING LOOP ILEOSTOMY    SPINAL CORD STIMULATOR IMPLANT  2022    TOTAL HIP ARTHROPLASTY  08/27/2013    Total Hip Replacement        Social History  She reports that she quit smoking about 41 years ago. Her  smoking use included cigarettes. She has never used smokeless tobacco. She reports current alcohol use. She reports that she does not use drugs.    Family History  Family History   Problem Relation Name Age of Onset    No Known Problems Mother      No Known Problems Father      No Known Problems Sister      No Known Problems Sister      No Known Problems Sister      No Known Problems Sister          Allergies  Shellfish containing products    Review of Systems     Constitutional: not feeling poorly, no fever, no recent weight gain and no recent weight loss.   Eyes: no blurred vision and no diplopia.   ENT: no hearing loss, no tinnitus, no earache, no sore throat, no hoarseness and no swollen glands in the neck.   Cardiovascular: no chest pain, no tightness or heavy pressure,   Respiratory: Cough or shortness of breath  Gastrointestinal: no change in bowel habits, no diarrhea, no constipation, no bloody stools, no nausea, no vomiting, no abdominal pain, no signs and symptoms of ulcer disease, no dell colored stools and no intolerance to fatty foods.   Genitourinary: no urinary frequency, no dysuria, no hematuria, no burning sensation during urination, urinary stream is not smaller and urinary stream does not start and stop.   Musculoskeletal: Back pain with difficulty walking  Skin: no rashes, no change in skin color and pigmentation, no skin lesions and no skin lumps.   Neurological: no headaches, no dizziness, no seizures, no tingling, no numbness, no signs and symptoms of stroke and no limb weakness.   Psychiatric: no confusion, no memory lapses or loss, no depression and no sleep disturbances.   Endocrine: no excessive thirst, no dry skin, no cold intolerance, no heat intolerance and no increased urinary frequency.   Hematologic/Lymphatic: is not slow to heal, does not bleed easily, does not bruise easily, no thrombophlebitis, no anemia and no history of blood transfusion.   All other systems have been reviewed  and are negative for complaint.     Vitals:    02/10/25 1715   BP: 105/52   Pulse: 92   Resp: 16   Temp:    SpO2: 94%        Scheduled medications  acetaminophen, 975 mg, oral, Once  [START ON 2/11/2025] amiodarone, 200 mg, oral, Daily  apixaban, 5 mg, oral, BID  atorvastatin, 40 mg, oral, Nightly  azithromycin, 500 mg, intravenous, Once  azithromycin, 500 mg, intravenous, Daily  baclofen, 5 mg, oral, BID  cefTRIAXone, 1 g, intravenous, Daily  donepezil, 5 mg, oral, Nightly  DULoxetine, 30 mg, oral, BID  empagliflozin, 25 mg, oral, Daily  gabapentin, 600 mg, oral, TID  [START ON 2/11/2025] glipiZIDE XL, 5 mg, oral, Daily  insulin lispro, 0-5 Units, subcutaneous, Before meals & nightly  oseltamivir, 30 mg, oral, BID  [START ON 2/11/2025] pantoprazole, 40 mg, oral, Daily before breakfast   Or  [START ON 2/11/2025] pantoprazole, 40 mg, intravenous, Daily before breakfast  SITagliptin phosphate, 50 mg, oral, Daily  [START ON 2/11/2025] spironolactone, 25 mg, oral, Daily  [START ON 2/11/2025] tafamidis, 61 mg, oral, Daily  topiramate, 100 mg, oral, BID      Continuous medications     PRN medications  PRN medications: acetaminophen **OR** acetaminophen **OR** acetaminophen, benzonatate, guaiFENesin, melatonin, ondansetron **OR** ondansetron, polyethylene glycol, traZODone    Results from last 7 days   Lab Units 02/10/25  1612   WBC AUTO x10*3/uL 7.4   HEMOGLOBIN g/dL 14.6   HEMATOCRIT % 46.6*   PLATELETS AUTO x10*3/uL 193     Results from last 7 days   Lab Units 02/10/25  1612   SODIUM mmol/L 135*   POTASSIUM mmol/L 3.9   CHLORIDE mmol/L 102   CO2 mmol/L 25   BUN mg/dL 27*   CREATININE mg/dL 1.07*   CALCIUM mg/dL 8.6   PROTEIN TOTAL g/dL 6.9   BILIRUBIN TOTAL mg/dL 0.5   ALK PHOS U/L 63   ALT U/L 46*   AST U/L 59*   GLUCOSE mg/dL 214*     Results from last 7 days   Lab Units 02/10/25  1802 02/10/25  1612   TROPHS ng/L 524* 526*        XR chest 1 view   Final Result   1.Newly appearing right upper lobe infiltrates and mild    accentuation of markings in the right infrahilar and basilar regions.   Findings could be related to pneumonia or asymmetric pulmonary   vascular congestion.   2.Stable mild enlargement of the cardiomediastinal silhouette.   Signed by Ania Brito DO          Physical Exam      Constitutional   General appearance: Alert   Eyes   Inspection of eyes: Sclera and conjunctiva were normal.    Pupil exam: Pupils were equal in size. Extraocular movements were intact.   Pulmonary   Respiratory assessment: No respiratory distress, normal respiratory rhythm and effort.    Auscultation of Lungs: Rhonchi mild wheezing  Cardiovascular   Auscultation of heart: Apical pulse normal, heart rate and rhythm normal, normal S1 and S2, no murmurs and no pericardial rub.    Exam for edema: No peripheral edema.   Abdomen   Abdominal Exam: No bruits, normal bowel sounds, soft, non-tender, no abdominal mass palpated.    Liver and Spleen exam: No hepato-splenomegaly.   Musculoskeletal     Inspection/palpation of joints, bones and muscles: No joint swelling. Normal movement of all extremities.   Skin   Skin inspection: Normal skin color and pigmentation, normal skin turgor and no visible rash.   Neurologic   Cranial nerves: Nerves 2-12 were intact, no focal neuro defects.         Assessment/Plan      #Influenza pneumonia  Started Tamiflu  #Exacerbation of COPD  DuoNebs and Solu-Medrol    #Elevated troponin  #Infiltrative cardiomyopathy/amyloidosis  Consulting cardiology    #Diabetes mellitus type 2  Sliding insulin coverage    #Hypertension  Stable resume home medications    #Dyslipidemia  Continue statins with target LDL less than 70    #CKD 3B  Monitor kidney functions while diuresing    #Postlaminectomy syndrome with chronic back pain  Continue pain medications    PT OT consult for deconditioning

## 2025-02-10 NOTE — ED TRIAGE NOTES
Pt coming in for generalized weakness. Per squad she has been at home with decreased movement per the family. Currently has a cough but has no other complaints.

## 2025-02-11 ENCOUNTER — APPOINTMENT (OUTPATIENT)
Dept: VASCULAR MEDICINE | Facility: HOSPITAL | Age: 80
DRG: 193 | End: 2025-02-11
Payer: MEDICARE

## 2025-02-11 LAB
GLUCOSE BLD MANUAL STRIP-MCNC: 124 MG/DL (ref 74–99)
GLUCOSE BLD MANUAL STRIP-MCNC: 129 MG/DL (ref 74–99)
GLUCOSE BLD MANUAL STRIP-MCNC: 145 MG/DL (ref 74–99)
GLUCOSE BLD MANUAL STRIP-MCNC: 51 MG/DL (ref 74–99)
GLUCOSE BLD MANUAL STRIP-MCNC: 65 MG/DL (ref 74–99)
GLUCOSE BLD MANUAL STRIP-MCNC: 79 MG/DL (ref 74–99)

## 2025-02-11 PROCEDURE — 2500000001 HC RX 250 WO HCPCS SELF ADMINISTERED DRUGS (ALT 637 FOR MEDICARE OP): Performed by: INTERNAL MEDICINE

## 2025-02-11 PROCEDURE — 2500000005 HC RX 250 GENERAL PHARMACY W/O HCPCS: Performed by: INTERNAL MEDICINE

## 2025-02-11 PROCEDURE — 2500000001 HC RX 250 WO HCPCS SELF ADMINISTERED DRUGS (ALT 637 FOR MEDICARE OP): Performed by: PHARMACIST

## 2025-02-11 PROCEDURE — 2500000004 HC RX 250 GENERAL PHARMACY W/ HCPCS (ALT 636 FOR OP/ED): Performed by: NURSE PRACTITIONER

## 2025-02-11 PROCEDURE — 93922 UPR/L XTREMITY ART 2 LEVELS: CPT | Performed by: SURGERY

## 2025-02-11 PROCEDURE — 97535 SELF CARE MNGMENT TRAINING: CPT | Mod: GO

## 2025-02-11 PROCEDURE — 2500000002 HC RX 250 W HCPCS SELF ADMINISTERED DRUGS (ALT 637 FOR MEDICARE OP, ALT 636 FOR OP/ED): Performed by: INTERNAL MEDICINE

## 2025-02-11 PROCEDURE — 97165 OT EVAL LOW COMPLEX 30 MIN: CPT | Mod: GO

## 2025-02-11 PROCEDURE — 1200000002 HC GENERAL ROOM WITH TELEMETRY DAILY

## 2025-02-11 PROCEDURE — 82947 ASSAY GLUCOSE BLOOD QUANT: CPT

## 2025-02-11 PROCEDURE — 2500000004 HC RX 250 GENERAL PHARMACY W/ HCPCS (ALT 636 FOR OP/ED): Performed by: INTERNAL MEDICINE

## 2025-02-11 PROCEDURE — 99223 1ST HOSP IP/OBS HIGH 75: CPT | Performed by: STUDENT IN AN ORGANIZED HEALTH CARE EDUCATION/TRAINING PROGRAM

## 2025-02-11 PROCEDURE — 93922 UPR/L XTREMITY ART 2 LEVELS: CPT

## 2025-02-11 PROCEDURE — 99232 SBSQ HOSP IP/OBS MODERATE 35: CPT | Performed by: INTERNAL MEDICINE

## 2025-02-11 RX ORDER — DEXTROSE 50 % IN WATER (D50W) INTRAVENOUS SYRINGE
25
Status: DISCONTINUED | OUTPATIENT
Start: 2025-02-11 | End: 2025-02-17 | Stop reason: HOSPADM

## 2025-02-11 RX ORDER — AZITHROMYCIN 500 MG/1
500 TABLET, FILM COATED ORAL EVERY 24 HOURS
Status: DISCONTINUED | OUTPATIENT
Start: 2025-02-11 | End: 2025-02-17 | Stop reason: HOSPADM

## 2025-02-11 RX ORDER — FUROSEMIDE 10 MG/ML
40 INJECTION INTRAMUSCULAR; INTRAVENOUS ONCE
Status: COMPLETED | OUTPATIENT
Start: 2025-02-11 | End: 2025-02-11

## 2025-02-11 RX ORDER — DEXTROSE 50 % IN WATER (D50W) INTRAVENOUS SYRINGE
12.5
Status: DISCONTINUED | OUTPATIENT
Start: 2025-02-11 | End: 2025-02-17 | Stop reason: HOSPADM

## 2025-02-11 RX ADMIN — SPIRONOLACTONE 25 MG: 25 TABLET ORAL at 08:33

## 2025-02-11 RX ADMIN — DONEPEZIL HYDROCHLORIDE 5 MG: 5 TABLET ORAL at 20:33

## 2025-02-11 RX ADMIN — OSELTAMIVIR PHOSPHATE 30 MG: 30 CAPSULE ORAL at 20:33

## 2025-02-11 RX ADMIN — TOPIRAMATE 100 MG: 100 TABLET, FILM COATED ORAL at 20:33

## 2025-02-11 RX ADMIN — PANTOPRAZOLE SODIUM 40 MG: 40 INJECTION, POWDER, FOR SOLUTION INTRAVENOUS at 06:33

## 2025-02-11 RX ADMIN — EMPAGLIFLOZIN 25 MG: 25 TABLET, FILM COATED ORAL at 08:33

## 2025-02-11 RX ADMIN — AZITHROMYCIN 500 MG: 500 TABLET, FILM COATED ORAL at 20:33

## 2025-02-11 RX ADMIN — SODIUM CHLORIDE 250 ML: 9 INJECTION, SOLUTION INTRAVENOUS at 00:24

## 2025-02-11 RX ADMIN — OSELTAMIVIR PHOSPHATE 30 MG: 30 CAPSULE ORAL at 08:33

## 2025-02-11 RX ADMIN — AMIODARONE HYDROCHLORIDE 200 MG: 200 TABLET ORAL at 08:33

## 2025-02-11 RX ADMIN — CEFTRIAXONE SODIUM 1 G: 1 INJECTION, SOLUTION INTRAVENOUS at 16:23

## 2025-02-11 RX ADMIN — DEXTROSE MONOHYDRATE 25 G: 25 INJECTION, SOLUTION INTRAVENOUS at 16:46

## 2025-02-11 RX ADMIN — GABAPENTIN 600 MG: 300 CAPSULE ORAL at 14:10

## 2025-02-11 RX ADMIN — INSULIN LISPRO 4 UNITS: 100 INJECTION, SOLUTION INTRAVENOUS; SUBCUTANEOUS at 16:23

## 2025-02-11 RX ADMIN — DULOXETINE HYDROCHLORIDE 30 MG: 30 CAPSULE, DELAYED RELEASE ORAL at 08:33

## 2025-02-11 RX ADMIN — APIXABAN 5 MG: 5 TABLET, FILM COATED ORAL at 08:33

## 2025-02-11 RX ADMIN — ATORVASTATIN CALCIUM 40 MG: 40 TABLET, FILM COATED ORAL at 20:33

## 2025-02-11 RX ADMIN — BACLOFEN 5 MG: 10 TABLET ORAL at 08:33

## 2025-02-11 RX ADMIN — FUROSEMIDE 40 MG: 10 INJECTION, SOLUTION INTRAMUSCULAR; INTRAVENOUS at 14:10

## 2025-02-11 RX ADMIN — BENZONATATE 100 MG: 100 CAPSULE ORAL at 06:33

## 2025-02-11 RX ADMIN — GABAPENTIN 600 MG: 300 CAPSULE ORAL at 20:33

## 2025-02-11 RX ADMIN — TAFAMIDIS 61 MG: 61 CAPSULE, LIQUID FILLED ORAL at 20:34

## 2025-02-11 RX ADMIN — DULOXETINE HYDROCHLORIDE 30 MG: 30 CAPSULE, DELAYED RELEASE ORAL at 20:33

## 2025-02-11 RX ADMIN — TOPIRAMATE 100 MG: 100 TABLET, FILM COATED ORAL at 08:33

## 2025-02-11 RX ADMIN — GABAPENTIN 600 MG: 300 CAPSULE ORAL at 08:33

## 2025-02-11 RX ADMIN — APIXABAN 5 MG: 5 TABLET, FILM COATED ORAL at 20:33

## 2025-02-11 RX ADMIN — GLIPIZIDE 5 MG: 2.5 TABLET, EXTENDED RELEASE ORAL at 08:33

## 2025-02-11 RX ADMIN — BACLOFEN 5 MG: 10 TABLET ORAL at 20:33

## 2025-02-11 SDOH — SOCIAL STABILITY: SOCIAL INSECURITY: WITHIN THE LAST YEAR, HAVE YOU BEEN AFRAID OF YOUR PARTNER OR EX-PARTNER?: NO

## 2025-02-11 SDOH — SOCIAL STABILITY: SOCIAL INSECURITY: WITHIN THE LAST YEAR, HAVE YOU BEEN HUMILIATED OR EMOTIONALLY ABUSED IN OTHER WAYS BY YOUR PARTNER OR EX-PARTNER?: NO

## 2025-02-11 SDOH — SOCIAL STABILITY: SOCIAL INSECURITY
WITHIN THE LAST YEAR, HAVE YOU BEEN RAPED OR FORCED TO HAVE ANY KIND OF SEXUAL ACTIVITY BY YOUR PARTNER OR EX-PARTNER?: NO

## 2025-02-11 SDOH — ECONOMIC STABILITY: FOOD INSECURITY: HOW HARD IS IT FOR YOU TO PAY FOR THE VERY BASICS LIKE FOOD, HOUSING, MEDICAL CARE, AND HEATING?: NOT VERY HARD

## 2025-02-11 SDOH — SOCIAL STABILITY: SOCIAL INSECURITY: ARE THERE ANY APPARENT SIGNS OF INJURIES/BEHAVIORS THAT COULD BE RELATED TO ABUSE/NEGLECT?: NO

## 2025-02-11 SDOH — HEALTH STABILITY: MENTAL HEALTH: HOW MANY DRINKS CONTAINING ALCOHOL DO YOU HAVE ON A TYPICAL DAY WHEN YOU ARE DRINKING?: PATIENT DOES NOT DRINK

## 2025-02-11 SDOH — ECONOMIC STABILITY: TRANSPORTATION INSECURITY: IN THE PAST 12 MONTHS, HAS LACK OF TRANSPORTATION KEPT YOU FROM MEDICAL APPOINTMENTS OR FROM GETTING MEDICATIONS?: NO

## 2025-02-11 SDOH — SOCIAL STABILITY: SOCIAL INSECURITY: HAVE YOU HAD THOUGHTS OF HARMING ANYONE ELSE?: NO

## 2025-02-11 SDOH — HEALTH STABILITY: MENTAL HEALTH: HOW OFTEN DO YOU HAVE A DRINK CONTAINING ALCOHOL?: NEVER

## 2025-02-11 SDOH — ECONOMIC STABILITY: HOUSING INSECURITY: AT ANY TIME IN THE PAST 12 MONTHS, WERE YOU HOMELESS OR LIVING IN A SHELTER (INCLUDING NOW)?: NO

## 2025-02-11 SDOH — SOCIAL STABILITY: SOCIAL INSECURITY: DOES ANYONE TRY TO KEEP YOU FROM HAVING/CONTACTING OTHER FRIENDS OR DOING THINGS OUTSIDE YOUR HOME?: NO

## 2025-02-11 SDOH — SOCIAL STABILITY: SOCIAL INSECURITY: WERE YOU ABLE TO COMPLETE ALL THE BEHAVIORAL HEALTH SCREENINGS?: YES

## 2025-02-11 SDOH — ECONOMIC STABILITY: HOUSING INSECURITY: IN THE LAST 12 MONTHS, WAS THERE A TIME WHEN YOU WERE NOT ABLE TO PAY THE MORTGAGE OR RENT ON TIME?: NO

## 2025-02-11 SDOH — SOCIAL STABILITY: SOCIAL INSECURITY: ABUSE: ADULT

## 2025-02-11 SDOH — ECONOMIC STABILITY: FOOD INSECURITY: WITHIN THE PAST 12 MONTHS, YOU WORRIED THAT YOUR FOOD WOULD RUN OUT BEFORE YOU GOT THE MONEY TO BUY MORE.: NEVER TRUE

## 2025-02-11 SDOH — ECONOMIC STABILITY: INCOME INSECURITY: IN THE PAST 12 MONTHS HAS THE ELECTRIC, GAS, OIL, OR WATER COMPANY THREATENED TO SHUT OFF SERVICES IN YOUR HOME?: NO

## 2025-02-11 SDOH — ECONOMIC STABILITY: FOOD INSECURITY: WITHIN THE PAST 12 MONTHS, THE FOOD YOU BOUGHT JUST DIDN'T LAST AND YOU DIDN'T HAVE MONEY TO GET MORE.: NEVER TRUE

## 2025-02-11 SDOH — SOCIAL STABILITY: SOCIAL INSECURITY: DO YOU FEEL ANYONE HAS EXPLOITED OR TAKEN ADVANTAGE OF YOU FINANCIALLY OR OF YOUR PERSONAL PROPERTY?: NO

## 2025-02-11 SDOH — SOCIAL STABILITY: SOCIAL INSECURITY: HAS ANYONE EVER THREATENED TO HURT YOUR FAMILY OR YOUR PETS?: NO

## 2025-02-11 SDOH — SOCIAL STABILITY: SOCIAL INSECURITY: DO YOU FEEL UNSAFE GOING BACK TO THE PLACE WHERE YOU ARE LIVING?: NO

## 2025-02-11 SDOH — SOCIAL STABILITY: SOCIAL INSECURITY: HAVE YOU HAD ANY THOUGHTS OF HARMING ANYONE ELSE?: NO

## 2025-02-11 SDOH — SOCIAL STABILITY: SOCIAL INSECURITY: ARE YOU OR HAVE YOU BEEN THREATENED OR ABUSED PHYSICALLY, EMOTIONALLY, OR SEXUALLY BY ANYONE?: NO

## 2025-02-11 ASSESSMENT — ACTIVITIES OF DAILY LIVING (ADL)
GROOMING: NEEDS ASSISTANCE
FEEDING YOURSELF: INDEPENDENT
WALKS IN HOME: NEEDS ASSISTANCE
BATHING: NEEDS ASSISTANCE
ASSISTIVE_DEVICE: WALKER
TOILETING: NEEDS ASSISTANCE
JUDGMENT_ADEQUATE_SAFELY_COMPLETE_DAILY_ACTIVITIES: YES
HEARING - RIGHT EAR: HEARING AID
LACK_OF_TRANSPORTATION: NO
ADEQUATE_TO_COMPLETE_ADL: YES
HOME_MANAGEMENT_TIME_ENTRY: 10
HEARING - LEFT EAR: HEARING AID
PATIENT'S MEMORY ADEQUATE TO SAFELY COMPLETE DAILY ACTIVITIES?: YES
LACK_OF_TRANSPORTATION: NO
ADL_ASSISTANCE: NEEDS ASSISTANCE
DRESSING YOURSELF: NEEDS ASSISTANCE

## 2025-02-11 ASSESSMENT — COGNITIVE AND FUNCTIONAL STATUS - GENERAL
TURNING FROM BACK TO SIDE WHILE IN FLAT BAD: A LOT
DAILY ACTIVITIY SCORE: 13
PERSONAL GROOMING: A LOT
STANDING UP FROM CHAIR USING ARMS: A LOT
DRESSING REGULAR UPPER BODY CLOTHING: A LOT
EATING MEALS: A LITTLE
MOVING TO AND FROM BED TO CHAIR: A LOT
HELP NEEDED FOR BATHING: A LOT
MOVING FROM LYING ON BACK TO SITTING ON SIDE OF FLAT BED WITH BEDRAILS: A LITTLE
PERSONAL GROOMING: A LOT
DRESSING REGULAR UPPER BODY CLOTHING: A LOT
PATIENT BASELINE BEDBOUND: NO
WALKING IN HOSPITAL ROOM: A LOT
HELP NEEDED FOR BATHING: A LOT
WALKING IN HOSPITAL ROOM: A LOT
DRESSING REGULAR UPPER BODY CLOTHING: A LOT
DRESSING REGULAR LOWER BODY CLOTHING: A LOT
MOBILITY SCORE: 13
TOILETING: TOTAL
DAILY ACTIVITIY SCORE: 12
CLIMB 3 TO 5 STEPS WITH RAILING: A LOT
DRESSING REGULAR LOWER BODY CLOTHING: A LOT
HELP NEEDED FOR BATHING: A LOT
PERSONAL GROOMING: A LITTLE
DAILY ACTIVITIY SCORE: 12
EATING MEALS: A LITTLE
STANDING UP FROM CHAIR USING ARMS: A LOT
MOVING FROM LYING ON BACK TO SITTING ON SIDE OF FLAT BED WITH BEDRAILS: A LOT
DRESSING REGULAR LOWER BODY CLOTHING: TOTAL
CLIMB 3 TO 5 STEPS WITH RAILING: A LOT
MOBILITY SCORE: 12
EATING MEALS: A LOT
TURNING FROM BACK TO SIDE WHILE IN FLAT BAD: A LOT
MOVING TO AND FROM BED TO CHAIR: A LOT
TOILETING: A LOT
TOILETING: A LOT

## 2025-02-11 ASSESSMENT — PAIN SCALES - GENERAL
PAINLEVEL_OUTOF10: 0 - NO PAIN

## 2025-02-11 ASSESSMENT — LIFESTYLE VARIABLES
HOW OFTEN DO YOU HAVE A DRINK CONTAINING ALCOHOL: NEVER
HOW MANY STANDARD DRINKS CONTAINING ALCOHOL DO YOU HAVE ON A TYPICAL DAY: PATIENT DOES NOT DRINK
AUDIT-C TOTAL SCORE: 0
SKIP TO QUESTIONS 9-10: 1
PRESCIPTION_ABUSE_PAST_12_MONTHS: NO
AUDIT-C TOTAL SCORE: 0
HOW OFTEN DO YOU HAVE 6 OR MORE DRINKS ON ONE OCCASION: NEVER
SUBSTANCE_ABUSE_PAST_12_MONTHS: NO

## 2025-02-11 ASSESSMENT — PAIN - FUNCTIONAL ASSESSMENT: PAIN_FUNCTIONAL_ASSESSMENT: 0-10

## 2025-02-11 ASSESSMENT — PATIENT HEALTH QUESTIONNAIRE - PHQ9
SUM OF ALL RESPONSES TO PHQ9 QUESTIONS 1 & 2: 0
1. LITTLE INTEREST OR PLEASURE IN DOING THINGS: NOT AT ALL
2. FEELING DOWN, DEPRESSED OR HOPELESS: NOT AT ALL

## 2025-02-11 NOTE — PROGRESS NOTES
"Elida Benson is a 79 y.o. female who was referred to the Clinical Pharmacy Team to complete a Transitions of Care encounter for discharge medication optimization. The patient was referred for their T2DM, COPD, and HFpEF.    Attending: Julián Barboza MD    PCP: Ervin Alvarez MD    _______________________________________________________________________  PHARMACY ASSESSMENT    Home Pharmacy: [facility]  Meds to beds? yes    Affordability/Accessibility: [facility] for Eliquis and Jardiance   Adherence/Organization: Daughter helps manage medications  Adverse Effects: No issues   _______________________________________________________________________  DIABETES ASSESSMENT    CURRENT PHARMACOTHERAPY  - Jardiance 25 mg  - Glipizide 5 mg     HISTORICAL PHARMACOTHERAPY  - n/a    BLOOD SUGAR TESTING AT HOME  - Patient does not have testing supplies at home    Diet:   - Unable to assess    Social History:   - Unable to assess    Exercise Routine:   - Uses a walker, unable to exercise     Additional Contributory Factors [medications, comorbidities]   - HTN, afib, HF, CKD    SECONDARY PREVENTION  - Statin? Atorvastatin 40 mg  - ACE-I/ARB? Losartan 25 mg  - Aspirin? No    Lab Results   Component Value Date    HGBA1C 6.6 (A) 12/09/2024       Lab Results   Component Value Date    CHOL 182 05/26/2023    CHOL 199 10/27/2022    CHOL 216 (H) 04/21/2022     Lab Results   Component Value Date    HDL 77.2 05/26/2023    HDL 73.9 10/27/2022    .3 04/21/2022     No results found for: \"LDLCALC\"  Lab Results   Component Value Date    TRIG 102 05/26/2023    TRIG 83 10/27/2022    TRIG 105 04/21/2022     No components found for: \"CHOLHDL\"    _______________________________________________________________________  CHRONIC HEART FAILURE ASSESSMENT  -HTN present/diagnosed: yes  -Home BP cuff: yes    LVEF: 62%  eGFR: 54  Beta blocker: No  ACEi/ARB/ARNI: Losartan 25 mg  MRA: Spironolactone 25 mg  SGLT2i: Jardiance 25 mg  Diuretic: Furosemide " 20 mg  Advanced therapies: None  ___________________________________________________________________  ASTHMA/COPD ASSESSMENT    CURRENT PHARMACOTHERAPY  - None    HISTORICAL PHARMACOTHERAPY  - None    RESCUE INHALER USE  - None     INHALER TECHNIQUE  - n/a    EXACERBATION HISTORY  - When was your last hospitalization for an exacerbation? N/a  - When was the last time you were treated with antibiotics and/or steroids? N/a    SMOKING CESSATION  Patient is not currently using tobacco products  - Quit Date: 41 years ago  - Years Smoking: Unable to assess  ___________________________________________________________________  PATIENT EDUCATION/GOALS  - Recommended to monitor blood pressure and log it daily   - Introduced post-discharge pharmacy team follow up and benefits of calls  - Answered all patient questions and concerns to best of my ability  _______________________________________________________________________  RECOMMENDATIONS/PLAN  Consider initiating rescue and maintenance inhaler for COPD  Recommend sending prescriptions for testing supplies to monitor blood sugars   Continue all medications per medical team  Please send prescriptions to [facility] pharmacy for assistance on insurance prior authorization and copay. Prescriptions will be delivered to the patient's bedside prior to discharge with the Meds to Beds program.   Continuity of care will be provided by PCP and clinical pharmacy team      Estefany Tariq, PharmD  PGY-1 Pharmacy Resident     Verbal consent to manage patient's drug therapy was obtained from the patient. They were informed they may decline to participate or withdraw from participation in pharmacy services at any time.

## 2025-02-11 NOTE — CONSULTS
Inpatient consult to Cardiology  Consult performed by: Cesar ORLANDO MD  Consult ordered by: Julián Barboza MD  Reason for consult: chf        History Of Present Illness:    Elida Benson is a 79 y.o. female with past medical history of dementia, hypertension, diverticulitis status post laparoscopic anterior resection with ileostomy on 8/16/2023, paroxysmal atrial fibrillation on amiodarone and Eliquis, preserved ejection fraction with suspected myocardial cleft versus false tendon, moderate concentric hypertrophy, moderate left atrial enlargement on echo performed 8/19/2023, preserved ejection fraction with moderate septal thickness, abnormal strain imaging concerning for possible infiltrative heart disease on echo performed 8/22/2023, amyloid SPECT heart study demonstrates radiotracer deposition within myocardium of left ventricle suggestive of TTR amyloidosis on nuclear imaging performed 4/15/2024, preserved LVEF of 62% with constellation of findings consistent with amyloidosis including diffuse subendocardial delayed enhancement and abnormal myocardial involving time as well as increased T1 mapping times, asymmetric mid to basal septal hypertrophy 1.8 cm on cardiac MRI performed 9/13/2024 who presented to [facility] with shortenss of breath, found to have influenza A infection and pneumonia. Cardiology consulted for elevated troponin.     She is mildly confused, and easily falling back to sleep in bed. She is able to state that she is in [facility], but not really sure why she is here.  She does admit to feeling poorly lately and does think she has a fever at home.  Denies any other specific complaints at this time.  Denies any shortness of breath.  Denies any arm or leg swelling.      Home CV medications:  amiodarone 200mg daily, Eliquis 5mg BID, furosemide 20mg daily, losartan 25mg daily, atorvastatin 40mg daily, Jardiance 25mg daily, Vyndamax 61mg daily,  spironolactone 25mg daily.      She follows with Dr. PRINCESS Ignacio for cardiology, last visit 1/27/2025  She follows with Dr. EILEEN Dueñas for advanced heart failure, last visit 11/2024    Past Cardiology Tests (Last 3 Years):    EKG:        Echo:    8/2023:  1. Left ventricular systolic function is normal with a 55% estimated ejection fraction.  2. Moderately increased left ventricular septal thickness.  3. The left ventricular posterior wall thickness is moderately increased.  4. There is left ventricular concentric remodeling.  5. The longitudinal 2D Strain is mildly decreased with a pattern of apical preservation which is suggestive of infiltrative heart disease.  6. The patient is in atrial fibrillation which may influence the estimate of left ventricular function and transvalvular flows.    Cardiac Imaging:  Cardiac MRI 10/2024 showed changes consistent with amyloidosis. SPEP was normal  Technetium pyrophosphate scan 4/2024 was grade 3.  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    Past Surgical History:  She has a past surgical history that includes Carpal tunnel release (08/27/2013); Knee Arthroplasty (08/27/2013); Total hip arthroplasty (08/27/2013); Other surgical history (08/16/2023); Ileostomy; Laminectomy; Lumbar fusion (2023); and Spinal cord stimulator implant (2022).      Social History:  She reports that she quit smoking about 41  "years ago. Her smoking use included cigarettes. She has never used smokeless tobacco. She reports current alcohol use. She reports that she does not use drugs.    Family History:  Family History   Problem Relation Name Age of Onset    No Known Problems Mother      No Known Problems Father      No Known Problems Sister      No Known Problems Sister      No Known Problems Sister      No Known Problems Sister          Allergies:  Shellfish containing products    ROS:  10 point review of systems including (Constitutional, Eyes, ENMT, Respiratory, Cardiac, Gastrointestinal, Neurological, Psychiatric, and Hematologic) was performed and is otherwise negative.    Objective Data:  Last Recorded Vitals:  Vitals:    02/10/25 2319 02/10/25 2354 25 0530 25 0805   BP: 89/53 (!) 89/46 109/59 94/60   BP Location: Left arm  Left arm Left arm   Patient Position: Lying   Lying   Pulse: 80      Resp: 18      Temp: 36.4 °C (97.5 °F)  35.9 °C (96.7 °F) 36.6 °C (97.9 °F)   TempSrc: Temporal  Temporal Temporal   SpO2: 95%  98% 93%   Weight:       Height:         Medical Gas Therapy: None (Room air)  Weight  Av.5 kg (140 lb)  Min: 63.5 kg (140 lb)  Max: 63.5 kg (140 lb)    LABS:  CMP:  Results from last 7 days   Lab Units 02/10/25  1612   SODIUM mmol/L 135*   POTASSIUM mmol/L 3.9   CHLORIDE mmol/L 102   CO2 mmol/L 25   ANION GAP mmol/L 12   BUN mg/dL 27*   CREATININE mg/dL 1.07*   EGFR mL/min/1.73m*2 54*   ALBUMIN g/dL 3.5   ALT U/L 46*   AST U/L 59*   BILIRUBIN TOTAL mg/dL 0.5   LIPASE U/L 24     CBC:  Results from last 7 days   Lab Units 02/10/25  1612   WBC AUTO x10*3/uL 7.4   HEMOGLOBIN g/dL 14.6   HEMATOCRIT % 46.6*   PLATELETS AUTO x10*3/uL 193   MCV fL 96     COAG:     ABO: No results found for: \"ABO\"  HEME/ENDO:     CARDIAC:   Results from last 7 days   Lab Units 02/10/25  1802 02/10/25  1612   TROPHS ng/L 524* 526*   BNP pg/mL  --  370*             Last I/O:    Intake/Output Summary (Last 24 hours) at 2025 " 1106  Last data filed at 2/11/2025 0900  Gross per 24 hour   Intake 1420 ml   Output --   Net 1420 ml     Net IO Since Admission: 1,420 mL [02/11/25 1106]      Imaging Results:      Inpatient Medications:  Scheduled medications   Medication Dose Route Frequency    amiodarone  200 mg oral Daily    apixaban  5 mg oral BID    atorvastatin  40 mg oral Nightly    azithromycin  500 mg intravenous Daily    baclofen  5 mg oral BID    cefTRIAXone  1 g intravenous Daily    donepezil  5 mg oral Nightly    DULoxetine  30 mg oral BID    empagliflozin  25 mg oral Daily    gabapentin  600 mg oral TID    glipiZIDE XL  5 mg oral Daily    insulin lispro  0-5 Units subcutaneous Before meals & nightly    oseltamivir  30 mg oral BID    pantoprazole  40 mg oral Daily before breakfast    Or    pantoprazole  40 mg intravenous Daily before breakfast    spironolactone  25 mg oral Daily    tafamidis  61 mg oral Daily    topiramate  100 mg oral BID     PRN medications   Medication    acetaminophen    Or    acetaminophen    Or    acetaminophen    benzonatate    dextrose    dextrose    glucagon    glucagon    guaiFENesin    melatonin    polyethylene glycol    prochlorperazine    Or    prochlorperazine    traZODone     Continuous Medications   Medication Dose Last Rate       Outpatient Medications:  Current Outpatient Medications   Medication Instructions    acetaminophen (Tylenol) 325 mg tablet Give 2 tablet by mouth every 4 hours as needed for Pain    amiodarone (Pacerone) 200 mg tablet TAKE ONE TABLET BY MOUTH IN THE MORNING FOR ANTIARRHYTHMIC    atorvastatin (LIPITOR) 40 mg, oral, Nightly    b complex 0.4 mg tablet 1 tablet, Daily    baclofen (LIORESAL) 5 mg, oral, 2 times daily    benzonatate (Tessalon) 100 mg capsule TAKE 1 CAPSULE BY MOUTH 3 TIMES A DAY AS NEEDED FOR COUGH. DO NOT CRUSH OR CHEW.    biotin 1 mg tablet 1 tab(s) orally once a day    cephalexin (KEFLEX) 500 mg, oral, 2 times daily    cholecalciferol (Vitamin D-3) 1,250 mcg  (50,000 unit) capsule Take 1 capsule (50,000 Units) by mouth 1 (one) time per week. Sunday    docusate sodium (Colace) 100 mg capsule 1 capsule, 2 times daily PRN    donepezil (ARICEPT) 5 mg, oral, Nightly    DULoxetine (CYMBALTA) 30 mg, oral, 2 times daily    Eliquis 5 mg, oral, 2 times daily    fluticasone (Flonase) 50 mcg/actuation nasal spray 2 sprays, Each Nostril, Daily, Shake gently. Before first use, prime pump. After use, clean tip and replace cap.    furosemide (LASIX) 20 mg, oral, Daily    gabapentin (NEURONTIN) 600 mg, oral, 3 times daily    glipiZIDE XL (GLUCOTROL XL) 5 mg, oral, Daily, Always take with food in the morning    Jardiance 25 mg, oral, Daily    losartan (COZAAR) 25 mg, oral, Daily    melatonin 3 mg tablet Take 2 tablets (6 mg) by mouth as needed at bedtime for sleep.    naloxone (NARCAN) 4 mg, nasal, As needed, May repeat every 2-3 minutes if needed, alternating nostrils, until medical assistance becomes available.    ondansetron ODT (ZOFRAN-ODT) 4 mg, oral, Every 8 hours PRN    SITagliptin phosphate (JANUVIA) 50 mg, oral, Daily    spironolactone (ALDACTONE) 25 mg, oral, Daily    topiramate (TOPAMAX) 100 mg, oral, 2 times daily    traZODone (DESYREL) 50 mg, oral, Nightly PRN    Vyndamax 61 mg, oral, Daily       Physical Exam:    General:  Ill appearing, lethargic, mildly confused.   HEENT:  Normocephalic.  Moist mucosa.    Neck:  Normal Jugular Venous Pressure.  Cardiovascular:  Regular rate and rhythm.  Normal S1 and S2, no murmurs, rubs or gallops  Pulmonary:  Coarse, bilaterally diminished,   Abdomen:  Soft. Non-tender.   Non-distended.  Positive bowel sounds.  Lower Extremities:  2+ pedal pulses. No LE edema.  Neurologic:  Alert and oriented x3. No focal deficit.   Skin: Skin warm and dry, normal skin turgor.   Psychiatric: lethargic, mildly confused.        Assessment/Plan     Elida Benson is a 79 y.o. female with past medical history of dementia, hypertension, diverticulitis  status post laparoscopic anterior resection with ileostomy on 8/16/2023, paroxysmal atrial fibrillation on amiodarone and Eliquis, preserved ejection fraction with suspected myocardial cleft versus false tendon, moderate concentric hypertrophy, moderate left atrial enlargement on echo performed 8/19/2023, preserved ejection fraction with moderate septal thickness, abnormal strain imaging concerning for possible infiltrative heart disease on echo performed 8/22/2023, amyloid SPECT heart study demonstrates radiotracer deposition within myocardium of left ventricle suggestive of TTR amyloidosis on nuclear imaging performed 4/15/2024, preserved LVEF of 62% with constellation of findings consistent with amyloidosis including diffuse subendocardial delayed enhancement and abnormal myocardial involving time as well as increased T1 mapping times, asymmetric mid to basal septal hypertrophy 1.8 cm on cardiac MRI performed 9/13/2024 who presented to [facility] with shortenss of breath, found to have influenza A infection and pneumonia. Cardiology consulted for elevated troponin.     Home CV medications:  amiodarone 200mg daily, Eliquis 5mg BID, furosemide 20mg daily, losartan 25mg daily, atorvastatin 40mg daily, Jardiance 25mg daily, Vyndamax 61mg daily,  spironolactone 25mg daily.     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    - CXR: Newly appearing right upper lobe infiltrates and mild accentuation of markings in the right infrahilar and basilar regions. Findings could be related to pneumonia or asymmetric pulmonary vascular congestion.  2.Stable mild enlargement of the cardiomediastinal silhouette.   - Admit  ( previous 144 on 12/2024)     # Paroxysmal atrial fibrillation on amiodarone and Eliquis   - maintaining NSR during admit    # Elevated troponin.   - HS troponin 526, 524.  Low level elevation  with a flat trend consistent with a non-MI troponin elevation / chronic non-traumatic myocardial injury in the setting of above. Core measures do not apply.  There are no significant clinical signs / symptoms or ischemic changes consistent with ACS.  Denies any chest pain.     Plan:  - holding losartan with lower BP, plan to restart when able  - We will attempt to diurese,  Dose with furosemide 40mg IV now and watch response.  Strict I/O  - Continue with amiodarone and Eliquis  - Continue with tafamidis    Please follow-up with Dr. Dueñas of advanced heart failure is scheduled May 5, 2025.  Hopefully we can move up this visit to March or April 2025    Follow up with Cecilia Kumar CNP in 1 week post discharge  She will follow up with Dr. Ignacio in 4-6 weeks post discharge.     Code Status:  Full Code      Sandy Wolfe, PARAG-CNP      Thank you for allowing me to participate in their care.  Please feel free to call me with any further questions or concerns.    Cesar Ignacio MD, [facility], [facility]  Division of Cardiovascular Medicine  System Director, Nuclear Cardiology   Medical Director, [facility] [place], [facility]   Clinical , [employer]  [email or web address]   Office:  [phone]          Both the ERICK and I have had a face to face encounter with the patient today. I have examined the patient and edited the documented physical examination as necessary.  I personally reviewed the patient's vital signs, telemetry, recent labs, medications, orders, EKGs, and pertinent cardiac imaging/ echocardiography.  I have reviewed the ERICK's encounter note, approve the ERICK's documentation and have edited the note to reflect my diagnostic and therapeutic plan.

## 2025-02-11 NOTE — PROGRESS NOTES
Physical Therapy                 Therapy Communication Note    Patient Name: Elida Benson  MRN: [id number]  Department: [place] [address]  Room: [address] [place] [address]  Today's Date: 2/11/2025     Discipline: Physical Therapy          Missed Visit Reason: Missed Visit Reason: Other (Comment) Pt asleep upon arrival, and is having difficulty keeping eyes open and answering questions. She is declining to participate in PT eval at this time. Nurse aware    Missed Time: Attempt

## 2025-02-11 NOTE — PROGRESS NOTES
Occupational Therapy    Evaluation/Treatment    Patient Name: Elida Benson  MRN: [id number]  Department: [place] [id number]  Room: [address] [place] [address]  Today's Date: 02/11/25  Time Calculation  Start Time: 1451  Stop Time: 1520  Time Calculation (min): 29 min       Assessment:  OT Assessment: Pt presents with an increase in fatigue, lethargy, generalized weakness, reduced balance, endurance and strength, impeding ADL performance and functional mobility. Pt would benefit from skilled OT services to address these deficits and facilitate highest level of function.  Prognosis: Fair  Barriers to Discharge Home: Caregiver assistance, Physical needs  Caregiver Assistance: Patient lives alone and/or does not have reliable caregiver assistance  Physical Needs: 24hr mobility assistance needed, 24hr ADL assistance needed, Stair navigation into home limited by function/safety, High falls risk due to function or environment  Evaluation/Treatment Tolerance: Patient limited by fatigue  Medical Staff Made Aware: Yes  End of Session Communication: Bedside nurse  End of Session Patient Position: Bed, 3 rail up, Alarm on  OT Assessment Results: Decreased ADL status, Decreased upper extremity range of motion, Decreased safe judgment during ADL, Decreased cognition, Decreased endurance, Decreased functional mobility, Decreased IADLs  Prognosis: Fair  Barriers to Discharge: Decreased caregiver support  Evaluation/Treatment Tolerance: Patient limited by fatigue  Medical Staff Made Aware: Yes  Strengths: Ability to acquire knowledge, Support of extended family/friends, Living arrangement secure  Barriers to Participation: Comorbidities, Insight into problems  Plan:  Treatment Interventions: ADL retraining, Functional transfer training, UE strengthening/ROM, Endurance training, Patient/family training, Equipment evaluation/education, Compensatory technique education  OT Frequency: 3 times per week  OT Discharge Recommendations: Moderate intensity level of  continued care  OT Recommended Transfer Status: Assist of 2  OT - OK to Discharge: Yes (Per POC)  Treatment Interventions: ADL retraining, Functional transfer training, UE strengthening/ROM, Endurance training, Patient/family training, Equipment evaluation/education, Compensatory technique education    Subjective   Current Problem:  1. Community acquired pneumonia of right upper lobe of lung        2. Influenza A        3. Demand ischemia of myocardium (Multi)        4. Venous insufficiency  Vascular US Ankle Brachial Index (KEL) Without Exercise    Vascular US Ankle Brachial Index (KEL) Without Exercise    CANCELED: Vascular US PVR without exercise    CANCELED: Vascular US PVR without exercise      5. Peripheral vascular disease, unspecified (CMS-HCC)  Vascular US Ankle Brachial Index (KEL) Without Exercise    Vascular US Ankle Brachial Index (KEL) Without Exercise    CANCELED: Vascular US PVR without exercise    CANCELED: Vascular US PVR without exercise      6. Thoracic aortic aneurysm, ruptured, unspecified (Multi)  Vascular US Ankle Brachial Index (KEL) Without Exercise        General:   OT Received On: 02/11/25  General  Reason for Referral: 79 y.o. female presenting with generalized weakness and + Influenza A.  Referred By: Julián Barboza MD  Past Medical History Relevant to Rehab: history of hypertension, A-fib, cervical myelopathy, diabetes, chronic pain,  Family/Caregiver Present: No  Prior to Session Communication: Bedside nurse  Patient Position Received: Bed, 3 rail up, Alarm on  Preferred Learning Style: auditory, verbal, visual  General Comment: Pt agreeable and cooperative to OT eval, limited by fatigue and generalized weakness.   Precautions:  Medical Precautions: Fall precautions, Infection precautions (Droplet, Influenza)     Date/Time Vitals Session Patient Position Pulse Resp SpO2 BP MAP (mmHg)    02/11/25 1451 During OT  Sitting  74  --  97 %  119/65  78                 Pain:  Pain  Assessment  Pain Assessment: 0-10  0-10 (Numeric) Pain Score: 0 - No pain    Objective   Cognition:  Overall Cognitive Status:  (Increase lethargy and fatigue)  Orientation Level: Disoriented to situation (Able to recall name, birthday, time and in 'hospital')           Home Living:  Type of Home: House  Lives With: Alone  Home Adaptive Equipment: Walker rolling or standard  Home Layout: One level  Home Access: Stairs to enter with rails  Entrance Stairs-Rails: Both  Entrance Stairs-Number of Steps: 3  Bathroom Shower/Tub: Tub/shower unit  Bathroom Toilet: Handicapped height  Bathroom Equipment: Grab bars in shower, Shower chair with back, Bedside commode, Grab bars around toilet  Home Living Comments: Pt reports daughters visit daily, however, is at home alone at night.  Prior Function:  Level of [place]: Needs assistance with ADLs, Needs assistance with homemaking  Receives Help From: Family  ADL Assistance: Needs assistance (Assistance with dressing & bathing/showering. Reports able to use BSC by self.)  Homemaking Assistance: Needs assistance (Family completes iADLs (laundry, cooking and cleaning))  Ambulatory Assistance:  (With walker)     Activities of Daily Living: UE Dressing  UE Dressing Level of Assistance: Moderate assistance  UE Dressing Where Assessed: Edge of bed  UE Dressing Comments: Don/doff hospital gown    LE Dressing  LE Dressing: Yes  LE Dressing Adaptive Equipment: Rafia/  Sock Level of Assistance: Maximum assistance  LE Dressing Where Assessed: Edge of bed  LE Dressing Comments: Don/doff hospital socks    Toileting  Toileting Level of Assistance: Dependent  Where Assessed: Bed level  Toileting Comments: Assist with pericare; pt soiled and bowel movement noted during session.  Activity Tolerance:  Endurance: Tolerates 10 - 20 min exercise with multiple rests     Bed Mobility/Transfers: Bed Mobility  Bed Mobility: Yes  Bed Mobility 1  Bed Mobility 1: Supine to sitting, Sitting to  supine  Level of Assistance 1: Maximum assistance  Bed Mobility Comments 1: Assist with BLE and trunk with all bed mobility, cues for sequencing of LLE and hand placement.  Bed Mobility 2  Bed Mobility  2: Scooting  Level of Assistance 2: Maximum assistance  Bed Mobility Comments 2: Toward EOB with use of chux pads.  Bed Mobility 3  Bed Mobility 3: Rolling right  Level of Assistance 3: Minimum assistance  Bed Mobility Comments 3: Cues for use of bed rail and sequencing  Bed Mobility 4  Bed Mobility 4: Rolling left  Level of Assistance 4: Moderate assistance, Maximum assistance  Bed Mobility Comments 4: Pt reports increase difficulty rolling toward L side, Mod-Max A to complete roll. Cues for sequencing and hand placement/use of bed rails.  Bed Mobility 5  Bed Mobility 5: Scooting  Level of Assistance 5: Maximum assistance  Bed Mobility Comments 5: In Trendelenberg, Max A to scoot toward HOB with use of chux pads.    Transfers  Transfer: Yes  Transfer 1  Transfer From 1: Bed to  Transfer to 1: Stand  Technique 1: Sit to stand, Stand to sit  Transfer Device 1: Gait belt  Transfer Level of Assistance 1: Moderate assistance (Hand held/ arm in arm assist)  Trials/Comments 1: Bed height elevated, upon standing pt soiled and bowel movement noted. Cues for hand placement, body positioning and sequencing for safe transfer.    Sitting Balance:  Static Sitting Balance  Static Sitting-Balance Support: Feet supported  Static Sitting-Level of Assistance: Close supervision, Contact guard (SBA with bouts of CGA due to retro/lateral leaning)  Static Sitting-Comment/Number of Minutes: EOB  Standing Balance:  Static Standing Balance  Static Standing-Balance Support: Bilateral upper extremity supported  Static Standing-Level of Assistance: Minimum assistance, Moderate assistance  Static Standing-Comment/Number of Minutes: arm in arm assist     Vision:Vision - Basic Assessment  Current Vision: No visual deficits  Sensation:  Light  Touch: No apparent deficits  Strength:  Strength Comments: Limited bilateral shoulder flexion     Perception:  Inattention/Neglect: Cues to maintain midline in sitting  Coordination:  Movements are Fluid and Coordinated: Yes   Hand Function:  Hand Function  Gross Grasp: Functional  Coordination: Functional  Extremities: RUE   RUE : Within Functional Limits  RUE Strength  RUE Overall Strength: Greater than or equal to 3/5 as evidenced by functional mobility and LUE   LUE: Within Functional Limits  LUE Strength  LUE Overall Strength: Greater than or equal to 3/5 as evidenced by functional mobility    Outcome Measures: [facility] Daily Activity  Putting on and taking off regular lower body clothing: Total  Bathing (including washing, rinsing, drying): A lot  Putting on and taking off regular upper body clothing: A lot  Toileting, which includes using toilet, bedpan or urinal: Total  Taking care of personal grooming such as brushing teeth: A little  Eating Meals: A little  Daily Activity - Total Score: 12        Education Documentation  Precautions, taught by Bebe Soria OT at 2/11/2025  4:04 PM.  Learner: Patient  Readiness: Acceptance  Method: Explanation  Response: Needs Reinforcement    Body Mechanics, taught by Bebe Soria OT at 2/11/2025  4:04 PM.  Learner: Patient  Readiness: Acceptance  Method: Explanation  Response: Needs Reinforcement    ADL Training, taught by Bebe Soria OT at 2/11/2025  4:04 PM.  Learner: Patient  Readiness: Acceptance  Method: Explanation  Response: Needs Reinforcement    Education Comments  No comments found.        OP EDUCATION:       Goals:  Encounter Problems       Encounter Problems (Active)       ADLs       Patient will perform sponge UB and LB bathing in seating with minimal assist  level of assistance.       Start:  02/11/25    Expected End:  02/25/25            Patient with complete upper body dressing with stand by assist level of assistance donning and doffing all UE  clothes with PRN adaptive equipment while supported sitting and edge of bed        Start:  02/11/25    Expected End:  02/25/25            Patient with complete lower body dressing with moderate assist  level of assistance donning and doffing all LE clothes  with PRN adaptive equipment while supported sitting and edge of bed        Start:  02/11/25    Expected End:  02/25/25            Patient will complete daily grooming tasks with supervision level of assistance and PRN adaptive equipment while supported sitting and edge of bed.       Start:  02/11/25    Expected End:  02/25/25            Patient will complete toileting including hygiene clothing management/hygiene with moderate assist level of assistance and bedside commode.       Start:  02/11/25    Expected End:  02/25/25               MOBILITY       Patient will perform Functional mobility x Household distances/Community Distances with contact guard assist level of assistance and front wheeled walker in order to improve safety and functional mobility.       Start:  02/11/25    Expected End:  02/25/25               TRANSFERS       Patient will perform bed mobility minimal assist  level of assistance and bed rails in order to improve safety and independence with mobility       Start:  02/11/25    Expected End:  02/25/25            Patient will complete sit to stand transfer with contact guard assist level of assistance and front wheeled walker in order to improve safety and prepare for out of bed mobility.       Start:  02/11/25    Expected End:  02/25/25

## 2025-02-11 NOTE — ED PROVIDER NOTES
HPI   Chief Complaint   Patient presents with    Weakness, Gen       HPI: []  79-year-old  female with a history of hypertension, A-fib, cervical myelopathy, diabetes, chronic pain, comes in with the cough congestion and generalized weakness.  Cough nonproductive.  No fever or chills.  No nausea Medary.  No abdominal pain.  No hematemesis melena m or hematochezia, no hemoptysis no trauma no falls no headache no vision changes no syncope onus given no recent travel or hospitalization.    Past day: Hypertension, A-fib, cervical myelopathy, diabetes, chronic pain, COPD  Social: History of tobacco use in the past denies current tobacco alcohol drug abuse.  REVIEW OF SYSTEMS:    GENERAL.: No weight loss, fatigue, anorexia, insomnia, fever.  Positive weakness    EYES: No vision loss, double vision, drainage, eye pain.    ENT: No pharyngitis, dry mouth.    CARDIOPULMONARY: No chest pain, palpitations, syncope, near syncope. No shortness of breath, positive for cough, hemoptysis.    GI: No abdominal pain, change in bowel habits, melena, hematemesis, hematochezia, nausea, vomiting, diarrhea.    : No discharge, dysuria, frequency, urgency, hematuria.    MS: No limb pain, joint pain, joint swelling.    SKIN: No rashes.    PSYCH: No depression, anxiety, suicidality, homicidality.    Review of systems is otherwise negative unless stated above or in history of present illness.  Social history, family history, allergies reviewed.  PHYSICAL EXAM:    GENERAL: Vitals noted, no distress. Alert and oriented  x 2 at her baseline. Non-toxic.      EENT: TMs clear. Posterior oropharynx unremarkable. No meningismus. No LAD.     NECK: Supple. Nontender. No midline tenderness.     CARDIAC: Regular, rate, rhythm. No murmurs rubs or gallops. No JVD    PULMONARY: Coarse bibasilar crackles no wheezes rales or rhonchi. No respiratory distress.  No tachypnea stridor or retractions able to speak in full sentences    ABDOMEN: Soft,  nonsurgical. Nontender. No peritoneal signs. Normoactive bowel sounds. No pulsatile masses.     EXTREMITIES: No peripheral edema. Negative Homans bilaterally, no cords.  2+ bounding pulses well-perfused.    SKIN: No rash. Intact.     NEURO: No focal neurologic deficits, NIH score of 0. Cranial nerves normal as tested from II through XII.     MEDICAL DECISION MAKING:  EKG upon arrival my interpretation shows normal sinus rhythm normal axis rate in the mid 90s with a right bundle branch block which is old with no ischemic changes.  CBC with differential chemistries are unremarkable troponin x 2 remains flat around 500, BNP slightly elevated, chest ray shows evolving infiltrates bilaterally, influenza A positive.    COVID RSV negative.    Treatment ED: IV established pancultured given IV fluids intravenous Rocephin azithromycin and oral Tamiflu.      ED course: Patient remained stable hemodynamic.  Impression: #1 influenza A, #2 community-acquired pneumonia, #3 demand ischemia myocardium  Plan/MDM: 79-year-old -American female history of A-fib COPD comes in with what appears to be acute influenza A with evolving pneumonia with this could be a viral pneumonitis versus a better infection superimposed with elevated troponin which I think demand ischemia myocardium my suspicion for STEMI NSTEMI pulm embolism or dissection is low.  Patient will be hospitalized for further care.              Patient History   Past Medical History:   Diagnosis Date    A-fib (Multi)     Managed on meds, Eliquis stoppage and cardiac clearance in [facility] from Dr. Ignacio    Cervical myelopathy     Chronic pain syndrome     COPD (chronic obstructive pulmonary disease) (Multi)     denies, she is a former smoker but quit in 1984    Dementia     Depression     Diabetic neuropathy (Multi)     Diverticulitis     DM (diabetes mellitus) (Multi)     denies but A1C= 7.2% on 9/5/23    LORENZ (dyspnea on exertion)     Stable per cards note    HLD  (hyperlipidemia)     Hypertension, essential     Infiltrative cardiomyopathy (Multi)     Echo 23, following with Dr. Ignacio    OA (ocular albinism) (Multi)     multiple sites    Other malaise 2022    Physical deconditioning    Palpitations     on occasion, has afib followed by cardiology    Post laminectomy syndrome     Rectovaginal fistula     Spinal stenosis of cervical region     UTI (urinary tract infection)      Past Surgical History:   Procedure Laterality Date    CARPAL TUNNEL RELEASE  2013    Neuroplasty Decompression Median Nerve At Carpal Tunnel    ILEOSTOMY      KNEE ARTHROPLASTY  2013    Knee Arthroplasty    LAMINECTOMY      LUMBAR FUSION      OTHER SURGICAL HISTORY  2023    LAPAROSCPIC ANTERIOR RESECTION, DIVERTING LOOP ILEOSTOMY    SPINAL CORD STIMULATOR IMPLANT      TOTAL HIP ARTHROPLASTY  2013    Total Hip Replacement     Family History   Problem Relation Name Age of Onset    No Known Problems Mother      No Known Problems Father      No Known Problems Sister      No Known Problems Sister      No Known Problems Sister      No Known Problems Sister       Social History     Tobacco Use    Smoking status: Former     Current packs/day: 0.00     Types: Cigarettes     Quit date:      Years since quittin.1    Smokeless tobacco: Never   Vaping Use    Vaping status: Never Used   Substance Use Topics    Alcohol use: Yes     Comment: drinks beer or liquor a few times a year    Drug use: Never       Physical Exam   ED Triage Vitals [02/10/25 1454]   Temperature Heart Rate Respirations BP   37.8 °C (100.1 °F) 87 16 112/73      Pulse Ox Temp Source Heart Rate Source Patient Position   97 % Oral -- --      BP Location FiO2 (%)     -- --       Physical Exam      ED Course & MDM   ED Course as of 02/10/25 1948   Mon Feb 10, 2025   1947 Patient chest x-ray is concerning for evolving pneumonia, EKG shows a unchanged right bundle branch block, troponin x 2 remains  flat around 500, influenza A positive, BNP elevated, CBC with differential unremarkable, patient was pancultured given Tamiflu Rocephin azithromycin IV fluids and patient will be hospitalized further care. [MT]      ED Course User Index  [MT] Arnaud Bill MD         Diagnoses as of 02/10/25 1948   Community acquired pneumonia of right upper lobe of lung   Influenza A   Demand ischemia of myocardium (Multi)                 No data recorded     Padmini Coma Scale Score: 15 (02/10/25 1933 : Faustino Hooker RN)                           Medical Decision Making      Procedure  Procedures     Arnaud Bill MD  02/10/25 1951     no

## 2025-02-11 NOTE — CARE PLAN
The patient's goals for the shift include      The clinical goals for the shift include pt willl not fall    Over the shift, the patient did not make progress toward the following goals. Barriers to progression include educate. Recommendations to address these barriers include answer question the pt may have.

## 2025-02-11 NOTE — PROGRESS NOTES
Elida Benson is a 79 y.o. female     Patient complains of back pain  The hospital bed is making it worse    Review of Systems     Constitutional: no fever, no chills,   Cardiovascular: no chest pain   Respiratory: Cough or shortness of breath  Gastrointestinal: no abdominal pain, no constipation, no melena, no nausea, no diarrhea, no vomiting and no blood in stools.   Neurological: Back pain  All other systems have been reviewed and are negative for complaint.       Vitals:    02/11/25 1648   BP: 97/61   Pulse:    Resp:    Temp: 36.9 °C (98.5 °F)   SpO2: 97%        Scheduled medications  amiodarone, 200 mg, oral, Daily  apixaban, 5 mg, oral, BID  atorvastatin, 40 mg, oral, Nightly  azithromycin, 500 mg, oral, q24h  baclofen, 5 mg, oral, BID  cefTRIAXone, 1 g, intravenous, Daily  donepezil, 5 mg, oral, Nightly  DULoxetine, 30 mg, oral, BID  empagliflozin, 25 mg, oral, Daily  gabapentin, 600 mg, oral, TID  glipiZIDE XL, 5 mg, oral, Daily  insulin lispro, 0-5 Units, subcutaneous, Before meals & nightly  oseltamivir, 30 mg, oral, BID  pantoprazole, 40 mg, oral, Daily before breakfast   Or  pantoprazole, 40 mg, intravenous, Daily before breakfast  spironolactone, 25 mg, oral, Daily  tafamidis, 61 mg, oral, Daily  topiramate, 100 mg, oral, BID      Continuous medications     PRN medications  PRN medications: acetaminophen **OR** acetaminophen **OR** acetaminophen, benzonatate, dextrose, dextrose, glucagon, glucagon, guaiFENesin, melatonin, polyethylene glycol, prochlorperazine **OR** prochlorperazine, traZODone    Lab Review   Results from last 7 days   Lab Units 02/10/25  1612   WBC AUTO x10*3/uL 7.4   HEMOGLOBIN g/dL 14.6   HEMATOCRIT % 46.6*   PLATELETS AUTO x10*3/uL 193     Results from last 7 days   Lab Units 02/10/25  1612   SODIUM mmol/L 135*   POTASSIUM mmol/L 3.9   CHLORIDE mmol/L 102   CO2 mmol/L 25   BUN mg/dL 27*   CREATININE mg/dL 1.07*   CALCIUM mg/dL 8.6   PROTEIN TOTAL g/dL 6.9   BILIRUBIN TOTAL mg/dL  0.5   ALK PHOS U/L 63   ALT U/L 46*   AST U/L 59*   GLUCOSE mg/dL 214*     Results from last 7 days   Lab Units 02/10/25  1802 02/10/25  1612   TROPHS ng/L 524* 526*        Vascular US Ankle Brachial Index (KEL) Without Exercise         XR chest 1 view   Final Result   1.Newly appearing right upper lobe infiltrates and mild   accentuation of markings in the right infrahilar and basilar regions.   Findings could be related to pneumonia or asymmetric pulmonary   vascular congestion.   2.Stable mild enlargement of the cardiomediastinal silhouette.   Signed by Ania Brito DO            Physical Exam    Constitutional   General appearance: Alert complaining of back pain  Pulmonary   Respiratory assessment: Bilateral rhonchi  Cardiovascular   Auscultation of heart: Apical pulse normal, heart rate and rhythm normal, normal S1 and S2, no murmurs and no pericardial rub.    Exam for edema: No peripheral edema.   Diminished pulses in right lower extremity which appears cold to touch  Abdomen   Abdominal Exam: No bruits, normal bowel sounds, soft, non-tender, no abdominal mass palpated.    Liver and Spleen exam: No hepato-splenomegaly.   Musculoskeletal     Inspection/palpation of joints, bones and muscles: No joint swelling. Normal movement of all extremities.   Neurologic   Cranial nerves: Nerves 2-12 were intact, no focal neuro defects.         Assessment/Plan      #Influenza pneumonia  Continue Tamiflu/Rocephin/azithromycin  #Exacerbation of COPD  DuoNebs and Solu-Medrol    #Peripheral artery disease  Check vascular ultrasound     #Elevated troponin  #Infiltrative cardiomyopathy/amyloidosis  Cardiology managing     #Diabetes mellitus type 2  Sliding insulin coverage     #Hypertension  Low blood pressures  We will hold medication.  Blood pressure less than 100     #Dyslipidemia  Continue statins with target LDL less than 70     #CKD 3B  Monitor kidney functions while diuresing     #Postlaminectomy syndrome with chronic  back pain  Continue pain medications     PT OT consult for deconditioning

## 2025-02-11 NOTE — CARE PLAN
The patient's goals for the shift include      The clinical goals for the shift include patient will remian freee from falls and injury    Over the shift, the patient did not make progress toward the following goals. Barriers to progression include . Recommendations to address these barriers include .    Problem: Fall/Injury  Goal: Not fall by end of shift  Outcome: Progressing     Problem: Fall/Injury  Goal: Be free from injury by end of the shift  Outcome: Progressing

## 2025-02-11 NOTE — PROGRESS NOTES
02/11/25 1425   Discharge Planning   Living Arrangements Other (Comment)  (Technically lives alone but daughter and grandchildren take turns staying with patient, she has someone with her 24/7)   Support Systems Children;Family members   Assistance Needed relies on others for assistance   Type of Residence Private residence   Who is requesting discharge planning? Provider   Home or Post Acute Services Post acute facilities (Rehab/SNF/etc)   Type of Post Acute Facility Services Skilled nursing   Expected Discharge Disposition SNF   Does the patient need discharge transport arranged? Yes   RoundTrip coordination needed? Yes   Financial Resource Strain   How hard is it for you to pay for the very basics like food, housing, medical care, and heating? Not hard   Housing Stability   In the last 12 months, was there a time when you were not able to pay the mortgage or rent on time? N   In the past 12 months, how many times have you moved where you were living? 0   At any time in the past 12 months, were you homeless or living in a shelter (including now)? N   Transportation Needs   In the past 12 months, has lack of transportation kept you from medical appointments or from getting medications? no   In the past 12 months, has lack of transportation kept you from meetings, work, or from getting things needed for daily living? No   Patient Choice   Provider Choice list and CMS website (https://medicare.gov/care-compare#search) for post-acute Quality and Resource Measure Data were provided and reviewed with: Family     Spoke to daughter regarding dc planning  Explained role of TCC  Patient admitted for FLU + and pna    Daughter states a week ago patient was at baseline and using walker in home independently, uses bathroom independently, technically lives alone but family takes turns staying with patient, she has someone with her 24/7, patient also has a service with aide help for personal care, laundry etc. So many hours a  week and pays for this at a discounted rate based on her income.  Has life alert button  Daughter thinks patient may need SNF at discharge.  PT/OT pending.  She is asking for SNF referral to be placed to [facility]  She is aware we are waiting for therapy evals for more information.  SNF list left in room for daughter who will be here later this evening.     ADOD 1-3 days  BARRIERS symptom improvement, med clearance  DISPO home with skilled home care vs SNF (will need auth)

## 2025-02-11 NOTE — CARE PLAN
The patient's goals for the shift include      The clinical goals for the shift include pt willl not fall    Over the shift, the patient did not make progress toward the following goals. Barriers to progression include education. Recommendations to address these barriers include reeducate.

## 2025-02-12 ENCOUNTER — ANESTHESIA EVENT (OUTPATIENT)
Dept: OPERATING ROOM | Facility: CLINIC | Age: 80
End: 2025-02-12
Payer: MEDICARE

## 2025-02-12 LAB
ANION GAP SERPL CALC-SCNC: 12 MMOL/L (ref 10–20)
ATRIAL RATE: 92 BPM
BUN SERPL-MCNC: 21 MG/DL (ref 6–23)
CALCIUM SERPL-MCNC: 8.2 MG/DL (ref 8.6–10.3)
CHLORIDE SERPL-SCNC: 107 MMOL/L (ref 98–107)
CO2 SERPL-SCNC: 23 MMOL/L (ref 21–32)
CREAT SERPL-MCNC: 0.91 MG/DL (ref 0.5–1.05)
EGFRCR SERPLBLD CKD-EPI 2021: 65 ML/MIN/1.73M*2
ERYTHROCYTE [DISTWIDTH] IN BLOOD BY AUTOMATED COUNT: 15.2 % (ref 11.5–14.5)
GLUCOSE BLD MANUAL STRIP-MCNC: 104 MG/DL (ref 74–99)
GLUCOSE BLD MANUAL STRIP-MCNC: 116 MG/DL (ref 74–99)
GLUCOSE BLD MANUAL STRIP-MCNC: 121 MG/DL (ref 74–99)
GLUCOSE BLD MANUAL STRIP-MCNC: 217 MG/DL (ref 74–99)
GLUCOSE BLD MANUAL STRIP-MCNC: 65 MG/DL (ref 74–99)
GLUCOSE BLD MANUAL STRIP-MCNC: 69 MG/DL (ref 74–99)
GLUCOSE BLD MANUAL STRIP-MCNC: 97 MG/DL (ref 74–99)
GLUCOSE SERPL-MCNC: 52 MG/DL (ref 74–99)
HCT VFR BLD AUTO: 43 % (ref 36–46)
HGB BLD-MCNC: 13.6 G/DL (ref 12–16)
MCH RBC QN AUTO: 30.2 PG (ref 26–34)
MCHC RBC AUTO-ENTMCNC: 31.6 G/DL (ref 32–36)
MCV RBC AUTO: 96 FL (ref 80–100)
NRBC BLD-RTO: 0 /100 WBCS (ref 0–0)
P AXIS: 76 DEGREES
P OFFSET: 174 MS
P ONSET: 138 MS
PLATELET # BLD AUTO: 174 X10*3/UL (ref 150–450)
POTASSIUM SERPL-SCNC: 3.6 MMOL/L (ref 3.5–5.3)
PR INTERVAL: 174 MS
Q ONSET: 225 MS
QRS COUNT: 15 BEATS
QRS DURATION: 136 MS
QT INTERVAL: 416 MS
QTC CALCULATION(BAZETT): 514 MS
QTC FREDERICIA: 479 MS
R AXIS: -67 DEGREES
RBC # BLD AUTO: 4.5 X10*6/UL (ref 4–5.2)
SODIUM SERPL-SCNC: 138 MMOL/L (ref 136–145)
T AXIS: 60 DEGREES
T OFFSET: 433 MS
VENTRICULAR RATE: 92 BPM
WBC # BLD AUTO: 7.6 X10*3/UL (ref 4.4–11.3)

## 2025-02-12 PROCEDURE — 2500000001 HC RX 250 WO HCPCS SELF ADMINISTERED DRUGS (ALT 637 FOR MEDICARE OP): Performed by: PHARMACIST

## 2025-02-12 PROCEDURE — 2500000005 HC RX 250 GENERAL PHARMACY W/O HCPCS: Performed by: INTERNAL MEDICINE

## 2025-02-12 PROCEDURE — 94664 DEMO&/EVAL PT USE INHALER: CPT

## 2025-02-12 PROCEDURE — 2500000002 HC RX 250 W HCPCS SELF ADMINISTERED DRUGS (ALT 637 FOR MEDICARE OP, ALT 636 FOR OP/ED)

## 2025-02-12 PROCEDURE — 1200000002 HC GENERAL ROOM WITH TELEMETRY DAILY

## 2025-02-12 PROCEDURE — 82947 ASSAY GLUCOSE BLOOD QUANT: CPT

## 2025-02-12 PROCEDURE — 2500000002 HC RX 250 W HCPCS SELF ADMINISTERED DRUGS (ALT 637 FOR MEDICARE OP, ALT 636 FOR OP/ED): Performed by: INTERNAL MEDICINE

## 2025-02-12 PROCEDURE — 36415 COLL VENOUS BLD VENIPUNCTURE: CPT | Performed by: INTERNAL MEDICINE

## 2025-02-12 PROCEDURE — 2500000004 HC RX 250 GENERAL PHARMACY W/ HCPCS (ALT 636 FOR OP/ED): Performed by: INTERNAL MEDICINE

## 2025-02-12 PROCEDURE — 99233 SBSQ HOSP IP/OBS HIGH 50: CPT | Performed by: STUDENT IN AN ORGANIZED HEALTH CARE EDUCATION/TRAINING PROGRAM

## 2025-02-12 PROCEDURE — 2500000004 HC RX 250 GENERAL PHARMACY W/ HCPCS (ALT 636 FOR OP/ED): Mod: JZ

## 2025-02-12 PROCEDURE — 99232 SBSQ HOSP IP/OBS MODERATE 35: CPT | Performed by: INTERNAL MEDICINE

## 2025-02-12 PROCEDURE — 85027 COMPLETE CBC AUTOMATED: CPT | Performed by: INTERNAL MEDICINE

## 2025-02-12 PROCEDURE — 80048 BASIC METABOLIC PNL TOTAL CA: CPT | Performed by: INTERNAL MEDICINE

## 2025-02-12 PROCEDURE — 2500000001 HC RX 250 WO HCPCS SELF ADMINISTERED DRUGS (ALT 637 FOR MEDICARE OP): Performed by: INTERNAL MEDICINE

## 2025-02-12 PROCEDURE — 94640 AIRWAY INHALATION TREATMENT: CPT

## 2025-02-12 PROCEDURE — 97161 PT EVAL LOW COMPLEX 20 MIN: CPT | Mod: GP

## 2025-02-12 RX ORDER — FUROSEMIDE 20 MG/1
40 TABLET ORAL DAILY
Status: DISCONTINUED | OUTPATIENT
Start: 2025-02-13 | End: 2025-02-17 | Stop reason: HOSPADM

## 2025-02-12 RX ORDER — IPRATROPIUM BROMIDE AND ALBUTEROL SULFATE 2.5; .5 MG/3ML; MG/3ML
3 SOLUTION RESPIRATORY (INHALATION) EVERY 2 HOUR PRN
Status: DISCONTINUED | OUTPATIENT
Start: 2025-02-12 | End: 2025-02-17 | Stop reason: HOSPADM

## 2025-02-12 RX ORDER — IPRATROPIUM BROMIDE AND ALBUTEROL SULFATE 2.5; .5 MG/3ML; MG/3ML
3 SOLUTION RESPIRATORY (INHALATION)
Status: DISCONTINUED | OUTPATIENT
Start: 2025-02-12 | End: 2025-02-12

## 2025-02-12 RX ORDER — IPRATROPIUM BROMIDE AND ALBUTEROL SULFATE 2.5; .5 MG/3ML; MG/3ML
3 SOLUTION RESPIRATORY (INHALATION)
Status: DISCONTINUED | OUTPATIENT
Start: 2025-02-12 | End: 2025-02-17 | Stop reason: HOSPADM

## 2025-02-12 RX ADMIN — DULOXETINE HYDROCHLORIDE 30 MG: 30 CAPSULE, DELAYED RELEASE ORAL at 09:32

## 2025-02-12 RX ADMIN — DEXTROSE MONOHYDRATE 12.5 G: 25 INJECTION, SOLUTION INTRAVENOUS at 12:57

## 2025-02-12 RX ADMIN — IPRATROPIUM BROMIDE AND ALBUTEROL SULFATE 3 ML: 2.5; .5 SOLUTION RESPIRATORY (INHALATION) at 19:39

## 2025-02-12 RX ADMIN — BACLOFEN 5 MG: 10 TABLET ORAL at 09:32

## 2025-02-12 RX ADMIN — BACLOFEN 5 MG: 10 TABLET ORAL at 20:51

## 2025-02-12 RX ADMIN — TOPIRAMATE 100 MG: 100 TABLET, FILM COATED ORAL at 20:53

## 2025-02-12 RX ADMIN — DULOXETINE HYDROCHLORIDE 30 MG: 30 CAPSULE, DELAYED RELEASE ORAL at 20:52

## 2025-02-12 RX ADMIN — INSULIN LISPRO 2 UNITS: 100 INJECTION, SOLUTION INTRAVENOUS; SUBCUTANEOUS at 20:54

## 2025-02-12 RX ADMIN — OSELTAMIVIR PHOSPHATE 30 MG: 30 CAPSULE ORAL at 09:32

## 2025-02-12 RX ADMIN — OSELTAMIVIR PHOSPHATE 30 MG: 30 CAPSULE ORAL at 20:53

## 2025-02-12 RX ADMIN — METHYLPREDNISOLONE SODIUM SUCCINATE 40 MG: 40 INJECTION, POWDER, FOR SOLUTION INTRAMUSCULAR; INTRAVENOUS at 14:38

## 2025-02-12 RX ADMIN — CEFTRIAXONE SODIUM 1 G: 1 INJECTION, SOLUTION INTRAVENOUS at 09:33

## 2025-02-12 RX ADMIN — TOPIRAMATE 100 MG: 100 TABLET, FILM COATED ORAL at 09:32

## 2025-02-12 RX ADMIN — GABAPENTIN 600 MG: 300 CAPSULE ORAL at 09:32

## 2025-02-12 RX ADMIN — AMIODARONE HYDROCHLORIDE 200 MG: 200 TABLET ORAL at 09:32

## 2025-02-12 RX ADMIN — GABAPENTIN 600 MG: 300 CAPSULE ORAL at 14:39

## 2025-02-12 RX ADMIN — METHYLPREDNISOLONE SODIUM SUCCINATE 40 MG: 40 INJECTION, POWDER, FOR SOLUTION INTRAMUSCULAR; INTRAVENOUS at 20:53

## 2025-02-12 RX ADMIN — APIXABAN 5 MG: 5 TABLET, FILM COATED ORAL at 09:32

## 2025-02-12 RX ADMIN — GABAPENTIN 600 MG: 300 CAPSULE ORAL at 20:52

## 2025-02-12 RX ADMIN — PANTOPRAZOLE SODIUM 40 MG: 40 TABLET, DELAYED RELEASE ORAL at 05:35

## 2025-02-12 RX ADMIN — SPIRONOLACTONE 25 MG: 25 TABLET ORAL at 09:32

## 2025-02-12 RX ADMIN — APIXABAN 5 MG: 5 TABLET, FILM COATED ORAL at 20:50

## 2025-02-12 RX ADMIN — DONEPEZIL HYDROCHLORIDE 5 MG: 5 TABLET ORAL at 20:52

## 2025-02-12 RX ADMIN — EMPAGLIFLOZIN 25 MG: 25 TABLET, FILM COATED ORAL at 09:57

## 2025-02-12 RX ADMIN — TAFAMIDIS 61 MG: 61 CAPSULE, LIQUID FILLED ORAL at 10:01

## 2025-02-12 RX ADMIN — ATORVASTATIN CALCIUM 40 MG: 40 TABLET, FILM COATED ORAL at 20:50

## 2025-02-12 RX ADMIN — AZITHROMYCIN 500 MG: 500 TABLET, FILM COATED ORAL at 20:51

## 2025-02-12 RX ADMIN — IPRATROPIUM BROMIDE AND ALBUTEROL SULFATE 3 ML: 2.5; .5 SOLUTION RESPIRATORY (INHALATION) at 13:38

## 2025-02-12 ASSESSMENT — COGNITIVE AND FUNCTIONAL STATUS - GENERAL
EATING MEALS: A LITTLE
MOVING FROM LYING ON BACK TO SITTING ON SIDE OF FLAT BED WITH BEDRAILS: A LOT
DRESSING REGULAR LOWER BODY CLOTHING: A LITTLE
EATING MEALS: A LITTLE
PERSONAL GROOMING: A LITTLE
STANDING UP FROM CHAIR USING ARMS: A LOT
CLIMB 3 TO 5 STEPS WITH RAILING: A LOT
MOVING TO AND FROM BED TO CHAIR: A LOT
TURNING FROM BACK TO SIDE WHILE IN FLAT BAD: A LOT
TOILETING: A LOT
TURNING FROM BACK TO SIDE WHILE IN FLAT BAD: A LOT
EATING MEALS: A LITTLE
EATING MEALS: A LITTLE
MOVING TO AND FROM BED TO CHAIR: A LOT
STANDING UP FROM CHAIR USING ARMS: TOTAL
MOBILITY SCORE: 11
DRESSING REGULAR UPPER BODY CLOTHING: A LITTLE
MOVING TO AND FROM BED TO CHAIR: TOTAL
PERSONAL GROOMING: A LITTLE
DAILY ACTIVITIY SCORE: 16
MOVING TO AND FROM BED TO CHAIR: A LOT
DRESSING REGULAR UPPER BODY CLOTHING: A LITTLE
DRESSING REGULAR LOWER BODY CLOTHING: A LITTLE
MOBILITY SCORE: 8
MOVING FROM LYING ON BACK TO SITTING ON SIDE OF FLAT BED WITH BEDRAILS: A LOT
PERSONAL GROOMING: A LITTLE
DAILY ACTIVITIY SCORE: 16
TOILETING: A LOT
HELP NEEDED FOR BATHING: A LOT
DAILY ACTIVITIY SCORE: 16
CLIMB 3 TO 5 STEPS WITH RAILING: A LOT
TURNING FROM BACK TO SIDE WHILE IN FLAT BAD: A LOT
MOBILITY SCORE: 11
WALKING IN HOSPITAL ROOM: TOTAL
WALKING IN HOSPITAL ROOM: TOTAL
CLIMB 3 TO 5 STEPS WITH RAILING: A LOT
MOBILITY SCORE: 11
TOILETING: A LOT
WALKING IN HOSPITAL ROOM: TOTAL
DRESSING REGULAR LOWER BODY CLOTHING: A LITTLE
CLIMB 3 TO 5 STEPS WITH RAILING: A LOT
MOVING FROM LYING ON BACK TO SITTING ON SIDE OF FLAT BED WITH BEDRAILS: A LOT
CLIMB 3 TO 5 STEPS WITH RAILING: TOTAL
STANDING UP FROM CHAIR USING ARMS: A LOT
HELP NEEDED FOR BATHING: A LOT
HELP NEEDED FOR BATHING: A LOT
MOBILITY SCORE: 11
WALKING IN HOSPITAL ROOM: TOTAL
DAILY ACTIVITIY SCORE: 16
EATING MEALS: A LITTLE
MOVING FROM LYING ON BACK TO SITTING ON SIDE OF FLAT BED WITH BEDRAILS: A LOT
PERSONAL GROOMING: A LITTLE
HELP NEEDED FOR BATHING: A LOT
MOVING FROM LYING ON BACK TO SITTING ON SIDE OF FLAT BED WITH BEDRAILS: A LOT
CLIMB 3 TO 5 STEPS WITH RAILING: A LOT
TOILETING: A LOT
TURNING FROM BACK TO SIDE WHILE IN FLAT BAD: A LOT
MOVING TO AND FROM BED TO CHAIR: A LOT
WALKING IN HOSPITAL ROOM: TOTAL
DRESSING REGULAR UPPER BODY CLOTHING: A LITTLE
STANDING UP FROM CHAIR USING ARMS: A LOT
DAILY ACTIVITIY SCORE: 16
WALKING IN HOSPITAL ROOM: TOTAL
MOVING FROM LYING ON BACK TO SITTING ON SIDE OF FLAT BED WITH BEDRAILS: A LOT
DRESSING REGULAR UPPER BODY CLOTHING: A LITTLE
DRESSING REGULAR LOWER BODY CLOTHING: A LITTLE
DRESSING REGULAR LOWER BODY CLOTHING: A LITTLE
HELP NEEDED FOR BATHING: A LOT
STANDING UP FROM CHAIR USING ARMS: A LOT
TOILETING: A LOT
PERSONAL GROOMING: A LITTLE
MOVING TO AND FROM BED TO CHAIR: A LOT
STANDING UP FROM CHAIR USING ARMS: A LOT
TURNING FROM BACK TO SIDE WHILE IN FLAT BAD: A LOT
DRESSING REGULAR UPPER BODY CLOTHING: A LITTLE
MOBILITY SCORE: 11
TURNING FROM BACK TO SIDE WHILE IN FLAT BAD: A LOT

## 2025-02-12 ASSESSMENT — PAIN SCALES - GENERAL
PAINLEVEL_OUTOF10: 7
PAINLEVEL_OUTOF10: 0 - NO PAIN
PAINLEVEL_OUTOF10: 7

## 2025-02-12 ASSESSMENT — PAIN - FUNCTIONAL ASSESSMENT
PAIN_FUNCTIONAL_ASSESSMENT: 0-10
PAIN_FUNCTIONAL_ASSESSMENT: 0-10

## 2025-02-12 NOTE — PROGRESS NOTES
Physical Therapy    Physical Therapy Evaluation    Patient Name: Elida Benson  MRN: [id number]  Today's Date: 2/12/2025   Time Calculation  Start Time: 1138  Stop Time: 1155  Time Calculation (min): 17 min  616/616-A    Assessment/Plan   PT Assessment  PT Assessment Results: Decreased strength, Decreased endurance, Impaired balance, Decreased mobility, Pain  Rehab Prognosis: Good  Barriers to Discharge Home: Caregiver assistance, Physical needs  Caregiver Assistance: Caregiver assistance needed per identified barriers - however, level of patient's required assistance exceeds assistance available at home  Physical Needs: Stair navigation into home limited by function/safety, 24hr mobility assistance needed, High falls risk due to function or environment  Evaluation/Treatment Tolerance: Patient limited by fatigue  Medical Staff Made Aware: Yes  Strengths: Ability to acquire knowledge, Access to adaptive/assistive products, Premorbid level of function, Support of extended family/friends  Barriers to Participation: Comorbidities  End of Session Communication: Bedside nurse, Physician  Assessment Comment: Pt presents with weakness, decreased ambulation and transfers, and unsteadiness due to deconditioning and decreased activiy tolerance; can benefit from skilled PT intervention to assist with discharge planning and address the aforementioned issues to enable the pt to return to their prior level of function, which was independent with ww.  End of Session Patient Position: Bed, 3 rail up, Alarm on (bed height elevated with Dr. Barboza in room assessing pt; aware to lower bed when done)  IP OR SWING BED PT PLAN  Inpatient or Swing Bed: Inpatient  PT Plan  Treatment/Interventions: Bed mobility, Transfer training, Gait training, Balance training, Neuromuscular re-education, Strengthening, Endurance training, Therapeutic exercise, Therapeutic activity  PT Plan: Ongoing PT  PT Frequency: 3 times per week  PT Discharge  Recommendations: Moderate intensity level of continued care  PT Recommended Transfer Status: Assist x2, Assistive device  PT - OK to Discharge: Yes (PT POC established)    Subjective     Current Problem:  1. Community acquired pneumonia of right upper lobe of lung        2. Influenza A        3. Demand ischemia of myocardium (Multi)        4. Venous insufficiency  Vascular US Ankle Brachial Index (KEL) Without Exercise    Vascular US Ankle Brachial Index (KEL) Without Exercise    CANCELED: Vascular US PVR without exercise    CANCELED: Vascular US PVR without exercise      5. Peripheral vascular disease, unspecified (CMS-[facility])  Vascular US Ankle Brachial Index (KEL) Without Exercise    Vascular US Ankle Brachial Index (KEL) Without Exercise    CANCELED: Vascular US PVR without exercise    CANCELED: Vascular US PVR without exercise      6. Thoracic aortic aneurysm, ruptured, unspecified (Multi)  Vascular US Ankle Brachial Index (KEL) Without Exercise        Patient Active Problem List   Diagnosis    Cervical disc disorder with myelopathy    COPD without exacerbation (Multi)    Chronic pain syndrome    Moderate dementia with mood disturbance, unspecified dementia type (Multi)    Depression    Hypertension, essential    Weakness    Insomnia    Arthritis of left shoulder region    Combined form of age-related cataract, both eyes    Contracture of joint of finger    Lumbar disc herniation with radiculopathy    Lumbar radiculopathy    Lumbar stenosis with neurogenic claudication    Acquired spondylolisthesis    Lumbar scoliosis    Myelopathy concurrent with and due to spinal stenosis of cervical region    Osteoarthritis, multiple sites    Pain disorder associated with psychological and physical factors    Postlaminectomy syndrome, lumbar    Presence of retained hardware    Venous insufficiency    Vocal cord anomaly    Type 2 diabetes mellitus without retinopathy (Multi)    Colovaginal fistula    Weight loss    Hyperkalemia     Abdominal pain    Anemia    Ankle pain, right    Arrhythmia    Atrial fibrillation (Multi)    Diverticulitis, colon    Hypovolemia associated with hypermetabolic state    Hypovolemic shock (Multi)    Infection of intervertebral disc (pyogenic), thoracolumbar region    Lactic acidosis    Muscle wasting and atrophy, not elsewhere classified, unspecified site    Nonhealing nonsurgical wound    NSTEMI, initial episode of care (Multi)    Oliguria    Overactive bladder    Papular rash    Pleural effusion, not elsewhere classified    Primary localized osteoarthrosis, lower leg    Primary osteoarthritis, left shoulder    Status post laparoscopic colectomy    Trigger ring finger of right hand    Undernutrition    Pulmonary hypertension, unspecified (Multi)    Ileostomy present (Multi)    Acidosis, unspecified    Acute posthemorrhagic anemia    Infiltrative cardiomyopathy (Multi)    Psychogenic pain    UTI (urinary tract infection)    Edema    Pneumonia, unspecified organism    Stage 3b chronic kidney disease (Multi)    Acute on chronic diastolic heart failure    Dry eye syndrome of bilateral lacrimal glands    Regular astigmatism of both eyes with presbyopia    Combined forms of age-related cataract of right eye    Combined forms of age-related cataract of left eye    Community acquired pneumonia of right upper lobe of lung       General Visit Information:  General  Reason for Referral: 79 y.o. female presenting with generalized weakness and + Influenza A.  Missed Visit Reason: Other (Comment)  Family/Caregiver Present: No  Prior to Session Communication: Bedside nurse  Patient Position Received: Bed, 3 rail up, Alarm on  Preferred Learning Style: auditory, kinesthetic  General Comment: Pt feels very weak and tired, but agreeable to participate in PT with encouragement.    Home Living:  Home Living  Type of Home: House  Lives With: Alone  Home Adaptive Equipment: Walker rolling or standard  Home Layout: One level  Home  Access: Stairs to enter with rails  Entrance Stairs-Rails: Both  Entrance Stairs-Number of Steps: 3  Home Living Comments: Dtr visit daily    Prior Level of Function:  Prior Function Per Pt/Caregiver Report  Level of [place]: Needs assistance with ADLs, Needs assistance with homemaking  Receives Help From: Family  Homemaking Assistance:  (family assists with IADLs)  Ambulatory Assistance: Independent (with ww)  Prior Function Comments: denies any recent falls    Precautions:  Precautions  Medical Precautions: Fall precautions (droplet precautions)  Precautions Comment: Junaid       Objective     Pain:  Pain Assessment  Pain Assessment: 0-10  0-10 (Numeric) Pain Score: 7  Pain Type: Chronic pain  Pain Location: Back  Pain Orientation: Lower  Clinical Progression: Gradually improving  Pain Interventions: Repositioned (to R S/L at end of session)    Cognition:  Cognition  Overall Cognitive Status: Within Functional Limits  Orientation Level: Oriented X4  Attention: Within Functional Limits  Insight: Within function limits  Impulsive: Within functional limits    General Assessments:  General Observation  General Observation: Pt is weak and deconditioned.   Activity Tolerance  Endurance: Decreased tolerance for upright activites, Tolerates less than 10 min exercise, no significant change in vital signs  Sensation  Light Touch: No apparent deficits           Postural Control  Postural Control: Impaired  Posture Comment: posterior lean in sitting  Static Sitting Balance  Static Sitting-Balance Support: No upper extremity supported, Feet unsupported  Static Sitting-Level of Assistance: Minimum assistance  Static Sitting-Comment/Number of Minutes: Pt of short stature, difficult to get B feet onto floor.  Dynamic Sitting Balance  Dynamic Sitting-Balance Support: Feet unsupported, No upper extremity supported  Dynamic Sitting-Level of Assistance: Moderate assistance  Dynamic Sitting-Balance: Trunk control activities,  Reaching for objects  Dynamic Sitting-Comments: + coughing fit at EOB       Functional Assessments:     Bed Mobility  Bed Mobility: Yes  Bed Mobility 1  Bed Mobility 1: Supine to sitting, Sitting to supine  Level of Assistance 1: Maximum assistance, Moderate verbal cues, Moderate tactile cues  Bed Mobility Comments 1: Pt educated in bed mobility technique moving supine->sit via logroll due to back pain; pt requires mod VC's for technique and proper UE placements for upper body initiate rolling and to use L UE to reach over for opposite bedrail, and to use BUE to push up into sitting from sidelying. Pt with decreased shoulder ROM, needed assist with logroll to reach opposite bedrail, and assist with BLE and trunk.  Bed Mobility 2  Bed Mobility  2: Scooting  Level of Assistance 2: Dependent, +2  Bed Mobility Comments 2: towards HOB while supine  Transfers  Transfer: No (Deferred due to decreased activity tolerance while sitting EOB.)             Extremity/Trunk Assessments:  RUE   RUE :  (grossly 3/5, shoulder flexion < 90 degrees)  LUE   LUE:  (grossly 3/5, shoulder flexion < 90 degrees)  RLE   RLE : Exceptions to WFL  Strength RLE  R Hip Flexion: 3-/5  R Knee Extension: 3/5  R Ankle Dorsiflexion: 3/5  R Ankle Plantar Flexion: 3/5  LLE   LLE : Exceptions to WFL  Strength LLE  L Hip Flexion: 3-/5  L Knee Extension: 3/5  L Ankle Dorsiflexion: 3/5  L Ankle Plantar Flexion: 3/5    Outcome Measures:     [facility] Basic Mobility  Turning from your back to your side while in a flat bed without using bedrails: A lot  Moving from lying on your back to sitting on the side of a flat bed without using bedrails: A lot  Moving to and from bed to chair (including a wheelchair): Total  Standing up from a chair using your arms (e.g. wheelchair or bedside chair): Total  To walk in hospital room: Total  Climbing 3-5 steps with railing: Total  Basic Mobility - Total Score: 8      Goals:  Encounter Problems       Encounter Problems (Active)        Balance       STG - Maintains dynamic standing balance with ww with min A x 5 minutes       Start:  02/12/25    Expected End:  02/26/25       INTERVENTIONS:  1. Practice standing with minimal support.  2. Educate patient about standing tolerance.  3. Educate patient about independence with gait, transfers, and ADL's.  4. Educate patient about use of assistive device.  5. Educate patient about self-directed care.         STG - Maintains dynamic sitting balance without upper extremity support x 5 minutes with SBA       Start:  02/12/25    Expected End:  02/26/25       INTERVENTIONS:  1. Practice sitting on the edge of a bed/mat with minimal support.  2. Educate patient about maintining total hip precautions while maintaining balance.  3. Educate patient about pressure relief.  4. Educate patient about use of assistive device.            Mobility       STG - Patient will ambulate with ww 25' x 2 with min A       Start:  02/12/25    Expected End:  02/26/25            Increase BLE strength to attain functional goals achieved through supine, seated, and standing TE.        Start:  02/12/25    Expected End:  02/26/25               PT Transfers       STG - Transfer from bed to chair with ww with min A       Start:  02/12/25    Expected End:  02/26/25            STG - Patient to transfer to and from sit to supine with min A       Start:  02/12/25    Expected End:  02/26/25            STG - Patient will transfer sit to and from stand with ww with min A       Start:  02/12/25    Expected End:  02/26/25                 Education Documentation  Precautions, taught by Nohemy Sanabria, PT at 2/12/2025  1:07 PM.  Learner: Patient  Readiness: Acceptance  Method: Explanation  Response: Verbalizes Understanding, Demonstrated Understanding  Comment: see above    Body Mechanics, taught by Nohemy Sanabria, PT at 2/12/2025  1:07 PM.  Learner: Patient  Readiness: Acceptance  Method: Explanation  Response: Verbalizes Understanding,  Demonstrated Understanding  Comment: see above    Mobility Training, taught by Nohemy Sanabria, PT at 2/12/2025  1:07 PM.  Learner: Patient  Readiness: Acceptance  Method: Explanation  Response: Verbalizes Understanding, Demonstrated Understanding  Comment: see above    Education Comments  No comments found.

## 2025-02-12 NOTE — PROGRESS NOTES
Return call from patient's daughter, Sheila. They would like referrals to [facility].

## 2025-02-12 NOTE — PROGRESS NOTES
Phone calls to both daughters they have not discussed choices of faciillities yet. Stressed the importance of them getting back to me so we can secure a bed at their desired location.

## 2025-02-12 NOTE — NURSING NOTE
This RN notified primary RN Annie Turk of critical lab, blood glucose 52, also checked in on pt and gave po orange juice. Notified CTA to check blood sugar now

## 2025-02-12 NOTE — PROGRESS NOTES
"Subjective Data:  Patient resting, reports feeling comfortable with c/o dyspnea with exertion  No supp oxygen requirements at this time    Overnight Events:    None reported      Objective Data:  Last Recorded Vitals:  Vitals:    25 2012 25 0430 25 0900 25 1338   BP: 93/61 96/63 98/65    BP Location: Left arm Left arm     Patient Position: Lying Lying     Pulse: 80 72 69    Resp: 22  15    Temp: 36.8 °C (98.3 °F) 36.8 °C (98.2 °F) 37 °C (98.6 °F)    TempSrc: Temporal Temporal Temporal    SpO2: 93% 94% 95% 97%   Weight:       Height:         Medical Gas Therapy: None (Room air)  Weight  Av.5 kg (140 lb)  Min: 63.5 kg (140 lb)  Max: 63.5 kg (140 lb)    LABS:  CMP:  Results from last 7 days   Lab Units 25  0546 02/10/25  1612   SODIUM mmol/L 138 135*   POTASSIUM mmol/L 3.6 3.9   CHLORIDE mmol/L 107 102   CO2 mmol/L 23 25   ANION GAP mmol/L 12 12   BUN mg/dL 21 27*   CREATININE mg/dL 0.91 1.07*   EGFR mL/min/1.73m*2 65 54*   ALBUMIN g/dL  --  3.5   ALT U/L  --  46*   AST U/L  --  59*   BILIRUBIN TOTAL mg/dL  --  0.5   LIPASE U/L  --  24     CBC:  Results from last 7 days   Lab Units 25  0546 02/10/25  1612   WBC AUTO x10*3/uL 7.6 7.4   HEMOGLOBIN g/dL 13.6 14.6   HEMATOCRIT % 43.0 46.6*   PLATELETS AUTO x10*3/uL 174 193   MCV fL 96 96     COAG:     ABO: No results found for: \"ABO\"  HEME/ENDO:     CARDIAC:   Results from last 7 days   Lab Units 02/10/25  1802 02/10/25  1612   TROPHS ng/L 524* 526*   BNP pg/mL  --  370*             Last I/O:    Intake/Output Summary (Last 24 hours) at 2025 1442  Last data filed at 2025 1003  Gross per 24 hour   Intake 336 ml   Output 1700 ml   Net -1364 ml     Net IO Since Admission: -144 mL [25 1442]          Inpatient Medications:  Scheduled medications   Medication Dose Route Frequency    amiodarone  200 mg oral Daily    apixaban  5 mg oral BID    atorvastatin  40 mg oral Nightly    azithromycin  500 mg oral q24h    baclofen  5 " mg oral BID    cefTRIAXone  1 g intravenous Daily    donepezil  5 mg oral Nightly    DULoxetine  30 mg oral BID    empagliflozin  25 mg oral Daily    gabapentin  600 mg oral TID    [Held by provider] glipiZIDE XL  5 mg oral Daily    insulin lispro  0-5 Units subcutaneous Before meals & nightly    ipratropium-albuteroL  3 mL nebulization TID    methylPREDNISolone sodium succinate (PF)  40 mg intravenous q8h    oseltamivir  30 mg oral BID    pantoprazole  40 mg oral Daily before breakfast    Or    pantoprazole  40 mg intravenous Daily before breakfast    spironolactone  25 mg oral Daily    tafamidis  61 mg oral Daily    topiramate  100 mg oral BID     PRN medications   Medication    acetaminophen    Or    acetaminophen    Or    acetaminophen    benzonatate    dextrose    dextrose    glucagon    glucagon    guaiFENesin    ipratropium-albuteroL    melatonin    polyethylene glycol    prochlorperazine    Or    prochlorperazine    traZODone     Continuous Medications   Medication Dose Last Rate       Physical Exam:  General: Elderly female, lethargic, mildly confused.   HEENT:  Normocephalic.  Moist mucosa.    Neck:  Normal Jugular Venous Pressure.  Cardiovascular:  Regular rate and rhythm.  Normal S1 and S2, no murmurs, rubs or gallops  Pulmonary: Expiratory Rhonchi, No supp oxygen  Abdomen:  Soft. Non-tender.   Non-distended.  Positive bowel sounds.  Lower Extremities:  2+ pedal pulses. No LE edema.  Neurologic:  Alert and oriented x3. No focal deficit.   Skin: Skin warm and dry, normal skin turgor.   Psychiatric: agitated     Assessment/Plan  Elida Benson is a 79 y.o. female with past medical history of dementia, hypertension, diverticulitis status post laparoscopic anterior resection with ileostomy on 8/16/2023, paroxysmal atrial fibrillation on amiodarone and Eliquis, preserved ejection fraction with suspected myocardial cleft versus false tendon, moderate concentric hypertrophy, moderate left atrial enlargement  on echo performed 8/19/2023, preserved ejection fraction with moderate septal thickness, abnormal strain imaging concerning for possible infiltrative heart disease on echo performed 8/22/2023, amyloid SPECT heart study demonstrates radiotracer deposition within myocardium of left ventricle suggestive of TTR amyloidosis on nuclear imaging performed 4/15/2024, preserved LVEF of 62% with constellation of findings consistent with amyloidosis including diffuse subendocardial delayed enhancement and abnormal myocardial involving time as well as increased T1 mapping times, asymmetric mid to basal septal hypertrophy 1.8 cm on cardiac MRI performed 9/13/2024 who presented to [facility] with shortenss of breath, found to have influenza A infection and pneumonia. Cardiology consulted for elevated troponin.      Home CV medications:  amiodarone 200mg daily, Eliquis 5mg BID, furosemide 20mg daily, losartan 25mg daily, atorvastatin 40mg daily, Jardiance 25mg daily, Vyndamax 61mg daily,  spironolactone 25mg daily.      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              - HS troponin 526, 524.  Low level elevation with a flat trend consistent with a non-MI troponin elevation / chronic non-traumatic myocardial injury in the setting of above. Core measures do not apply.  There are no significant clinical signs / symptoms or ischemic changes consistent with ACS.  Denies any chest pain.      Plan:  - Supportive care management of Influenza and PNA per primary team  - holding losartan and spironolactone with soft BP's> can address resumption at cardiology outpt follow  up  - Start Furosemide 40 mg PO Daily tomorrow as ordered  - Continue with amiodarone and Eliquis  - Continue with tafamidis  Follow-up with Dr. Dueñas of advanced heart failure scheduled May 5, 2025. Will request sooner appoint  HF follow up with Cecilia Kumar CNP in 1 week post discharge  She will follow up with Dr. Ignacio in 4-6 weeks post discharge  Please call with questions    Code Status:  Full Code    Ella Mallory, APRN-CNP      Thank you for allowing me to participate in their care.  Please feel free to call me with any further questions or concerns.    Cesar Ignacio MD, FAC, [facility]  Division of Cardiovascular Medicine  System Director, Nuclear Cardiology   Medical Director, [facility] [place], [facility]   Clinical , [employer]  [email or web address]   Office:  [phone]          Both the ERICK and I have had a face to face encounter with the patient today. I have examined the patient and edited the documented physical examination as necessary.  I personally reviewed the patient's vital signs, telemetry, recent labs, medications, orders, EKGs, and pertinent cardiac imaging/ echocardiography.  I have reviewed the ERICK's encounter note, approve the ERICK's documentation and have edited the note to reflect my diagnostic and therapeutic plan.

## 2025-02-12 NOTE — PROGRESS NOTES
Elida Benson is a 79 y.o. female     Significant wheezing today    Review of Systems     Constitutional: no fever, no chills,   Cardiovascular: no chest pain   Respiratory: Bilateral wheezing  Gastrointestinal: no abdominal pain, no constipation, no melena, no nausea, no diarrhea, no vomiting and no blood in stools.   Neurological: Back pain  All other systems have been reviewed and are negative for complaint.       Vitals:    02/12/25 1338   BP:    Pulse:    Resp:    Temp:    SpO2: 97%        Scheduled medications  amiodarone, 200 mg, oral, Daily  apixaban, 5 mg, oral, BID  atorvastatin, 40 mg, oral, Nightly  azithromycin, 500 mg, oral, q24h  baclofen, 5 mg, oral, BID  cefTRIAXone, 1 g, intravenous, Daily  donepezil, 5 mg, oral, Nightly  DULoxetine, 30 mg, oral, BID  empagliflozin, 25 mg, oral, Daily  gabapentin, 600 mg, oral, TID  [Held by provider] glipiZIDE XL, 5 mg, oral, Daily  insulin lispro, 0-5 Units, subcutaneous, Before meals & nightly  ipratropium-albuteroL, 3 mL, nebulization, TID  methylPREDNISolone sodium succinate (PF), 40 mg, intravenous, q8h  oseltamivir, 30 mg, oral, BID  pantoprazole, 40 mg, oral, Daily before breakfast   Or  pantoprazole, 40 mg, intravenous, Daily before breakfast  spironolactone, 25 mg, oral, Daily  tafamidis, 61 mg, oral, Daily  topiramate, 100 mg, oral, BID      Continuous medications     PRN medications  PRN medications: acetaminophen **OR** acetaminophen **OR** acetaminophen, benzonatate, dextrose, dextrose, glucagon, glucagon, guaiFENesin, ipratropium-albuteroL, melatonin, polyethylene glycol, prochlorperazine **OR** prochlorperazine, traZODone    Lab Review   Results from last 7 days   Lab Units 02/12/25  0546 02/10/25  1612   WBC AUTO x10*3/uL 7.6 7.4   HEMOGLOBIN g/dL 13.6 14.6   HEMATOCRIT % 43.0 46.6*   PLATELETS AUTO x10*3/uL 174 193     Results from last 7 days   Lab Units 02/12/25  0546 02/10/25  1612   SODIUM mmol/L 138 135*   POTASSIUM mmol/L 3.6 3.9    CHLORIDE mmol/L 107 102   CO2 mmol/L 23 25   BUN mg/dL 21 27*   CREATININE mg/dL 0.91 1.07*   CALCIUM mg/dL 8.2* 8.6   PROTEIN TOTAL g/dL  --  6.9   BILIRUBIN TOTAL mg/dL  --  0.5   ALK PHOS U/L  --  63   ALT U/L  --  46*   AST U/L  --  59*   GLUCOSE mg/dL 52* 214*     Results from last 7 days   Lab Units 02/10/25  1802 02/10/25  1612   TROPHS ng/L 524* 526*        Vascular US Ankle Brachial Index (KEL) Without Exercise         XR chest 1 view   Final Result   1.Newly appearing right upper lobe infiltrates and mild   accentuation of markings in the right infrahilar and basilar regions.   Findings could be related to pneumonia or asymmetric pulmonary   vascular congestion.   2.Stable mild enlargement of the cardiomediastinal silhouette.   Signed by Ania Brito DO            Physical Exam    Constitutional   General appearance: Alert complaining of back pain  Pulmonary   Respiratory assessment: Bilateral rhonchi with wheezing  Cardiovascular   Auscultation of heart: Apical pulse normal, heart rate and rhythm normal, normal S1 and S2, no murmurs and no pericardial rub.    Exam for edema: No peripheral edema.   Diminished pulses in right lower extremity which appears cold to touch  Abdomen   Abdominal Exam: No bruits, normal bowel sounds, soft, non-tender, no abdominal mass palpated.    Liver and Spleen exam: No hepato-splenomegaly.   Musculoskeletal     Inspection/palpation of joints, bones and muscles: No joint swelling. Normal movement of all extremities.   Neurologic   Cranial nerves: Nerves 2-12 were intact, no focal neuro defects.         Assessment/Plan      #Influenza pneumonia  Continue Tamiflu/Rocephin/azithromycin  #Exacerbation of COPD  Added DuoNebs and Solu-Medrol       #Elevated troponin  #Infiltrative cardiomyopathy/amyloidosis  Cardiology managing     #Diabetes mellitus type 2  Sliding insulin coverage     #Hypertension  Low blood pressures  We will hold medication.  Blood pressure less than 100      #Dyslipidemia  Continue statins with target LDL less than 70     #CKD 3B  Monitor kidney functions while diuresing     #Postlaminectomy syndrome with chronic back pain  Continue pain medications     PT OT consult for deconditioning   Consistent Carbohydrate Diabetic Diets

## 2025-02-13 ENCOUNTER — PHARMACY VISIT (OUTPATIENT)
Dept: PHARMACY | Facility: CLINIC | Age: 80
End: 2025-02-13
Payer: MEDICARE

## 2025-02-13 LAB
ANION GAP SERPL CALC-SCNC: 16 MMOL/L (ref 10–20)
BUN SERPL-MCNC: 29 MG/DL (ref 6–23)
CALCIUM SERPL-MCNC: 8.6 MG/DL (ref 8.6–10.3)
CHLORIDE SERPL-SCNC: 107 MMOL/L (ref 98–107)
CO2 SERPL-SCNC: 19 MMOL/L (ref 21–32)
CREAT SERPL-MCNC: 1.02 MG/DL (ref 0.5–1.05)
EGFRCR SERPLBLD CKD-EPI 2021: 57 ML/MIN/1.73M*2
ERYTHROCYTE [DISTWIDTH] IN BLOOD BY AUTOMATED COUNT: 15.3 % (ref 11.5–14.5)
GLUCOSE BLD MANUAL STRIP-MCNC: 244 MG/DL (ref 74–99)
GLUCOSE BLD MANUAL STRIP-MCNC: 369 MG/DL (ref 74–99)
GLUCOSE BLD MANUAL STRIP-MCNC: 381 MG/DL (ref 74–99)
GLUCOSE BLD MANUAL STRIP-MCNC: 481 MG/DL (ref 74–99)
GLUCOSE BLD MANUAL STRIP-MCNC: 487 MG/DL (ref 74–99)
GLUCOSE SERPL-MCNC: 269 MG/DL (ref 74–99)
HCT VFR BLD AUTO: 44.1 % (ref 36–46)
HGB BLD-MCNC: 13.6 G/DL (ref 12–16)
MCH RBC QN AUTO: 30.2 PG (ref 26–34)
MCHC RBC AUTO-ENTMCNC: 30.8 G/DL (ref 32–36)
MCV RBC AUTO: 98 FL (ref 80–100)
NRBC BLD-RTO: 0 /100 WBCS (ref 0–0)
PLATELET # BLD AUTO: 198 X10*3/UL (ref 150–450)
POTASSIUM SERPL-SCNC: 4.5 MMOL/L (ref 3.5–5.3)
RBC # BLD AUTO: 4.5 X10*6/UL (ref 4–5.2)
SODIUM SERPL-SCNC: 137 MMOL/L (ref 136–145)
WBC # BLD AUTO: 6.6 X10*3/UL (ref 4.4–11.3)

## 2025-02-13 PROCEDURE — 85027 COMPLETE CBC AUTOMATED: CPT | Performed by: INTERNAL MEDICINE

## 2025-02-13 PROCEDURE — 99232 SBSQ HOSP IP/OBS MODERATE 35: CPT | Performed by: INTERNAL MEDICINE

## 2025-02-13 PROCEDURE — 97530 THERAPEUTIC ACTIVITIES: CPT | Mod: GO

## 2025-02-13 PROCEDURE — 97535 SELF CARE MNGMENT TRAINING: CPT | Mod: GO

## 2025-02-13 PROCEDURE — 1200000002 HC GENERAL ROOM WITH TELEMETRY DAILY

## 2025-02-13 PROCEDURE — 82947 ASSAY GLUCOSE BLOOD QUANT: CPT

## 2025-02-13 PROCEDURE — 80048 BASIC METABOLIC PNL TOTAL CA: CPT | Performed by: INTERNAL MEDICINE

## 2025-02-13 PROCEDURE — 36415 COLL VENOUS BLD VENIPUNCTURE: CPT | Performed by: INTERNAL MEDICINE

## 2025-02-13 PROCEDURE — 2500000002 HC RX 250 W HCPCS SELF ADMINISTERED DRUGS (ALT 637 FOR MEDICARE OP, ALT 636 FOR OP/ED)

## 2025-02-13 PROCEDURE — 2500000001 HC RX 250 WO HCPCS SELF ADMINISTERED DRUGS (ALT 637 FOR MEDICARE OP)

## 2025-02-13 PROCEDURE — 2500000004 HC RX 250 GENERAL PHARMACY W/ HCPCS (ALT 636 FOR OP/ED): Mod: JZ

## 2025-02-13 PROCEDURE — 2500000001 HC RX 250 WO HCPCS SELF ADMINISTERED DRUGS (ALT 637 FOR MEDICARE OP): Performed by: PHARMACIST

## 2025-02-13 PROCEDURE — 94668 MNPJ CHEST WALL SBSQ: CPT

## 2025-02-13 PROCEDURE — 2500000004 HC RX 250 GENERAL PHARMACY W/ HCPCS (ALT 636 FOR OP/ED): Performed by: INTERNAL MEDICINE

## 2025-02-13 PROCEDURE — 2500000002 HC RX 250 W HCPCS SELF ADMINISTERED DRUGS (ALT 637 FOR MEDICARE OP, ALT 636 FOR OP/ED): Performed by: INTERNAL MEDICINE

## 2025-02-13 PROCEDURE — 2500000001 HC RX 250 WO HCPCS SELF ADMINISTERED DRUGS (ALT 637 FOR MEDICARE OP): Performed by: INTERNAL MEDICINE

## 2025-02-13 PROCEDURE — 94640 AIRWAY INHALATION TREATMENT: CPT

## 2025-02-13 RX ORDER — INSULIN LISPRO 100 [IU]/ML
0-10 INJECTION, SOLUTION INTRAVENOUS; SUBCUTANEOUS
Status: DISCONTINUED | OUTPATIENT
Start: 2025-02-13 | End: 2025-02-13

## 2025-02-13 RX ORDER — INSULIN LISPRO 100 [IU]/ML
0-10 INJECTION, SOLUTION INTRAVENOUS; SUBCUTANEOUS
Status: DISCONTINUED | OUTPATIENT
Start: 2025-02-13 | End: 2025-02-17 | Stop reason: HOSPADM

## 2025-02-13 RX ADMIN — BACLOFEN 5 MG: 10 TABLET ORAL at 09:26

## 2025-02-13 RX ADMIN — DULOXETINE HYDROCHLORIDE 30 MG: 30 CAPSULE, DELAYED RELEASE ORAL at 09:26

## 2025-02-13 RX ADMIN — METHYLPREDNISOLONE SODIUM SUCCINATE 40 MG: 40 INJECTION, POWDER, FOR SOLUTION INTRAMUSCULAR; INTRAVENOUS at 12:40

## 2025-02-13 RX ADMIN — IPRATROPIUM BROMIDE AND ALBUTEROL SULFATE 3 ML: 2.5; .5 SOLUTION RESPIRATORY (INHALATION) at 14:04

## 2025-02-13 RX ADMIN — APIXABAN 5 MG: 5 TABLET, FILM COATED ORAL at 09:26

## 2025-02-13 RX ADMIN — BACLOFEN 5 MG: 10 TABLET ORAL at 21:40

## 2025-02-13 RX ADMIN — AZITHROMYCIN 500 MG: 500 TABLET, FILM COATED ORAL at 21:39

## 2025-02-13 RX ADMIN — INSULIN LISPRO 10 UNITS: 100 INJECTION, SOLUTION INTRAVENOUS; SUBCUTANEOUS at 22:43

## 2025-02-13 RX ADMIN — APIXABAN 5 MG: 5 TABLET, FILM COATED ORAL at 21:39

## 2025-02-13 RX ADMIN — GABAPENTIN 600 MG: 300 CAPSULE ORAL at 16:00

## 2025-02-13 RX ADMIN — OSELTAMIVIR PHOSPHATE 30 MG: 30 CAPSULE ORAL at 21:39

## 2025-02-13 RX ADMIN — INSULIN LISPRO 10 UNITS: 100 INJECTION, SOLUTION INTRAVENOUS; SUBCUTANEOUS at 16:00

## 2025-02-13 RX ADMIN — TOPIRAMATE 100 MG: 100 TABLET, FILM COATED ORAL at 09:26

## 2025-02-13 RX ADMIN — AMIODARONE HYDROCHLORIDE 200 MG: 200 TABLET ORAL at 09:26

## 2025-02-13 RX ADMIN — EMPAGLIFLOZIN 25 MG: 25 TABLET, FILM COATED ORAL at 09:26

## 2025-02-13 RX ADMIN — INSULIN LISPRO 5 UNITS: 100 INJECTION, SOLUTION INTRAVENOUS; SUBCUTANEOUS at 12:40

## 2025-02-13 RX ADMIN — TOPIRAMATE 100 MG: 100 TABLET, FILM COATED ORAL at 21:39

## 2025-02-13 RX ADMIN — INSULIN LISPRO 2 UNITS: 100 INJECTION, SOLUTION INTRAVENOUS; SUBCUTANEOUS at 09:26

## 2025-02-13 RX ADMIN — DULOXETINE HYDROCHLORIDE 30 MG: 30 CAPSULE, DELAYED RELEASE ORAL at 21:40

## 2025-02-13 RX ADMIN — CEFTRIAXONE SODIUM 1 G: 1 INJECTION, SOLUTION INTRAVENOUS at 09:26

## 2025-02-13 RX ADMIN — OSELTAMIVIR PHOSPHATE 30 MG: 30 CAPSULE ORAL at 09:26

## 2025-02-13 RX ADMIN — ATORVASTATIN CALCIUM 40 MG: 40 TABLET, FILM COATED ORAL at 21:40

## 2025-02-13 RX ADMIN — IPRATROPIUM BROMIDE AND ALBUTEROL SULFATE 3 ML: 2.5; .5 SOLUTION RESPIRATORY (INHALATION) at 08:00

## 2025-02-13 RX ADMIN — FUROSEMIDE 40 MG: 20 TABLET ORAL at 09:26

## 2025-02-13 RX ADMIN — GABAPENTIN 600 MG: 300 CAPSULE ORAL at 09:26

## 2025-02-13 RX ADMIN — DONEPEZIL HYDROCHLORIDE 5 MG: 5 TABLET ORAL at 21:39

## 2025-02-13 RX ADMIN — METHYLPREDNISOLONE SODIUM SUCCINATE 40 MG: 40 INJECTION, POWDER, FOR SOLUTION INTRAMUSCULAR; INTRAVENOUS at 04:28

## 2025-02-13 RX ADMIN — PANTOPRAZOLE SODIUM 40 MG: 40 TABLET, DELAYED RELEASE ORAL at 06:48

## 2025-02-13 RX ADMIN — IPRATROPIUM BROMIDE AND ALBUTEROL SULFATE 3 ML: 2.5; .5 SOLUTION RESPIRATORY (INHALATION) at 19:05

## 2025-02-13 RX ADMIN — GABAPENTIN 600 MG: 300 CAPSULE ORAL at 21:39

## 2025-02-13 ASSESSMENT — PAIN - FUNCTIONAL ASSESSMENT
PAIN_FUNCTIONAL_ASSESSMENT: WONG-BAKER FACES
PAIN_FUNCTIONAL_ASSESSMENT: 0-10
PAIN_FUNCTIONAL_ASSESSMENT: 0-10

## 2025-02-13 ASSESSMENT — PAIN SCALES - GENERAL
PAINLEVEL_OUTOF10: 0 - NO PAIN

## 2025-02-13 ASSESSMENT — COGNITIVE AND FUNCTIONAL STATUS - GENERAL
STANDING UP FROM CHAIR USING ARMS: A LOT
DRESSING REGULAR UPPER BODY CLOTHING: A LITTLE
WALKING IN HOSPITAL ROOM: A LOT
DAILY ACTIVITIY SCORE: 16
DRESSING REGULAR LOWER BODY CLOTHING: A LITTLE
STANDING UP FROM CHAIR USING ARMS: A LOT
TOILETING: TOTAL
DRESSING REGULAR UPPER BODY CLOTHING: A LOT
HELP NEEDED FOR BATHING: A LOT
EATING MEALS: A LITTLE
TURNING FROM BACK TO SIDE WHILE IN FLAT BAD: A LOT
PERSONAL GROOMING: A LITTLE
TOILETING: A LOT
TOILETING: A LOT
DAILY ACTIVITIY SCORE: 10
MOBILITY SCORE: 12
PERSONAL GROOMING: A LITTLE
MOVING FROM LYING ON BACK TO SITTING ON SIDE OF FLAT BED WITH BEDRAILS: A LOT
HELP NEEDED FOR BATHING: A LOT
MOVING TO AND FROM BED TO CHAIR: A LOT
HELP NEEDED FOR BATHING: A LOT
EATING MEALS: A LOT
PERSONAL GROOMING: A LOT
DAILY ACTIVITIY SCORE: 16
TURNING FROM BACK TO SIDE WHILE IN FLAT BAD: A LOT
DRESSING REGULAR LOWER BODY CLOTHING: A LITTLE
MOBILITY SCORE: 12
DRESSING REGULAR LOWER BODY CLOTHING: TOTAL
WALKING IN HOSPITAL ROOM: A LOT
DRESSING REGULAR UPPER BODY CLOTHING: A LITTLE
MOVING FROM LYING ON BACK TO SITTING ON SIDE OF FLAT BED WITH BEDRAILS: A LOT
CLIMB 3 TO 5 STEPS WITH RAILING: A LOT
EATING MEALS: A LITTLE
CLIMB 3 TO 5 STEPS WITH RAILING: A LOT
MOVING TO AND FROM BED TO CHAIR: A LOT

## 2025-02-13 ASSESSMENT — ACTIVITIES OF DAILY LIVING (ADL): HOME_MANAGEMENT_TIME_ENTRY: 25

## 2025-02-13 ASSESSMENT — PAIN SCALES - WONG BAKER: WONGBAKER_NUMERICALRESPONSE: HURTS EVEN MORE

## 2025-02-13 NOTE — PROGRESS NOTES
Occupational Therapy    OT Treatment    Patient Name: Elida Benson  MRN: [id number]  Department: [facility] [id number]  Room: [address] [place] [address]  Today's Date: 2/13/2025  Time Calculation  Start Time: 1445  Stop Time: 1523  Time Calculation (min): 38 min        Assessment:  OT Assessment: Pt slowly progressing toward OT tx goals, continues to be limited by fatigue, generalized weakness and pain. Pt completed seated ADL tasks bed level and edge of bed with increase assist due to bilateral limited shoulder flexion.  Prognosis: Fair  Barriers to Discharge Home: Caregiver assistance, Physical needs  Caregiver Assistance: Patient lives alone and/or does not have reliable caregiver assistance  Physical Needs: 24hr mobility assistance needed, 24hr ADL assistance needed, Stair navigation into home limited by function/safety, High falls risk due to function or environment  Evaluation/Treatment Tolerance: Patient limited by fatigue  Medical Staff Made Aware: Yes  End of Session Communication: Bedside nurse  End of Session Patient Position: Bed, 3 rail up, Alarm on (On bedpan, RN notified and aware.)  OT Assessment Results: Decreased ADL status, Decreased upper extremity range of motion, Decreased safe judgment during ADL, Decreased cognition, Decreased endurance, Decreased functional mobility, Decreased IADLs  Prognosis: Fair  Barriers to Discharge: Decreased caregiver support  Evaluation/Treatment Tolerance: Patient limited by fatigue  Medical Staff Made Aware: Yes  Plan:  Treatment Interventions: ADL retraining, Functional transfer training, UE strengthening/ROM, Endurance training, Patient/family training, Equipment evaluation/education, Fine motor coordination activities, Compensatory technique education  OT Frequency: 3 times per week  OT Discharge Recommendations: Moderate intensity level of continued care  OT Recommended Transfer Status: Assist of 2  OT - OK to Discharge: Yes (Per POC)  Treatment Interventions: ADL retraining, Functional  transfer training, UE strengthening/ROM, Endurance training, Patient/family training, Equipment evaluation/education, Fine motor coordination activities, Compensatory technique education    Subjective   Previous Visit Info:  OT Last Visit  OT Received On: 02/13/25  General:  General  Reason for Referral: 79 y.o. female presenting with generalized weakness and + Influenza A.  Referred By: Julián Barboza MD  Past Medical History Relevant to Rehab: history of hypertension, A-fib, cervical myelopathy, diabetes, chronic pain,  Family/Caregiver Present: No  Prior to Session Communication: Bedside nurse  Patient Position Received: Bed, 3 rail up, Alarm on  Preferred Learning Style: auditory, kinesthetic  General Comment: Pt with increase faitue and lethargy, but agreeable to participate in OT tx..  Precautions:  Medical Precautions: Fall precautions, Infection precautions (Flu)     Date/Time Vitals Session Patient Position Pulse Resp SpO2 BP MAP (mmHg)    02/13/25 1539 --  --  --  --  98 %  141/62  88                 Pain:  Pain Assessment  Pain Assessment: Cadet-Baker FACES  Cadet-Baker FACES Pain Rating: Hurts even more (During mobility)  Pain Interventions: Repositioned, Ambulation/increased activity    Objective       Coordination:  Movements are Fluid and Coordinated: No  Activities of Daily Living:    Grooming  Grooming Level of Assistance: Minimum assistance, Maximum assistance, Setup  Grooming Where Assessed: Edge of bed  Grooming Comments: Min A for opening toothpaste & applying toothbrush at EOB. Max A for donning deodorant at EOB. Set-up assist for washing face with washcloth bed level.    UE Bathing  UE Bathing Level of Assistance: Maximum assistance  UE Bathing Where Assessed: Edge of bed  UE Bathing Comments: Max A for UE/UB sponge bathing at EOB with HOB elevated for lateral support.       UE Dressing  UE Dressing Level of Assistance: Maximum assistance  UE Dressing Where Assessed: Edge of bed  UE Dressing  Comments: Don/doff hospital gown    LE Dressing  LE Dressing: Yes  LE Dressing Adaptive Equipment: Rafia/  Sock Level of Assistance: Dependent  Adult Briefs Level of Assistance: Dependent  LE Dressing Where Assessed: Edge of bed    Toileting  Toileting Level of Assistance: Dependent  Where Assessed: Bed level  Toileting Comments: Pt incontinent, assist with pericare hygiene. Continued to make bowel movement and placed on bedpan - RN notified.     Bed Mobility/Transfers: Bed Mobility  Bed Mobility: Yes  Bed Mobility 1  Bed Mobility 1: Supine to sitting  Level of Assistance 1: Maximum assistance  Bed Mobility Comments 1: To manage & progress BLE and trunk toward EOB. Cues for sequencing.  Bed Mobility 2  Bed Mobility  2: Scooting  Level of Assistance 2: Maximum assistance  Bed Mobility Comments 2: Toward EOB with use of chux pads.  Bed Mobility 3  Bed Mobility 3: Sitting to supine  Level of Assistance 3: Dependent, +2  Bed Mobility Comments 3: To manage and progress BLE and trunk into bed. Cues for sequencing.  Bed Mobility 4  Bed Mobility 4: Rolling right  Level of Assistance 4: Minimum assistance  Bed Mobility Comments 4: Cues for hand placement and use of bed rail.  Bed Mobility 5  Bed Mobility 5: Rolling left  Level of Assistance 5: Maximum assistance  Bed Mobility Comments 5: Cues for hand placement and use of bed rail, Max A due to limited shoulder use as well as generalized weakness/fatigue.    Transfers  Transfer: Yes  Transfer 1  Transfer From 1: Bed to  Transfer to 1: Stand  Technique 1: Sit to stand, Stand to sit  Transfer Device 1: Walker, Gait belt  Transfer Level of Assistance 1: Moderate assistance, +2  Trials/Comments 1: Cues for safe hand placement and body positioning.      Functional Mobility:  Functional Mobility  Functional Mobility Performed: No  Sitting Balance:  Static Sitting Balance  Static Sitting-Balance Support: Feet supported  Static Sitting-Level of Assistance: Close supervision,  Contact guard (SBA with bouts of CGA due to retro/lateral leaning)  Static Sitting-Comment/Number of Minutes: EOB  Standing Balance:  Static Standing Balance  Static Standing-Balance Support: Bilateral upper extremity supported  Static Standing-Level of Assistance: Minimum assistance, Moderate assistance  Static Standing-Comment/Number of Minutes: With FWW, able to tolerate and maintain static standing ~ 3 minutes.     Strength:  Strength Comments: Limited bilateral shoulder flexion  Other Activity:     RUE   RUE : Exceptions to WFL and LUE   LUE: Exceptions to WFL    Outcome Measures:[facility] Daily Activity  Putting on and taking off regular lower body clothing: Total  Bathing (including washing, rinsing, drying): A lot  Putting on and taking off regular upper body clothing: A lot  Toileting, which includes using toilet, bedpan or urinal: Total  Taking care of personal grooming such as brushing teeth: A lot  Eating Meals: A lot  Daily Activity - Total Score: 10      Education Documentation  Precautions, taught by Bebe Soria OT at 2/13/2025  4:18 PM.  Learner: Patient  Readiness: Acceptance  Method: Explanation  Response: Needs Reinforcement, Verbalizes Understanding    Body Mechanics, taught by Bebe Soria OT at 2/13/2025  4:18 PM.  Learner: Patient  Readiness: Acceptance  Method: Explanation  Response: Needs Reinforcement, Verbalizes Understanding    ADL Training, taught by Bebe Soria OT at 2/13/2025  4:18 PM.  Learner: Patient  Readiness: Acceptance  Method: Explanation  Response: Needs Reinforcement, Verbalizes Understanding    Education Comments  No comments found.        OP EDUCATION:       Goals:  Encounter Problems       Encounter Problems (Active)       ADLs       Patient will perform sponge UB and LB bathing in seating with minimal assist  level of assistance. (Not Progressing)       Start:  02/11/25    Expected End:  02/25/25            Patient with complete upper body dressing with stand by  assist level of assistance donning and doffing all UE clothes with PRN adaptive equipment while supported sitting and edge of bed  (Progressing)       Start:  02/11/25    Expected End:  02/25/25            Patient with complete lower body dressing with moderate assist  level of assistance donning and doffing all LE clothes  with PRN adaptive equipment while supported sitting and edge of bed  (Not Progressing)       Start:  02/11/25    Expected End:  02/25/25            Patient will complete daily grooming tasks with supervision level of assistance and PRN adaptive equipment while supported sitting and edge of bed. (Progressing)       Start:  02/11/25    Expected End:  02/25/25            Patient will complete toileting including hygiene clothing management/hygiene with moderate assist level of assistance and bedside commode. (Not Progressing)       Start:  02/11/25    Expected End:  02/25/25               MOBILITY       Patient will perform Functional mobility x Household distances/Community Distances with contact guard assist level of assistance and front wheeled walker in order to improve safety and functional mobility. (Not Progressing)       Start:  02/11/25    Expected End:  02/25/25               TRANSFERS       Patient will perform bed mobility minimal assist  level of assistance and bed rails in order to improve safety and independence with mobility (Not Progressing)       Start:  02/11/25    Expected End:  02/25/25            Patient will complete sit to stand transfer with contact guard assist level of assistance and front wheeled walker in order to improve safety and prepare for out of bed mobility. (Progressing)       Start:  02/11/25    Expected End:  02/25/25

## 2025-02-13 NOTE — PROGRESS NOTES
Elida Benson is a 79 y.o. female     Shows improvement on auscultation today  Back pain persists    Review of Systems     Constitutional: no fever, no chills,   Cardiovascular: no chest pain   Respiratory: Bilateral wheezing  Gastrointestinal: no abdominal pain, no constipation, no melena, no nausea, no diarrhea, no vomiting and no blood in stools.   Neurological: Back pain  All other systems have been reviewed and are negative for complaint.       Vitals:    02/13/25 1539   BP: 141/62   Pulse:    Resp:    Temp: 36.9 °C (98.4 °F)   SpO2: 98%        Scheduled medications  amiodarone, 200 mg, oral, Daily  apixaban, 5 mg, oral, BID  atorvastatin, 40 mg, oral, Nightly  azithromycin, 500 mg, oral, q24h  baclofen, 5 mg, oral, BID  cefTRIAXone, 1 g, intravenous, Daily  donepezil, 5 mg, oral, Nightly  DULoxetine, 30 mg, oral, BID  empagliflozin, 25 mg, oral, Daily  furosemide, 40 mg, oral, Daily  gabapentin, 600 mg, oral, TID  [Held by provider] glipiZIDE XL, 5 mg, oral, Daily  insulin lispro, 0-10 Units, subcutaneous, TID AC  ipratropium-albuteroL, 3 mL, nebulization, TID  methylPREDNISolone sodium succinate (PF), 40 mg, intravenous, q8h  oseltamivir, 30 mg, oral, BID  pantoprazole, 40 mg, oral, Daily before breakfast   Or  pantoprazole, 40 mg, intravenous, Daily before breakfast  [Held by provider] spironolactone, 25 mg, oral, Daily  tafamidis, 61 mg, oral, Daily  topiramate, 100 mg, oral, BID      Continuous medications     PRN medications  PRN medications: acetaminophen **OR** acetaminophen **OR** acetaminophen, benzocaine-menthol, benzonatate, dextrose, dextrose, glucagon, glucagon, guaiFENesin, ipratropium-albuteroL, melatonin, polyethylene glycol, prochlorperazine **OR** prochlorperazine, traZODone    Lab Review   Results from last 7 days   Lab Units 02/13/25  0528 02/12/25  0546 02/10/25  1612   WBC AUTO x10*3/uL 6.6 7.6 7.4   HEMOGLOBIN g/dL 13.6 13.6 14.6   HEMATOCRIT % 44.1 43.0 46.6*   PLATELETS AUTO  x10*3/uL 198 174 193     Results from last 7 days   Lab Units 02/13/25  0528 02/12/25  0546 02/10/25  1612   SODIUM mmol/L 137 138 135*   POTASSIUM mmol/L 4.5 3.6 3.9   CHLORIDE mmol/L 107 107 102   CO2 mmol/L 19* 23 25   BUN mg/dL 29* 21 27*   CREATININE mg/dL 1.02 0.91 1.07*   CALCIUM mg/dL 8.6 8.2* 8.6   PROTEIN TOTAL g/dL  --   --  6.9   BILIRUBIN TOTAL mg/dL  --   --  0.5   ALK PHOS U/L  --   --  63   ALT U/L  --   --  46*   AST U/L  --   --  59*   GLUCOSE mg/dL 269* 52* 214*     Results from last 7 days   Lab Units 02/10/25  1802 02/10/25  1612   TROPHS ng/L 524* 526*        Vascular US Ankle Brachial Index (KEL) Without Exercise         XR chest 1 view   Final Result   1.Newly appearing right upper lobe infiltrates and mild   accentuation of markings in the right infrahilar and basilar regions.   Findings could be related to pneumonia or asymmetric pulmonary   vascular congestion.   2.Stable mild enlargement of the cardiomediastinal silhouette.   Signed by Ania Brito DO            Physical Exam    Constitutional   General appearance: Alert complaining of back pain  Pulmonary   Respiratory assessment: Bilateral rhonchi with wheezing  Cardiovascular   Auscultation of heart: Apical pulse normal, heart rate and rhythm normal, normal S1 and S2, no murmurs and no pericardial rub.    Exam for edema: No peripheral edema.   Diminished pulses in right lower extremity which appears cold to touch  Abdomen   Abdominal Exam: No bruits, normal bowel sounds, soft, non-tender, no abdominal mass palpated.    Liver and Spleen exam: No hepato-splenomegaly.   Musculoskeletal     Inspection/palpation of joints, bones and muscles: No joint swelling. Normal movement of all extremities.   Neurologic   Cranial nerves: Nerves 2-12 were intact, no focal neuro defects.         Assessment/Plan      #Influenza pneumonia  Continue Tamiflu/Rocephin/azithromycin  #Exacerbation of COPD  Improving with Solu-Medrol and DuoNebs       #Elevated  troponin  #Infiltrative cardiomyopathy/amyloidosis  Cardiology managing     #Diabetes mellitus type 2  Sliding insulin coverage     #Hypertension  Low blood pressures  We will hold medication.  Blood pressure less than 100     #Dyslipidemia  Continue statins with target LDL less than 70     #CKD 3B  Monitor kidney functions while diuresing     #Postlaminectomy syndrome with chronic back pain  Continue pain medications     PT OT consult for deconditioning

## 2025-02-13 NOTE — PROGRESS NOTES
2/13/2025 9:53 AM Daughter requests referrals to [facility], [facility] and [facility]. Guerline BALDWIN

## 2025-02-14 LAB
ANION GAP SERPL CALC-SCNC: 15 MMOL/L (ref 10–20)
BACTERIA BLD CULT: NORMAL
BACTERIA BLD CULT: NORMAL
BUN SERPL-MCNC: 39 MG/DL (ref 6–23)
CALCIUM SERPL-MCNC: 8.9 MG/DL (ref 8.6–10.3)
CHLORIDE SERPL-SCNC: 105 MMOL/L (ref 98–107)
CO2 SERPL-SCNC: 22 MMOL/L (ref 21–32)
CREAT SERPL-MCNC: 1.3 MG/DL (ref 0.5–1.05)
EGFRCR SERPLBLD CKD-EPI 2021: 42 ML/MIN/1.73M*2
ERYTHROCYTE [DISTWIDTH] IN BLOOD BY AUTOMATED COUNT: 15 % (ref 11.5–14.5)
GLUCOSE BLD MANUAL STRIP-MCNC: 215 MG/DL (ref 74–99)
GLUCOSE BLD MANUAL STRIP-MCNC: 235 MG/DL (ref 74–99)
GLUCOSE BLD MANUAL STRIP-MCNC: 301 MG/DL (ref 74–99)
GLUCOSE BLD MANUAL STRIP-MCNC: 315 MG/DL (ref 74–99)
GLUCOSE SERPL-MCNC: 262 MG/DL (ref 74–99)
HCT VFR BLD AUTO: 44.1 % (ref 36–46)
HGB BLD-MCNC: 13.6 G/DL (ref 12–16)
MCH RBC QN AUTO: 29.6 PG (ref 26–34)
MCHC RBC AUTO-ENTMCNC: 30.8 G/DL (ref 32–36)
MCV RBC AUTO: 96 FL (ref 80–100)
NRBC BLD-RTO: 0 /100 WBCS (ref 0–0)
PLATELET # BLD AUTO: 253 X10*3/UL (ref 150–450)
POTASSIUM SERPL-SCNC: 4 MMOL/L (ref 3.5–5.3)
RBC # BLD AUTO: 4.59 X10*6/UL (ref 4–5.2)
SODIUM SERPL-SCNC: 138 MMOL/L (ref 136–145)
WBC # BLD AUTO: 9.5 X10*3/UL (ref 4.4–11.3)

## 2025-02-14 PROCEDURE — 36415 COLL VENOUS BLD VENIPUNCTURE: CPT | Performed by: INTERNAL MEDICINE

## 2025-02-14 PROCEDURE — 1200000002 HC GENERAL ROOM WITH TELEMETRY DAILY

## 2025-02-14 PROCEDURE — 2500000002 HC RX 250 W HCPCS SELF ADMINISTERED DRUGS (ALT 637 FOR MEDICARE OP, ALT 636 FOR OP/ED): Performed by: INTERNAL MEDICINE

## 2025-02-14 PROCEDURE — 97112 NEUROMUSCULAR REEDUCATION: CPT | Mod: GP

## 2025-02-14 PROCEDURE — 2500000001 HC RX 250 WO HCPCS SELF ADMINISTERED DRUGS (ALT 637 FOR MEDICARE OP): Performed by: PHARMACIST

## 2025-02-14 PROCEDURE — 97530 THERAPEUTIC ACTIVITIES: CPT | Mod: GP

## 2025-02-14 PROCEDURE — 85027 COMPLETE CBC AUTOMATED: CPT | Performed by: INTERNAL MEDICINE

## 2025-02-14 PROCEDURE — 82947 ASSAY GLUCOSE BLOOD QUANT: CPT

## 2025-02-14 PROCEDURE — 94668 MNPJ CHEST WALL SBSQ: CPT

## 2025-02-14 PROCEDURE — 2500000001 HC RX 250 WO HCPCS SELF ADMINISTERED DRUGS (ALT 637 FOR MEDICARE OP)

## 2025-02-14 PROCEDURE — 99232 SBSQ HOSP IP/OBS MODERATE 35: CPT | Performed by: INTERNAL MEDICINE

## 2025-02-14 PROCEDURE — 82374 ASSAY BLOOD CARBON DIOXIDE: CPT | Performed by: INTERNAL MEDICINE

## 2025-02-14 PROCEDURE — 2500000001 HC RX 250 WO HCPCS SELF ADMINISTERED DRUGS (ALT 637 FOR MEDICARE OP): Performed by: INTERNAL MEDICINE

## 2025-02-14 PROCEDURE — 94640 AIRWAY INHALATION TREATMENT: CPT

## 2025-02-14 PROCEDURE — 2500000002 HC RX 250 W HCPCS SELF ADMINISTERED DRUGS (ALT 637 FOR MEDICARE OP, ALT 636 FOR OP/ED): Performed by: PHARMACIST

## 2025-02-14 PROCEDURE — 2500000004 HC RX 250 GENERAL PHARMACY W/ HCPCS (ALT 636 FOR OP/ED): Mod: JZ | Performed by: INTERNAL MEDICINE

## 2025-02-14 RX ADMIN — OSELTAMIVIR PHOSPHATE 30 MG: 30 CAPSULE ORAL at 10:21

## 2025-02-14 RX ADMIN — TOPIRAMATE 100 MG: 100 TABLET, FILM COATED ORAL at 21:26

## 2025-02-14 RX ADMIN — AZITHROMYCIN 500 MG: 500 TABLET, FILM COATED ORAL at 21:26

## 2025-02-14 RX ADMIN — FUROSEMIDE 40 MG: 20 TABLET ORAL at 10:21

## 2025-02-14 RX ADMIN — APIXABAN 5 MG: 5 TABLET, FILM COATED ORAL at 10:21

## 2025-02-14 RX ADMIN — GABAPENTIN 600 MG: 300 CAPSULE ORAL at 10:21

## 2025-02-14 RX ADMIN — GABAPENTIN 600 MG: 300 CAPSULE ORAL at 16:00

## 2025-02-14 RX ADMIN — GABAPENTIN 600 MG: 300 CAPSULE ORAL at 21:26

## 2025-02-14 RX ADMIN — PANTOPRAZOLE SODIUM 40 MG: 40 TABLET, DELAYED RELEASE ORAL at 06:06

## 2025-02-14 RX ADMIN — BACLOFEN 5 MG: 10 TABLET ORAL at 10:21

## 2025-02-14 RX ADMIN — CEFTRIAXONE SODIUM 1 G: 1 INJECTION, SOLUTION INTRAVENOUS at 10:21

## 2025-02-14 RX ADMIN — AMIODARONE HYDROCHLORIDE 200 MG: 200 TABLET ORAL at 10:22

## 2025-02-14 RX ADMIN — INSULIN LISPRO 4 UNITS: 100 INJECTION, SOLUTION INTRAVENOUS; SUBCUTANEOUS at 18:00

## 2025-02-14 RX ADMIN — IPRATROPIUM BROMIDE AND ALBUTEROL SULFATE 3 ML: 2.5; .5 SOLUTION RESPIRATORY (INHALATION) at 12:16

## 2025-02-14 RX ADMIN — OSELTAMIVIR PHOSPHATE 30 MG: 30 CAPSULE ORAL at 21:26

## 2025-02-14 RX ADMIN — DULOXETINE HYDROCHLORIDE 30 MG: 30 CAPSULE, DELAYED RELEASE ORAL at 10:22

## 2025-02-14 RX ADMIN — IPRATROPIUM BROMIDE AND ALBUTEROL SULFATE 3 ML: 2.5; .5 SOLUTION RESPIRATORY (INHALATION) at 21:04

## 2025-02-14 RX ADMIN — BACLOFEN 5 MG: 10 TABLET ORAL at 21:26

## 2025-02-14 RX ADMIN — DULOXETINE HYDROCHLORIDE 30 MG: 30 CAPSULE, DELAYED RELEASE ORAL at 21:26

## 2025-02-14 RX ADMIN — INSULIN LISPRO 4 UNITS: 100 INJECTION, SOLUTION INTRAVENOUS; SUBCUTANEOUS at 10:00

## 2025-02-14 RX ADMIN — IPRATROPIUM BROMIDE AND ALBUTEROL SULFATE 3 ML: 2.5; .5 SOLUTION RESPIRATORY (INHALATION) at 07:42

## 2025-02-14 RX ADMIN — APIXABAN 5 MG: 5 TABLET, FILM COATED ORAL at 21:26

## 2025-02-14 RX ADMIN — DONEPEZIL HYDROCHLORIDE 5 MG: 5 TABLET ORAL at 21:26

## 2025-02-14 RX ADMIN — ATORVASTATIN CALCIUM 40 MG: 40 TABLET, FILM COATED ORAL at 21:26

## 2025-02-14 RX ADMIN — EMPAGLIFLOZIN 25 MG: 25 TABLET, FILM COATED ORAL at 10:21

## 2025-02-14 RX ADMIN — TOPIRAMATE 100 MG: 100 TABLET, FILM COATED ORAL at 10:00

## 2025-02-14 RX ADMIN — METHYLPREDNISOLONE SODIUM SUCCINATE 40 MG: 40 INJECTION, POWDER, FOR SOLUTION INTRAMUSCULAR; INTRAVENOUS at 11:16

## 2025-02-14 RX ADMIN — INSULIN LISPRO 8 UNITS: 100 INJECTION, SOLUTION INTRAVENOUS; SUBCUTANEOUS at 21:44

## 2025-02-14 RX ADMIN — INSULIN LISPRO 8 UNITS: 100 INJECTION, SOLUTION INTRAVENOUS; SUBCUTANEOUS at 12:28

## 2025-02-14 ASSESSMENT — COGNITIVE AND FUNCTIONAL STATUS - GENERAL
TOILETING: A LOT
DRESSING REGULAR LOWER BODY CLOTHING: A LOT
MOVING FROM LYING ON BACK TO SITTING ON SIDE OF FLAT BED WITH BEDRAILS: A LITTLE
STANDING UP FROM CHAIR USING ARMS: A LITTLE
MOBILITY SCORE: 16
HELP NEEDED FOR BATHING: A LOT
EATING MEALS: A LOT
MOVING TO AND FROM BED TO CHAIR: A LITTLE
MOVING TO AND FROM BED TO CHAIR: A LITTLE
PERSONAL GROOMING: A LOT
CLIMB 3 TO 5 STEPS WITH RAILING: TOTAL
WALKING IN HOSPITAL ROOM: TOTAL
MOVING FROM LYING ON BACK TO SITTING ON SIDE OF FLAT BED WITH BEDRAILS: A LITTLE
DRESSING REGULAR UPPER BODY CLOTHING: A LOT
MOBILITY SCORE: 8
EATING MEALS: A LOT
CLIMB 3 TO 5 STEPS WITH RAILING: A LOT
HELP NEEDED FOR BATHING: A LOT
TURNING FROM BACK TO SIDE WHILE IN FLAT BAD: A LITTLE
CLIMB 3 TO 5 STEPS WITH RAILING: A LOT
TOILETING: A LOT
MOVING TO AND FROM BED TO CHAIR: TOTAL
STANDING UP FROM CHAIR USING ARMS: A LITTLE
DAILY ACTIVITIY SCORE: 12
WALKING IN HOSPITAL ROOM: A LOT
MOBILITY SCORE: 16
WALKING IN HOSPITAL ROOM: A LOT
TURNING FROM BACK TO SIDE WHILE IN FLAT BAD: A LITTLE
DRESSING REGULAR UPPER BODY CLOTHING: A LOT
MOVING FROM LYING ON BACK TO SITTING ON SIDE OF FLAT BED WITH BEDRAILS: A LOT
STANDING UP FROM CHAIR USING ARMS: TOTAL
TURNING FROM BACK TO SIDE WHILE IN FLAT BAD: A LOT
PERSONAL GROOMING: A LOT
DAILY ACTIVITIY SCORE: 12
DRESSING REGULAR LOWER BODY CLOTHING: A LOT

## 2025-02-14 ASSESSMENT — PAIN DESCRIPTION - DESCRIPTORS: DESCRIPTORS: ACHING;SORE

## 2025-02-14 ASSESSMENT — PAIN SCALES - GENERAL
PAINLEVEL_OUTOF10: 0 - NO PAIN

## 2025-02-14 ASSESSMENT — PAIN - FUNCTIONAL ASSESSMENT
PAIN_FUNCTIONAL_ASSESSMENT: 0-10

## 2025-02-14 ASSESSMENT — PAIN SCALES - WONG BAKER: WONGBAKER_NUMERICALRESPONSE: HURTS EVEN MORE

## 2025-02-14 NOTE — CARE PLAN
Problem: Fall/Injury  Goal: Not fall by end of shift  Outcome: Progressing  Goal: Be free from injury by end of the shift  Outcome: Progressing  Goal: Verbalize understanding of personal risk factors for fall in the hospital  Outcome: Progressing  Goal: Verbalize understanding of risk factor reduction measures to prevent injury from fall in the home  Outcome: Progressing  Goal: Use assistive devices by end of the shift  Outcome: Progressing  Goal: Pace activities to prevent fatigue by end of the shift  Outcome: Progressing   The patient's goals for the shift include      The clinical goals for the shift include pt remains HDS

## 2025-02-14 NOTE — PROGRESS NOTES
02/14/25 1613   Discharge Planning   Expected Discharge Disposition SNF     Have auth for [facility]  Reached out to NP, not med ready for dc today  Possibly ready for dc tomorrow

## 2025-02-14 NOTE — PROGRESS NOTES
Elida Benson is a 79 y.o. female     Improving steadily  Discussed with daughter at bedside  Still has some wheezing but improving  Hopefully can discharge patient home tomorrow if continues to improve    Review of Systems     Constitutional: no fever, no chills,   Cardiovascular: no chest pain   Respiratory: Bilateral wheezing  Gastrointestinal: no abdominal pain, no constipation, no melena, no nausea, no diarrhea, no vomiting and no blood in stools.   Neurological: Back pain  All other systems have been reviewed and are negative for complaint.       Vitals:    02/14/25 1537   BP: 113/70   Pulse:    Resp: 18   Temp: 37 °C (98.6 °F)   SpO2: 99%        Scheduled medications  amiodarone, 200 mg, oral, Daily  apixaban, 5 mg, oral, BID  atorvastatin, 40 mg, oral, Nightly  azithromycin, 500 mg, oral, q24h  baclofen, 5 mg, oral, BID  cefTRIAXone, 1 g, intravenous, Daily  donepezil, 5 mg, oral, Nightly  DULoxetine, 30 mg, oral, BID  empagliflozin, 25 mg, oral, Daily  furosemide, 40 mg, oral, Daily  gabapentin, 600 mg, oral, TID  [Held by provider] glipiZIDE XL, 5 mg, oral, Daily  insulin lispro, 0-10 Units, subcutaneous, After meals & nightly  ipratropium-albuteroL, 3 mL, nebulization, TID  methylPREDNISolone sodium succinate (PF), 40 mg, intravenous, q24h  oseltamivir, 30 mg, oral, BID  pantoprazole, 40 mg, oral, Daily before breakfast   Or  pantoprazole, 40 mg, intravenous, Daily before breakfast  [Held by provider] spironolactone, 25 mg, oral, Daily  tafamidis, 61 mg, oral, Daily  topiramate, 100 mg, oral, BID      Continuous medications     PRN medications  PRN medications: acetaminophen **OR** acetaminophen **OR** acetaminophen, benzocaine-menthol, benzonatate, dextrose, dextrose, glucagon, glucagon, guaiFENesin, ipratropium-albuteroL, melatonin, polyethylene glycol, prochlorperazine **OR** prochlorperazine, traZODone    Lab Review   Results from last 7 days   Lab Units 02/14/25  0541 02/13/25  0521  02/12/25  0546   WBC AUTO x10*3/uL 9.5 6.6 7.6   HEMOGLOBIN g/dL 13.6 13.6 13.6   HEMATOCRIT % 44.1 44.1 43.0   PLATELETS AUTO x10*3/uL 253 198 174     Results from last 7 days   Lab Units 02/14/25  0541 02/13/25  0528 02/12/25  0546 02/10/25  1612   SODIUM mmol/L 138 137 138 135*   POTASSIUM mmol/L 4.0 4.5 3.6 3.9   CHLORIDE mmol/L 105 107 107 102   CO2 mmol/L 22 19* 23 25   BUN mg/dL 39* 29* 21 27*   CREATININE mg/dL 1.30* 1.02 0.91 1.07*   CALCIUM mg/dL 8.9 8.6 8.2* 8.6   PROTEIN TOTAL g/dL  --   --   --  6.9   BILIRUBIN TOTAL mg/dL  --   --   --  0.5   ALK PHOS U/L  --   --   --  63   ALT U/L  --   --   --  46*   AST U/L  --   --   --  59*   GLUCOSE mg/dL 262* 269* 52* 214*     Results from last 7 days   Lab Units 02/10/25  1802 02/10/25  1612   TROPHS ng/L 524* 526*        Vascular US Ankle Brachial Index (KEL) Without Exercise         XR chest 1 view   Final Result   1.Newly appearing right upper lobe infiltrates and mild   accentuation of markings in the right infrahilar and basilar regions.   Findings could be related to pneumonia or asymmetric pulmonary   vascular congestion.   2.Stable mild enlargement of the cardiomediastinal silhouette.   Signed by Ania Brito DO            Physical Exam    Constitutional   General appearance: Alert complaining of back pain  Pulmonary   Respiratory assessment: Bilateral rhonchi with wheezing  Cardiovascular   Auscultation of heart: Apical pulse normal, heart rate and rhythm normal, normal S1 and S2, no murmurs and no pericardial rub.    Exam for edema: No peripheral edema.   Diminished pulses in right lower extremity which appears cold to touch  Abdomen   Abdominal Exam: No bruits, normal bowel sounds, soft, non-tender, no abdominal mass palpated.    Liver and Spleen exam: No hepato-splenomegaly.   Musculoskeletal     Inspection/palpation of joints, bones and muscles: No joint swelling. Normal movement of all extremities.   Neurologic   Cranial nerves: Nerves 2-12 were  intact, no focal neuro defects.         Assessment/Plan      #Influenza pneumonia  Continue Tamiflu/Rocephin/azithromycin  #Exacerbation of COPD  Improving with Solu-Medrol and DuoNebs       #Elevated troponin  #Infiltrative cardiomyopathy/amyloidosis  Stable overall  Continue current medications     #Diabetes mellitus type 2  Sliding insulin coverage     #Hypertension  Low blood pressures  We will hold medication.  Blood pressure less than 100     #Dyslipidemia  Continue statins with target LDL less than 70     #CKD 3B  Monitor kidney functions while diuresing     #Postlaminectomy syndrome with chronic back pain  Continue pain medications     PT OT consult for deconditioning

## 2025-02-14 NOTE — PROGRESS NOTES
Physical Therapy    Physical Therapy Treatment    Patient Name: Elida Benson  MRN: [id number]  Department: [place] [address]  Room: [address] [place] [address]  Today's Date: 2/14/2025  Time Calculation  Start Time: 1020  Stop Time: 1043  Time Calculation (min): 23 min         Assessment/Plan   PT Assessment  PT Assessment Results: Decreased strength, Decreased endurance, Impaired balance, Decreased mobility, Pain  Rehab Prognosis: Good  Barriers to Discharge Home: Caregiver assistance, Physical needs  Caregiver Assistance: Caregiver assistance needed per identified barriers - however, level of patient's required assistance exceeds assistance available at home  Physical Needs: Stair navigation into home limited by function/safety, 24hr mobility assistance needed, High falls risk due to function or environment  Evaluation/Treatment Tolerance: Patient tolerated treatment well  Medical Staff Made Aware: Yes  Strengths: Ability to acquire knowledge, Attitude of self, Coping skills, Premorbid level of function, Rehab experience  Barriers to Participation: Comorbidities  End of Session Communication: Bedside nurse  Assessment Comment: Pt seen for follow up visit; remains with weakness, decreased ambulation and transfers, and unsteadiness due to deconditioning and decreased activiy tolerance.  She will benefit from skilled PT intervention and MOD intensity PT post-acute DC to maximize her safety and promote prior level of function, which was independent with ww.  End of Session Patient Position: Bed, 3 rail up, Alarm off, caregiver present (RN present with patient.)     PT Plan  Treatment/Interventions: Bed mobility, Transfer training, Gait training, Balance training, Strengthening, Endurance training, Therapeutic exercise, Therapeutic activity, Home exercise program, Positioning, Postural re-education  PT Plan: Ongoing PT  PT Frequency: 3 times per week  PT Discharge Recommendations: Moderate intensity level of continued care  Equipment  Recommended upon Discharge: Wheeled walker, Lift, Wheelchair  PT Recommended Transfer Status: Assist x2, Assistive device  PT - OK to Discharge: Yes    General Visit Information:   PT  Visit  PT Received On: 02/14/25  Response to Previous Treatment: Patient with no complaints from previous session., Patient reporting fatigue but able to participate.  General  Reason for Referral: 79 y.o. female presenting with generalized weakness and + Influenza A.  Referred By: Julián Barboza MD  Past Medical History Relevant to Rehab: history of hypertension, A-fib, cervical myelopathy, diabetes, chronic pain,  Family/Caregiver Present: No  Prior to Session Communication: Bedside nurse (CHARO Long present in room during session, encouraging)  Patient Position Received: Bed, 3 rail up, Alarm on  Preferred Learning Style: auditory, kinesthetic  General Comment: Pt with R hip pain in supine; improved with mobiltiy interventions. Also with increasing productive cough..    Subjective   Precautions:  Precautions  Medical Precautions: Fall precautions, Infection precautions (Flu)  Precautions Comment: Purewick     Date/Time Vitals Session Patient Position Pulse Resp SpO2 BP MAP (mmHg)    02/14/25 1020 During PT  --  --  --  --  --  --     02/14/25 1120 --  --  75  18  98 %  120/76  --           Objective   Pain:  Pain Assessment  Pain Assessment: 0-10  0-10 (Numeric) Pain Score: 0 - No pain  Cadet-Baker FACES Pain Rating: Hurts even more (intially denying pain, then reporting R hip pain (mod pain behavoirs); RN present and provided pain medications during session)  Pain Type: Chronic pain  Pain Location: Hip  Pain Orientation: Right  Pain Descriptors: Aching, Sore  Pain Frequency: Constant/continuous  Pain Onset: Ongoing  Clinical Progression: Gradually improving  Effect of Pain on Daily Activities: PT to tolerance, improving with AROM activities and repositioning  Patient's Stated Pain Goal: No pain  Pain Interventions: Repositioned,  Ambulation/increased activity, Distraction, Elevated, Emotional support, Back rub, Relaxation technique, Rest, Therapeutic presence  Response to Interventions: Content/relaxed, Absence of non-verbal indicators of pain, Decrease in pain, Provider notified  Cognition:  Cognition  Overall Cognitive Status:  (followed all simple commands.)  Orientation Level: Disoriented to situation  Insight: Mild  Impulsive: Within functional limits  Processing Speed: Delayed  Coordination:  Movements are Fluid and Coordinated: No  Postural Control:  Postural Control  Postural Control: Impaired  Posture Comment: posterior and R lean in sitting  Static Sitting Balance  Static Sitting-Balance Support: No upper extremity supported, Feet unsupported  Static Sitting-Level of Assistance: Minimum assistance, Contact guard  Static Sitting-Comment/Number of Minutes: improved sitting balance to SBA once having bilat feet on floor  Dynamic Sitting Balance  Dynamic Sitting-Balance Support: No upper extremity supported, Feet supported  Dynamic Sitting-Level of Assistance: Moderate assistance, Minimum assistance  Dynamic Sitting-Balance: Trunk control activities, Reaching for objects, Forward lean, Lateral lean  Dynamic Sitting-Comments: lateral scooting along EOB with mod A and draw sheet, bed in trendelenberg for gravity assistance x3 trials  Extremity/Trunk Assessments:   RUE   RUE : Exceptions to WFL  LUE   LUE: Exceptions to WFL  LUE Strength  LUE Overall Strength: Greater than or equal to 3/5 as evidenced by functional mobility  RLE   RLE : Exceptions to WFL  LLE   LLE : Exceptions to WFL  Activity Tolerance:  Activity Tolerance  Endurance: Decreased tolerance for upright activites, Tolerates less than 10 min exercise, no significant change in vital signs  Early Mobility/Exercise Safety Screen: Proceed with mobilization - No exclusion criteria met  Activity Tolerance Comments: fair  Treatments:  Therapeutic Exercise  Therapeutic Exercise  Performed: Yes  Therapeutic Exercise Activity 1: LAQ, ABD/ADD seated at EOB x10 BLE; supine AAROM HS, ABD/ ADD, SLR LLE x6-8 reps with (+) response. encouraged to continue with actitvity    Therapeutic Activity  Therapeutic Activity Performed: Yes  Therapeutic Activity 1: postural corrections and positioning at EOB completed x15 minutes with (+) response for assisting with chronic R hip pain. dyanamic sitting and activities completed.    Balance/Neuromuscular Re-Education  Balance/Neuromuscular Re-Education Activity Performed: Yes  Balance/Neuromuscular Re-Education Activity 1: see balance and therapeutic acitivity sections; provided multimodal instruction and A with tasks. good effort. fatigued quickly.    Bed Mobility  Bed Mobility: Yes  Bed Mobility 1  Bed Mobility 1: Supine to sitting  Level of Assistance 1: Maximum assistance  Bed Mobility Comments 1: BLE and trunk with pillow between knees for logroll.  Bed Mobility 2  Bed Mobility  2: Scooting  Level of Assistance 2: Moderate assistance, Maximum tactile cues, Maximum verbal cues  Bed Mobility Comments 2: x1-2 seated A/P, lateral to the R along EOB  Bed Mobility 3  Bed Mobility 3: Sitting to supine  Level of Assistance 3: +2, Maximum assistance, Maximum verbal cues, Maximum tactile cues  Bed Mobility Comments 3: assistance at trunk and BLE  Bed Mobility 4  Bed Mobility 4: Rolling right  Level of Assistance 4: Minimum assistance  Bed Mobility Comments 4: draw sheet and bedrail support for hygiene and linens with RN  Bed Mobility 5  Bed Mobility 5: Rolling left  Level of Assistance 5: Moderate assistance, Maximum verbal cues, Moderate tactile cues  Bed Mobility Comments 5: draw sheet and bedrail support with hygiene and linens with RN    Ambulation/Gait Training  Ambulation/Gait Training Performed: No  Transfers  Transfer: No (emphasis on sitting tasks; scooting along EOB with partial lifting of buttocks, mod A x1-2)    Outcome Measures:  [facility] Basic  Mobility  Turning from your back to your side while in a flat bed without using bedrails: A lot  Moving from lying on your back to sitting on the side of a flat bed without using bedrails: A lot  Moving to and from bed to chair (including a wheelchair): Total  Standing up from a chair using your arms (e.g. wheelchair or bedside chair): Total  To walk in hospital room: Total  Climbing 3-5 steps with railing: Total  Basic Mobility - Total Score: 8    Education Documentation  Precautions, taught by Ella Soria, PT at 2/14/2025 11:35 AM.  Learner: Patient  Readiness: Acceptance  Method: Explanation, Demonstration  Response: Verbalizes Understanding, Demonstrated Understanding  Comment: HEP, positioning, bed mobility & transfer training, postural corrections, sequencing and safety    Body Mechanics, taught by Ella Soria, PT at 2/14/2025 11:35 AM.  Learner: Patient  Readiness: Acceptance  Method: Explanation, Demonstration  Response: Verbalizes Understanding, Demonstrated Understanding  Comment: HEP, positioning, bed mobility & transfer training, postural corrections, sequencing and safety    Mobility Training, taught by Ella Soria, PT at 2/14/2025 11:35 AM.  Learner: Patient  Readiness: Acceptance  Method: Explanation, Demonstration  Response: Verbalizes Understanding, Demonstrated Understanding  Comment: HEP, positioning, bed mobility & transfer training, postural corrections, sequencing and safety    Education Comments  No comments found.        OP EDUCATION:       Encounter Problems       Encounter Problems (Active)       Balance       STG - Maintains dynamic standing balance with ww with min A x 5 minutes (Progressing)       Start:  02/12/25    Expected End:  02/26/25       INTERVENTIONS:  1. Practice standing with minimal support.  2. Educate patient about standing tolerance.  3. Educate patient about independence with gait, transfers, and ADL's.  4. Educate patient about use of assistive device.  5.  Educate patient about self-directed care.         STG - Maintains dynamic sitting balance without upper extremity support x 5 minutes with SBA (Progressing)       Start:  02/12/25    Expected End:  02/26/25       INTERVENTIONS:  1. Practice sitting on the edge of a bed/mat with minimal support.  2. Educate patient about maintining total hip precautions while maintaining balance.  3. Educate patient about pressure relief.  4. Educate patient about use of assistive device.            Mobility       STG - Patient will ambulate with ww 25' x 2 with min A (Progressing)       Start:  02/12/25    Expected End:  02/26/25            Increase BLE strength to attain functional goals achieved through supine, seated, and standing TE.  (Progressing)       Start:  02/12/25    Expected End:  02/26/25               PT Transfers       STG - Transfer from bed to chair with ww with min A (Progressing)       Start:  02/12/25    Expected End:  02/26/25            STG - Patient to transfer to and from sit to supine with min A (Progressing)       Start:  02/12/25    Expected End:  02/26/25            STG - Patient will transfer sit to and from stand with ww with min A (Progressing)       Start:  02/12/25    Expected End:  02/26/25

## 2025-02-15 LAB
ANION GAP SERPL CALC-SCNC: 14 MMOL/L (ref 10–20)
BUN SERPL-MCNC: 37 MG/DL (ref 6–23)
CALCIUM SERPL-MCNC: 8.8 MG/DL (ref 8.6–10.3)
CHLORIDE SERPL-SCNC: 105 MMOL/L (ref 98–107)
CO2 SERPL-SCNC: 21 MMOL/L (ref 21–32)
CREAT SERPL-MCNC: 1.1 MG/DL (ref 0.5–1.05)
EGFRCR SERPLBLD CKD-EPI 2021: 52 ML/MIN/1.73M*2
ERYTHROCYTE [DISTWIDTH] IN BLOOD BY AUTOMATED COUNT: 15 % (ref 11.5–14.5)
GLUCOSE BLD MANUAL STRIP-MCNC: 212 MG/DL (ref 74–99)
GLUCOSE BLD MANUAL STRIP-MCNC: 322 MG/DL (ref 74–99)
GLUCOSE BLD MANUAL STRIP-MCNC: 394 MG/DL (ref 74–99)
GLUCOSE SERPL-MCNC: 247 MG/DL (ref 74–99)
HCT VFR BLD AUTO: 44.1 % (ref 36–46)
HGB BLD-MCNC: 13.7 G/DL (ref 12–16)
MCH RBC QN AUTO: 30 PG (ref 26–34)
MCHC RBC AUTO-ENTMCNC: 31.1 G/DL (ref 32–36)
MCV RBC AUTO: 97 FL (ref 80–100)
NRBC BLD-RTO: 0 /100 WBCS (ref 0–0)
PLATELET # BLD AUTO: 275 X10*3/UL (ref 150–450)
POTASSIUM SERPL-SCNC: 4 MMOL/L (ref 3.5–5.3)
RBC # BLD AUTO: 4.57 X10*6/UL (ref 4–5.2)
SODIUM SERPL-SCNC: 136 MMOL/L (ref 136–145)
WBC # BLD AUTO: 7.8 X10*3/UL (ref 4.4–11.3)

## 2025-02-15 PROCEDURE — 80048 BASIC METABOLIC PNL TOTAL CA: CPT | Performed by: INTERNAL MEDICINE

## 2025-02-15 PROCEDURE — 85027 COMPLETE CBC AUTOMATED: CPT | Performed by: INTERNAL MEDICINE

## 2025-02-15 PROCEDURE — 2500000002 HC RX 250 W HCPCS SELF ADMINISTERED DRUGS (ALT 637 FOR MEDICARE OP, ALT 636 FOR OP/ED): Performed by: INTERNAL MEDICINE

## 2025-02-15 PROCEDURE — 82947 ASSAY GLUCOSE BLOOD QUANT: CPT

## 2025-02-15 PROCEDURE — 2500000001 HC RX 250 WO HCPCS SELF ADMINISTERED DRUGS (ALT 637 FOR MEDICARE OP)

## 2025-02-15 PROCEDURE — 94640 AIRWAY INHALATION TREATMENT: CPT

## 2025-02-15 PROCEDURE — 99231 SBSQ HOSP IP/OBS SF/LOW 25: CPT | Performed by: INTERNAL MEDICINE

## 2025-02-15 PROCEDURE — 1200000002 HC GENERAL ROOM WITH TELEMETRY DAILY

## 2025-02-15 PROCEDURE — 2500000004 HC RX 250 GENERAL PHARMACY W/ HCPCS (ALT 636 FOR OP/ED): Performed by: INTERNAL MEDICINE

## 2025-02-15 PROCEDURE — 2500000002 HC RX 250 W HCPCS SELF ADMINISTERED DRUGS (ALT 637 FOR MEDICARE OP, ALT 636 FOR OP/ED): Performed by: PHARMACIST

## 2025-02-15 PROCEDURE — 36415 COLL VENOUS BLD VENIPUNCTURE: CPT | Performed by: INTERNAL MEDICINE

## 2025-02-15 PROCEDURE — 2500000001 HC RX 250 WO HCPCS SELF ADMINISTERED DRUGS (ALT 637 FOR MEDICARE OP): Performed by: PHARMACIST

## 2025-02-15 PROCEDURE — 2500000001 HC RX 250 WO HCPCS SELF ADMINISTERED DRUGS (ALT 637 FOR MEDICARE OP): Performed by: INTERNAL MEDICINE

## 2025-02-15 RX ORDER — AMOXICILLIN AND CLAVULANATE POTASSIUM 875; 125 MG/1; MG/1
875 TABLET, FILM COATED ORAL 2 TIMES DAILY
Qty: 14 TABLET | Refills: 0 | Status: SHIPPED | OUTPATIENT
Start: 2025-02-15 | End: 2025-02-22

## 2025-02-15 RX ORDER — PREDNISONE 10 MG/1
TABLET ORAL
Qty: 42 TABLET | Refills: 0 | Status: SHIPPED | OUTPATIENT
Start: 2025-02-15 | End: 2025-02-27

## 2025-02-15 RX ADMIN — APIXABAN 5 MG: 5 TABLET, FILM COATED ORAL at 09:08

## 2025-02-15 RX ADMIN — EMPAGLIFLOZIN 25 MG: 25 TABLET, FILM COATED ORAL at 09:10

## 2025-02-15 RX ADMIN — DONEPEZIL HYDROCHLORIDE 5 MG: 5 TABLET ORAL at 20:10

## 2025-02-15 RX ADMIN — APIXABAN 5 MG: 5 TABLET, FILM COATED ORAL at 20:10

## 2025-02-15 RX ADMIN — INSULIN LISPRO 4 UNITS: 100 INJECTION, SOLUTION INTRAVENOUS; SUBCUTANEOUS at 09:14

## 2025-02-15 RX ADMIN — IPRATROPIUM BROMIDE AND ALBUTEROL SULFATE 3 ML: 2.5; .5 SOLUTION RESPIRATORY (INHALATION) at 20:22

## 2025-02-15 RX ADMIN — DULOXETINE HYDROCHLORIDE 30 MG: 30 CAPSULE, DELAYED RELEASE ORAL at 20:10

## 2025-02-15 RX ADMIN — TOPIRAMATE 100 MG: 100 TABLET, FILM COATED ORAL at 09:08

## 2025-02-15 RX ADMIN — OSELTAMIVIR PHOSPHATE 30 MG: 30 CAPSULE ORAL at 09:09

## 2025-02-15 RX ADMIN — GABAPENTIN 600 MG: 300 CAPSULE ORAL at 14:19

## 2025-02-15 RX ADMIN — METHYLPREDNISOLONE SODIUM SUCCINATE 40 MG: 40 INJECTION, POWDER, FOR SOLUTION INTRAMUSCULAR; INTRAVENOUS at 14:19

## 2025-02-15 RX ADMIN — INSULIN LISPRO 10 UNITS: 100 INJECTION, SOLUTION INTRAVENOUS; SUBCUTANEOUS at 20:25

## 2025-02-15 RX ADMIN — BACLOFEN 5 MG: 10 TABLET ORAL at 20:10

## 2025-02-15 RX ADMIN — TOPIRAMATE 100 MG: 100 TABLET, FILM COATED ORAL at 20:10

## 2025-02-15 RX ADMIN — IPRATROPIUM BROMIDE AND ALBUTEROL SULFATE 3 ML: 2.5; .5 SOLUTION RESPIRATORY (INHALATION) at 07:25

## 2025-02-15 RX ADMIN — INSULIN LISPRO 4 UNITS: 100 INJECTION, SOLUTION INTRAVENOUS; SUBCUTANEOUS at 14:19

## 2025-02-15 RX ADMIN — PANTOPRAZOLE SODIUM 40 MG: 40 TABLET, DELAYED RELEASE ORAL at 06:55

## 2025-02-15 RX ADMIN — AZITHROMYCIN 500 MG: 500 TABLET, FILM COATED ORAL at 20:10

## 2025-02-15 RX ADMIN — ATORVASTATIN CALCIUM 40 MG: 40 TABLET, FILM COATED ORAL at 20:10

## 2025-02-15 RX ADMIN — GABAPENTIN 600 MG: 300 CAPSULE ORAL at 09:10

## 2025-02-15 RX ADMIN — INSULIN LISPRO 8 UNITS: 100 INJECTION, SOLUTION INTRAVENOUS; SUBCUTANEOUS at 18:37

## 2025-02-15 RX ADMIN — FUROSEMIDE 40 MG: 20 TABLET ORAL at 09:08

## 2025-02-15 RX ADMIN — CEFTRIAXONE SODIUM 1 G: 1 INJECTION, SOLUTION INTRAVENOUS at 09:11

## 2025-02-15 RX ADMIN — GABAPENTIN 600 MG: 300 CAPSULE ORAL at 20:10

## 2025-02-15 RX ADMIN — DULOXETINE HYDROCHLORIDE 30 MG: 30 CAPSULE, DELAYED RELEASE ORAL at 09:08

## 2025-02-15 RX ADMIN — BACLOFEN 5 MG: 10 TABLET ORAL at 09:09

## 2025-02-15 RX ADMIN — AMIODARONE HYDROCHLORIDE 200 MG: 200 TABLET ORAL at 09:09

## 2025-02-15 RX ADMIN — IPRATROPIUM BROMIDE AND ALBUTEROL SULFATE 3 ML: 2.5; .5 SOLUTION RESPIRATORY (INHALATION) at 14:58

## 2025-02-15 ASSESSMENT — COGNITIVE AND FUNCTIONAL STATUS - GENERAL
EATING MEALS: A LOT
MOBILITY SCORE: 16
MOBILITY SCORE: 12
PERSONAL GROOMING: A LOT
MOVING TO AND FROM BED TO CHAIR: A LITTLE
MOVING FROM LYING ON BACK TO SITTING ON SIDE OF FLAT BED WITH BEDRAILS: A LOT
TURNING FROM BACK TO SIDE WHILE IN FLAT BAD: A LOT
DRESSING REGULAR UPPER BODY CLOTHING: A LOT
MOVING FROM LYING ON BACK TO SITTING ON SIDE OF FLAT BED WITH BEDRAILS: A LITTLE
MOVING TO AND FROM BED TO CHAIR: A LOT
DRESSING REGULAR LOWER BODY CLOTHING: A LOT
TOILETING: A LOT
STANDING UP FROM CHAIR USING ARMS: A LOT
MOVING FROM LYING ON BACK TO SITTING ON SIDE OF FLAT BED WITH BEDRAILS: A LOT
WALKING IN HOSPITAL ROOM: A LOT
TURNING FROM BACK TO SIDE WHILE IN FLAT BAD: A LOT
DAILY ACTIVITIY SCORE: 12
TOILETING: A LOT
CLIMB 3 TO 5 STEPS WITH RAILING: A LOT
CLIMB 3 TO 5 STEPS WITH RAILING: TOTAL
EATING MEALS: A LITTLE
WALKING IN HOSPITAL ROOM: A LOT
MOBILITY SCORE: 11
DRESSING REGULAR LOWER BODY CLOTHING: A LOT
HELP NEEDED FOR BATHING: A LOT
CLIMB 3 TO 5 STEPS WITH RAILING: A LOT
STANDING UP FROM CHAIR USING ARMS: A LOT
HELP NEEDED FOR BATHING: A LOT
PERSONAL GROOMING: A LOT
DRESSING REGULAR UPPER BODY CLOTHING: A LOT
MOVING TO AND FROM BED TO CHAIR: A LOT
DAILY ACTIVITIY SCORE: 13
WALKING IN HOSPITAL ROOM: A LOT
STANDING UP FROM CHAIR USING ARMS: A LITTLE
TURNING FROM BACK TO SIDE WHILE IN FLAT BAD: A LITTLE

## 2025-02-15 ASSESSMENT — PAIN SCALES - GENERAL
PAINLEVEL_OUTOF10: 0 - NO PAIN

## 2025-02-15 ASSESSMENT — PAIN - FUNCTIONAL ASSESSMENT: PAIN_FUNCTIONAL_ASSESSMENT: 0-10

## 2025-02-15 NOTE — CARE PLAN
The patient's goals for the shift include  rest    The clinical goals for the shift include Patient will remain free from falls throughout shift

## 2025-02-15 NOTE — PROGRESS NOTES
Elida Benson is a 79 y.o. female     Continues to improve  Was okay to discharge to skilled nursing facility today but apparently they do not have a bed  She will go tomorrow morning    Review of Systems     Constitutional: no fever, no chills,   Cardiovascular: no chest pain   Respiratory: Bilateral wheezing  Gastrointestinal: no abdominal pain, no constipation, no melena, no nausea, no diarrhea, no vomiting and no blood in stools.   Neurological: Back pain  All other systems have been reviewed and are negative for complaint.       Vitals:    02/15/25 1257   BP: 123/74   Pulse:    Resp: 18   Temp: 37 °C (98.6 °F)   SpO2: 100%        Scheduled medications  amiodarone, 200 mg, oral, Daily  apixaban, 5 mg, oral, BID  atorvastatin, 40 mg, oral, Nightly  azithromycin, 500 mg, oral, q24h  baclofen, 5 mg, oral, BID  cefTRIAXone, 1 g, intravenous, Daily  donepezil, 5 mg, oral, Nightly  DULoxetine, 30 mg, oral, BID  empagliflozin, 25 mg, oral, Daily  furosemide, 40 mg, oral, Daily  gabapentin, 600 mg, oral, TID  [Held by provider] glipiZIDE XL, 5 mg, oral, Daily  insulin lispro, 0-10 Units, subcutaneous, After meals & nightly  ipratropium-albuteroL, 3 mL, nebulization, TID  methylPREDNISolone sodium succinate (PF), 40 mg, intravenous, q24h  pantoprazole, 40 mg, oral, Daily before breakfast   Or  pantoprazole, 40 mg, intravenous, Daily before breakfast  [Held by provider] spironolactone, 25 mg, oral, Daily  tafamidis, 61 mg, oral, Daily  topiramate, 100 mg, oral, BID      Continuous medications     PRN medications  PRN medications: acetaminophen **OR** acetaminophen **OR** acetaminophen, benzocaine-menthol, benzonatate, dextrose, dextrose, glucagon, glucagon, guaiFENesin, ipratropium-albuteroL, melatonin, polyethylene glycol, prochlorperazine **OR** prochlorperazine, traZODone    Lab Review   Results from last 7 days   Lab Units 02/15/25  0621 02/14/25  0541 02/13/25  0528   WBC AUTO x10*3/uL 7.8 9.5 6.6   HEMOGLOBIN  g/dL 13.7 13.6 13.6   HEMATOCRIT % 44.1 44.1 44.1   PLATELETS AUTO x10*3/uL 275 253 198     Results from last 7 days   Lab Units 02/15/25  0621 02/14/25  0541 02/13/25  0528 02/12/25  0546 02/10/25  1612   SODIUM mmol/L 136 138 137   < > 135*   POTASSIUM mmol/L 4.0 4.0 4.5   < > 3.9   CHLORIDE mmol/L 105 105 107   < > 102   CO2 mmol/L 21 22 19*   < > 25   BUN mg/dL 37* 39* 29*   < > 27*   CREATININE mg/dL 1.10* 1.30* 1.02   < > 1.07*   CALCIUM mg/dL 8.8 8.9 8.6   < > 8.6   PROTEIN TOTAL g/dL  --   --   --   --  6.9   BILIRUBIN TOTAL mg/dL  --   --   --   --  0.5   ALK PHOS U/L  --   --   --   --  63   ALT U/L  --   --   --   --  46*   AST U/L  --   --   --   --  59*   GLUCOSE mg/dL 247* 262* 269*   < > 214*    < > = values in this interval not displayed.     Results from last 7 days   Lab Units 02/10/25  1802 02/10/25  1612   TROPHS ng/L 524* 526*        Vascular US Ankle Brachial Index (KEL) Without Exercise   Final Result      XR chest 1 view   Final Result   1.Newly appearing right upper lobe infiltrates and mild   accentuation of markings in the right infrahilar and basilar regions.   Findings could be related to pneumonia or asymmetric pulmonary   vascular congestion.   2.Stable mild enlargement of the cardiomediastinal silhouette.   Signed by Ania Brito DO            Physical Exam    Constitutional   General appearance: Alert complaining of back pain  Pulmonary   Respiratory assessment: Bilateral rhonchi with wheezing  Cardiovascular   Auscultation of heart: Apical pulse normal, heart rate and rhythm normal, normal S1 and S2, no murmurs and no pericardial rub.    Exam for edema: No peripheral edema.   Diminished pulses in right lower extremity which appears cold to touch  Abdomen   Abdominal Exam: No bruits, normal bowel sounds, soft, non-tender, no abdominal mass palpated.    Liver and Spleen exam: No hepato-splenomegaly.   Musculoskeletal     Inspection/palpation of joints, bones and muscles: No joint  swelling. Normal movement of all extremities.   Neurologic   Cranial nerves: Nerves 2-12 were intact, no focal neuro defects.         Assessment/Plan      #Influenza pneumonia  Continue Tamiflu/Rocephin/azithromycin  #Exacerbation of COPD  Improving with Solu-Medrol and DuoNebs       #Elevated troponin  #Infiltrative cardiomyopathy/amyloidosis  Stable overall  Continue current medications     #Diabetes mellitus type 2  Sliding insulin coverage     #Hypertension  Low blood pressures  We will hold medication.  Blood pressure less than 100     #Dyslipidemia  Continue statins with target LDL less than 70     #CKD 3B  Monitor kidney functions while diuresing     #Postlaminectomy syndrome with chronic back pain  Continue pain medications     PT OT consult for deconditioning

## 2025-02-15 NOTE — CARE PLAN
The patient's goals for the shift include      The clinical goals for the shift include Patient will remain free from falls throughout shift    Over the shift, the patient did not make progress toward the following goals. Barriers to progression include . Recommendations to address these barriers include .  PROMOTE GOOD NUTRITION

## 2025-02-15 NOTE — CARE PLAN
The patient's goals for the shift include      The clinical goals for the shift include Patient will remain free from falls throughout shift    Over the shift, the patient did not make progress toward the following goals. Barriers to progression include . Recommendations to address these barriers include .PROMOTE GOOD NUTRITIOB

## 2025-02-15 NOTE — PROGRESS NOTES
02/15/25 0848   Discharge Planning   Expected Discharge Disposition SNF   Does the patient need discharge transport arranged? Yes   RoundTrip coordination needed? Yes   Has discharge transport been arranged? No   Stroke Family Assessment   Stroke Family Assessment Needed No   Intensity of Service   Intensity of Service 0-30 min     Insurance auth obtained for [facility] and is good thru 2/20/25.  SNF stated patient will be going to a semi private room.  Updated SNF via careport that she did test positive for Influenza A on 2/10/25.  Roselyn Barry RN     2/15/25 @ 0941  SNF is checking on private room availability.  Roselyn Barry RN     2/15/25 @ 0533  SNF can accept patient tomorrow as they will have a private room for her then due to the Influenza A.  Updated attending via secure chat.  Roselyn Barry RN     2/15/25 @ 0360  Requested [facility] send final orders and complete the 7000.  Report to be called to [phone]  [facility] set transport up for tomorrow morning at 11am.  [place] uploaded into careport.  Secure chat to charge nurse and bedside nurse to inform of discharge tomorrow.  Roselyn Barry RN     2/15/25 @ 9590  The [employer] Vehicle you requested for Elida FISHER in unit/room 616 on 02/16/2025 is scheduled to arrive at 11:00am EST! [employer] is handling this ride and you can contact them at [phone].  Roselyn Barry RN

## 2025-02-16 VITALS
HEIGHT: 58 IN | OXYGEN SATURATION: 93 % | TEMPERATURE: 97.6 F | WEIGHT: 140 LBS | SYSTOLIC BLOOD PRESSURE: 121 MMHG | BODY MASS INDEX: 29.39 KG/M2 | HEART RATE: 76 BPM | DIASTOLIC BLOOD PRESSURE: 63 MMHG | RESPIRATION RATE: 13 BRPM

## 2025-02-16 LAB
ANION GAP SERPL CALC-SCNC: 13 MMOL/L (ref 10–20)
BUN SERPL-MCNC: 40 MG/DL (ref 6–23)
CALCIUM SERPL-MCNC: 8.8 MG/DL (ref 8.6–10.3)
CHLORIDE SERPL-SCNC: 104 MMOL/L (ref 98–107)
CO2 SERPL-SCNC: 25 MMOL/L (ref 21–32)
CREAT SERPL-MCNC: 1.12 MG/DL (ref 0.5–1.05)
EGFRCR SERPLBLD CKD-EPI 2021: 51 ML/MIN/1.73M*2
ERYTHROCYTE [DISTWIDTH] IN BLOOD BY AUTOMATED COUNT: 15.1 % (ref 11.5–14.5)
GLUCOSE BLD MANUAL STRIP-MCNC: 180 MG/DL (ref 74–99)
GLUCOSE BLD MANUAL STRIP-MCNC: 232 MG/DL (ref 74–99)
GLUCOSE BLD MANUAL STRIP-MCNC: 243 MG/DL (ref 74–99)
GLUCOSE BLD MANUAL STRIP-MCNC: 443 MG/DL (ref 74–99)
GLUCOSE SERPL-MCNC: 240 MG/DL (ref 74–99)
HCT VFR BLD AUTO: 48.1 % (ref 36–46)
HGB BLD-MCNC: 14.1 G/DL (ref 12–16)
MCH RBC QN AUTO: 30 PG (ref 26–34)
MCHC RBC AUTO-ENTMCNC: 29.3 G/DL (ref 32–36)
MCV RBC AUTO: 102 FL (ref 80–100)
NRBC BLD-RTO: 0 /100 WBCS (ref 0–0)
PLATELET # BLD AUTO: 253 X10*3/UL (ref 150–450)
POTASSIUM SERPL-SCNC: 4.3 MMOL/L (ref 3.5–5.3)
RBC # BLD AUTO: 4.7 X10*6/UL (ref 4–5.2)
SODIUM SERPL-SCNC: 138 MMOL/L (ref 136–145)
WBC # BLD AUTO: 6.9 X10*3/UL (ref 4.4–11.3)

## 2025-02-16 PROCEDURE — 2500000001 HC RX 250 WO HCPCS SELF ADMINISTERED DRUGS (ALT 637 FOR MEDICARE OP): Performed by: INTERNAL MEDICINE

## 2025-02-16 PROCEDURE — 2500000002 HC RX 250 W HCPCS SELF ADMINISTERED DRUGS (ALT 637 FOR MEDICARE OP, ALT 636 FOR OP/ED): Performed by: INTERNAL MEDICINE

## 2025-02-16 PROCEDURE — 82947 ASSAY GLUCOSE BLOOD QUANT: CPT

## 2025-02-16 PROCEDURE — 2500000001 HC RX 250 WO HCPCS SELF ADMINISTERED DRUGS (ALT 637 FOR MEDICARE OP)

## 2025-02-16 PROCEDURE — 80048 BASIC METABOLIC PNL TOTAL CA: CPT | Performed by: INTERNAL MEDICINE

## 2025-02-16 PROCEDURE — 99231 SBSQ HOSP IP/OBS SF/LOW 25: CPT | Performed by: INTERNAL MEDICINE

## 2025-02-16 PROCEDURE — 1200000002 HC GENERAL ROOM WITH TELEMETRY DAILY

## 2025-02-16 PROCEDURE — 94668 MNPJ CHEST WALL SBSQ: CPT

## 2025-02-16 PROCEDURE — 94640 AIRWAY INHALATION TREATMENT: CPT

## 2025-02-16 PROCEDURE — 85027 COMPLETE CBC AUTOMATED: CPT | Performed by: INTERNAL MEDICINE

## 2025-02-16 PROCEDURE — 36415 COLL VENOUS BLD VENIPUNCTURE: CPT | Performed by: INTERNAL MEDICINE

## 2025-02-16 PROCEDURE — 2500000004 HC RX 250 GENERAL PHARMACY W/ HCPCS (ALT 636 FOR OP/ED): Performed by: INTERNAL MEDICINE

## 2025-02-16 PROCEDURE — 2500000001 HC RX 250 WO HCPCS SELF ADMINISTERED DRUGS (ALT 637 FOR MEDICARE OP): Performed by: PHARMACIST

## 2025-02-16 RX ADMIN — IPRATROPIUM BROMIDE AND ALBUTEROL SULFATE 3 ML: 2.5; .5 SOLUTION RESPIRATORY (INHALATION) at 20:09

## 2025-02-16 RX ADMIN — EMPAGLIFLOZIN 25 MG: 25 TABLET, FILM COATED ORAL at 09:45

## 2025-02-16 RX ADMIN — DULOXETINE HYDROCHLORIDE 30 MG: 30 CAPSULE, DELAYED RELEASE ORAL at 20:39

## 2025-02-16 RX ADMIN — INSULIN LISPRO 10 UNITS: 100 INJECTION, SOLUTION INTRAVENOUS; SUBCUTANEOUS at 21:26

## 2025-02-16 RX ADMIN — METHYLPREDNISOLONE SODIUM SUCCINATE 40 MG: 40 INJECTION, POWDER, FOR SOLUTION INTRAMUSCULAR; INTRAVENOUS at 12:36

## 2025-02-16 RX ADMIN — INSULIN LISPRO 4 UNITS: 100 INJECTION, SOLUTION INTRAVENOUS; SUBCUTANEOUS at 09:48

## 2025-02-16 RX ADMIN — GABAPENTIN 600 MG: 300 CAPSULE ORAL at 09:44

## 2025-02-16 RX ADMIN — IPRATROPIUM BROMIDE AND ALBUTEROL SULFATE 3 ML: 2.5; .5 SOLUTION RESPIRATORY (INHALATION) at 12:00

## 2025-02-16 RX ADMIN — DULOXETINE HYDROCHLORIDE 30 MG: 30 CAPSULE, DELAYED RELEASE ORAL at 09:45

## 2025-02-16 RX ADMIN — AZITHROMYCIN 500 MG: 500 TABLET, FILM COATED ORAL at 20:39

## 2025-02-16 RX ADMIN — IPRATROPIUM BROMIDE AND ALBUTEROL SULFATE 3 ML: 2.5; .5 SOLUTION RESPIRATORY (INHALATION) at 06:48

## 2025-02-16 RX ADMIN — TRAZODONE HYDROCHLORIDE 50 MG: 50 TABLET ORAL at 20:39

## 2025-02-16 RX ADMIN — DONEPEZIL HYDROCHLORIDE 5 MG: 5 TABLET ORAL at 20:39

## 2025-02-16 RX ADMIN — CEFTRIAXONE SODIUM 1 G: 1 INJECTION, SOLUTION INTRAVENOUS at 09:56

## 2025-02-16 RX ADMIN — GABAPENTIN 600 MG: 300 CAPSULE ORAL at 20:39

## 2025-02-16 RX ADMIN — GABAPENTIN 600 MG: 300 CAPSULE ORAL at 17:02

## 2025-02-16 RX ADMIN — APIXABAN 5 MG: 5 TABLET, FILM COATED ORAL at 09:45

## 2025-02-16 RX ADMIN — INSULIN LISPRO 4 UNITS: 100 INJECTION, SOLUTION INTRAVENOUS; SUBCUTANEOUS at 17:03

## 2025-02-16 RX ADMIN — ACETAMINOPHEN 650 MG: 325 TABLET, FILM COATED ORAL at 18:44

## 2025-02-16 RX ADMIN — FUROSEMIDE 40 MG: 20 TABLET ORAL at 09:45

## 2025-02-16 RX ADMIN — TAFAMIDIS 61 MG: 61 CAPSULE, LIQUID FILLED ORAL at 10:10

## 2025-02-16 RX ADMIN — TOPIRAMATE 100 MG: 100 TABLET, FILM COATED ORAL at 09:45

## 2025-02-16 RX ADMIN — ATORVASTATIN CALCIUM 40 MG: 40 TABLET, FILM COATED ORAL at 20:39

## 2025-02-16 RX ADMIN — APIXABAN 5 MG: 5 TABLET, FILM COATED ORAL at 20:39

## 2025-02-16 RX ADMIN — PANTOPRAZOLE SODIUM 40 MG: 40 INJECTION, POWDER, FOR SOLUTION INTRAVENOUS at 05:57

## 2025-02-16 RX ADMIN — AMIODARONE HYDROCHLORIDE 200 MG: 200 TABLET ORAL at 09:45

## 2025-02-16 RX ADMIN — TOPIRAMATE 100 MG: 100 TABLET, FILM COATED ORAL at 20:39

## 2025-02-16 RX ADMIN — BACLOFEN 5 MG: 10 TABLET ORAL at 09:45

## 2025-02-16 RX ADMIN — BACLOFEN 5 MG: 10 TABLET ORAL at 20:39

## 2025-02-16 RX ADMIN — INSULIN LISPRO 2 UNITS: 100 INJECTION, SOLUTION INTRAVENOUS; SUBCUTANEOUS at 13:38

## 2025-02-16 ASSESSMENT — PAIN SCALES - PAIN ASSESSMENT IN ADVANCED DEMENTIA (PAINAD)
CONSOLABILITY: NO NEED TO CONSOLE
BREATHING: NORMAL
BODYLANGUAGE: RELAXED
FACIALEXPRESSION: SMILING OR INEXPRESSIVE
TOTALSCORE: 0

## 2025-02-16 ASSESSMENT — PAIN DESCRIPTION - LOCATION: LOCATION: FOOT

## 2025-02-16 ASSESSMENT — PAIN SCALES - GENERAL
PAINLEVEL_OUTOF10: 0 - NO PAIN
PAINLEVEL_OUTOF10: 0 - NO PAIN
PAINLEVEL_OUTOF10: 7

## 2025-02-16 ASSESSMENT — PAIN - FUNCTIONAL ASSESSMENT
PAIN_FUNCTIONAL_ASSESSMENT: 0-10
PAIN_FUNCTIONAL_ASSESSMENT: 0-10

## 2025-02-16 NOTE — PROGRESS NOTES
Elida Benson is a 79 y.o. female     Patient is stable  Breathing is better  Could not get in touch with the daughter    Review of Systems     Constitutional: no fever, no chills,   Cardiovascular: no chest pain   Respiratory: Bilateral wheezing  Gastrointestinal: no abdominal pain, no constipation, no melena, no nausea, no diarrhea, no vomiting and no blood in stools.   Neurological: Back pain  All other systems have been reviewed and are negative for complaint.       Vitals:    02/16/25 1233   BP: 107/67   Pulse: 91   Resp: 17   Temp: 36.4 °C (97.5 °F)   SpO2: 94%        Scheduled medications  amiodarone, 200 mg, oral, Daily  apixaban, 5 mg, oral, BID  atorvastatin, 40 mg, oral, Nightly  azithromycin, 500 mg, oral, q24h  baclofen, 5 mg, oral, BID  cefTRIAXone, 1 g, intravenous, Daily  donepezil, 5 mg, oral, Nightly  DULoxetine, 30 mg, oral, BID  empagliflozin, 25 mg, oral, Daily  furosemide, 40 mg, oral, Daily  gabapentin, 600 mg, oral, TID  [Held by provider] glipiZIDE XL, 5 mg, oral, Daily  insulin lispro, 0-10 Units, subcutaneous, After meals & nightly  ipratropium-albuteroL, 3 mL, nebulization, TID  methylPREDNISolone sodium succinate (PF), 40 mg, intravenous, q24h  pantoprazole, 40 mg, oral, Daily before breakfast   Or  pantoprazole, 40 mg, intravenous, Daily before breakfast  [Held by provider] spironolactone, 25 mg, oral, Daily  tafamidis, 61 mg, oral, Daily  topiramate, 100 mg, oral, BID      Continuous medications     PRN medications  PRN medications: acetaminophen **OR** acetaminophen **OR** acetaminophen, benzocaine-menthol, benzonatate, dextrose, dextrose, glucagon, glucagon, guaiFENesin, ipratropium-albuteroL, melatonin, polyethylene glycol, prochlorperazine **OR** prochlorperazine, traZODone    Lab Review   Results from last 7 days   Lab Units 02/16/25  0702 02/15/25  0621 02/14/25  0541   WBC AUTO x10*3/uL 6.9 7.8 9.5   HEMOGLOBIN g/dL 14.1 13.7 13.6   HEMATOCRIT % 48.1* 44.1 44.1   PLATELETS  AUTO x10*3/uL 253 275 253     Results from last 7 days   Lab Units 02/16/25  0702 02/15/25  0621 02/14/25  0541 02/12/25  0546 02/10/25  1612   SODIUM mmol/L 138 136 138   < > 135*   POTASSIUM mmol/L 4.3 4.0 4.0   < > 3.9   CHLORIDE mmol/L 104 105 105   < > 102   CO2 mmol/L 25 21 22   < > 25   BUN mg/dL 40* 37* 39*   < > 27*   CREATININE mg/dL 1.12* 1.10* 1.30*   < > 1.07*   CALCIUM mg/dL 8.8 8.8 8.9   < > 8.6   PROTEIN TOTAL g/dL  --   --   --   --  6.9   BILIRUBIN TOTAL mg/dL  --   --   --   --  0.5   ALK PHOS U/L  --   --   --   --  63   ALT U/L  --   --   --   --  46*   AST U/L  --   --   --   --  59*   GLUCOSE mg/dL 240* 247* 262*   < > 214*    < > = values in this interval not displayed.     Results from last 7 days   Lab Units 02/10/25  1802 02/10/25  1612   TROPHS ng/L 524* 526*        Vascular US Ankle Brachial Index (KEL) Without Exercise   Final Result      XR chest 1 view   Final Result   1.Newly appearing right upper lobe infiltrates and mild   accentuation of markings in the right infrahilar and basilar regions.   Findings could be related to pneumonia or asymmetric pulmonary   vascular congestion.   2.Stable mild enlargement of the cardiomediastinal silhouette.   Signed by Ania Brito DO            Physical Exam    Constitutional   General appearance: Alert complaining of back pain  Pulmonary   Respiratory assessment: Bilateral rhonchi with wheezing  Cardiovascular   Auscultation of heart: Apical pulse normal, heart rate and rhythm normal, normal S1 and S2, no murmurs and no pericardial rub.    Exam for edema: No peripheral edema.   Diminished pulses in right lower extremity which appears cold to touch  Abdomen   Abdominal Exam: No bruits, normal bowel sounds, soft, non-tender, no abdominal mass palpated.    Liver and Spleen exam: No hepato-splenomegaly.   Musculoskeletal     Inspection/palpation of joints, bones and muscles: No joint swelling. Normal movement of all extremities.   Neurologic    Cranial nerves: Nerves 2-12 were intact, no focal neuro defects.         Assessment/Plan      #Influenza pneumonia  Continue Tamiflu/Rocephin/azithromycin  #Exacerbation of COPD  Improving with Solu-Medrol and DuoNebs       #Elevated troponin  #Infiltrative cardiomyopathy/amyloidosis  Stable overall  Continue current medications     #Diabetes mellitus type 2  Sliding insulin coverage     #Hypertension  Low blood pressures  We will hold medication.  Blood pressure less than 100     #Dyslipidemia  Continue statins with target LDL less than 70     #CKD 3B  Monitor kidney functions while diuresing     #Postlaminectomy syndrome with chronic back pain  Continue pain medications     PT OT consult for deconditioning

## 2025-02-16 NOTE — CARE PLAN
The patient's goals for the shift include      The clinical goals for the shift include patient will remain safe    Over the shift, the patient did not make progress toward the following goals. Barriers to progression include . Recommendations to address these barriers include .  Problem: Fall/Injury  Goal: Not fall by end of shift  Outcome: Progressing     Problem: Fall/Injury  Goal: Be free from injury by end of the shift  Outcome: Progressing

## 2025-02-16 NOTE — CARE PLAN
Problem: Skin  Goal: Promote skin healing  Outcome: Progressing  Flowsheets (Taken 2/16/2025 1240)  Promote skin healing: Turn/reposition every 2 hours/use positioning/transfer devices   The patient's goals for the shift include      The clinical goals for the shift include patient will remain safe    Over the shift, the patient did not make progress toward the following goals. Barriers to progression include promte good nutrition. Recommendations to address these barriers include .

## 2025-02-16 NOTE — PROGRESS NOTES
02/16/25 1048   Discharge Planning   Expected Discharge Disposition SNF     spoke to daughter regarding dc today, she states that she feels patient is not medically ready for dc d/t resp distress when moving or getting up with aide or therapy. She is requesting a call from attending regarding medical readiness. She feels patient MAY be ready tomorrow but really thinks more like Tuesday.     Dr Barboza, bedside RN and charge RN all aware of above. Daughters contact info provided on secure chat for attending.    [facility] updated

## 2025-02-17 VITALS
OXYGEN SATURATION: 96 % | SYSTOLIC BLOOD PRESSURE: 99 MMHG | HEIGHT: 58 IN | DIASTOLIC BLOOD PRESSURE: 65 MMHG | TEMPERATURE: 98.8 F | RESPIRATION RATE: 11 BRPM | HEART RATE: 72 BPM | BODY MASS INDEX: 29.39 KG/M2 | WEIGHT: 140 LBS

## 2025-02-17 LAB
ANION GAP SERPL CALC-SCNC: 12 MMOL/L (ref 10–20)
BUN SERPL-MCNC: 40 MG/DL (ref 6–23)
CALCIUM SERPL-MCNC: 8.9 MG/DL (ref 8.6–10.3)
CHLORIDE SERPL-SCNC: 104 MMOL/L (ref 98–107)
CO2 SERPL-SCNC: 26 MMOL/L (ref 21–32)
CREAT SERPL-MCNC: 1.17 MG/DL (ref 0.5–1.05)
EGFRCR SERPLBLD CKD-EPI 2021: 48 ML/MIN/1.73M*2
ERYTHROCYTE [DISTWIDTH] IN BLOOD BY AUTOMATED COUNT: 14.8 % (ref 11.5–14.5)
GLUCOSE BLD MANUAL STRIP-MCNC: 228 MG/DL (ref 74–99)
GLUCOSE BLD MANUAL STRIP-MCNC: 269 MG/DL (ref 74–99)
GLUCOSE SERPL-MCNC: 218 MG/DL (ref 74–99)
HCT VFR BLD AUTO: 44.5 % (ref 36–46)
HGB BLD-MCNC: 14.2 G/DL (ref 12–16)
MCH RBC QN AUTO: 30.1 PG (ref 26–34)
MCHC RBC AUTO-ENTMCNC: 31.9 G/DL (ref 32–36)
MCV RBC AUTO: 95 FL (ref 80–100)
NRBC BLD-RTO: 0 /100 WBCS (ref 0–0)
PLATELET # BLD AUTO: 333 X10*3/UL (ref 150–450)
POTASSIUM SERPL-SCNC: 4.1 MMOL/L (ref 3.5–5.3)
RBC # BLD AUTO: 4.71 X10*6/UL (ref 4–5.2)
SODIUM SERPL-SCNC: 138 MMOL/L (ref 136–145)
WBC # BLD AUTO: 7 X10*3/UL (ref 4.4–11.3)

## 2025-02-17 PROCEDURE — 2500000001 HC RX 250 WO HCPCS SELF ADMINISTERED DRUGS (ALT 637 FOR MEDICARE OP): Performed by: INTERNAL MEDICINE

## 2025-02-17 PROCEDURE — 99239 HOSP IP/OBS DSCHRG MGMT >30: CPT | Performed by: INTERNAL MEDICINE

## 2025-02-17 PROCEDURE — 2500000004 HC RX 250 GENERAL PHARMACY W/ HCPCS (ALT 636 FOR OP/ED): Mod: JZ | Performed by: INTERNAL MEDICINE

## 2025-02-17 PROCEDURE — 2500000001 HC RX 250 WO HCPCS SELF ADMINISTERED DRUGS (ALT 637 FOR MEDICARE OP)

## 2025-02-17 PROCEDURE — 85027 COMPLETE CBC AUTOMATED: CPT | Performed by: INTERNAL MEDICINE

## 2025-02-17 PROCEDURE — 94640 AIRWAY INHALATION TREATMENT: CPT

## 2025-02-17 PROCEDURE — 82947 ASSAY GLUCOSE BLOOD QUANT: CPT

## 2025-02-17 PROCEDURE — 80048 BASIC METABOLIC PNL TOTAL CA: CPT | Performed by: INTERNAL MEDICINE

## 2025-02-17 PROCEDURE — 36415 COLL VENOUS BLD VENIPUNCTURE: CPT | Performed by: INTERNAL MEDICINE

## 2025-02-17 PROCEDURE — 2500000002 HC RX 250 W HCPCS SELF ADMINISTERED DRUGS (ALT 637 FOR MEDICARE OP, ALT 636 FOR OP/ED): Performed by: INTERNAL MEDICINE

## 2025-02-17 RX ADMIN — TAFAMIDIS 61 MG: 61 CAPSULE, LIQUID FILLED ORAL at 13:04

## 2025-02-17 RX ADMIN — EMPAGLIFLOZIN 25 MG: 25 TABLET, FILM COATED ORAL at 10:22

## 2025-02-17 RX ADMIN — PANTOPRAZOLE SODIUM 40 MG: 40 INJECTION, POWDER, FOR SOLUTION INTRAVENOUS at 06:16

## 2025-02-17 RX ADMIN — BACLOFEN 5 MG: 10 TABLET ORAL at 10:22

## 2025-02-17 RX ADMIN — DULOXETINE HYDROCHLORIDE 30 MG: 30 CAPSULE, DELAYED RELEASE ORAL at 10:21

## 2025-02-17 RX ADMIN — IPRATROPIUM BROMIDE AND ALBUTEROL SULFATE 3 ML: 2.5; .5 SOLUTION RESPIRATORY (INHALATION) at 14:20

## 2025-02-17 RX ADMIN — INSULIN LISPRO 4 UNITS: 100 INJECTION, SOLUTION INTRAVENOUS; SUBCUTANEOUS at 10:21

## 2025-02-17 RX ADMIN — FUROSEMIDE 40 MG: 20 TABLET ORAL at 10:22

## 2025-02-17 RX ADMIN — METHYLPREDNISOLONE SODIUM SUCCINATE 40 MG: 40 INJECTION, POWDER, FOR SOLUTION INTRAMUSCULAR; INTRAVENOUS at 12:59

## 2025-02-17 RX ADMIN — GABAPENTIN 600 MG: 300 CAPSULE ORAL at 10:21

## 2025-02-17 RX ADMIN — INSULIN LISPRO 6 UNITS: 100 INJECTION, SOLUTION INTRAVENOUS; SUBCUTANEOUS at 12:58

## 2025-02-17 RX ADMIN — TOPIRAMATE 100 MG: 100 TABLET, FILM COATED ORAL at 10:22

## 2025-02-17 RX ADMIN — APIXABAN 5 MG: 5 TABLET, FILM COATED ORAL at 10:21

## 2025-02-17 RX ADMIN — AMIODARONE HYDROCHLORIDE 200 MG: 200 TABLET ORAL at 10:22

## 2025-02-17 RX ADMIN — IPRATROPIUM BROMIDE AND ALBUTEROL SULFATE 3 ML: 2.5; .5 SOLUTION RESPIRATORY (INHALATION) at 07:56

## 2025-02-17 RX ADMIN — CEFTRIAXONE SODIUM 1 G: 1 INJECTION, SOLUTION INTRAVENOUS at 10:31

## 2025-02-17 ASSESSMENT — COGNITIVE AND FUNCTIONAL STATUS - GENERAL
DRESSING REGULAR UPPER BODY CLOTHING: A LOT
MOBILITY SCORE: 12
PERSONAL GROOMING: A LOT
STANDING UP FROM CHAIR USING ARMS: A LOT
HELP NEEDED FOR BATHING: A LOT
MOVING FROM LYING ON BACK TO SITTING ON SIDE OF FLAT BED WITH BEDRAILS: A LOT
DRESSING REGULAR LOWER BODY CLOTHING: A LOT
TURNING FROM BACK TO SIDE WHILE IN FLAT BAD: A LOT
TOILETING: A LOT
WALKING IN HOSPITAL ROOM: A LOT
CLIMB 3 TO 5 STEPS WITH RAILING: A LOT
DAILY ACTIVITIY SCORE: 13
MOVING TO AND FROM BED TO CHAIR: A LOT
EATING MEALS: A LITTLE

## 2025-02-17 ASSESSMENT — PAIN SCALES - GENERAL: PAINLEVEL_OUTOF10: 0 - NO PAIN

## 2025-02-17 ASSESSMENT — PAIN - FUNCTIONAL ASSESSMENT: PAIN_FUNCTIONAL_ASSESSMENT: 0-10

## 2025-02-17 NOTE — DISCHARGE SUMMARY
Discharge Diagnosis  Community acquired pneumonia of right upper lobe of lung    Issues Requiring Follow-Up  Physical therapy  Outpatient follow-up with podiatry for ingrown toenail    Discharge Meds     Medication List      START taking these medications     amoxicillin-pot clavulanate 875-125 mg tablet; Commonly known as:   Augmentin; Take 1 tablet (875 mg) by mouth 2 times a day for 7 days.   predniSONE 10 mg tablet; Commonly known as: Deltasone; Take 6 tablets   (60 mg) by mouth once daily for 2 days, THEN 5 tablets (50 mg) once daily   for 2 days, THEN 4 tablets (40 mg) once daily for 2 days, THEN 3 tablets   (30 mg) once daily for 2 days, THEN 2 tablets (20 mg) once daily for 2   days, THEN 1 tablet (10 mg) once daily for 2 days.; Start taking on:   February 15, 2025     CHANGE how you take these medications     amiodarone 200 mg tablet; Commonly known as: Pacerone; TAKE ONE TABLET   BY MOUTH IN THE MORNING FOR ANTIARRHYTHMIC; What changed: how much to   take, how to take this, when to take this   benzonatate 100 mg capsule; Commonly known as: Tessalon; TAKE 1 CAPSULE   BY MOUTH 3 TIMES A DAY AS NEEDED FOR COUGH. DO NOT CRUSH OR CHEW.; What   changed: See the new instructions.   fluticasone 50 mcg/actuation nasal spray; Commonly known as: Flonase;   Administer 2 sprays into each nostril once daily. Shake gently. Before   first use, prime pump. After use, clean tip and replace cap.; What   changed: when to take this, reasons to take this     CONTINUE taking these medications     acetaminophen 325 mg tablet; Commonly known as: Tylenol   atorvastatin 40 mg tablet; Commonly known as: Lipitor; Take 1 tablet (40   mg) by mouth once daily at bedtime.   b complex 0.4 mg tablet   baclofen 5 mg tablet; Commonly known as: Lioresal; TAKE ONE TABLET BY   MOUTH TWO TIMES A DAY   biotin 1 mg tablet   cholecalciferol 50,000 unit capsule; Commonly known as: Vitamin D-3   docusate sodium 100 mg capsule; Commonly known as:  Colace   donepezil 5 mg tablet; Commonly known as: Aricept; TAKE ONE TABLET BY   MOUTH AT BEDTIME   DULoxetine 30 mg DR capsule; Commonly known as: Cymbalta; TAKE ONE   CAPSULE BY MOUTH TWO TIMES A DAY   Eliquis 5 mg tablet; Generic drug: apixaban; Take 1 tablet (5 mg) by   mouth 2 times a day.   furosemide 20 mg tablet; Commonly known as: Lasix; Take 1 tablet (20 mg)   by mouth once daily.   gabapentin 600 mg tablet; Commonly known as: Neurontin; TAKE 1 TABLET BY   MOUTH 3 TIMES DAILY.   Jardiance 25 mg; Generic drug: empagliflozin; Take 1 tablet (25 mg) by   mouth once daily.   melatonin 3 mg tablet   naloxone 4 mg/0.1 mL nasal spray; Commonly known as: Narcan; Administer   1 spray (4 mg) into affected nostril(s) if needed for opioid reversal. May   repeat every 2-3 minutes if needed, alternating nostrils, until medical   assistance becomes available.   ondansetron ODT 4 mg disintegrating tablet; Commonly known as:   Zofran-ODT; Take 1 tablet (4 mg) by mouth every 8 hours if needed for   nausea or vomiting.   SITagliptin phosphate 50 mg tablet; Commonly known as: Januvia; Take 1   tablet (50 mg) by mouth once daily.   spironolactone 25 mg tablet; Commonly known as: Aldactone; Take 1 tablet   (25 mg) by mouth once daily.   topiramate 100 mg tablet; Commonly known as: Topamax; Take 1 tablet (100   mg) by mouth 2 times a day.   traZODone 50 mg tablet; Commonly known as: Desyrel; TAKE ONE TABLET BY   MOUTH AT BEDTIME AS NEEDED for sleep   Vyndamax 61 mg capsule; Generic drug: tafamidis; Take 1 capsule (61 mg)   by mouth once daily.     STOP taking these medications     cephalexin 500 mg capsule; Commonly known as: Keflex   glipiZIDE XL 5 mg 24 hr tablet; Commonly known as: Glucotrol XL   losartan 25 mg tablet; Commonly known as: Cozaar       Test Results Pending At Discharge  Pending Labs       Order Current Status    Extra Urine Gray Tube Collected (02/10/25 1949)    Urinalysis with Reflex Culture and Microscopic In  process            Hospital Course   Patient with a past medical history of hypertension dyslipidemia COPD atrial fibrillation on Eliquis dementia diabetes mellitus chronic diastolic congestive heart failure, amyloid heart disease postlaminectomy syndrome, chronic kidney disease stage IIIb presents to the hospital with increasing cough and productive phlegm along with worsening shortness of breath     Patient workup confirmed pneumonia  She also tested positive for influenza  We started the patient on Tamiflu antibiotics DuoNebs and Solu-Medrol  She improved slowly but steadily and is now off oxygen  On the day of discharge the patient was breathing normally and lungs sounded clear to auscultation    During the stay she also had elevated troponins and because of her history of infiltrative cardiomyopathy we consult cardiology  Their opinion was that elevated troponins were because of the infection and no further interventions were needed    Patient has postlaminectomy syndrome and chronic back pain and because of that she is quite deconditioned  She will need physical Occupational Therapy and pain medications upon discharge    Will discharge the patient to skilled nursing facility in stable condition    Pertinent Physical Exam At Time of Discharge  Physical Exam    Outpatient Follow-Up  Future Appointments   Date Time Provider Department Center   2/27/2025  3:45 PM Da Silvestre MD [id number] [facility]   3/5/2025  1:00 PM Telly Chu MD [id number] [facility]   3/6/2025  3:45 PM Da Silvestre MD [id number] [facility]   3/28/2025 10:20 AM Ledy Couch, PharmD [id number] [facility]   4/4/2025  8:00 AM Sebastian Lo MD [id number] [facility]   5/5/2025  1:20 PM Lori Dueñas MD PhD [id number] [facility]   6/9/2025  1:15 PM David Swift MD [id number] [facility]   7/28/2025 11:30 AM Cesar ORLANDO MD [id number] [facility]   8/15/2025  2:00 PM Esther Cedillo MD [id number] [facility]       Patient seen at bedside. Events from  the last visit reviewed. Discussed with staff. Results of tests and investigations from last visit reviewed and discussed with patient/Family. Electronic chart on [facility] reviewed. Input / Recommendations  from consultants  appreciated and reviewed and agreed with.     discharge summary and profile completed. medications reviewed and discussed with patient and family.  scripts completed and signed.     total discharge time in excess of 30 minutes.    Julián Barboza MD

## 2025-02-17 NOTE — PROGRESS NOTES
02/17/25 1049   Discharge Planning   Expected Discharge Disposition SNF     Patient med ready for discharge  Have asked provider to call daughter Kaelyn for a medical update  NP attempted no answer and Dr Barboza attempted yesterday no answer, per Dr Barboza patient is med ready for dc and transport to SNF needs to be arranged.    Called daughter Kaelyn who states she did not receive calls from providers and wants to talk to them before she agrees to dc for patient to [facility].    Angie GOODMAN aware and asked to please call daughter again.   [facility] asked to set up transport.    Bedside RN Annie states she spoke to daughter about earlier  time at 1215p and daughter agreeable, provider, NP and charge nurse all aware of dc time and place.     Transport changed to 2p, SNF can NOT accommodate any early, bedside RN aware.

## 2025-02-17 NOTE — CARE PLAN
The patient's goals for the shift include      The clinical goals for the shift include patient will remain free from falls    Over the shift, the patient did not make progress toward the following goals. Barriers to progression include . Recommendations to address these barriers include .  Problem: Fall/Injury  Goal: Not fall by end of shift  Outcome: Progressing     Problem: Fall/Injury  Goal: Be free from injury by end of the shift  Outcome: Progressing

## 2025-02-21 ENCOUNTER — TELEPHONE (OUTPATIENT)
Dept: CARDIOLOGY | Facility: CLINIC | Age: 80
End: 2025-02-21
Payer: MEDICARE

## 2025-02-21 DIAGNOSIS — M54.12 CERVICAL RADICULOPATHY: ICD-10-CM

## 2025-02-21 NOTE — TELEPHONE ENCOUNTER
Patients daughter called and was wondering if it was safe for patient to stop her eliquis 3 days before her back injection. I reached out to Dr. Ignacio via secure chat and he confirmed she can hold her eliquis for 3 days prior to procedure. I spoke with patients daughter and relayed Dr. Ignacio's recommendations and she verbalized understanding.

## 2025-02-26 ENCOUNTER — ANESTHESIA (OUTPATIENT)
Dept: OPERATING ROOM | Facility: CLINIC | Age: 80
End: 2025-02-26
Payer: MEDICARE

## 2025-02-27 ENCOUNTER — APPOINTMENT (OUTPATIENT)
Dept: OPHTHALMOLOGY | Facility: CLINIC | Age: 80
End: 2025-02-27
Payer: MEDICARE

## 2025-02-28 ENCOUNTER — TELEPHONE (OUTPATIENT)
Dept: PRIMARY CARE | Facility: CLINIC | Age: 80
End: 2025-02-28
Payer: MEDICARE

## 2025-02-28 NOTE — TELEPHONE ENCOUNTER
Daughter states mom was hospitalized for pneumonia, flu and uti a couple of weeks ago and wonders if you would consider writing a Rx for viridianack to help with the incontinence which is getting worse. Patient is getting weak, losing memory shaking she states they're concerned and would like to discuss with you.

## 2025-03-05 ENCOUNTER — OFFICE VISIT (OUTPATIENT)
Dept: ORTHOPEDIC SURGERY | Facility: CLINIC | Age: 80
End: 2025-03-05
Payer: MEDICARE

## 2025-03-05 DIAGNOSIS — M25.512 LEFT SHOULDER PAIN, UNSPECIFIED CHRONICITY: ICD-10-CM

## 2025-03-05 DIAGNOSIS — M19.012 ARTHRITIS OF LEFT SHOULDER REGION: Primary | ICD-10-CM

## 2025-03-05 PROCEDURE — 1111F DSCHRG MED/CURRENT MED MERGE: CPT | Performed by: STUDENT IN AN ORGANIZED HEALTH CARE EDUCATION/TRAINING PROGRAM

## 2025-03-05 PROCEDURE — 99214 OFFICE O/P EST MOD 30 MIN: CPT | Mod: 25 | Performed by: STUDENT IN AN ORGANIZED HEALTH CARE EDUCATION/TRAINING PROGRAM

## 2025-03-05 PROCEDURE — 99214 OFFICE O/P EST MOD 30 MIN: CPT | Performed by: STUDENT IN AN ORGANIZED HEALTH CARE EDUCATION/TRAINING PROGRAM

## 2025-03-05 PROCEDURE — 2500000004 HC RX 250 GENERAL PHARMACY W/ HCPCS (ALT 636 FOR OP/ED): Performed by: STUDENT IN AN ORGANIZED HEALTH CARE EDUCATION/TRAINING PROGRAM

## 2025-03-05 PROCEDURE — 20610 DRAIN/INJ JOINT/BURSA W/O US: CPT | Mod: LT | Performed by: STUDENT IN AN ORGANIZED HEALTH CARE EDUCATION/TRAINING PROGRAM

## 2025-03-05 RX ORDER — LIDOCAINE HYDROCHLORIDE 10 MG/ML
5 INJECTION, SOLUTION INFILTRATION; PERINEURAL
Status: COMPLETED | OUTPATIENT
Start: 2025-03-05 | End: 2025-03-05

## 2025-03-05 RX ORDER — TRIAMCINOLONE ACETONIDE 40 MG/ML
80 INJECTION, SUSPENSION INTRA-ARTICULAR; INTRAMUSCULAR
Status: COMPLETED | OUTPATIENT
Start: 2025-03-05 | End: 2025-03-05

## 2025-03-05 RX ADMIN — LIDOCAINE HYDROCHLORIDE 5 ML: 10 INJECTION, SOLUTION INFILTRATION; PERINEURAL at 13:20

## 2025-03-05 RX ADMIN — TRIAMCINOLONE ACETONIDE 80 MG: 40 INJECTION, SUSPENSION INTRA-ARTICULAR; INTRAMUSCULAR at 13:20

## 2025-03-05 NOTE — PROGRESS NOTES
CHIEF COMPLAINT: No chief complaint on file.    History: 79 y.o. female presents to the office today for evaluation of her left shoulder.  She has known left shoulder end-stage arthritis.  We have given her 5 cortisone injections previously, the most recent of which was 3 months ago.  She had relief from this up until a few weeks ago.  Is not interested in surgery at this time.  Difficulty with using the left shoulder.  Significant pain is anterior and deep.  Requesting repeat injection today.    Past medical history, past surgical history, medications, allergies, family history, social history, and review of systems were reviewed today.    A 12 point review of systems was negative other than as stated in the HPI.    Past Medical History:   Diagnosis Date    A-fib (Multi)     Managed on meds, Eliquis stoppage and cardiac clearance in [facility] from Dr. Ignacio    Cervical myelopathy     Chronic pain syndrome     COPD (chronic obstructive pulmonary disease) (Multi)     denies, she is a former smoker but quit in 1984    Dementia     Depression     Diabetic neuropathy (Multi)     Diverticulitis     DM (diabetes mellitus) (Multi)     denies but A1C= 7.2% on 9/5/23    LORENZ (dyspnea on exertion)     Stable per cards note    HLD (hyperlipidemia)     Hypertension, essential     Infiltrative cardiomyopathy (Multi)     Echo 8/22/23, following with Dr. Ignacio    OA (ocular albinism) (Multi)     multiple sites    Other malaise 04/21/2022    Physical deconditioning    Palpitations     on occasion, has afib followed by cardiology    Post laminectomy syndrome     Rectovaginal fistula     Spinal stenosis of cervical region     UTI (urinary tract infection)         Allergies   Allergen Reactions    Shellfish Containing Products Swelling        Past Surgical History:   Procedure Laterality Date    CARPAL TUNNEL RELEASE  08/27/2013    Neuroplasty Decompression Median Nerve At Carpal Tunnel    ILEOSTOMY      KNEE ARTHROPLASTY  08/27/2013     Knee Arthroplasty    LAMINECTOMY      LUMBAR FUSION      OTHER SURGICAL HISTORY  2023    LAPAROSCPIC ANTERIOR RESECTION, DIVERTING LOOP ILEOSTOMY    SPINAL CORD STIMULATOR IMPLANT      TOTAL HIP ARTHROPLASTY  2013    Total Hip Replacement        Family History   Problem Relation Name Age of Onset    No Known Problems Mother      No Known Problems Father      No Known Problems Sister      No Known Problems Sister      No Known Problems Sister      No Known Problems Sister          Social History     Socioeconomic History    Marital status:      Spouse name: Not on file    Number of children: Not on file    Years of education: Not on file    Highest education level: Not on file   Occupational History    Not on file   Tobacco Use    Smoking status: Former     Current packs/day: 0.00     Types: Cigarettes     Quit date:      Years since quittin.2    Smokeless tobacco: Never   Vaping Use    Vaping status: Never Used   Substance and Sexual Activity    Alcohol use: Yes     Comment: drinks beer or liquor a few times a year    Drug use: Never    Sexual activity: Defer   Other Topics Concern    Not on file   Social History Narrative    Not on file     Social Drivers of Health     Financial Resource Strain: Low Risk  (2025)    Overall Financial Resource Strain (CARDIA)     Difficulty of Paying Living Expenses: Not hard at all   Food Insecurity: No Food Insecurity (2025)    Hunger Vital Sign     Worried About Running Out of Food in the Last Year: Never true     Ran Out of Food in the Last Year: Never true   Transportation Needs: No Transportation Needs (2025)    PRAPARE - Transportation     Lack of Transportation (Medical): No     Lack of Transportation (Non-Medical): No   Physical Activity: Inactive (2024)    Exercise Vital Sign     Days of Exercise per Week: 0 days     Minutes of Exercise per Session: 0 min   Stress: No Stress Concern Present (2024)    [place]  [place] [employer] - Occupational Stress Questionnaire     Feeling of Stress : Not at all   Social Connections: Feeling Socially Integrated (6/10/2024)    OASIS : Social Isolation     Frequency of experiencing loneliness or isolation: Never   Recent Concern: Social Connections - Moderately Isolated (4/9/2024)    Social Connection and Isolation Panel [NHANES]     Frequency of Communication with Friends and Family: More than three times a week     Frequency of Social Gatherings with Friends and Family: More than three times a week     Attends Spiritism Services: 1 to 4 times per year     Active Member of Clubs or Organizations: No     Attends Club or Organization Meetings: Never     Marital Status:    Intimate Partner Violence: Not At Risk (2/11/2025)    Humiliation, Afraid, Rape, and Kick questionnaire     Fear of Current or Ex-Partner: No     Emotionally Abused: No     Physically Abused: No     Sexually Abused: No   Housing Stability: Low Risk  (2/11/2025)    Housing Stability Vital Sign     Unable to Pay for Housing in the Last Year: No     Number of Times Moved in the Last Year: 0     Homeless in the Last Year: No        CURRENT MEDICATIONS:   Current Outpatient Medications   Medication Sig Dispense Refill    acetaminophen (Tylenol) 325 mg tablet Give 2 tablet by mouth every 4 hours as needed for Pain      amiodarone (Pacerone) 200 mg tablet TAKE ONE TABLET BY MOUTH IN THE MORNING FOR ANTIARRHYTHMIC (Patient taking differently: Take 1 tablet (200 mg) by mouth once daily. TAKE ONE TABLET BY MOUTH IN THE MORNING FOR ANTIARRHYTHMIC) 60 tablet 0    apixaban (Eliquis) 5 mg tablet Take 1 tablet (5 mg) by mouth 2 times a day. 180 tablet 3    atorvastatin (Lipitor) 40 mg tablet Take 1 tablet (40 mg) by mouth once daily at bedtime. 90 tablet 1    b complex 0.4 mg tablet Take 1 tablet by mouth once daily.      baclofen (Lioresal) 5 mg tablet TAKE ONE TABLET BY MOUTH TWO TIMES A DAY 60 tablet 2     benzonatate (Tessalon) 100 mg capsule TAKE 1 CAPSULE BY MOUTH 3 TIMES A DAY AS NEEDED FOR COUGH. DO NOT CRUSH OR CHEW. (Patient taking differently: Take 1 capsule (100 mg) by mouth 3 times a day as needed for cough.) 42 capsule 0    biotin 1 mg tablet 1 tab(s) orally once a day      cholecalciferol (Vitamin D-3) 1,250 mcg (50,000 unit) capsule Take 1 capsule (50,000 Units) by mouth 1 (one) time per week. Sunday      docusate sodium (Colace) 100 mg capsule Take 1 capsule (100 mg) by mouth 2 times a day as needed for constipation.      donepezil (Aricept) 5 mg tablet TAKE ONE TABLET BY MOUTH AT BEDTIME 90 tablet 0    DULoxetine (Cymbalta) 30 mg DR capsule TAKE ONE CAPSULE BY MOUTH TWO TIMES A  capsule 0    empagliflozin (Jardiance) 25 mg Take 1 tablet (25 mg) by mouth once daily. 90 tablet 3    fluticasone (Flonase) 50 mcg/actuation nasal spray Administer 2 sprays into each nostril once daily. Shake gently. Before first use, prime pump. After use, clean tip and replace cap. (Patient taking differently: Administer 2 sprays into each nostril once daily as needed for allergies. Shake gently. Before first use, prime pump. After use, clean tip and replace cap.) 16 g 3    furosemide (Lasix) 20 mg tablet Take 1 tablet (20 mg) by mouth once daily. 90 tablet 3    gabapentin (Neurontin) 600 mg tablet TAKE 1 TABLET BY MOUTH 3 TIMES DAILY. 90 tablet 0    melatonin 3 mg tablet Take 2 tablets (6 mg) by mouth as needed at bedtime for sleep.      naloxone (Narcan) 4 mg/0.1 mL nasal spray Administer 1 spray (4 mg) into affected nostril(s) if needed for opioid reversal. May repeat every 2-3 minutes if needed, alternating nostrils, until medical assistance becomes available. 2 each 0    ondansetron ODT (Zofran-ODT) 4 mg disintegrating tablet Take 1 tablet (4 mg) by mouth every 8 hours if needed for nausea or vomiting. 20 tablet 0    SITagliptin phosphate (Januvia) 50 mg tablet Take 1 tablet (50 mg) by mouth once daily. 90 tablet  1    spironolactone (Aldactone) 25 mg tablet Take 1 tablet (25 mg) by mouth once daily. 90 tablet 3    tafamidis (Vyndamax) 61 mg capsule Take 1 capsule (61 mg) by mouth once daily. 30 capsule 10    topiramate (Topamax) 100 mg tablet Take 1 tablet (100 mg) by mouth 2 times a day. 60 tablet 10    traZODone (Desyrel) 50 mg tablet TAKE ONE TABLET BY MOUTH AT BEDTIME AS NEEDED for sleep 30 tablet 0     No current facility-administered medications for this visit.       Physical Examination:      2/16/2025     3:00 PM 2/16/2025     4:28 PM 2/16/2025     5:00 PM 2/16/2025     8:09 PM 2/17/2025     4:19 AM 2/17/2025     8:17 AM 2/17/2025    12:05 PM   Vitals   Systolic  119  121 116 108 99   Diastolic  66  63 61 72 65   BP Location  Right arm  Right arm Right arm Right arm Right arm   Heart Rate 69 69 75 76 70 68 72   Temp  36.2 °C (97.1 °F)  36.4 °C (97.6 °F) 36.5 °C (97.7 °F) 36.8 °C (98.2 °F) 37.1 °C (98.8 °F)   Resp 16  17 13 16 16 11      There is no height or weight on file to calculate BMI.    Well-appearing, appears stated age, pleasant and cooperative, appropriate mood and behavior. Height and weight reviewed. Alert and oriented x3.  Auditory function intact.  No acute distress.  Intact ocular function, ARIC, EOMI. Breathing is unlabored .  There is no evidence of jugular venous distension. Skin appearance is normal without evidence of rash or other lesions. 2+ radial pulses bilaterally, fingers pink and wwp, good capillary refill, no pitting edema. No appreciable lymphadenopathy in bilateral upper extremities. SILT throughout both upper extremities, median/radial/ulnar/musculocutaneous/axillary nerve motor and sensory intact (except for abnormalities noted in focused musculoskeletal exam section below).     On exam of bilateral upper extremities, very limited range of motion of the left shoulder.  Active forward flexion to 40, external rotation to neutral.  On the right she has active forward flexion to 100,  external rotation to 30.    Imaging: Previous radiographs of the left shoulder show end-stage arthritis of the left shoulder    Assessment: Arthritis of the left shoulder    Plan: Patient with end-stage arthritis of the left shoulder.  She has responded well to cortisone injections previously and is not interested in surgery at this time.  She has multiple medical morbidities.  We performed a repeat injection today.  She will follow-up with us as needed going forward.    Injection was performed, please see separate procedure note, patient tolerated well.   Patient ID: Elida Benson is a 79 y.o. female.    L Inj/Asp: L subacromial bursa on 3/5/2025 1:20 PM  Indications: pain  Details: 22 G needle, posterior approach  Medications: 80 mg triamcinolone acetonide 40 mg/mL; 5 mL lidocaine 10 mg/mL (1 %)  Outcome: tolerated well, no immediate complications  Procedure, treatment alternatives, risks and benefits explained, specific risks discussed. Consent was given by the patient. Immediately prior to procedure a time out was called to verify the correct patient, procedure, equipment, support staff and site/side marked as required. Patient was prepped and draped in the usual sterile fashion.           Dragon software was used to dictate this note, please be aware that minor errors in transcription may be present.    Telly Chu MD    Shoulder/Elbow Surgery  [facility]

## 2025-03-06 ENCOUNTER — SPECIALTY PHARMACY (OUTPATIENT)
Dept: PHARMACY | Facility: CLINIC | Age: 80
End: 2025-03-06

## 2025-03-06 ENCOUNTER — APPOINTMENT (OUTPATIENT)
Dept: OPHTHALMOLOGY | Facility: CLINIC | Age: 80
End: 2025-03-06
Payer: MEDICARE

## 2025-03-06 DIAGNOSIS — I10 HYPERTENSION, ESSENTIAL: Primary | ICD-10-CM

## 2025-03-06 RX ORDER — LOSARTAN POTASSIUM 25 MG/1
25 TABLET ORAL DAILY
Qty: 30 TABLET | Refills: 11 | Status: SHIPPED | OUTPATIENT
Start: 2025-03-06 | End: 2026-03-06

## 2025-03-10 PROCEDURE — RXMED WILLOW AMBULATORY MEDICATION CHARGE

## 2025-03-14 ENCOUNTER — PATIENT OUTREACH (OUTPATIENT)
Dept: PRIMARY CARE | Facility: CLINIC | Age: 80
End: 2025-03-14
Payer: MEDICARE

## 2025-03-14 ENCOUNTER — DOCUMENTATION (OUTPATIENT)
Dept: PRIMARY CARE | Facility: CLINIC | Age: 80
End: 2025-03-14
Payer: MEDICARE

## 2025-03-14 ENCOUNTER — APPOINTMENT (OUTPATIENT)
Facility: HOSPITAL | Age: 80
End: 2025-03-14
Payer: MEDICARE

## 2025-03-14 DIAGNOSIS — G89.4 CHRONIC PAIN SYNDROME: ICD-10-CM

## 2025-03-14 RX ORDER — DULOXETIN HYDROCHLORIDE 30 MG/1
30 CAPSULE, DELAYED RELEASE ORAL 2 TIMES DAILY
Qty: 180 CAPSULE | Refills: 0 | Status: SHIPPED | OUTPATIENT
Start: 2025-03-14

## 2025-03-14 NOTE — PROGRESS NOTES
Discharge Facility:   [facility] 2/10 - 2/17/25 dx: community acquired pneumonia of right upper lobe of lung   Discharge Facility:  [facility]  Discharge Diagnosis: hypertensive heart and chronic kidney disease with heart failure and stage 1 through stage 4 chronic kidney disease, or unspecified chronic kidney disease  Admission Date: 2/17/25  Discharge Date:  3/13/25    PCP Appointment Date: 3/24/25  Specialist Appointment Date:   Hospital Encounter and Summary Linked: Yes (linked hospital encounter)   ED to Hosp-Admission (Discharged) with Julián Barboza MD; Arnaud Bill MD (02/10/2025)   See discharge assessment below for further details     Wrap Up  Wrap Up Additional Comments: Successful transitions of care outreach to patient's daughter, who is her primary caregiver. She reports patient is doing okay since discharge, but does seem to be weak. Patient will be receiving home health care through  and her daughter is waiting for thier call. States they have worked with  HH in the past.  She reports they were given all of the medications left over from the SNF, as well as updated medication list. She is not at home at this time, so unable to review medications. She reports there are some new medications for her diabetes, including an insulin. They received education at the nursing facility for insulin administration. She has an appt with her PCP for 3/24/25. Patient's daughter voiced concern about her ability to get her out of the house safely for the appointment. Encouraged her to speak with the physical therapist about the best way to do this, and if PT feels it is not safe for patient to be taken out of the home for this appt - then call Dr. Swift's office to reschedule appt or see if it can be done virtually.  She is in agreement with this. No other questions or concerns. (3/14/2025  9:46 AM)    Engagement  Call Start Time: 0933 (3/14/2025  9:46 AM)    Medications  Medications reviewed with  patient/caregiver?: No (caregiver was not home with access to med list) (3/14/2025  9:46 AM)  Is the patient having any side effects they believe may be caused by any medication additions or changes?: No (3/14/2025  9:46 AM)  Does the patient have all medications ordered at discharge?: Yes (3/14/2025  9:46 AM)  Is the patient taking all medications as directed (includes completed medication regime)?: Yes (3/14/2025  9:46 AM)  Medication Comments: Reviewed with patient's daughter who manages her medications. She reports patient was given a bag of all of her medications and they were provided with an updated medication list. She states patient was started on some new medications for her diabetes, but she could not recall what they were and is not at home. They were missing pen needles for insulin pens and lancets when she went to the pharmacy. She reached out to the SNF to get those orders placed and has not yet checked back with pharmacy to see if the scripts were received. She will call if additional assistance is needed. (3/14/2025  9:46 AM)    Appointments  Does the patient have a primary care provider?: Yes (3/14/2025  9:46 AM)  Care Management Interventions: Verified appointment date/time/provider (3/14/2025  9:46 AM)  Has the patient kept scheduled appointments due by today?: Not applicable (3/14/2025  9:46 AM)    Self Management  What is the home health agency?: [facility] (3/14/2025  9:46 AM)  Has home health visited the patient within 72 hours of discharge?: Call prior to 72 hours (3/14/2025  9:46 AM)    Patient Teaching  Does the patient have access to their discharge instructions?: Yes (3/14/2025  9:46 AM)  Care Management Interventions: Reviewed instructions with patient (3/14/2025  9:46 AM)  What is the patient's perception of their health status since discharge?: Improving (3/14/2025  9:46 AM)  Is the patient/caregiver able to teach back the hierarchy of who to call/visit for symptoms/problems? PCP,  Specialist, Home Health nurse, Urgent Care, ED, 911: Yes (3/14/2025  9:46 AM)

## 2025-03-17 ENCOUNTER — TELEPHONE (OUTPATIENT)
Dept: HOME HEALTH SERVICES | Facility: HOME HEALTH | Age: 80
End: 2025-03-17
Payer: MEDICARE

## 2025-03-17 NOTE — TELEPHONE ENCOUNTER
Dr. David Swift MD      This patient was recently at [facility] 2/17/2025-3/13/2025  and was discharged home without [facility] orders being entered. If you are agreeable, please enter  orders in EPIC  under [id number] ,  so that we can schedule a nurse/therapist to go out and start  services. Patient   will require Skilled Nursing, Physical Therapy and Occupational Therapy .       Thank you,??   MARY ANGELA LPN     Referral  ?

## 2025-03-19 ENCOUNTER — HOME HEALTH ADMISSION (OUTPATIENT)
Dept: HOME HEALTH SERVICES | Facility: HOME HEALTH | Age: 80
End: 2025-03-19
Payer: MEDICARE

## 2025-03-19 ENCOUNTER — PHARMACY VISIT (OUTPATIENT)
Dept: PHARMACY | Facility: CLINIC | Age: 80
End: 2025-03-19
Payer: MEDICARE

## 2025-03-19 ENCOUNTER — DOCUMENTATION (OUTPATIENT)
Dept: HOME HEALTH SERVICES | Facility: HOME HEALTH | Age: 80
End: 2025-03-19
Payer: MEDICARE

## 2025-03-19 NOTE — HH CARE COORDINATION
Home Care received a Referral for Physical Therapy and Home Health Aide. We have processed the referral for a Start of Care on 3.20-3.21.2025.     If you have any questions or concerns, please feel free to contact us at [phone]. Follow the prompts, enter your five digit zip code, and you will be directed to your care team on CENTL 2.

## 2025-03-21 ENCOUNTER — HOME CARE VISIT (OUTPATIENT)
Dept: HOME HEALTH SERVICES | Facility: HOME HEALTH | Age: 80
End: 2025-03-21
Payer: MEDICARE

## 2025-03-21 VITALS — HEART RATE: 72 BPM | OXYGEN SATURATION: 96 % | TEMPERATURE: 97.3 F

## 2025-03-21 PROCEDURE — G0151 HHCP-SERV OF PT,EA 15 MIN: HCPCS | Mod: HHH

## 2025-03-21 PROCEDURE — 169592 NO-PAY CLAIM PROCEDURE

## 2025-03-21 SDOH — ECONOMIC STABILITY: HOUSING INSECURITY: HOME SAFETY: UH HANDBOOK PROVIDED AND REVIEWED WITH PATIENT AND CG

## 2025-03-21 ASSESSMENT — ACTIVITIES OF DAILY LIVING (ADL)
AMBULATION ASSISTANCE: NON-AMBULATORY
OASIS_M1830: 05
ENTERING_EXITING_HOME: TOTAL DEPENDENCE
CURRENT_FUNCTION: ONE PERSON

## 2025-03-21 ASSESSMENT — ENCOUNTER SYMPTOMS
MUSCLE WEAKNESS: 1
PERSON REPORTING PAIN: PATIENT
SUBJECTIVE PAIN PROGRESSION: WAXING AND WANING
PAIN: 1
LOWEST PAIN SEVERITY IN PAST 24 HOURS: 5/10
FATIGUES EASILY: 1
HIGHEST PAIN SEVERITY IN PAST 24 HOURS: 6/10

## 2025-03-24 ENCOUNTER — HOME CARE VISIT (OUTPATIENT)
Dept: HOME HEALTH SERVICES | Facility: HOME HEALTH | Age: 80
End: 2025-03-24
Payer: MEDICARE

## 2025-03-24 ENCOUNTER — APPOINTMENT (OUTPATIENT)
Dept: PRIMARY CARE | Facility: CLINIC | Age: 80
End: 2025-03-24
Payer: MEDICARE

## 2025-03-24 VITALS
DIASTOLIC BLOOD PRESSURE: 67 MMHG | WEIGHT: 140 LBS | HEIGHT: 58 IN | BODY MASS INDEX: 29.39 KG/M2 | HEART RATE: 80 BPM | SYSTOLIC BLOOD PRESSURE: 93 MMHG

## 2025-03-24 DIAGNOSIS — J44.9 COPD WITHOUT EXACERBATION (MULTI): ICD-10-CM

## 2025-03-24 DIAGNOSIS — E85.4 CARDIAC AMYLOIDOSIS: ICD-10-CM

## 2025-03-24 DIAGNOSIS — M96.1 POSTLAMINECTOMY SYNDROME, LUMBAR: ICD-10-CM

## 2025-03-24 DIAGNOSIS — R06.02 SOB (SHORTNESS OF BREATH) ON EXERTION: Primary | ICD-10-CM

## 2025-03-24 DIAGNOSIS — I42.8 INFILTRATIVE CARDIOMYOPATHY (MULTI): ICD-10-CM

## 2025-03-24 DIAGNOSIS — I48.11 LONGSTANDING PERSISTENT ATRIAL FIBRILLATION (MULTI): ICD-10-CM

## 2025-03-24 DIAGNOSIS — E11.9 TYPE 2 DIABETES MELLITUS WITHOUT RETINOPATHY (MULTI): ICD-10-CM

## 2025-03-24 DIAGNOSIS — M48.02 MYELOPATHY CONCURRENT WITH AND DUE TO SPINAL STENOSIS OF CERVICAL REGION: ICD-10-CM

## 2025-03-24 DIAGNOSIS — F51.01 PRIMARY INSOMNIA: ICD-10-CM

## 2025-03-24 DIAGNOSIS — G99.2 MYELOPATHY CONCURRENT WITH AND DUE TO SPINAL STENOSIS OF CERVICAL REGION: ICD-10-CM

## 2025-03-24 DIAGNOSIS — I10 HYPERTENSION, ESSENTIAL: ICD-10-CM

## 2025-03-24 DIAGNOSIS — F32.A DEPRESSION, UNSPECIFIED DEPRESSION TYPE: ICD-10-CM

## 2025-03-24 DIAGNOSIS — I43 CARDIAC AMYLOIDOSIS: ICD-10-CM

## 2025-03-24 DIAGNOSIS — J18.9 COMMUNITY ACQUIRED PNEUMONIA OF RIGHT UPPER LOBE OF LUNG: ICD-10-CM

## 2025-03-24 DIAGNOSIS — N18.32 STAGE 3B CHRONIC KIDNEY DISEASE (MULTI): ICD-10-CM

## 2025-03-24 DIAGNOSIS — M48.062 LUMBAR STENOSIS WITH NEUROGENIC CLAUDICATION: ICD-10-CM

## 2025-03-24 DIAGNOSIS — D64.9 ANEMIA, UNSPECIFIED TYPE: ICD-10-CM

## 2025-03-24 PROBLEM — I73.9 PERIPHERAL VASCULAR DISEASE (CMS-HCC): Status: ACTIVE | Noted: 2018-07-19

## 2025-03-24 PROBLEM — M46.30: Status: ACTIVE | Noted: 2023-09-07

## 2025-03-24 PROBLEM — R92.8 ABNORMAL BREAST THERMOGRAPHY: Status: ACTIVE | Noted: 2023-03-12

## 2025-03-24 PROCEDURE — 3078F DIAST BP <80 MM HG: CPT | Performed by: FAMILY MEDICINE

## 2025-03-24 PROCEDURE — G2211 COMPLEX E/M VISIT ADD ON: HCPCS | Performed by: FAMILY MEDICINE

## 2025-03-24 PROCEDURE — 99215 OFFICE O/P EST HI 40 MIN: CPT | Performed by: FAMILY MEDICINE

## 2025-03-24 PROCEDURE — G0157 HHC PT ASSISTANT EA 15: HCPCS | Mod: CQ,HHH

## 2025-03-24 PROCEDURE — 3074F SYST BP LT 130 MM HG: CPT | Performed by: FAMILY MEDICINE

## 2025-03-24 RX ORDER — BLOOD SUGAR DIAGNOSTIC
STRIP MISCELLANEOUS
COMMUNITY
Start: 2025-03-13

## 2025-03-24 RX ORDER — PEN NEEDLE, DIABETIC 32GX 5/32"
NEEDLE, DISPOSABLE MISCELLANEOUS
COMMUNITY
Start: 2025-03-14

## 2025-03-24 RX ORDER — ALOGLIPTIN 12.5 MG/1
TABLET, FILM COATED ORAL
COMMUNITY
Start: 2025-03-17

## 2025-03-24 RX ORDER — LANCETS 30 GAUGE
EACH MISCELLANEOUS
COMMUNITY
Start: 2025-03-13

## 2025-03-24 RX ORDER — METOPROLOL SUCCINATE 25 MG/1
TABLET, EXTENDED RELEASE ORAL EVERY 24 HOURS
COMMUNITY
Start: 2024-02-13

## 2025-03-24 RX ORDER — TRAMADOL HYDROCHLORIDE 50 MG/1
TABLET ORAL EVERY 12 HOURS
COMMUNITY
Start: 2024-02-13

## 2025-03-24 RX ORDER — LANCETS 33 GAUGE
EACH MISCELLANEOUS
COMMUNITY
Start: 2025-03-14

## 2025-03-24 RX ORDER — TRAZODONE HYDROCHLORIDE 50 MG/1
50 TABLET ORAL NIGHTLY PRN
Qty: 30 TABLET | Refills: 0 | Status: SHIPPED | OUTPATIENT
Start: 2025-03-24

## 2025-03-24 ASSESSMENT — ENCOUNTER SYMPTOMS
LOSS OF SENSATION IN FEET: 1
OCCASIONAL FEELINGS OF UNSTEADINESS: 1
DEPRESSION: 0

## 2025-03-24 NOTE — PROGRESS NOTES
Pt comes in for fu from the hospital for flu and pneumonia. Pt was seen at [facility]. Today- still not walking and having right foot pain in the big toe. And left foot pain in her smaller toes. Pt was sent home on insulin and she has never been on insulin before.

## 2025-03-24 NOTE — PROGRESS NOTES
"Subjective   Patient ID: Elida Benson is a 80 y.o. female who presents for No chief complaint on file..  f  HPI fu cap of righ upper lobe, dementia , htn, afib with amyloid cardyomyopathy , chol, dm    Review of Systems   Constitutional:  Positive for fatigue.        Fu hospitalization for cap   Respiratory:  Positive for shortness of breath.         Exertioanlal only at this point   Cardiovascular:  Positive for leg swelling.        1 plus, back to baseline after hospitalization    Musculoskeletal:  Positive for arthralgias and back pain.        Pt non ambulatorydue to postlaminectomy syndrome   Skin:         Wound on her decubitus region area 2x1 cm of skin first detree dcubutis skin breakdown from sitting   Neurological:  Weakness: secondary to post laminectomy syndrom and cervicle meeyloopathy.   Psychiatric/Behavioral:          Memory issues       Objective   BP 93/67   Pulse 80   Ht 1.473 m (4' 10\")   Wt 63.5 kg (140 lb)   BMI 29.26 kg/m²     Physical Exam  Constitutional:       Comments: Sleepy but responsive   Cardiovascular:      Comments: Noraml lung sounds on exam   Musculoskeletal:      Comments: Bilat leg weakness secondary to laminectomy syndrom pt non ambulatory   Skin:     Comments: First degree skin breakdonw on buttocks on left side, wi   Neurological:      Comments: Weakness lower ext w non ambulation         Assessment/Plan   Problem List Items Addressed This Visit             ICD-10-CM    Anemia D64.9    Atrial fibrillation (Multi) I48.91    Relevant Medications    metoprolol succinate XL (Toprol XL) 25 mg 24 hr tablet    Community acquired pneumonia of right upper lobe of lung J18.9    COPD without exacerbation (Multi) J44.9    Depression F32.A    Hypertension, essential I10    Relevant Orders    Comprehensive Metabolic Panel    Infiltrative cardiomyopathy (Multi) I42.8    Relevant Medications    metoprolol succinate XL (Toprol XL) 25 mg 24 hr tablet    Lumbar stenosis with neurogenic " claudication M48.062    Relevant Orders    Referral to Palliative Care    Myelopathy concurrent with and due to spinal stenosis of cervical region M48.02, G99.2    Relevant Orders    Referral to Palliative Care    Postlaminectomy syndrome, lumbar M96.1    Stage 3b chronic kidney disease (Multi) N18.32    Type 2 diabetes mellitus without retinopathy (Multi) E11.9     Other Visit Diagnoses         Codes    SOB (shortness of breath) on exertion    -  Primary R06.02    Relevant Orders    XR chest 2 views    CBC and Auto Differential    Cardiac amyloidosis     E85.4, I43    Relevant Medications    metoprolol succinate XL (Toprol XL) 25 mg 24 hr tablet    Other Relevant Orders    Referral to Palliative Care        Dm levels elevated after hospitalization and penumonia will cont on curret regiment of meds and recheck this in 2 months  Cardiac amyloidosis pt seeing dr pate and started on Vyndamax as treatment for this   Afibb cont on amiodarone and eliquis as pt tolerating well   Dm cont jardiance  Ckd 3b cont jardiance and will follow w nephrology as well    Post laminectomy syndrom w leg weakness and sp cervicle meylopathy w arm weakness pt getting pt and ot w home health care and will continue this   Pt w stage 1 skin breakdown on buttock from sitting will involve home health wound care to clean and dress areas and monitor thios   Pt sees pain mgmt for inj and possible nerve ablation for lumbar failed laminectomy syndrom  Arthopathy of shoulders pt w severe advanced djd of gglenohumeral joint  Hypokalemia need recheck of potassium and mag done   Will get cxr  to determine resolution of cap  Will order palliative care to help w chronic pain syndrome  Ove 40 min in care and ordering for this patient  Also sent down stairs to podiatry for treatment of ingrown toenail

## 2025-03-25 ENCOUNTER — HOME CARE VISIT (OUTPATIENT)
Dept: HOME HEALTH SERVICES | Facility: HOME HEALTH | Age: 80
End: 2025-03-25
Payer: MEDICARE

## 2025-03-25 LAB
ALBUMIN SERPL-MCNC: 3.3 G/DL (ref 3.6–5.1)
ALP SERPL-CCNC: 70 U/L (ref 37–153)
ALT SERPL-CCNC: 26 U/L (ref 6–29)
ANION GAP SERPL CALCULATED.4IONS-SCNC: 11 MMOL/L (CALC) (ref 7–17)
AST SERPL-CCNC: 22 U/L (ref 10–35)
BASOPHILS # BLD AUTO: 31 CELLS/UL (ref 0–200)
BASOPHILS NFR BLD AUTO: 0.5 %
BILIRUB SERPL-MCNC: 0.5 MG/DL (ref 0.2–1.2)
BUN SERPL-MCNC: 24 MG/DL (ref 7–25)
CALCIUM SERPL-MCNC: 8.9 MG/DL (ref 8.6–10.4)
CHLORIDE SERPL-SCNC: 104 MMOL/L (ref 98–110)
CO2 SERPL-SCNC: 21 MMOL/L (ref 20–32)
CREAT SERPL-MCNC: 0.73 MG/DL (ref 0.6–1)
EGFRCR SERPLBLD CKD-EPI 2021: 84 ML/MIN/1.73M2
EOSINOPHIL # BLD AUTO: 81 CELLS/UL (ref 15–500)
EOSINOPHIL NFR BLD AUTO: 1.3 %
ERYTHROCYTE [DISTWIDTH] IN BLOOD BY AUTOMATED COUNT: 14.4 % (ref 11–15)
GLUCOSE SERPL-MCNC: 105 MG/DL (ref 65–99)
HCT VFR BLD AUTO: 44.7 % (ref 35–45)
HGB BLD-MCNC: 14.5 G/DL (ref 11.7–15.5)
LYMPHOCYTES # BLD AUTO: 1184 CELLS/UL (ref 850–3900)
LYMPHOCYTES NFR BLD AUTO: 19.1 %
MCH RBC QN AUTO: 30.5 PG (ref 27–33)
MCHC RBC AUTO-ENTMCNC: 32.4 G/DL (ref 32–36)
MCV RBC AUTO: 94.1 FL (ref 80–100)
MONOCYTES # BLD AUTO: 632 CELLS/UL (ref 200–950)
MONOCYTES NFR BLD AUTO: 10.2 %
NEUTROPHILS # BLD AUTO: 4272 CELLS/UL (ref 1500–7800)
NEUTROPHILS NFR BLD AUTO: 68.9 %
PLATELET # BLD AUTO: 258 THOUSAND/UL (ref 140–400)
PMV BLD REES-ECKER: 10 FL (ref 7.5–12.5)
POTASSIUM SERPL-SCNC: 4.3 MMOL/L (ref 3.5–5.3)
PROT SERPL-MCNC: 6 G/DL (ref 6.1–8.1)
RBC # BLD AUTO: 4.75 MILLION/UL (ref 3.8–5.1)
SODIUM SERPL-SCNC: 136 MMOL/L (ref 135–146)
WBC # BLD AUTO: 6.2 THOUSAND/UL (ref 3.8–10.8)

## 2025-03-25 PROCEDURE — G0152 HHCP-SERV OF OT,EA 15 MIN: HCPCS | Mod: HHH

## 2025-03-25 ASSESSMENT — ENCOUNTER SYMPTOMS
FATIGUE: 1
SHORTNESS OF BREATH: 1
ARTHRALGIAS: 1
BACK PAIN: 1

## 2025-03-26 ENCOUNTER — HOME CARE VISIT (OUTPATIENT)
Dept: HOME HEALTH SERVICES | Facility: HOME HEALTH | Age: 80
End: 2025-03-26
Payer: MEDICARE

## 2025-03-26 PROCEDURE — G0299 HHS/HOSPICE OF RN EA 15 MIN: HCPCS | Mod: HHH

## 2025-03-26 PROCEDURE — G0157 HHC PT ASSISTANT EA 15: HCPCS | Mod: CQ,HHH

## 2025-03-26 SDOH — ECONOMIC STABILITY: HOUSING INSECURITY: HOME SAFETY: HOME CAN BE CLUTTERED BUT PT HAS BEEN ABLE TO NEGOTIATE THIS IN PAST WHEN BETTER

## 2025-03-26 ASSESSMENT — ACTIVITIES OF DAILY LIVING (ADL)
FEEDING ASSESSED: 1
TOILETING: ONE PERSON
BATHING_CURRENT_FUNCTION: MAXIMUM ASSIST
GROOMING ASSESSED: 1
DRESSING_LB_CURRENT_FUNCTION: MAXIMUM ASSIST
TOILETING: 1
BATHING ASSESSED: 1
GROOMING_CURRENT_FUNCTION: MODERATE ASSIST

## 2025-03-26 ASSESSMENT — ENCOUNTER SYMPTOMS
PAIN LOCATION: LEFT ARM
PAIN LOCATION: LEFT LEG
PERSON REPORTING PAIN: PATIENT
PAIN LOCATION: RIGHT ARM
PAIN LOCATION: RIGHT LEG
PAIN: 1
PAIN LOCATION: ABDOMEN
HIGHEST PAIN SEVERITY IN PAST 24 HOURS: 7/10

## 2025-03-27 ENCOUNTER — HOME CARE VISIT (OUTPATIENT)
Dept: HOME HEALTH SERVICES | Facility: HOME HEALTH | Age: 80
End: 2025-03-27
Payer: MEDICARE

## 2025-03-27 ENCOUNTER — APPOINTMENT (OUTPATIENT)
Dept: PRIMARY CARE | Facility: CLINIC | Age: 80
End: 2025-03-27
Payer: MEDICARE

## 2025-03-28 ENCOUNTER — HOME CARE VISIT (OUTPATIENT)
Dept: HOME HEALTH SERVICES | Facility: HOME HEALTH | Age: 80
End: 2025-03-28
Payer: MEDICARE

## 2025-03-28 ENCOUNTER — APPOINTMENT (OUTPATIENT)
Dept: PHARMACY | Facility: HOSPITAL | Age: 80
End: 2025-03-28
Payer: MEDICARE

## 2025-03-29 ENCOUNTER — HOME CARE VISIT (OUTPATIENT)
Dept: HOME HEALTH SERVICES | Facility: HOME HEALTH | Age: 80
End: 2025-03-29
Payer: MEDICARE

## 2025-03-29 ENCOUNTER — APPOINTMENT (OUTPATIENT)
Dept: HOME HEALTH SERVICES | Facility: HOME HEALTH | Age: 80
End: 2025-03-29
Payer: MEDICARE

## 2025-03-29 VITALS
HEART RATE: 87 BPM | RESPIRATION RATE: 18 BRPM | TEMPERATURE: 98.5 F | SYSTOLIC BLOOD PRESSURE: 110 MMHG | OXYGEN SATURATION: 97 % | DIASTOLIC BLOOD PRESSURE: 70 MMHG

## 2025-03-29 SDOH — ECONOMIC STABILITY: FOOD INSECURITY: MEALS PER DAY: 0

## 2025-03-29 ASSESSMENT — ACTIVITIES OF DAILY LIVING (ADL)
AMBULATION ASSISTANCE: ONE PERSON
CURRENT_FUNCTION: ONE PERSON

## 2025-03-29 ASSESSMENT — ENCOUNTER SYMPTOMS
PAIN: 1
PAIN LOCATION - PAIN QUALITY: ACHY
PAIN LOCATION - PAIN FREQUENCY: INTERMITTENT
SUBJECTIVE PAIN PROGRESSION: UNCHANGED
APPETITE LEVEL: FAIR
CHANGE IN APPETITE: UNCHANGED
PAIN LOCATION - PAIN SEVERITY: 0/10
PAIN SEVERITY GOAL: 0/10
PAIN LOCATION: GENERALIZED
PERSON REPORTING PAIN: PATIENT
MUSCLE WEAKNESS: 1
LAST BOWEL MOVEMENT: 67289
LOWEST PAIN SEVERITY IN PAST 24 HOURS: 0/10
BOWEL INCONTINENCE: 1
HIGHEST PAIN SEVERITY IN PAST 24 HOURS: 0/10

## 2025-03-31 ENCOUNTER — HOME CARE VISIT (OUTPATIENT)
Dept: HOME HEALTH SERVICES | Facility: HOME HEALTH | Age: 80
End: 2025-03-31
Payer: MEDICARE

## 2025-03-31 PROCEDURE — G0299 HHS/HOSPICE OF RN EA 15 MIN: HCPCS | Mod: HHH

## 2025-04-01 ENCOUNTER — HOME CARE VISIT (OUTPATIENT)
Dept: HOME HEALTH SERVICES | Facility: HOME HEALTH | Age: 80
End: 2025-04-01
Payer: MEDICARE

## 2025-04-01 VITALS
OXYGEN SATURATION: 98 % | DIASTOLIC BLOOD PRESSURE: 60 MMHG | HEART RATE: 71 BPM | SYSTOLIC BLOOD PRESSURE: 89 MMHG | TEMPERATURE: 97.5 F | RESPIRATION RATE: 15 BRPM

## 2025-04-01 PROCEDURE — G0158 HHC OT ASSISTANT EA 15: HCPCS | Mod: CO,HHH

## 2025-04-02 ENCOUNTER — HOME CARE VISIT (OUTPATIENT)
Dept: HOME HEALTH SERVICES | Facility: HOME HEALTH | Age: 80
End: 2025-04-02
Payer: MEDICARE

## 2025-04-02 ENCOUNTER — SPECIALTY PHARMACY (OUTPATIENT)
Dept: PHARMACY | Facility: CLINIC | Age: 80
End: 2025-04-02

## 2025-04-02 PROCEDURE — G0157 HHC PT ASSISTANT EA 15: HCPCS | Mod: CQ,HHH

## 2025-04-02 PROCEDURE — G0156 HHCP-SVS OF AIDE,EA 15 MIN: HCPCS | Mod: HHH

## 2025-04-03 ENCOUNTER — TELEPHONE (OUTPATIENT)
Dept: PRIMARY CARE | Facility: CLINIC | Age: 80
End: 2025-04-03
Payer: MEDICARE

## 2025-04-03 ENCOUNTER — HOME CARE VISIT (OUTPATIENT)
Dept: HOME HEALTH SERVICES | Facility: HOME HEALTH | Age: 80
End: 2025-04-03
Payer: MEDICARE

## 2025-04-03 VITALS
HEART RATE: 72 BPM | RESPIRATION RATE: 16 BRPM | OXYGEN SATURATION: 98 % | DIASTOLIC BLOOD PRESSURE: 67 MMHG | TEMPERATURE: 97.2 F | SYSTOLIC BLOOD PRESSURE: 116 MMHG

## 2025-04-03 PROCEDURE — G0158 HHC OT ASSISTANT EA 15: HCPCS | Mod: CO,HHH

## 2025-04-03 NOTE — TELEPHONE ENCOUNTER
Calling about a [employer] scooter.    The company is needing info.   Family would like to discuss with you.

## 2025-04-04 ENCOUNTER — APPOINTMENT (OUTPATIENT)
Dept: INFECTIOUS DISEASES | Facility: CLINIC | Age: 80
End: 2025-04-04
Payer: MEDICARE

## 2025-04-04 ENCOUNTER — HOME CARE VISIT (OUTPATIENT)
Dept: HOME HEALTH SERVICES | Facility: HOME HEALTH | Age: 80
End: 2025-04-04
Payer: MEDICARE

## 2025-04-04 VITALS
DIASTOLIC BLOOD PRESSURE: 60 MMHG | SYSTOLIC BLOOD PRESSURE: 111 MMHG | OXYGEN SATURATION: 97 % | HEART RATE: 80 BPM | RESPIRATION RATE: 18 BRPM | TEMPERATURE: 97.6 F

## 2025-04-04 DIAGNOSIS — A49.8 PROTEUS INFECTION: ICD-10-CM

## 2025-04-04 DIAGNOSIS — T84.7XXA HARDWARE COMPLICATING WOUND INFECTION, INITIAL ENCOUNTER: Primary | ICD-10-CM

## 2025-04-04 PROCEDURE — 1159F MED LIST DOCD IN RCRD: CPT | Performed by: INTERNAL MEDICINE

## 2025-04-04 PROCEDURE — 99213 OFFICE O/P EST LOW 20 MIN: CPT | Performed by: INTERNAL MEDICINE

## 2025-04-04 RX ORDER — CEPHALEXIN 500 MG/1
500 CAPSULE ORAL 2 TIMES DAILY
Qty: 60 CAPSULE | Refills: 6 | Status: SHIPPED | OUTPATIENT
Start: 2025-04-04 | End: 2025-05-04

## 2025-04-04 SDOH — ECONOMIC STABILITY: FOOD INSECURITY: MEALS PER DAY: 3

## 2025-04-04 SDOH — ECONOMIC STABILITY: GENERAL

## 2025-04-04 ASSESSMENT — ENCOUNTER SYMPTOMS
SUBJECTIVE PAIN PROGRESSION: UNCHANGED
HIGHEST PAIN SEVERITY IN PAST 24 HOURS: 0/10
PAIN LOCATION - RELIEVING FACTORS: REST
CHANGE IN APPETITE: UNCHANGED
PAIN LOCATION - PAIN FREQUENCY: INTERMITTENT
PAIN LOCATION - PAIN SEVERITY: 4/10
PAIN LOCATION: GENERALIZED
APPETITE LEVEL: FAIR
PAIN: 1
PAIN SEVERITY GOAL: 1/10
MUSCLE WEAKNESS: 1
PAIN LOCATION: BACK
PAIN LOCATION - PAIN QUALITY: ACHING, THROBBING
LOWEST PAIN SEVERITY IN PAST 24 HOURS: 2/10
LAST BOWEL MOVEMENT: 67294
HIGHEST PAIN SEVERITY IN PAST 24 HOURS: 4/10
PAIN: 1
SUBJECTIVE PAIN PROGRESSION: GRADUALLY IMPROVING
PAIN LOCATION - PAIN SEVERITY: 0/10
PERSON REPORTING PAIN: PATIENT
PAIN LOCATION - PAIN QUALITY: ACHY
PAIN LOCATION - PAIN FREQUENCY: INTERMITTENT
PAIN LOCATION: LEFT SHOULDER
LOWEST PAIN SEVERITY IN PAST 24 HOURS: 0/10
PERSON REPORTING PAIN: PATIENT
BOWEL INCONTINENCE: 1
PAIN LOCATION - PAIN SEVERITY: 3/10
PAIN LOCATION - EXACERBATING FACTORS: MOVING
PAIN SEVERITY GOAL: 0/10

## 2025-04-04 ASSESSMENT — ACTIVITIES OF DAILY LIVING (ADL)
CURRENT_FUNCTION: ONE PERSON
MONEY MANAGEMENT (EXPENSES/BILLS): TOTALLY DEPENDENT
AMBULATION ASSISTANCE: ONE PERSON

## 2025-04-04 NOTE — PROGRESS NOTES
Infectious Diseases Clinic Follow-up:    Reason for Visit: Scheduled follow-up    History of Current Issue  Virtual or Telephone Consent  An interactive audio and video telecommunication system which permits real time communications between the patient (at the originating site) and provider (at the distant site) was utilized to provide this telehealth service.   Verbal consent was requested and obtained from Elida Benson on this date, 04/04/25 for a telehealth visit and the patient's location was confirmed at the time of the visit.      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 Patient underwent I&D but pedicle screws and hardware were retained. Patient also has some bone graft.   Patient followed for chronic antibiotic suppression for previous spinal hardware associated infection.   Primary pathogen in the past had included Proteus     Post lumbar laminectomy pain syndrome       Stimulator placed 9/16/2021 11/30/2022 COVID-19 infection    TODAY 4/4/25, I had a phone and video telemedicine evaluation with the patient and her daughter Kaelyn who assisted with video link and phone call.         Overall patient has been doing well from an infectious disease standpoint.  Has had no signs of recrudescent Proteus infection of the previous lumbar spine surgical site.  Patient continues on chronic suppressive therapy.  Previously discussed duration of therapy which is not well-defined but had previously recommended lifelong secondary suppression.  Previously consider reducing to once daily cephalexin though suppressive dosing regimens are unclear.  At this time discussed with patient and her daughter and together decided to continue cephalexin indefinitely at this time.       Today the patient  "denies fever, chills, wound dehiscence, wound drainage, rash, nausea, vomiting, and diarrhea.  Patient has had no vaginal yeast infections on chronic antibiotic therapy.  Patient follows up with her other providers and is planning probable pain management intervention early next week.       The patient and her daughter had no additional questions for me after our general evaluation..  Patient has been doing well now nearly 6 years after her original surgery and postoperative spinal hardware associated infection.     PAST SURGICAL HISTORY:  Past Surgical History:   Procedure Laterality Date    CARPAL TUNNEL RELEASE  08/27/2013    Neuroplasty Decompression Median Nerve At Carpal Tunnel    ILEOSTOMY      KNEE ARTHROPLASTY  08/27/2013    Knee Arthroplasty    LAMINECTOMY      LUMBAR FUSION  2023    OTHER SURGICAL HISTORY  08/16/2023    LAPAROSCPIC ANTERIOR RESECTION, DIVERTING LOOP ILEOSTOMY    SPINAL CORD STIMULATOR IMPLANT  2022    TOTAL HIP ARTHROPLASTY  08/27/2013    Total Hip Replacement       ALLERGIES:    Allergies   Allergen Reactions    Shellfish Containing Products Swelling     MEDICATIONS:      Current Outpatient Medications:     acetaminophen (Tylenol) 325 mg tablet, Give 2 tablet by mouth every 4 hours as needed for Pain, Disp: , Rfl:     alogliptin (Nesina) 12.5 mg tablet, TAKE ONE TABLET BY MOUTH ONCE A DAY FOR DIABETES MELLITUS, Disp: , Rfl:     amiodarone (Pacerone) 200 mg tablet, TAKE ONE TABLET BY MOUTH IN THE MORNING FOR ANTIARRHYTHMIC, Disp: 60 tablet, Rfl: 0    apixaban (Eliquis) 5 mg tablet, Take 1 tablet (5 mg) by mouth 2 times a day., Disp: 180 tablet, Rfl: 3    b complex 0.4 mg tablet, Take 1 tablet by mouth once daily., Disp: , Rfl:     baclofen (Lioresal) 5 mg tablet, TAKE ONE TABLET BY MOUTH TWO TIMES A DAY, Disp: 60 tablet, Rfl: 2    BD Ailyn 2nd Gen Pen Needle 32 gauge x 5/32\" needle, USE 4 TIMES A DAY, Disp: , Rfl:     benzonatate (Tessalon) 100 mg capsule, TAKE 1 CAPSULE BY MOUTH 3 TIMES A " DAY AS NEEDED FOR COUGH. DO NOT CRUSH OR CHEW. (Patient taking differently: Take 1 capsule (100 mg) by mouth 3 times a day as needed for cough.), Disp: 42 capsule, Rfl: 0    biotin 1 mg tablet, 1 tab(s) orally once a day, Disp: , Rfl:     cholecalciferol (Vitamin D-3) 1,250 mcg (50,000 unit) capsule, Take 1 capsule (50,000 Units) by mouth 1 (one) time per week. Sunday, Disp: , Rfl:     dapagliflozin propanediol (Farxiga) 10 mg, Take 1 tablet (10 mg) by mouth early in the morning.., Disp: , Rfl:     docusate sodium (Colace) 100 mg capsule, Take 1 capsule (100 mg) by mouth 2 times a day as needed for constipation., Disp: , Rfl:     donepezil (Aricept) 5 mg tablet, TAKE ONE TABLET BY MOUTH AT BEDTIME, Disp: 90 tablet, Rfl: 0    DULoxetine (Cymbalta) 30 mg DR capsule, TAKE ONE CAPSULE BY MOUTH TWO TIMES A DAY, Disp: 180 capsule, Rfl: 0    empagliflozin (Jardiance) 25 mg, Take 1 tablet (25 mg) by mouth once daily., Disp: 90 tablet, Rfl: 3    furosemide (Lasix) 20 mg tablet, Take 1 tablet (20 mg) by mouth once daily., Disp: 90 tablet, Rfl: 3    gabapentin (Neurontin) 600 mg tablet, TAKE 1 TABLET BY MOUTH 3 TIMES DAILY., Disp: 90 tablet, Rfl: 0    insulin glargine (Lantus Solostar U-100 Insulin) 100 unit/mL (3 mL) pen, Inject 3 Units under the skin once daily in the morning., Disp: , Rfl:     insulin lispro (HumaLOG) 100 unit/mL pen, Inject 1 Units under the skin 4 times a day. inject subcutaneiously before meals and at bedtime for hyperglycemia per sliding scale 151-200 1 unit 201-250 2units 251-300 3 units 350 4 units 351-400 5units notifiy Md if if greater than 400, notify md if lower than 40., Disp: , Rfl:     insulin lispro (HumaLOG) 100 unit/mL pen, 3 Units 3 times daily (morning, midday, late afternoon). inject  3 units before meals., Disp: , Rfl:     losartan (Cozaar) 25 mg tablet, Take 1 tablet (25 mg) by mouth once daily., Disp: 30 tablet, Rfl: 11    melatonin 3 mg tablet, Take 2 tablets (6 mg) by mouth as needed  at bedtime for sleep., Disp: , Rfl:     metoprolol succinate XL (Toprol XL) 25 mg 24 hr tablet, Take by mouth once every 24 hours., Disp: , Rfl:     naloxone (Narcan) 4 mg/0.1 mL nasal spray, Administer 1 spray (4 mg) into affected nostril(s) if needed for opioid reversal. May repeat every 2-3 minutes if needed, alternating nostrils, until medical assistance becomes available., Disp: 2 each, Rfl: 0    ondansetron ODT (Zofran-ODT) 4 mg disintegrating tablet, Take 1 tablet (4 mg) by mouth every 8 hours if needed for nausea or vomiting., Disp: 20 tablet, Rfl: 0    OneTouch Delica Plus Lancet 33 gauge misc, USE AS DIRECTED THREE TIMES A DAY, Disp: , Rfl:     OneTouch Ultra Test strip, USE AS DIRECTED TO TEST 3 TIMES DAILY, Disp: , Rfl:     OneTouch Ultra2 Meter misc, use as directed, Disp: , Rfl:     SITagliptin phosphate (Januvia) 50 mg tablet, Take 1 tablet (50 mg) by mouth once daily., Disp: 90 tablet, Rfl: 1    spironolactone (Aldactone) 25 mg tablet, Take 1 tablet (25 mg) by mouth once daily., Disp: 90 tablet, Rfl: 3    tafamidis (Vyndamax) 61 mg capsule, Take 1 capsule (61 mg) by mouth once daily., Disp: 30 capsule, Rfl: 10    topiramate (Topamax) 100 mg tablet, Take 1 tablet (100 mg) by mouth 2 times a day., Disp: 60 tablet, Rfl: 10    traMADol (Ultram) 50 mg tablet, Take by mouth every 12 hours., Disp: , Rfl:     traZODone (Desyrel) 50 mg tablet, Take 1 tablet (50 mg) by mouth as needed at bedtime for sleep., Disp: 30 tablet, Rfl: 0    atorvastatin (Lipitor) 40 mg tablet, Take 1 tablet (40 mg) by mouth once daily at bedtime., Disp: 90 tablet, Rfl: 1    fluticasone (Flonase) 50 mcg/actuation nasal spray, Administer 2 sprays into each nostril once daily. Shake gently. Before first use, prime pump. After use, clean tip and replace cap. (Patient taking differently: Administer 2 sprays into each nostril once daily as needed for allergies. Shake gently. Before first use, prime pump. After use, clean tip and replace  cap.), Disp: 16 g, Rfl: 3        PHYSICAL EXAMINATION: Telemedicine video and audio connection.  No acute visible issues with the patient.       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 Patient underwent I&D but pedicle screws and hardware were retained. Patient also has some bone graft.   Patient followed for chronic antibiotic suppression for previous spinal   hardware associated infection.   Primary pathogen in the past had included Proteus     Post lumbar laminectomy pain syndrome       Stimulator placed 9/16/2021 11/30/2022 COVID-19 infection     Overall patient has been doing well from an infectious disease standpoint.  Has had no signs of recrudescent Proteus infection of the previous lumbar spine surgical site.  Patient continues on chronic suppressive therapy.  Previously discussed duration of therapy which is not well-defined but had previously recommended lifelong secondary suppression.  Plan to continue twice daily cephalexin for long-term suppression.  Patient is tolerating antibiotics.    3/24/2025 laboratory testing showed:   WBC 6.2, hemoglobin 14.5, serum creatinine 0.73     PLAN:  1. Continue oral cephalexin 500 mg p.o. twice daily, duration indefinite.  New prescription sent electronically.  2. Continue to co-monitor laboratory studies  3.  ID office will contact patient to schedule 6-month follow-up appointment  4.  Will co-monitor laboratory studies  5.  Follow-up with PCP, cardiology, heart failure, and pain management      I spent 25 minutes in the professional and overall care of this patient.      Sebastian Lo MD

## 2025-04-05 ASSESSMENT — ENCOUNTER SYMPTOMS
PAIN LOCATION - RELIEVING FACTORS: REST
LOWEST PAIN SEVERITY IN PAST 24 HOURS: 2/10
PAIN LOCATION - EXACERBATING FACTORS: MOVING
PERSON REPORTING PAIN: PATIENT
PAIN LOCATION - PAIN SEVERITY: 1/10
PAIN LOCATION - PAIN SEVERITY: 4/10
PAIN LOCATION: LEFT KNEE
PAIN LOCATION - PAIN SEVERITY: 2/10
PAIN LOCATION: LEFT SHOULDER
PAIN LOCATION - PAIN SEVERITY: 1/10
PAIN SEVERITY GOAL: 1/10
PAIN LOCATION - PAIN QUALITY: ACHING
PAIN LOCATION: RIGHT KNEE
HIGHEST PAIN SEVERITY IN PAST 24 HOURS: 4/10
PAIN LOCATION: BACK
PAIN LOCATION - PAIN FREQUENCY: INTERMITTENT
SUBJECTIVE PAIN PROGRESSION: UNCHANGED
PAIN: 1

## 2025-04-07 ENCOUNTER — HOSPITAL ENCOUNTER (OUTPATIENT)
Facility: HOSPITAL | Age: 80
Discharge: HOME | End: 2025-04-07
Payer: MEDICARE

## 2025-04-07 VITALS
RESPIRATION RATE: 18 BRPM | WEIGHT: 144 LBS | TEMPERATURE: 97.5 F | SYSTOLIC BLOOD PRESSURE: 128 MMHG | OXYGEN SATURATION: 96 % | DIASTOLIC BLOOD PRESSURE: 85 MMHG | HEART RATE: 99 BPM | BODY MASS INDEX: 29.03 KG/M2 | HEIGHT: 59 IN

## 2025-04-07 DIAGNOSIS — M54.12 CERVICAL RADICULOPATHY: ICD-10-CM

## 2025-04-07 PROCEDURE — 2500000004 HC RX 250 GENERAL PHARMACY W/ HCPCS (ALT 636 FOR OP/ED): Mod: JZ | Performed by: ANESTHESIOLOGY

## 2025-04-07 PROCEDURE — 2550000001 HC RX 255 CONTRASTS: Performed by: ANESTHESIOLOGY

## 2025-04-07 PROCEDURE — 62321 NJX INTERLAMINAR CRV/THRC: CPT | Performed by: ANESTHESIOLOGY

## 2025-04-07 RX ORDER — MIDAZOLAM HYDROCHLORIDE 1 MG/ML
INJECTION, SOLUTION INTRAMUSCULAR; INTRAVENOUS
Status: COMPLETED | OUTPATIENT
Start: 2025-04-07 | End: 2025-04-07

## 2025-04-07 RX ORDER — LIDOCAINE HYDROCHLORIDE 5 MG/ML
INJECTION, SOLUTION INFILTRATION; INTRAVENOUS
Status: COMPLETED | OUTPATIENT
Start: 2025-04-07 | End: 2025-04-07

## 2025-04-07 RX ORDER — METHYLPREDNISOLONE ACETATE 40 MG/ML
INJECTION, SUSPENSION INTRA-ARTICULAR; INTRALESIONAL; INTRAMUSCULAR; SOFT TISSUE
Status: COMPLETED | OUTPATIENT
Start: 2025-04-07 | End: 2025-04-07

## 2025-04-07 RX ADMIN — IOHEXOL 1 ML: 240 INJECTION, SOLUTION INTRATHECAL; INTRAVASCULAR; INTRAVENOUS; ORAL at 15:17

## 2025-04-07 RX ADMIN — LIDOCAINE HYDROCHLORIDE 5 ML: 5 INJECTION, SOLUTION INFILTRATION at 15:17

## 2025-04-07 RX ADMIN — METHYLPREDNISOLONE ACETATE 40 MG: 40 INJECTION, SUSPENSION INTRA-ARTICULAR; INTRALESIONAL; INTRAMUSCULAR; SOFT TISSUE at 15:17

## 2025-04-07 RX ADMIN — MIDAZOLAM 2 MG: 1 INJECTION INTRAMUSCULAR; INTRAVENOUS at 15:10

## 2025-04-07 ASSESSMENT — PAIN - FUNCTIONAL ASSESSMENT
PAIN_FUNCTIONAL_ASSESSMENT: 0-10
PAIN_FUNCTIONAL_ASSESSMENT: 0-10
PAIN_FUNCTIONAL_ASSESSMENT: WONG-BAKER FACES
PAIN_FUNCTIONAL_ASSESSMENT: 0-10
PAIN_FUNCTIONAL_ASSESSMENT: WONG-BAKER FACES

## 2025-04-07 ASSESSMENT — PAIN SCALES - GENERAL
PAINLEVEL_OUTOF10: 7
PAINLEVEL_OUTOF10: 7
PAINLEVEL_OUTOF10: 0 - NO PAIN
PAINLEVEL_OUTOF10: 0 - NO PAIN
PAINLEVEL_OUTOF10: 7

## 2025-04-07 ASSESSMENT — COLUMBIA-SUICIDE SEVERITY RATING SCALE - C-SSRS
1. IN THE PAST MONTH, HAVE YOU WISHED YOU WERE DEAD OR WISHED YOU COULD GO TO SLEEP AND NOT WAKE UP?: NO
6. HAVE YOU EVER DONE ANYTHING, STARTED TO DO ANYTHING, OR PREPARED TO DO ANYTHING TO END YOUR LIFE?: NO
2. HAVE YOU ACTUALLY HAD ANY THOUGHTS OF KILLING YOURSELF?: NO

## 2025-04-07 NOTE — TELEPHONE ENCOUNTER
Sp w/ pt. Daughter. Will call insurance company and find out who she will go through and have them fax the paperwork.

## 2025-04-07 NOTE — DISCHARGE INSTRUCTIONS
DISCHARGE INSTRUCTIONS FOR INJECTIONS     You underwent Cervical interlaminar epidural steroid injection today    After most injections, it is recommended that you relax and limit your activity for the remainder of the day unless you have been told otherwise by your pain physician.  You should not drive a car, operate machinery, or make important legal decisions unless otherwise directed by your pain physician.  You may resume your normal activity, including exercise, tomorrow.      Keep a written pain diary of how much pain relief you experienced following the injection procedure and the length of time of pain relief you experienced pain relief. Following diagnostic injections like medial branch nerve blocks, sacroiliac joint blocks, stellate ganglion injections and other blocks, it is very important you record the specific amount of pain relief you experienced immediately after the injectionand how long it lasted. Your doctor will ask you for this information at your follow up visit.     For all injections, please keep the injection site dry and inspect the site for a couple of days. You may remove the Band-Aid the day of the injection at any time.     Some discomfort, bruising or slight swelling may occur at the injection site. This is not abnormal if it occurs.  If needed you may:    -Take over the counter medication such as Tylenol or Motrin.   -Apply an ice pack for 30 minutes, 2 to 3 times a day for the first 24 hours.     You may shower today; no soaking baths, hot tubs, whirlpools or swimming pools for two days.      If you are given steroids in your injection, it may take 3-5 days for the steroid medication to take effect. You may notice a worsening of your symptoms for 1-2 days after the injection. This is not abnormal.  You may use acetaminophen, ibuprofen, or prescription medication that your doctor may have prescribed for you if you need to do so.     A few common side effects of steroids include  facial flushing, sweating, restlessness, irritability,difficulty sleeping, increase in blood sugar, and increased blood pressure. If you have diabetes, please monitor your blood sugar at least once a day for at least 5 days. If you have poorly controlled high blood pressure, monitoryour blood pressure for at least 2 days and contact your primary care physician if these numbers are unusually high for you.      If you take aspirin or non-steroidal anti-inflammatory drugs (examples are Motrin, Advil, ibuprofen, Naprosyn, Voltaren, Relafen, etc.) you may restart these this evening, but stop taking it 3 days before your next appointment, unless instructed otherwiseby your physician.      You do not need to discontinue non-aspirin-containing pain medications prior to an injection (examples: Celebrex, tramadol, hydrocodone and acetaminophen).      If you take a blood thinning medication (Coumadin, Lovenox, Fragmin,Ticlid, Plavix, Pradaxa, etc.), please discuss this with your primary care physician/cardiologist and your pain physician. These medications MUST be discontinued before you can have an injection safely, without the risk of uncontrolled bleeding. If these medications are not discontinued for an appropriate period of time, you will not be able to receivean injection. Please adhere to instructions given to you about when to restart your blood thinning medication. If you have any questions please reach out to our team.    If you are taking Coumadin, please have your INR checked the morning of your procedure and bring the result to your appointment unless otherwise instructed. If your INR is over 1.2, your injection may need to be rescheduled to avoid uncontrolled bleeding from the needle placement.     Call [facility]  and ask for Pain Management at [phone] between 8am-4pm Monday - Friday if you are experiencing the following:    If you received an epidural or spinal injection:    -Headache that doesnot go away  with medicine, is worse when sitting or standing up, and is greatly relieved upon lying down.   -Severe pain worse than or different than your baseline pain.   -Chills or fever (101º F or greater).   -Drainage or signs of infection at the injection site     Go directly to the Emergency Department if you are experiencing the following and received an epidural or spinal injection:   -Abrupt weakness or progressive weakness in your legs that starts after you leave the clinic.   -Abrupt severe or worsening numbness in your legs.   -Inability to urinate after the injection or loss of bowel or bladder control without the urge to defecate or urinate.     If you have a clinical question that cannot wait until your next appointment, please call [phone] between 8am-4pm Monday - Friday or send a [employer] message. We do our best to return all non-emergency messages within 24 hours, Monday - Friday. A nurse or physician will return your message. You may also the appropriate nurse below, and they will do their best to answer your questions.  - For Dr. Palafox, call Elizabeth at [phone]  - For Dr. Wolfe, call Ellen at [phone]  - For Dr. Ernst, call Ellen at [phone]  - For Dr. Hernandez, call Summer at [phone]  - For Dr. Mejia, call Summer at [phone]    If you need to cancel an appointment, please call the scheduling staff at [phone] during normal business hours or leave a message at least 24 hours in advance.     If you are going to be sedated for your next procedure, you MUST have responsible adult who can legally drive accompany you home. You cannot eat or drink for at least eight hours prior to the planned procedure if you are going to receive sedation. You may take your non-blood thinning medications with a small sip of water.

## 2025-04-07 NOTE — H&P
HISTORY AND PHYSICAL    History Of Present Illness  Elida Benson is a 80 y.o. female presenting with chronic pain here for procedure as stated below. Patient denies any changes to health since the last visit to our clinic.    Past Medical History  Past Medical History:   Diagnosis Date    A-fib (Multi)     Managed on meds, Eliquis stoppage and cardiac clearance in [facility] from Dr. Ignacio    Cervical myelopathy     Chronic pain syndrome     COPD (chronic obstructive pulmonary disease) (Multi)     denies, she is a former smoker but quit in 1984    Dementia     Depression     Diabetic neuropathy (Multi)     Diverticulitis     DM (diabetes mellitus) (Multi)     denies but A1C= 7.2% on 9/5/23    LORENZ (dyspnea on exertion)     Stable per cards note    HLD (hyperlipidemia)     Hypertension, essential     Infiltrative cardiomyopathy (Multi)     Echo 8/22/23, following with Dr. Ignacio    OA (ocular albinism) (Multi)     multiple sites    Other malaise 04/21/2022    Physical deconditioning    Palpitations     on occasion, has afib followed by cardiology    Post laminectomy syndrome     Rectovaginal fistula     Spinal stenosis of cervical region     UTI (urinary tract infection)        Surgical History  Past Surgical History:   Procedure Laterality Date    CARPAL TUNNEL RELEASE  08/27/2013    Neuroplasty Decompression Median Nerve At Carpal Tunnel    ILEOSTOMY      KNEE ARTHROPLASTY  08/27/2013    Knee Arthroplasty    LAMINECTOMY      LUMBAR FUSION  2023    OTHER SURGICAL HISTORY  08/16/2023    LAPAROSCPIC ANTERIOR RESECTION, DIVERTING LOOP ILEOSTOMY    SPINAL CORD STIMULATOR IMPLANT  2022    TOTAL HIP ARTHROPLASTY  08/27/2013    Total Hip Replacement        Social History  She reports that she quit smoking about 41 years ago. Her smoking use included cigarettes. She has never used smokeless tobacco. She reports current alcohol use. She reports that she does not use drugs.    Family History  Family History   Problem  "Relation Name Age of Onset    No Known Problems Mother      No Known Problems Father      No Known Problems Sister      No Known Problems Sister      No Known Problems Sister      No Known Problems Sister          Allergies  Shellfish containing products    Review of Systems  12 point ROS done and negative except for the above.     Physical Exam  General: NAD, well groomed, well nourished  Eyes: Non-icteric sclera, EOMI  Ears, Nose, Mouth, and Throat: External ears and nose appear to be without deformity or rash. No lesions or masses noted. Hearing is grossly intact.   Neck: Trachea midline  Respiratory: Nonlabored breathing   Cardiovascular: No peripheral edema   Skin: No rashes or open lesions/ulcers identified on skin.      Last Recorded Vitals  There were no vitals taken for this visit.    Relevant Results  Current Outpatient Medications   Medication Instructions    acetaminophen (Tylenol) 325 mg tablet Give 2 tablet by mouth every 4 hours as needed for Pain    alogliptin (Nesina) 12.5 mg tablet TAKE ONE TABLET BY MOUTH ONCE A DAY FOR DIABETES MELLITUS    amiodarone (Pacerone) 200 mg tablet TAKE ONE TABLET BY MOUTH IN THE MORNING FOR ANTIARRHYTHMIC    atorvastatin (LIPITOR) 40 mg, oral, Nightly    b complex 0.4 mg tablet 1 tablet, Daily    baclofen (LIORESAL) 5 mg, oral, 2 times daily    BD Ailyn 2nd Gen Pen Needle 32 gauge x 5/32\" needle USE 4 TIMES A DAY    benzonatate (Tessalon) 100 mg capsule TAKE 1 CAPSULE BY MOUTH 3 TIMES A DAY AS NEEDED FOR COUGH. DO NOT CRUSH OR CHEW.    biotin 1 mg tablet 1 tab(s) orally once a day    cephalexin (KEFLEX) 500 mg, oral, 2 times daily    cholecalciferol (Vitamin D-3) 1,250 mcg (50,000 unit) capsule Take 1 capsule (50,000 Units) by mouth 1 (one) time per week. Sunday    dapagliflozin propanediol (FARXIGA) 10 mg, Daily    docusate sodium (Colace) 100 mg capsule 1 capsule, 2 times daily PRN    donepezil (ARICEPT) 5 mg, oral, Nightly    DULoxetine (CYMBALTA) 30 mg, oral, 2 " times daily    Eliquis 5 mg, oral, 2 times daily    fluticasone (Flonase) 50 mcg/actuation nasal spray 2 sprays, Each Nostril, Daily, Shake gently. Before first use, prime pump. After use, clean tip and replace cap.    furosemide (LASIX) 20 mg, oral, Daily    gabapentin (NEURONTIN) 600 mg, oral, 3 times daily    insulin lispro (HUMALOG) 1 Units, 4 times daily    insulin lispro (HUMALOG) 3 Units, 3 times daily (morning, midday, late afternoon)    Jardiance 25 mg, oral, Daily    Lantus Solostar U-100 Insulin 3 Units, Every morning    losartan (COZAAR) 25 mg, oral, Daily    melatonin 3 mg tablet Take 2 tablets (6 mg) by mouth as needed at bedtime for sleep.    metoprolol succinate XL (Toprol XL) 25 mg 24 hr tablet Every 24 hours    naloxone (NARCAN) 4 mg, nasal, As needed, May repeat every 2-3 minutes if needed, alternating nostrils, until medical assistance becomes available.    ondansetron ODT (ZOFRAN-ODT) 4 mg, oral, Every 8 hours PRN    OneTouch Delica Plus Lancet 33 gauge misc USE AS DIRECTED THREE TIMES A DAY    OneTouch Ultra Test strip USE AS DIRECTED TO TEST 3 TIMES DAILY    OneTouch Ultra2 Meter misc use as directed    SITagliptin phosphate (JANUVIA) 50 mg, oral, Daily    spironolactone (ALDACTONE) 25 mg, oral, Daily    topiramate (TOPAMAX) 100 mg, oral, 2 times daily    traMADol (Ultram) 50 mg tablet Every 12 hours    traZODone (DESYREL) 50 mg, oral, Nightly PRN    Vyndamax 61 mg, oral, Daily         MR lumbar spine wo IV contrast 02/05/2025    Narrative  Interpreted By:  Leonardo Mathias,  STUDY:  MR LUMBAR SPINE WO IV CONTRAST;  2/5/2025 1:27 pm    INDICATION:  Signs/Symptoms:back pain.    COMPARISON:  08/22/2022    ACCESSION NUMBER(S):  [id number]    ORDERING CLINICIAN:  JOVANNY PAVON    TECHNIQUE:  Sagittal STIR, sagittal T2, sagittal T1, axial T2, axial T1 weighted  MRI images of the lumbar spine were obtained without intravenous  contrast administration.    FINDINGS:  Postoperative changes are  identified compatible with a previous  posterior laminectomies at the L3 through S1 levels as well as a  discectomy at the L5/S1 level. There is again evidence of metallic  artifact from posterior-lateral orthopedic fixation hardware and  pedicle screws extending from the L2 through L5 levels.    There is a similar fluid collection noted along the laminectomy bed  which may represent a postoperative seroma. There is again evidence  of more infiltrative signal abnormality within the surrounding  posterior paraspinal soft tissues likely postoperative in etiology.    There is metallic artifact from spinal stimulator leads extending of  the posterior spinal canal at the T11/12 level with cranial extension  to the obtained field of view at the T7 level.    There is again evidence of approximate 11 mm of anterolisthesis of L4  on L5.    There are degenerative signal changes noted along endplates  throughout the lumbar and visualized lower thoracic region. There is  bony ankylosis of the T12 and L1 as well as the T10 and T11 vertebrae.    Metallic artifact from the spinal stimulator device limits evaluation  for underlying spinal cord signal abnormality. The conus medullaris  is normally positioned terminating at the  L1/2 level.    There is loss of disc height throughout the lumbar and visualized  lower thoracic region compatible with degenerative disc disease.    At the L5/S1 level,  postoperative changes are again identified  compatible with a previous posterior laminectomy as well as  discectomy. There is posterior osteophytic spurring along with  hypertrophic degenerative facet changes. There is intermediate signal  on the T1 weighted images in an epidural location surrounding the  thecal sac within the spinal canal suggestive of granulation  tissue/scar. There is mild overall narrowing of the thecal sac within  the spinal canal. Metallic artifact from the pedicle screws limits  evaluation of the neural foramen.  Within limitations of the study,  there is suspected severe right and moderate to severe left-sided  neural foraminal narrowing    At the L4/L5 level,  there are postoperative changes compatible with  a previous posterior laminectomy. There is again evidence of 11 mm of  anterolisthesis of L4 on L5. There is mild narrowing of the thecal  sac in the AP dimension within the spinal canal. Metallic artifact  from the pedicle screws limits evaluation of the neural foramen.  Within limitations of the study, there is suspected severe bilateral  neural foraminal narrowing.    At the L3/L4 level,  there are postoperative changes compatible with  a previous posterior laminectomy. There is posterior osteophytic  spurring and posterior disc bulge contributing to mild encroachment  upon the ventral spinal canal. Metallic artifact from the pedicle  screws limits evaluation of the neural foramen.    At the L2/L3 level,  there is posterior osteophytic spurring and  posterior disc bulge along with degenerative facet changes  contributing to mild-to-moderate narrowing of the thecal sac within  the spinal canal. Metallic artifact from the pedicle screws limits  evaluation of the neural foramen at this level.    At the L1/L2 level,  there is a minimal posterior disc bulge and mild  degenerative facet changes without significant spinal canal  narrowing. There is moderate right and mild left-sided neural  foraminal narrowing    At the T12/L1 level,  there is posterior osteophytic spurring  contributing to mild encroachment upon the spinal canal. There is  moderate encroachment upon the neural foramen bilaterally.    At the T11/12 level, the sagittal images demonstrate a posterior  disc/osteophyte complex contributing to partial effacement of ventral  subarachnoid space. No axial images were obtained through this level  limiting further evaluation.    At the T10/11 level, the sagittal images demonstrate mild posterior  osteophytic  spurring contributing to mild impression upon the ventral  subarachnoid space. Metallic artifact from the spinal stimulator  leads limits further evaluation of the spinal canal. No axial images  were obtained through this level.    At the T9/10 level, the sagittal images demonstrate a posterior  disc/osteophyte complex contributing to mild impression upon the  ventral subarachnoid space. Metallic artifact from the spinal  stimulator leads limits further evaluation of the spinal canal. No  axial images were obtained through this level.    At the T8/9 level, the sagittal images demonstrate a mild posterior  disc/osteophyte complex contributing to mild impression upon the  ventral subarachnoid space. Metallic artifact from the spinal  stimulator leads limits further evaluation of the spinal canal. No  axial images were obtained through this level.    At the T7/8 level, there is a large posterior disc/osteophyte complex  contributing to effacement of ventral subarachnoid space with  suspected flattening of the ventral thoracic spinal cord although the  spinal canal is suboptimally evaluated secondary to metallic artifact  from the spinal stimulator leads. No axial images were obtained  through this level limiting further evaluation.    There is atrophy/fatty infiltration of the posterior paraspinal  musculature within lumbar and sacral regions.    There is a 20 mm cystic focus noted along the upper pole of the right  kidney.    Impression  Postoperative changes are identified compatible with a previous  posterior laminectomies at the L3 through S1 levels as well as a  discectomy at the L5/S1 level. There is again evidence of metallic  artifact from posterior-lateral orthopedic fixation hardware and  pedicle screws extending from the L2 through L5 levels.    There is a similar fluid collection noted along the laminectomy bed  which may represent a postoperative seroma. There is again evidence  of more infiltrative signal  abnormality within the surrounding  posterior paraspinal soft tissues likely postoperative in etiology.    There is metallic artifact from spinal stimulator leads extending of  the posterior spinal canal at the T11/12 level with cranial extension  to the obtained field of view at the T7 level.    There is again evidence of approximate 11 mm of anterolisthesis of L4  on L5.    There are degenerative signal changes noted along endplates  throughout the lumbar and visualized lower thoracic region. There is  bony ankylosis of the T12 and L1 as well as the T10 and T11 vertebrae.    There is multilevel spondylosis with varying degrees of spinal canal  and neural foraminal narrowing as described above.    There is atrophy/fatty infiltration of the posterior paraspinal  musculature within lumbar and sacral regions.    There is a 20 mm cystic focus noted along the upper pole of the right  kidney.    MACRO:  None.    Signed by: Leonardo Mathias 2/5/2025 2:44 PM  Dictation workstation:   [id number]      MR cervical spine wo IV contrast 02/05/2025    Narrative  Interpreted By:  Leonardo Mathias,  STUDY:  MR CERVICAL SPINE WO IV CONTRAST;  2/5/2025 1:10 pm    INDICATION:  Signs/Symptoms:neck pain.    COMPARISON:  03/03/2023    ACCESSION NUMBER(S):  [id number]    ORDERING CLINICIAN:  JOVANNY PAVON    TECHNIQUE:  Sagittal STIR, sagittal T2, sagittal T1, axial T2, axial T1 weighted  MRI images of the cervical spine were obtained.    FINDINGS:  Postoperative changes are again identified compatible with a previous  C5 corpectomy. There is metallic artifact from anterior orthopedic  fixation hardware extending from the C4 through C6 levels as well as  posterolateral orthopedic fixation hardware extending from the C3  through C6 levels.    There is approximately 3 mm of anterolisthesis of C6 on C7.    There are degenerative signal changes along endplates within the  cervical and visualized upper thoracic region. There is a  component  of bony ankylosis of the T2 and T3 as well as T5 and T6 vertebrae.      At the C2/3 level,  there is a posterior disc/osteophyte complex and  ligamentum flavum hypertrophy contributing to effacement of the  dorsal subarachnoid space and partial effacement of the ventral  subarachnoid space. There is mild flattening of the dorsal margin of  the cervical spinal cord. Left-sided degenerative facet changes and  degenerative uncovertebral joint changes contributing to moderate  encroachment upon the left neural foramen and mild encroachment upon  the right neural foramen.    At the C3/4 level,  there is again evidence of a mild posterior  disc/osteophyte complex more pronounced to the right of midline  contributing to partial effacement of the ventral subarachnoid space  without significant spinal cord deformity. There are degenerative  uncovertebral joint changes and degenerative facet changes  contributing to mild encroachment upon the neural foramen bilaterally.    At the C4/5 level,  there is a right-sided osteophytic spurring  contributing to partial effacement of the right ventral subarachnoid  space. There is flattening of the right ventral cervical spinal cord.  There is again evidence of asymmetric volume loss and increased  intramedullary signal on the STIR and T2 images along the right  aspect of the spinal cord at the C4/5 level compatible with a  component of underlying myelomalacia. There is mild encroachment upon  the right neural foramen. There is no significant left-sided neural  foraminal narrowing.    At the C5/6 level,  there is mild posterior bony hypertrophy and  ligamentum flavum hypertrophy contributing to effacement of the  dorsal subarachnoid space and partial effacement of the ventral  subarachnoid space. There is severe right and moderate to severe  left-sided neural foraminal narrowing.    At the C6/7 level,  there is 3 mm of anterolisthesis of C6 on C7  along with a posterior  disc/osteophyte complex most pronounced  asymmetrically to the left of midline contributing to effacement of  the ventral subarachnoid space. There is flattening of the ventral  cervical spinal cord which is draped over the disc/osteophyte  complex. There are degenerative uncovertebral joint changes and  degenerative facet changes contributing to moderate to severe  bilateral neural foraminal narrowing.    At the C7/T1 level,  there is a posterior disc/osteophyte complex  along with degenerative facet changes contributing to partial  effacement of ventral and dorsolateral subarachnoid space without  significant spinal cord deformity. There is severe right and moderate  left-sided neural foraminal narrowing.    At the T1/2 level,  there is a posterior disc/osteophyte complex  along with degenerative facet changes contributing to partial  effacement of ventral and dorsolateral subarachnoid space without  significant spinal cord deformity. There is severe bilateral neural  foraminal narrowing.    At the T2/3, T3/4, T4/5, and T5/6 levels, there are posterior  disc/osteophyte complexes contributing to partial effacement of  ventral subarachnoid space. No axial images were obtained through  these levels limiting further evaluation.    Impression  Postoperative changes are again identified compatible with a previous  C5 corpectomy. There is metallic artifact from anterior orthopedic  fixation hardware extending from the C4 through C6 levels as well as  posterolateral orthopedic fixation hardware extending from the C3  through C6 levels.    There is approximately 3 mm of anterolisthesis of C6 on C7.    There is multilevel spondylosis with varying degrees of spinal canal  and neural foraminal narrowing as described above.    MACRO:  None.    Signed by: Leonardo Mathias 2/5/2025 2:11 PM  Dictation workstation:   [id number]      XR cervical spine 2-3 views 06/19/2023    Narrative  Interpreted By:  EMILE KERN MD  MRN:  [id number]  Patient Name: TAMARA PALMER    STUDY:  Cervical spine, 3 views.    INDICATION:  cervical post-op  M50.00: Cervical disc disorder with myelopathy.    COMPARISON:  03/02/2023    ACCESSION NUMBER(S):  [id number]    ORDERING CLINICIAN:  TOSIN QUILES    FINDINGS:  Alignment is within normal limits.  Anterior cervical discectomy and fusion changes are noted from C4-C6  with C5 corpectomy. Posterior spinal instrumented fusion changes are  noted from C3-C6. Hardware is intact without perihardware fractures  or lucencies.  Mild C3-4 spondylosis with disc height loss and osteophytes  anteriorly.  Vertebral body heights are preserved. Posterior elements are intact.  Prevertebral soft tissues are unremarkable.    Impression  1. Anterior cervical discectomy and fusion from C4-C6 with C5  corpectomy. No hardware complication.  2. Posterior spinal instrumented fusion from C3-C6 without hardware  complication.      MR cervical spine wo IV contrast     Narrative  Interpreted By:  JAYSON EPPERSON MD and JOSE A FINE MD  MRN: [id number]  Patient Name: TAMARA PALMER    STUDY:  MRI BRAIN WO; MRI CERVICAL WO;  3/3/2023 8:16 am    INDICATION:  arm weakness, Lie Flat: Yes, Pre Med: No .    COMPARISON:  CT C-spine 09/22/2022  Cervical spine radiograph 03/02/2023  Cervical spine MRI 08/22/2022    ACCESSION NUMBER(S):  [id number]; [id number]    ORDERING CLINICIAN:  JAIME AMAYA    TECHNIQUE:  Axial T2, FLAIR, DWI, gradient echo T2 and sagittal and coronal T1  weighted images of brain were acquired.    Sagittal T1, T2, STIR, axial T1 and axial T2 weighted images were  acquired through the cervical spine.    FINDINGS:  Brain:  CSF Spaces: There is global parenchymal volume loss leading to  prominence of the ventricles and sulci. The basal cisterns are  patent. No abnormal extra-axial fluid collection.    Parenchyma: There is no diffusion restriction abnormality to suggest  acute infarct.  There are punctate and confluent  subcortical  periventricular white matter FLAIR/T2 hyperintensities without  corresponding diffusion restriction which are nonspecific but may  reflect sequelae of chronic microvascular disease given the patient's  age. Within the bilateral thalami and left basal ganglia there is  heterogenous T2 signal with focal T2 and T1 dark areas that  demonstrates susceptibility artifact on gradient echo imaging. These  may reflect areas of hemosiderin deposition or calcification. There  is no mass effect or midline shift.    Paranasal Sinuses and Mastoids: Visualized paranasal sinuses and  mastoid air cells are unremarkable.    Cervical spine:    Motion artifact and patient positioning somewhat limits assessment.    Since the prior MRI, there has been interval anterior instrumented  fusion from C4 through C6 and posterior instrumented fusion from C3  through C6 with a C5 corpectomy. Susceptibility artifact from the  patient's hardware limits evaluation, particularly of the upper  cervical spine.    Alignment: Straightening of the normal cervical lordosis. There  appears to be 6 mm grade 1 anterolisthesis of C7 on T1, which is new  when compared to the prior study..    Vertebrae/Intervertebral Discs: The vertebral bodies demonstrate  expected height. The marrow signal is within normal limits.  Multilevel disc desiccation.    Cord: Normal in caliber and signal, flattening of the ventral cord  has resolved.    C1-C2: The cervicomedullary junction appears unremarkable. There is  no spinal canal stenosis.    C2-C3: Infolding of ligamentum flavum mildly narrows the spinal  canal. Facet and uncovertebral hypertrophy mildly narrow the left  neural foramen; right neural foramen is patent.    C3-C4: Facet and uncovertebral hypertrophy mildly narrow the left  neural foramen. Spinal canal and right neural foramina are patent.    C4-C5: Osseous ridging and infolding of ligamentum flavum mildly  narrows the spinal canal. Right neural  foramen is patent. Left neural  foramen is not well visualized secondary to artifact.    C5-C6: Spinal canal is patent. Limited evaluation of neural foramina  secondary to artifact.    C6-C7: There is a central disc protrusion of the central C6-7  intervertebral disc which appears to contact and slightly flattens  the ventral aspect of the cord and results in moderate canal  stenosis. Limited evaluation of neural foramina.    C7-T1: There is no posterior disc contour abnormality. There is no  significant central spinal or neural foraminal stenosis.    Multilevel disc protrusions in the thoracic spine resulting in at  least mild canal stenosis, partially visualized.    The prevertebral and posterior paraspinous soft tissues are within  normal limits.    Impression  Brain:  1. No evidence of acute infarct, intracranial mass effect or midline  shift.  2. Scattered foci of hemosiderin within the bilateral thalami and  left basal ganglia are favored to represent chronic microhemorrhages  likely due to chronic hypertensive infarcts.    Limited evaluation of the cervical spine cervical spine:  1. Focal central disc protrusion at C6-7 with mild flattening of the  anterior spinal cord without associated edema or myelomalacia; there  is moderate canal stenosis at this level. Additional degenerative  changes as described, slightly limited evaluation secondary to  hardware.  2. Interval anterior instrumented fusion of C4 through C6 with C5  corpectomy and posterior instrumented fusion of C3 through C6.    I personally reviewed the images/study and I agree with the findings  as stated. This study was interpreted at [facility], [place], [place].      XR cervical spine 2-3 views     Narrative  Interpreted By:  CHLOÉ PUGA MD  MRN: [id number]  Patient Name: TAMARA PALMER    STUDY:  SPINE, CERVICAL, 2 OR 3 VIEWS;  3/2/2023 11:25 am    INDICATION:  ap/lat  M50.00: Cervical disc disorder with  myelopathy.    COMPARISON:  None.    ACCESSION NUMBER(S):  [id number]    ORDERING CLINICIAN:  NEW ROMERO    FINDINGS:  C-spine, three views    Anterior fusion from C4 through C6 with corpectomy at C5. Posterior  fusion laminectomy at C3 through C6. The hardware is intact. There is  no malalignment. Limited evaluation of the cervicothoracic junction  however    Impression  Limited evaluation of the cervicothoracic junction, within this  limitation:    No hardware failure about the anterior C4-C6 fusion and posterior  C3-C6 fusion with corpectomy      MR cervical spine wo IV contrast 08/22/2022    Narrative  MRN: [id number]  Patient Name: TAMARA PALMER    STUDY:  MRI CERVICAL WO;  8/22/2022 3:30 pm    INDICATION:  NECK PAIN    COMPARISON:  None.    ACCESSION NUMBER(S):  [id number]    ORDERING CLINICIAN:  NEW ROMERO    TECHNIQUE:  Sagittal T1, T2, STIR, axial T1 and axial T2 weighted images were  acquired through the cervical spine.    FINDINGS:  Alignment: There is straightening of the normal cervical lordosis,  which may be related to patient positioning or muscle spasm. The  alignment is preserved.    Vertebrae/Intervertebral Discs: The vertebral bodies demonstrate  expected height.  Multilevel degenerative endplate changes without  associated marrow edema. Multilevel loss of normal disc T2 signal and  disc height.    Cord: Multilevel flattening of the ventral cord, no abnormal cord  signal is identified.    C1-C2: The cervicomedullary junction appears unremarkable. There is  no central canal stenosis.    C2-C3: Disc osteophyte complex, facet and uncovertebral hypertrophy,  and ligamentum flavum thickening with mild spinal canal narrowing and  mild left neural foramen narrowing.    C3-C4: Disc osteophyte complex and facet and uncovertebral  hypertrophy with mild spinal canal narrowing as well as mild left and  moderate right neural foramen narrowing.    C4-C5: Disc osteophyte complex and facet and  uncovertebral  hypertrophy with moderate spinal canal narrowing and moderate left  neural foramen narrowing.    C5-C6: Disc osteophyte complex and facet and uncovertebral  hypertrophy with moderate spinal canal narrowing as well as mild left  and moderate right neural foramen narrowing.    C6-C7: Disc osteophyte complex and facet and uncovertebral  hypertrophy with moderate spinal canal narrowing and mild bilateral  neural foramen narrowing.    C7-T1: Disc osteophyte complex and facet and uncovertebral  hypertrophy with mild spinal canal narrowing.    Partially visualized thoracic disc bulges, no high-grade spinal canal  narrowing is identified.    The prevertebral and posterior paraspinous soft tissues are within  normal limits.    Impression  Degenerative changes most pronounced at C4-5 and C5-6.      MR lumbar spine wo IV contrast 08/22/2022    Narrative  MRN: [id number]  Patient Name: TAMARA PALMER    STUDY:  MRI L-SPINE WO;  8/22/2022 3:30 pm    INDICATION:  Pain, leg pain    COMPARISON:  Lumbar spine MRI, 06/29/2022    ACCESSION NUMBER(S):  [id number]    ORDERING CLINICIAN:  NEW ROMERO    TECHNIQUE:  Sagittal T1, T2, STIR, axial T1 and T2 weighted images of the lumbar  spine were acquired.    FINDINGS:  5 lumbar-type vertebral bodies are again noted, the last well-formed  disc space is labeled L5-S1.    Changes of L3-S1 laminectomy, L5-S1 discectomy, L2-5 posterior  fusion, and spinal cord stimulator placement with associated  susceptibility artifact are similar to previous. A nonspecific T2  hyperintense collection in the laminectomy bed at L3-4 (sagittal stir  image 10) is unchanged. Unchanged grade 2 anterolisthesis at L4-5,  the alignment is otherwise preserved. Unchanged vertebral body fusion  at T10-11 and T12-L1.    Vertebral body height is preserved. Multilevel degenerate endplate  changes, no edema in the visualized marrow. Multilevel loss of normal  disc T2 signal and disc height. On a level by  level basis:    T11-12: Partially visualized disc bulge and facet hypertrophy with  mild spinal canal narrowing and moderate left neural foramen  narrowing, similar to previous. The right neural foramen is obscured  by susceptibility artifact.    T12-L1: Disc osteophyte complex and facet hypertrophy with mild  narrowing of the right subarticular recess and moderate bilateral  neural foramen narrowing, similar to previous.    L1-2: Disc bulge, facet hypertrophy, and ligamentum flavum thickening  with mild narrowing of both subarticular recesses and mild left and  moderate right neural foramen narrowing, similar to previous.    L2-3: Disc bulge, facet hypertrophy, and ligamentum flavum thickening  with mild narrowing of both subarticular recesses, similar to  previous. The foramina are obscured by artifact.    L3-4: Postoperative changes with a disc bulge and facet hypertrophy.  There is no residual spinal canal narrowing, the foramina are  obscured by artifact.    L4-5: Postoperative changes with a disc bulge and unroofing as well  as facet hypertrophy, severe bilateral neural foramen narrowing is  unchanged.    L5-S1: Postoperative changes with a disc bulge and facet hypertrophy,  severe bilateral neural foramen narrowing is unchanged.    The lower thoracic cord appears unremarkable. The conus terminates at  L2. The visualized cauda equina is unremarkable. Postoperative  changes in the paraspinal soft tissues with associated granulation  tissue and paraspinal muscular atrophy, similar to previous.  Incidental T2 hyperintense renal cysts. Partially visualized colonic  diverticulosis.    Impression  Stable postoperative and degenerative changes.     No image results found.     1. Cervical radiculopathy  FL pain management    FL pain management    Epidural Steroid Injection    Epidural Steroid Injection              Assessment/Plan   Elida Benson is a 80 y.o. female presenting with chronic pain here for Cervical  interlaminar epidural steroid injection; she denies any recent antibiotic use or infections, she held Eliquis for 5 days, and she denies contrast or local anesthetic allergies.    ASA PS Classification: 3  Mallampati score: 2    Plan:  - We will proceed with the procedure as above. We discussed extensively the risks, benefits, and alternatives to the procedure. The patient's questions were addressed and answered in detail. The patient demonstrated understanding of the procedure, and is amenable to proceeding with it. The Risks of the procedure that were discussed with the patient include but are not limited to the following: A lack of efficacy, transient worsening of pain, bleeding, infection, nerve injury, nerve damage, neuritis or sunburn sensation. Informed consent obtained as attached to EMR.  - Patient to continue physician directed home exercises as tolerated.  - Patient will follow-up with us in clinic.      La Nena Buenrostro MD  Chronic Pain Fellow  [facility]

## 2025-04-08 ENCOUNTER — HOME CARE VISIT (OUTPATIENT)
Dept: HOME HEALTH SERVICES | Facility: HOME HEALTH | Age: 80
End: 2025-04-08
Payer: MEDICARE

## 2025-04-08 VITALS
OXYGEN SATURATION: 98 % | TEMPERATURE: 97.2 F | DIASTOLIC BLOOD PRESSURE: 64 MMHG | RESPIRATION RATE: 16 BRPM | SYSTOLIC BLOOD PRESSURE: 123 MMHG | HEART RATE: 75 BPM

## 2025-04-08 PROCEDURE — G0158 HHC OT ASSISTANT EA 15: HCPCS | Mod: CO,HHH

## 2025-04-09 ENCOUNTER — HOME CARE VISIT (OUTPATIENT)
Dept: HOME HEALTH SERVICES | Facility: HOME HEALTH | Age: 80
End: 2025-04-09
Payer: MEDICARE

## 2025-04-09 DIAGNOSIS — F51.01 PRIMARY INSOMNIA: ICD-10-CM

## 2025-04-09 PROCEDURE — G0157 HHC PT ASSISTANT EA 15: HCPCS | Mod: CQ,HHH

## 2025-04-09 PROCEDURE — G0156 HHCP-SVS OF AIDE,EA 15 MIN: HCPCS | Mod: HHH

## 2025-04-10 ENCOUNTER — HOME CARE VISIT (OUTPATIENT)
Dept: HOME HEALTH SERVICES | Facility: HOME HEALTH | Age: 80
End: 2025-04-10
Payer: MEDICARE

## 2025-04-10 VITALS
OXYGEN SATURATION: 98 % | HEART RATE: 72 BPM | TEMPERATURE: 97.5 F | DIASTOLIC BLOOD PRESSURE: 72 MMHG | SYSTOLIC BLOOD PRESSURE: 128 MMHG | RESPIRATION RATE: 16 BRPM

## 2025-04-10 VITALS
DIASTOLIC BLOOD PRESSURE: 60 MMHG | OXYGEN SATURATION: 97 % | SYSTOLIC BLOOD PRESSURE: 110 MMHG | TEMPERATURE: 98.7 F | RESPIRATION RATE: 20 BRPM | HEART RATE: 78 BPM

## 2025-04-10 DIAGNOSIS — E85.4 CARDIAC AMYLOIDOSIS: Primary | ICD-10-CM

## 2025-04-10 DIAGNOSIS — I43 CARDIAC AMYLOIDOSIS: Primary | ICD-10-CM

## 2025-04-10 PROCEDURE — G0158 HHC OT ASSISTANT EA 15: HCPCS | Mod: CO,HHH

## 2025-04-10 PROCEDURE — G0299 HHS/HOSPICE OF RN EA 15 MIN: HCPCS | Mod: HHH

## 2025-04-10 SDOH — ECONOMIC STABILITY: GENERAL

## 2025-04-10 SDOH — ECONOMIC STABILITY: FOOD INSECURITY: MEALS PER DAY: 3

## 2025-04-10 ASSESSMENT — ENCOUNTER SYMPTOMS
PAIN LOCATION - PAIN FREQUENCY: INTERMITTENT
PERSON REPORTING PAIN: PATIENT
LOWEST PAIN SEVERITY IN PAST 24 HOURS: 0/10
PAIN LOCATION: GENERALIZED
HIGHEST PAIN SEVERITY IN PAST 24 HOURS: 0/10
APPETITE LEVEL: FAIR
MUSCLE WEAKNESS: 1
LAST BOWEL MOVEMENT: 67305
PAIN: 1
PAIN SEVERITY GOAL: 0/10
SUBJECTIVE PAIN PROGRESSION: UNCHANGED
PAIN LOCATION - PAIN SEVERITY: 0/10
BOWEL INCONTINENCE: 1
PAIN LOCATION - PAIN QUALITY: ACHY
CHANGE IN APPETITE: UNCHANGED

## 2025-04-11 ENCOUNTER — HOME CARE VISIT (OUTPATIENT)
Dept: HOME HEALTH SERVICES | Facility: HOME HEALTH | Age: 80
End: 2025-04-11
Payer: MEDICARE

## 2025-04-11 PROCEDURE — RXMED WILLOW AMBULATORY MEDICATION CHARGE

## 2025-04-11 PROCEDURE — G0156 HHCP-SVS OF AIDE,EA 15 MIN: HCPCS | Mod: HHH

## 2025-04-11 PROCEDURE — G0157 HHC PT ASSISTANT EA 15: HCPCS | Mod: CQ,HHH

## 2025-04-11 RX ORDER — TRAZODONE HYDROCHLORIDE 50 MG/1
50 TABLET ORAL NIGHTLY PRN
Qty: 30 TABLET | Refills: 0 | Status: SHIPPED | OUTPATIENT
Start: 2025-04-11

## 2025-04-11 ASSESSMENT — ENCOUNTER SYMPTOMS
PAIN LOCATION: RIGHT SHOULDER
PAIN LOCATION - PAIN SEVERITY: 3/10
PAIN LOCATION: LEFT SHOULDER
PAIN LOCATION - PAIN QUALITY: ACHING, THROBBING
PAIN LOCATION - EXACERBATING FACTORS: MOVING
HIGHEST PAIN SEVERITY IN PAST 24 HOURS: 4/10
LOWEST PAIN SEVERITY IN PAST 24 HOURS: 3/10
PERSON REPORTING PAIN: PATIENT
SUBJECTIVE PAIN PROGRESSION: UNCHANGED
PAIN LOCATION - PAIN SEVERITY: 4/10
PAIN LOCATION - PAIN QUALITY: ACHING, THROBBING
PAIN LOCATION - PAIN QUALITY: ACHING, THROBBING
PAIN LOCATION: RIGHT SHOULDER
PAIN LOCATION: BACK
PAIN LOCATION - PAIN QUALITY: ACHING, THROBBING
PAIN LOCATION - PAIN FREQUENCY: INTERMITTENT
PAIN SEVERITY GOAL: 1/10
PAIN LOCATION - EXACERBATING FACTORS: MOVING
PAIN: 1
PAIN LOCATION - RELIEVING FACTORS: REST
HIGHEST PAIN SEVERITY IN PAST 24 HOURS: 4/10
LOWEST PAIN SEVERITY IN PAST 24 HOURS: 2/10
PAIN LOCATION - PAIN SEVERITY: 2/10
PERSON REPORTING PAIN: PATIENT
PAIN LOCATION - PAIN SEVERITY: 3/10
SUBJECTIVE PAIN PROGRESSION: UNCHANGED
PAIN LOCATION: RIGHT KNEE
PAIN LOCATION - PAIN SEVERITY: 3/10
PAIN LOCATION - PAIN FREQUENCY: INTERMITTENT
PAIN LOCATION - PAIN SEVERITY: 4/10
PAIN LOCATION: RIGHT KNEE
PAIN: 1
PAIN LOCATION - PAIN QUALITY: ACHING, THROBBING
PAIN LOCATION - PAIN SEVERITY: 3/10
PAIN LOCATION - PAIN SEVERITY: 2/10
PAIN SEVERITY GOAL: 1/10
PAIN LOCATION - RELIEVING FACTORS: REST
PAIN LOCATION: BACK
PAIN LOCATION: LEFT SHOULDER

## 2025-04-14 ENCOUNTER — HOME CARE VISIT (OUTPATIENT)
Dept: HOME HEALTH SERVICES | Facility: HOME HEALTH | Age: 80
End: 2025-04-14
Payer: MEDICARE

## 2025-04-14 PROCEDURE — G0152 HHCP-SERV OF OT,EA 15 MIN: HCPCS | Mod: HHH

## 2025-04-14 ASSESSMENT — ACTIVITIES OF DAILY LIVING (ADL)
DRESSING_UB_CURRENT_FUNCTION: MODERATE ASSIST
DRESSING_LB_CURRENT_FUNCTION: MAXIMUM ASSIST
GROOMING_CURRENT_FUNCTION: MODERATE ASSIST
TOILETING: 1
GROOMING ASSESSED: 1

## 2025-04-14 ASSESSMENT — ENCOUNTER SYMPTOMS
HIGHEST PAIN SEVERITY IN PAST 24 HOURS: 4/10
PAIN LOCATION: BACK
PAIN: 1
LOWEST PAIN SEVERITY IN PAST 24 HOURS: 2/10
PAIN LOCATION: LEFT ARM
PERSON REPORTING PAIN: PATIENT

## 2025-04-15 ENCOUNTER — HOME CARE VISIT (OUTPATIENT)
Dept: HOME HEALTH SERVICES | Facility: HOME HEALTH | Age: 80
End: 2025-04-15
Payer: MEDICARE

## 2025-04-15 VITALS
RESPIRATION RATE: 16 BRPM | OXYGEN SATURATION: 98 % | TEMPERATURE: 97.2 F | SYSTOLIC BLOOD PRESSURE: 126 MMHG | DIASTOLIC BLOOD PRESSURE: 65 MMHG | HEART RATE: 65 BPM

## 2025-04-15 PROCEDURE — G0158 HHC OT ASSISTANT EA 15: HCPCS | Mod: CO,HHH

## 2025-04-15 PROCEDURE — G0157 HHC PT ASSISTANT EA 15: HCPCS | Mod: CQ,HHH

## 2025-04-16 ENCOUNTER — HOME CARE VISIT (OUTPATIENT)
Dept: HOME HEALTH SERVICES | Facility: HOME HEALTH | Age: 80
End: 2025-04-16
Payer: MEDICARE

## 2025-04-16 PROCEDURE — G0156 HHCP-SVS OF AIDE,EA 15 MIN: HCPCS | Mod: HHH

## 2025-04-17 ENCOUNTER — PHARMACY VISIT (OUTPATIENT)
Dept: PHARMACY | Facility: CLINIC | Age: 80
End: 2025-04-17
Payer: MEDICARE

## 2025-04-17 ENCOUNTER — HOME CARE VISIT (OUTPATIENT)
Dept: HOME HEALTH SERVICES | Facility: HOME HEALTH | Age: 80
End: 2025-04-17
Payer: MEDICARE

## 2025-04-17 NOTE — PROGRESS NOTES
Pharmacist Clinic: Cardiology Management    Elida Benson is a 80 y.o. female was referred to Clinical Pharmacy Team for anticoagulation and heart failure management.     Referring Provider: Cesar Ignacio MD    THIS IS A FOLLOW UP PATIENT APPOINTMENT. AT LAST VISIT ON 12/27/2024 WITH PHARMACIST (Ledy Couch).    REVIEW OF LAST APPT  Kaelyn states Elida is doing well on anticoagulation therapy. Patient reports adherence, no adverse effects, and denies s/s of bleeding.   Kaelyn reports Elida is tolerating her heart failure regimen well. Last visit, Kaelyn states Elida was complaining of dizziness, her losartan has since been decreased to 25mg daily. Kaelyn reports she will occasionally have dizziness but this has improved since the dose decrease. Kaelyn reports her SBP has been around low 100s. Kaelyn denies Elida has s/s of worsening heart failure.  Discussed with Kaelyn the refill and auto refill process through [facility] pharmacy, she verbalized understanding.    Appointment was completed by Kaelyn (dtr) who was reached at primary number.    Allergies Reviewed? No    Allergies   Allergen Reactions    Shellfish Containing Products Swelling       Past Medical History:   Diagnosis Date    A-fib (Multi)     Managed on meds, Eliquis stoppage and cardiac clearance in [facility] from Dr. Ignacio    Cervical myelopathy     Chronic pain syndrome     COPD (chronic obstructive pulmonary disease) (Multi)     denies, she is a former smoker but quit in 1984    Dementia     Depression     Diabetic neuropathy (Multi)     Diverticulitis     DM (diabetes mellitus) (Multi)     denies but A1C= 7.2% on 9/5/23    LORENZ (dyspnea on exertion)     Stable per cards note    HLD (hyperlipidemia)     Hypertension, essential     Infiltrative cardiomyopathy (Multi)     Echo 8/22/23, following with Dr. Ignacio    OA (ocular albinism) (Multi)     multiple sites    Other malaise 04/21/2022    Physical deconditioning    Palpitations     on  "occasion, has afib followed by cardiology    Post laminectomy syndrome     Rectovaginal fistula     Spinal stenosis of cervical region     UTI (urinary tract infection)        Current Outpatient Medications on File Prior to Visit   Medication Sig Dispense Refill    acetaminophen (Tylenol) 325 mg tablet Give 2 tablet by mouth every 4 hours as needed for Pain      alogliptin (Nesina) 12.5 mg tablet TAKE ONE TABLET BY MOUTH ONCE A DAY FOR DIABETES MELLITUS      amiodarone (Pacerone) 200 mg tablet TAKE ONE TABLET BY MOUTH IN THE MORNING FOR ANTIARRHYTHMIC 60 tablet 0    apixaban (Eliquis) 5 mg tablet Take 1 tablet (5 mg) by mouth 2 times a day. 180 tablet 3    atorvastatin (Lipitor) 40 mg tablet Take 1 tablet (40 mg) by mouth once daily at bedtime. 90 tablet 1    b complex 0.4 mg tablet Take 1 tablet by mouth once daily.      baclofen (Lioresal) 5 mg tablet TAKE ONE TABLET BY MOUTH TWO TIMES A DAY 60 tablet 2    BD Ailyn 2nd Gen Pen Needle 32 gauge x 5/32\" needle USE 4 TIMES A DAY      benzonatate (Tessalon) 100 mg capsule TAKE 1 CAPSULE BY MOUTH 3 TIMES A DAY AS NEEDED FOR COUGH. DO NOT CRUSH OR CHEW. (Patient taking differently: Take 1 capsule (100 mg) by mouth 3 times a day as needed for cough.) 42 capsule 0    biotin 1 mg tablet 1 tab(s) orally once a day      cephalexin (Keflex) 500 mg capsule Take 1 capsule (500 mg) by mouth 2 times a day. 60 capsule 6    cholecalciferol (Vitamin D-3) 1,250 mcg (50,000 unit) capsule Take 1 capsule (50,000 Units) by mouth 1 (one) time per week. Sunday      docusate sodium (Colace) 100 mg capsule Take 1 capsule (100 mg) by mouth 2 times a day as needed for constipation.      donepezil (Aricept) 5 mg tablet TAKE ONE TABLET BY MOUTH AT BEDTIME 90 tablet 0    DULoxetine (Cymbalta) 30 mg DR capsule TAKE ONE CAPSULE BY MOUTH TWO TIMES A  capsule 0    empagliflozin (Jardiance) 25 mg Take 1 tablet (25 mg) by mouth once daily. 90 tablet 3    fluticasone (Flonase) 50 mcg/actuation " nasal spray Administer 2 sprays into each nostril once daily. Shake gently. Before first use, prime pump. After use, clean tip and replace cap. (Patient taking differently: Administer 2 sprays into each nostril once daily as needed for allergies. Shake gently. Before first use, prime pump. After use, clean tip and replace cap.) 16 g 3    furosemide (Lasix) 20 mg tablet Take 1 tablet (20 mg) by mouth once daily. 90 tablet 3    gabapentin (Neurontin) 600 mg tablet TAKE 1 TABLET BY MOUTH 3 TIMES DAILY. 90 tablet 0    insulin glargine (Lantus Solostar U-100 Insulin) 100 unit/mL (3 mL) pen Inject 3 Units under the skin once daily in the morning.      insulin lispro (HumaLOG) 100 unit/mL pen Inject 1 Units under the skin 4 times a day. inject subcutaneiously before meals and at bedtime for hyperglycemia  per sliding scale  151-200 1 unit 201-250 2units 251-300 3 units 350 4 units 351-400 5units notifiy Md if if greater than 400, notify md if lower than 40.      insulin lispro (HumaLOG) 100 unit/mL pen 3 Units 3 times daily (morning, midday, late afternoon). inject  3 units before meals.      losartan (Cozaar) 25 mg tablet Take 1 tablet (25 mg) by mouth once daily. 30 tablet 11    melatonin 3 mg tablet Take 2 tablets (6 mg) by mouth as needed at bedtime for sleep.      metoprolol succinate XL (Toprol XL) 25 mg 24 hr tablet Take by mouth once every 24 hours.      naloxone (Narcan) 4 mg/0.1 mL nasal spray Administer 1 spray (4 mg) into affected nostril(s) if needed for opioid reversal. May repeat every 2-3 minutes if needed, alternating nostrils, until medical assistance becomes available. 2 each 0    ondansetron ODT (Zofran-ODT) 4 mg disintegrating tablet Take 1 tablet (4 mg) by mouth every 8 hours if needed for nausea or vomiting. 20 tablet 0    OneTouch Delica Plus Lancet 33 gauge misc USE AS DIRECTED THREE TIMES A DAY      OneTouch Ultra Test strip USE AS DIRECTED TO TEST 3 TIMES DAILY      OneTouch Ultra2 Meter misc use  "as directed      SITagliptin phosphate (Januvia) 50 mg tablet Take 1 tablet (50 mg) by mouth once daily. 90 tablet 1    spironolactone (Aldactone) 25 mg tablet Take 1 tablet (25 mg) by mouth once daily. 90 tablet 3    tafamidis (Vyndamax) 61 mg capsule Take 1 capsule (61 mg) by mouth once daily. 30 capsule 10    topiramate (Topamax) 100 mg tablet Take 1 tablet (100 mg) by mouth 2 times a day. 60 tablet 10    traMADol (Ultram) 50 mg tablet Take by mouth every 12 hours.      traZODone (Desyrel) 50 mg tablet Take 1 tablet (50 mg) by mouth as needed at bedtime for sleep. 30 tablet 0    [DISCONTINUED] dapagliflozin propanediol (Farxiga) 10 mg Take 1 tablet (10 mg) by mouth early in the morning..       No current facility-administered medications on file prior to visit.         RELEVANT LAB RESULTS:  Lab Results   Component Value Date    BILITOT 0.5 03/24/2025    CALCIUM 8.9 03/24/2025    CO2 21 03/24/2025     03/24/2025    CREATININE 0.73 03/24/2025    GLUCOSE 105 (H) 03/24/2025    ALKPHOS 70 03/24/2025    K 4.3 03/24/2025    PROT 6.0 (L) 03/24/2025     03/24/2025    AST 22 03/24/2025    ALT 26 03/24/2025    BUN 24 03/24/2025    ANIONGAP 11 03/24/2025    MG 2.20 04/17/2024    PHOS 4.5 05/13/2024    ALBUMIN 3.3 (L) 03/24/2025    LIPASE 24 02/10/2025    GFRF CANCELED 09/07/2023    GFRMALE CANCELED 09/07/2023     Lab Results   Component Value Date    TRIG 102 05/26/2023    CHOL 182 05/26/2023    HDL 77.2 05/26/2023     No results found for: \"BMCBC\", \"CBCDIF\"     PHARMACEUTICAL ASSESSMENT:    MEDICATION RECONCILIATION    Drug Interactions? Yes, describe: Amiodarone, Donepezil- increased risk of prolonging Qtc, continue to monitor    Medication Documentation Review Audit       Reviewed by Radha Luz MA (Medical Assistant) on 04/04/25 at 0801      Medication Order Taking? Sig Documenting Provider Last Dose Status   acetaminophen (Tylenol) 325 mg tablet [id number] Yes Give 2 tablet by mouth every 4 hours as " "needed for Pain Historical MD Anders Taking Active   alogliptin (Nesina) 12.5 mg tablet [id number] Yes TAKE ONE TABLET BY MOUTH ONCE A DAY FOR DIABETES MELLITUS Sterling Provider, MD  Active   amiodarone (Pacerone) 200 mg tablet [id number] Yes TAKE ONE TABLET BY MOUTH IN THE MORNING FOR ANTIARRHYTHMIC Cesar ORLANDO MD 2/10/2025 Morning Active   apixaban (Eliquis) 5 mg tablet [id number] Yes Take 1 tablet (5 mg) by mouth 2 times a day. Cesar ORLANDO MD 2/10/2025 Morning Active   atorvastatin (Lipitor) 40 mg tablet [id number] No Take 1 tablet (40 mg) by mouth once daily at bedtime. David Swift MD 2025  25 2359   b complex 0.4 mg tablet [id number] Yes Take 1 tablet by mouth once daily. Sterling Mcnamara MD 2/10/2025 Morning Active   baclofen (Lioresal) 5 mg tablet [id number] Yes TAKE ONE TABLET BY MOUTH TWO TIMES A DAY Jf Perdue MD 2/10/2025 Morning Active   BD Ailyn 2nd Gen Pen Needle 32 gauge x \" needle [id number] Yes USE 4 TIMES A DAY Historical Provider, MD  Active   benzonatate (Tessalon) 100 mg capsule [id number] Yes TAKE 1 CAPSULE BY MOUTH 3 TIMES A DAY AS NEEDED FOR COUGH. DO NOT CRUSH OR CHEW.   Patient taking differently: Take 1 capsule (100 mg) by mouth 3 times a day as needed for cough.    David Swift MD 2/10/2025 Active   biotin 1 mg tablet [id number] Yes 1 tab(s) orally once a day Sterling Mcnamara MD 2/10/2025 Active   cholecalciferol (Vitamin D-3) 1,250 mcg (50,000 unit) capsule [id number] Yes Take 1 capsule (50,000 Units) by mouth 1 (one) time per week.  Sterling Mcnamara MD 2025 Active   dapagliflozin propanediol (Farxiga) 10 mg [id number] Yes Take 1 tablet (10 mg) by mouth early in the morning.. Sterling Mcnamara MD  Active   docusate sodium (Colace) 100 mg capsule [id number] Yes Take 1 capsule (100 mg) by mouth 2 times a day as needed for constipation. Historical Provider, MD Taking Active   donepezil (Aricept) 5 mg tablet [id number] Yes TAKE ONE " TABLET BY MOUTH AT BEDTIME David Swift MD 2025 Evening Active   DULoxetine (Cymbalta) 30 mg DR capsule [id number] Yes TAKE ONE CAPSULE BY MOUTH TWO TIMES A DAY David Swift MD  Active   empagliflozin (Jardiance) 25 mg [id number] Yes Take 1 tablet (25 mg) by mouth once daily. Cesar ORLANDO MD  Active   fluticasone (Flonase) 50 mcg/actuation nasal spray [id number]  Administer 2 sprays into each nostril once daily. Shake gently. Before first use, prime pump. After use, clean tip and replace cap.   Patient taking differently: Administer 2 sprays into each nostril once daily as needed for allergies. Shake gently. Before first use, prime pump. After use, clean tip and replace cap.    David Swift MD   25 235   furosemide (Lasix) 20 mg tablet [id number] Yes Take 1 tablet (20 mg) by mouth once daily. Lori Dueñas MD PhD 2/10/2025 Morning Active   gabapentin (Neurontin) 600 mg tablet [id number] Yes TAKE 1 TABLET BY MOUTH 3 TIMES DAILY. David Swift MD 2/10/2025 Morning Active   insulin glargine (Lantus Solostar U-100 Insulin) 100 unit/mL (3 mL) pen [id number] Yes Inject 3 Units under the skin once daily in the morning. Historical Provider, MD  Active   insulin lispro (HumaLOG) 100 unit/mL pen [id number] Yes Inject 1 Units under the skin 4 times a day. inject subcutaneiously before meals and at bedtime for hyperglycemia  per sliding scale  151-200 1 unit 201-250 2units 251-300 3 units 350 4 units 351-400 5units notifiy Md if if greater than 400, notify md if lower than 40. David Swift MD  Active   insulin lispro (HumaLOG) 100 unit/mL pen [id number] Yes 3 Units 3 times daily (morning, midday, late afternoon). inject  3 units before meals. Historical Provider, MD  Active   losartan (Cozaar) 25 mg tablet [id number] Yes Take 1 tablet (25 mg) by mouth once daily. Lori Dueñas MD PhD  Active   melatonin 3 mg tablet [id number] Yes Take 2 tablets (6 mg) by mouth as needed at bedtime for  sleep. Historical Provider, MD Taking Active   metoprolol succinate XL (Toprol XL) 25 mg 24 hr tablet [id number] Yes Take by mouth once every 24 hours. Historical Provider, MD  Active   naloxone (Narcan) 4 mg/0.1 mL nasal spray [id number] Yes Administer 1 spray (4 mg) into affected nostril(s) if needed for opioid reversal. May repeat every 2-3 minutes if needed, alternating nostrils, until medical assistance becomes available. Jf Perdue MD  Active   ondansetron ODT (Zofran-ODT) 4 mg disintegrating tablet [id number] Yes Take 1 tablet (4 mg) by mouth every 8 hours if needed for nausea or vomiting. Terrell Dixon PA-C Taking Active   OneTouch Delica Plus Lancet 33 gauge misc [id number] Yes USE AS DIRECTED THREE TIMES A DAY Historical Provider, MD  Active   OneTouch Ultra Test strip [id number] Yes USE AS DIRECTED TO TEST 3 TIMES DAILY Historical Provider, MD  Active   OneTouch Ultra2 Meter misc [id number] Yes use as directed Historical Provider, MD  Active   SITagliptin phosphate (Januvia) 50 mg tablet [id number] Yes Take 1 tablet (50 mg) by mouth once daily. David Swift MD Taking Active   spironolactone (Aldactone) 25 mg tablet [id number] Yes Take 1 tablet (25 mg) by mouth once daily. Lori Dueñas MD PhD 2/10/2025 Active   tafamidis (Vyndamax) 61 mg capsule [id number] Yes Take 1 capsule (61 mg) by mouth once daily. Lori Dueñas MD PhD 2/10/2025 Active   topiramate (Topamax) 100 mg tablet [id number] Yes Take 1 tablet (100 mg) by mouth 2 times a day. Kaykay Palafox MD 2/10/2025 Morning Active   traMADol (Ultram) 50 mg tablet [id number] Yes Take by mouth every 12 hours. Historical Provider, MD  Active   traZODone (Desyrel) 50 mg tablet [id number] Yes Take 1 tablet (50 mg) by mouth as needed at bedtime for sleep. David Swift MD  Active                    DISEASE MANAGEMENT ASSESSMENT:     ANTICOAGULATION ASSESSMENT    The ASCVD Risk score ([place] DK, et al., 2019) failed to calculate for the  following reasons:    Risk score cannot be calculated because patient has a medical history suggesting prior/existing ASCVD    DIAGNOSIS: prevention of nonvalvular atrial fibrilliation stroke and systemic embolism  - Patient is projected to be on anticoagulation long term  - [id number] Score: [7] (only included if diagnosis is atrial fibrillation)   Age: [<65 (0)] [65-74 (+1)] [> 75 (+2)]: 2  Sex: [Male/Female (+1)]: 1  CHF history: [No/Yes(+1)]: 1  Hypertension history: [No/Yes(+1)]: 1  Stroke/TIA/thromboembolism history: [No/Yes(+2)]: 0  Vascular disease history (prior MI, peripheral artery disease, aortic plaque): [No/Yes(+1)]: 1  Diabetes history: [No/Yes(+1)]: 1    CURRENT PHARMACOTHERAPY:    Eliquis 5mg twice daily  81yo  65.3kg  Scr 0.73 (03/24/2025)    RELEVANT PAST MEDICAL HISTORY:   Afib, HTN, NSTEMI, T2DM    Affordability/Accessibility:  PAP  Adherence/Organization: reports adherence, patient daughter uses pillbox  Adverse Reactions: none reported  Recent Hospitalizations: none  Recent Falls/Trauma: none reported   Changes in Tobacco or Alcohol Intake:   Tobacco: does not use  Alcohol: socially     EDUCATION/COUNSELING:   - Counseled patient on MOA, expectations, duration of therapy, contraindications, administration, and monitoring parameters  - Counseled patient of side effects that are indicative of bleeding such as dark tarry stool, unexplainable bruising, or vomiting up a coffee ground like substance     CHF ASSESSMENT     Symptom/Staging:  -Most recent ejection fraction: 62%  -ACC/AHA Stage B    Results for orders placed in visit on 06/29/18    Echocardiogram    Narrative  [facility], [address] [place] [address]  Tel [phone] and Fax [phone]    TRANSTHORACIC ECHOCARDIOGRAM REPORT      Patient Name:     TAMARA Nicole Physician:  [id number] Sherlyn Vu MD  Study Date:       7/18/2018       Referring           Irvin Robb  Physician:  MRN/PID:           [id number]        PCP:  Accession/Order#: [id number]       Department          [place] [address]  Location:  YOB: 1945       Fellow:             Sebastian Pena MD  Gender:           F               Nurse:  Admit Date:       7/10/2018       Sonographer:        Anna Zacarias  [facility]  Admission Status: Inpatient -     Additional Staff:  STAT  Height:           147.32 cm       CC Report to:       [address]  Weight:           68.04 kg        Study Type:         Echocardiogram  BSA:              1.61 m2  Blood Pressure: 130 /78 mmHg    Diagnosis/ICD: R07.89 Other chest pain  Indication:    Chest Pain  Procedure/CPT: Echo Complete w/Full Doppler (92950)    Patient History:  Pertinent History: DM, fatigue, Htn.    Study Detail: The following Echo studies were performed: 2D, M-Mode, Doppler and  color flow. Technically challenging study due to prominent lung  artifact.      PHYSICIAN INTERPRETATION:  Left Ventricle: The left ventricular systolic function is hyperdynamic, with an estimated ejection fraction of 65-70%. The left ventricular cavity size is normal. There is mild concentric left ventricular hypertrophy. Spectral Doppler shows an impaired relaxation pattern of left ventricular diastolic filling. Hyperdynamic LV function with color acceleration beginning midcavity with peak dynamic gradient at rest of 18mmHg which increased to 22mmHg with valsalva.  Left Atrium: The left atrium is mildly dilated.  Right Ventricle: The right ventricle is normal in size. There is normal right ventricular global systolic function.  Right Atrium: The right atrium is normal in size. There is a possible device noted in the right atrium.  Aortic Valve: The aortic valve is trileaflet. There is no evidence of aortic valve regurgitation. The peak instantaneous gradient of the aortic valve is 18.1 mmHg.  Mitral Valve: The mitral valve is mildly thickened. There is trace mitral valve regurgitation.  Tricuspid  Valve: The tricuspid valve is structurally normal. There is trace tricuspid regurgitation. The doppler estimated RVSP is mildly elevated at 41.2 mmHg.  Pulmonic Valve: The pulmonic valve is not well visualized. The pulmonic valve regurgitation was not well visualized.  Pericardium: There is a trivial pericardial effusion.  Aorta: The aortic root is normal.  Systemic Veins: The inferior vena cava appears to be of normal size. There is IVC inspiratory collapse greater than 50%.  In comparison to the previous echocardiogram(s): There are no prior studies on this patient for comparison purposes.      CONCLUSIONS:  1. The left ventricular systolic function is hyperdynamic with a 65-70% estimated ejection fraction.  2. Hyperdynamic LV function with color acceleration beginning midcavity with peak dynamic gradient at rest of 18mmHg which increased to 22mmHg with valsalva.  3. Spectral Doppler shows an impaired relaxation pattern of left ventricular diastolic filling.  4. Mildly elevated RVSP.  5. No sinigifcant valvular pahtology.    QUANTITATIVE DATA SUMMARY:  2D MEASUREMENTS:  Normal Ranges:  IVSd:          1.40 cm   (0.6-1.1cm)  LVPWd:         1.28 cm   (0.6-1.1cm)  LVIDd:         3.33 cm   (3.9-5.9cm)  LVIDs:         2.22 cm  LV Mass Index: 93.6 g/m2  LV % FS        33.3 %    LA VOLUME:  Normal Ranges:  LA Vol A4C:        49.4 ml    (22+/-6mL/m2)  LA Vol A2C:        60.1 ml  LA Vol BP:         57.0 ml  LA Vol Index A4C:  30.6ml/m2  LA Vol Index A2C:  37.3 ml/m2  LA Vol Index BP:   35.4 ml/m2  LA Area A4C:       18.0 cm2  LA Area A2C:       20.8 cm2  LA Major Axis A4C: 5.6 cm  LA Major Axis A2C: 6.1 cm  LA Volume Index:   35.2 ml/m2    RA VOLUME BY A/L METHOD:  Normal Ranges:  RA Area A4C: 9.9 cm2    M-MODE MEASUREMENTS:  Normal Ranges:  Ao Root: 2.80 cm (2.0-3.7cm)  LAs:     3.41 cm (2.7-4.0cm)    AORTA MEASUREMENTS:  Normal Ranges:  Asc Ao, d: 2.34 cm (2.1-3.4cm)    LV SYSTOLIC FUNCTION BY 2D PLANIMETRY (MOD):  Normal  Ranges:  EF-A4C View: 74.5 % (>55%)  EF-A2C View: 75.8 %  EF-Biplane:  76.0 %    LV DIASTOLIC FUNCTION:  Normal Ranges:  MV Peak E:        1.04 m/s    (0.7-1.2 m/s)  MV Peak A:        1.09 m/s    (0.42-0.7 m/s)  E/A Ratio:        0.95        (1.0-2.2)  MV e'             0.05 m/s    (>8.0)  MV A Dur:         106.00 msec  E/e' Ratio:       20.80       (<8.0)  MV DT:            230 msec    (150-240 msec)  PulmV Sys Jorge:    69.20 cm/s  PulmV [place] Jorge:   45.80 cm/s  PulmV S/D Jorge:    1.50  PulmV A Revs Jorge: 29.40 cm/s  PulmV A Revs Dur: 79.00 msec    AORTIC VALVE:  Normal Ranges:  AoV Vmax:      2.13 m/s  (<1.7m/s)  AoV Peak P.1 mmHg (<20mmHg)  LVOT Max Jorge:  1.57 m/s  (<1.1m/s)  LVOT VTI:      27.80 cm  LVOT Diameter: 1.70 cm   (1.8-2.4cm)  AoV Area,Vmax: 1.67 cm2  (2.5-4.5cm2)    RIGHT VENTRICLE:  RV 1   3.02 cm  RV 2   1.88 cm  RV 3   5.88 cm  TAPSE: 29.6 mm  RV s'  0.23 m/s    TRICUSPID VALVE/RVSP:  Normal Ranges:  Peak TR Velocity: 3.09 m/s  RV Syst Pressure: 41.2 mmHg (< 30mmHg)  IVC Diam:         1.25 cm    PULMONIC VALVE:  Normal Ranges:  PV Max Jorge: 1.0 m/s  (0.6-0.9m/s)  PV Max PG:  3.8 mmHg    Pulmonary Veins:  PulmV A Revs Dur: 79.00 msec  PulmV A Revs Jorge: 29.40 cm/s  PulmV [place] Jorge:   45.80 cm/s  PulmV S/D Jorge:    1.50  PulmV Sys Jorge:    69.20 cm/s      [id number] Sherlyn Vu MD  Electronically signed on 2018 at 4:50:05 PM         Final       Guideline-Directed Medical Therapy:  -ARNI: No   -If no, then ACEi/ARB?: Yes, describe: Losartan 25mg daily  -Beta Blocker: No  -MRA: Yes, describe: Spironolactone 25mg daily  -SGLT2i: Yes, describe: Jardiance 25mg daily    Secondary Prevention:  -The ASCVD Risk score ([place] WEBB, et al., 2019) failed to calculate for the following reasons:    Risk score cannot be calculated because patient has a medical history suggesting prior/existing ASCVD   -Aspirin 81mg? no  -Statin?: Yes, describe: Atorvastatin 40mg daily  -HTN?: Yes, describe: controlled at last  "OV    CURRENT PHARMACOTHERAPY:   Losartan 25mg daily  Spironolactone 25mg daily  Jardiance 25mg daily  Furosemide 20mg daily    Affordability:  PAP  Adherence/Compliance: reports adherence  Adverse Effects: none reported    Monitoring Weights at Home: Yes; once a month  Home Weight Recordings: unable to assess    Past In Office Weight Readings:   Wt Readings from Last 6 Encounters:   04/07/25 65.3 kg (144 lb)   03/24/25 63.5 kg (140 lb)   02/10/25 63.5 kg (140 lb)   01/27/25 64.4 kg (142 lb)   12/09/24 63.5 kg (140 lb)   11/07/24 62.6 kg (138 lb)       Monitoring Blood Pressure at Home: Yes; \"sporadically\"  Home BP Recordings: average SBP low 100s    Past In Office BP Readings:   BP Readings from Last 6 Encounters:   04/15/25 126/65   04/10/25 128/72   04/10/25 110/60   04/08/25 123/64   04/07/25 128/85   04/03/25 116/67       HEALTH MANAGEMENT    Maintaining fluid restriction (<2 L/day): N/A  Edema/swelling: No; patient wears compression socks  Shortness of breath: No  Trouble sleeping/lying down: No  Dry/hacking cough: No  Recent Hospitalizations: No      EDUCATION/COUNSELING:   - Counseled patient on MOA, expectations, duration of therapy, contraindications, administration, and monitoring parameters  - Counseled patient on lifestyle modifications that can decrease your risk of having complications (smoking cessation, losing weight, daily weights, vaccines)  - Counseled patient on fluid intake and weight management. Recommended to not consume more than 2 liters of fliuids per day. If they have gained more than 2-3 pounds within a 24 hours period (or 5 pounds in a week), contact their cardiologist  - Answered all patient questions and concerns     DISCUSSION/NOTES:   Kaelyn states Elida is doing well on anticoagulation therapy. Patient reports adherence, no adverse effects, and denies s/s of bleeding.   Kaelyn reports Elida is tolerating her heart failure regimen well reporting adherence, no adverse effects and " denies s/s of worsening heart failure. Kaelyn reports her SBP averages around low 100s, patient denies dizziness/lightheadedness.   Discussed with Kaelyn the refill and auto refill process through [facility] pharmacy, she verbalized understanding. [facility] to request refill for Jardiance and Eliquis on patient behalf.    ASSESSMENT:    Assessment/Plan   Problem List Items Addressed This Visit       Atrial fibrillation (Multi)    Patient age, weight and renal function appropriate to continue Eliquis 5mg twice daily.          Relevant Orders    Referral to Clinical Pharmacy    Acute on chronic diastolic heart failure    Patient currently on 3 of 4 GDMT medications. Renal function and potassium level appropriate to continue current regimen.      Labs (03/24/2025): Scr-0.73 eGFR-84 K-4.3         Relevant Orders    Referral to Clinical Pharmacy         RECOMMENDATIONS/PLAN:    CONTINUE  Eliquis 5mg twice daily  Losartan 25mg daily  Spironolactone 25mg daily  Jardiance 25mg daily  Furosemide 20mg daily    Last Appnt with Referring Provider: 01/27/2025  Next Appnt with Referring Provider: 07/28/2025  Clinical Pharmacist follow up: 3 months  VAF/Application Expiration: 11/15/2025  Type of Encounter: Linnea Couch, PharmD    Verbal consent to manage patient's drug therapy was obtained from an individual authorized to act on behalf of a patient. They were informed they may decline to participate or withdraw from participation in pharmacy services at any time.    Continue all meds under the continuation of care with the referring provider and clinical pharmacy team.

## 2025-04-18 ENCOUNTER — HOME CARE VISIT (OUTPATIENT)
Dept: HOME HEALTH SERVICES | Facility: HOME HEALTH | Age: 80
End: 2025-04-18
Payer: MEDICARE

## 2025-04-18 ENCOUNTER — APPOINTMENT (OUTPATIENT)
Dept: PHARMACY | Facility: HOSPITAL | Age: 80
End: 2025-04-18
Payer: MEDICARE

## 2025-04-18 VITALS
DIASTOLIC BLOOD PRESSURE: 64 MMHG | SYSTOLIC BLOOD PRESSURE: 110 MMHG | TEMPERATURE: 97.8 F | HEART RATE: 78 BPM | OXYGEN SATURATION: 96 %

## 2025-04-18 DIAGNOSIS — I50.33 ACUTE ON CHRONIC DIASTOLIC HEART FAILURE: ICD-10-CM

## 2025-04-18 DIAGNOSIS — I48.0 PAROXYSMAL ATRIAL FIBRILLATION (MULTI): ICD-10-CM

## 2025-04-18 PROCEDURE — RXMED WILLOW AMBULATORY MEDICATION CHARGE

## 2025-04-18 PROCEDURE — G0156 HHCP-SVS OF AIDE,EA 15 MIN: HCPCS | Mod: HHH

## 2025-04-18 SDOH — ECONOMIC STABILITY: HOUSING INSECURITY: HOME SAFETY: DISCUSSED 2 PERSON CARRY NEEDED IN AND OUT OF HOME AS PT CURRENTLY NONAMBULATORY.

## 2025-04-18 ASSESSMENT — ENCOUNTER SYMPTOMS
PERSON REPORTING PAIN: PATIENT
PAIN LOCATION - PAIN SEVERITY: 5/10
HIGHEST PAIN SEVERITY IN PAST 24 HOURS: 8/10
PAIN LOCATION - PAIN SEVERITY: 2/10
LOWEST PAIN SEVERITY IN PAST 24 HOURS: 3/10
PAIN LOCATION: LEFT SHOULDER
PERSON REPORTING PAIN: PATIENT
PAIN SEVERITY GOAL: 1/10
PAIN: 1
SUBJECTIVE PAIN PROGRESSION: WAXING AND WANING
PAIN LOCATION - PAIN SEVERITY: 3/10
HIGHEST PAIN SEVERITY IN PAST 24 HOURS: 5/10
SUBJECTIVE PAIN PROGRESSION: UNCHANGED
PAIN: 1
PAIN LOCATION: BACK
PAIN LOCATION: RIGHT SHOULDER
LOWEST PAIN SEVERITY IN PAST 24 HOURS: 0/10

## 2025-04-18 NOTE — Clinical Note
Rafa Ignacio,  Today I spoke with Kaelyn (dtr) about Elida's Eliquis and heart medications. Kaelyn states Elida is doing well on anticoagulation therapy and her current heart failure regimen. Patient reports adherence, no adverse effects, and denies s/s of bleeding/worsening heart failure. Kaelyn reports patient SBP averages around low 100s, patient denies dizziness/lightheadedness. Plan to continue current regimen and follow up in 3 months. Thank you!

## 2025-04-18 NOTE — ASSESSMENT & PLAN NOTE
Patient currently on 3 of 4 GDMT medications. Renal function and potassium level appropriate to continue current regimen.      Labs (03/24/2025): Scr-0.73 eGFR-84 K-4.3

## 2025-04-19 NOTE — CASE COMMUNICATION
Reporting pt has likely experienced worsening depression.  She is tearful today.  Not engaging in PT on a regular basis and largely remains in her hospital bed.  Remains homebound and heavily dependent on caregiver daughter.  She requires ambulance or 2 man carry out of the home in case of emergency.  Has been nonambulatory for at least 3 months now.  I asked her to make an appointment to see if any changes in meds could help her to att ain PT goals.

## 2025-04-19 NOTE — HOME HEALTH
Pt visited for PT for reasessment.  Pt has made marginal progress due to lack of motivation and pt admits depression. Tearful during visit today.  Feels she has nothing to do and shes upset because her refridgerator is not working properly.   Dtr cg felt pain was her greatest limitation but patient states she is just overal disgusted with her life in general.  Discussed importance of engagement in PT poc.  Pt still remains largely in bed.  She made committement to get out of bed daily and into wheelchair for a few hours.  Pt also stated she would participate in therapy.

## 2025-04-21 ENCOUNTER — HOME CARE VISIT (OUTPATIENT)
Dept: HOME HEALTH SERVICES | Facility: HOME HEALTH | Age: 80
End: 2025-04-21
Payer: MEDICARE

## 2025-04-21 PROCEDURE — G0156 HHCP-SVS OF AIDE,EA 15 MIN: HCPCS | Mod: HHH

## 2025-04-22 ENCOUNTER — PHARMACY VISIT (OUTPATIENT)
Dept: PHARMACY | Facility: CLINIC | Age: 80
End: 2025-04-22
Payer: MEDICARE

## 2025-04-22 ENCOUNTER — HOME CARE VISIT (OUTPATIENT)
Dept: HOME HEALTH SERVICES | Facility: HOME HEALTH | Age: 80
End: 2025-04-22
Payer: MEDICARE

## 2025-04-22 PROCEDURE — G0158 HHC OT ASSISTANT EA 15: HCPCS | Mod: CO,HHH

## 2025-04-23 ENCOUNTER — HOME CARE VISIT (OUTPATIENT)
Dept: HOME HEALTH SERVICES | Facility: HOME HEALTH | Age: 80
End: 2025-04-23
Payer: MEDICARE

## 2025-04-23 PROCEDURE — G0157 HHC PT ASSISTANT EA 15: HCPCS | Mod: CQ,HHH

## 2025-04-23 PROCEDURE — G0156 HHCP-SVS OF AIDE,EA 15 MIN: HCPCS | Mod: HHH

## 2025-04-24 ENCOUNTER — HOME CARE VISIT (OUTPATIENT)
Dept: HOME HEALTH SERVICES | Facility: HOME HEALTH | Age: 80
End: 2025-04-24
Payer: MEDICARE

## 2025-04-24 PROCEDURE — G0158 HHC OT ASSISTANT EA 15: HCPCS | Mod: CO,HHH

## 2025-04-25 ENCOUNTER — HOME CARE VISIT (OUTPATIENT)
Dept: HOME HEALTH SERVICES | Facility: HOME HEALTH | Age: 80
End: 2025-04-25
Payer: MEDICARE

## 2025-04-25 PROCEDURE — G0157 HHC PT ASSISTANT EA 15: HCPCS | Mod: CQ,HHH

## 2025-04-27 VITALS
HEART RATE: 67 BPM | DIASTOLIC BLOOD PRESSURE: 67 MMHG | RESPIRATION RATE: 16 BRPM | OXYGEN SATURATION: 98 % | TEMPERATURE: 97.9 F | SYSTOLIC BLOOD PRESSURE: 123 MMHG

## 2025-04-27 ASSESSMENT — ENCOUNTER SYMPTOMS
PAIN: 1
PAIN LOCATION - EXACERBATING FACTORS: MOVING
PAIN LOCATION - PAIN QUALITY: ACHING
PAIN LOCATION - RELIEVING FACTORS: REST
PAIN LOCATION: LEFT SHOULDER
PAIN LOCATION: BACK
PAIN LOCATION - PAIN FREQUENCY: INTERMITTENT
LOWEST PAIN SEVERITY IN PAST 24 HOURS: 2/10
HIGHEST PAIN SEVERITY IN PAST 24 HOURS: 3/10
PAIN SEVERITY GOAL: 0/10
PAIN LOCATION - PAIN SEVERITY: 2/10
PAIN LOCATION - PAIN SEVERITY: 3/10
PERSON REPORTING PAIN: PATIENT

## 2025-04-28 ENCOUNTER — HOME CARE VISIT (OUTPATIENT)
Dept: HOME HEALTH SERVICES | Facility: HOME HEALTH | Age: 80
End: 2025-04-28
Payer: MEDICARE

## 2025-04-28 DIAGNOSIS — E11.59 TYPE 2 DIABETES MELLITUS WITH OTHER CIRCULATORY COMPLICATION, WITHOUT LONG-TERM CURRENT USE OF INSULIN: Primary | ICD-10-CM

## 2025-04-28 DIAGNOSIS — G89.4 CHRONIC PAIN SYNDROME: ICD-10-CM

## 2025-04-28 DIAGNOSIS — I50.33 ACUTE ON CHRONIC DIASTOLIC HEART FAILURE: ICD-10-CM

## 2025-04-28 PROCEDURE — G0156 HHCP-SVS OF AIDE,EA 15 MIN: HCPCS | Mod: HHH

## 2025-04-28 RX ORDER — GABAPENTIN 600 MG/1
600 TABLET ORAL 3 TIMES DAILY
Qty: 90 TABLET | Refills: 0 | Status: SHIPPED | OUTPATIENT
Start: 2025-04-28

## 2025-04-28 RX ORDER — ALOGLIPTIN 12.5 MG/1
12.5 TABLET, FILM COATED ORAL DAILY
Qty: 90 TABLET | Refills: 1 | Status: SHIPPED | OUTPATIENT
Start: 2025-04-28

## 2025-04-29 ENCOUNTER — HOME CARE VISIT (OUTPATIENT)
Dept: HOME HEALTH SERVICES | Facility: HOME HEALTH | Age: 80
End: 2025-04-29
Payer: MEDICARE

## 2025-04-29 VITALS
RESPIRATION RATE: 16 BRPM | SYSTOLIC BLOOD PRESSURE: 108 MMHG | TEMPERATURE: 97 F | HEART RATE: 68 BPM | DIASTOLIC BLOOD PRESSURE: 62 MMHG | OXYGEN SATURATION: 98 %

## 2025-04-29 PROCEDURE — G0158 HHC OT ASSISTANT EA 15: HCPCS | Mod: CO,HHH

## 2025-04-30 ENCOUNTER — HOME CARE VISIT (OUTPATIENT)
Dept: HOME HEALTH SERVICES | Facility: HOME HEALTH | Age: 80
End: 2025-04-30
Payer: MEDICARE

## 2025-04-30 ENCOUNTER — TELEMEDICINE (OUTPATIENT)
Dept: CARDIOLOGY | Facility: HOSPITAL | Age: 80
End: 2025-04-30
Payer: MEDICARE

## 2025-04-30 VITALS — DIASTOLIC BLOOD PRESSURE: 80 MMHG | SYSTOLIC BLOOD PRESSURE: 106 MMHG

## 2025-04-30 DIAGNOSIS — E85.4 CARDIAC AMYLOIDOSIS: ICD-10-CM

## 2025-04-30 DIAGNOSIS — I43 CARDIAC AMYLOIDOSIS: ICD-10-CM

## 2025-04-30 DIAGNOSIS — I73.89 OTHER SPECIFIED PERIPHERAL VASCULAR DISEASES: ICD-10-CM

## 2025-04-30 DIAGNOSIS — I10 HYPERTENSION, ESSENTIAL: ICD-10-CM

## 2025-04-30 PROCEDURE — 3074F SYST BP LT 130 MM HG: CPT | Performed by: STUDENT IN AN ORGANIZED HEALTH CARE EDUCATION/TRAINING PROGRAM

## 2025-04-30 PROCEDURE — 1159F MED LIST DOCD IN RCRD: CPT | Performed by: STUDENT IN AN ORGANIZED HEALTH CARE EDUCATION/TRAINING PROGRAM

## 2025-04-30 PROCEDURE — 99215 OFFICE O/P EST HI 40 MIN: CPT | Performed by: STUDENT IN AN ORGANIZED HEALTH CARE EDUCATION/TRAINING PROGRAM

## 2025-04-30 PROCEDURE — G0157 HHC PT ASSISTANT EA 15: HCPCS | Mod: CQ,HHH

## 2025-04-30 PROCEDURE — G0156 HHCP-SVS OF AIDE,EA 15 MIN: HCPCS | Mod: HHH

## 2025-04-30 PROCEDURE — 1036F TOBACCO NON-USER: CPT | Performed by: STUDENT IN AN ORGANIZED HEALTH CARE EDUCATION/TRAINING PROGRAM

## 2025-04-30 PROCEDURE — 3079F DIAST BP 80-89 MM HG: CPT | Performed by: STUDENT IN AN ORGANIZED HEALTH CARE EDUCATION/TRAINING PROGRAM

## 2025-04-30 RX ORDER — LOSARTAN POTASSIUM 25 MG/1
25 TABLET ORAL DAILY
Qty: 30 TABLET | Refills: 11 | Status: SHIPPED | OUTPATIENT
Start: 2025-04-30 | End: 2026-04-30

## 2025-04-30 NOTE — PROGRESS NOTES
"Referring Clinician: Dr.J Ignacio  Accompanied by: daughter Kaelyn   Patient ID: Elida Benson   Primary Cardiologist:  Primary Care Provider: David Swift MD   Visit Type:  Follow Up   On Call: Patient , adult daughter Kaelyn      Verbal consent was requested and obtained from patient on this date for a telehealth visit.    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  She has a past medical history significant for hypertension, diabetes mellitus, dementia, pAF maintained on amiodarone and DOAC, right drop foot ? cause) since ~  2020, HFpEF.  Ms. Benson was hospitalized 4/2024 with dyspnea and was treated for pneumonia, and acutely decompensated heart failure.  On her most recent TTE 8/2023 LVEF 55%, left ventricular thickening.  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  She is due to have cardiac MRI 6/2024.  She had normal kappa: Lambda ratio, with normal urine protein electrophoresis.   (4/2024)  Cardiac MRI 10/2024 showed changes consistent with amyloidosis. SPEP was normal  Technetium pyrophosphate scan 4/2024 was grade 3.  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  Previously, her beta-blocker was discontinued and her hypertensive control has been very low, she tends to run with SBP 80-90 mmHg  She was hospitalized earlier this year (2025) with pneumonia.  At last visit her antihypertensive medications were de-escalated due to hypotension and this has been well tolerated.    Elida Benson and her daughter reports that her legs have been \"cold and dark\" for \"a few days\".  No other lower limb symptoms legs have beemn col d and dark few days\".  She denies other cardiovascular symptoms today.    Her blood pressure has been monitored at home and has been \"normal\".  Functionally, exercise capacity is described as limited by imbalance, and right foot drop. " "    She is fully adherent with prescribed medications, her daughter helps with this.     Her last HF hospitalisation was 4/2024.    Surgical Hx:  -Ileostomy 2023    Past Obstetric Hx:  - G 3  - No cardiac complications of pregnancy    Social Hx:  - Smoking- quit > 50 years   - ETOH - 1 drink on special occasions.  Very rare use.  - Illicit drugs-never  - Lives alone normally, relatives visit often, several times a day    Family Hx:  Specifically, there is no family history of  CAD, heart failure, ICD, PPM, LVAD, OHT, arrhythmias, CVA, or sudden cardiac death.    Medication reconciliation completed, see below.     Medication Documentation Review Audit       Reviewed by Helen Byrd RN (Registered Nurse) on 04/30/25 at 1404      Medication Order Taking? Sig Documenting Provider Last Dose Status   acetaminophen (Tylenol) 325 mg tablet [id number] Yes Give 2 tablet by mouth every 4 hours as needed for Pain Historical Provider, MD  Active     Discontinued 04/28/25 1918   alogliptin (Nesina) 12.5 mg tablet [id number] Yes Take 1 tablet (12.5 mg) by mouth once daily. David Swift MD  Active   amiodarone (Pacerone) 200 mg tablet [id number] Yes TAKE ONE TABLET BY MOUTH IN THE MORNING FOR ANTIARRHYTHMIC Cesar ORLANDO MD  Active   apixaban (Eliquis) 5 mg tablet [id number] Yes Take 1 tablet (5 mg) by mouth 2 times a day. Cesar ORLANDO MD  Active   atorvastatin (Lipitor) 40 mg tablet [id number] Yes Take 1 tablet (40 mg) by mouth once daily at bedtime. David Swift MD  Active   b complex 0.4 mg tablet [id number] Yes Take 1 tablet by mouth once daily. Historical Provider, MD  Active   baclofen (Lioresal) 5 mg tablet [id number] Yes TAKE ONE TABLET BY MOUTH TWO TIMES A DAY Jf Perdue MD  Active   BD Ailyn 2nd Gen Pen Needle 32 gauge x 5/32\" needle [id number] Yes USE 4 TIMES A DAY Historical Provider, MD  Active   benzonatate (Tessalon) 100 mg capsule [id number] Yes TAKE 1 CAPSULE BY MOUTH 3 TIMES A DAY AS NEEDED FOR " COUGH. DO NOT CRUSH OR CHEW. David Swift MD  Active   biotin 1 mg tablet [id number] Yes 1 tab(s) orally once a day Historical Provider, MD  Active   cephalexin (Keflex) 500 mg capsule [id number] Yes Take 1 capsule (500 mg) by mouth 2 times a day. Sebastian Lo MD  Active   cholecalciferol (Vitamin D-3) 1,250 mcg (50,000 unit) capsule [id number] Yes Take 1 capsule (50,000 Units) by mouth 1 (one) time per week.  Historical Provider, MD  Active   docusate sodium (Colace) 100 mg capsule [id number] Yes Take 1 capsule (100 mg) by mouth 2 times a day as needed for constipation. Historical Provider, MD  Active   donepezil (Aricept) 5 mg tablet [id number] Yes TAKE ONE TABLET BY MOUTH AT BEDTIME David Swift MD  Active   DULoxetine (Cymbalta) 30 mg DR capsule [id number] Yes TAKE ONE CAPSULE BY MOUTH TWO TIMES A DAY David Swift MD  Active     Discontinued 25   empagliflozin (Jardiance) 25 mg tablet [id number] Yes Take 1 tablet (25 mg) by mouth once daily. David Swift MD  Active   fluticasone (Flonase) 50 mcg/actuation nasal spray [id number]  Administer 2 sprays into each nostril once daily. Shake gently. Before first use, prime pump. After use, clean tip and replace cap.   Patient taking differently: Administer 2 sprays into each nostril once daily as needed for allergies. Shake gently. Before first use, prime pump. After use, clean tip and replace cap.    David Swift MD   25   furosemide (Lasix) 20 mg tablet [id number] Yes Take 1 tablet (20 mg) by mouth once daily. Lori Dueñas MD PhD  Active     Discontinued 25   gabapentin (Neurontin) 600 mg tablet [id number] Yes Take 1 tablet (600 mg) by mouth 3 times a day. David Swift MD  Active   insulin glargine (Lantus Solostar U-100 Insulin) 100 unit/mL (3 mL) pen [id number] Yes Inject 3 Units under the skin once daily in the morning. As needed for sugars over 200. Historical Provider, MD  Active   insulin lispro  (HumaLOG) 100 unit/mL pen [id number] Yes Inject 1 Units under the skin 4 times a day. inject subcutaneiously before meals and at bedtime for hyperglycemia  per sliding scale  151-200 1 unit 201-250 2units 251-300 3 units 350 4 units 351-400 5units notifiy Md if if greater than 400, notify md if lower than 40. David Swift MD  Active   insulin lispro (HumaLOG) 100 unit/mL pen [id number] Yes 3 Units 3 times daily (morning, midday, late afternoon). inject  3 units before meals. Historical Provider, MD  Active   losartan (Cozaar) 25 mg tablet [id number] Yes Take 1 tablet (25 mg) by mouth once daily. Lori Dueñas MD PhD  Active   melatonin 3 mg tablet [id number] Yes Take 2 tablets (6 mg) by mouth as needed at bedtime for sleep. Historical Provider, MD  Active   metoprolol succinate XL (Toprol XL) 25 mg 24 hr tablet [id number] Yes Take by mouth once every 24 hours. Historical Provider, MD  Active   naloxone (Narcan) 4 mg/0.1 mL nasal spray [id number] Yes Administer 1 spray (4 mg) into affected nostril(s) if needed for opioid reversal. May repeat every 2-3 minutes if needed, alternating nostrils, until medical assistance becomes available. Jf Perdue MD  Active   ondansetron ODT (Zofran-ODT) 4 mg disintegrating tablet [id number] Yes Take 1 tablet (4 mg) by mouth every 8 hours if needed for nausea or vomiting. Terrell Dixon PA-C  Active   OneTouch Delica Plus Lancet 33 gauge misc [id number] Yes USE AS DIRECTED THREE TIMES A DAY Historical Provider, MD  Active   OneTouch Ultra Test strip [id number] Yes USE AS DIRECTED TO TEST 3 TIMES DAILY Historical Provider, MD  Active   OneTouch Ultra2 Meter misc [id number] Yes use as directed Historical Provider, MD  Active   SITagliptin phosphate (Januvia) 50 mg tablet [id number] Yes Take 1 tablet (50 mg) by mouth once daily. David Swift MD  Active   spironolactone (Aldactone) 25 mg tablet [id number] Yes Take 1 tablet (25 mg) by mouth once daily. Lori Dueñas MD PhD   Active   tafamidis (Vyndamax) 61 mg capsule [id number] Yes Take 1 capsule (61 mg) by mouth once daily. Lori Dueñas MD PhD  Active   topiramate (Topamax) 100 mg tablet [id number] Yes Take 1 tablet (100 mg) by mouth 2 times a day. Kaykay Palafox MD  Active   traMADol (Ultram) 50 mg tablet [id number] Yes Take by mouth every 12 hours. Historical Provider, MD  Active   traZODone (Desyrel) 50 mg tablet [id number] Yes Take 1 tablet (50 mg) by mouth as needed at bedtime for sleep. David Swift MD  Active                      Allergies   Allergen Reactions    Shellfish Containing Products Swelling      Investigations:    The electronic medical record has been reviewed by me for salient history. All cardiovascular imaging and testing available in the electronic medical record, and [employer] has been reviewed.       Visit Vitals  /80   OB Status Menopausal   Smoking Status Former          O/E:  No in person physical examination done  Very pleasant elderly -American woman in no apparent cardiopulmonary or painful distress  AO x 4  Apparent conversational SOB: None    Lab Results   Component Value Date    WBC 6.2 03/24/2025    HGB 14.5 03/24/2025    HCT 44.7 03/24/2025    MCV 94.1 03/24/2025     03/24/2025       Chemistry    Lab Results   Component Value Date/Time     03/24/2025 1658    K 4.3 03/24/2025 1658     03/24/2025 1658    CO2 21 03/24/2025 1658    BUN 24 03/24/2025 1658    CREATININE 0.73 03/24/2025 1658    Lab Results   Component Value Date/Time    CALCIUM 8.9 03/24/2025 1658    ALKPHOS 70 03/24/2025 1658    AST 22 03/24/2025 1658    ALT 26 03/24/2025 1658    BILITOT 0.5 03/24/2025 1658          IMPRESSION:    79 y.o. retired  [employer] who presents for advanced heart failure care.  She has a past medical history significant for hypertension, diabetes mellitus, dementia, pAF maintained on amiodarone and DOAC, right drop foot ? cause) since ~  2020, HFpEF.  Ms. Benson was  hospitalized 4/2024 with dyspnea and was treated for pneumonia, and acutely decompensated heart failure.  On her most recent TTE 8/2023 LVEF 55%, left ventricular thickening.  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  She is due to have cardiac MRI 6/2024.  She had normal kappa: Lambda ratio, with normal urine protein electrophoresis.   (4/2024)  Cardiac MRI 10/2024 showed changes consistent with amyloidosis. SPEP was normal  Technetium pyrophosphate scan 4/2024 was grade 3.  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  Today she reports cold and dark lower legs without pain.    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 There may be grammatical, punctuation, or verbiage errors that can occur with voice recognition programs.    Lori Dueñas MD PhD

## 2025-05-01 ENCOUNTER — TELEPHONE (OUTPATIENT)
Dept: PRIMARY CARE | Facility: CLINIC | Age: 80
End: 2025-05-01
Payer: MEDICARE

## 2025-05-01 ENCOUNTER — HOME CARE VISIT (OUTPATIENT)
Dept: HOME HEALTH SERVICES | Facility: HOME HEALTH | Age: 80
End: 2025-05-01
Payer: MEDICARE

## 2025-05-01 VITALS
OXYGEN SATURATION: 98 % | RESPIRATION RATE: 16 BRPM | SYSTOLIC BLOOD PRESSURE: 101 MMHG | HEART RATE: 65 BPM | DIASTOLIC BLOOD PRESSURE: 65 MMHG | TEMPERATURE: 97 F

## 2025-05-01 DIAGNOSIS — E11.9 TYPE 2 DIABETES MELLITUS WITHOUT COMPLICATION, WITH LONG-TERM CURRENT USE OF INSULIN: Primary | ICD-10-CM

## 2025-05-01 DIAGNOSIS — Z79.4 TYPE 2 DIABETES MELLITUS WITHOUT COMPLICATION, WITH LONG-TERM CURRENT USE OF INSULIN: Primary | ICD-10-CM

## 2025-05-01 PROCEDURE — G0158 HHC OT ASSISTANT EA 15: HCPCS | Mod: CO,HHH

## 2025-05-01 ASSESSMENT — ENCOUNTER SYMPTOMS
PAIN LOCATION - EXACERBATING FACTORS: MOVING
PAIN: 1
SUBJECTIVE PAIN PROGRESSION: UNCHANGED
PAIN LOCATION - PAIN FREQUENCY: INTERMITTENT
PAIN LOCATION - PAIN SEVERITY: 3/10
PAIN SEVERITY GOAL: 1/10
PAIN LOCATION: BACK
LOWEST PAIN SEVERITY IN PAST 24 HOURS: 2/10
PAIN LOCATION: LEFT SHOULDER
PAIN LOCATION - PAIN SEVERITY: 1/10
PAIN LOCATION - PAIN SEVERITY: 2/10
PAIN LOCATION - PAIN QUALITY: ACHING
HIGHEST PAIN SEVERITY IN PAST 24 HOURS: 4/10
PERSON REPORTING PAIN: PATIENT
PAIN LOCATION: RIGHT HIP
PAIN SEVERITY GOAL: 0/10
PAIN LOCATION - PAIN SEVERITY: 4/10
PERSON REPORTING PAIN: PATIENT
PAIN LOCATION - RELIEVING FACTORS: REST
LOWEST PAIN SEVERITY IN PAST 24 HOURS: 1/10
PAIN LOCATION: LEFT SHOULDER
HIGHEST PAIN SEVERITY IN PAST 24 HOURS: 2/10
PAIN: 1
SUBJECTIVE PAIN PROGRESSION: UNCHANGED
PAIN LOCATION: BACK
PAIN LOCATION - PAIN SEVERITY: 2/10

## 2025-05-01 NOTE — TELEPHONE ENCOUNTER
Patient's daughter called and stated she could not  Alogliptin. Insurance wants patient to try Januvia or complete PA.    Patient's daughter also wants to know should patient continue Farxiga, she's running out and needs refills, Daughter states Farxiga was given from nursing home.

## 2025-05-02 ENCOUNTER — HOME CARE VISIT (OUTPATIENT)
Dept: HOME HEALTH SERVICES | Facility: HOME HEALTH | Age: 80
End: 2025-05-02
Payer: MEDICARE

## 2025-05-02 DIAGNOSIS — I42.8 INFILTRATIVE CARDIOMYOPATHY (MULTI): ICD-10-CM

## 2025-05-02 DIAGNOSIS — I50.33 ACUTE ON CHRONIC DIASTOLIC HEART FAILURE: ICD-10-CM

## 2025-05-02 DIAGNOSIS — E11.9 TYPE 2 DIABETES MELLITUS WITHOUT RETINOPATHY (MULTI): Primary | ICD-10-CM

## 2025-05-02 PROCEDURE — G0151 HHCP-SERV OF PT,EA 15 MIN: HCPCS | Mod: HHH

## 2025-05-02 RX ORDER — ATORVASTATIN CALCIUM 40 MG/1
40 TABLET, FILM COATED ORAL NIGHTLY
Qty: 30 TABLET | Refills: 0 | Status: SHIPPED | OUTPATIENT
Start: 2025-05-02

## 2025-05-02 RX ORDER — BLOOD SUGAR DIAGNOSTIC
STRIP MISCELLANEOUS
Qty: 100 EACH | Refills: 1 | Status: SHIPPED | OUTPATIENT
Start: 2025-05-02

## 2025-05-02 RX ORDER — SPIRONOLACTONE 25 MG/1
25 TABLET ORAL DAILY
Qty: 30 TABLET | Refills: 11 | Status: SHIPPED | OUTPATIENT
Start: 2025-05-02 | End: 2026-05-02

## 2025-05-02 RX ORDER — LANCETS 33 GAUGE
EACH MISCELLANEOUS
Qty: 100 EACH | Refills: 1 | Status: SHIPPED | OUTPATIENT
Start: 2025-05-02

## 2025-05-02 SDOH — ECONOMIC STABILITY: HOUSING INSECURITY: HOME SAFETY: PT WOULD BENEFIT FROM RAMP

## 2025-05-02 ASSESSMENT — ENCOUNTER SYMPTOMS
PERSON REPORTING PAIN: PATIENT
DENIES PAIN: 1

## 2025-05-03 VITALS
DIASTOLIC BLOOD PRESSURE: 78 MMHG | OXYGEN SATURATION: 96 % | TEMPERATURE: 97.4 F | SYSTOLIC BLOOD PRESSURE: 126 MMHG | HEART RATE: 76 BPM

## 2025-05-03 NOTE — HOME HEALTH
Pt visited for PT to address strengthening les and core, wheelchair mobility and standing.  [facility] supervisory also completed.

## 2025-05-05 ENCOUNTER — APPOINTMENT (OUTPATIENT)
Dept: CARDIOLOGY | Facility: CLINIC | Age: 80
End: 2025-05-05
Payer: MEDICARE

## 2025-05-05 ENCOUNTER — APPOINTMENT (OUTPATIENT)
Dept: PRIMARY CARE | Facility: CLINIC | Age: 80
End: 2025-05-05
Payer: MEDICARE

## 2025-05-06 ENCOUNTER — HOME CARE VISIT (OUTPATIENT)
Dept: HOME HEALTH SERVICES | Facility: HOME HEALTH | Age: 80
End: 2025-05-06
Payer: MEDICARE

## 2025-05-06 ENCOUNTER — SPECIALTY PHARMACY (OUTPATIENT)
Dept: PHARMACY | Facility: CLINIC | Age: 80
End: 2025-05-06

## 2025-05-06 PROCEDURE — G0158 HHC OT ASSISTANT EA 15: HCPCS | Mod: CO,HHH

## 2025-05-06 ASSESSMENT — ENCOUNTER SYMPTOMS
PERSON REPORTING PAIN: PATIENT
PAIN LOCATION - PAIN SEVERITY: 4/10
PAIN LOCATION - EXACERBATING FACTORS: MOVING
PAIN LOCATION - PAIN QUALITY: ACHING
PAIN LOCATION - PAIN FREQUENCY: INTERMITTENT
PAIN LOCATION - PAIN SEVERITY: 2/10
PAIN LOCATION: RIGHT HIP
PAIN LOCATION - PAIN QUALITY: ACHING
PAIN SEVERITY GOAL: 1/10
PAIN LOCATION - RELIEVING FACTORS: REST
PAIN LOCATION - EXACERBATING FACTORS: MOVING
PAIN LOCATION: BACK
SUBJECTIVE PAIN PROGRESSION: UNCHANGED
PAIN LOCATION - RELIEVING FACTORS: REST
PAIN LOCATION - PAIN FREQUENCY: INTERMITTENT
PAIN: 1
LOWEST PAIN SEVERITY IN PAST 24 HOURS: 2/10
PAIN LOCATION - PAIN QUALITY: ACHING
PAIN LOCATION: LEFT SHOULDER
HIGHEST PAIN SEVERITY IN PAST 24 HOURS: 4/10
PAIN LOCATION - EXACERBATING FACTORS: MOVING
PAIN LOCATION - RELIEVING FACTORS: REST
PAIN LOCATION - PAIN SEVERITY: 1/10
PAIN LOCATION - PAIN FREQUENCY: INTERMITTENT

## 2025-05-07 ENCOUNTER — HOME CARE VISIT (OUTPATIENT)
Dept: HOME HEALTH SERVICES | Facility: HOME HEALTH | Age: 80
End: 2025-05-07
Payer: MEDICARE

## 2025-05-07 ENCOUNTER — HOSPITAL ENCOUNTER (OUTPATIENT)
Facility: CLINIC | Age: 80
Setting detail: OUTPATIENT SURGERY
Discharge: HOME | End: 2025-05-07
Attending: OPHTHALMOLOGY | Admitting: OPHTHALMOLOGY
Payer: MEDICARE

## 2025-05-07 VITALS
BODY MASS INDEX: 30.23 KG/M2 | DIASTOLIC BLOOD PRESSURE: 88 MMHG | RESPIRATION RATE: 16 BRPM | OXYGEN SATURATION: 100 % | WEIGHT: 144 LBS | HEART RATE: 87 BPM | HEIGHT: 58 IN | TEMPERATURE: 97.5 F | SYSTOLIC BLOOD PRESSURE: 151 MMHG

## 2025-05-07 DIAGNOSIS — H25.811 COMBINED FORMS OF AGE-RELATED CATARACT OF RIGHT EYE: Primary | ICD-10-CM

## 2025-05-07 LAB — GLUCOSE BLD MANUAL STRIP-MCNC: 102 MG/DL (ref 74–99)

## 2025-05-07 PROCEDURE — 3600000008 HC OR TIME - EACH INCREMENTAL 1 MINUTE - PROCEDURE LEVEL THREE: Performed by: OPHTHALMOLOGY

## 2025-05-07 PROCEDURE — 2760000001 HC OR 276 NO HCPCS: Performed by: OPHTHALMOLOGY

## 2025-05-07 PROCEDURE — A66982 PR REMV CATARACT EXTRACAP,INSERT LENS,COMP: Performed by: ANESTHESIOLOGY

## 2025-05-07 PROCEDURE — 2500000004 HC RX 250 GENERAL PHARMACY W/ HCPCS (ALT 636 FOR OP/ED): Performed by: OPHTHALMOLOGY

## 2025-05-07 PROCEDURE — 2500000004 HC RX 250 GENERAL PHARMACY W/ HCPCS (ALT 636 FOR OP/ED): Mod: JZ | Performed by: ANESTHESIOLOGIST ASSISTANT

## 2025-05-07 PROCEDURE — 99100 ANES PT EXTEME AGE<1 YR&>70: CPT | Performed by: ANESTHESIOLOGY

## 2025-05-07 PROCEDURE — 2500000005 HC RX 250 GENERAL PHARMACY W/O HCPCS: Performed by: OPHTHALMOLOGY

## 2025-05-07 PROCEDURE — A66982 PR REMV CATARACT EXTRACAP,INSERT LENS,COMP: Performed by: ANESTHESIOLOGIST ASSISTANT

## 2025-05-07 PROCEDURE — 2720000007 HC OR 272 NO HCPCS: Performed by: OPHTHALMOLOGY

## 2025-05-07 PROCEDURE — 82947 ASSAY GLUCOSE BLOOD QUANT: CPT

## 2025-05-07 PROCEDURE — 7100000009 HC PHASE TWO TIME - INITIAL BASE CHARGE: Performed by: OPHTHALMOLOGY

## 2025-05-07 PROCEDURE — 66984 XCAPSL CTRC RMVL W/O ECP: CPT | Performed by: OPHTHALMOLOGY

## 2025-05-07 PROCEDURE — 3600000003 HC OR TIME - INITIAL BASE CHARGE - PROCEDURE LEVEL THREE: Performed by: OPHTHALMOLOGY

## 2025-05-07 PROCEDURE — 3700000001 HC GENERAL ANESTHESIA TIME - INITIAL BASE CHARGE: Performed by: OPHTHALMOLOGY

## 2025-05-07 PROCEDURE — 7100000010 HC PHASE TWO TIME - EACH INCREMENTAL 1 MINUTE: Performed by: OPHTHALMOLOGY

## 2025-05-07 PROCEDURE — V2632 POST CHMBR INTRAOCULAR LENS: HCPCS | Performed by: OPHTHALMOLOGY

## 2025-05-07 PROCEDURE — 3700000002 HC GENERAL ANESTHESIA TIME - EACH INCREMENTAL 1 MINUTE: Performed by: OPHTHALMOLOGY

## 2025-05-07 DEVICE — CLAREON ASPHERIC HYDROPHOBIC ACRYLIC IOL WITH THE AUTONOMEAUTOMATED PRE-LOADED DELIVERY SYSTEM
Type: IMPLANTABLE DEVICE | Site: EYE | Status: FUNCTIONAL
Brand: CLAREON™

## 2025-05-07 RX ORDER — KETOROLAC TROMETHAMINE 5 MG/ML
1 SOLUTION OPHTHALMIC 4 TIMES DAILY
Qty: 5 ML | Refills: 0 | Status: SHIPPED | OUTPATIENT
Start: 2025-05-07

## 2025-05-07 RX ORDER — ACETAMINOPHEN 325 MG/1
650 TABLET ORAL EVERY 4 HOURS PRN
Status: DISCONTINUED | OUTPATIENT
Start: 2025-05-07 | End: 2025-05-07 | Stop reason: HOSPADM

## 2025-05-07 RX ORDER — CYCLOPENTOLATE HYDROCHLORIDE 10 MG/ML
1 SOLUTION/ DROPS OPHTHALMIC
Status: COMPLETED | OUTPATIENT
Start: 2025-05-07 | End: 2025-05-07

## 2025-05-07 RX ORDER — TRIAMCINOLONE ACETONIDE 40 MG/ML
INJECTION, SUSPENSION INTRA-ARTICULAR; INTRAMUSCULAR AS NEEDED
Status: DISCONTINUED | OUTPATIENT
Start: 2025-05-07 | End: 2025-05-07 | Stop reason: HOSPADM

## 2025-05-07 RX ORDER — MOXIFLOXACIN 5 MG/ML
SOLUTION/ DROPS OPHTHALMIC AS NEEDED
Status: DISCONTINUED | OUTPATIENT
Start: 2025-05-07 | End: 2025-05-07 | Stop reason: HOSPADM

## 2025-05-07 RX ORDER — FENTANYL CITRATE 50 UG/ML
25 INJECTION, SOLUTION INTRAMUSCULAR; INTRAVENOUS EVERY 5 MIN PRN
Status: DISCONTINUED | OUTPATIENT
Start: 2025-05-07 | End: 2025-05-07 | Stop reason: HOSPADM

## 2025-05-07 RX ORDER — LABETALOL HYDROCHLORIDE 5 MG/ML
5 INJECTION, SOLUTION INTRAVENOUS ONCE AS NEEDED
Status: DISCONTINUED | OUTPATIENT
Start: 2025-05-07 | End: 2025-05-07 | Stop reason: HOSPADM

## 2025-05-07 RX ORDER — EPINEPHRINE 1 MG/ML
INJECTION, SOLUTION, CONCENTRATE INTRAVENOUS AS NEEDED
Status: DISCONTINUED | OUTPATIENT
Start: 2025-05-07 | End: 2025-05-07 | Stop reason: HOSPADM

## 2025-05-07 RX ORDER — MOXIFLOXACIN 5 MG/ML
1 SOLUTION/ DROPS OPHTHALMIC
Status: COMPLETED | OUTPATIENT
Start: 2025-05-07 | End: 2025-05-07

## 2025-05-07 RX ORDER — PREDNISOLONE ACETATE 10 MG/ML
1 SUSPENSION/ DROPS OPHTHALMIC 4 TIMES DAILY
Qty: 5 ML | Refills: 0 | Status: SHIPPED | OUTPATIENT
Start: 2025-05-07

## 2025-05-07 RX ORDER — PHENYLEPHRINE HYDROCHLORIDE 25 MG/ML
1 SOLUTION/ DROPS OPHTHALMIC
Status: COMPLETED | OUTPATIENT
Start: 2025-05-07 | End: 2025-05-07

## 2025-05-07 RX ORDER — LIDOCAINE HYDROCHLORIDE 10 MG/ML
INJECTION, SOLUTION INFILTRATION; PERINEURAL AS NEEDED
Status: DISCONTINUED | OUTPATIENT
Start: 2025-05-07 | End: 2025-05-07 | Stop reason: HOSPADM

## 2025-05-07 RX ORDER — TETRACAINE HYDROCHLORIDE 5 MG/ML
SOLUTION OPHTHALMIC AS NEEDED
Status: DISCONTINUED | OUTPATIENT
Start: 2025-05-07 | End: 2025-05-07 | Stop reason: HOSPADM

## 2025-05-07 RX ORDER — FENTANYL CITRATE 50 UG/ML
50 INJECTION, SOLUTION INTRAMUSCULAR; INTRAVENOUS EVERY 5 MIN PRN
Status: DISCONTINUED | OUTPATIENT
Start: 2025-05-07 | End: 2025-05-07 | Stop reason: HOSPADM

## 2025-05-07 RX ORDER — ONDANSETRON HYDROCHLORIDE 2 MG/ML
4 INJECTION, SOLUTION INTRAVENOUS ONCE AS NEEDED
Status: DISCONTINUED | OUTPATIENT
Start: 2025-05-07 | End: 2025-05-07 | Stop reason: HOSPADM

## 2025-05-07 RX ORDER — OXYCODONE HYDROCHLORIDE 5 MG/1
5 TABLET ORAL EVERY 4 HOURS PRN
Status: DISCONTINUED | OUTPATIENT
Start: 2025-05-07 | End: 2025-05-07 | Stop reason: HOSPADM

## 2025-05-07 RX ORDER — ALBUTEROL SULFATE 0.83 MG/ML
2.5 SOLUTION RESPIRATORY (INHALATION) ONCE AS NEEDED
Status: DISCONTINUED | OUTPATIENT
Start: 2025-05-07 | End: 2025-05-07 | Stop reason: HOSPADM

## 2025-05-07 RX ORDER — TROPICAMIDE 10 MG/ML
1 SOLUTION/ DROPS OPHTHALMIC
Status: COMPLETED | OUTPATIENT
Start: 2025-05-07 | End: 2025-05-07

## 2025-05-07 RX ORDER — LIDOCAINE HYDROCHLORIDE 10 MG/ML
0.1 INJECTION, SOLUTION EPIDURAL; INFILTRATION; INTRACAUDAL; PERINEURAL ONCE
Status: DISCONTINUED | OUTPATIENT
Start: 2025-05-07 | End: 2025-05-07 | Stop reason: HOSPADM

## 2025-05-07 RX ORDER — WATER 1 ML/ML
INJECTION IRRIGATION AS NEEDED
Status: DISCONTINUED | OUTPATIENT
Start: 2025-05-07 | End: 2025-05-07 | Stop reason: HOSPADM

## 2025-05-07 RX ORDER — FENTANYL CITRATE 50 UG/ML
INJECTION, SOLUTION INTRAMUSCULAR; INTRAVENOUS AS NEEDED
Status: DISCONTINUED | OUTPATIENT
Start: 2025-05-07 | End: 2025-05-07

## 2025-05-07 RX ORDER — TETRACAINE HYDROCHLORIDE 5 MG/ML
1 SOLUTION OPHTHALMIC ONCE
Status: COMPLETED | OUTPATIENT
Start: 2025-05-07 | End: 2025-05-07

## 2025-05-07 RX ADMIN — CYCLOPENTOLATE HYDROCHLORIDE 1 DROP: 10 SOLUTION/ DROPS OPHTHALMIC at 11:50

## 2025-05-07 RX ADMIN — PHENYLEPHRINE HYDROCHLORIDE 1 DROP: 25 SOLUTION/ DROPS OPHTHALMIC at 11:55

## 2025-05-07 RX ADMIN — TETRACAINE HYDROCHLORIDE 1 DROP: 5 SOLUTION OPHTHALMIC at 11:50

## 2025-05-07 RX ADMIN — TROPICAMIDE 1 DROP: 10 SOLUTION/ DROPS OPHTHALMIC at 11:50

## 2025-05-07 RX ADMIN — MOXIFLOXACIN HYDROCHLORIDE 1 DROP: 5 SOLUTION/ DROPS OPHTHALMIC at 12:00

## 2025-05-07 RX ADMIN — CYCLOPENTOLATE HYDROCHLORIDE 1 DROP: 10 SOLUTION/ DROPS OPHTHALMIC at 11:55

## 2025-05-07 RX ADMIN — MOXIFLOXACIN HYDROCHLORIDE 1 DROP: 5 SOLUTION/ DROPS OPHTHALMIC at 11:55

## 2025-05-07 RX ADMIN — TROPICAMIDE 1 DROP: 10 SOLUTION/ DROPS OPHTHALMIC at 11:55

## 2025-05-07 RX ADMIN — MOXIFLOXACIN HYDROCHLORIDE 1 DROP: 5 SOLUTION/ DROPS OPHTHALMIC at 11:50

## 2025-05-07 RX ADMIN — TROPICAMIDE 1 DROP: 10 SOLUTION/ DROPS OPHTHALMIC at 12:00

## 2025-05-07 RX ADMIN — PHENYLEPHRINE HYDROCHLORIDE 1 DROP: 25 SOLUTION/ DROPS OPHTHALMIC at 11:50

## 2025-05-07 RX ADMIN — CYCLOPENTOLATE HYDROCHLORIDE 1 DROP: 10 SOLUTION/ DROPS OPHTHALMIC at 12:00

## 2025-05-07 RX ADMIN — FENTANYL CITRATE 50 MCG: 50 INJECTION, SOLUTION INTRAMUSCULAR; INTRAVENOUS at 12:19

## 2025-05-07 RX ADMIN — PHENYLEPHRINE HYDROCHLORIDE 1 DROP: 25 SOLUTION/ DROPS OPHTHALMIC at 12:00

## 2025-05-07 SDOH — HEALTH STABILITY: MENTAL HEALTH: CURRENT SMOKER: 0

## 2025-05-07 ASSESSMENT — PAIN SCALES - GENERAL
PAINLEVEL_OUTOF10: 0 - NO PAIN
PAINLEVEL_OUTOF10: 0 - NO PAIN
PAIN_LEVEL: 0
PAINLEVEL_OUTOF10: 0 - NO PAIN
PAINLEVEL_OUTOF10: 0 - NO PAIN

## 2025-05-07 ASSESSMENT — PAIN - FUNCTIONAL ASSESSMENT
PAIN_FUNCTIONAL_ASSESSMENT: 0-10

## 2025-05-07 NOTE — ANESTHESIA POSTPROCEDURE EVALUATION
Patient: Elida Benson    Procedure Summary       Date: 05/07/25 [place] / [place] [facility] [place] [facility] / [facility] [place] [facility]    Anesthesia Start: 1216 Anesthesia Stop: 1239    Procedure: Cataract extraction with intraocular lens implantation (Right: Eye) Diagnosis:       Combined forms of age-related cataract of right eye      (Combined forms of age-related cataract of right eye [H25.811])    Surgeons: Da Silvestre MD Responsible Provider: Faith Hager MD    Anesthesia Type: MAC ASA Status: 4            Anesthesia Type: MAC    Vitals Value Taken Time   /97 05/07/25 12:52   Temp 36.4 °C (97.5 °F) 05/07/25 12:37   Pulse 91 05/07/25 12:52   Resp 16 05/07/25 12:52   SpO2 100 % 05/07/25 12:52       Anesthesia Post Evaluation    Patient location during evaluation: PACU  Patient participation: complete - patient cannot participate  Level of consciousness: awake  Pain score: 0  Pain management: adequate  Airway patency: patent  Cardiovascular status: acceptable  Respiratory status: acceptable  Hydration status: acceptable  Postoperative Nausea and Vomiting: none        There were no known notable events for this encounter.

## 2025-05-07 NOTE — OP NOTE
Cataract extraction with intraocular lens implantation (R) Operative Note     Date: 2025  OR Location: [facility] [place] [facility]    Name: Elida Benson, : 1945, Age: 80 y.o., MRN: [id number], Sex: female    Diagnosis  Pre-op Diagnosis      * Combined forms of age-related cataract of right eye [H25.811] Post-op Diagnosis     * Combined forms of age-related cataract of right eye [H25.811]     Procedures  Cataract extraction with intraocular lens implantation  90492 - [place] XCAPSL CTRC RMVL INSJ IO LENS PROSTH W/O ECP      Surgeons      * Da Silvestre - Primary    Resident/Fellow/Other Assistant:  Surgeons and Role:  * No surgeons found with a matching role *    Staff:   Circulator: Brenda Clay Person: Kellie Loomis Circulator: Jamison    Anesthesia Staff: Anesthesiologist: Faith Hager MD  C-AA: RADHA Rincon    Procedure Summary  Anesthesia: Monitor Anesthesia Care  ASA: IV  Estimated Blood Loss: 0mL  Intra-op Medications:   Administrations occurring from 1159 to 1236 on 25:   Medication Name Total Dose   lidocaine (Xylocaine) 10 mg/mL (1 %) injection 1 mL   EPINEPHrine HCl (PF) (Adrenalin) injection 0.3 mg   balanced salts (BSS) intraocular solution 515 mL   chondroitin sulf-sod hyaluron (Duovisc) intraocular kit 1 mL   moxifloxacin (Vigamox) 0.5 % ophthalmic solution 1 drop   triamcinolone acetonide (Kenalog-40) injection 10 mg   tetracaine (Altacaine) 0.5 % ophthalmic solution 2 drop   tetracaine (PF) 0.5 % ophthalmic solution 2 drop   sterile water irrigation solution 100 mL   cyclopentolate (Cyclogyl) 1 % ophthalmic solution 1 drop 1 drop   moxifloxacin (Vigamox) 0.5 % ophthalmic solution 1 drop 1 drop   phenylephrine (Mydfrin) 2.5 % ophthalmic solution 1 drop 1 drop   tropicamide (Mydriacyl) 1 % ophthalmic solution 1 drop 1 drop   fentaNYL (Sublimaze) injection 50 mcg/mL 50 mcg              Anesthesia Record               Intraprocedure I/O Totals       None           Specimen: No  specimens collected              Drains and/or Catheters:   External Urinary Catheter (Active)       [REMOVED] External Urinary Catheter Female (Removed)       [REMOVED] External Urinary Catheter Female (Removed)       Tourniquet Times:         Implants:  Implants       Type Name Action Serial No.       6.0MM X 13MM CLAREON 1-PIECE ASPHERIC MONOFOCAL INTRAOCULAR LENS, 22.0 DIOPTER, W/AUTONOME AUTOMATED PRE-LOADED DELIVERY SYSTEM, HYDROPHOBIC ACRYLIC Implanted [id number]              Findings: Dense cataract, small pupil OD    Indications: Elida Benson is an 80 y.o. female who is having surgery for Combined forms of age-related cataract of right eye [H25.811].     The patient was seen in the preoperative area. The risks, benefits, complications, treatment options, non-operative alternatives, expected recovery and outcomes were discussed with the patient. The possibilities of reaction to medication, pulmonary aspiration, injury to surrounding structures, bleeding, recurrent infection, the need for additional procedures, failure to diagnose a condition, and creating a complication requiring transfusion or operation were discussed with the patient. The patient concurred with the proposed plan, giving informed consent.  The site of surgery was properly noted/marked if necessary per policy. The patient has been actively warmed in preoperative area. Preoperative antibiotics have been ordered and given within 1 hours of incision. Venous thrombosis prophylaxis are not indicated.    Procedure Details: The patient was placed in the supine position on the operating room table where appropriate blood pressure and cardiac monitoring were initiated. The patient was prepped and draped in the usual sterile fashion for intraocular surgery. This included instillation of Betadine 5% onto the ocular surface followed by irrigation with balance salt solution a minute or two later. A lid speculum was placed and the operating  microscope was positioned. One paracentesis stab incision was made to the left of the planned cataract incision with a 15-degree supersharp blade. 1 ml of preservative free lidocaine was injected into the AC. Trypan blue was injected into the AC followed by BSS. Viscoat was used to replace the aqueous humor. A temporal clear corneal wound was fashioned beginning at the limbus with a 2.2 mm keratome, extending 2 mm into clear cornea before entering the anterior chamber. A continuous tear circular capsulorhexis of approximately 5 mm in diameter was performed. Hydrodissection was performed using a Moncada canula. The endothelium was coated with viscoat again. Using the Ozil handpiece on the [employer] Lens Removal System, the nucleus was emulsified and aspirated using a divide-and-conquer technique. The cataract was very dense and that significantly increased the difficulty of the surgery. Residual cortex was removed from the eye with the irrigation/aspiration bimanually. ProVisc was used to inflate the capsular bag. The lens implant was inspected and found to be free of visible defects. The lens used was an Robby model CNA0T0, power 22.0 diopter intraocular lens. The lens was inserted into the capsular bag using the [place] insertion mechanism. The lens was centered with a Parsons hook. Residual viscoelastic was removed from the eye with the irrigation/aspiration instrument. Preservative free moxifloxacin was injected  intracameral. The wounds were checked and found to be watertight. The surgery in general was more challenging due to intraoperative floppy iris syndrome which caused the pupil to constrict significantly during the surgery limiting the view and removal of the nuclear and cortical pieces.  The lid speculum was removed and the eye was dressed with Maxitrol ointment, eye pad, tape, and shield. The patient tolerated the procedure well, and there were no complications.   Evidence of Infection: No    Complications:  None; patient tolerated the procedure well.    Disposition: PACU - hemodynamically stable.  Condition: stable                 Additional Details: None    Attending Attestation: I performed the procedure.    Da Silvestre  Phone Number: [phone]

## 2025-05-07 NOTE — DISCHARGE INSTRUCTIONS
Postop instructions:    Start the following eye drops in the operative eye. Please put the eye shield back on after you put in drops   Prednisolone acetate drops: 4 times/day   2. Ketorolac drops:  4 times/day      Sleep with shield at night for 7 days  No eye rubbing  May shower and wash face but no water inside surgery eye  No lifting any weight above 10lbs  Patient to resume home medications.   Please call immediately if you develop any redness, pain, decreased vision, flashes, floaters.     Office phone (after hours): [phone]   [facility] : [phone] (call this number if unable to reach someone on the phone above) then ask for ophthalmologist on call.      Follow up Friday 5/9 at the [facility] Office at 10:15am

## 2025-05-07 NOTE — H&P
History Of Present Illness  Elida Benson is a 80 y.o. female presenting with right eye cataract.     Past Medical History  She has a past medical history of A-fib (Multi), Arthritis, Cataract, Cervical myelopathy, CHF (congestive heart failure), Chronic pain syndrome, CKD (chronic kidney disease), COPD (chronic obstructive pulmonary disease) (Multi), Dementia, Depression, Diabetic neuropathy (Multi), Diverticulitis, DM (diabetes mellitus) (Multi), LORENZ (dyspnea on exertion), HLD (hyperlipidemia), Hypertension, essential, Hypovolemic shock (Multi), Infiltrative cardiomyopathy (Multi), Nonhealing nonsurgical wound, NSTEMI (non-ST elevated myocardial infarction) (Multi), OA (ocular albinism) (Multi), Other malaise (04/21/2022), Palpitations, Pneumonia (02/17/2025), Post laminectomy syndrome, Psychogenic pain, Rectovaginal fistula, Spinal stenosis of cervical region, UTI (urinary tract infection), and Vocal cord anomaly.    Surgical History  She has a past surgical history that includes Carpal tunnel release (08/27/2013); Knee Arthroplasty (08/27/2013); Total hip arthroplasty (08/27/2013); Other surgical history (08/16/2023); Ileostomy; Laminectomy; Lumbar fusion (2023); Spinal cord stimulator implant (2022); Wound debridement; and Ileostomy closure.     Social History  She reports that she quit smoking about 41 years ago. Her smoking use included cigarettes. She has never used smokeless tobacco. She reports current alcohol use. She reports that she does not use drugs.     Allergies  Shellfish containing products    Meds  Current Outpatient Medications   Medication Instructions    acetaminophen (Tylenol) 325 mg tablet Give 2 tablet by mouth every 4 hours as needed for Pain    alogliptin (NESINA) 12.5 mg, oral, Daily    amiodarone (Pacerone) 200 mg tablet TAKE ONE TABLET BY MOUTH IN THE MORNING FOR ANTIARRHYTHMIC    atorvastatin (LIPITOR) 40 mg, oral, Nightly, For hyperlipidemia    b complex 0.4 mg tablet 1 tablet,  "Daily    baclofen (LIORESAL) 5 mg, oral, 2 times daily    BD Ailyn 2nd Gen Pen Needle 32 gauge x 5/32\" needle USE 4 TIMES A DAY    benzonatate (Tessalon) 100 mg capsule TAKE 1 CAPSULE BY MOUTH 3 TIMES A DAY AS NEEDED FOR COUGH. DO NOT CRUSH OR CHEW.    biotin 1 mg tablet 1 tab(s) orally once a day    cholecalciferol (Vitamin D-3) 1,250 mcg (50,000 unit) capsule Take 1 capsule (50,000 Units) by mouth 1 (one) time per week. Sunday    docusate sodium (Colace) 100 mg capsule 1 capsule, 2 times daily PRN    donepezil (ARICEPT) 5 mg, oral, Nightly    DULoxetine (CYMBALTA) 30 mg, oral, 2 times daily    Eliquis 5 mg, oral, 2 times daily    empagliflozin (JARDIANCE) 25 mg, oral, Daily    fluticasone (Flonase) 50 mcg/actuation nasal spray 2 sprays, Each Nostril, Daily, Shake gently. Before first use, prime pump. After use, clean tip and replace cap.    furosemide (LASIX) 20 mg, oral, Daily    gabapentin (NEURONTIN) 600 mg, oral, 3 times daily    insulin lispro (HUMALOG) 1 Units, 4 times daily    insulin lispro (HUMALOG) 3 Units, 3 times daily (morning, midday, late afternoon)    Lantus Solostar U-100 Insulin 3 Units, Every morning    losartan (COZAAR) 25 mg, oral, Daily    melatonin 3 mg tablet Take 2 tablets (6 mg) by mouth as needed at bedtime for sleep.    metoprolol succinate XL (Toprol XL) 25 mg 24 hr tablet Every 24 hours    naloxone (NARCAN) 4 mg, nasal, As needed, May repeat every 2-3 minutes if needed, alternating nostrils, until medical assistance becomes available.    ondansetron ODT (ZOFRAN-ODT) 4 mg, oral, Every 8 hours PRN    OneTouch Delica Plus Lancet 33 gauge misc Daily use    OneTouch Ultra Test 100    OneTouch Ultra2 Meter misc use as directed    SITagliptin phosphate (JANUVIA) 50 mg, oral, Daily    SITagliptin phosphate (JANUVIA) 50 mg, oral, Daily    spironolactone (ALDACTONE) 25 mg, oral, Daily    tafamidis (VYNDAMAX) 61 mg, oral, Daily    topiramate (TOPAMAX) 100 mg, oral, 2 times daily    traMADol " (Ultram) 50 mg tablet Every 12 hours    traZODone (DESYREL) 50 mg, oral, Nightly PRN         ROS  Patient denies ocular pain, redness, discharge, decreased vision, double vision, blind spots, flashes, or floaters.     Physical Exam  Not recorded     Constitutional: No acute distress   HEENT: mucus membranes moist, atraumatic   Respiratory: no respiratory distress   Cardiovascular: no peripheral edema  Abdomen: non distended   MSK/neuro: moves all extremities spontaneously   Skin: no rashes   Psych: alert and oriented        Assessment/Plan   Assessment & Plan  Combined forms of age-related cataract of right eye      Plan cataract surgery right eye             David Ugalde MD

## 2025-05-07 NOTE — ANESTHESIA PREPROCEDURE EVALUATION
Patient: Elida Benson    Procedure Information       Date/Time: 05/07/25 1225    Procedure: Cataract extraction with intraocular lens implantation (Right)    Location: [facility] OR 02 / [facility]    Surgeons: Da Silvestre MD            Relevant Problems   Anesthesia (within normal limits)      Cardiac   (+) Arrhythmia   (+) Atrial fibrillation (Multi)   (+) Hypertension, essential   (+) NSTEMI, initial episode of care (Multi)   (+) Peripheral vascular disease (CMS-HCC)      Pulmonary   (+) COPD without exacerbation (Multi)   (+) Community acquired pneumonia of right upper lobe of lung   (+) Pneumonia, unspecified organism   (+) Pulmonary hypertension, unspecified (Multi)      Neuro   (+) Depression   (+) Lumbar disc herniation with radiculopathy   (+) Lumbar radiculopathy   (+) Moderate dementia with mood disturbance, unspecified dementia type (Multi)      /Renal   (+) UTI (urinary tract infection)      Endocrine   (+) Type 2 diabetes mellitus without retinopathy (Multi)      Hematology   (+) Acute posthemorrhagic anemia   (+) Anemia      Musculoskeletal   (+) Chronic pain syndrome   (+) Infection of intervertebral disc (pyogenic), thoracolumbar region   (+) Lumbar disc herniation with radiculopathy   (+) Lumbar scoliosis   (+) Lumbar stenosis with neurogenic claudication   (+) Myelopathy concurrent with and due to spinal stenosis of cervical region   (+) Osteoarthritis, multiple sites   (+) Primary osteoarthritis, left shoulder      ID   (+) Community acquired pneumonia of right upper lobe of lung   (+) Infection of intervertebral disc (pyogenic), thoracolumbar region   (+) Pneumonia, unspecified organism   (+) Pyogenic infection of intervertebral disc (Multi)   (+) UTI (urinary tract infection)      Skin   (+) Papular rash       Clinical information reviewed:   Tobacco  Allergies  Meds   Med Hx  Surg Hx  OB Status  Fam Hx  Soc   Hx        NPO Detail:  NPO/Void Status  Date of Last  Liquid: 25  Time of Last Liquid:   Date of Last Solid: 25  Time of Last Solid:   Last Intake Type: Clear fluids  Time of Last Void: 1000         Physical Exam    Airway  Mallampati: II  TM distance: >3 FB  Neck ROM: full  Mouth openin finger widths     Cardiovascular    Dental - normal exam     Pulmonary    Abdominal            Anesthesia Plan    History of general anesthesia?: yes  History of complications of general anesthesia?: no    ASA 4     MAC     The patient is not a current smoker.    intravenous induction   Postoperative pain plan includes opioids.  Anesthetic plan and risks discussed with patient.    Plan discussed with CRNA and CAA.

## 2025-05-07 NOTE — BRIEF OP NOTE
Date: 2025  OR Location: [facility] OR    Name: Elida Benson, : 1945, Age: 80 y.o., MRN: [id number], Sex: female    Diagnosis  Pre-op Diagnosis      * Combined forms of age-related cataract of right eye [H25.811] Post-op Diagnosis     * Combined forms of age-related cataract of right eye [H25.811]     Procedures  Cataract extraction with intraocular lens implantation  67821 - CO XCAPSL CTRC RMVL INSJ IO LENS PROSTH W/O ECP      Surgeons      * Da Silvestre - Primary    Resident/Fellow/Other Assistant:  Surgeons and Role:  * No surgeons found with a matching role *    Staff:   Circulator: Brenda Clay Person: Kellie Loomis Circulator: Jamison    Anesthesia Staff: Anesthesiologist: Faith Hager MD  C-AA: RADHA Rincon    Procedure Summary  Anesthesia: Monitor Anesthesia Care  ASA: IV  Estimated Blood Loss: <5 mL  Intra-op Medications:   Administrations occurring from 1159 to 1236 on 25:   Medication Name Total Dose   lidocaine (Xylocaine) 10 mg/mL (1 %) injection 1 mL   EPINEPHrine HCl (PF) (Adrenalin) injection 0.3 mg   balanced salts (BSS) intraocular solution 515 mL   chondroitin sulf-sod hyaluron (Duovisc) intraocular kit 1 mL   moxifloxacin (Vigamox) 0.5 % ophthalmic solution 1 drop   triamcinolone acetonide (Kenalog-40) injection 10 mg   tetracaine (Altacaine) 0.5 % ophthalmic solution 2 drop   tetracaine (PF) 0.5 % ophthalmic solution 2 drop   sterile water irrigation solution 100 mL   fentaNYL (Sublimaze) injection 50 mcg/mL 50 mcg   cyclopentolate (Cyclogyl) 1 % ophthalmic solution 1 drop 1 drop   moxifloxacin (Vigamox) 0.5 % ophthalmic solution 1 drop 1 drop   phenylephrine (Mydfrin) 2.5 % ophthalmic solution 1 drop 1 drop   tropicamide (Mydriacyl) 1 % ophthalmic solution 1 drop 1 drop              Anesthesia Record               Intraprocedure I/O Totals       None           Specimen: No specimens collected               Findings: cataract right  eye     Complications:  None; patient tolerated the procedure well.     Disposition: PACU - hemodynamically stable.  Condition: stable  Specimens Collected: No specimens collected  Attending Attestation:     Da Silvestre  Phone Number: [phone]

## 2025-05-08 ENCOUNTER — PATIENT OUTREACH (OUTPATIENT)
Dept: PRIMARY CARE | Facility: CLINIC | Age: 80
End: 2025-05-08
Payer: MEDICARE

## 2025-05-08 ENCOUNTER — HOME CARE VISIT (OUTPATIENT)
Dept: HOME HEALTH SERVICES | Facility: HOME HEALTH | Age: 80
End: 2025-05-08
Payer: MEDICARE

## 2025-05-08 VITALS
TEMPERATURE: 97.2 F | DIASTOLIC BLOOD PRESSURE: 64 MMHG | SYSTOLIC BLOOD PRESSURE: 123 MMHG | RESPIRATION RATE: 16 BRPM | OXYGEN SATURATION: 97 % | HEART RATE: 75 BPM

## 2025-05-08 PROCEDURE — G0158 HHC OT ASSISTANT EA 15: HCPCS | Mod: CO,HHH

## 2025-05-08 NOTE — PROGRESS NOTES
TCM RN Outreach  Successful outreach to patient's daughter regarding hospitalization as patient continues TCM program.   At time of outreach call the patient's daughter reports she is still not walking. States she is not able to take any steps.  She does have PT and OT working with her in home health care.  Patient has a pressure ulcer that her daughter states she came home from SNF with.  She is not getting nursing visits to help with this. She would like a nurse to come out to assess the wound and see if there is something more they should be doing. She has been treating it with Desitin ointment. Educated on ways to reduce pressure. Message sent to PCP to see if he can add nursing on to her home health care orders.    No additional questions, concerns or needs noted at this time.

## 2025-05-09 ENCOUNTER — HOME CARE VISIT (OUTPATIENT)
Dept: HOME HEALTH SERVICES | Facility: HOME HEALTH | Age: 80
End: 2025-05-09
Payer: MEDICARE

## 2025-05-09 ENCOUNTER — APPOINTMENT (OUTPATIENT)
Dept: OPHTHALMOLOGY | Facility: CLINIC | Age: 80
End: 2025-05-09
Payer: MEDICARE

## 2025-05-09 DIAGNOSIS — H25.811 COMBINED FORMS OF AGE-RELATED CATARACT OF RIGHT EYE: Primary | ICD-10-CM

## 2025-05-09 DIAGNOSIS — L89.300 PRESSURE INJURY OF BUTTOCK, UNSTAGEABLE, UNSPECIFIED LATERALITY (MULTI): ICD-10-CM

## 2025-05-09 PROCEDURE — 99024 POSTOP FOLLOW-UP VISIT: CPT | Performed by: OPHTHALMOLOGY

## 2025-05-09 ASSESSMENT — ENCOUNTER SYMPTOMS
PAIN LOCATION: RIGHT HIP
HIGHEST PAIN SEVERITY IN PAST 24 HOURS: 3/10
PAIN LOCATION: BACK
LOWEST PAIN SEVERITY IN PAST 24 HOURS: 2/10
NEUROLOGICAL NEGATIVE: 0
GASTROINTESTINAL NEGATIVE: 0
HEMATOLOGIC/LYMPHATIC NEGATIVE: 0
PAIN LOCATION: LEFT KNEE
PAIN SEVERITY GOAL: 1/10
SUBJECTIVE PAIN PROGRESSION: UNCHANGED
MUSCULOSKELETAL NEGATIVE: 0
EYES NEGATIVE: 1
PAIN: 1
CONSTITUTIONAL NEGATIVE: 0
RESPIRATORY NEGATIVE: 0
ALLERGIC/IMMUNOLOGIC NEGATIVE: 0
PERSON REPORTING PAIN: PATIENT
ENDOCRINE NEGATIVE: 0
PSYCHIATRIC NEGATIVE: 0
CARDIOVASCULAR NEGATIVE: 0

## 2025-05-09 ASSESSMENT — SLIT LAMP EXAM - LIDS
COMMENTS: GOOD POSITION, MILD MGD
COMMENTS: GOOD POSITION, MILD MGD

## 2025-05-09 ASSESSMENT — TONOMETRY
OD_IOP_MMHG: 21
IOP_METHOD: TONOPEN

## 2025-05-09 ASSESSMENT — EXTERNAL EXAM - RIGHT EYE: OD_EXAM: NORMAL

## 2025-05-09 ASSESSMENT — VISUAL ACUITY
OD_SC: 20/40
METHOD: SNELLEN - LINEAR
OD_SC+: +2

## 2025-05-09 ASSESSMENT — EXTERNAL EXAM - LEFT EYE: OS_EXAM: NORMAL

## 2025-05-11 ENCOUNTER — TELEPHONE (OUTPATIENT)
Dept: HOME HEALTH SERVICES | Facility: HOME HEALTH | Age: 80
End: 2025-05-11
Payer: MEDICARE

## 2025-05-11 NOTE — TELEPHONE ENCOUNTER
Good Morning ,    [facility] received supplemental orders for Ms. Benson for Home Health Aide and Wound/Ostomy care. Patient would need to have Skilled Nursing to assess the wound. We would also need wound care orders.      Thank You  [facility]

## 2025-05-13 ENCOUNTER — HOME CARE VISIT (OUTPATIENT)
Dept: HOME HEALTH SERVICES | Facility: HOME HEALTH | Age: 80
End: 2025-05-13
Payer: MEDICARE

## 2025-05-13 VITALS
SYSTOLIC BLOOD PRESSURE: 123 MMHG | RESPIRATION RATE: 16 BRPM | DIASTOLIC BLOOD PRESSURE: 65 MMHG | HEART RATE: 65 BPM | TEMPERATURE: 97.2 F | OXYGEN SATURATION: 98 %

## 2025-05-13 PROCEDURE — G0157 HHC PT ASSISTANT EA 15: HCPCS | Mod: CQ,HHH

## 2025-05-13 PROCEDURE — G0158 HHC OT ASSISTANT EA 15: HCPCS | Mod: CO,HHH

## 2025-05-13 ASSESSMENT — ENCOUNTER SYMPTOMS
PAIN LOCATION: BACK
PAIN LOCATION: RIGHT KNEE
HIGHEST PAIN SEVERITY IN PAST 24 HOURS: 3/10
PAIN LOCATION - PAIN SEVERITY: 2/10
PAIN LOCATION - PAIN SEVERITY: 2/10
PAIN LOCATION: LEFT SHOULDER
PAIN LOCATION - PAIN SEVERITY: 2/10
PAIN: 1
PAIN SEVERITY GOAL: 0/10
SUBJECTIVE PAIN PROGRESSION: GRADUALLY IMPROVING
LOWEST PAIN SEVERITY IN PAST 24 HOURS: 1/10

## 2025-05-14 ENCOUNTER — HOME CARE VISIT (OUTPATIENT)
Dept: HOME HEALTH SERVICES | Facility: HOME HEALTH | Age: 80
End: 2025-05-14
Payer: MEDICARE

## 2025-05-14 PROCEDURE — G0152 HHCP-SERV OF OT,EA 15 MIN: HCPCS | Mod: HHH

## 2025-05-14 PROCEDURE — G0156 HHCP-SVS OF AIDE,EA 15 MIN: HCPCS | Mod: HHH

## 2025-05-15 ENCOUNTER — HOME CARE VISIT (OUTPATIENT)
Dept: HOME HEALTH SERVICES | Facility: HOME HEALTH | Age: 80
End: 2025-05-15
Payer: MEDICARE

## 2025-05-15 ENCOUNTER — TELEMEDICINE CLINICAL SUPPORT (OUTPATIENT)
Dept: PHARMACY | Facility: HOSPITAL | Age: 80
End: 2025-05-15
Payer: MEDICARE

## 2025-05-15 VITALS
TEMPERATURE: 97.6 F | OXYGEN SATURATION: 96 % | SYSTOLIC BLOOD PRESSURE: 116 MMHG | HEART RATE: 76 BPM | DIASTOLIC BLOOD PRESSURE: 60 MMHG

## 2025-05-15 PROCEDURE — G0151 HHCP-SERV OF PT,EA 15 MIN: HCPCS | Mod: HHH

## 2025-05-15 ASSESSMENT — ENCOUNTER SYMPTOMS
PERSON REPORTING PAIN: PATIENT
DENIES PAIN: 1

## 2025-05-15 NOTE — PROGRESS NOTES
[facility] Clinical Note  Patient Reassessment     Introduction  Elida Benson is a 80 y.o. female who is on the specialty pharmacy service for management of: Cardiology Core.      [facility] supplied medication: Vyndamax 61mg by mouth once daily    Duration of therapy: Maintenance    The most recent encounter visit with the referring prescriber Lori Dueñas MD PhD on 4/30/25 was reviewed.  Pharmacy will continue to collaborate in the care of this patient with the referring prescriber.    Discussion  Elida was contacted on 5/15/2025 at 2:00 PM for a pharmacy visit with encounter number [id number] from:   [facility]  [address]  [place] [address]  Dept: [phone]  Dept Fax: [phone]  Loc: [phone]  Caregiver consented to a/an Telephone visit, which was performed.    Efficacy  Patient has developed new symptoms of condition: No  Patient/caregiver feels medication is affecting the disease state: Unknown    Goals  Provided education on goals and possible outcomes of therapy:  Adherence with therapy  Timely completion of appropriate labs  Timely and appropriate follow up with provider  Identify and address medication interactions with presciption medications, OTC medications and supplements  Optimize or maintain quality of life  Cardiology: Slow progression of disease  Prolong life  Patient has documented target(s) for goals of therapy: No    Tolerance  Patient has experienced side effects from this medication: No  Changes to current therapy regimen: No    The follow-up timeline was discussed. Every person responds to and reacts to therapy differently. Patient should be assessed for efficacy and tolerability in approximately: annually    Adherence  Patient Information  Informant: Child/Children  Demonstrates Understanding of Importance of Adherence: Yes  Does the patient have any barriers to  self-administration (including physical and mental?): No  Medication Information  Medication: tafamidis (Vyndamax)  Patient Reported Missed Doses in the Last 4 Weeks: 0  Estimated Medication Adherence Level: %  Adherence Estimation Source: Claims history  Barriers to Adherence: No Problems identified  What concerns do you have regarding your medications?: none; PCP is having her hold insulin due to low blood glucose readings during acute recovery timeframe. Just had cataract surgery. Has been sleepy; wound is taking time to heal, but no major concerns or med-related issues.   The importance of adherence was discussed and patient/caregiver was advised to take the medication as prescribed by their provider. Encouraged patient/caregiver to call physician's office or specialty pharmacy if they have a question regarding a missed dose.    General Assessment  Changes to home medications, OTCs or supplements: Yes - PCP is having her hold insulin doses due to low blood sugar readings, per daughter's report.  Current Medications[1]  Reported new allergies: No  Reported new medical conditions: No  Additional monitoring reviewed: Cardiology - No Labs Needed: There are no routine laboratory monitoring parameters for this medication  Is laboratory follow up needed? No    Advised to contact the pharmacy if there are any changes to the patient's medication list, including prescriptions, OTC medications, herbal products, or supplements.    Impression/Plan  This patient has been identified as high risk due to Geriatric (over 65 years of age).  The following action was taken: Patient/caregiver encouraged to participate in patient management program.    QOL/Patient Satisfaction  Rate your quality of life on scale of 1-10:  (not assessed)  Rate your satisfaction with  Specialty Pharmacy on scale of 1-10:  (not assessed)    Provided contact information ([phone]) for [facility] and reviewed  "dispensing process, refill timeline and patient management follow up. Confirmed understanding of education conducted during assessment. All questions and concerns were addressed and patient/caregiver was encouraged to reach out for additional questions or concerns.    Based on the patient's diagnosis, medication list, progress towards goals, adherence, tolerance, and medication list, medication remains appropriate: Therapy remains appropriate (I attest)    Kala Peck, PharmD        [1]   Current Outpatient Medications   Medication Sig Dispense Refill    acetaminophen (Tylenol) 325 mg tablet Give 2 tablet by mouth every 4 hours as needed for Pain      alogliptin (Nesina) 12.5 mg tablet Take 1 tablet (12.5 mg) by mouth once daily. 90 tablet 1    amiodarone (Pacerone) 200 mg tablet TAKE ONE TABLET BY MOUTH IN THE MORNING FOR ANTIARRHYTHMIC 60 tablet 0    apixaban (Eliquis) 5 mg tablet Take 1 tablet (5 mg) by mouth 2 times a day. 180 tablet 3    atorvastatin (Lipitor) 40 mg tablet TAKE ONE TABLET BY MOUTH AT BEDTIME FOR HYPERLIPIDEMIA 30 tablet 0    b complex 0.4 mg tablet Take 1 tablet by mouth once daily.      baclofen (Lioresal) 5 mg tablet TAKE ONE TABLET BY MOUTH TWO TIMES A DAY 60 tablet 2    BD Ailyn 2nd Gen Pen Needle 32 gauge x 5/32\" needle USE 4 TIMES A DAY      benzonatate (Tessalon) 100 mg capsule TAKE 1 CAPSULE BY MOUTH 3 TIMES A DAY AS NEEDED FOR COUGH. DO NOT CRUSH OR CHEW. 42 capsule 0    biotin 1 mg tablet 1 tab(s) orally once a day      cholecalciferol (Vitamin D-3) 1,250 mcg (50,000 unit) capsule Take 1 capsule (50,000 Units) by mouth 1 (one) time per week. Sunday      docusate sodium (Colace) 100 mg capsule Take 1 capsule (100 mg) by mouth 2 times a day as needed for constipation.      donepezil (Aricept) 5 mg tablet TAKE ONE TABLET BY MOUTH AT BEDTIME 90 tablet 0    DULoxetine (Cymbalta) 30 mg DR capsule TAKE ONE CAPSULE BY MOUTH TWO TIMES A  capsule 0    empagliflozin (Jardiance) 25 mg " tablet Take 1 tablet (25 mg) by mouth once daily. 90 tablet 1    fluticasone (Flonase) 50 mcg/actuation nasal spray Administer 2 sprays into each nostril once daily. Shake gently. Before first use, prime pump. After use, clean tip and replace cap. (Patient taking differently: Administer 2 sprays into each nostril once daily as needed for allergies. Shake gently. Before first use, prime pump. After use, clean tip and replace cap.) 16 g 3    furosemide (Lasix) 20 mg tablet Take 1 tablet (20 mg) by mouth once daily. 90 tablet 3    gabapentin (Neurontin) 600 mg tablet Take 1 tablet (600 mg) by mouth 3 times a day. 90 tablet 0    insulin glargine (Lantus Solostar U-100 Insulin) 100 unit/mL (3 mL) pen Inject 3 Units under the skin once daily in the morning. As needed for sugars over 200.      insulin lispro (HumaLOG) 100 unit/mL pen Inject 1 Units under the skin 4 times a day. inject subcutaneiously before meals and at bedtime for hyperglycemia  per sliding scale  151-200 1 unit 201-250 2units 251-300 3 units 350 4 units 351-400 5units notifiy Md if if greater than 400, notify md if lower than 40.      insulin lispro (HumaLOG) 100 unit/mL pen 3 Units 3 times daily (morning, midday, late afternoon). inject  3 units before meals.      ketorolac (Acular) 0.5 % ophthalmic solution Administer 1 drop into the right eye 4 times a day. 5 mL 0    losartan (Cozaar) 25 mg tablet Take 1 tablet (25 mg) by mouth once daily. 30 tablet 11    melatonin 3 mg tablet Take 2 tablets (6 mg) by mouth as needed at bedtime for sleep.      metoprolol succinate XL (Toprol XL) 25 mg 24 hr tablet Take by mouth once every 24 hours.      naloxone (Narcan) 4 mg/0.1 mL nasal spray Administer 1 spray (4 mg) into affected nostril(s) if needed for opioid reversal. May repeat every 2-3 minutes if needed, alternating nostrils, until medical assistance becomes available. 2 each 0    ondansetron ODT (Zofran-ODT) 4 mg disintegrating tablet Take 1 tablet (4 mg)  by mouth every 8 hours if needed for nausea or vomiting. 20 tablet 0    OneTouch Delica Plus Lancet 33 gauge misc Daily use 100 each 1    OneTouch Ultra Test 100 100 each 1    OneTouch Ultra2 Meter misc use as directed      prednisoLONE acetate (Pred-Forte) 1 % ophthalmic suspension Administer 1 drop into the right eye 4 times a day. 5 mL 0    SITagliptin phosphate (Januvia) 50 mg tablet Take 1 tablet (50 mg) by mouth once daily. 90 tablet 1    SITagliptin phosphate (Januvia) 50 mg tablet Take 1 tablet (50 mg) by mouth once daily. 30 tablet 11    spironolactone (Aldactone) 25 mg tablet Take 1 tablet (25 mg) by mouth once daily. 30 tablet 11    tafamidis (Vyndamax) 61 mg capsule Take 1 capsule (61 mg) by mouth once daily. 30 capsule 10    topiramate (Topamax) 100 mg tablet Take 1 tablet (100 mg) by mouth 2 times a day. 60 tablet 10    traMADol (Ultram) 50 mg tablet Take by mouth every 12 hours.      traZODone (Desyrel) 50 mg tablet Take 1 tablet (50 mg) by mouth as needed at bedtime for sleep. 30 tablet 0     No current facility-administered medications for this visit.

## 2025-05-16 ENCOUNTER — HOME CARE VISIT (OUTPATIENT)
Dept: HOME HEALTH SERVICES | Facility: HOME HEALTH | Age: 80
End: 2025-05-16
Payer: MEDICARE

## 2025-05-16 DIAGNOSIS — R23.8 SKIN BREAKDOWN: Primary | ICD-10-CM

## 2025-05-16 DIAGNOSIS — F51.01 PRIMARY INSOMNIA: ICD-10-CM

## 2025-05-16 PROCEDURE — G0299 HHS/HOSPICE OF RN EA 15 MIN: HCPCS | Mod: HHH

## 2025-05-16 PROCEDURE — G0156 HHCP-SVS OF AIDE,EA 15 MIN: HCPCS | Mod: HHH

## 2025-05-16 RX ORDER — HYDROPHILIC CREAM
PASTE (GRAM) TOPICAL
Qty: 71 G | Refills: 0 | Status: SHIPPED | OUTPATIENT
Start: 2025-05-16

## 2025-05-16 RX ORDER — TRAZODONE HYDROCHLORIDE 50 MG/1
50 TABLET ORAL NIGHTLY PRN
Qty: 30 TABLET | Refills: 0 | Status: SHIPPED | OUTPATIENT
Start: 2025-05-16

## 2025-05-16 NOTE — CASE COMMUNICATION
DISCHARGE SUMMARY:    DISCIPLINE: PT  DATE OF DISCIPLINE DISCHARGE: 38166  REASON FOR DISCHARGE: Goals unmet, limited compliance to PT poc.  EVALUATION OF GOALS: Yes  SUMMARY OF CARE PROVIDED: hep,standing, strengthening, pain management   DISCHARGE INSTRUCTIONS GIVEN: Cont home exercises, use ad for fall prevention, ramp recommended as patient is nonambulatory.  SERVICES REMAINING: SN  NOMNC OBTAINED: No

## 2025-05-17 VITALS
TEMPERATURE: 98.7 F | DIASTOLIC BLOOD PRESSURE: 70 MMHG | RESPIRATION RATE: 20 BRPM | HEART RATE: 88 BPM | OXYGEN SATURATION: 98 % | SYSTOLIC BLOOD PRESSURE: 115 MMHG

## 2025-05-17 SDOH — ECONOMIC STABILITY: FOOD INSECURITY: MEALS PER DAY: 3

## 2025-05-17 ASSESSMENT — ACTIVITIES OF DAILY LIVING (ADL)
AMBULATION ASSISTANCE: ONE PERSON
CURRENT_FUNCTION: ONE PERSON
OASIS_M1830: 05
ENTERING_EXITING_HOME: MODERATE ASSIST

## 2025-05-17 ASSESSMENT — ENCOUNTER SYMPTOMS
PAIN: 1
MUSCLE WEAKNESS: 1
SUBJECTIVE PAIN PROGRESSION: UNCHANGED
PERSON REPORTING PAIN: PATIENT
PAIN LOCATION - PAIN SEVERITY: 0/10
PAIN LOCATION - PAIN FREQUENCY: INTERMITTENT
PAIN LOCATION: GENERALIZED
HIGHEST PAIN SEVERITY IN PAST 24 HOURS: 0/10
APPETITE LEVEL: FAIR
LOWEST PAIN SEVERITY IN PAST 24 HOURS: 0/10
BOWEL INCONTINENCE: 1
LAST BOWEL MOVEMENT: 67340
PAIN LOCATION - PAIN QUALITY: ACHY
CHANGE IN APPETITE: UNCHANGED
PAIN SEVERITY GOAL: 0/10

## 2025-05-20 RX ORDER — PREDNISONE 10 MG/1
TABLET ORAL
COMMUNITY
Start: 2025-03-13

## 2025-05-20 RX ORDER — CEPHALEXIN 500 MG/1
CAPSULE ORAL
COMMUNITY
Start: 2025-05-08

## 2025-05-21 ENCOUNTER — HOME CARE VISIT (OUTPATIENT)
Dept: HOME HEALTH SERVICES | Facility: HOME HEALTH | Age: 80
End: 2025-05-21
Payer: MEDICARE

## 2025-05-21 PROCEDURE — G0156 HHCP-SVS OF AIDE,EA 15 MIN: HCPCS | Mod: HHH

## 2025-05-22 ENCOUNTER — HOME CARE VISIT (OUTPATIENT)
Dept: HOME HEALTH SERVICES | Facility: HOME HEALTH | Age: 80
End: 2025-05-22
Payer: MEDICARE

## 2025-05-22 PROCEDURE — G0299 HHS/HOSPICE OF RN EA 15 MIN: HCPCS | Mod: HHH

## 2025-05-23 ENCOUNTER — HOME CARE VISIT (OUTPATIENT)
Dept: HOME HEALTH SERVICES | Facility: HOME HEALTH | Age: 80
End: 2025-05-23
Payer: MEDICARE

## 2025-05-23 PROCEDURE — G0156 HHCP-SVS OF AIDE,EA 15 MIN: HCPCS | Mod: HHH

## 2025-05-24 ENCOUNTER — ANESTHESIA EVENT (OUTPATIENT)
Dept: OPERATING ROOM | Facility: CLINIC | Age: 80
End: 2025-05-24
Payer: MEDICARE

## 2025-05-24 DIAGNOSIS — I49.9 CARDIAC ARRHYTHMIA, UNSPECIFIED CARDIAC ARRHYTHMIA TYPE: ICD-10-CM

## 2025-05-26 VITALS
TEMPERATURE: 98.6 F | OXYGEN SATURATION: 99 % | RESPIRATION RATE: 19 BRPM | SYSTOLIC BLOOD PRESSURE: 112 MMHG | DIASTOLIC BLOOD PRESSURE: 69 MMHG | HEART RATE: 80 BPM

## 2025-05-26 SDOH — ECONOMIC STABILITY: FOOD INSECURITY: MEALS PER DAY: 3

## 2025-05-26 SDOH — ECONOMIC STABILITY: GENERAL

## 2025-05-26 ASSESSMENT — ENCOUNTER SYMPTOMS
CHANGE IN APPETITE: UNCHANGED
LOWEST PAIN SEVERITY IN PAST 24 HOURS: 0/10
BOWEL INCONTINENCE: 1
PAIN: 1
PAIN LOCATION: GENERALIZED
HIGHEST PAIN SEVERITY IN PAST 24 HOURS: 0/10
APPETITE LEVEL: FAIR
PAIN LOCATION - PAIN FREQUENCY: INTERMITTENT
PAIN SEVERITY GOAL: 0/10
PAIN LOCATION - PAIN SEVERITY: 0/10
PERSON REPORTING PAIN: PATIENT
MUSCLE WEAKNESS: 1
LAST BOWEL MOVEMENT: 67347
PAIN LOCATION - PAIN QUALITY: ACHY
SUBJECTIVE PAIN PROGRESSION: UNCHANGED

## 2025-05-26 ASSESSMENT — ACTIVITIES OF DAILY LIVING (ADL)
AMBULATION ASSISTANCE: ONE PERSON
MONEY MANAGEMENT (EXPENSES/BILLS): TOTALLY DEPENDENT
CURRENT_FUNCTION: ONE PERSON

## 2025-05-27 RX ORDER — AMIODARONE HYDROCHLORIDE 200 MG/1
TABLET ORAL
Qty: 60 TABLET | Refills: 0 | Status: SHIPPED | OUTPATIENT
Start: 2025-05-27

## 2025-05-28 ENCOUNTER — HOSPITAL ENCOUNTER (OUTPATIENT)
Facility: CLINIC | Age: 80
Setting detail: OUTPATIENT SURGERY
Discharge: HOME | End: 2025-05-28
Attending: OPHTHALMOLOGY | Admitting: OPHTHALMOLOGY
Payer: MEDICARE

## 2025-05-28 ENCOUNTER — ANESTHESIA (OUTPATIENT)
Dept: OPERATING ROOM | Facility: CLINIC | Age: 80
End: 2025-05-28
Payer: MEDICARE

## 2025-05-28 VITALS
HEIGHT: 58 IN | SYSTOLIC BLOOD PRESSURE: 108 MMHG | TEMPERATURE: 98.2 F | BODY MASS INDEX: 30.22 KG/M2 | DIASTOLIC BLOOD PRESSURE: 72 MMHG | RESPIRATION RATE: 16 BRPM | OXYGEN SATURATION: 97 % | WEIGHT: 143.96 LBS | HEART RATE: 89 BPM

## 2025-05-28 DIAGNOSIS — H25.812 COMBINED FORMS OF AGE-RELATED CATARACT OF LEFT EYE: Primary | ICD-10-CM

## 2025-05-28 LAB — GLUCOSE BLD MANUAL STRIP-MCNC: 183 MG/DL (ref 74–99)

## 2025-05-28 PROCEDURE — 2760000001 HC OR 276 NO HCPCS: Performed by: OPHTHALMOLOGY

## 2025-05-28 PROCEDURE — 2500000005 HC RX 250 GENERAL PHARMACY W/O HCPCS: Performed by: OPHTHALMOLOGY

## 2025-05-28 PROCEDURE — 7100000010 HC PHASE TWO TIME - EACH INCREMENTAL 1 MINUTE: Performed by: OPHTHALMOLOGY

## 2025-05-28 PROCEDURE — 66984 XCAPSL CTRC RMVL W/O ECP: CPT | Performed by: OPHTHALMOLOGY

## 2025-05-28 PROCEDURE — V2632 POST CHMBR INTRAOCULAR LENS: HCPCS | Performed by: OPHTHALMOLOGY

## 2025-05-28 PROCEDURE — 3600000003 HC OR TIME - INITIAL BASE CHARGE - PROCEDURE LEVEL THREE: Performed by: OPHTHALMOLOGY

## 2025-05-28 PROCEDURE — 82947 ASSAY GLUCOSE BLOOD QUANT: CPT

## 2025-05-28 PROCEDURE — 7100000009 HC PHASE TWO TIME - INITIAL BASE CHARGE: Performed by: OPHTHALMOLOGY

## 2025-05-28 PROCEDURE — 3600000008 HC OR TIME - EACH INCREMENTAL 1 MINUTE - PROCEDURE LEVEL THREE: Performed by: OPHTHALMOLOGY

## 2025-05-28 PROCEDURE — 2500000004 HC RX 250 GENERAL PHARMACY W/ HCPCS (ALT 636 FOR OP/ED): Performed by: OPHTHALMOLOGY

## 2025-05-28 DEVICE — CLAREON ASPHERIC HYDROPHOBIC ACRYLIC IOL WITH THE AUTONOMEAUTOMATED PRE-LOADED DELIVERY SYSTEM
Type: IMPLANTABLE DEVICE | Site: EYE | Status: FUNCTIONAL
Brand: CLAREON™

## 2025-05-28 RX ORDER — FENTANYL CITRATE 50 UG/ML
INJECTION, SOLUTION INTRAMUSCULAR; INTRAVENOUS CONTINUOUS PRN
Status: DISCONTINUED | OUTPATIENT
Start: 2025-05-28 | End: 2025-05-28

## 2025-05-28 RX ORDER — FENTANYL CITRATE 50 UG/ML
50 INJECTION, SOLUTION INTRAMUSCULAR; INTRAVENOUS EVERY 5 MIN PRN
Status: DISCONTINUED | OUTPATIENT
Start: 2025-05-28 | End: 2025-05-28 | Stop reason: HOSPADM

## 2025-05-28 RX ORDER — TETRACAINE HYDROCHLORIDE 5 MG/ML
SOLUTION OPHTHALMIC AS NEEDED
Status: DISCONTINUED | OUTPATIENT
Start: 2025-05-28 | End: 2025-05-28 | Stop reason: HOSPADM

## 2025-05-28 RX ORDER — PREDNISOLONE ACETATE 10 MG/ML
1 SUSPENSION/ DROPS OPHTHALMIC 4 TIMES DAILY
Qty: 5 ML | Refills: 0 | Status: SHIPPED | OUTPATIENT
Start: 2025-05-28

## 2025-05-28 RX ORDER — TETRACAINE HYDROCHLORIDE 5 MG/ML
1 SOLUTION OPHTHALMIC ONCE
Status: COMPLETED | OUTPATIENT
Start: 2025-05-28 | End: 2025-05-28

## 2025-05-28 RX ORDER — LABETALOL HYDROCHLORIDE 5 MG/ML
5 INJECTION, SOLUTION INTRAVENOUS ONCE AS NEEDED
Status: DISCONTINUED | OUTPATIENT
Start: 2025-05-28 | End: 2025-05-28 | Stop reason: HOSPADM

## 2025-05-28 RX ORDER — CYCLOPENTOLATE HYDROCHLORIDE 10 MG/ML
1 SOLUTION/ DROPS OPHTHALMIC
Status: COMPLETED | OUTPATIENT
Start: 2025-05-28 | End: 2025-05-28

## 2025-05-28 RX ORDER — ALBUTEROL SULFATE 0.83 MG/ML
2.5 SOLUTION RESPIRATORY (INHALATION) ONCE AS NEEDED
Status: DISCONTINUED | OUTPATIENT
Start: 2025-05-28 | End: 2025-05-28 | Stop reason: HOSPADM

## 2025-05-28 RX ORDER — MOXIFLOXACIN 5 MG/ML
SOLUTION/ DROPS OPHTHALMIC AS NEEDED
Status: DISCONTINUED | OUTPATIENT
Start: 2025-05-28 | End: 2025-05-28 | Stop reason: HOSPADM

## 2025-05-28 RX ORDER — LIDOCAINE HYDROCHLORIDE 10 MG/ML
0.1 INJECTION, SOLUTION EPIDURAL; INFILTRATION; INTRACAUDAL; PERINEURAL ONCE
Status: DISCONTINUED | OUTPATIENT
Start: 2025-05-28 | End: 2025-05-28 | Stop reason: HOSPADM

## 2025-05-28 RX ORDER — FENTANYL CITRATE 50 UG/ML
25 INJECTION, SOLUTION INTRAMUSCULAR; INTRAVENOUS EVERY 5 MIN PRN
Status: DISCONTINUED | OUTPATIENT
Start: 2025-05-28 | End: 2025-05-28 | Stop reason: HOSPADM

## 2025-05-28 RX ORDER — ACETAMINOPHEN 325 MG/1
650 TABLET ORAL EVERY 4 HOURS PRN
Status: DISCONTINUED | OUTPATIENT
Start: 2025-05-28 | End: 2025-05-28 | Stop reason: HOSPADM

## 2025-05-28 RX ORDER — OXYCODONE HYDROCHLORIDE 5 MG/1
5 TABLET ORAL EVERY 4 HOURS PRN
Status: DISCONTINUED | OUTPATIENT
Start: 2025-05-28 | End: 2025-05-28 | Stop reason: HOSPADM

## 2025-05-28 RX ORDER — METOCLOPRAMIDE HYDROCHLORIDE 5 MG/ML
10 INJECTION INTRAMUSCULAR; INTRAVENOUS ONCE AS NEEDED
Status: DISCONTINUED | OUTPATIENT
Start: 2025-05-28 | End: 2025-05-28 | Stop reason: HOSPADM

## 2025-05-28 RX ORDER — LIDOCAINE HYDROCHLORIDE 10 MG/ML
INJECTION, SOLUTION INFILTRATION; PERINEURAL AS NEEDED
Status: DISCONTINUED | OUTPATIENT
Start: 2025-05-28 | End: 2025-05-28 | Stop reason: HOSPADM

## 2025-05-28 RX ORDER — ONDANSETRON HYDROCHLORIDE 2 MG/ML
4 INJECTION, SOLUTION INTRAVENOUS ONCE AS NEEDED
Status: DISCONTINUED | OUTPATIENT
Start: 2025-05-28 | End: 2025-05-28 | Stop reason: HOSPADM

## 2025-05-28 RX ORDER — TROPICAMIDE 10 MG/ML
1 SOLUTION/ DROPS OPHTHALMIC
Status: COMPLETED | OUTPATIENT
Start: 2025-05-28 | End: 2025-05-28

## 2025-05-28 RX ORDER — MOXIFLOXACIN 5 MG/ML
1 SOLUTION/ DROPS OPHTHALMIC
Status: COMPLETED | OUTPATIENT
Start: 2025-05-28 | End: 2025-05-28

## 2025-05-28 RX ORDER — PHENYLEPHRINE HYDROCHLORIDE 25 MG/ML
1 SOLUTION/ DROPS OPHTHALMIC
Status: COMPLETED | OUTPATIENT
Start: 2025-05-28 | End: 2025-05-28

## 2025-05-28 RX ORDER — KETOROLAC TROMETHAMINE 5 MG/ML
1 SOLUTION OPHTHALMIC 4 TIMES DAILY
Qty: 5 ML | Refills: 1 | Status: SHIPPED | OUTPATIENT
Start: 2025-05-28

## 2025-05-28 RX ADMIN — PHENYLEPHRINE HYDROCHLORIDE 1 DROP: 25 SOLUTION/ DROPS OPHTHALMIC at 08:35

## 2025-05-28 RX ADMIN — TETRACAINE HYDROCHLORIDE 1 DROP: 5 SOLUTION OPHTHALMIC at 08:25

## 2025-05-28 RX ADMIN — TROPICAMIDE 1 DROP: 10 SOLUTION/ DROPS OPHTHALMIC at 08:45

## 2025-05-28 RX ADMIN — PHENYLEPHRINE HYDROCHLORIDE 1 DROP: 25 SOLUTION/ DROPS OPHTHALMIC at 08:45

## 2025-05-28 RX ADMIN — CYCLOPENTOLATE HYDROCHLORIDE 1 DROP: 10 SOLUTION/ DROPS OPHTHALMIC at 08:25

## 2025-05-28 RX ADMIN — PHENYLEPHRINE HYDROCHLORIDE 1 DROP: 25 SOLUTION/ DROPS OPHTHALMIC at 08:25

## 2025-05-28 RX ADMIN — MOXIFLOXACIN HYDROCHLORIDE 1 DROP: 5 SOLUTION/ DROPS OPHTHALMIC at 08:25

## 2025-05-28 RX ADMIN — TROPICAMIDE 1 DROP: 10 SOLUTION/ DROPS OPHTHALMIC at 08:25

## 2025-05-28 RX ADMIN — CYCLOPENTOLATE HYDROCHLORIDE 1 DROP: 10 SOLUTION/ DROPS OPHTHALMIC at 08:45

## 2025-05-28 RX ADMIN — MOXIFLOXACIN HYDROCHLORIDE 1 DROP: 5 SOLUTION/ DROPS OPHTHALMIC at 08:35

## 2025-05-28 RX ADMIN — MOXIFLOXACIN HYDROCHLORIDE 1 DROP: 5 SOLUTION/ DROPS OPHTHALMIC at 08:30

## 2025-05-28 RX ADMIN — CYCLOPENTOLATE HYDROCHLORIDE 1 DROP: 10 SOLUTION/ DROPS OPHTHALMIC at 08:35

## 2025-05-28 RX ADMIN — TROPICAMIDE 1 DROP: 10 SOLUTION/ DROPS OPHTHALMIC at 08:35

## 2025-05-28 ASSESSMENT — ENCOUNTER SYMPTOMS
SEIZURES: 0
EYE REDNESS: 0
COLOR CHANGE: 0
ABDOMINAL DISTENTION: 0
FACIAL ASYMMETRY: 0
AGITATION: 0
JOINT SWELLING: 0
STRIDOR: 0
DIAPHORESIS: 0
WHEEZING: 0
FACIAL SWELLING: 0
FEVER: 0
RHINORRHEA: 0
EYE DISCHARGE: 0

## 2025-05-28 ASSESSMENT — PAIN SCALES - GENERAL
PAINLEVEL_OUTOF10: 0 - NO PAIN

## 2025-05-28 ASSESSMENT — PAIN - FUNCTIONAL ASSESSMENT
PAIN_FUNCTIONAL_ASSESSMENT: 0-10

## 2025-05-28 NOTE — BRIEF OP NOTE
Date: 2025  OR Location: [facility] OR    Name: Elida Benson, : 1945, Age: 80 y.o., MRN: [id number], Sex: female    Diagnosis  Pre-op Diagnosis      * Combined forms of age-related cataract of left eye [H25.812] Post-op Diagnosis     * Combined forms of age-related cataract of left eye [H25.812]     Procedures  Cataract extraction with intraocular lens implantation  69861 - CA XCAPSL CTRC RMVL INSJ IO LENS PROSTH W/O ECP      Surgeons      * Da Silvestre - Primary    Resident/Fellow/Other Assistant:  Surgeons and Role:  * No surgeons found with a matching role *    Staff:   Circulator: Radha Clay Person: Marylou  Circulator: Gina    Anesthesia Staff: No anesthesia staff entered.    Procedure Summary  Anesthesia: Monitor Anesthesia Care  ASA: III  Estimated Blood Loss: <1mL  Intra-op Medications:   Administrations occurring from 922 to 09 on 25:   Medication Name Total Dose   lidocaine (Xylocaine) 10 mg/mL (1 %) injection 1 mL   chondroitin sulf-sod hyaluron (Duovisc) intraocular kit 0.55 mL   moxifloxacin (Vigamox) 0.5 % ophthalmic solution 1 drop   EPINEPHrine (Adrenalin) 1 mg/mL 0.3 mg in balanced salts (BSS) 500 mL irrigation 0.3 mg   balanced salts (BSS) intraocular solution 15 mL 15 mL   tetracaine (PF) 0.5 % ophthalmic solution 2 drop              Anesthesia Record               Intraprocedure I/O Totals       None           Specimen: No specimens collected               Findings: Preop: Cataract left eye  Postop: posterior chamber intraocular lens (PCIOL) left eye    Complications:  None; patient tolerated the procedure well.     Disposition: PACU - hemodynamically stable.  Condition: stable  Specimens Collected: No specimens collected  Attending Attestation: I was present and scrubbed for the entire procedure.    Da Silvestre  Phone Number: [phone]

## 2025-05-28 NOTE — DISCHARGE INSTRUCTIONS
Start the following eye drops in the operative eye:   Prednisolone acetate drops: 4 times/day   Ketorolac drops: 4 times/day     Sleep with shield at night for 7 days  No eye rubbing  May shower and wash face but no water inside surgery eye  No lifting any weight above 10lbs  Patient to resume home medications.   Please call immediately if you develop any redness, pain, decreased vision, flashes, floaters.   Please follow up with Dr Silvestre at 8:30 am at [facility] (third floor of [facility])  Office phone (after hours): [phone]   Hospital : [phone] (call this number if unable to reach someone on the phone above) then ask for ophthalmologist on call.             Problem: Goal Outcome Summary  Goal: Goal Outcome Summary  Outcome: No Change  A & O x3, /95 around Noon. Gave prn hydralazine. O2 2L NC sating mid 90's. Pain managed with Toradol. Working with OT/PT. NGT to LIS with no output, lots of bubbles in cannister. Right YOSELYN x2, #1 with small serosang and #2 with green bile. [place] with good uop. No BM. Bowel sound hypoactive. Using IS. Receiving TPN and Lipids for nutrition. Amiodarone drip infusing at 0.5mg/min. HR 80-90's NSR. K+ and Phos replaced per protocol.        09-Apr-2021

## 2025-05-28 NOTE — H&P
History Of Present Illness  Elida Benson is a 80 y.o. female presenting with left eye cataract.     Past Medical History  Medical History[1]    Surgical History  Surgical History[2]     Social History  She reports that she quit smoking about 41 years ago. Her smoking use included cigarettes. She has never used smokeless tobacco. She reports current alcohol use. She reports that she does not use drugs.    Family History  Family History[3]     Allergies  Shellfish containing products    Review of Systems   Constitutional:  Negative for diaphoresis and fever.   HENT:  Negative for facial swelling, nosebleeds and rhinorrhea.    Eyes:  Negative for discharge and redness.   Respiratory:  Negative for wheezing and stridor.    Cardiovascular:  Negative for leg swelling.   Gastrointestinal:  Negative for abdominal distention.   Musculoskeletal:  Negative for joint swelling.   Skin:  Negative for color change and pallor.   Neurological:  Negative for seizures, syncope and facial asymmetry.   Psychiatric/Behavioral:  Negative for agitation and behavioral problems.         Physical Exam  Constitutional:       Appearance: Normal appearance.   HENT:      Head: Normocephalic and atraumatic.      Nose: No rhinorrhea.   Eyes:      General:         Right eye: No discharge.         Left eye: No discharge.      Conjunctiva/sclera: Conjunctivae normal.   Cardiovascular:      Rate and Rhythm: Normal rate and regular rhythm.   Pulmonary:      Effort: Pulmonary effort is normal. No respiratory distress.      Breath sounds: No stridor.   Abdominal:      General: Abdomen is flat. There is no distension.   Musculoskeletal:         General: No swelling or deformity.   Skin:     General: Skin is warm and dry.   Neurological:      General: No focal deficit present.      Mental Status: She is alert. Mental status is at baseline.   Psychiatric:         Mood and Affect: Mood normal.         Behavior: Behavior normal.          Last Recorded  "Vitals  Blood pressure 120/74, pulse 93, temperature 36.8 °C (98.2 °F), temperature source Temporal, resp. rate 16, height 1.473 m (4' 10\"), weight 65.3 kg (143 lb 15.4 oz), SpO2 97%.    Relevant Results             Assessment & Plan  Combined forms of age-related cataract of left eye      Proceed with CEIOL left eye as planned.        Armando Hobbs MD         [1]   Past Medical History:  Diagnosis Date    A-fib (Multi)     Managed on meds,    Arthritis     Cataract     Cervical myelopathy     CHF (congestive heart failure)     Chronic pain syndrome     CKD (chronic kidney disease)     stage 3b    COPD (chronic obstructive pulmonary disease) (Multi)     denies, she is a former smoker but quit in 1984    Dementia     Depression     Diabetic neuropathy (Multi)     Diverticulitis     DM (diabetes mellitus) (Multi)     denies but A1C= 8.2 in 2025    LORENZ (dyspnea on exertion)     Stable per cards note    HLD (hyperlipidemia)     Hypertension, essential     Hypovolemic shock (Multi)     Infiltrative cardiomyopathy (Multi)     Echo 8/22/23, following with Dr. Ignacio    Nonhealing nonsurgical wound     NSTEMI (non-ST elevated myocardial infarction) (Multi)     OA (ocular albinism) (Multi)     multiple sites    Other malaise 04/21/2022    Physical deconditioning    Palpitations     on occasion, has afib followed by cardiology    Pneumonia 02/17/2025    Post laminectomy syndrome     Psychogenic pain     Rectovaginal fistula     Spinal stenosis of cervical region     UTI (urinary tract infection)     Vocal cord anomaly    [2]   Past Surgical History:  Procedure Laterality Date    CARPAL TUNNEL RELEASE  08/27/2013    Neuroplasty Decompression Median Nerve At Carpal Tunnel    CATARACT EXTRACTION Right 05/07/2025    Dr. Da Silvestre    ILEOSTOMY      ILEOSTOMY CLOSURE      KNEE ARTHROPLASTY  08/27/2013    Knee Arthroplasty    LAMINECTOMY      LUMBAR FUSION  2023    OTHER SURGICAL HISTORY  08/16/2023    LAPAROSCPIC ANTERIOR " RESECTION, DIVERTING LOOP ILEOSTOMY    SPINAL CORD STIMULATOR IMPLANT  2022    TOTAL HIP ARTHROPLASTY  08/27/2013    Total Hip Replacement    WOUND DEBRIDEMENT     [3]   Family History  Problem Relation Name Age of Onset    No Known Problems Mother      No Known Problems Father      No Known Problems Sister      No Known Problems Sister      No Known Problems Sister      No Known Problems Sister

## 2025-05-28 NOTE — ANESTHESIA PREPROCEDURE EVALUATION
Patient: Elida Benson    Procedure Information       Date/Time: 05/28/25 0922    Procedure: Cataract extraction with intraocular lens implantation (Left)    [facility] [facility] [place] [facility]    Surgeons: Da Silvestre MD            Relevant Problems   Anesthesia (within normal limits)      Cardiac  ECHO 2023:  CONCLUSIONS:  1. Left ventricular systolic function is normal with a 55% estimated ejection fraction.  2. Moderately increased left ventricular septal thickness.  3. The left ventricular posterior wall thickness is moderately increased.  4. There is left ventricular concentric remodeling.  5. The longitudinal 2D Strain is mildly decreased with a pattern of apical preservation which is suggestive of infiltrative heart disease.  6. The patient is in atrial fibrillation which may influence the estimate of left ventricular function and transvalvular flows.     (+) Arrhythmia   (+) Atrial fibrillation (Multi)   (+) Hypertension, essential   (+) NSTEMI, initial episode of care (Multi)   (+) Peripheral vascular disease      Pulmonary   (+) COPD without exacerbation (Multi)   (+) Community acquired pneumonia of right upper lobe of lung   (+) Pneumonia, unspecified organism   (+) Pulmonary hypertension, unspecified (Multi)      Neuro   (+) Depression   (+) Lumbar disc herniation with radiculopathy   (+) Lumbar radiculopathy   (+) Moderate dementia with mood disturbance, unspecified dementia type (Multi)      /Renal   (+) UTI (urinary tract infection)      Endocrine   (+) Type 2 diabetes mellitus without retinopathy (Multi)      Hematology   (+) Acute posthemorrhagic anemia   (+) Anemia      Musculoskeletal   (+) Chronic pain syndrome   (+) Infection of intervertebral disc (pyogenic), thoracolumbar region   (+) Lumbar disc herniation with radiculopathy   (+) Lumbar scoliosis   (+) Lumbar stenosis with neurogenic claudication   (+) Myelopathy concurrent with and due to spinal stenosis of cervical  region   (+) Osteoarthritis, multiple sites   (+) Primary osteoarthritis, left shoulder      ID   (+) Community acquired pneumonia of right upper lobe of lung   (+) Infection of intervertebral disc (pyogenic), thoracolumbar region   (+) Pneumonia, unspecified organism   (+) Pyogenic infection of intervertebral disc (Multi)   (+) UTI (urinary tract infection)      Skin   (+) Papular rash       Clinical information reviewed:   Tobacco  Allergies  Meds   Med Hx  Surg Hx   Fam Hx  Soc Hx        NPO Detail:  No data recorded     Physical Exam    Airway  Mallampati: III     Cardiovascular - normal exam   Dental - normal exam     Pulmonary - normal exam   Abdominal            Anesthesia Plan    History of general anesthesia?: yes  History of complications of general anesthesia?: no    ASA 3     MAC     Anesthetic plan and risks discussed with patient.  Use of blood products discussed with patient who.    Plan discussed with CRNA, CAA and attending.

## 2025-05-28 NOTE — OP NOTE
Cataract extraction with intraocular lens implantation (L) Operative Note     Date: 2025  OR Location: [facility] [place] [facility]    Name: Elida Benson, : 1945, Age: 80 y.o., MRN: [id number], Sex: female    Diagnosis  Pre-op Diagnosis      * Combined forms of age-related cataract of left eye [H25.812] Post-op Diagnosis     * Combined forms of age-related cataract of left eye [H25.812]     Procedures  Cataract extraction with intraocular lens implantation  85876 - MS XCAPSL CTRC RMVL INSJ IO LENS PROSTH W/O ECP      Surgeons      * Da Silvestre - Primary    Resident/Fellow/Other Assistant:  Surgeons and Role:  * No surgeons found with a matching role *    Staff:   Circulator: Radha Clay Person: Marylou  Circulator: Gina    Anesthesia Staff: No anesthesia staff entered.    Procedure Summary  Anesthesia: Monitor Anesthesia Care  ASA: III  Estimated Blood Loss: 0mL  Intra-op Medications:   Administrations occurring from 09 to 0959 on 25:   Medication Name Total Dose   lidocaine (Xylocaine) 10 mg/mL (1 %) injection 1 mL   chondroitin sulf-sod hyaluron (Duovisc) intraocular kit 0.55 mL   moxifloxacin (Vigamox) 0.5 % ophthalmic solution 1 drop   EPINEPHrine (Adrenalin) 1 mg/mL 0.3 mg in balanced salts (BSS) 500 mL irrigation 0.3 mg   balanced salts (BSS) intraocular solution 15 mL 15 mL   tetracaine (PF) 0.5 % ophthalmic solution 2 drop              Anesthesia Record               Intraprocedure I/O Totals       None           Specimen: No specimens collected              Drains and/or Catheters:   [REMOVED] External Urinary Catheter Female (Removed)       [REMOVED] External Urinary Catheter Female (Removed)       [REMOVED] External Urinary Catheter (Removed)       Tourniquet Times:         Implants:  Implants       Type Name Action Serial No.       6.0MM X 13MM CLAREON 1-PIECE ASPHERIC MONOFOCAL INTRAOCULAR LENS, 22.5 DIOPTER, W/AUTONOME AUTOMATED PRE-LOADED DELIVERY SYSTEM, HYDROPHOBIC ACRYLIC Implanted  [id number]{ [id number]              Findings: Cataract  OS    Indications: Elida Benson is an 80 y.o. female who is having surgery for Combined forms of age-related cataract of left eye [H25.812].     The patient was seen in the preoperative area. The risks, benefits, complications, treatment options, non-operative alternatives, expected recovery and outcomes were discussed with the patient. The possibilities of reaction to medication, pulmonary aspiration, injury to surrounding structures, bleeding, recurrent infection, the need for additional procedures, failure to diagnose a condition, and creating a complication requiring transfusion or operation were discussed with the patient. The patient concurred with the proposed plan, giving informed consent.  The site of surgery was properly noted/marked if necessary per policy. The patient has been actively warmed in preoperative area. Preoperative antibiotics have been ordered and given within 1 hours of incision. Venous thrombosis prophylaxis are not indicated.    Procedure Details: The patient was placed in the supine position on the operating room table where appropriate blood pressure and cardiac monitoring were initiated. The patient was prepped and draped in the usual sterile fashion for intraocular surgery. This included instillation of Betadine 5% onto the ocular surface followed by irrigation with balance salt solution a minute or two later. A lid speculum was placed and the operating microscope was positioned. One paracentesis stab incision was made to the left of the planned cataract incision with a 15-degree supersharp blade. 1 ml of preservative free lidocaine was injected into the AC. Viscoat was used to replace the aqueous humor. A temporal clear corneal wound was fashioned beginning at the limbus with a 2.2 mm keratome, extending 2 mm into clear cornea before entering the anterior chamber. A continuous tear circular capsulorhexis of approximately 5 mm  in diameter was performed. Hydrodissection was performed using a [place]cada canula. The endothelium was coated with viscoat again. Using the Ozil handpiece on the ConfortVisuel Lens Removal System, the nucleus was emulsified and aspirated using a divide-and-conquer technique. Residual cortex was removed from the eye with the irrigation/aspiration bimanually. ProVisc was used to inflate the capsular bag. The lens implant was inspected and found to be free of visible defects. The lens used was an Robby model [id number], power 22.5 diopter intraocular lens. The lens was inserted into the capsular bag using the [place] insertion mechanism. The lens was centered with a Parsons hook. Residual viscoelastic was removed from the eye with the irrigation/aspiration instrument. Preservative free moxifloxacin was injected  intracameral. The wounds were checked and found to be watertight. 0.3ml of Diluted (1:3) triamcinolone acetonide was injected subonj inferiorly. The lid speculum was removed and the eye was dressed with Maxitrol ointment, eye pad, tape, and shield. The patient tolerated the procedure well, and there were no complications.   Evidence of Infection: No   Complications:  None; patient tolerated the procedure well.    Disposition: PACU - hemodynamically stable.  Condition: stable                 Additional Details: None    Attending Attestation: I performed the procedure.    Da Silvestre  Phone Number: [phone]

## 2025-05-29 ENCOUNTER — HOME CARE VISIT (OUTPATIENT)
Dept: HOME HEALTH SERVICES | Facility: HOME HEALTH | Age: 80
End: 2025-05-29
Payer: MEDICARE

## 2025-05-29 DIAGNOSIS — I42.8 INFILTRATIVE CARDIOMYOPATHY (MULTI): ICD-10-CM

## 2025-05-29 PROCEDURE — G0299 HHS/HOSPICE OF RN EA 15 MIN: HCPCS | Mod: HHH

## 2025-05-29 PROCEDURE — RXMED WILLOW AMBULATORY MEDICATION CHARGE

## 2025-05-30 ENCOUNTER — HOME CARE VISIT (OUTPATIENT)
Dept: HOME HEALTH SERVICES | Facility: HOME HEALTH | Age: 80
End: 2025-05-30
Payer: MEDICARE

## 2025-05-30 ENCOUNTER — OFFICE VISIT (OUTPATIENT)
Dept: OPHTHALMOLOGY | Facility: CLINIC | Age: 80
End: 2025-05-30
Payer: MEDICARE

## 2025-05-30 ENCOUNTER — APPOINTMENT (OUTPATIENT)
Dept: OPHTHALMOLOGY | Facility: CLINIC | Age: 80
End: 2025-05-30
Payer: MEDICARE

## 2025-05-30 VITALS
SYSTOLIC BLOOD PRESSURE: 110 MMHG | OXYGEN SATURATION: 97 % | DIASTOLIC BLOOD PRESSURE: 60 MMHG | RESPIRATION RATE: 19 BRPM | HEART RATE: 80 BPM | TEMPERATURE: 98.6 F

## 2025-05-30 DIAGNOSIS — Z96.1 PSEUDOPHAKIA OF BOTH EYES: Primary | ICD-10-CM

## 2025-05-30 PROCEDURE — 99024 POSTOP FOLLOW-UP VISIT: CPT | Performed by: OPHTHALMOLOGY

## 2025-05-30 RX ORDER — ATORVASTATIN CALCIUM 40 MG/1
40 TABLET, FILM COATED ORAL NIGHTLY
Qty: 30 TABLET | Refills: 0 | Status: SHIPPED | OUTPATIENT
Start: 2025-05-30

## 2025-05-30 SDOH — ECONOMIC STABILITY: FOOD INSECURITY: MEALS PER DAY: 3

## 2025-05-30 SDOH — ECONOMIC STABILITY: GENERAL

## 2025-05-30 ASSESSMENT — ENCOUNTER SYMPTOMS
MUSCLE WEAKNESS: 1
PAIN LOCATION - PAIN SEVERITY: 0/10
DESCRIPTION OF MEMORY LOSS: SHORT TERM
DESCRIPTION OF MEMORY LOSS: IMMEDIATE
SUBJECTIVE PAIN PROGRESSION: UNCHANGED
LOWEST PAIN SEVERITY IN PAST 24 HOURS: 0/10
APPETITE LEVEL: FAIR
PERSON REPORTING PAIN: PATIENT
PAIN LOCATION - PAIN FREQUENCY: INTERMITTENT
BOWEL INCONTINENCE: 1
LAST BOWEL MOVEMENT: 67354
PAIN SEVERITY GOAL: 0/10
PAIN LOCATION - PAIN QUALITY: ACHY
CHANGE IN APPETITE: UNCHANGED
PAIN LOCATION: GENERALIZED
PAIN: 1
HIGHEST PAIN SEVERITY IN PAST 24 HOURS: 0/10

## 2025-05-30 ASSESSMENT — VISUAL ACUITY
OS_SC: 20/30
OD_SC+: +2
OD_SC: 20/40
METHOD: SNELLEN - LINEAR

## 2025-05-30 ASSESSMENT — ACTIVITIES OF DAILY LIVING (ADL)
MONEY MANAGEMENT (EXPENSES/BILLS): TOTALLY DEPENDENT
CURRENT_FUNCTION: TWO PERSON
AMBULATION ASSISTANCE: TWO PERSON
OASIS_M1830: 05
HOME_HEALTH_OASIS: 01

## 2025-05-30 ASSESSMENT — TONOMETRY
IOP_METHOD: TONOPEN
OS_IOP_MMHG: 21
OD_IOP_MMHG: 24

## 2025-05-30 ASSESSMENT — SLIT LAMP EXAM - LIDS
COMMENTS: GOOD POSITION, MILD MGD
COMMENTS: GOOD POSITION, MILD MGD

## 2025-05-30 ASSESSMENT — EXTERNAL EXAM - LEFT EYE: OS_EXAM: NORMAL

## 2025-05-30 ASSESSMENT — EXTERNAL EXAM - RIGHT EYE: OD_EXAM: NORMAL

## 2025-06-04 ENCOUNTER — PHARMACY VISIT (OUTPATIENT)
Dept: PHARMACY | Facility: CLINIC | Age: 80
End: 2025-06-04
Payer: MEDICARE

## 2025-06-06 DIAGNOSIS — I42.8 INFILTRATIVE CARDIOMYOPATHY (MULTI): ICD-10-CM

## 2025-06-07 RX ORDER — ATORVASTATIN CALCIUM 40 MG/1
TABLET, FILM COATED ORAL
Qty: 30 TABLET | Refills: 0 | Status: SHIPPED | OUTPATIENT
Start: 2025-06-07

## 2025-06-09 ENCOUNTER — APPOINTMENT (OUTPATIENT)
Dept: PRIMARY CARE | Facility: CLINIC | Age: 80
End: 2025-06-09
Payer: MEDICARE

## 2025-06-09 VITALS — HEIGHT: 58 IN | WEIGHT: 119.6 LBS | BODY MASS INDEX: 25.11 KG/M2

## 2025-06-09 DIAGNOSIS — E78.5 DYSLIPIDEMIA: ICD-10-CM

## 2025-06-09 DIAGNOSIS — F45.42 PAIN DISORDER ASSOCIATED WITH PSYCHOLOGICAL AND PHYSICAL FACTORS: ICD-10-CM

## 2025-06-09 DIAGNOSIS — I48.11 LONGSTANDING PERSISTENT ATRIAL FIBRILLATION (MULTI): Primary | ICD-10-CM

## 2025-06-09 DIAGNOSIS — I50.41 ACUTE COMBINED SYSTOLIC AND DIASTOLIC CONGESTIVE HEART FAILURE: ICD-10-CM

## 2025-06-09 DIAGNOSIS — I10 HYPERTENSION, ESSENTIAL: ICD-10-CM

## 2025-06-09 DIAGNOSIS — N18.32 STAGE 3B CHRONIC KIDNEY DISEASE (MULTI): ICD-10-CM

## 2025-06-09 DIAGNOSIS — F32.A DEPRESSION, UNSPECIFIED DEPRESSION TYPE: ICD-10-CM

## 2025-06-09 DIAGNOSIS — I42.8 INFILTRATIVE CARDIOMYOPATHY (MULTI): ICD-10-CM

## 2025-06-09 DIAGNOSIS — E11.9 TYPE 2 DIABETES MELLITUS WITHOUT RETINOPATHY (MULTI): ICD-10-CM

## 2025-06-09 DIAGNOSIS — M46.35: ICD-10-CM

## 2025-06-09 DIAGNOSIS — F03.B3 MODERATE DEMENTIA WITH MOOD DISTURBANCE, UNSPECIFIED DEMENTIA TYPE (MULTI): ICD-10-CM

## 2025-06-09 DIAGNOSIS — M96.1 POSTLAMINECTOMY SYNDROME, LUMBAR: ICD-10-CM

## 2025-06-09 DIAGNOSIS — J18.9 COMMUNITY ACQUIRED PNEUMONIA OF RIGHT UPPER LOBE OF LUNG: ICD-10-CM

## 2025-06-09 PROBLEM — I11.9 HYPERTENSIVE HEART DISEASE WITHOUT CONGESTIVE HEART FAILURE: Status: ACTIVE | Noted: 2025-06-09

## 2025-06-09 LAB — POC HEMOGLOBIN A1C: 7.3 % (ref 4.2–6.5)

## 2025-06-09 PROCEDURE — 1159F MED LIST DOCD IN RCRD: CPT | Performed by: FAMILY MEDICINE

## 2025-06-09 PROCEDURE — 99215 OFFICE O/P EST HI 40 MIN: CPT | Performed by: FAMILY MEDICINE

## 2025-06-09 PROCEDURE — 83036 HEMOGLOBIN GLYCOSYLATED A1C: CPT | Performed by: FAMILY MEDICINE

## 2025-06-09 ASSESSMENT — ENCOUNTER SYMPTOMS
DEPRESSION: 0
LOSS OF SENSATION IN FEET: 0
OCCASIONAL FEELINGS OF UNSTEADINESS: 0

## 2025-06-09 NOTE — PROGRESS NOTES
Pt is here for fuv, pt has concerns of wet cough, wheezing, weakness and sleepiness. Pt is having weakness in both legs and hands.

## 2025-06-09 NOTE — PROGRESS NOTES
"Subjective   Patient ID: Elida Benson is a 80 y.o. female who presents for No chief complaint on file..    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         Appetitie is imp    HENT: Negative.     Respiratory: Negative.          Fu viral resp illness and never had xray from march so will get this on Wednesday but her symptoms of uri are resolvign    Cardiovascular: Negative.         No chest pain , no sig sob pt not ambulatory so no sig exertioanl issues  No lower ext edema   Gastrointestinal: Negative.    Musculoskeletal: Negative.    Neurological:  Positive for weakness.        Lumbar spine stenosis and dis   Severe lower ext weakness and unable to ambulate, can stand for very short periods only with home pt     Objective   Ht 1.473 m (4' 10\")   Wt 54.3 kg (119 lb 9.6 oz)   BMI 25.00 kg/m²     Physical Exam  Constitutional:       Appearance: Normal appearance. She is normal weight.   HENT:      Head: Normocephalic and atraumatic.      Right Ear: Tympanic membrane normal.      Left Ear: Tympanic membrane normal.      Nose: Congestion present.      Mouth/Throat:      Mouth: Mucous membranes are moist.      Pharynx: Oropharynx is clear.   Cardiovascular:      Rate and Rhythm: Normal rate and regular rhythm.      Heart sounds: Normal heart sounds.      Comments: Occasional skipped beat  Cannot feel pedal or post tibial pulses, and this was noted as well by cardio and daljit was ordered but hasn't scheduled this yet  Pulmonary:      Effort: Pulmonary effort is normal.      Breath sounds: Normal breath sounds.      Comments: Sligh end exp wheeze but good air movement  in 4 quad  Abdominal:      General: Abdomen is flat. Bowel sounds are normal.      Palpations: Abdomen is soft. "   Musculoskeletal:      Cervical back: Normal range of motion and neck supple.      Comments: Sig low back pain w bilat leg weakn ess, unchanged from prior   Skin:     General: Skin is warm and dry.      Capillary Refill: Capillary refill takes less than 2 seconds.   Neurological:      Mental Status: She is alert.      Sensory: Sensory deficit present.      Motor: Weakness present.      Coordination: Coordination abnormal.      Gait: Gait abnormal.      Comments: Unable to walk and can stand only for short time w assistance         Assessment/Plan   Problem List Items Addressed This Visit           ICD-10-CM    Atrial fibrillation (Multi) - Primary I48.91    Community acquired pneumonia of right upper lobe of lung J18.9    Depression F32.A    Hypertension, essential I10    Relevant Orders    Comprehensive Metabolic Panel (Completed)    Infection of intervertebral disc (pyogenic), thoracolumbar region M46.35    Infiltrative cardiomyopathy (Multi) I42.8    Moderate dementia with mood disturbance, unspecified dementia type (Multi) F03.B3    Pain disorder associated with psychological and physical factors F45.42    Postlaminectomy syndrome, lumbar M96.1    Stage 3b chronic kidney disease (Multi) N18.32    Type 2 diabetes mellitus without retinopathy (Multi) E11.9    Relevant Orders    POCT glycosylated hemoglobin (Hb A1C) manually resulted (Completed)     Other Visit Diagnoses         Codes      Acute combined systolic and diastolic congestive heart failure     I50.41    Relevant Orders    B-type natriuretic peptide (Completed)      Dyslipidemia     E78.5    Relevant Orders    Lipid Panel (Completed)        Chf had disc w cardio stopping lasix and given apparent fluid balance and lack of ext edema will trial this was only on 10 mg dose, but will maintain aldactone w both diuretic effect and beneficail cardiomyopathy effect- will order bnp today   Pt w possible pad still hasn't scheduled vasc study ordered by cardio -  gave number to get this   Fu cap and more recent uri will get fu xray chest done on wedsday when she is at [facility]  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

## 2025-06-10 LAB
ALBUMIN SERPL-MCNC: 3.7 G/DL (ref 3.6–5.1)
ALBUMIN SERPL-MCNC: 3.7 G/DL (ref 3.6–5.1)
ALP SERPL-CCNC: 72 U/L (ref 37–153)
ALP SERPL-CCNC: 73 U/L (ref 37–153)
ALT SERPL-CCNC: 25 U/L (ref 6–29)
ALT SERPL-CCNC: 25 U/L (ref 6–29)
ANION GAP SERPL CALCULATED.4IONS-SCNC: 10 MMOL/L (CALC) (ref 7–17)
ANION GAP SERPL CALCULATED.4IONS-SCNC: 12 MMOL/L (CALC) (ref 7–17)
AST SERPL-CCNC: 18 U/L (ref 10–35)
AST SERPL-CCNC: 18 U/L (ref 10–35)
BILIRUB SERPL-MCNC: 0.4 MG/DL (ref 0.2–1.2)
BILIRUB SERPL-MCNC: 0.4 MG/DL (ref 0.2–1.2)
BNP SERPL-MCNC: 106 PG/ML
BUN SERPL-MCNC: 26 MG/DL (ref 7–25)
BUN SERPL-MCNC: 26 MG/DL (ref 7–25)
CALCIUM SERPL-MCNC: 9.3 MG/DL (ref 8.6–10.4)
CALCIUM SERPL-MCNC: 9.4 MG/DL (ref 8.6–10.4)
CHLORIDE SERPL-SCNC: 107 MMOL/L (ref 98–110)
CHLORIDE SERPL-SCNC: 110 MMOL/L (ref 98–110)
CHOLEST SERPL-MCNC: 155 MG/DL
CHOLEST/HDLC SERPL: 2.2 (CALC)
CO2 SERPL-SCNC: 17 MMOL/L (ref 20–32)
CO2 SERPL-SCNC: 19 MMOL/L (ref 20–32)
CREAT SERPL-MCNC: 0.63 MG/DL (ref 0.6–1)
CREAT SERPL-MCNC: 0.64 MG/DL (ref 0.6–1)
EGFRCR SERPLBLD CKD-EPI 2021: 90 ML/MIN/1.73M2
EGFRCR SERPLBLD CKD-EPI 2021: 91 ML/MIN/1.73M2
ERYTHROCYTE [DISTWIDTH] IN BLOOD BY AUTOMATED COUNT: 13.3 % (ref 11–15)
GLUCOSE SERPL-MCNC: 181 MG/DL (ref 65–99)
GLUCOSE SERPL-MCNC: 182 MG/DL (ref 65–99)
HCT VFR BLD AUTO: 50.5 % (ref 35–45)
HDLC SERPL-MCNC: 71 MG/DL
HGB BLD-MCNC: 16 G/DL (ref 11.7–15.5)
LDLC SERPL CALC-MCNC: 70 MG/DL (CALC)
MCH RBC QN AUTO: 30.5 PG (ref 27–33)
MCHC RBC AUTO-ENTMCNC: 31.7 G/DL (ref 32–36)
MCV RBC AUTO: 96.4 FL (ref 80–100)
NONHDLC SERPL-MCNC: 84 MG/DL (CALC)
PLATELET # BLD AUTO: 257 THOUSAND/UL (ref 140–400)
PMV BLD REES-ECKER: 9.8 FL (ref 7.5–12.5)
POTASSIUM SERPL-SCNC: 4.2 MMOL/L (ref 3.5–5.3)
POTASSIUM SERPL-SCNC: 4.3 MMOL/L (ref 3.5–5.3)
PROT SERPL-MCNC: 6.4 G/DL (ref 6.1–8.1)
PROT SERPL-MCNC: 6.5 G/DL (ref 6.1–8.1)
RBC # BLD AUTO: 5.24 MILLION/UL (ref 3.8–5.1)
SODIUM SERPL-SCNC: 136 MMOL/L (ref 135–146)
SODIUM SERPL-SCNC: 139 MMOL/L (ref 135–146)
TRIGL SERPL-MCNC: 64 MG/DL
WBC # BLD AUTO: 6.9 THOUSAND/UL (ref 3.8–10.8)

## 2025-06-10 ASSESSMENT — ENCOUNTER SYMPTOMS
CARDIOVASCULAR NEGATIVE: 1
CONSTITUTIONAL NEGATIVE: 1
MUSCULOSKELETAL NEGATIVE: 1
RESPIRATORY NEGATIVE: 1
WEAKNESS: 1
GASTROINTESTINAL NEGATIVE: 1

## 2025-06-11 ENCOUNTER — HOSPITAL ENCOUNTER (OUTPATIENT)
Dept: RADIOLOGY | Facility: HOSPITAL | Age: 80
Discharge: HOME | End: 2025-06-11
Payer: MEDICARE

## 2025-06-11 ENCOUNTER — OFFICE VISIT (OUTPATIENT)
Dept: ORTHOPEDIC SURGERY | Facility: CLINIC | Age: 80
End: 2025-06-11
Payer: MEDICARE

## 2025-06-11 ENCOUNTER — APPOINTMENT (OUTPATIENT)
Dept: OPHTHALMOLOGY | Facility: CLINIC | Age: 80
End: 2025-06-11
Payer: MEDICARE

## 2025-06-11 DIAGNOSIS — M19.012 ARTHRITIS OF LEFT SHOULDER REGION: Primary | ICD-10-CM

## 2025-06-11 DIAGNOSIS — Z96.1 PSEUDOPHAKIA OF BOTH EYES: Primary | ICD-10-CM

## 2025-06-11 DIAGNOSIS — M25.512 LEFT SHOULDER PAIN, UNSPECIFIED CHRONICITY: ICD-10-CM

## 2025-06-11 DIAGNOSIS — R06.02 SOB (SHORTNESS OF BREATH) ON EXERTION: ICD-10-CM

## 2025-06-11 PROBLEM — H25.812 COMBINED FORMS OF AGE-RELATED CATARACT OF LEFT EYE: Status: RESOLVED | Noted: 2024-12-24 | Resolved: 2025-06-11

## 2025-06-11 PROBLEM — H25.811 COMBINED FORMS OF AGE-RELATED CATARACT OF RIGHT EYE: Status: RESOLVED | Noted: 2024-12-24 | Resolved: 2025-06-11

## 2025-06-11 PROBLEM — H25.813 COMBINED FORM OF AGE-RELATED CATARACT, BOTH EYES: Status: RESOLVED | Noted: 2023-05-26 | Resolved: 2025-06-11

## 2025-06-11 PROCEDURE — 1036F TOBACCO NON-USER: CPT | Performed by: STUDENT IN AN ORGANIZED HEALTH CARE EDUCATION/TRAINING PROGRAM

## 2025-06-11 PROCEDURE — 71046 X-RAY EXAM CHEST 2 VIEWS: CPT | Performed by: RADIOLOGY

## 2025-06-11 PROCEDURE — 92134 CPTRZ OPH DX IMG PST SGM RTA: CPT | Performed by: STUDENT IN AN ORGANIZED HEALTH CARE EDUCATION/TRAINING PROGRAM

## 2025-06-11 PROCEDURE — 2023F DILAT RTA XM W/O RTNOPTHY: CPT | Performed by: STUDENT IN AN ORGANIZED HEALTH CARE EDUCATION/TRAINING PROGRAM

## 2025-06-11 PROCEDURE — 71046 X-RAY EXAM CHEST 2 VIEWS: CPT

## 2025-06-11 PROCEDURE — 2500000004 HC RX 250 GENERAL PHARMACY W/ HCPCS (ALT 636 FOR OP/ED): Performed by: STUDENT IN AN ORGANIZED HEALTH CARE EDUCATION/TRAINING PROGRAM

## 2025-06-11 PROCEDURE — 99214 OFFICE O/P EST MOD 30 MIN: CPT | Performed by: STUDENT IN AN ORGANIZED HEALTH CARE EDUCATION/TRAINING PROGRAM

## 2025-06-11 PROCEDURE — 20610 DRAIN/INJ JOINT/BURSA W/O US: CPT | Mod: LT | Performed by: STUDENT IN AN ORGANIZED HEALTH CARE EDUCATION/TRAINING PROGRAM

## 2025-06-11 PROCEDURE — 1159F MED LIST DOCD IN RCRD: CPT | Performed by: STUDENT IN AN ORGANIZED HEALTH CARE EDUCATION/TRAINING PROGRAM

## 2025-06-11 PROCEDURE — 99212 OFFICE O/P EST SF 10 MIN: CPT | Mod: 25 | Performed by: STUDENT IN AN ORGANIZED HEALTH CARE EDUCATION/TRAINING PROGRAM

## 2025-06-11 PROCEDURE — 99024 POSTOP FOLLOW-UP VISIT: CPT | Performed by: STUDENT IN AN ORGANIZED HEALTH CARE EDUCATION/TRAINING PROGRAM

## 2025-06-11 RX ORDER — LIDOCAINE HYDROCHLORIDE 10 MG/ML
5 INJECTION, SOLUTION INFILTRATION; PERINEURAL
Status: COMPLETED | OUTPATIENT
Start: 2025-06-11 | End: 2025-06-11

## 2025-06-11 RX ORDER — TRIAMCINOLONE ACETONIDE 40 MG/ML
80 INJECTION, SUSPENSION INTRA-ARTICULAR; INTRAMUSCULAR
Status: COMPLETED | OUTPATIENT
Start: 2025-06-11 | End: 2025-06-11

## 2025-06-11 RX ADMIN — LIDOCAINE HYDROCHLORIDE 5 ML: 10 INJECTION, SOLUTION INFILTRATION; PERINEURAL at 14:18

## 2025-06-11 RX ADMIN — TRIAMCINOLONE ACETONIDE 80 MG: 40 INJECTION, SUSPENSION INTRA-ARTICULAR; INTRAMUSCULAR at 14:18

## 2025-06-11 ASSESSMENT — CONF VISUAL FIELD
METHOD: COUNTING FINGERS
OS_SUPERIOR_NASAL_RESTRICTION: 0
OS_INFERIOR_TEMPORAL_RESTRICTION: 0
OD_SUPERIOR_TEMPORAL_RESTRICTION: 0
OD_NORMAL: 1
OD_INFERIOR_TEMPORAL_RESTRICTION: 0
OD_SUPERIOR_NASAL_RESTRICTION: 0
OD_INFERIOR_NASAL_RESTRICTION: 0
OS_SUPERIOR_TEMPORAL_RESTRICTION: 0
OS_NORMAL: 1
OS_INFERIOR_NASAL_RESTRICTION: 0

## 2025-06-11 ASSESSMENT — EXTERNAL EXAM - LEFT EYE: OS_EXAM: NORMAL

## 2025-06-11 ASSESSMENT — ENCOUNTER SYMPTOMS
RESPIRATORY NEGATIVE: 0
ENDOCRINE NEGATIVE: 0
PSYCHIATRIC NEGATIVE: 0
MUSCULOSKELETAL NEGATIVE: 0
NEUROLOGICAL NEGATIVE: 0
EYES NEGATIVE: 1
CONSTITUTIONAL NEGATIVE: 0
HEMATOLOGIC/LYMPHATIC NEGATIVE: 0
CARDIOVASCULAR NEGATIVE: 0
ALLERGIC/IMMUNOLOGIC NEGATIVE: 0
GASTROINTESTINAL NEGATIVE: 0

## 2025-06-11 ASSESSMENT — VISUAL ACUITY
OS_SC: 20/25
OD_SC: 20/25
METHOD: SNELLEN - LINEAR

## 2025-06-11 ASSESSMENT — TONOMETRY
OD_IOP_MMHG: 17
IOP_METHOD: TONOPEN
OS_IOP_MMHG: 17

## 2025-06-11 ASSESSMENT — SLIT LAMP EXAM - LIDS
COMMENTS: GOOD POSITION, MILD MGD
COMMENTS: GOOD POSITION, MILD MGD

## 2025-06-11 ASSESSMENT — PAIN SCALES - GENERAL: PAINLEVEL_OUTOF10: 10 - WORST POSSIBLE PAIN

## 2025-06-11 ASSESSMENT — EXTERNAL EXAM - RIGHT EYE: OD_EXAM: NORMAL

## 2025-06-11 ASSESSMENT — PAIN - FUNCTIONAL ASSESSMENT: PAIN_FUNCTIONAL_ASSESSMENT: 0-10

## 2025-06-11 ASSESSMENT — CUP TO DISC RATIO
OS_RATIO: .4
OD_RATIO: .4

## 2025-06-11 NOTE — PROGRESS NOTES
CHIEF COMPLAINT: No chief complaint on file.    History: 80 y.o. female presents to the office today for evaluation of her left shoulder.  She has known left shoulder end-stage arthritis.  We have given her 6 cortisone injections previously, the most recent of which was 3 months ago.  She only had a few weeks of relief.  Is not interested in surgery at this time.  Difficulty with using the left shoulder.  Significant pain is anterior and deep.  Requesting repeat injection today.    Past medical history, past surgical history, medications, allergies, family history, social history, and review of systems were reviewed today.    A 12 point review of systems was negative other than as stated in the HPI.    Past Medical History:   Diagnosis Date    A-fib (Multi)     Managed on meds,    Arthritis     Cataract     Cervical myelopathy     CHF (congestive heart failure)     Chronic pain syndrome     CKD (chronic kidney disease)     stage 3b    COPD (chronic obstructive pulmonary disease) (Multi)     denies, she is a former smoker but quit in 1984    Dementia     Depression     Diabetic neuropathy (Multi)     Diverticulitis     DM (diabetes mellitus) (Multi)     denies but A1C= 8.2 in 2025    LORENZ (dyspnea on exertion)     Stable per cards note    HLD (hyperlipidemia)     Hypertension, essential     Hypovolemic shock (Multi)     Infiltrative cardiomyopathy (Multi)     Echo 8/22/23, following with Dr. Ignacio    Nonhealing nonsurgical wound     NSTEMI (non-ST elevated myocardial infarction) (Multi)     OA (ocular albinism) (Multi)     multiple sites    Other malaise 04/21/2022    Physical deconditioning    Palpitations     on occasion, has afib followed by cardiology    Pneumonia 02/17/2025    Post laminectomy syndrome     Psychogenic pain     Rectovaginal fistula     Spinal stenosis of cervical region     UTI (urinary tract infection)     Vocal cord anomaly         Allergies   Allergen Reactions    Shellfish Containing  Products Swelling        Past Surgical History:   Procedure Laterality Date    CARPAL TUNNEL RELEASE  2013    Neuroplasty Decompression Median Nerve At Carpal Tunnel    CATARACT EXTRACTION Right 2025    Dr. Da Silvestre    ILEOSTOMY      ILEOSTOMY CLOSURE      KNEE ARTHROPLASTY  2013    Knee Arthroplasty    LAMINECTOMY      LUMBAR FUSION      OTHER SURGICAL HISTORY  2023    LAPAROSCPIC ANTERIOR RESECTION, DIVERTING LOOP ILEOSTOMY    SPINAL CORD STIMULATOR IMPLANT      TOTAL HIP ARTHROPLASTY  2013    Total Hip Replacement    WOUND DEBRIDEMENT          Family History   Problem Relation Name Age of Onset    No Known Problems Mother      No Known Problems Father      No Known Problems Sister      No Known Problems Sister      No Known Problems Sister      No Known Problems Sister          Social History     Socioeconomic History    Marital status:      Spouse name: Not on file    Number of children: Not on file    Years of education: Not on file    Highest education level: Not on file   Occupational History    Not on file   Tobacco Use    Smoking status: Former     Current packs/day: 0.00     Types: Cigarettes     Quit date:      Years since quittin.4    Smokeless tobacco: Never    Tobacco comments:     No hx of anesthesia reactions. No FH of MH.     No illnesses 30 days. Had pneumonia 25. Pt is wheelchair bound but daughter says she can stand and help transfer. Is able to lie flat for 30 minutes. Dementia dx but daughter says she makes own decisions.   Vaping Use    Vaping status: Never Used   Substance and Sexual Activity    Alcohol use: Yes     Comment: drinks beer or liquor a few times a year    Drug use: Never    Sexual activity: Defer   Other Topics Concern    Not on file   Social History Narrative    Not on file     Social Drivers of Health     Financial Resource Strain: Low Risk  (2025)    Overall Financial Resource Strain (CARDIA)     Difficulty of  Paying Living Expenses: Not hard at all   Food Insecurity: No Food Insecurity (2/11/2025)    Hunger Vital Sign     Worried About Running Out of Food in the Last Year: Never true     Ran Out of Food in the Last Year: Never true   Transportation Needs: No Transportation Needs (5/29/2025)    OASIS : Transportation     Lack of Transportation (Medical): No     Lack of Transportation (Non-Medical): No     Patient Unable or Declines to Respond: No   Physical Activity: Inactive (4/9/2024)    Exercise Vital Sign     Days of Exercise per Week: 0 days     Minutes of Exercise per Session: 0 min   Stress: No Stress Concern Present (4/9/2024)    [employer] [place] [employer] - Occupational Stress Questionnaire     Feeling of Stress : Not at all   Social Connections: Feeling Socially Integrated (5/29/2025)    OASIS : Social Isolation     Frequency of experiencing loneliness or isolation: Rarely   Intimate Partner Violence: Not At Risk (2/11/2025)    Humiliation, Afraid, Rape, and Kick questionnaire     Fear of Current or Ex-Partner: No     Emotionally Abused: No     Physically Abused: No     Sexually Abused: No   Housing Stability: Low Risk  (2/11/2025)    Housing Stability Vital Sign     Unable to Pay for Housing in the Last Year: No     Number of Times Moved in the Last Year: 0     Homeless in the Last Year: No        CURRENT MEDICATIONS:   Current Outpatient Medications   Medication Sig Dispense Refill    acetaminophen (Tylenol) 325 mg tablet Give 2 tablet by mouth every 4 hours as needed for Pain      alogliptin (Nesina) 12.5 mg tablet Take 1 tablet (12.5 mg) by mouth once daily. 90 tablet 1    amiodarone (Pacerone) 200 mg tablet TAKE ONE TABLET BY MOUTH EVERY MORNING FOR ANTIARRHYTHMIC. 60 tablet 0    apixaban (Eliquis) 5 mg tablet Take 1 tablet (5 mg) by mouth 2 times a day. 180 tablet 3    atorvastatin (Lipitor) 40 mg tablet TAKE ONE TABLET BY MOUTH EVERY DAY AT BEDTIME FOR HYPERLIPIDEMIA 30 tablet 0     "b complex 0.4 mg tablet Take 1 tablet by mouth once daily.      baclofen (Lioresal) 5 mg tablet TAKE ONE TABLET BY MOUTH TWO TIMES A DAY 60 tablet 2    BD Ailyn 2nd Gen Pen Needle 32 gauge x 5/32\" needle USE 4 TIMES A DAY      benzonatate (Tessalon) 100 mg capsule TAKE 1 CAPSULE BY MOUTH 3 TIMES A DAY AS NEEDED FOR COUGH. DO NOT CRUSH OR CHEW. 42 capsule 0    biotin 1 mg tablet 1 tab(s) orally once a day      cephalexin (Keflex) 500 mg capsule       cholecalciferol (Vitamin D-3) 1,250 mcg (50,000 unit) capsule Take 1 capsule (50,000 Units) by mouth 1 (one) time per week. Sunday      docusate sodium (Colace) 100 mg capsule Take 1 capsule (100 mg) by mouth 2 times a day as needed for constipation.      donepezil (Aricept) 5 mg tablet TAKE ONE TABLET BY MOUTH AT BEDTIME 90 tablet 0    DULoxetine (Cymbalta) 30 mg DR capsule TAKE ONE CAPSULE BY MOUTH TWO TIMES A  capsule 0    empagliflozin (Jardiance) 25 mg tablet Take 1 tablet (25 mg) by mouth once daily. 90 tablet 1    fluticasone (Flonase) 50 mcg/actuation nasal spray Administer 2 sprays into each nostril once daily. Shake gently. Before first use, prime pump. After use, clean tip and replace cap. (Patient taking differently: Administer 2 sprays into each nostril once daily as needed for allergies. Shake gently. Before first use, prime pump. After use, clean tip and replace cap.) 16 g 3    furosemide (Lasix) 20 mg tablet Take 1 tablet (20 mg) by mouth once daily. 90 tablet 3    gabapentin (Neurontin) 600 mg tablet Take 1 tablet (600 mg) by mouth 3 times a day. 90 tablet 0    insulin glargine (Lantus Solostar U-100 Insulin) 100 unit/mL (3 mL) pen Inject 3 Units under the skin once daily in the morning. As needed for sugars over 200.      insulin lispro (HumaLOG) 100 unit/mL pen Inject 1 Units under the skin 4 times a day. inject subcutaneiously before meals and at bedtime for hyperglycemia  per sliding scale  151-200 1 unit 201-250 2units 251-300 3 units 350 4 " units 351-400 5units notifiy Md if if greater than 400, notify md if lower than 40.      insulin lispro (HumaLOG) 100 unit/mL pen 3 Units 3 times daily (morning, midday, late afternoon). inject  3 units before meals.      ketorolac (Acular) 0.5 % ophthalmic solution Administer 1 drop into the right eye 4 times a day. 5 mL 0    ketorolac (Acular) 0.5 % ophthalmic solution Administer 1 drop into the left eye 4 times a day. 5 mL 1    losartan (Cozaar) 25 mg tablet Take 1 tablet (25 mg) by mouth once daily. 30 tablet 11    melatonin 3 mg tablet Take 2 tablets (6 mg) by mouth as needed at bedtime for sleep.      metoprolol succinate XL (Toprol XL) 25 mg 24 hr tablet Take by mouth once every 24 hours.      naloxone (Narcan) 4 mg/0.1 mL nasal spray Administer 1 spray (4 mg) into affected nostril(s) if needed for opioid reversal. May repeat every 2-3 minutes if needed, alternating nostrils, until medical assistance becomes available. 2 each 0    ondansetron ODT (Zofran-ODT) 4 mg disintegrating tablet Take 1 tablet (4 mg) by mouth every 8 hours if needed for nausea or vomiting. 20 tablet 0    OneTouch Delica Plus Lancet 33 gauge misc Daily use 100 each 1    OneTouch Ultra Test 100 100 each 1    OneTouch Ultra2 Meter misc use as directed      prednisoLONE acetate (Pred-Forte) 1 % ophthalmic suspension Administer 1 drop into the right eye 4 times a day. 5 mL 0    prednisoLONE acetate (Pred-Forte) 1 % ophthalmic suspension Administer 1 drop into the left eye 4 times a day. 5 mL 0    predniSONE (Deltasone) 10 mg tablet       SITagliptin phosphate (Januvia) 50 mg tablet Take 1 tablet (50 mg) by mouth once daily. 90 tablet 1    SITagliptin phosphate (Januvia) 50 mg tablet Take 1 tablet (50 mg) by mouth once daily. 30 tablet 11    spironolactone (Aldactone) 25 mg tablet Take 1 tablet (25 mg) by mouth once daily. 30 tablet 11    tafamidis (Vyndamax) 61 mg capsule Take 1 capsule (61 mg) by mouth once daily. 30 capsule 10     "topiramate (Topamax) 100 mg tablet Take 1 tablet (100 mg) by mouth 2 times a day. 60 tablet 10    traMADol (Ultram) 50 mg tablet Take by mouth every 12 hours.      traZODone (Desyrel) 50 mg tablet TAKE ONE TABLET BY MOUTH AT BEDTIME AS NEEDED for sleep 30 tablet 0    wound dressing (Triad Wound Dressing) paste Apply to affected area daily 71 g 0     No current facility-administered medications for this visit.       Physical Examination:      5/28/2025     9:35 AM 5/28/2025     9:40 AM 5/28/2025     9:41 AM 5/28/2025     9:43 AM 5/28/2025    10:20 AM 5/29/2025     3:00 PM 6/9/2025     1:15 PM   Vitals   Systolic    114 108 110    Diastolic    68 72 60    Heart Rate    82 89 80    Temp    36.8 °C (98.2 °F) 36.8 °C (98.2 °F) 37 °C (98.6 °F)    Resp 16 16 16 16 16 19    Height       1.473 m (4' 10\")   Weight (lb)       119.6   BMI       25 kg/m2   BSA (m2)       1.49 m2   Visit Report       Report      There is no height or weight on file to calculate BMI.    Well-appearing, appears stated age, pleasant and cooperative, appropriate mood and behavior. Height and weight reviewed. Alert and oriented x3.  Auditory function intact.  No acute distress.  Intact ocular function, ARIC, EOMI. Breathing is unlabored .  There is no evidence of jugular venous distension. Skin appearance is normal without evidence of rash or other lesions. 2+ radial pulses bilaterally, fingers pink and wwp, good capillary refill, no pitting edema. No appreciable lymphadenopathy in bilateral upper extremities. SILT throughout both upper extremities, median/radial/ulnar/musculocutaneous/axillary nerve motor and sensory intact (except for abnormalities noted in focused musculoskeletal exam section below).     On exam of bilateral upper extremities, very limited range of motion of the left shoulder.  Active forward flexion to 40, external rotation to neutral.  On the right she has active forward flexion to 100, external rotation to 30.    Imaging: " Previous radiographs of the left shoulder show end-stage arthritis of the left shoulder    Assessment: Arthritis of the left shoulder    Plan: Patient with end-stage arthritis of the left shoulder.  She has responded well to cortisone injections previously and is not interested in surgery at this time.  She has multiple medical morbidities.  We performed a repeat injection today.  She will follow-up with us as needed going forward. Continue with pain management.       Injection was performed, please see separate procedure note, patient tolerated well.   Patient ID: Elida Benson is a 80 y.o. female.    L Inj/Asp: L glenohumeral on 6/11/2025 2:18 PM  Indications: pain  Details: 22 G needle, anterior approach  Medications: 80 mg triamcinolone acetonide 40 mg/mL; 5 mL lidocaine 10 mg/mL (1 %)  Outcome: tolerated well, no immediate complications  Procedure, treatment alternatives, risks and benefits explained, specific risks discussed. Consent was given by the patient. Immediately prior to procedure a time out was called to verify the correct patient, procedure, equipment, support staff and site/side marked as required. Patient was prepped and draped in the usual sterile fashion.           Dragon software was used to dictate this note, please be aware that minor errors in transcription may be present.    Telly Chu MD    Shoulder/Elbow Surgery  [facility]

## 2025-06-11 NOTE — PROGRESS NOTES
Assessment/Plan   Diagnoses and all orders for this visit:  Pseudophakia of both eyes  -patient presents today for post op vision s/p PCIOL with Konrad OD 5/7/25 OS 5/28/25  -patient is doing excellent with BCVA 20/25 OD+OS without correction (SC)  -no complaints  -reports finishing drops OD; currently tapering OS with Pred and Ketorolac TID currently  -no pain, discomfort  -ocular health normal on DFE today to extent seen  -OCT MAC normal without CME     RTC with Dr. Silvestre as scheduled    Depression score 21 today  Patient being monitored by psychiatrist   Lynn Frankel taking Effexor, Stelazine, and Seroquel  Patient also takes Benadryl to combat these sedative properties of the other medications  I warned her against doing this    She states that her psychiatrist is aware and is working with her on weaning down to a reasonable dosage

## 2025-06-13 DIAGNOSIS — J40 BRONCHITIS: Primary | ICD-10-CM

## 2025-06-13 RX ORDER — CEFUROXIME AXETIL 500 MG/1
500 TABLET ORAL 2 TIMES DAILY
Qty: 14 TABLET | Refills: 0 | Status: SHIPPED | OUTPATIENT
Start: 2025-06-13 | End: 2025-06-20

## 2025-06-19 DIAGNOSIS — F51.01 PRIMARY INSOMNIA: ICD-10-CM

## 2025-06-20 ENCOUNTER — APPOINTMENT (OUTPATIENT)
Dept: OPHTHALMOLOGY | Facility: CLINIC | Age: 80
End: 2025-06-20
Payer: MEDICARE

## 2025-06-20 RX ORDER — TRAZODONE HYDROCHLORIDE 50 MG/1
50 TABLET ORAL NIGHTLY PRN
Qty: 30 TABLET | Refills: 0 | Status: SHIPPED | OUTPATIENT
Start: 2025-06-20

## 2025-06-26 ENCOUNTER — SPECIALTY PHARMACY (OUTPATIENT)
Dept: PHARMACY | Facility: CLINIC | Age: 80
End: 2025-06-26

## 2025-06-26 PROCEDURE — RXMED WILLOW AMBULATORY MEDICATION CHARGE

## 2025-07-01 ENCOUNTER — HOME HEALTH ADMISSION (OUTPATIENT)
Dept: HOME HEALTH SERVICES | Facility: HOME HEALTH | Age: 80
End: 2025-07-01
Payer: MEDICARE

## 2025-07-01 ENCOUNTER — DOCUMENTATION (OUTPATIENT)
Dept: HOME HEALTH SERVICES | Facility: HOME HEALTH | Age: 80
End: 2025-07-01
Payer: MEDICARE

## 2025-07-01 NOTE — HH CARE COORDINATION
Home Care received a Referral for Nursing. We have processed the referral for a Start of Care on 24-48 HOURS .     If you have any questions or concerns, please feel free to contact us at [phone]. Follow the prompts, enter your five digit zip code, and you will be directed to your care team on [facility].

## 2025-07-02 ENCOUNTER — PHARMACY VISIT (OUTPATIENT)
Dept: PHARMACY | Facility: CLINIC | Age: 80
End: 2025-07-02
Payer: MEDICARE

## 2025-07-02 DIAGNOSIS — I42.8 INFILTRATIVE CARDIOMYOPATHY (MULTI): ICD-10-CM

## 2025-07-02 RX ORDER — ATORVASTATIN CALCIUM 40 MG/1
40 TABLET, FILM COATED ORAL DAILY
Qty: 30 TABLET | Refills: 0 | Status: SHIPPED | OUTPATIENT
Start: 2025-07-02 | End: 2025-07-03 | Stop reason: SDUPTHER

## 2025-07-03 ENCOUNTER — TELEPHONE (OUTPATIENT)
Dept: PRIMARY CARE | Facility: CLINIC | Age: 80
End: 2025-07-03
Payer: MEDICARE

## 2025-07-03 DIAGNOSIS — I10 HYPERTENSION, ESSENTIAL: ICD-10-CM

## 2025-07-03 DIAGNOSIS — I42.8 INFILTRATIVE CARDIOMYOPATHY (MULTI): ICD-10-CM

## 2025-07-03 RX ORDER — ATORVASTATIN CALCIUM 20 MG/1
20 TABLET, FILM COATED ORAL DAILY
Qty: 90 TABLET | Refills: 0 | Status: SHIPPED | OUTPATIENT
Start: 2025-07-03

## 2025-07-03 RX ORDER — LOSARTAN POTASSIUM 25 MG/1
25 TABLET ORAL DAILY
Qty: 90 TABLET | Refills: 3 | Status: SHIPPED | OUTPATIENT
Start: 2025-07-03 | End: 2026-07-03

## 2025-07-03 NOTE — TELEPHONE ENCOUNTER
Pharmacy called and stated that patient is currently taking 61mg of Vyndamax once a day and states that there is a counter interaction with Atorvastatin and pharmacist wants you to know that this can cause liver function issues. Pharmacy wasn't sure if you wanted to adjust the dosing.

## 2025-07-05 ENCOUNTER — HOME CARE VISIT (OUTPATIENT)
Dept: HOME HEALTH SERVICES | Facility: HOME HEALTH | Age: 80
End: 2025-07-05
Payer: MEDICARE

## 2025-07-05 VITALS
TEMPERATURE: 97.6 F | HEART RATE: 81 BPM | DIASTOLIC BLOOD PRESSURE: 60 MMHG | SYSTOLIC BLOOD PRESSURE: 108 MMHG | RESPIRATION RATE: 18 BRPM | OXYGEN SATURATION: 97 %

## 2025-07-05 PROCEDURE — 169592 NO-PAY CLAIM PROCEDURE

## 2025-07-05 PROCEDURE — G0299 HHS/HOSPICE OF RN EA 15 MIN: HCPCS | Mod: HHH

## 2025-07-05 ASSESSMENT — ENCOUNTER SYMPTOMS
STOOL FREQUENCY: DAILY
PERSON REPORTING PAIN: PATIENT
BOWEL INCONTINENCE: 1
MUSCLE WEAKNESS: 1
CHANGE IN APPETITE: UNCHANGED
APPETITE LEVEL: GOOD
ARTHRALGIAS: 1
DENIES PAIN: 1
LIMITED RANGE OF MOTION: 1

## 2025-07-05 ASSESSMENT — ACTIVITIES OF DAILY LIVING (ADL)
OASIS_M1830: 06
ENTERING_EXITING_HOME: MAXIMUM ASSIST

## 2025-07-08 ENCOUNTER — HOME CARE VISIT (OUTPATIENT)
Dept: HOME HEALTH SERVICES | Facility: HOME HEALTH | Age: 80
End: 2025-07-08
Payer: MEDICARE

## 2025-07-08 ENCOUNTER — TELEPHONE (OUTPATIENT)
Dept: PRIMARY CARE | Facility: CLINIC | Age: 80
End: 2025-07-08
Payer: MEDICARE

## 2025-07-08 DIAGNOSIS — L97.509: ICD-10-CM

## 2025-07-08 PROCEDURE — G0151 HHCP-SERV OF PT,EA 15 MIN: HCPCS | Mod: HHH

## 2025-07-08 ASSESSMENT — ENCOUNTER SYMPTOMS
SUBJECTIVE PAIN PROGRESSION: WAXING AND WANING
LIMITED RANGE OF MOTION: 1
PAIN: 1
TREMORS: 1
ARTHRALGIAS: 1
PERSON REPORTING PAIN: PATIENT
MUSCLE WEAKNESS: 1

## 2025-07-08 NOTE — CASE COMMUNICATION
Hello, pt asessed today for PT at home. Pt currently dependent for all adls and bed bound.  I am recommending  a pressure reducing cushion or overlay on her current hospital bed mattress as well as pressure reducing cushion for her wheelchair.  The pt and family would also benefit from a salena lift to allow safe transfers to wheelchair for transport to appointments and to participate in adls.  Pt is largely in bed, incontinent and at hi gh risk for pressure ulcers.  This additional DME will reduce pressure, facilitate healing and allow pt to be out of bed with less pain.   Please fax prescription to [facility] at [phone]. Thank you.

## 2025-07-08 NOTE — TELEPHONE ENCOUNTER
[place] [facility] called and stated that when they called daughter Kaelyn to schedule appointment, Kaelyn states that patient will not come out the house, it is very difficult to get her out the house and she does not want to make any appointments with wound care. SHANDRAI.

## 2025-07-09 ENCOUNTER — HOME CARE VISIT (OUTPATIENT)
Dept: HOME HEALTH SERVICES | Facility: HOME HEALTH | Age: 80
End: 2025-07-09
Payer: MEDICARE

## 2025-07-09 PROCEDURE — G0299 HHS/HOSPICE OF RN EA 15 MIN: HCPCS | Mod: HHH

## 2025-07-09 ASSESSMENT — ENCOUNTER SYMPTOMS
LOWEST PAIN SEVERITY IN PAST 24 HOURS: 6/10
HIGHEST PAIN SEVERITY IN PAST 24 HOURS: 9/10

## 2025-07-09 ASSESSMENT — ACTIVITIES OF DAILY LIVING (ADL)
CURRENT_FUNCTION: TWO PERSON
AMBULATION ASSISTANCE: NON-AMBULATORY

## 2025-07-11 ENCOUNTER — OFFICE VISIT (OUTPATIENT)
Dept: WOUND CARE | Facility: CLINIC | Age: 80
End: 2025-07-11
Payer: MEDICARE

## 2025-07-11 VITALS
OXYGEN SATURATION: 97 % | HEART RATE: 70 BPM | DIASTOLIC BLOOD PRESSURE: 80 MMHG | RESPIRATION RATE: 19 BRPM | SYSTOLIC BLOOD PRESSURE: 117 MMHG | TEMPERATURE: 98.7 F

## 2025-07-11 DIAGNOSIS — S91.101A OPEN WOUND OF RIGHT GREAT TOE, INITIAL ENCOUNTER: Primary | ICD-10-CM

## 2025-07-11 PROCEDURE — 99213 OFFICE O/P EST LOW 20 MIN: CPT | Mod: 25

## 2025-07-11 PROCEDURE — 11042 DBRDMT SUBQ TIS 1ST 20SQCM/<: CPT

## 2025-07-11 RX ORDER — MUPIROCIN 20 MG/G
OINTMENT TOPICAL 3 TIMES DAILY
Qty: 22 G | Refills: 1 | Status: SHIPPED | OUTPATIENT
Start: 2025-07-11 | End: 2025-07-21

## 2025-07-11 SDOH — ECONOMIC STABILITY: GENERAL

## 2025-07-11 SDOH — ECONOMIC STABILITY: FOOD INSECURITY: MEALS PER DAY: 3

## 2025-07-11 ASSESSMENT — ENCOUNTER SYMPTOMS
HIGHEST PAIN SEVERITY IN PAST 24 HOURS: 0/10
MUSCLE WEAKNESS: 1
PAIN LOCATION: GENERALIZED
APPETITE LEVEL: FAIR
PAIN: 1
DESCRIPTION OF MEMORY LOSS: SHORT TERM
LAST BOWEL MOVEMENT: 67397
LOWEST PAIN SEVERITY IN PAST 24 HOURS: 0/10
PAIN LOCATION - PAIN SEVERITY: 0/10
BOWEL INCONTINENCE: 1
SUBJECTIVE PAIN PROGRESSION: UNCHANGED
PAIN SEVERITY GOAL: 0/10
PAIN LOCATION - PAIN QUALITY: ACHY
PAIN LOCATION - PAIN FREQUENCY: INTERMITTENT
PERSON REPORTING PAIN: PATIENT
CHANGE IN APPETITE: UNCHANGED

## 2025-07-11 ASSESSMENT — ACTIVITIES OF DAILY LIVING (ADL)
AMBULATION ASSISTANCE: TWO PERSON
MONEY MANAGEMENT (EXPENSES/BILLS): TOTALLY DEPENDENT
CURRENT_FUNCTION: TWO PERSON

## 2025-07-12 ENCOUNTER — HOME CARE VISIT (OUTPATIENT)
Dept: HOME HEALTH SERVICES | Facility: HOME HEALTH | Age: 80
End: 2025-07-12
Payer: MEDICARE

## 2025-07-12 VITALS
OXYGEN SATURATION: 97 % | HEART RATE: 84 BPM | SYSTOLIC BLOOD PRESSURE: 118 MMHG | TEMPERATURE: 97.2 F | RESPIRATION RATE: 16 BRPM | DIASTOLIC BLOOD PRESSURE: 70 MMHG

## 2025-07-12 PROCEDURE — G0300 HHS/HOSPICE OF LPN EA 15 MIN: HCPCS | Mod: HHH

## 2025-07-12 ASSESSMENT — ENCOUNTER SYMPTOMS
LOWEST PAIN SEVERITY IN PAST 24 HOURS: 1/10
PERSON REPORTING PAIN: PATIENT
BOWEL INCONTINENCE: 1
PAIN LOCATION: LEFT BUTTOCK
OCCASIONAL FEELINGS OF UNSTEADINESS: 0
PAIN: 1
APPETITE LEVEL: GOOD
LAST BOWEL MOVEMENT: 67398
SUBJECTIVE PAIN PROGRESSION: WAXING AND WANING

## 2025-07-12 ASSESSMENT — ACTIVITIES OF DAILY LIVING (ADL)
AMBULATION ASSISTANCE: 1
AMBULATION ASSISTANCE: INDEPENDENT

## 2025-07-13 ENCOUNTER — HOME CARE VISIT (OUTPATIENT)
Dept: HOME HEALTH SERVICES | Facility: HOME HEALTH | Age: 80
End: 2025-07-13
Payer: MEDICARE

## 2025-07-14 ENCOUNTER — TELEPHONE (OUTPATIENT)
Dept: PRIMARY CARE | Facility: CLINIC | Age: 80
End: 2025-07-14

## 2025-07-14 ENCOUNTER — APPOINTMENT (OUTPATIENT)
Dept: OPHTHALMOLOGY | Facility: CLINIC | Age: 80
End: 2025-07-14
Payer: MEDICARE

## 2025-07-15 ENCOUNTER — HOME CARE VISIT (OUTPATIENT)
Dept: HOME HEALTH SERVICES | Facility: HOME HEALTH | Age: 80
End: 2025-07-15
Payer: MEDICARE

## 2025-07-15 PROCEDURE — G0152 HHCP-SERV OF OT,EA 15 MIN: HCPCS | Mod: HHH

## 2025-07-16 ENCOUNTER — HOME CARE VISIT (OUTPATIENT)
Dept: HOME HEALTH SERVICES | Facility: HOME HEALTH | Age: 80
End: 2025-07-16
Payer: MEDICARE

## 2025-07-16 PROCEDURE — G0157 HHC PT ASSISTANT EA 15: HCPCS | Mod: CQ,HHH

## 2025-07-16 PROCEDURE — G0299 HHS/HOSPICE OF RN EA 15 MIN: HCPCS | Mod: HHH

## 2025-07-16 ASSESSMENT — ACTIVITIES OF DAILY LIVING (ADL)
TOILETING: 1
DRESSING_UB_CURRENT_FUNCTION: MODERATE ASSIST
BATHING_CURRENT_FUNCTION: MAXIMUM ASSIST
DRESSING_UB_CURRENT_FUNCTION: MAXIMUM ASSIST
GROOMING_CURRENT_FUNCTION: MODERATE ASSIST
GROOMING_CURRENT_FUNCTION: MAXIMUM ASSIST
BATHING_CURRENT_FUNCTION: MODERATE ASSIST
TOILETING: MODERATE ASSIST
GROOMING ASSESSED: 1
BATHING ASSESSED: 1
DRESSING_LB_CURRENT_FUNCTION: MAXIMUM ASSIST
TOILETING: MAXIMUM ASSIST

## 2025-07-16 ASSESSMENT — ENCOUNTER SYMPTOMS
PERSON REPORTING PAIN: PATIENT
SLEEP QUALITY: FAIR
PAIN: 1

## 2025-07-18 ENCOUNTER — APPOINTMENT (OUTPATIENT)
Dept: PHARMACY | Facility: HOSPITAL | Age: 80
End: 2025-07-18
Payer: MEDICARE

## 2025-07-18 ENCOUNTER — HOME CARE VISIT (OUTPATIENT)
Dept: HOME HEALTH SERVICES | Facility: HOME HEALTH | Age: 80
End: 2025-07-18
Payer: MEDICARE

## 2025-07-18 ENCOUNTER — OFFICE VISIT (OUTPATIENT)
Dept: WOUND CARE | Facility: CLINIC | Age: 80
End: 2025-07-18
Payer: MEDICARE

## 2025-07-18 PROCEDURE — 11042 DBRDMT SUBQ TIS 1ST 20SQCM/<: CPT

## 2025-07-18 PROCEDURE — G0157 HHC PT ASSISTANT EA 15: HCPCS | Mod: CQ,HHH

## 2025-07-19 DIAGNOSIS — I49.9 CARDIAC ARRHYTHMIA, UNSPECIFIED CARDIAC ARRHYTHMIA TYPE: ICD-10-CM

## 2025-07-19 DIAGNOSIS — F51.01 PRIMARY INSOMNIA: ICD-10-CM

## 2025-07-19 DIAGNOSIS — G89.4 CHRONIC PAIN SYNDROME: ICD-10-CM

## 2025-07-20 VITALS
OXYGEN SATURATION: 98 % | TEMPERATURE: 97.6 F | DIASTOLIC BLOOD PRESSURE: 60 MMHG | HEART RATE: 78 BPM | RESPIRATION RATE: 19 BRPM | SYSTOLIC BLOOD PRESSURE: 110 MMHG

## 2025-07-20 SDOH — ECONOMIC STABILITY: FOOD INSECURITY: MEALS PER DAY: 3

## 2025-07-20 SDOH — ECONOMIC STABILITY: GENERAL

## 2025-07-20 ASSESSMENT — ENCOUNTER SYMPTOMS
PAIN LOCATION - PAIN SEVERITY: 0/10
PAIN SEVERITY GOAL: 0/10
PAIN LOCATION - PAIN QUALITY: ACHY
HIGHEST PAIN SEVERITY IN PAST 24 HOURS: 0/10
CHANGE IN APPETITE: UNCHANGED
SUBJECTIVE PAIN PROGRESSION: UNCHANGED
PAIN LOCATION - PAIN FREQUENCY: INTERMITTENT
APPETITE LEVEL: POOR
LIMITED RANGE OF MOTION: 1
PAIN: 1
PERSON REPORTING PAIN: PATIENT
MUSCLE WEAKNESS: 1
PAIN LOCATION: GENERALIZED
BOWEL INCONTINENCE: 1
LOWEST PAIN SEVERITY IN PAST 24 HOURS: 0/10
LAST BOWEL MOVEMENT: 67401

## 2025-07-21 ENCOUNTER — HOME CARE VISIT (OUTPATIENT)
Dept: HOME HEALTH SERVICES | Facility: HOME HEALTH | Age: 80
End: 2025-07-21
Payer: MEDICARE

## 2025-07-21 VITALS
DIASTOLIC BLOOD PRESSURE: 58 MMHG | HEART RATE: 74 BPM | TEMPERATURE: 97.2 F | SYSTOLIC BLOOD PRESSURE: 92 MMHG | RESPIRATION RATE: 16 BRPM

## 2025-07-21 DIAGNOSIS — G89.4 CHRONIC PAIN SYNDROME: ICD-10-CM

## 2025-07-21 PROCEDURE — G0158 HHC OT ASSISTANT EA 15: HCPCS | Mod: CO,HHH

## 2025-07-21 RX ORDER — GABAPENTIN 600 MG/1
600 TABLET ORAL 3 TIMES DAILY
Qty: 90 TABLET | Refills: 0 | OUTPATIENT
Start: 2025-07-21

## 2025-07-21 RX ORDER — GABAPENTIN 600 MG/1
600 TABLET ORAL 3 TIMES DAILY
Qty: 90 TABLET | Refills: 0 | Status: SHIPPED | OUTPATIENT
Start: 2025-07-21

## 2025-07-21 RX ORDER — TRAZODONE HYDROCHLORIDE 50 MG/1
50 TABLET ORAL NIGHTLY PRN
Qty: 30 TABLET | Refills: 0 | Status: SHIPPED | OUTPATIENT
Start: 2025-07-21

## 2025-07-21 RX ORDER — AMIODARONE HYDROCHLORIDE 200 MG/1
200 TABLET ORAL DAILY
Qty: 60 TABLET | Refills: 11 | Status: SHIPPED | OUTPATIENT
Start: 2025-07-21

## 2025-07-21 ASSESSMENT — ENCOUNTER SYMPTOMS
ANGER WITHIN DEFINED LIMITS: 1
AGGRESSION WITHIN DEFINED LIMITS: 1

## 2025-07-21 ASSESSMENT — ACTIVITIES OF DAILY LIVING (ADL): EFFECT OF PAIN ON DAILY ACTIVITIES: LIMITED FUNCTIONAL ACTIVITY

## 2025-07-21 ASSESSMENT — PAIN DESCRIPTION - PAIN TYPE: TYPE: ACUTE PAIN

## 2025-07-22 ENCOUNTER — HOME CARE VISIT (OUTPATIENT)
Dept: HOME HEALTH SERVICES | Facility: HOME HEALTH | Age: 80
End: 2025-07-22
Payer: MEDICARE

## 2025-07-22 PROCEDURE — G0157 HHC PT ASSISTANT EA 15: HCPCS | Mod: CQ,HHH

## 2025-07-23 ENCOUNTER — HOME CARE VISIT (OUTPATIENT)
Dept: HOME HEALTH SERVICES | Facility: HOME HEALTH | Age: 80
End: 2025-07-23
Payer: MEDICARE

## 2025-07-23 PROCEDURE — G0156 HHCP-SVS OF AIDE,EA 15 MIN: HCPCS | Mod: HHH

## 2025-07-23 PROCEDURE — G0158 HHC OT ASSISTANT EA 15: HCPCS | Mod: CO,HHH

## 2025-07-23 ASSESSMENT — ENCOUNTER SYMPTOMS
AGGRESSION WITHIN DEFINED LIMITS: 1
ANGER WITHIN DEFINED LIMITS: 1

## 2025-07-24 ENCOUNTER — HOME CARE VISIT (OUTPATIENT)
Dept: HOME HEALTH SERVICES | Facility: HOME HEALTH | Age: 80
End: 2025-07-24
Payer: MEDICARE

## 2025-07-24 ENCOUNTER — SPECIALTY PHARMACY (OUTPATIENT)
Dept: PHARMACY | Facility: CLINIC | Age: 80
End: 2025-07-24

## 2025-07-24 PROCEDURE — G0157 HHC PT ASSISTANT EA 15: HCPCS | Mod: CQ,HHH

## 2025-07-24 PROCEDURE — RXMED WILLOW AMBULATORY MEDICATION CHARGE

## 2025-07-24 PROCEDURE — G0299 HHS/HOSPICE OF RN EA 15 MIN: HCPCS | Mod: HHH

## 2025-07-25 ENCOUNTER — HOME CARE VISIT (OUTPATIENT)
Dept: HOME HEALTH SERVICES | Facility: HOME HEALTH | Age: 80
End: 2025-07-25
Payer: MEDICARE

## 2025-07-25 ENCOUNTER — OFFICE VISIT (OUTPATIENT)
Dept: WOUND CARE | Facility: CLINIC | Age: 80
End: 2025-07-25
Payer: MEDICARE

## 2025-07-25 DIAGNOSIS — L03.031 CELLULITIS OF GREAT TOE, RIGHT: Primary | ICD-10-CM

## 2025-07-25 DIAGNOSIS — L97.512 CHRONIC ULCER OF GREAT TOE OF RIGHT FOOT WITH FAT LAYER EXPOSED (MULTI): ICD-10-CM

## 2025-07-25 PROCEDURE — G0156 HHCP-SVS OF AIDE,EA 15 MIN: HCPCS | Mod: HHH

## 2025-07-25 PROCEDURE — 11042 DBRDMT SUBQ TIS 1ST 20SQCM/<: CPT

## 2025-07-25 RX ORDER — CEPHALEXIN 500 MG/1
500 CAPSULE ORAL 2 TIMES DAILY
Qty: 20 CAPSULE | Refills: 0 | Status: SHIPPED | OUTPATIENT
Start: 2025-07-25 | End: 2025-08-04

## 2025-07-26 VITALS
TEMPERATURE: 98.6 F | RESPIRATION RATE: 19 BRPM | OXYGEN SATURATION: 97 % | DIASTOLIC BLOOD PRESSURE: 70 MMHG | HEART RATE: 78 BPM | SYSTOLIC BLOOD PRESSURE: 120 MMHG

## 2025-07-26 SDOH — ECONOMIC STABILITY: GENERAL

## 2025-07-26 SDOH — ECONOMIC STABILITY: FOOD INSECURITY: MEALS PER DAY: 3

## 2025-07-26 ASSESSMENT — ACTIVITIES OF DAILY LIVING (ADL)
CURRENT_FUNCTION: TWO PERSON
AMBULATION ASSISTANCE: TWO PERSON
MONEY MANAGEMENT (EXPENSES/BILLS): TOTALLY DEPENDENT

## 2025-07-26 ASSESSMENT — ENCOUNTER SYMPTOMS
PERSON REPORTING PAIN: PATIENT
PAIN LOCATION: GENERALIZED
PAIN SEVERITY GOAL: 0/10
MUSCLE WEAKNESS: 1
PAIN LOCATION - PAIN SEVERITY: 0/10
PAIN LOCATION - PAIN FREQUENCY: INTERMITTENT
LAST BOWEL MOVEMENT: 67409
PAIN: 1
BOWEL INCONTINENCE: 1
LOWEST PAIN SEVERITY IN PAST 24 HOURS: 0/10
PAIN LOCATION - PAIN QUALITY: ACHY
APPETITE LEVEL: FAIR
HIGHEST PAIN SEVERITY IN PAST 24 HOURS: 0/10
SUBJECTIVE PAIN PROGRESSION: UNCHANGED
CHANGE IN APPETITE: UNCHANGED

## 2025-07-28 ENCOUNTER — OFFICE VISIT (OUTPATIENT)
Dept: CARDIOLOGY | Facility: CLINIC | Age: 80
End: 2025-07-28
Payer: MEDICARE

## 2025-07-28 ENCOUNTER — HOSPITAL ENCOUNTER (OUTPATIENT)
Dept: VASCULAR MEDICINE | Facility: HOSPITAL | Age: 80
Discharge: HOME | End: 2025-07-28
Payer: MEDICARE

## 2025-07-28 ENCOUNTER — HOSPITAL ENCOUNTER (OUTPATIENT)
Dept: RADIOLOGY | Facility: HOSPITAL | Age: 80
Discharge: HOME | End: 2025-07-28
Payer: MEDICARE

## 2025-07-28 VITALS
BODY MASS INDEX: 29.39 KG/M2 | OXYGEN SATURATION: 95 % | WEIGHT: 140 LBS | DIASTOLIC BLOOD PRESSURE: 75 MMHG | SYSTOLIC BLOOD PRESSURE: 102 MMHG | HEIGHT: 58 IN | HEART RATE: 87 BPM

## 2025-07-28 DIAGNOSIS — I48.11 LONGSTANDING PERSISTENT ATRIAL FIBRILLATION (MULTI): ICD-10-CM

## 2025-07-28 DIAGNOSIS — I10 HYPERTENSION, ESSENTIAL: ICD-10-CM

## 2025-07-28 DIAGNOSIS — I49.9 CARDIAC ARRHYTHMIA, UNSPECIFIED CARDIAC ARRHYTHMIA TYPE: ICD-10-CM

## 2025-07-28 DIAGNOSIS — I43 CARDIAC AMYLOIDOSIS: Primary | ICD-10-CM

## 2025-07-28 DIAGNOSIS — I50.33 ACUTE ON CHRONIC DIASTOLIC HEART FAILURE: ICD-10-CM

## 2025-07-28 DIAGNOSIS — I42.8 INFILTRATIVE CARDIOMYOPATHY (MULTI): ICD-10-CM

## 2025-07-28 DIAGNOSIS — E85.4 CARDIAC AMYLOIDOSIS: ICD-10-CM

## 2025-07-28 DIAGNOSIS — R42 DIZZINESS: ICD-10-CM

## 2025-07-28 DIAGNOSIS — I73.89 OTHER SPECIFIED PERIPHERAL VASCULAR DISEASES: ICD-10-CM

## 2025-07-28 DIAGNOSIS — R93.1 ABNORMAL ECHOCARDIOGRAM: ICD-10-CM

## 2025-07-28 DIAGNOSIS — I43 CARDIAC AMYLOIDOSIS: ICD-10-CM

## 2025-07-28 DIAGNOSIS — J44.9 COPD WITHOUT EXACERBATION (MULTI): ICD-10-CM

## 2025-07-28 DIAGNOSIS — E66.01 MORBID (SEVERE) OBESITY DUE TO EXCESS CALORIES (MULTI): ICD-10-CM

## 2025-07-28 DIAGNOSIS — J96.01 ACUTE RESPIRATORY FAILURE WITH HYPOXIA: ICD-10-CM

## 2025-07-28 DIAGNOSIS — L97.512 CHRONIC ULCER OF GREAT TOE OF RIGHT FOOT WITH FAT LAYER EXPOSED (MULTI): ICD-10-CM

## 2025-07-28 DIAGNOSIS — E85.4 CARDIAC AMYLOIDOSIS: Primary | ICD-10-CM

## 2025-07-28 DIAGNOSIS — Z93.2 ILEOSTOMY PRESENT (MULTI): ICD-10-CM

## 2025-07-28 DIAGNOSIS — Z43.2 ENCOUNTER FOR ATTENTION TO ILEOSTOMY: ICD-10-CM

## 2025-07-28 DIAGNOSIS — I27.20 PULMONARY HYPERTENSION, UNSPECIFIED (MULTI): ICD-10-CM

## 2025-07-28 DIAGNOSIS — I77.1 STRICTURE OF ARTERY: ICD-10-CM

## 2025-07-28 DIAGNOSIS — I73.9 PERIPHERAL VASCULAR DISEASE, UNSPECIFIED: ICD-10-CM

## 2025-07-28 PROCEDURE — 93922 UPR/L XTREMITY ART 2 LEVELS: CPT

## 2025-07-28 PROCEDURE — G2211 COMPLEX E/M VISIT ADD ON: HCPCS | Performed by: STUDENT IN AN ORGANIZED HEALTH CARE EDUCATION/TRAINING PROGRAM

## 2025-07-28 PROCEDURE — 99215 OFFICE O/P EST HI 40 MIN: CPT | Performed by: STUDENT IN AN ORGANIZED HEALTH CARE EDUCATION/TRAINING PROGRAM

## 2025-07-28 PROCEDURE — 3078F DIAST BP <80 MM HG: CPT | Performed by: STUDENT IN AN ORGANIZED HEALTH CARE EDUCATION/TRAINING PROGRAM

## 2025-07-28 PROCEDURE — 73630 X-RAY EXAM OF FOOT: CPT | Mod: RT

## 2025-07-28 PROCEDURE — 3074F SYST BP LT 130 MM HG: CPT | Performed by: STUDENT IN AN ORGANIZED HEALTH CARE EDUCATION/TRAINING PROGRAM

## 2025-07-28 PROCEDURE — 73630 X-RAY EXAM OF FOOT: CPT | Mod: RIGHT SIDE | Performed by: RADIOLOGY

## 2025-07-28 PROCEDURE — 93922 UPR/L XTREMITY ART 2 LEVELS: CPT | Performed by: STUDENT IN AN ORGANIZED HEALTH CARE EDUCATION/TRAINING PROGRAM

## 2025-07-28 PROCEDURE — 99212 OFFICE O/P EST SF 10 MIN: CPT

## 2025-07-28 PROCEDURE — 1159F MED LIST DOCD IN RCRD: CPT | Performed by: STUDENT IN AN ORGANIZED HEALTH CARE EDUCATION/TRAINING PROGRAM

## 2025-07-28 NOTE — PROGRESS NOTES
Follow-up visit    HPI:    Elida Benson is a 80 y.o. female with pertinent history of dementia, hypertension, diverticulitis status post laparoscopic anterior resection with ileostomy on 8/16/2023, paroxysmal atrial fibrillation on amiodarone and Eliquis, preserved ejection fraction with suspected myocardial cleft versus false tendon, moderate concentric hypertrophy, moderate left atrial enlargement on echo performed 8/19/2023, preserved ejection fraction with moderate septal thickness, abnormal strain imaging concerning for possible infiltrative heart disease on echo performed 8/22/2023, amyloid SPECT heart study demonstrates radiotracer deposition within myocardium of left ventricle suggestive of TTR amyloidosis on nuclear imaging performed 4/15/2024, preserved LVEF of 62% with constellation of findings consistent with amyloidosis including diffuse subendocardial delayed enhancement and abnormal myocardial involving time as well as increased T1 mapping times, asymmetric mid to basal septal hypertrophy 1.8 cm on cardiac MRI performed 9/13/2024  presents to cardiology clinic for follow-up.    She is accompanied by her daughters who provide history and have questions.  She is doing relatively well.  Dyspnea on exertion stable.  We reviewed and discussed her prior cardiac MRI as well as her prior PYP scan.  She has been tolerating Vyndamax relatively well.  No exacerbating or relieving factors.  Patient denies chest pain and angina.  Pt denies orthopnea, and paroxysmal nocturnal dyspnea.  Pt denies syncope.  No recent falls.  No fever or chills.  No cough.  No change in bowel or bladder habits.  No sick contacts.  No recent travel.    12 point review of systems including (Constitutional, Eyes, ENMT, Respiratory, Cardiac, Gastrointestinal, Neurological, Psychiatric, and Hematologic) was performed and is otherwise negative.    Past medical history reviewed:   has a past medical history of A-fib (Multi), Arthritis,  Cataract, Cervical myelopathy, CHF (congestive heart failure), Chronic pain syndrome, CKD (chronic kidney disease), COPD (chronic obstructive pulmonary disease) (Multi), Dementia, Depression, Diabetic neuropathy (Multi), Diverticulitis, DM (diabetes mellitus) (Multi), LORENZ (dyspnea on exertion), HLD (hyperlipidemia), Hypertension, essential, Hypovolemic shock (Multi), Infiltrative cardiomyopathy (Multi), Nonhealing nonsurgical wound, NSTEMI (non-ST elevated myocardial infarction) (Multi), OA (ocular albinism) (Multi), Other malaise (04/21/2022), Palpitations, Pneumonia (02/17/2025), Post laminectomy syndrome, Psychogenic pain, Rectovaginal fistula, Spinal stenosis of cervical region, UTI (urinary tract infection), and Vocal cord anomaly.    Past surgical history reviewed:   has a past surgical history that includes Carpal tunnel release (08/27/2013); Knee Arthroplasty (08/27/2013); Total hip arthroplasty (08/27/2013); Other surgical history (08/16/2023); Ileostomy; Laminectomy; Lumbar fusion (2023); Spinal cord stimulator implant (2022); Wound debridement; Ileostomy closure; and Cataract extraction (Right, 05/07/2025).    Social history reviewed:   reports that she quit smoking about 41 years ago. Her smoking use included cigarettes. She has never used smokeless tobacco. She reports current alcohol use. She reports that she does not use drugs.     Family history reviewed:    Family History   Problem Relation Name Age of Onset    No Known Problems Mother      No Known Problems Father      No Known Problems Sister      No Known Problems Sister      No Known Problems Sister      No Known Problems Sister         Allergies reviewed: Shellfish containing products     Medications reviewed:   Current Outpatient Medications   Medication Instructions    acetaminophen (Tylenol) 325 mg tablet Give 2 tablet by mouth every 4 hours as needed for Pain    alogliptin (NESINA) 12.5 mg, oral, Daily    amiodarone (PACERONE) 200 mg, oral,  "Daily, TAKE ONE TABLET BY MOUTH EVERY MORNING FOR ANTIARRHYTHMIC.    atorvastatin (LIPITOR) 20 mg, oral, Daily, TAKE ONE TABLET BY MOUTH EVERY DAY AT BEDTIME FOR HYPERLIPIDEMIA    b complex 0.4 mg tablet 1 tablet, Daily    baclofen (LIORESAL) 5 mg, oral, 2 times daily    BD Ailyn 2nd Gen Pen Needle 32 gauge x 5/32\" needle USE 4 TIMES A DAY    benzonatate (Tessalon) 100 mg capsule TAKE 1 CAPSULE BY MOUTH 3 TIMES A DAY AS NEEDED FOR COUGH. DO NOT CRUSH OR CHEW.    biotin 1 mg tablet 1 tab(s) orally once a day    cephalexin (KEFLEX) 500 mg, oral, 2 times daily    cholecalciferol (Vitamin D-3) 1,250 mcg (50,000 unit) capsule Take 1 capsule (50,000 Units) by mouth 1 (one) time per week. Sunday    docusate sodium (Colace) 100 mg capsule 1 capsule, 2 times daily PRN    DULoxetine (CYMBALTA) 30 mg, oral, 2 times daily    Eliquis 5 mg, oral, 2 times daily    empagliflozin (JARDIANCE) 25 mg, oral, Daily    fluticasone (Flonase) 50 mcg/actuation nasal spray 2 sprays, Each Nostril, Daily, Shake gently. Before first use, prime pump. After use, clean tip and replace cap.    furosemide (LASIX) 20 mg, oral, Daily    gabapentin (NEURONTIN) 600 mg, oral, 3 times daily    insulin lispro (HUMALOG) 1 Units, 4 times daily    insulin lispro (HUMALOG) 3 Units, 3 times daily (morning, midday, late afternoon)    ketorolac (Acular) 0.5 % ophthalmic solution 1 drop, Right Eye, 4 times daily    ketorolac (Acular) 0.5 % ophthalmic solution 1 drop, Left Eye, 4 times daily    Lantus Solostar U-100 Insulin 3 Units, Every morning    losartan (COZAAR) 25 mg, oral, Daily    melatonin 3 mg tablet Take 2 tablets (6 mg) by mouth as needed at bedtime for sleep.    naloxone (NARCAN) 4 mg, nasal, As needed, May repeat every 2-3 minutes if needed, alternating nostrils, until medical assistance becomes available.    ondansetron ODT (ZOFRAN-ODT) 4 mg, oral, Every 8 hours PRN    OneTouch Delica Plus Lancet 33 gauge misc Daily use    OneTouch Ultra Test 100    " OneTouch Ultra2 Meter misc use as directed    prednisoLONE acetate (Pred-Forte) 1 % ophthalmic suspension 1 drop, Right Eye, 4 times daily    prednisoLONE acetate (Pred-Forte) 1 % ophthalmic suspension 1 drop, Left Eye, 4 times daily    predniSONE (Deltasone) 10 mg tablet     SITagliptin phosphate (JANUVIA) 50 mg, oral, Daily    SITagliptin phosphate (JANUVIA) 50 mg, oral, Daily    spironolactone (ALDACTONE) 25 mg, oral, Daily    topiramate (TOPAMAX) 100 mg, oral, 2 times daily    traMADol (ULTRAM) 50 mg, Every 12 hours    traZODone (DESYREL) 50 mg, oral, Nightly PRN    Vyndamax 61 mg, oral, Daily    wound dressing (Triad Wound Dressing) paste Apply to affected area daily        Vitals reviewed: Visit Vitals  /75 (BP Location: Right arm, Patient Position: Sitting, BP Cuff Size: Adult)   Pulse 87         Physical Exam:   General:  Patient is awake, alert, and oriented.  Patient is in no acute distress.  HEENT:  Pupils equal and reactive.  Normocephalic.  Moist mucosa.    Neck:  No thyromegaly.  11 cm jugular Venous Pressure.  Cardiovascular:  Regular rate and rhythm.  Normal S1 and S2.  1/6 EDER.  Pulmonary:  Clear to auscultation bilaterally.  Abdomen:  Soft. Non-tender.   Non-distended.  Positive bowel sounds.  Lower Extremities:  2+ pedal pulses.  1+ LE edema.  Neurologic:  Cranial nerves intact.  No focal deficit.   Skin: Skin warm and dry, normal skin turgor.   Psychiatric: Normal affect.    Last Labs:  CBC -      Lab Results   Component Value Date    WBC 6.9 06/09/2025    HGB 16.0 (H) 06/09/2025    HCT 50.5 (H) 06/09/2025     06/09/2025        CMP-  Lab Results   Component Value Date    GLUCOSE 181 (H) 06/09/2025     06/09/2025    K 4.2 06/09/2025     06/09/2025    CO2 19 (L) 06/09/2025    ANIONGAP 10 06/09/2025    BUN 26 (H) 06/09/2025    CREATININE 0.64 06/09/2025    EGFR 90 06/09/2025    CALCIUM 9.3 06/09/2025    PHOS 4.5 05/13/2024    PROT 6.5 06/09/2025    ALBUMIN 3.7 06/09/2025     AST 18 06/09/2025    ALT 25 06/09/2025    ALKPHOS 72 06/09/2025    BILITOT 0.4 06/09/2025        LIPIDS-  Lab Results   Component Value Date    CHOL 155 06/09/2025    TRIG 64 06/09/2025    HDL 71 06/09/2025    CHHDL 2.2 06/09/2025    VLDL 20 05/26/2023        OTHERS-  Lab Results   Component Value Date    HGBA1C 7.3 (A) 06/09/2025     (H) 06/09/2025        I personally reviewed the patient's recent vitals, labs, medications, orders, EKGs, pertinent cardiac imaging/ echocardiography.    Assessment and Plan:    Elida Benson is a 80 y.o. female with pertinent history of dementia, hypertension, diverticulitis status post laparoscopic anterior resection with ileostomy on 8/16/2023, paroxysmal atrial fibrillation on amiodarone and Eliquis, preserved ejection fraction with suspected myocardial cleft versus false tendon, moderate concentric hypertrophy, moderate left atrial enlargement on echo performed 8/19/2023, preserved ejection fraction with moderate septal thickness, abnormal strain imaging concerning for possible infiltrative heart disease on echo performed 8/22/2023, amyloid SPECT heart study demonstrates radiotracer deposition within myocardium of left ventricle suggestive of TTR amyloidosis on nuclear imaging performed 4/15/2024, preserved LVEF of 62% with constellation of findings consistent with amyloidosis including diffuse subendocardial delayed enhancement and abnormal myocardial involving time as well as increased T1 mapping times, asymmetric mid to basal septal hypertrophy 1.8 cm on cardiac MRI performed 9/13/2024  presents to cardiology clinic for follow-up. She is accompanied by her daughters who provide history and have questions.  She is doing relatively well.  Dyspnea on exertion stable.  We reviewed and discussed her prior cardiac MRI as well as her prior PYP scan.  She has been tolerating Vyndamax relatively well.  She does note some occasional borderline dizziness.  Blood pressure is low  normal today.    We did discuss in the future we have a low threshold to consider reducing or holding losartan.    Please continue your current cardiac medications including amiodarone 200 mg once daily, atorvastatin 40 mg daily, Eliquis 5 mg twice daily, Jardiance 25 mg once daily, losartan 25 mg daily, furosemide Lasix 20 mg once daily, Vyndamax (tafamidis) 61 mg daily.      Please follow-up with Dr. Dueñas of advanced heart failure within the next 2 to 3 months.    Please followup with me in Cardiology clinic within the next 6-7 months.    Please return to clinic sooner or seek emergent care if your symptoms reoccur or worsen.    Thank you for allowing me to participate in their care.  Please feel free to call me with any further questions or concerns.      Cesar Ignacio MD, [facility], [facility]  Division of Cardiovascular Medicine  System Director, Nuclear Cardiology   Medical Director, [facility] [place], [facility]   Clinical , [employer]  [email or web address]   Office:  [phone]

## 2025-07-28 NOTE — PROGRESS NOTES
Pharmacist Clinic: Cardiology Management    Elida Benson is a 80 y.o. female was referred to Clinical Pharmacy Team for anticoagulation and heart failure management.     Referring Provider: Cesar Ignacio MD    THIS IS A FOLLOW UP PATIENT APPOINTMENT. AT LAST VISIT ON 04/18/2025 WITH PHARMACIST (Ledy Couch).    REVIEW OF LAST APPT  Kaelyn states Elida is doing well on anticoagulation therapy. Patient reports adherence, no adverse effects, and denies s/s of bleeding.   Kaelyn reports Elida is tolerating her heart failure regimen well reporting adherence, no adverse effects and denies s/s of worsening heart failure. Kaelyn reports her SBP averages around low 100s, patient denies dizziness/lightheadedness.   Discussed with Kaelyn the refill and auto refill process through [facility] pharmacy, she verbalized understanding. [facility] to request refill for Jardiance and Eliquis on patient behalf.    Appointment was completed by Kaelyn (dtr) who was reached at primary number.    Allergies Reviewed? No    Allergies   Allergen Reactions    Shellfish Containing Products Swelling       Past Medical History:   Diagnosis Date    A-fib (Multi)     Managed on meds,    Arthritis     Cataract     Cervical myelopathy     CHF (congestive heart failure)     Chronic pain syndrome     CKD (chronic kidney disease)     stage 3b    COPD (chronic obstructive pulmonary disease) (Multi)     denies, she is a former smoker but quit in 1984    Dementia     Depression     Diabetic neuropathy (Multi)     Diverticulitis     DM (diabetes mellitus) (Multi)     denies but A1C= 8.2 in 2025    LORENZ (dyspnea on exertion)     Stable per cards note    HLD (hyperlipidemia)     Hypertension, essential     Hypovolemic shock (Multi)     Infiltrative cardiomyopathy (Multi)     Echo 8/22/23, following with Dr. Ignacio    Nonhealing nonsurgical wound     NSTEMI (non-ST elevated myocardial infarction) (Multi)     OA (ocular albinism) (Multi)     multiple  "sites    Other malaise 04/21/2022    Physical deconditioning    Palpitations     on occasion, has afib followed by cardiology    Pneumonia 02/17/2025    Post laminectomy syndrome     Psychogenic pain     Rectovaginal fistula     Spinal stenosis of cervical region     UTI (urinary tract infection)     Vocal cord anomaly        Current Outpatient Medications on File Prior to Visit   Medication Sig Dispense Refill    empagliflozin (Jardiance) 25 mg tablet Take 1 tablet (25 mg) by mouth once daily. 90 tablet 1    losartan (Cozaar) 25 mg tablet Take 1 tablet (25 mg) by mouth once daily. 90 tablet 3    spironolactone (Aldactone) 25 mg tablet Take 1 tablet (25 mg) by mouth once daily. 30 tablet 11    tafamidis (Vyndamax) 61 mg capsule Take 1 capsule (61 mg) by mouth once daily. 30 capsule 10    acetaminophen (Tylenol) 325 mg tablet Give 2 tablet by mouth every 4 hours as needed for Pain      alogliptin (Nesina) 12.5 mg tablet Take 1 tablet (12.5 mg) by mouth once daily. 90 tablet 1    amiodarone (Pacerone) 200 mg tablet Take 1 tablet (200 mg) by mouth once daily. TAKE ONE TABLET BY MOUTH EVERY MORNING FOR ANTIARRHYTHMIC. 60 tablet 11    apixaban (Eliquis) 5 mg tablet Take 1 tablet (5 mg) by mouth 2 times a day. 180 tablet 3    atorvastatin (Lipitor) 20 mg tablet Take 1 tablet (20 mg) by mouth once daily. TAKE ONE TABLET BY MOUTH EVERY DAY AT BEDTIME FOR HYPERLIPIDEMIA 90 tablet 0    b complex 0.4 mg tablet Take 1 tablet by mouth once daily.      baclofen (Lioresal) 5 mg tablet TAKE ONE TABLET BY MOUTH TWO TIMES A DAY 60 tablet 2    BD Ailyn 2nd Gen Pen Needle 32 gauge x 5/32\" needle USE 4 TIMES A DAY      benzonatate (Tessalon) 100 mg capsule TAKE 1 CAPSULE BY MOUTH 3 TIMES A DAY AS NEEDED FOR COUGH. DO NOT CRUSH OR CHEW. 42 capsule 0    biotin 1 mg tablet 1 tab(s) orally once a day      cephalexin (Keflex) 500 mg capsule Take 1 capsule (500 mg) by mouth 2 times a day for 10 days. 20 capsule 0    cholecalciferol (Vitamin " D-3) 1,250 mcg (50,000 unit) capsule Take 1 capsule (50,000 Units) by mouth 1 (one) time per week. Sunday      docusate sodium (Colace) 100 mg capsule Take 1 capsule (100 mg) by mouth 2 times a day as needed for constipation.      DULoxetine (Cymbalta) 30 mg DR capsule TAKE ONE CAPSULE BY MOUTH TWO TIMES A  capsule 0    fluticasone (Flonase) 50 mcg/actuation nasal spray Administer 2 sprays into each nostril once daily. Shake gently. Before first use, prime pump. After use, clean tip and replace cap. 16 g 3    furosemide (Lasix) 20 mg tablet Take 1 tablet (20 mg) by mouth once daily. 90 tablet 3    gabapentin (Neurontin) 600 mg tablet TAKE ONE TABLET BY MOUTH THREE TIMES A DAY 90 tablet 0    insulin glargine (Lantus Solostar U-100 Insulin) 100 unit/mL (3 mL) pen Inject 3 Units under the skin once daily in the morning. As needed for sugars over 200.      insulin lispro (HumaLOG) 100 unit/mL pen Inject 1 Units under the skin 4 times a day. inject subcutaneiously before meals and at bedtime for hyperglycemia  per sliding scale  151-200 1 unit 201-250 2units 251-300 3 units 350 4 units 351-400 5units notifiy Md if if greater than 400, notify md if lower than 40.      melatonin 3 mg tablet Take 2 tablets (6 mg) by mouth as needed at bedtime for sleep.      naloxone (Narcan) 4 mg/0.1 mL nasal spray Administer 1 spray (4 mg) into affected nostril(s) if needed for opioid reversal. May repeat every 2-3 minutes if needed, alternating nostrils, until medical assistance becomes available. 2 each 0    ondansetron ODT (Zofran-ODT) 4 mg disintegrating tablet Take 1 tablet (4 mg) by mouth every 8 hours if needed for nausea or vomiting. 20 tablet 0    OneTouch Delica Plus Lancet 33 gauge misc Daily use 100 each 1    OneTouch Ultra Test 100 100 each 1    OneTouch Ultra2 Meter misc use as directed      SITagliptin phosphate (Januvia) 50 mg tablet Take 1 tablet (50 mg) by mouth once daily. 90 tablet 1    topiramate (Topamax) 100  mg tablet Take 1 tablet (100 mg) by mouth 2 times a day. 60 tablet 10    traMADol (Ultram) 50 mg tablet Take 1 tablet (50 mg) by mouth every 12 hours.      traZODone (Desyrel) 50 mg tablet TAKE ONE TABLET BY MOUTH AT BEDTIME AS NEEDED FOR SLEEP 30 tablet 0    wound dressing (Triad Wound Dressing) paste Apply to affected area daily 71 g 0    [DISCONTINUED] cephalexin (Keflex) 500 mg capsule Take one capsule by mouth twice daily       [DISCONTINUED] insulin lispro (HumaLOG) 100 unit/mL pen Inject 3 Units under the skin 3 times daily (morning, midday, late afternoon). inject  3 units before meals.  Indications: type 2 diabetes mellitus (Patient not taking: Reported on 7/28/2025)      [DISCONTINUED] ketorolac (Acular) 0.5 % ophthalmic solution Administer 1 drop into the right eye 4 times a day. (Patient not taking: Reported on 7/28/2025) 5 mL 0    [DISCONTINUED] ketorolac (Acular) 0.5 % ophthalmic solution Administer 1 drop into the left eye 4 times a day. (Patient not taking: Reported on 7/28/2025) 5 mL 1    [DISCONTINUED] mupirocin (Bactroban) 2 % ointment Apply topically 3 times a day for 10 days. (Patient not taking: Reported on 7/28/2025) 22 g 1    [DISCONTINUED] prednisoLONE acetate (Pred-Forte) 1 % ophthalmic suspension Administer 1 drop into the right eye 4 times a day. (Patient not taking: Reported on 7/28/2025) 5 mL 0    [DISCONTINUED] prednisoLONE acetate (Pred-Forte) 1 % ophthalmic suspension Administer 1 drop into the left eye 4 times a day. (Patient not taking: Reported on 7/28/2025) 5 mL 0    [DISCONTINUED] predniSONE (Deltasone) 10 mg tablet  (Patient not taking: Reported on 7/28/2025)      [DISCONTINUED] SITagliptin phosphate (Januvia) 50 mg tablet Take 1 tablet (50 mg) by mouth once daily. (Patient not taking: Reported on 7/28/2025) 30 tablet 11     No current facility-administered medications on file prior to visit.         RELEVANT LAB RESULTS:  Lab Results   Component Value Date    BILITOT 0.4  "06/09/2025    CALCIUM 9.3 06/09/2025    CO2 19 (L) 06/09/2025     06/09/2025    CREATININE 0.64 06/09/2025    GLUCOSE 181 (H) 06/09/2025    ALKPHOS 72 06/09/2025    K 4.2 06/09/2025    PROT 6.5 06/09/2025     06/09/2025    AST 18 06/09/2025    ALT 25 06/09/2025    BUN 26 (H) 06/09/2025    ANIONGAP 10 06/09/2025    MG 2.20 04/17/2024    PHOS 4.5 05/13/2024    ALBUMIN 3.7 06/09/2025    LIPASE 24 02/10/2025    GFRF CANCELED 09/07/2023    GFRMALE CANCELED 09/07/2023     Lab Results   Component Value Date    TRIG 64 06/09/2025    CHOL 155 06/09/2025    LDLCALC 70 06/09/2025    HDL 71 06/09/2025     No results found for: \"BMCBC\", \"CBCDIF\"     PHARMACEUTICAL ASSESSMENT:    MEDICATION RECONCILIATION    Drug Interactions? Yes, describe: Januvia, alogliptin- duplicate therapy, contact PCP immediately for which DPP-4i he prefers patient to take if Elida is taking both. Fill history: 7/19/2025 alogliptin and Januvia filled- see discussion/notes for further information.   Amiodarone, Donepezil- increased risk of prolonging Qtc, continue to monitor.    Medication Documentation Review Audit       Reviewed by Mercedes Pinedo RN (Registered Nurse) on 07/28/25 at 1155      Medication Order Taking? Sig Documenting Provider Last Dose Status   acetaminophen (Tylenol) 325 mg tablet [id number] Yes Give 2 tablet by mouth every 4 hours as needed for Pain Historical Provider, MD  Active   alogliptin (Nesina) 12.5 mg tablet [id number] Yes Take 1 tablet (12.5 mg) by mouth once daily. David Swift MD  Active   amiodarone (Pacerone) 200 mg tablet [id number] Yes Take 1 tablet (200 mg) by mouth once daily. TAKE ONE TABLET BY MOUTH EVERY MORNING FOR ANTIARRHYTHMIC. Cesar ORLANDO MD  Active   apixaban (Eliquis) 5 mg tablet [id number] Yes Take 1 tablet (5 mg) by mouth 2 times a day. Cesar ORLANDO MD  Active   atorvastatin (Lipitor) 20 mg tablet [id number] Yes Take 1 tablet (20 mg) by mouth once daily. TAKE ONE TABLET BY MOUTH EVERY DAY " "AT BEDTIME FOR HYPERLIPIDEMIA David Swift MD  Active   b complex 0.4 mg tablet [id number] Yes Take 1 tablet by mouth once daily. Historical Provider, MD  Active   baclofen (Lioresal) 5 mg tablet [id number] Yes TAKE ONE TABLET BY MOUTH TWO TIMES A DAY Jf Perdue MD  Active   BD Ailyn 2nd Gen Pen Needle 32 gauge x 5/32\" needle [id number] Yes USE 4 TIMES A DAY Historical Provider, MD  Active   benzonatate (Tessalon) 100 mg capsule [id number] Yes TAKE 1 CAPSULE BY MOUTH 3 TIMES A DAY AS NEEDED FOR COUGH. DO NOT CRUSH OR CHEW. David Swift MD  Active   biotin 1 mg tablet [id number] Yes 1 tab(s) orally once a day Historical Provider, MD  Active     Discontinued 07/25/25 1445   cephalexin (Keflex) 500 mg capsule [id number] Yes Take 1 capsule (500 mg) by mouth 2 times a day for 10 days. Kellie Russell, APRN-CNP  Active   cholecalciferol (Vitamin D-3) 1,250 mcg (50,000 unit) capsule [id number] Yes Take 1 capsule (50,000 Units) by mouth 1 (one) time per week. Sunday Historical Provider, MD  Active   docusate sodium (Colace) 100 mg capsule [id number] Yes Take 1 capsule (100 mg) by mouth 2 times a day as needed for constipation. Historical Provider, MD  Active   DULoxetine (Cymbalta) 30 mg DR capsule [id number] Yes TAKE ONE CAPSULE BY MOUTH TWO TIMES A DAY David Swift MD  Active   empagliflozin (Jardiance) 25 mg tablet [id number] Yes Take 1 tablet (25 mg) by mouth once daily. David Swift MD  Active   fluticasone (Flonase) 50 mcg/actuation nasal spray [id number] Yes Administer 2 sprays into each nostril once daily. Shake gently. Before first use, prime pump. After use, clean tip and replace cap. David Swift MD  Active   furosemide (Lasix) 20 mg tablet [id number] Yes Take 1 tablet (20 mg) by mouth once daily. Lroi Dueñas MD PhD  Active   gabapentin (Neurontin) 600 mg tablet [id number] Yes TAKE ONE TABLET BY MOUTH THREE TIMES A DAY David Swift MD  Active   insulin glargine (Lantus Solostar U-100 Insulin) " 100 unit/mL (3 mL) pen [id number] Yes Inject 3 Units under the skin once daily in the morning. As needed for sugars over 200. Historical Provider, MD  Active   insulin lispro (HumaLOG) 100 unit/mL pen [id number] Yes Inject 1 Units under the skin 4 times a day. inject subcutaneiously before meals and at bedtime for hyperglycemia  per sliding scale  151-200 1 unit 201-250 2units 251-300 3 units 350 4 units 351-400 5units notifiy Md if if greater than 400, notify md if lower than 40. David Swift MD  Active   insulin lispro (HumaLOG) 100 unit/mL pen [id number]  Inject 3 Units under the skin 3 times daily (morning, midday, late afternoon). inject  3 units before meals.  Indications: type 2 diabetes mellitus   Patient not taking: Reported on 7/28/2025    Historical Provider, MD  Active   ketorolac (Acular) 0.5 % ophthalmic solution [id number]  Administer 1 drop into the right eye 4 times a day.   Patient not taking: Reported on 7/28/2025    Kisha Ugalde MD  Active   ketorolac (Acular) 0.5 % ophthalmic solution [id number]  Administer 1 drop into the left eye 4 times a day.   Patient not taking: Reported on 7/28/2025    Armando Hobbs MD  Active   losartan (Cozaar) 25 mg tablet [id number] Yes Take 1 tablet (25 mg) by mouth once daily. Lori Dueñas MD PhD  Active   melatonin 3 mg tablet [id number] Yes Take 2 tablets (6 mg) by mouth as needed at bedtime for sleep. Historical Provider, MD  Active   mupirocin (Bactroban) 2 % ointment [id number]  Apply topically 3 times a day for 10 days.   Patient not taking: Reported on 7/28/2025    Kellie Russell, PARAG-CNP  Flag for Review   naloxone (Narcan) 4 mg/0.1 mL nasal spray [id number] Yes Administer 1 spray (4 mg) into affected nostril(s) if needed for opioid reversal. May repeat every 2-3 minutes if needed, alternating nostrils, until medical assistance becomes available. Jf Perdue MD  Active   ondansetron ODT (Zofran-ODT) 4 mg disintegrating tablet [id number] Yes  Take 1 tablet (4 mg) by mouth every 8 hours if needed for nausea or vomiting. Terrell Dixon PA-C  Active   OneTouch Delica Plus Lancet 33 gauge misc [id number] Yes Daily use David Swift MD  Active   OneTouch Ultra Test [id number] Yes 100 David Swift MD  Active   OneTouch Ultra2 Meter misc [id number] Yes use as directed Historical Provider, MD  Active   prednisoLONE acetate (Pred-Forte) 1 % ophthalmic suspension [id number]  Administer 1 drop into the right eye 4 times a day.   Patient not taking: Reported on 7/28/2025    Kisha Ugalde MD  Flag for Review   prednisoLONE acetate (Pred-Forte) 1 % ophthalmic suspension [id number]  Administer 1 drop into the left eye 4 times a day.   Patient not taking: Reported on 7/28/2025    Armando Hobbs MD  Flag for Review   predniSONE (Deltasone) 10 mg tablet [id number]     Patient not taking: Reported on 7/28/2025    Historical Provider, MD  Flag for Review   SITagliptin phosphate (Januvia) 50 mg tablet [id number] Yes Take 1 tablet (50 mg) by mouth once daily. David Swift MD  Active   SITagliptin phosphate (Januvia) 50 mg tablet [id number]  Take 1 tablet (50 mg) by mouth once daily.   Patient not taking: Reported on 7/28/2025    David Swift MD  Active   spironolactone (Aldactone) 25 mg tablet [id number] Yes Take 1 tablet (25 mg) by mouth once daily. Lori Dueñas MD PhD  Active   tafamidis (Vyndamax) 61 mg capsule [id number] Yes Take 1 capsule (61 mg) by mouth once daily. Lori Dueñas MD PhD  Active   topiramate (Topamax) 100 mg tablet [id number] Yes Take 1 tablet (100 mg) by mouth 2 times a day. Kaykay Palafox MD  Active   traMADol (Ultram) 50 mg tablet [id number] Yes Take 1 tablet (50 mg) by mouth every 12 hours. Historical Provider, MD  Active   traZODone (Desyrel) 50 mg tablet [id number] Yes TAKE ONE TABLET BY MOUTH AT BEDTIME AS NEEDED FOR SLEEP David Swift MD  Active   wound dressing (Triad Wound Dressing) paste [id number] Yes Apply to affected  area daily David Swift MD  Active                    DISEASE MANAGEMENT ASSESSMENT:     ANTICOAGULATION ASSESSMENT    The ASCVD Risk score (Carlo DK, et al., 2019) failed to calculate for the following reasons:    Risk score cannot be calculated because patient has a medical history suggesting prior/existing ASCVD    DIAGNOSIS: prevention of nonvalvular atrial fibrilliation stroke and systemic embolism  - Patient is projected to be on anticoagulation long term  - [id number] Score: [7] (only included if diagnosis is atrial fibrillation)   Age: [<65 (0)] [65-74 (+1)] [> 75 (+2)]: 2  Sex: [Male/Female (+1)]: 1  CHF history: [No/Yes(+1)]: 1  Hypertension history: [No/Yes(+1)]: 1  Stroke/TIA/thromboembolism history: [No/Yes(+2)]: 0  Vascular disease history (prior MI, peripheral artery disease, aortic plaque): [No/Yes(+1)]: 1  Diabetes history: [No/Yes(+1)]: 1    CURRENT PHARMACOTHERAPY:    Eliquis 5mg twice daily  79yo  63.5kg  Scr 0.64 (06/09/2025)    RELEVANT PAST MEDICAL HISTORY:   Afib, HTN, NSTEMI, T2DM    Affordability/Accessibility:  PAP  Adherence/Organization: reports adherence, patient daughter uses pillbox and gives patient medication  Adverse Reactions: none reported  Recent Hospitalizations: none  Recent Falls/Trauma: none reported   Changes in Tobacco or Alcohol Intake:   Tobacco: does not use  Alcohol: socially     EDUCATION/COUNSELING:   - Counseled patient on MOA, expectations, duration of therapy, contraindications, administration, and monitoring parameters  - Counseled patient of side effects that are indicative of bleeding such as dark tarry stool, unexplainable bruising, or vomiting up a coffee ground like substance     CHF ASSESSMENT     Symptom/Staging:  -Most recent ejection fraction: 62%  -ACC/AHA Stage B    Results for orders placed in visit on 06/29/18    Echocardiogram    Narrative  [facility], [address] [place] [address]  Tel [phone] and Fax  [phone]    TRANSTHORACIC ECHOCARDIOGRAM REPORT      Patient Name:     TAMARA PALMER Reading Physician:  [id number] Sherlyn Vu MD  Study Date:       7/18/2018       Referring           Irvin Robb  Physician:  MRN/PID:          [id number]        PCP:  Accession/Order#: [id number]       [place] [address]  Location:  YOB: 1945       Fellow:             Sebastian Pena MD  Gender:           F               Nurse:  Admit Date:       7/10/2018       Sonographer:        Anna Zacarias  [facility]  Admission Status: Inpatient -     Additional Staff:  STAT  Height:           147.32 cm       CC Report to:       [address]  Weight:           68.04 kg        Study Type:         Echocardiogram  BSA:              1.61 m2  Blood Pressure: 130 /78 mmHg    Diagnosis/ICD: R07.89 Other chest pain  Indication:    Chest Pain  Procedure/CPT: Echo Complete w/Full Doppler (46579)    Patient History:  Pertinent History: DM, fatigue, Htn.    Study Detail: The following Echo studies were performed: 2D, M-Mode, Doppler and  color flow. Technically challenging study due to prominent lung  artifact.      PHYSICIAN INTERPRETATION:  Left Ventricle: The left ventricular systolic function is hyperdynamic, with an estimated ejection fraction of 65-70%. The left ventricular cavity size is normal. There is mild concentric left ventricular hypertrophy. Spectral Doppler shows an impaired relaxation pattern of left ventricular diastolic filling. Hyperdynamic LV function with color acceleration beginning midcavity with peak dynamic gradient at rest of 18mmHg which increased to 22mmHg with valsalva.  Left Atrium: The left atrium is mildly dilated.  Right Ventricle: The right ventricle is normal in size. There is normal right ventricular global systolic function.  Right Atrium: The right atrium is normal in size. There is a possible device noted in the right atrium.  Aortic Valve: The aortic valve is  trileaflet. There is no evidence of aortic valve regurgitation. The peak instantaneous gradient of the aortic valve is 18.1 mmHg.  Mitral Valve: The mitral valve is mildly thickened. There is trace mitral valve regurgitation.  Tricuspid Valve: The tricuspid valve is structurally normal. There is trace tricuspid regurgitation. The doppler estimated RVSP is mildly elevated at 41.2 mmHg.  Pulmonic Valve: The pulmonic valve is not well visualized. The pulmonic valve regurgitation was not well visualized.  Pericardium: There is a trivial pericardial effusion.  Aorta: The aortic root is normal.  Systemic Veins: The inferior vena cava appears to be of normal size. There is IVC inspiratory collapse greater than 50%.  In comparison to the previous echocardiogram(s): There are no prior studies on this patient for comparison purposes.      CONCLUSIONS:  1. The left ventricular systolic function is hyperdynamic with a 65-70% estimated ejection fraction.  2. Hyperdynamic LV function with color acceleration beginning midcavity with peak dynamic gradient at rest of 18mmHg which increased to 22mmHg with valsalva.  3. Spectral Doppler shows an impaired relaxation pattern of left ventricular diastolic filling.  4. Mildly elevated RVSP.  5. No sinigifcant valvular pahtology.    QUANTITATIVE DATA SUMMARY:  2D MEASUREMENTS:  Normal Ranges:  IVSd:          1.40 cm   (0.6-1.1cm)  LVPWd:         1.28 cm   (0.6-1.1cm)  LVIDd:         3.33 cm   (3.9-5.9cm)  LVIDs:         2.22 cm  LV Mass Index: 93.6 g/m2  LV % FS        33.3 %    LA VOLUME:  Normal Ranges:  LA Vol A4C:        49.4 ml    (22+/-6mL/m2)  LA Vol A2C:        60.1 ml  LA Vol BP:         57.0 ml  LA Vol Index A4C:  30.6ml/m2  LA Vol Index A2C:  37.3 ml/m2  LA Vol Index BP:   35.4 ml/m2  LA Area A4C:       18.0 cm2  LA Area A2C:       20.8 cm2  LA Major Axis A4C: 5.6 cm  LA Major Axis A2C: 6.1 cm  LA Volume Index:   35.2 ml/m2    RA VOLUME BY A/L METHOD:  Normal Ranges:  RA Area  A4C: 9.9 cm2    M-MODE MEASUREMENTS:  Normal Ranges:  Ao Root: 2.80 cm (2.0-3.7cm)  LAs:     3.41 cm (2.7-4.0cm)    AORTA MEASUREMENTS:  Normal Ranges:  Asc Ao, d: 2.34 cm (2.1-3.4cm)    LV SYSTOLIC FUNCTION BY 2D PLANIMETRY (MOD):  Normal Ranges:  EF-A4C View: 74.5 % (>55%)  EF-A2C View: 75.8 %  EF-Biplane:  76.0 %    LV DIASTOLIC FUNCTION:  Normal Ranges:  MV Peak E:        1.04 m/s    (0.7-1.2 m/s)  MV Peak A:        1.09 m/s    (0.42-0.7 m/s)  E/A Ratio:        0.95        (1.0-2.2)  MV e'             0.05 m/s    (>8.0)  MV A Dur:         106.00 msec  E/e' Ratio:       20.80       (<8.0)  MV DT:            230 msec    (150-240 msec)  PulmV Sys Jorge:    69.20 cm/s  PulmV [place] Jorge:   45.80 cm/s  PulmV S/D Jorge:    1.50  PulmV A Revs Jorge: 29.40 cm/s  PulmV A Revs Dur: 79.00 msec    AORTIC VALVE:  Normal Ranges:  AoV Vmax:      2.13 m/s  (<1.7m/s)  AoV Peak P.1 mmHg (<20mmHg)  LVOT Max Jorge:  1.57 m/s  (<1.1m/s)  LVOT VTI:      27.80 cm  LVOT Diameter: 1.70 cm   (1.8-2.4cm)  AoV Area,Vmax: 1.67 cm2  (2.5-4.5cm2)    RIGHT VENTRICLE:  RV 1   3.02 cm  RV 2   1.88 cm  RV 3   5.88 cm  TAPSE: 29.6 mm  RV s'  0.23 m/s    TRICUSPID VALVE/RVSP:  Normal Ranges:  Peak TR Velocity: 3.09 m/s  RV Syst Pressure: 41.2 mmHg (< 30mmHg)  IVC Diam:         1.25 cm    PULMONIC VALVE:  Normal Ranges:  PV Max Jorge: 1.0 m/s  (0.6-0.9m/s)  PV Max PG:  3.8 mmHg    Pulmonary Veins:  PulmV A Revs Dur: 79.00 msec  PulmV A Revs Jorge: 29.40 cm/s  PulmV [place] Jorge:   45.80 cm/s  PulmV S/D Jorge:    1.50  PulmV Sys Jorge:    69.20 cm/s      [id number] Sherlyn Vu MD  Electronically signed on 2018 at 4:50:05 PM         Final       Guideline-Directed Medical Therapy:  -ARNI: No   -If no, then ACEi/ARB?: Yes, describe: Losartan 25mg daily  -Beta Blocker: No  -MRA: Yes, describe: Spironolactone 25mg daily  -SGLT2i: Yes, describe: Jardiance 25mg daily    Secondary Prevention:  -The ASCVD Risk score (Carlo DK, et al., 2019) failed to calculate for the  following reasons:    Risk score cannot be calculated because patient has a medical history suggesting prior/existing ASCVD   -Aspirin 81mg? no  -Statin?: Yes, describe: Atorvastatin 40mg daily  -HTN?: Yes, describe: controlled at last OV    CURRENT PHARMACOTHERAPY:   Losartan 25mg daily  Spironolactone 25mg daily  Jardiance 25mg daily  Furosemide 20mg daily    Affordability: [facility]  Adherence/Compliance: reports adherence, patient daughter uses pillbox and gives patient medication  Adverse Effects: occasional dizziness, no other concerns reported    Monitoring Weights at Home: No; unable to get patient on scale at home  Home Weight Recordings: N/A    Past In Office Weight Readings:   Wt Readings from Last 6 Encounters:   07/28/25 63.5 kg (140 lb)   06/09/25 54.3 kg (119 lb 9.6 oz)   05/28/25 65.3 kg (143 lb 15.4 oz)   05/07/25 65.3 kg (144 lb)   04/07/25 65.3 kg (144 lb)   03/24/25 63.5 kg (140 lb)       Monitoring Blood Pressure at Home: Yes  Home BP Recordings: average SBP low 100s    Past In Office BP Readings:   BP Readings from Last 6 Encounters:   07/29/25 108/60   07/28/25 102/75   07/24/25 120/70   07/23/25 (P) 96/68   07/21/25 92/58   07/16/25 110/60       HEALTH MANAGEMENT    Maintaining fluid restriction (<2 L/day): N/A  Edema/swelling: No concerns, sees wound care weekly for feet  Shortness of breath: Yes occasionally in the morning  Trouble sleeping/lying down: No concerns, patient cannot lay flat  Dry/hacking cough: No  Recent Hospitalizations: No      EDUCATION/COUNSELING:   - Counseled patient on MOA, expectations, duration of therapy, contraindications, administration, and monitoring parameters  - Counseled patient on lifestyle modifications that can decrease your risk of having complications (smoking cessation, losing weight, daily weights, vaccines)  - Counseled patient on fluid intake and weight management. Recommended to not consume more than 2 liters of fliuids per day. If they have gained  "more than 2-3 pounds within a 24 hours period (or 5 pounds in a week), contact their cardiologist  - Answered all patient questions and concerns     DISCUSSION/NOTES:   Kaelyn states Elida is doing well on anticoagulation therapy. Patient reports adherence, no adverse effects, no falls and denies s/s of bleeding.   Kaelyn reports Elida is tolerating her heart failure regimen well reporting adherence and denies s/s of worsening heart failure. Kaelyn reports occasional dizziness, per referring provider 07/28 OV note \"we have a low threshold to consider reducing or holding losartan\". Kaelyn reports her SBP averages around low 100s.   Patient aware at next follow up visit we will be discussing  PAP renewal.  Discussed alogliptin and Januvia drug interaction (duplicate therapy). Kaelyn states patient is only taking Januvia. Reviewed fill history of alogliptin and Januvia (both filled on 7/19/2025 for 90 day supply). Kaelyn is not home during time of appt but made herself a note to check patient medications once she gets home. Advised Kaelyn to call Elida's PCP for further clarification of DPP-4i. Kaelyn understands Elida should be taking Januvia OR alogliptin.    ASSESSMENT:    Assessment/Plan   Problem List Items Addressed This Visit       Atrial fibrillation (Multi)    Patient age, weight and renal function appropriate to continue Eliquis 5mg twice daily.          Relevant Orders    Referral to Clinical Pharmacy    Acute on chronic diastolic heart failure - Primary    Patient currently on 3 of 4 GDMT medications. SBP averages low 100s. Occasional dizziness reported- per Dr. Ignacio low threshold to consider reducing or holding losartan. Renal function and potassium level appropriate for current regimen.      Labs (06/09/2025): Scr-0.64 eGFR-90 K-4.2         Relevant Orders    Referral to Clinical Pharmacy       RECOMMENDATIONS/PLAN:    CONTINUE  Eliquis 5mg twice daily  Losartan 25mg daily  Spironolactone 25mg " daily  Jardiance 25mg daily  Furosemide 20mg daily    Last Appnt with Referring Provider: 07/28/2025  Next Appnt with Referring Provider: 01/30/2026  Clinical Pharmacist follow up: 3 months  VAF/Application Expiration: 11/15/2025  Type of Encounter: Linnea Couch, Alexsandra    Verbal consent to manage patient's drug therapy was obtained from an individual authorized to act on behalf of a patient. They were informed they may decline to participate or withdraw from participation in pharmacy services at any time.    Continue all meds under the continuation of care with the referring provider and clinical pharmacy team.

## 2025-07-28 NOTE — PATIENT INSTRUCTIONS
Please continue your current cardiac medications including amiodarone 200 mg once daily, atorvastatin 40 mg daily, Eliquis 5 mg twice daily, Jardiance 25 mg once daily, losartan 25 mg daily, furosemide Lasix 20 mg once daily, Vyndamax (tafamidis) 61 mg daily.      Please follow-up with Dr. Dueñas of advanced heart failure within the next 2 to 3 months.    Please followup with me in Cardiology clinic within the next 6-7 months.    Please return to clinic sooner or seek emergent care if your symptoms reoccur or worsen.

## 2025-07-29 ENCOUNTER — HOME CARE VISIT (OUTPATIENT)
Dept: HOME HEALTH SERVICES | Facility: HOME HEALTH | Age: 80
End: 2025-07-29
Payer: MEDICARE

## 2025-07-29 ENCOUNTER — APPOINTMENT (OUTPATIENT)
Dept: PHARMACY | Facility: HOSPITAL | Age: 80
End: 2025-07-29
Payer: MEDICARE

## 2025-07-29 VITALS
RESPIRATION RATE: 19 BRPM | DIASTOLIC BLOOD PRESSURE: 60 MMHG | HEART RATE: 75 BPM | OXYGEN SATURATION: 94 % | SYSTOLIC BLOOD PRESSURE: 108 MMHG | TEMPERATURE: 98 F

## 2025-07-29 DIAGNOSIS — I50.33 ACUTE ON CHRONIC DIASTOLIC HEART FAILURE: Primary | ICD-10-CM

## 2025-07-29 DIAGNOSIS — I48.0 PAROXYSMAL ATRIAL FIBRILLATION (MULTI): ICD-10-CM

## 2025-07-29 PROCEDURE — G0157 HHC PT ASSISTANT EA 15: HCPCS | Mod: CQ,HHH

## 2025-07-29 PROCEDURE — G0299 HHS/HOSPICE OF RN EA 15 MIN: HCPCS | Mod: HHH

## 2025-07-29 SDOH — ECONOMIC STABILITY: FOOD INSECURITY: MEALS PER DAY: 3

## 2025-07-29 SDOH — ECONOMIC STABILITY: GENERAL

## 2025-07-29 ASSESSMENT — ENCOUNTER SYMPTOMS
CHANGE IN APPETITE: UNCHANGED
PAIN LOCATION: GENERALIZED
PAIN LOCATION - PAIN QUALITY: ACHY
BOWEL INCONTINENCE: 1
SUBJECTIVE PAIN PROGRESSION: UNCHANGED
PAIN: 1
PERSON REPORTING PAIN: PATIENT
LAST BOWEL MOVEMENT: 67414
PAIN SEVERITY GOAL: 0/10
LOWEST PAIN SEVERITY IN PAST 24 HOURS: 0/10
PAIN LOCATION - PAIN SEVERITY: 0/10
HIGHEST PAIN SEVERITY IN PAST 24 HOURS: 0/10
APPETITE LEVEL: FAIR
PAIN LOCATION - PAIN FREQUENCY: INTERMITTENT
MUSCLE WEAKNESS: 1

## 2025-07-29 ASSESSMENT — ACTIVITIES OF DAILY LIVING (ADL)
CURRENT_FUNCTION: TWO PERSON
MONEY MANAGEMENT (EXPENSES/BILLS): TOTALLY DEPENDENT
AMBULATION ASSISTANCE: TWO PERSON

## 2025-07-29 NOTE — ASSESSMENT & PLAN NOTE
Patient currently on 3 of 4 GDMT medications. SBP averages low 100s. Occasional dizziness reported- per Dr. Ignacio low threshold to consider reducing or holding losartan. Renal function and potassium level appropriate for current regimen.      Labs (06/09/2025): Scr-0.64 eGFR-90 K-4.2

## 2025-07-30 ENCOUNTER — HOME CARE VISIT (OUTPATIENT)
Dept: HOME HEALTH SERVICES | Facility: HOME HEALTH | Age: 80
End: 2025-07-30
Payer: MEDICARE

## 2025-07-30 ENCOUNTER — PHARMACY VISIT (OUTPATIENT)
Dept: PHARMACY | Facility: CLINIC | Age: 80
End: 2025-07-30
Payer: MEDICARE

## 2025-07-30 VITALS
TEMPERATURE: 97.5 F | HEART RATE: 72 BPM | DIASTOLIC BLOOD PRESSURE: 60 MMHG | SYSTOLIC BLOOD PRESSURE: 115 MMHG | RESPIRATION RATE: 16 BRPM

## 2025-07-30 PROCEDURE — G0158 HHC OT ASSISTANT EA 15: HCPCS | Mod: CO,HHH

## 2025-07-30 PROCEDURE — G0157 HHC PT ASSISTANT EA 15: HCPCS | Mod: CQ,HHH

## 2025-07-30 ASSESSMENT — ENCOUNTER SYMPTOMS
AGGRESSION WITHIN DEFINED LIMITS: 1
ANGER WITHIN DEFINED LIMITS: 1

## 2025-07-30 ASSESSMENT — PAIN DESCRIPTION - PAIN TYPE: TYPE: CHRONIC PAIN

## 2025-07-30 ASSESSMENT — ACTIVITIES OF DAILY LIVING (ADL): EFFECT OF PAIN ON DAILY ACTIVITIES: LIMITED FUNCTIONAL ACTIVITY

## 2025-08-01 ENCOUNTER — OFFICE VISIT (OUTPATIENT)
Dept: OPHTHALMOLOGY | Facility: CLINIC | Age: 80
End: 2025-08-01
Payer: MEDICARE

## 2025-08-01 ENCOUNTER — HOME CARE VISIT (OUTPATIENT)
Dept: HOME HEALTH SERVICES | Facility: HOME HEALTH | Age: 80
End: 2025-08-01
Payer: MEDICARE

## 2025-08-01 ENCOUNTER — OFFICE VISIT (OUTPATIENT)
Dept: WOUND CARE | Facility: CLINIC | Age: 80
End: 2025-08-01
Payer: MEDICARE

## 2025-08-01 DIAGNOSIS — H10.13 ALLERGIC CONJUNCTIVITIS OF BOTH EYES: Primary | ICD-10-CM

## 2025-08-01 PROCEDURE — 11042 DBRDMT SUBQ TIS 1ST 20SQCM/<: CPT

## 2025-08-01 PROCEDURE — G0158 HHC OT ASSISTANT EA 15: HCPCS | Mod: CO,HHH

## 2025-08-01 PROCEDURE — 99213 OFFICE O/P EST LOW 20 MIN: CPT | Performed by: OPHTHALMOLOGY

## 2025-08-01 ASSESSMENT — VISUAL ACUITY
METHOD: SNELLEN - LINEAR
OS_SC: 20/40-2
OD_SC: 20/30-2

## 2025-08-01 ASSESSMENT — EXTERNAL EXAM - LEFT EYE: OS_EXAM: NORMAL

## 2025-08-01 ASSESSMENT — EXTERNAL EXAM - RIGHT EYE: OD_EXAM: NORMAL

## 2025-08-01 ASSESSMENT — SLIT LAMP EXAM - LIDS
COMMENTS: GOOD POSITION, MILD MGD
COMMENTS: GOOD POSITION, MILD MGD

## 2025-08-01 ASSESSMENT — ENCOUNTER SYMPTOMS: EYES NEGATIVE: 1

## 2025-08-01 ASSESSMENT — TONOMETRY
OS_IOP_MMHG: DEFER
IOP_METHOD: TONOPEN
OD_IOP_MMHG: DEFER

## 2025-08-01 NOTE — PROGRESS NOTES
Assessment/Plan   Diagnoses and all orders for this visit:  Allergic conjunctivitis of both eyes  -     olopatadine 0.7 % drops; Administer 1 drop into both eyes once daily.

## 2025-08-02 ASSESSMENT — ENCOUNTER SYMPTOMS
AGGRESSION WITHIN DEFINED LIMITS: 1
ANGER WITHIN DEFINED LIMITS: 1

## 2025-08-04 ENCOUNTER — HOME CARE VISIT (OUTPATIENT)
Dept: HOME HEALTH SERVICES | Facility: HOME HEALTH | Age: 80
End: 2025-08-04
Payer: MEDICARE

## 2025-08-04 ENCOUNTER — TELEPHONE (OUTPATIENT)
Dept: PRIMARY CARE | Facility: CLINIC | Age: 80
End: 2025-08-04
Payer: MEDICARE

## 2025-08-04 VITALS
RESPIRATION RATE: 16 BRPM | HEART RATE: 80 BPM | TEMPERATURE: 97.5 F | SYSTOLIC BLOOD PRESSURE: 99 MMHG | DIASTOLIC BLOOD PRESSURE: 74 MMHG

## 2025-08-04 PROCEDURE — G0158 HHC OT ASSISTANT EA 15: HCPCS | Mod: CO,HHH

## 2025-08-04 ASSESSMENT — PAIN DESCRIPTION - PAIN TYPE: TYPE: CHRONIC PAIN

## 2025-08-05 ENCOUNTER — HOME CARE VISIT (OUTPATIENT)
Dept: HOME HEALTH SERVICES | Facility: HOME HEALTH | Age: 80
End: 2025-08-05
Payer: MEDICARE

## 2025-08-05 PROCEDURE — G0157 HHC PT ASSISTANT EA 15: HCPCS | Mod: CQ,HHH

## 2025-08-06 ENCOUNTER — HOME CARE VISIT (OUTPATIENT)
Dept: HOME HEALTH SERVICES | Facility: HOME HEALTH | Age: 80
End: 2025-08-06
Payer: MEDICARE

## 2025-08-06 VITALS
TEMPERATURE: 97.6 F | OXYGEN SATURATION: 100 % | DIASTOLIC BLOOD PRESSURE: 70 MMHG | HEART RATE: 67 BPM | RESPIRATION RATE: 20 BRPM | SYSTOLIC BLOOD PRESSURE: 112 MMHG

## 2025-08-06 PROCEDURE — G0299 HHS/HOSPICE OF RN EA 15 MIN: HCPCS | Mod: HHH

## 2025-08-06 SDOH — ECONOMIC STABILITY: GENERAL

## 2025-08-06 SDOH — ECONOMIC STABILITY: FOOD INSECURITY: MEALS PER DAY: 3

## 2025-08-06 ASSESSMENT — ENCOUNTER SYMPTOMS
LAST BOWEL MOVEMENT: 67422
PAIN LOCATION - PAIN SEVERITY: 0/10
PAIN LOCATION: GENERALIZED
LOWEST PAIN SEVERITY IN PAST 24 HOURS: 0/10
PERSON REPORTING PAIN: PATIENT
MUSCLE WEAKNESS: 1
PAIN LOCATION - PAIN QUALITY: ACHY
HIGHEST PAIN SEVERITY IN PAST 24 HOURS: 0/10
CHANGE IN APPETITE: UNCHANGED
APPETITE LEVEL: FAIR
BOWEL INCONTINENCE: 1
PAIN: 1
SUBJECTIVE PAIN PROGRESSION: UNCHANGED
PAIN LOCATION - PAIN FREQUENCY: INTERMITTENT
PAIN SEVERITY GOAL: 0/10

## 2025-08-07 ENCOUNTER — HOME CARE VISIT (OUTPATIENT)
Dept: HOME HEALTH SERVICES | Facility: HOME HEALTH | Age: 80
End: 2025-08-07
Payer: MEDICARE

## 2025-08-07 VITALS — OXYGEN SATURATION: 94 % | HEART RATE: 90 BPM

## 2025-08-07 PROCEDURE — G0151 HHCP-SERV OF PT,EA 15 MIN: HCPCS | Mod: HHH

## 2025-08-07 PROCEDURE — G0152 HHCP-SERV OF OT,EA 15 MIN: HCPCS | Mod: HHH

## 2025-08-07 ASSESSMENT — ENCOUNTER SYMPTOMS
PAIN LOCATION - PAIN SEVERITY: 5/10
PAIN LOCATION: COCCYX
PERSON REPORTING PAIN: PATIENT
HIGHEST PAIN SEVERITY IN PAST 24 HOURS: 5/10
PAIN LOCATION - PAIN FREQUENCY: CONSTANT
PAIN LOCATION - PAIN SEVERITY: 4/10
PAIN LOCATION: RIGHT FOOT
PAIN LOCATION - PAIN FREQUENCY: CONSTANT
PAIN LOCATION - RELIEVING FACTORS: REST, MEDS
SUBJECTIVE PAIN PROGRESSION: WAXING AND WANING
PAIN LOCATION - RELIEVING FACTORS: REST, MEDS
PAIN LOCATION - PAIN QUALITY: ACHE
PAIN: 1
PAIN LOCATION - EXACERBATING FACTORS: BED MOBILITY
PAIN LOCATION - PAIN QUALITY: ACHE
LOWEST PAIN SEVERITY IN PAST 24 HOURS: 4/10
PAIN SEVERITY GOAL: 3/10

## 2025-08-07 ASSESSMENT — ACTIVITIES OF DAILY LIVING (ADL): ORAL_CARE_SUPERVISION: 1

## 2025-08-08 ENCOUNTER — APPOINTMENT (OUTPATIENT)
Dept: WOUND CARE | Facility: CLINIC | Age: 80
End: 2025-08-08
Payer: MEDICARE

## 2025-08-08 VITALS — TEMPERATURE: 97.2 F

## 2025-08-08 ASSESSMENT — ENCOUNTER SYMPTOMS
HIGHEST PAIN SEVERITY IN PAST 24 HOURS: 9/10
PAIN: 1
PERSON REPORTING PAIN: PATIENT

## 2025-08-09 ASSESSMENT — ENCOUNTER SYMPTOMS
MUSCLE WEAKNESS: 1
TREMORS: 1
SUBJECTIVE PAIN PROGRESSION: WAXING AND WANING

## 2025-08-09 ASSESSMENT — ACTIVITIES OF DAILY LIVING (ADL)
CURRENT_FUNCTION: ONE PERSON
AMBULATION ASSISTANCE: NON-AMBULATORY

## 2025-08-09 NOTE — CASE COMMUNICATION
DISCHARGE SUMMARY:    DISCIPLINE: PT  DATE OF DISCIPLINE DISCHARGE: 0296  REASON FOR DISCHARGE: Goals partially achieved, pt limited by pain in r le and buttocks, cognitive limitations.  EVALUATION OF GOALS: Yes  SUMMARY OF CARE PROVIDED: hep, rom, positioning, bed mobility, safe transfers   DISCHARGE INSTRUCTIONS GIVEN: Cont home exercises, use ad for fall prevention  SERVICES REMAINING SN and OT  NOMNC OBTAINED: na

## 2025-08-09 NOTE — HOME HEALTH
Pt discharged with hep under care and assist of dtr, primary caregiver.  Pt has met her maximal potential

## 2025-08-11 ENCOUNTER — HOME CARE VISIT (OUTPATIENT)
Dept: HOME HEALTH SERVICES | Facility: HOME HEALTH | Age: 80
End: 2025-08-11
Payer: MEDICARE

## 2025-08-11 VITALS
SYSTOLIC BLOOD PRESSURE: 99 MMHG | HEART RATE: 85 BPM | TEMPERATURE: 97.1 F | DIASTOLIC BLOOD PRESSURE: 73 MMHG | RESPIRATION RATE: 17 BRPM

## 2025-08-11 PROCEDURE — G0158 HHC OT ASSISTANT EA 15: HCPCS | Mod: CO,HHH

## 2025-08-11 ASSESSMENT — ACTIVITIES OF DAILY LIVING (ADL): EFFECT OF PAIN ON DAILY ACTIVITIES: LIMITED FUNCTIONAL ACTIVITY

## 2025-08-11 ASSESSMENT — PAIN DESCRIPTION - PAIN TYPE: TYPE: CHRONIC PAIN

## 2025-08-14 ENCOUNTER — HOME CARE VISIT (OUTPATIENT)
Dept: HOME HEALTH SERVICES | Facility: HOME HEALTH | Age: 80
End: 2025-08-14
Payer: MEDICARE

## 2025-08-14 VITALS
OXYGEN SATURATION: 92 % | RESPIRATION RATE: 18 BRPM | SYSTOLIC BLOOD PRESSURE: 102 MMHG | TEMPERATURE: 97.3 F | HEART RATE: 76 BPM | DIASTOLIC BLOOD PRESSURE: 64 MMHG

## 2025-08-14 VITALS
TEMPERATURE: 97.5 F | DIASTOLIC BLOOD PRESSURE: 74 MMHG | HEART RATE: 85 BPM | RESPIRATION RATE: 17 BRPM | SYSTOLIC BLOOD PRESSURE: 92 MMHG

## 2025-08-14 PROCEDURE — G0158 HHC OT ASSISTANT EA 15: HCPCS | Mod: CO,HHH

## 2025-08-14 PROCEDURE — G0156 HHCP-SVS OF AIDE,EA 15 MIN: HCPCS | Mod: HHH

## 2025-08-14 PROCEDURE — G0300 HHS/HOSPICE OF LPN EA 15 MIN: HCPCS | Mod: HHH

## 2025-08-14 ASSESSMENT — ENCOUNTER SYMPTOMS
PAIN LOCATION - PAIN SEVERITY: 5/10
PAIN: 1
PAIN LOCATION - PAIN FREQUENCY: FREQUENT
APPETITE LEVEL: FAIR
PERSON REPORTING PAIN: PATIENT
LAST BOWEL MOVEMENT: 67430
HIGHEST PAIN SEVERITY IN PAST 24 HOURS: 6/10
MUSCLE WEAKNESS: 1
BOWEL PATTERN NORMAL: 1
CHANGE IN APPETITE: UNCHANGED
SUBJECTIVE PAIN PROGRESSION: WAXING AND WANING
PAIN SEVERITY GOAL: 0/10

## 2025-08-14 ASSESSMENT — ACTIVITIES OF DAILY LIVING (ADL)
EFFECT OF PAIN ON DAILY ACTIVITIES: LIMITED FUNCTIONAL ACTIVITY
CURRENT_FUNCTION: ONE PERSON
AMBULATION ASSISTANCE: NON-AMBULATORY

## 2025-08-14 ASSESSMENT — PAIN DESCRIPTION - PAIN TYPE: TYPE: CHRONIC PAIN

## 2025-08-15 ENCOUNTER — OFFICE VISIT (OUTPATIENT)
Dept: NEUROLOGY | Facility: HOSPITAL | Age: 80
End: 2025-08-15
Payer: MEDICARE

## 2025-08-15 ENCOUNTER — OFFICE VISIT (OUTPATIENT)
Dept: WOUND CARE | Facility: CLINIC | Age: 80
End: 2025-08-15
Payer: MEDICARE

## 2025-08-15 VITALS
TEMPERATURE: 98 F | RESPIRATION RATE: 17 BRPM | DIASTOLIC BLOOD PRESSURE: 77 MMHG | SYSTOLIC BLOOD PRESSURE: 109 MMHG | WEIGHT: 139.99 LBS | HEIGHT: 57 IN | BODY MASS INDEX: 30.2 KG/M2 | HEART RATE: 88 BPM

## 2025-08-15 DIAGNOSIS — F51.04 CHRONIC INSOMNIA: ICD-10-CM

## 2025-08-15 DIAGNOSIS — I73.9 PERIPHERAL VASCULAR DISEASE: Primary | ICD-10-CM

## 2025-08-15 DIAGNOSIS — F02.B0 MODERATE LEWY BODY DEMENTIA, UNSPECIFIED WHETHER BEHAVIORAL, PSYCHOTIC, OR MOOD DISTURBANCE OR ANXIETY: Primary | ICD-10-CM

## 2025-08-15 DIAGNOSIS — M79.674 PAIN OF RIGHT GREAT TOE: ICD-10-CM

## 2025-08-15 DIAGNOSIS — G31.83 MODERATE LEWY BODY DEMENTIA, UNSPECIFIED WHETHER BEHAVIORAL, PSYCHOTIC, OR MOOD DISTURBANCE OR ANXIETY: Primary | ICD-10-CM

## 2025-08-15 PROCEDURE — 1159F MED LIST DOCD IN RCRD: CPT | Performed by: PSYCHIATRY & NEUROLOGY

## 2025-08-15 PROCEDURE — G2211 COMPLEX E/M VISIT ADD ON: HCPCS | Performed by: PSYCHIATRY & NEUROLOGY

## 2025-08-15 PROCEDURE — 1125F AMNT PAIN NOTED PAIN PRSNT: CPT | Performed by: PSYCHIATRY & NEUROLOGY

## 2025-08-15 PROCEDURE — 3074F SYST BP LT 130 MM HG: CPT | Performed by: PSYCHIATRY & NEUROLOGY

## 2025-08-15 PROCEDURE — 99215 OFFICE O/P EST HI 40 MIN: CPT | Performed by: PSYCHIATRY & NEUROLOGY

## 2025-08-15 PROCEDURE — 11042 DBRDMT SUBQ TIS 1ST 20SQCM/<: CPT

## 2025-08-15 PROCEDURE — G2212 PROLONG OUTPT/OFFICE VIS: HCPCS | Performed by: PSYCHIATRY & NEUROLOGY

## 2025-08-15 PROCEDURE — 3078F DIAST BP <80 MM HG: CPT | Performed by: PSYCHIATRY & NEUROLOGY

## 2025-08-15 PROCEDURE — 99212 OFFICE O/P EST SF 10 MIN: CPT | Mod: 25

## 2025-08-15 RX ORDER — MEMANTINE HYDROCHLORIDE 5 MG/1
5 TABLET ORAL 2 TIMES DAILY
Qty: 120 TABLET | Refills: 2 | Status: SHIPPED | OUTPATIENT
Start: 2025-08-23 | End: 2025-11-21

## 2025-08-15 RX ORDER — TRAZODONE HYDROCHLORIDE 100 MG/1
100 TABLET ORAL NIGHTLY PRN
Status: SHIPPED | OUTPATIENT
Start: 2025-08-15

## 2025-08-15 RX ORDER — MEMANTINE HYDROCHLORIDE 5 MG/1
5 TABLET ORAL DAILY
Qty: 7 TABLET | Refills: 0 | Status: SHIPPED | OUTPATIENT
Start: 2025-08-15 | End: 2025-08-22

## 2025-08-15 ASSESSMENT — PAIN SCALES - GENERAL: PAINLEVEL_OUTOF10: 8

## 2025-08-15 ASSESSMENT — ENCOUNTER SYMPTOMS
OCCASIONAL FEELINGS OF UNSTEADINESS: 0
LOSS OF SENSATION IN FEET: 0
DEPRESSION: 0

## 2025-08-15 ASSESSMENT — MONTREAL COGNITIVE ASSESSMENT (MOCA)
4. NAME EACH OF THE THREE ANIMALS SHOWN: 3
7. [VIGILENCE] TAP WHEN HEARING DESIGNATED LETTER: 0
VISUOSPATIAL/EXECUTIVE SUBSCORE: 0
6. READ LIST OF DIGITS [FORWARD/BACKWARD]: 0
5. MEMORY TRIALS: 0

## 2025-08-18 ENCOUNTER — HOME CARE VISIT (OUTPATIENT)
Dept: HOME HEALTH SERVICES | Facility: HOME HEALTH | Age: 80
End: 2025-08-18
Payer: MEDICARE

## 2025-08-18 VITALS
SYSTOLIC BLOOD PRESSURE: 114 MMHG | OXYGEN SATURATION: 96 % | TEMPERATURE: 98.5 F | HEART RATE: 86 BPM | DIASTOLIC BLOOD PRESSURE: 80 MMHG

## 2025-08-18 DIAGNOSIS — F51.01 PRIMARY INSOMNIA: ICD-10-CM

## 2025-08-18 PROCEDURE — G0152 HHCP-SERV OF OT,EA 15 MIN: HCPCS | Mod: HHH

## 2025-08-18 RX ORDER — TRAZODONE HYDROCHLORIDE 50 MG/1
50 TABLET ORAL NIGHTLY PRN
Qty: 30 TABLET | Refills: 0 | Status: SHIPPED | OUTPATIENT
Start: 2025-08-18

## 2025-08-18 ASSESSMENT — ENCOUNTER SYMPTOMS
PAIN LOCATION - EXACERBATING FACTORS: MOBILITY
PAIN: 1
PAIN SEVERITY GOAL: 6/10
LOWEST PAIN SEVERITY IN PAST 24 HOURS: 7/10
PAIN LOCATION - RELIEVING FACTORS: MEDICATION
PAIN LOCATION: RIGHT FOOT
PAIN LOCATION - PAIN FREQUENCY: CONSTANT
PAIN LOCATION - PAIN SEVERITY: 8/10
PERSON REPORTING PAIN: PATIENT
HIGHEST PAIN SEVERITY IN PAST 24 HOURS: 8/10

## 2025-08-19 DIAGNOSIS — F51.04 CHRONIC INSOMNIA: Primary | ICD-10-CM

## 2025-08-20 ENCOUNTER — HOME CARE VISIT (OUTPATIENT)
Dept: HOME HEALTH SERVICES | Facility: HOME HEALTH | Age: 80
End: 2025-08-20
Payer: MEDICARE

## 2025-08-20 PROCEDURE — G0299 HHS/HOSPICE OF RN EA 15 MIN: HCPCS | Mod: HHH

## 2025-08-20 RX ORDER — TRAZODONE HYDROCHLORIDE 100 MG/1
100 TABLET ORAL NIGHTLY PRN
Status: SHIPPED | OUTPATIENT
Start: 2025-08-20 | End: 2025-08-22

## 2025-08-21 ENCOUNTER — HOME CARE VISIT (OUTPATIENT)
Dept: HOME HEALTH SERVICES | Facility: HOME HEALTH | Age: 80
End: 2025-08-21
Payer: MEDICARE

## 2025-08-21 ENCOUNTER — SPECIALTY PHARMACY (OUTPATIENT)
Dept: PHARMACY | Facility: CLINIC | Age: 80
End: 2025-08-21

## 2025-08-21 PROCEDURE — RXMED WILLOW AMBULATORY MEDICATION CHARGE

## 2025-08-21 PROCEDURE — G0156 HHCP-SVS OF AIDE,EA 15 MIN: HCPCS | Mod: HHH

## 2025-08-21 PROCEDURE — G0152 HHCP-SERV OF OT,EA 15 MIN: HCPCS | Mod: HHH

## 2025-08-21 ASSESSMENT — ENCOUNTER SYMPTOMS
PAIN LOCATION: RIGHT FOOT
PAIN: 1
PERSON REPORTING PAIN: PATIENT
PAIN LOCATION - PAIN SEVERITY: 10/10
SUBJECTIVE PAIN PROGRESSION: WAXING AND WANING
PAIN LOCATION - RELIEVING FACTORS: REST, MEDICATION
PAIN SEVERITY GOAL: 8/10
PAIN LOCATION - EXACERBATING FACTORS: MOBILITY
LOWEST PAIN SEVERITY IN PAST 24 HOURS: 9/10
PAIN LOCATION - PAIN QUALITY: ACHE
HIGHEST PAIN SEVERITY IN PAST 24 HOURS: 10/10
PAIN LOCATION - PAIN FREQUENCY: CONSTANT

## 2025-08-22 VITALS
OXYGEN SATURATION: 95 % | HEART RATE: 67 BPM | RESPIRATION RATE: 19 BRPM | TEMPERATURE: 98.6 F | DIASTOLIC BLOOD PRESSURE: 60 MMHG | SYSTOLIC BLOOD PRESSURE: 110 MMHG

## 2025-08-22 SDOH — ECONOMIC STABILITY: FOOD INSECURITY: MEALS PER DAY: 3

## 2025-08-22 SDOH — ECONOMIC STABILITY: GENERAL

## 2025-08-22 ASSESSMENT — ENCOUNTER SYMPTOMS
PAIN LOCATION: GENERALIZED
PAIN LOCATION - PAIN QUALITY: ACHY
HIGHEST PAIN SEVERITY IN PAST 24 HOURS: 0/10
PERSON REPORTING PAIN: PATIENT
APPETITE LEVEL: FAIR
LAST BOWEL MOVEMENT: 67437
BOWEL INCONTINENCE: 1
PAIN LOCATION - PAIN FREQUENCY: INTERMITTENT
PAIN LOCATION - PAIN SEVERITY: 0/10
LOWEST PAIN SEVERITY IN PAST 24 HOURS: 0/10
SUBJECTIVE PAIN PROGRESSION: UNCHANGED
CHANGE IN APPETITE: UNCHANGED
MUSCLE WEAKNESS: 1
PAIN: 1
PAIN SEVERITY GOAL: 0/10

## 2025-08-25 ENCOUNTER — HOME CARE VISIT (OUTPATIENT)
Dept: HOME HEALTH SERVICES | Facility: HOME HEALTH | Age: 80
End: 2025-08-25
Payer: MEDICARE

## 2025-08-25 VITALS
HEART RATE: 97 BPM | DIASTOLIC BLOOD PRESSURE: 83 MMHG | TEMPERATURE: 97.2 F | SYSTOLIC BLOOD PRESSURE: 108 MMHG | RESPIRATION RATE: 18 BRPM

## 2025-08-25 PROCEDURE — G0158 HHC OT ASSISTANT EA 15: HCPCS | Mod: CO,HHH

## 2025-08-25 ASSESSMENT — PAIN DESCRIPTION - PAIN TYPE: TYPE: CHRONIC PAIN

## 2025-08-25 ASSESSMENT — ACTIVITIES OF DAILY LIVING (ADL): EFFECT OF PAIN ON DAILY ACTIVITIES: LIMITED ACTIVITY TOLERANCE

## 2025-08-27 ENCOUNTER — HOME CARE VISIT (OUTPATIENT)
Dept: HOME HEALTH SERVICES | Facility: HOME HEALTH | Age: 80
End: 2025-08-27
Payer: MEDICARE

## 2025-08-27 VITALS
RESPIRATION RATE: 14 BRPM | OXYGEN SATURATION: 100 % | SYSTOLIC BLOOD PRESSURE: 102 MMHG | DIASTOLIC BLOOD PRESSURE: 62 MMHG | TEMPERATURE: 98.2 F | HEART RATE: 70 BPM

## 2025-08-27 DIAGNOSIS — G89.4 CHRONIC PAIN SYNDROME: ICD-10-CM

## 2025-08-27 PROCEDURE — G0152 HHCP-SERV OF OT,EA 15 MIN: HCPCS | Mod: HHH

## 2025-08-27 PROCEDURE — G0300 HHS/HOSPICE OF LPN EA 15 MIN: HCPCS | Mod: HHH

## 2025-08-27 PROCEDURE — G0156 HHCP-SVS OF AIDE,EA 15 MIN: HCPCS | Mod: HHH

## 2025-08-27 RX ORDER — GABAPENTIN 600 MG/1
600 TABLET ORAL 3 TIMES DAILY
Qty: 90 TABLET | Refills: 0 | Status: SHIPPED | OUTPATIENT
Start: 2025-08-27

## 2025-08-28 VITALS — OXYGEN SATURATION: 99 % | HEART RATE: 81 BPM

## 2025-08-28 RX ORDER — GABAPENTIN 600 MG/1
600 TABLET ORAL 3 TIMES DAILY
Qty: 90 TABLET | Refills: 0 | OUTPATIENT
Start: 2025-08-28

## 2025-08-28 ASSESSMENT — ACTIVITIES OF DAILY LIVING (ADL)
GROOMING ASSESSED: 1
GROOMING_CURRENT_FUNCTION: MINIMUM ASSIST
TOILETING: MODERATE ASSIST
GROOMING_CURRENT_FUNCTION: STAND BY ASSIST
FEEDING ASSESSED: 1
FEEDING: STAND BY ASSIST
TOILETING: 1

## 2025-08-28 ASSESSMENT — ENCOUNTER SYMPTOMS
PAIN: 1
HIGHEST PAIN SEVERITY IN PAST 24 HOURS: 10/10
LOWEST PAIN SEVERITY IN PAST 24 HOURS: 5/10
PERSON REPORTING PAIN: PATIENT

## 2025-08-30 ASSESSMENT — ACTIVITIES OF DAILY LIVING (ADL)
CURRENT_FUNCTION: MODERATE ASSIST
CURRENT_FUNCTION: ONE PERSON
PHYSICAL TRANSFERS ASSESSED: 1

## 2025-08-30 ASSESSMENT — ENCOUNTER SYMPTOMS
PAIN: 1
PAIN LOCATION - RELIEVING FACTORS: RELAXING
HIGHEST PAIN SEVERITY IN PAST 24 HOURS: 8/10
PAIN LOCATION - EXACERBATING FACTORS: MOVEMENT
PERSON REPORTING PAIN: PATIENT
PAIN LOCATION: GENERALIZED
LOWEST PAIN SEVERITY IN PAST 24 HOURS: 6/10
APPETITE LEVEL: FAIR
PAIN LOCATION - PAIN SEVERITY: 6/10

## 2025-09-01 ENCOUNTER — HOME CARE VISIT (OUTPATIENT)
Dept: HOME HEALTH SERVICES | Facility: HOME HEALTH | Age: 80
End: 2025-09-01
Payer: MEDICARE

## 2025-09-02 ENCOUNTER — HOME CARE VISIT (OUTPATIENT)
Dept: HOME HEALTH SERVICES | Facility: HOME HEALTH | Age: 80
End: 2025-09-02
Payer: MEDICARE

## 2025-09-02 PROCEDURE — G0152 HHCP-SERV OF OT,EA 15 MIN: HCPCS | Mod: HHH

## 2025-09-03 ASSESSMENT — ACTIVITIES OF DAILY LIVING (ADL)
OASIS_M1830: 03
FEEDING ASSESSED: 1
GROOMING ASSESSED: 1
FEEDING: STAND BY ASSIST
GROOMING_CURRENT_FUNCTION: MINIMUM ASSIST
TOILETING: 1
ENTERING_EXITING_HOME: TWO PERSON
TOILETING: MODERATE ASSIST
GROOMING_CURRENT_FUNCTION: STAND BY ASSIST

## 2025-09-03 ASSESSMENT — ENCOUNTER SYMPTOMS
HIGHEST PAIN SEVERITY IN PAST 24 HOURS: 6/10
PAIN: 1
PERSON REPORTING PAIN: PATIENT

## 2025-09-04 ENCOUNTER — HOME CARE VISIT (OUTPATIENT)
Dept: HOME HEALTH SERVICES | Facility: HOME HEALTH | Age: 80
End: 2025-09-04
Payer: MEDICARE

## 2025-09-05 ENCOUNTER — APPOINTMENT (OUTPATIENT)
Dept: WOUND CARE | Facility: CLINIC | Age: 80
End: 2025-09-05
Payer: MEDICARE

## 2025-09-05 ENCOUNTER — PHARMACY VISIT (OUTPATIENT)
Dept: PHARMACY | Facility: CLINIC | Age: 80
End: 2025-09-05
Payer: MEDICARE

## 2025-10-14 ENCOUNTER — APPOINTMENT (OUTPATIENT)
Facility: CLINIC | Age: 80
End: 2025-10-14
Payer: MEDICARE

## 2025-10-21 ENCOUNTER — APPOINTMENT (OUTPATIENT)
Dept: PHARMACY | Facility: HOSPITAL | Age: 80
End: 2025-10-21
Payer: MEDICARE

## 2025-12-12 ENCOUNTER — APPOINTMENT (OUTPATIENT)
Dept: PRIMARY CARE | Facility: CLINIC | Age: 80
End: 2025-12-12
Payer: MEDICARE

## (undated) DEVICE — SUTURE, PDS II, 0, 36 IN, CT, VIOLET

## (undated) DEVICE — HANDPIECE,  IRRIGATION/ASPIRATION, 45DEG, 2.2MM-2.8MM, BLUE

## (undated) DEVICE — NEEDLE, HYPODERMIC, REGULAR WALL, REGULAR BEVEL, 30 G X 0.5 IN

## (undated) DEVICE — GLOVE, SURGICAL, PROTEXIS PI BLUE W/NEUTHERA, 6.5, PF, LF

## (undated) DEVICE — Device

## (undated) DEVICE — GOWN, ASTOUND, XL

## (undated) DEVICE — CANNULA, HYDRODISSECTION, MICRO, 25 G X 8 MM

## (undated) DEVICE — SUTURE, VICRYL, 3-0, 27 IN, SH

## (undated) DEVICE — GLOVE, SURGICAL, PROTEXIS PI , 8.0, PF, LF

## (undated) DEVICE — DRESSING, TRANSPARENT, TEGADERM, 2-3/8 X 2-3/4 IN

## (undated) DEVICE — SYRINGE, 1 CC, LUER LOCK

## (undated) DEVICE — DRAPE, SHEET, ENDOSCOPY, GENERAL, FENESTRATED, ARMBOARD COVER, 98 X 123.5 IN, DISPOSABLE, LF, STERILE

## (undated) DEVICE — KNIFE, SIDEPORT, DUAL BEVEL, ANGLED, 1.0MM

## (undated) DEVICE — NEEDLE, HYPODERMIC, REGULAR WALL, REGULAR BEVEL, 18 G X 1.5 IN

## (undated) DEVICE — DRAPE, SHEET, UTILITY, NON ABSORBENT, 18 X 26 IN, LF

## (undated) DEVICE — GLOVE, SURGICAL, PROTEXIS MICRO, 6.5, PF, LATEX

## (undated) DEVICE — KNIFE, CLEARCUT SLIT, SIINGLE BEVEL, 2.4MM, ANG

## (undated) DEVICE — SUTURE, ETHIBOND XTRA, 3-0, 30 IN, SH